# Patient Record
Sex: MALE | Race: WHITE | NOT HISPANIC OR LATINO | URBAN - METROPOLITAN AREA
[De-identification: names, ages, dates, MRNs, and addresses within clinical notes are randomized per-mention and may not be internally consistent; named-entity substitution may affect disease eponyms.]

---

## 2017-12-19 ENCOUNTER — HISTORICAL (OUTPATIENT)
Dept: RADIOLOGY | Facility: HOSPITAL | Age: 57
End: 2017-12-19

## 2018-09-21 ENCOUNTER — HISTORICAL (OUTPATIENT)
Dept: ADMINISTRATIVE | Facility: HOSPITAL | Age: 58
End: 2018-09-21

## 2019-09-25 ENCOUNTER — HISTORICAL (OUTPATIENT)
Dept: ADMINISTRATIVE | Facility: HOSPITAL | Age: 59
End: 2019-09-25

## 2019-09-25 LAB
ALBUMIN SERPL-MCNC: 4.6 G/DL (ref 3.6–4.8)
ALBUMIN/GLOB SERPL: 2 {RATIO} (ref 1.2–2.2)
ALP SERPL-CCNC: 88 IU/L (ref 39–117)
ALT SERPL-CCNC: 22 IU/L (ref 0–44)
AST SERPL-CCNC: 17 IU/L (ref 0–40)
BASOPHILS # BLD AUTO: 0.1 X10E3/UL (ref 0–0.2)
BASOPHILS NFR BLD AUTO: 1 %
BILIRUB SERPL-MCNC: 0.5 MG/DL (ref 0–1.2)
BUN SERPL-MCNC: 28 MG/DL (ref 8–27)
CALCIUM SERPL-MCNC: 9.3 MG/DL (ref 8.6–10.2)
CHLORIDE SERPL-SCNC: 104 MMOL/L (ref 96–106)
CHOLEST SERPL-MCNC: 222 MG/DL (ref 100–199)
CHOLEST/HDLC SERPL: 7.2 RATIO (ref 0–5)
CO2 SERPL-SCNC: 24 MMOL/L (ref 20–29)
CREAT SERPL-MCNC: 1.51 MG/DL (ref 0.76–1.27)
CREAT/UREA NIT SERPL: 19 (ref 10–24)
DEPRECATED CALCIDIOL+CALCIFEROL SERPL-MC: 33.2 NG/ML (ref 30–100)
EOSINOPHIL # BLD AUTO: 0.2 X10E3/UL (ref 0–0.4)
EOSINOPHIL NFR BLD AUTO: 3 %
ERYTHROCYTE [DISTWIDTH] IN BLOOD BY AUTOMATED COUNT: 13.3 % (ref 12.3–15.4)
GLOBULIN SER-MCNC: 2.3 G/DL (ref 1.5–4.5)
GLUCOSE SERPL-MCNC: 177 MG/DL (ref 65–99)
HCT VFR BLD AUTO: 40.8 % (ref 37.5–51)
HDLC SERPL-MCNC: 31 MG/DL
HGB BLD-MCNC: 12.9 G/DL (ref 13–17.7)
LDLC SERPL CALC-MCNC: 166 MG/DL (ref 0–99)
LYMPHOCYTES # BLD AUTO: 1.3 X10E3/UL (ref 0.7–3.1)
LYMPHOCYTES NFR BLD AUTO: 18 %
MCH RBC QN AUTO: 31.1 PG (ref 26.6–33)
MCHC RBC AUTO-ENTMCNC: 31.6 G/DL (ref 31.5–35.7)
MCV RBC AUTO: 98 FL (ref 79–97)
MICROALBUMIN/CREAT RATIO PNL UR: 33.8 MG/G CREAT (ref 0–30)
MONOCYTES # BLD AUTO: 0.7 X10E3/UL (ref 0.1–0.9)
MONOCYTES NFR BLD AUTO: 10 %
NEUTROPHILS # BLD AUTO: 4.9 X10E3/UL (ref 1.4–7)
NEUTROPHILS NFR BLD AUTO: 68 %
PLATELET # BLD AUTO: 174 X10E3/UL (ref 150–450)
POTASSIUM SERPL-SCNC: 4.6 MMOL/L (ref 3.5–5.2)
PROT SERPL-MCNC: 6.9 G/DL (ref 6–8.5)
PSA SERPL-MCNC: 1.2 NG/ML (ref 0–4)
RBC # BLD AUTO: 4.15 X10(6)/MCL (ref 4.14–5.8)
SODIUM SERPL-SCNC: 142 MMOL/L (ref 134–144)
TRIGL SERPL-MCNC: 124 MG/DL (ref 0–149)
TSH SERPL-ACNC: 2.38 MIU/ML (ref 0.45–4.5)
VLDLC SERPL CALC-MCNC: 25 MG/DL (ref 5–40)
WBC # SPEC AUTO: 7.2 X10E3/UL (ref 3.4–10.8)

## 2020-01-06 ENCOUNTER — HISTORICAL (OUTPATIENT)
Dept: ADMINISTRATIVE | Facility: HOSPITAL | Age: 60
End: 2020-01-06

## 2020-01-06 LAB
BASOPHILS # BLD AUTO: 0.1 X10E3/UL (ref 0–0.2)
BASOPHILS NFR BLD AUTO: 1 %
CHOLEST SERPL-MCNC: 168 MG/DL (ref 100–199)
CHOLEST/HDLC SERPL: 4.8 RATIO (ref 0–5)
EOSINOPHIL # BLD AUTO: 0.3 X10E3/UL (ref 0–0.4)
EOSINOPHIL NFR BLD AUTO: 4 %
ERYTHROCYTE [DISTWIDTH] IN BLOOD BY AUTOMATED COUNT: 13.4 % (ref 11.6–15.4)
HCT VFR BLD AUTO: 39.9 % (ref 37.5–51)
HDLC SERPL-MCNC: 35 MG/DL
HGB BLD-MCNC: 12.1 G/DL (ref 13–17.7)
LDLC SERPL CALC-MCNC: 124 MG/DL (ref 0–99)
LYMPHOCYTES # BLD AUTO: 1.6 X10E3/UL (ref 0.7–3.1)
LYMPHOCYTES NFR BLD AUTO: 19 %
MCH RBC QN AUTO: 30.6 PG (ref 26.6–33)
MCHC RBC AUTO-ENTMCNC: 30.3 G/DL (ref 31.5–35.7)
MCV RBC AUTO: 101 FL (ref 79–97)
MONOCYTES # BLD AUTO: 0.9 X10E3/UL (ref 0.1–0.9)
MONOCYTES NFR BLD AUTO: 11 %
NEUTROPHILS # BLD AUTO: 5.4 X10E3/UL (ref 1.4–7)
NEUTROPHILS NFR BLD AUTO: 65 %
PLATELET # BLD AUTO: 169 X10E3/UL (ref 150–450)
RBC # BLD AUTO: 3.95 X10(6)/MCL (ref 4.14–5.8)
TRIGL SERPL-MCNC: 47 MG/DL (ref 0–149)
VLDLC SERPL CALC-MCNC: 9 MG/DL (ref 5–40)
WBC # SPEC AUTO: 8.2 X10E3/UL (ref 3.4–10.8)

## 2020-04-07 ENCOUNTER — HISTORICAL (OUTPATIENT)
Dept: ADMINISTRATIVE | Facility: HOSPITAL | Age: 60
End: 2020-04-07

## 2020-04-07 LAB
ALBUMIN SERPL-MCNC: 4.7 G/DL (ref 3.8–4.8)
ALBUMIN/GLOB SERPL: 1.7 {RATIO} (ref 1.2–2.2)
ALP SERPL-CCNC: 74 IU/L (ref 39–117)
ALT SERPL-CCNC: 66 IU/L (ref 0–44)
AST SERPL-CCNC: 29 IU/L (ref 0–40)
BILIRUB SERPL-MCNC: 0.4 MG/DL (ref 0–1.2)
BUN SERPL-MCNC: 33 MG/DL (ref 8–27)
CALCIUM SERPL-MCNC: 9.7 MG/DL (ref 8.6–10.2)
CHLORIDE SERPL-SCNC: 104 MMOL/L (ref 96–106)
CHOLEST SERPL-MCNC: 187 MG/DL (ref 100–199)
CHOLEST/HDLC SERPL: 5.3 RATIO (ref 0–5)
CO2 SERPL-SCNC: 24 MMOL/L (ref 20–29)
CREAT SERPL-MCNC: 1.46 MG/DL (ref 0.76–1.27)
CREAT/UREA NIT SERPL: 23 (ref 10–24)
GLOBULIN SER-MCNC: 2.7 G/DL (ref 1.5–4.5)
GLUCOSE SERPL-MCNC: 145 MG/DL (ref 65–99)
HDLC SERPL-MCNC: 35 MG/DL
LDLC SERPL CALC-MCNC: 129 MG/DL (ref 0–99)
POTASSIUM SERPL-SCNC: 4.7 MMOL/L (ref 3.5–5.2)
PROT SERPL-MCNC: 7.4 G/DL (ref 6–8.5)
SODIUM SERPL-SCNC: 144 MMOL/L (ref 134–144)
TRIGL SERPL-MCNC: 114 MG/DL (ref 0–149)
VLDLC SERPL CALC-MCNC: 23 MG/DL (ref 5–40)

## 2020-07-14 ENCOUNTER — HISTORICAL (OUTPATIENT)
Dept: ADMINISTRATIVE | Facility: HOSPITAL | Age: 60
End: 2020-07-14

## 2020-07-14 LAB
CHOLEST SERPL-MCNC: 180 MG/DL (ref 100–199)
CHOLEST/HDLC SERPL: 5.3 RATIO (ref 0–5)
HDLC SERPL-MCNC: 34 MG/DL
LDLC SERPL CALC-MCNC: 130 MG/DL (ref 0–99)
TRIGL SERPL-MCNC: 79 MG/DL (ref 0–149)
VLDLC SERPL CALC-MCNC: 16 MG/DL (ref 5–40)

## 2021-01-19 ENCOUNTER — HISTORICAL (OUTPATIENT)
Dept: ADMINISTRATIVE | Facility: HOSPITAL | Age: 61
End: 2021-01-19

## 2021-01-19 LAB
ALBUMIN SERPL-MCNC: 4.8 G/DL (ref 3.8–4.8)
ALBUMIN/GLOB SERPL: 2.2 {RATIO} (ref 1.2–2.2)
ALP SERPL-CCNC: 64 IU/L (ref 39–117)
ALT SERPL-CCNC: 36 IU/L (ref 0–44)
AST SERPL-CCNC: 21 IU/L (ref 0–40)
BASOPHILS # BLD AUTO: 0.1 X10E3/UL (ref 0–0.2)
BASOPHILS NFR BLD AUTO: 1 %
BILIRUB SERPL-MCNC: 0.3 MG/DL (ref 0–1.2)
BUN SERPL-MCNC: 31 MG/DL (ref 8–27)
CALCIUM SERPL-MCNC: 9.9 MG/DL (ref 8.6–10.2)
CHLORIDE SERPL-SCNC: 107 MMOL/L (ref 96–106)
CHOLEST SERPL-MCNC: 193 MG/DL (ref 100–199)
CHOLEST/HDLC SERPL: 4.7 RATIO (ref 0–5)
CO2 SERPL-SCNC: 24 MMOL/L (ref 20–29)
CREAT SERPL-MCNC: 1.6 MG/DL (ref 0.76–1.27)
CREAT/UREA NIT SERPL: 19 (ref 10–24)
DEPRECATED CALCIDIOL+CALCIFEROL SERPL-MC: 36.5 NG/ML (ref 30–100)
EOSINOPHIL # BLD AUTO: 0.3 X10E3/UL (ref 0–0.4)
EOSINOPHIL NFR BLD AUTO: 4 %
ERYTHROCYTE [DISTWIDTH] IN BLOOD BY AUTOMATED COUNT: 13.3 % (ref 11.6–15.4)
GLOBULIN SER-MCNC: 2.2 G/DL (ref 1.5–4.5)
GLUCOSE SERPL-MCNC: 128 MG/DL (ref 65–99)
HCT VFR BLD AUTO: 41.7 % (ref 37.5–51)
HDLC SERPL-MCNC: 41 MG/DL
HGB BLD-MCNC: 12.6 G/DL (ref 13–17.7)
LDLC SERPL CALC-MCNC: 139 MG/DL (ref 0–99)
LYMPHOCYTES # BLD AUTO: 1.5 X10E3/UL (ref 0.7–3.1)
LYMPHOCYTES NFR BLD AUTO: 22 %
MCH RBC QN AUTO: 31.1 PG (ref 26.6–33)
MCHC RBC AUTO-ENTMCNC: 30.2 G/DL (ref 31.5–35.7)
MCV RBC AUTO: 103 FL (ref 79–97)
MICROALBUMIN/CREAT RATIO PNL UR: 54 MG/G CREAT (ref 0–29)
MONOCYTES # BLD AUTO: 0.7 X10E3/UL (ref 0.1–0.9)
MONOCYTES NFR BLD AUTO: 11 %
NEUTROPHILS # BLD AUTO: 4.3 X10E3/UL (ref 1.4–7)
NEUTROPHILS NFR BLD AUTO: 62 %
PLATELET # BLD AUTO: 165 X10E3/UL (ref 150–450)
POTASSIUM SERPL-SCNC: 4.6 MMOL/L (ref 3.5–5.2)
PROT SERPL-MCNC: 7 G/DL (ref 6–8.5)
PSA SERPL-MCNC: 1.2 NG/ML (ref 0–4)
RBC # BLD AUTO: 4.05 X10(6)/MCL (ref 4.14–5.8)
SODIUM SERPL-SCNC: 146 MMOL/L (ref 134–144)
TRIGL SERPL-MCNC: 72 MG/DL (ref 0–149)
TSH SERPL-ACNC: 2.52 MIU/ML (ref 0.45–4.5)
VLDLC SERPL CALC-MCNC: 13 MG/DL (ref 5–40)
WBC # SPEC AUTO: 6.9 X10E3/UL (ref 3.4–10.8)

## 2021-04-20 LAB
HEMOCCULT SP1 STL QL: NEGATIVE
HEMOCCULT SP2 STL QL: NEGATIVE
HEMOCCULT SP3 STL QL: NEGATIVE

## 2021-04-29 ENCOUNTER — HISTORICAL (OUTPATIENT)
Dept: ADMINISTRATIVE | Facility: HOSPITAL | Age: 61
End: 2021-04-29

## 2021-04-29 LAB
ALBUMIN SERPL-MCNC: 4.6 G/DL (ref 3.8–4.8)
ALBUMIN/GLOB SERPL: 1.9 {RATIO} (ref 1.2–2.2)
ALP SERPL-CCNC: 68 IU/L (ref 39–117)
ALT SERPL-CCNC: 34 IU/L (ref 0–44)
AST SERPL-CCNC: 19 IU/L (ref 0–40)
BASOPHILS # BLD AUTO: 0.1 X10E3/UL (ref 0–0.2)
BASOPHILS NFR BLD AUTO: 1 %
BILIRUB SERPL-MCNC: 0.3 MG/DL (ref 0–1.2)
BUN SERPL-MCNC: 27 MG/DL (ref 8–27)
CALCIUM SERPL-MCNC: 9.3 MG/DL (ref 8.6–10.2)
CHLORIDE SERPL-SCNC: 104 MMOL/L (ref 96–106)
CO2 SERPL-SCNC: 23 MMOL/L (ref 20–29)
CREAT SERPL-MCNC: 1.54 MG/DL (ref 0.76–1.27)
CREAT/UREA NIT SERPL: 18 (ref 10–24)
EOSINOPHIL # BLD AUTO: 0.3 X10E3/UL (ref 0–0.4)
EOSINOPHIL NFR BLD AUTO: 4 %
ERYTHROCYTE [DISTWIDTH] IN BLOOD BY AUTOMATED COUNT: 13.3 % (ref 11.6–15.4)
GLOBULIN SER-MCNC: 2.4 G/DL (ref 1.5–4.5)
GLUCOSE SERPL-MCNC: 118 MG/DL (ref 65–99)
HCT VFR BLD AUTO: 40.9 % (ref 37.5–51)
HGB BLD-MCNC: 13 G/DL (ref 13–17.7)
LYMPHOCYTES # BLD AUTO: 1.3 X10E3/UL (ref 0.7–3.1)
LYMPHOCYTES NFR BLD AUTO: 19 %
MCH RBC QN AUTO: 31.6 PG (ref 26.6–33)
MCHC RBC AUTO-ENTMCNC: 31.8 G/DL (ref 31.5–35.7)
MCV RBC AUTO: 100 FL (ref 79–97)
MONOCYTES # BLD AUTO: 0.7 X10E3/UL (ref 0.1–0.9)
MONOCYTES NFR BLD AUTO: 10 %
NEUTROPHILS # BLD AUTO: 4.7 X10E3/UL (ref 1.4–7)
NEUTROPHILS NFR BLD AUTO: 66 %
PLATELET # BLD AUTO: 156 X10E3/UL (ref 150–450)
POTASSIUM SERPL-SCNC: 4.8 MMOL/L (ref 3.5–5.2)
PROT SERPL-MCNC: 7 G/DL (ref 6–8.5)
RBC # BLD AUTO: 4.11 X10(6)/MCL (ref 4.14–5.8)
SODIUM SERPL-SCNC: 143 MMOL/L (ref 134–144)
WBC # SPEC AUTO: 7 X10E3/UL (ref 3.4–10.8)

## 2021-06-01 LAB
LEFT EYE DM RETINOPATHY: POSITIVE
RIGHT EYE DM RETINOPATHY: POSITIVE

## 2021-06-21 ENCOUNTER — HISTORICAL (OUTPATIENT)
Dept: ADMINISTRATIVE | Facility: HOSPITAL | Age: 61
End: 2021-06-21

## 2021-06-21 LAB
ALBUMIN SERPL-MCNC: 4.4 G/DL (ref 3.8–4.8)
ALBUMIN/GLOB SERPL: 1.6 {RATIO} (ref 1.2–2.2)
ALP SERPL-CCNC: 94 IU/L (ref 48–121)
ALT SERPL-CCNC: 45 IU/L (ref 0–44)
AST SERPL-CCNC: 41 IU/L (ref 0–40)
BASOPHILS # BLD AUTO: 0.1 X10E3/UL (ref 0–0.2)
BASOPHILS NFR BLD AUTO: 1 %
BILIRUB SERPL-MCNC: 0.2 MG/DL (ref 0–1.2)
BUN SERPL-MCNC: 40 MG/DL (ref 8–27)
CALCIUM SERPL-MCNC: 9.5 MG/DL (ref 8.6–10.2)
CHLORIDE SERPL-SCNC: 100 MMOL/L (ref 96–106)
CO2 SERPL-SCNC: 19 MMOL/L (ref 20–29)
CREAT SERPL-MCNC: 1.77 MG/DL (ref 0.76–1.27)
CREAT/UREA NIT SERPL: 23 (ref 10–24)
EOSINOPHIL # BLD AUTO: 0.4 X10E3/UL (ref 0–0.4)
EOSINOPHIL NFR BLD AUTO: 3 %
ERYTHROCYTE [DISTWIDTH] IN BLOOD BY AUTOMATED COUNT: 13.6 % (ref 11.6–15.4)
GLOBULIN SER-MCNC: 2.8 G/DL (ref 1.5–4.5)
GLUCOSE SERPL-MCNC: 199 MG/DL (ref 65–99)
HCT VFR BLD AUTO: 34.3 % (ref 37.5–51)
HGB BLD-MCNC: 10.9 G/DL (ref 13–17.7)
LYMPHOCYTES # BLD AUTO: 1.6 X10E3/UL (ref 0.7–3.1)
LYMPHOCYTES NFR BLD AUTO: 15 %
MCH RBC QN AUTO: 31.1 PG (ref 26.6–33)
MCHC RBC AUTO-ENTMCNC: 31.8 G/DL (ref 31.5–35.7)
MCV RBC AUTO: 98 FL (ref 79–97)
MONOCYTES # BLD AUTO: 0.8 X10E3/UL (ref 0.1–0.9)
MONOCYTES NFR BLD AUTO: 8 %
NEUTROPHILS # BLD AUTO: 7.5 X10E3/UL (ref 1.4–7)
NEUTROPHILS NFR BLD AUTO: 73 %
PLATELET # BLD AUTO: 322 X10E3/UL (ref 150–450)
POTASSIUM SERPL-SCNC: 4.7 MMOL/L (ref 3.5–5.2)
PROT SERPL-MCNC: 7.2 G/DL (ref 6–8.5)
PSA SERPL-MCNC: 10.3 NG/ML (ref 0–4)
RBC # BLD AUTO: 3.5 X10(6)/MCL (ref 4.14–5.8)
SODIUM SERPL-SCNC: 139 MMOL/L (ref 134–144)
TSH SERPL-ACNC: 1.71 MIU/ML (ref 0.45–4.5)
WBC # SPEC AUTO: 10.4 X10E3/UL (ref 3.4–10.8)

## 2021-06-28 ENCOUNTER — HISTORICAL (OUTPATIENT)
Dept: ADMINISTRATIVE | Facility: HOSPITAL | Age: 61
End: 2021-06-28

## 2021-06-28 LAB
ALBUMIN SERPL-MCNC: 4.3 G/DL (ref 3.8–4.8)
ALBUMIN/GLOB SERPL: 1.8 {RATIO} (ref 1.2–2.2)
ALP SERPL-CCNC: 74 IU/L (ref 48–121)
ALT SERPL-CCNC: 25 IU/L (ref 0–44)
AST SERPL-CCNC: 18 IU/L (ref 0–40)
BILIRUB SERPL-MCNC: 0.2 MG/DL (ref 0–1.2)
BUN SERPL-MCNC: 37 MG/DL (ref 8–27)
CALCIUM SERPL-MCNC: 9.2 MG/DL (ref 8.6–10.2)
CHLORIDE SERPL-SCNC: 108 MMOL/L (ref 96–106)
CO2 SERPL-SCNC: 20 MMOL/L (ref 20–29)
CREAT SERPL-MCNC: 1.81 MG/DL (ref 0.76–1.27)
CREAT/UREA NIT SERPL: 20 (ref 10–24)
GLOBULIN SER-MCNC: 2.4 G/DL (ref 1.5–4.5)
GLUCOSE SERPL-MCNC: 160 MG/DL (ref 65–99)
POTASSIUM SERPL-SCNC: 4.5 MMOL/L (ref 3.5–5.2)
PROT SERPL-MCNC: 6.7 G/DL (ref 6–8.5)
SODIUM SERPL-SCNC: 146 MMOL/L (ref 134–144)

## 2021-09-27 ENCOUNTER — HISTORICAL (OUTPATIENT)
Dept: ADMINISTRATIVE | Facility: HOSPITAL | Age: 61
End: 2021-09-27

## 2021-09-27 LAB
ALBUMIN SERPL-MCNC: 4.6 G/DL (ref 3.8–4.8)
ALBUMIN/GLOB SERPL: 2.1 {RATIO} (ref 1.2–2.2)
ALP SERPL-CCNC: 72 IU/L (ref 44–121)
ALT SERPL-CCNC: 42 IU/L (ref 0–44)
AST SERPL-CCNC: 32 IU/L (ref 0–40)
BASOPHILS # BLD AUTO: 0 X10E3/UL (ref 0–0.2)
BASOPHILS NFR BLD AUTO: 1 %
BILIRUB SERPL-MCNC: 0.2 MG/DL (ref 0–1.2)
BUN SERPL-MCNC: 32 MG/DL (ref 8–27)
CALCIUM SERPL-MCNC: 9.1 MG/DL (ref 8.6–10.2)
CHLORIDE SERPL-SCNC: 105 MMOL/L (ref 96–106)
CHOLEST SERPL-MCNC: 139 MG/DL (ref 100–199)
CHOLEST/HDLC SERPL: 4 RATIO (ref 0–5)
CO2 SERPL-SCNC: 23 MMOL/L (ref 20–29)
CREAT SERPL-MCNC: 1.72 MG/DL (ref 0.76–1.27)
CREAT/UREA NIT SERPL: 19 (ref 10–24)
DEPRECATED CALCIDIOL+CALCIFEROL SERPL-MC: 34.3 NG/ML (ref 30–100)
EOSINOPHIL # BLD AUTO: 0.2 X10E3/UL (ref 0–0.4)
EOSINOPHIL NFR BLD AUTO: 3 %
ERYTHROCYTE [DISTWIDTH] IN BLOOD BY AUTOMATED COUNT: 14.5 % (ref 11.6–15.4)
GLOBULIN SER-MCNC: 2.2 G/DL (ref 1.5–4.5)
GLUCOSE SERPL-MCNC: 150 MG/DL (ref 65–99)
HCT VFR BLD AUTO: 37.8 % (ref 37.5–51)
HDLC SERPL-MCNC: 35 MG/DL
HGB BLD-MCNC: 11.7 G/DL (ref 13–17.7)
LDLC SERPL CALC-MCNC: 90 MG/DL (ref 0–99)
LYMPHOCYTES # BLD AUTO: 1 X10E3/UL (ref 0.7–3.1)
LYMPHOCYTES NFR BLD AUTO: 17 %
MCH RBC QN AUTO: 30.2 PG (ref 26.6–33)
MCHC RBC AUTO-ENTMCNC: 31 G/DL (ref 31.5–35.7)
MCV RBC AUTO: 98 FL (ref 79–97)
MONOCYTES # BLD AUTO: 0.6 X10E3/UL (ref 0.1–0.9)
MONOCYTES NFR BLD AUTO: 10 %
NEUTROPHILS # BLD AUTO: 4.3 X10E3/UL (ref 1.4–7)
NEUTROPHILS NFR BLD AUTO: 69 %
PLATELET # BLD AUTO: 165 X10E3/UL (ref 150–450)
POTASSIUM SERPL-SCNC: 4.7 MMOL/L (ref 3.5–5.2)
PROT SERPL-MCNC: 6.8 G/DL (ref 6–8.5)
PSA SERPL-MCNC: 5.2 NG/ML (ref 0–4)
RBC # BLD AUTO: 3.87 X10(6)/MCL (ref 4.14–5.8)
SODIUM SERPL-SCNC: 142 MMOL/L (ref 134–144)
TRIGL SERPL-MCNC: 71 MG/DL (ref 0–149)
VLDLC SERPL CALC-MCNC: 14 MG/DL (ref 5–40)
WBC # SPEC AUTO: 6.2 X10E3/UL (ref 3.4–10.8)

## 2022-01-11 ENCOUNTER — HISTORICAL (OUTPATIENT)
Dept: ADMINISTRATIVE | Facility: HOSPITAL | Age: 62
End: 2022-01-11

## 2022-01-11 LAB
ALBUMIN SERPL-MCNC: 4.7 G/DL (ref 3.8–4.8)
ALBUMIN/GLOB SERPL: 2 {RATIO} (ref 1.2–2.2)
ALP SERPL-CCNC: 61 IU/L (ref 44–121)
ALT SERPL-CCNC: 29 IU/L (ref 0–44)
AST SERPL-CCNC: 25 IU/L (ref 0–40)
BASOPHILS # BLD AUTO: 0 X10E3/UL (ref 0–0.2)
BASOPHILS NFR BLD AUTO: 1 %
BILIRUB SERPL-MCNC: 0.3 MG/DL (ref 0–1.2)
BUN SERPL-MCNC: 32 MG/DL (ref 8–27)
CALCIUM SERPL-MCNC: 9.3 MG/DL (ref 8.6–10.2)
CHLORIDE SERPL-SCNC: 105 MMOL/L (ref 96–106)
CHOLEST SERPL-MCNC: 150 MG/DL (ref 100–199)
CHOLEST/HDLC SERPL: 4.1 RATIO (ref 0–5)
CO2 SERPL-SCNC: 23 MMOL/L (ref 20–29)
CREAT SERPL-MCNC: 1.55 MG/DL (ref 0.76–1.27)
CREAT/UREA NIT SERPL: 21 (ref 10–24)
EOSINOPHIL # BLD AUTO: 0.2 X10E3/UL (ref 0–0.4)
EOSINOPHIL NFR BLD AUTO: 3 %
ERYTHROCYTE [DISTWIDTH] IN BLOOD BY AUTOMATED COUNT: 13.8 % (ref 11.6–15.4)
GLOBULIN SER-MCNC: 2.4 G/DL (ref 1.5–4.5)
GLUCOSE SERPL-MCNC: 98 MG/DL (ref 65–99)
HBA1C MFR BLD: 6.6 % (ref 4.8–5.6)
HCT VFR BLD AUTO: 38 % (ref 37.5–51)
HDLC SERPL-MCNC: 37 MG/DL
HGB BLD-MCNC: 11.9 G/DL (ref 13–17.7)
LDLC SERPL CALC-MCNC: 100 MG/DL (ref 0–99)
LYMPHOCYTES # BLD AUTO: 1 X10E3/UL (ref 0.7–3.1)
LYMPHOCYTES NFR BLD AUTO: 15 %
MCH RBC QN AUTO: 31.6 PG (ref 26.6–33)
MCHC RBC AUTO-ENTMCNC: 31.3 G/DL (ref 31.5–35.7)
MCV RBC AUTO: 101 FL (ref 79–97)
MONOCYTES # BLD AUTO: 0.7 X10E3/UL (ref 0.1–0.9)
MONOCYTES NFR BLD AUTO: 9 %
NEUTROPHILS # BLD AUTO: 5 X10E3/UL (ref 1.4–7)
NEUTROPHILS NFR BLD AUTO: 72 %
PLATELET # BLD AUTO: 171 X10E3/UL (ref 150–450)
POTASSIUM SERPL-SCNC: 5 MMOL/L (ref 3.5–5.2)
PROT SERPL-MCNC: 7.1 G/DL (ref 6–8.5)
PSA SERPL-MCNC: 4.1 NG/ML (ref 0–4)
RBC # BLD AUTO: 3.77 X10(6)/MCL (ref 4.14–5.8)
SODIUM SERPL-SCNC: 142 MMOL/L (ref 134–144)
TRIGL SERPL-MCNC: 65 MG/DL (ref 0–149)
VLDLC SERPL CALC-MCNC: 13 MG/DL (ref 5–40)
WBC # SPEC AUTO: 7 X10E3/UL (ref 3.4–10.8)

## 2022-04-11 ENCOUNTER — HISTORICAL (OUTPATIENT)
Dept: ADMINISTRATIVE | Facility: HOSPITAL | Age: 62
End: 2022-04-11
Payer: COMMERCIAL

## 2022-04-29 VITALS
BODY MASS INDEX: 21.19 KG/M2 | OXYGEN SATURATION: 98 % | DIASTOLIC BLOOD PRESSURE: 65 MMHG | WEIGHT: 124.13 LBS | SYSTOLIC BLOOD PRESSURE: 103 MMHG | HEIGHT: 64 IN

## 2022-07-06 PROBLEM — I25.10 ARTERIOSCLEROSIS OF CORONARY ARTERY: Chronic | Status: ACTIVE | Noted: 2022-07-06

## 2022-07-06 PROBLEM — E78.2 MIXED HYPERLIPIDEMIA: Status: ACTIVE | Noted: 2022-07-06

## 2022-07-06 PROBLEM — I25.10 ARTERIOSCLEROSIS OF CORONARY ARTERY: Status: ACTIVE | Noted: 2022-07-06

## 2022-07-06 PROBLEM — G82.20 PARAPARESIS: Chronic | Status: ACTIVE | Noted: 2022-07-06

## 2022-07-06 PROBLEM — I10 HYPERTENSION: Chronic | Status: ACTIVE | Noted: 2022-07-06

## 2022-07-06 PROBLEM — R79.89 LOW VITAMIN D LEVEL: Status: ACTIVE | Noted: 2022-07-06

## 2022-07-06 PROBLEM — D63.8 ANEMIA OF CHRONIC DISEASE: Status: ACTIVE | Noted: 2022-07-06

## 2022-07-06 PROBLEM — I10 HYPERTENSION: Status: ACTIVE | Noted: 2022-07-06

## 2022-07-06 PROBLEM — N18.31 STAGE 3A CHRONIC KIDNEY DISEASE: Status: ACTIVE | Noted: 2022-07-06

## 2022-07-06 PROBLEM — D63.8 ANEMIA OF CHRONIC DISEASE: Chronic | Status: ACTIVE | Noted: 2022-07-06

## 2022-07-06 PROBLEM — R79.89 LOW VITAMIN D LEVEL: Chronic | Status: ACTIVE | Noted: 2022-07-06

## 2022-07-06 PROBLEM — K21.9 GASTROESOPHAGEAL REFLUX DISEASE: Chronic | Status: ACTIVE | Noted: 2022-07-06

## 2022-07-06 PROBLEM — N18.31 STAGE 3A CHRONIC KIDNEY DISEASE: Chronic | Status: ACTIVE | Noted: 2022-07-06

## 2022-07-06 PROBLEM — E53.8 COBALAMIN DEFICIENCY: Status: ACTIVE | Noted: 2022-07-06

## 2022-07-06 PROBLEM — E11.9 TYPE 2 DIABETES MELLITUS: Chronic | Status: ACTIVE | Noted: 2022-07-06

## 2022-07-06 PROBLEM — E11.9 TYPE 2 DIABETES MELLITUS: Status: ACTIVE | Noted: 2022-07-06

## 2022-07-06 PROBLEM — E78.2 MIXED HYPERLIPIDEMIA: Chronic | Status: ACTIVE | Noted: 2022-07-06

## 2022-07-06 PROBLEM — G82.20 PARAPARESIS: Status: ACTIVE | Noted: 2022-07-06

## 2022-07-06 PROBLEM — G95.9 CERVICAL MYELOPATHY: Status: ACTIVE | Noted: 2022-07-06

## 2022-07-06 PROBLEM — I42.8 OTHER CARDIOMYOPATHY: Status: ACTIVE | Noted: 2022-07-06

## 2022-07-06 PROBLEM — E53.8 COBALAMIN DEFICIENCY: Chronic | Status: ACTIVE | Noted: 2022-07-06

## 2022-07-06 PROBLEM — G95.9 CERVICAL MYELOPATHY: Chronic | Status: ACTIVE | Noted: 2022-07-06

## 2022-07-06 PROBLEM — K21.9 GASTROESOPHAGEAL REFLUX DISEASE: Status: ACTIVE | Noted: 2022-07-06

## 2022-07-18 PROBLEM — I42.8 OTHER CARDIOMYOPATHY: Chronic | Status: ACTIVE | Noted: 2022-07-06

## 2022-07-25 ENCOUNTER — OFFICE VISIT (OUTPATIENT)
Dept: NEUROLOGY | Facility: CLINIC | Age: 62
End: 2022-07-25
Payer: COMMERCIAL

## 2022-07-25 VITALS
BODY MASS INDEX: 20.32 KG/M2 | SYSTOLIC BLOOD PRESSURE: 102 MMHG | WEIGHT: 119 LBS | HEIGHT: 64 IN | DIASTOLIC BLOOD PRESSURE: 60 MMHG

## 2022-07-25 DIAGNOSIS — Z86.73 H/O: STROKE: ICD-10-CM

## 2022-07-25 DIAGNOSIS — I63.89 OTHER CEREBRAL INFARCTION: ICD-10-CM

## 2022-07-25 DIAGNOSIS — G82.20 PARAPARESIS: Primary | Chronic | ICD-10-CM

## 2022-07-25 DIAGNOSIS — M54.16 LUMBAR RADICULAR PAIN: ICD-10-CM

## 2022-07-25 DIAGNOSIS — R33.9 URINARY RETENTION: ICD-10-CM

## 2022-07-25 DIAGNOSIS — R25.8 CLONUS: ICD-10-CM

## 2022-07-25 DIAGNOSIS — G95.9 CERVICAL MYELOPATHY: Chronic | ICD-10-CM

## 2022-07-25 PROCEDURE — 1159F PR MEDICATION LIST DOCUMENTED IN MEDICAL RECORD: ICD-10-PCS | Mod: CPTII,S$GLB,, | Performed by: NURSE PRACTITIONER

## 2022-07-25 PROCEDURE — 3074F SYST BP LT 130 MM HG: CPT | Mod: CPTII,S$GLB,, | Performed by: NURSE PRACTITIONER

## 2022-07-25 PROCEDURE — 3078F DIAST BP <80 MM HG: CPT | Mod: CPTII,S$GLB,, | Performed by: NURSE PRACTITIONER

## 2022-07-25 PROCEDURE — 99999 PR PBB SHADOW E&M-EST. PATIENT-LVL IV: ICD-10-PCS | Mod: PBBFAC,,, | Performed by: NURSE PRACTITIONER

## 2022-07-25 PROCEDURE — 1160F PR REVIEW ALL MEDS BY PRESCRIBER/CLIN PHARMACIST DOCUMENTED: ICD-10-PCS | Mod: CPTII,S$GLB,, | Performed by: NURSE PRACTITIONER

## 2022-07-25 PROCEDURE — 3051F PR MOST RECENT HEMOGLOBIN A1C LEVEL 7.0 - < 8.0%: ICD-10-PCS | Mod: CPTII,S$GLB,, | Performed by: NURSE PRACTITIONER

## 2022-07-25 PROCEDURE — 99214 PR OFFICE/OUTPT VISIT, EST, LEVL IV, 30-39 MIN: ICD-10-PCS | Mod: S$GLB,,, | Performed by: NURSE PRACTITIONER

## 2022-07-25 PROCEDURE — 1159F MED LIST DOCD IN RCRD: CPT | Mod: CPTII,S$GLB,, | Performed by: NURSE PRACTITIONER

## 2022-07-25 PROCEDURE — 99214 OFFICE O/P EST MOD 30 MIN: CPT | Mod: S$GLB,,, | Performed by: NURSE PRACTITIONER

## 2022-07-25 PROCEDURE — 99999 PR PBB SHADOW E&M-EST. PATIENT-LVL IV: CPT | Mod: PBBFAC,,, | Performed by: NURSE PRACTITIONER

## 2022-07-25 PROCEDURE — 3051F HG A1C>EQUAL 7.0%<8.0%: CPT | Mod: CPTII,S$GLB,, | Performed by: NURSE PRACTITIONER

## 2022-07-25 PROCEDURE — 3008F BODY MASS INDEX DOCD: CPT | Mod: CPTII,S$GLB,, | Performed by: NURSE PRACTITIONER

## 2022-07-25 PROCEDURE — 3008F PR BODY MASS INDEX (BMI) DOCUMENTED: ICD-10-PCS | Mod: CPTII,S$GLB,, | Performed by: NURSE PRACTITIONER

## 2022-07-25 PROCEDURE — 3074F PR MOST RECENT SYSTOLIC BLOOD PRESSURE < 130 MM HG: ICD-10-PCS | Mod: CPTII,S$GLB,, | Performed by: NURSE PRACTITIONER

## 2022-07-25 PROCEDURE — 1160F RVW MEDS BY RX/DR IN RCRD: CPT | Mod: CPTII,S$GLB,, | Performed by: NURSE PRACTITIONER

## 2022-07-25 PROCEDURE — 3078F PR MOST RECENT DIASTOLIC BLOOD PRESSURE < 80 MM HG: ICD-10-PCS | Mod: CPTII,S$GLB,, | Performed by: NURSE PRACTITIONER

## 2022-07-25 RX ORDER — DULOXETIN HYDROCHLORIDE 30 MG/1
30 CAPSULE, DELAYED RELEASE ORAL DAILY
Qty: 7 CAPSULE | Refills: 0 | Status: SHIPPED | OUTPATIENT
Start: 2022-07-25 | End: 2023-07-24

## 2022-07-25 NOTE — PROGRESS NOTES
Subjective:          Patient ID: Froy Barragan is a 62 y.o. male.    Chief Complaint: paraparesis     HPI:            Cont to have lower back pain, weakness to legs; states limited walking distance: now utilizes w/c in home; walker for short distances    States cymbalta not helpful; he had stopped gabapentin but inc pain and resumed; wants to stop the Cymbalta.    Has urinary urgency; self caths; management per Dr. Dilan Berry    Has frequent falls    Denies stroke symptoms    Continues with Paraparesis and increased weakness and deconditioning.    Sleeps well most nights      ROS: as per HPI, otherwise pertinent systems review is negative          Past Medical History:   Diagnosis Date    Anemia of chronic disease 7/6/2022    Arteriosclerosis of coronary artery 7/6/2022    Cervical myelopathy 7/6/2022    Cobalamin deficiency 7/6/2022    Gastroesophageal reflux disease 7/6/2022    Hypertension 7/6/2022    Low vitamin D level 7/6/2022    Mixed hyperlipidemia 7/6/2022    Paraparesis 7/6/2022    Stage 3a chronic kidney disease 7/6/2022    Type 2 diabetes mellitus 7/6/2022       Past Surgical History:   Procedure Laterality Date    SPINE SURGERY         History reviewed. No pertinent family history.    Social History     Socioeconomic History    Marital status: Single   Tobacco Use    Smoking status: Never Smoker    Smokeless tobacco: Never Used   Substance and Sexual Activity    Alcohol use: Never    Drug use: Never       Review of patient's allergies indicates:  No Known Allergies      Current Outpatient Medications:     alfuzosin (UROXATRAL) 10 mg Tb24, Take 10 mg by mouth once daily., Disp: , Rfl:     amLODIPine (NORVASC) 2.5 MG tablet, Take 2.5 mg by mouth., Disp: , Rfl:     aspirin (ECOTRIN) 81 MG EC tablet, Take 81 mg by mouth once daily., Disp: , Rfl:     carvediloL (COREG) 25 MG tablet, Take 25 mg by mouth 2 (two) times daily., Disp: , Rfl:     DULoxetine (CYMBALTA) 30 MG  "capsule, Take 60 mg by mouth once daily., Disp: , Rfl:     ezetimibe (ZETIA) 10 mg tablet, Take 10 mg by mouth once daily., Disp: , Rfl:     gabapentin (NEURONTIN) 300 MG capsule, Take 300 mg by mouth 3 (three) times daily., Disp: , Rfl:     glimepiride (AMARYL) 4 MG tablet, Take 1 tablet (4 mg total) by mouth daily with breakfast., Disp: 90 tablet, Rfl: 3    pantoprazole (PROTONIX) 40 MG tablet, Take 1 tablet (40 mg total) by mouth once daily., Disp: 30 tablet, Rfl: 11    rosuvastatin (CRESTOR) 40 MG Tab, Take 40 mg by mouth once daily., Disp: , Rfl:     vitamin D (VITAMIN D3) 1000 units Tab, Take 1,000 Units by mouth once daily., Disp: , Rfl:          Objective:        Exam:   /60   Ht 5' 4" (1.626 m)   Wt 54 kg (119 lb)   BMI 20.43 kg/m²     Physical Exam  Vitals reviewed.   Constitutional:       Appearance: no distress  HENT:      Ears:      Comments: normal   Eyes:      Extraocular Movements: Extraocular movements intact.      VFs grossly intact  Cardiovascular:      Rate and Rhythm: Normal rate and regular rhythm.   Pulmonary:      Effort: Pulmonary effort is normal.      Breath sounds: Normal breath sounds.   Musculoskeletal:         General: Normal range of motion; generalized weakness     B FDI and gastroc wasting  Skin:     General: Skin is warm and dry.      Healing abrasion lateral aspect of R elbow     Healing abrasions B knees  Neurological:      Mental Status: He is alert and oriented to person, place, and time.     Speech clear and fluent; no dysarthria     Face symmetric     Motor - gen weakness; B bicep/tricep/pincers/figner spread/wrist ext      5/5; B hip, PF, DF all 3-4/5; B knee ext 5/5     Reduced vib at the ankles and great toes     B bicep/tri/brachrad/KJs/AJs 2+     Nonsustained R ankle clonus and increased L ankle tone     No cogwheel rigidity     strongly positive bilateral Babinski's      Gait: WC; able to stand with assistance and was anteriorflexed;    unable to take a " step unassisted  Psychiatric:         Mood and Affect: Mood normal.         Behavior: Behavior normal.     Upper extremities move well but has wasting in the hand muscles.  Lower extremities were stiff           Assessment/Plan:     Problem List Items Addressed This Visit        Neuro    Cervical myelopathy (Chronic)    Current Assessment & Plan     MRI C spine w w/o contrast    Fall risk discussed; asked that he use WC or walker at all times    He refused PT    FU in 2 weeks w/Dr. Smyth for imaging review           Paraparesis - Primary (Chronic)    Current Assessment & Plan     Repeat MRI brain and C spine w w/o contrast  Advise he be careful not to fall; use assistive devices at all times    Driving discussed    Patient refused PT           H/O: stroke    Lumbar radicular pain    Current Assessment & Plan     Wean the Duloxetine:  For one week take 30 mg daily then stop taking    Ok to increase the Gabapentin to 2 caps per dose three times per day by adding one cap per week to one dose starting with bed time dose. He will ok this with PCP Dr. Bran who prescribes this for him.     Medication administration personally reviewed with patient by MD/provider. Potential or actual medication changes discussed. Common and potentially serious side effects of medications or medication changes discussed. Discussed increase risk of falls while taking Gabapentin; he ia aware and mentioned that most of the time he is in the WC.             Clonus    Current Assessment & Plan     Nonsustained R ankle clonus    MRI C spine w w/o contrast              Renal/    Urinary retention    Current Assessment & Plan     Patient continues to self catheterize - management per Dr. Dilan Berry    Repeat MRI  C spine w w/o contrast              Other Visit Diagnoses     Other cerebral infarction                   Continue 2' stroke prevention measures         Abhinav Alvarez, MSN, APRN, AGACNP-BC

## 2022-07-25 NOTE — ASSESSMENT & PLAN NOTE
Wean the Duloxetine:  For one week take 30 mg daily then stop taking    Ok to increase the Gabapentin to 2 caps per dose three times per day by adding one cap per week to one dose starting with bed time dose. He will ok this with PCP Dr. Bran who prescribes this for him.     Medication administration personally reviewed with patient by MD/provider. Potential or actual medication changes discussed. Common and potentially serious side effects of medications or medication changes discussed. Discussed increase risk of falls while taking Gabapentin; he ia aware and mentioned that most of the time he is in the .

## 2022-07-25 NOTE — ASSESSMENT & PLAN NOTE
MRI C spine w w/o contrast    Fall risk discussed; asked that he use WC or walker at all times    He refused PT    FU in 2 weeks w/Dr. Smyth for imaging review

## 2022-07-25 NOTE — ASSESSMENT & PLAN NOTE
Repeat MRI brain and C spine w w/o contrast  Advise he be careful not to fall; use assistive devices at all times    Driving discussed    Patient refused PT

## 2022-07-25 NOTE — ASSESSMENT & PLAN NOTE
Patient continues to self catheterize - management per Dr. Dilan Berry    Repeat MRI  C spine w w/o contrast

## 2022-08-08 ENCOUNTER — TELEPHONE (OUTPATIENT)
Dept: NEUROLOGY | Facility: CLINIC | Age: 62
End: 2022-08-08
Payer: COMMERCIAL

## 2022-08-08 NOTE — TELEPHONE ENCOUNTER
D/w pt: auth completed for [facility]; pt sched'd for 8/9/22 @345 (arrival time); pt notified; contact info for imaging location

## 2022-08-08 NOTE — TELEPHONE ENCOUNTER
CT scans an option but not likely to show me what I need to see (cord or demyelination) and if he has not met deductible this may be cost prohibitive as well. I could possibly show me stenosis and possibe stroke changes although this is less likely.     He was averse to PT that I suggested as well.    I did rec changes to his med regimen so we can keep his upcoming appt for now so we can discuss.    Is he averse to CT scans?

## 2022-08-08 NOTE — TELEPHONE ENCOUNTER
----- Message from Jojo Dean sent at 2022  8:50 AM CDT -----  Regarding: MRI not done yet  CallType: Patient Call  To: Ofc When in   From: Froy Barragan   Phone: [phone]   Patient name: Same   : 1960   Reg Dr: Dr Dennis Smyth   Ref: Did not do his MRI, will this  affect his appointment? Please call    [facility] ID: [id number]    --------------------------------------  Message History  Account: [id number]  Taken:  Fri 05-Aug-2022  1:17p PEYTON  Serial#: [phone]

## 2022-08-08 NOTE — TELEPHONE ENCOUNTER
rec'd call from [facility] regarding imaging being cancelled by patient  ..  Cost issue (he has not met his deductible); he has a f/u appt sched'd for this Thursday: how do y'all want to proceed

## 2022-08-10 ENCOUNTER — DOCUMENTATION ONLY (OUTPATIENT)
Dept: ADMINISTRATIVE | Facility: HOSPITAL | Age: 62
End: 2022-08-10
Payer: COMMERCIAL

## 2022-10-06 PROBLEM — R25.8 CLONUS: Chronic | Status: ACTIVE | Noted: 2022-07-25

## 2022-10-06 PROBLEM — M54.16 LUMBAR RADICULAR PAIN: Chronic | Status: ACTIVE | Noted: 2022-07-25

## 2022-10-06 PROBLEM — R33.9 URINARY RETENTION: Chronic | Status: ACTIVE | Noted: 2022-07-25

## 2022-10-06 PROBLEM — Z86.73 H/O: STROKE: Chronic | Status: ACTIVE | Noted: 2022-07-25

## 2022-10-20 PROBLEM — Z00.00 WELLNESS EXAMINATION: Status: ACTIVE | Noted: 2022-10-20

## 2023-01-10 PROBLEM — Z00.00 WELLNESS EXAMINATION: Status: RESOLVED | Noted: 2022-10-20 | Resolved: 2023-01-10

## 2024-05-03 ENCOUNTER — HOSPITAL ENCOUNTER (OUTPATIENT)
Dept: RADIOLOGY | Facility: HOSPITAL | Age: 64
Discharge: HOME | End: 2024-05-03
Attending: INTERNAL MEDICINE
Payer: MEDICARE

## 2024-05-03 DIAGNOSIS — N18.31 STAGE 3A CHRONIC KIDNEY DISEASE: Chronic | ICD-10-CM

## 2024-05-03 PROCEDURE — 82570 ASSAY OF URINE CREATININE: CPT

## 2024-05-03 PROCEDURE — 84156 ASSAY OF PROTEIN URINE: CPT

## 2024-05-03 PROCEDURE — 76775 US EXAM ABDO BACK WALL LIM: CPT | Mod: TC

## 2024-05-03 PROCEDURE — 0077U IG PARAPROTEIN QUAL BLD/UR: CPT

## 2024-05-10 PROCEDURE — 84156 ASSAY OF PROTEIN URINE: CPT | Performed by: INTERNAL MEDICINE

## 2024-05-10 PROCEDURE — 82570 ASSAY OF URINE CREATININE: CPT | Performed by: INTERNAL MEDICINE

## 2024-06-10 ENCOUNTER — LAB VISIT (OUTPATIENT)
Dept: LAB | Facility: HOSPITAL | Age: 64
End: 2024-06-10
Attending: INTERNAL MEDICINE
Payer: MEDICARE

## 2024-06-10 DIAGNOSIS — B70.0 MEGALOBLASTIC ANEMIA DUE TO FISH TAPEWORM: ICD-10-CM

## 2024-06-10 DIAGNOSIS — N18.31 CHRONIC KIDNEY DISEASE (CKD) STAGE G3A/A1, MODERATELY DECREASED GLOMERULAR FILTRATION RATE (GFR) BETWEEN 45-59 ML/MIN/1.73 SQUARE METER AND ALBUMINURIA CREATININE RATIO LESS THAN 30 MG/G: Primary | ICD-10-CM

## 2024-06-10 DIAGNOSIS — D63.8 MEGALOBLASTIC ANEMIA DUE TO FISH TAPEWORM: ICD-10-CM

## 2024-06-10 LAB
ERYTHROCYTE [DISTWIDTH] IN BLOOD BY AUTOMATED COUNT: 13.7 % (ref 11.5–17)
HCT VFR BLD AUTO: 37.2 % (ref 42–52)
HGB BLD-MCNC: 12.1 G/DL (ref 14–18)
MCH RBC QN AUTO: 31 PG (ref 27–31)
MCHC RBC AUTO-ENTMCNC: 32.5 G/DL (ref 33–36)
MCV RBC AUTO: 95.4 FL (ref 80–94)
NRBC BLD AUTO-RTO: 0 %
PLATELET # BLD AUTO: 234 X10(3)/MCL (ref 130–400)
PMV BLD AUTO: 11.1 FL (ref 7.4–10.4)
RBC # BLD AUTO: 3.9 X10(6)/MCL (ref 4.7–6.1)
WBC # SPEC AUTO: 7.49 X10(3)/MCL (ref 4.5–11.5)

## 2024-06-10 PROCEDURE — 36415 COLL VENOUS BLD VENIPUNCTURE: CPT

## 2024-06-10 PROCEDURE — 85027 COMPLETE CBC AUTOMATED: CPT

## 2024-06-12 LAB — VIEW PATHOLOGY REPORT (RELIAPATH): NORMAL

## 2024-09-08 ENCOUNTER — HOSPITAL ENCOUNTER (INPATIENT)
Facility: HOSPITAL | Age: 64
LOS: 23 days | Discharge: NURSING, REHAB OR LONG-TERM CARE | DRG: 871 | End: 2024-10-01
Attending: EMERGENCY MEDICINE | Admitting: INTERNAL MEDICINE
Payer: MEDICARE

## 2024-09-08 DIAGNOSIS — E86.0 ACUTE DEHYDRATION: ICD-10-CM

## 2024-09-08 DIAGNOSIS — I63.9 STROKE (CEREBRUM): ICD-10-CM

## 2024-09-08 DIAGNOSIS — M00.00 STAPHYLOCOCCAL ARTHRITIS, SEPTIC ARTHRITIS OF UNSPECIFIED LOCATION: ICD-10-CM

## 2024-09-08 DIAGNOSIS — R00.0 TACHYCARDIA: ICD-10-CM

## 2024-09-08 DIAGNOSIS — R73.9 HYPERGLYCEMIA: ICD-10-CM

## 2024-09-08 DIAGNOSIS — E11.10 DIABETIC KETOACIDOSIS WITHOUT COMA ASSOCIATED WITH TYPE 2 DIABETES MELLITUS: Primary | ICD-10-CM

## 2024-09-08 DIAGNOSIS — I50.31 ACUTE HEART FAILURE WITH PRESERVED EJECTION FRACTION: ICD-10-CM

## 2024-09-08 DIAGNOSIS — R94.31 QT PROLONGATION: ICD-10-CM

## 2024-09-08 DIAGNOSIS — G95.9 CERVICAL MYELOPATHY: ICD-10-CM

## 2024-09-08 DIAGNOSIS — N39.9 URINARY TRACT DISORDER: ICD-10-CM

## 2024-09-08 DIAGNOSIS — N17.9 ACUTE KIDNEY INJURY: ICD-10-CM

## 2024-09-08 DIAGNOSIS — I95.9 HYPOTENSION, UNSPECIFIED HYPOTENSION TYPE: ICD-10-CM

## 2024-09-08 DIAGNOSIS — R60.9 SWELLING: ICD-10-CM

## 2024-09-08 LAB
ALBUMIN SERPL-MCNC: 2.8 G/DL (ref 3.4–4.8)
ALBUMIN/GLOB SERPL: 0.9 RATIO (ref 1.1–2)
ALP SERPL-CCNC: 56 UNIT/L (ref 40–150)
ALT SERPL-CCNC: 13 UNIT/L (ref 0–55)
ANION GAP SERPL CALC-SCNC: 14 MEQ/L
ANION GAP SERPL CALC-SCNC: 9 MEQ/L
AST SERPL-CCNC: 22 UNIT/L (ref 5–34)
B-OH-BUTYR SERPL-MCNC: 1.4 MMOL/L
BACTERIA #/AREA URNS AUTO: ABNORMAL /HPF
BASE EXCESS BLD CALC-SCNC: -11.4 MMOL/L
BASOPHILS # BLD AUTO: 0.04 X10(3)/MCL
BASOPHILS NFR BLD AUTO: 0.4 %
BILIRUB SERPL-MCNC: 0.3 MG/DL
BILIRUB UR QL STRIP.AUTO: NEGATIVE
BLOOD GAS SAMPLE TYPE (OHS): ABNORMAL
BUN SERPL-MCNC: 76.8 MG/DL (ref 8.4–25.7)
BUN SERPL-MCNC: 88.6 MG/DL (ref 8.4–25.7)
CA-I BLD-SCNC: 1.04 MMOL/L (ref 1.12–1.23)
CALCIUM SERPL-MCNC: 7.1 MG/DL (ref 8.8–10)
CALCIUM SERPL-MCNC: 8.6 MG/DL (ref 8.8–10)
CHLORIDE SERPL-SCNC: 104 MMOL/L (ref 98–107)
CHLORIDE SERPL-SCNC: 116 MMOL/L (ref 98–107)
CK SERPL-CCNC: 712 U/L (ref 30–200)
CLARITY UR: ABNORMAL
CO2 BLDA-SCNC: 14.8 MMOL/L
CO2 SERPL-SCNC: 15 MMOL/L (ref 23–31)
CO2 SERPL-SCNC: 16 MMOL/L (ref 23–31)
COHGB MFR BLDA: 1.8 %
COLOR UR AUTO: ABNORMAL
CREAT SERPL-MCNC: 2.66 MG/DL (ref 0.73–1.18)
CREAT SERPL-MCNC: 3.4 MG/DL (ref 0.73–1.18)
CREAT/UREA NIT SERPL: 26
CREAT/UREA NIT SERPL: 29
DRAWN BY BLOOD GAS (OHS): ABNORMAL
EOSINOPHIL # BLD AUTO: 0 X10(3)/MCL (ref 0–0.9)
EOSINOPHIL NFR BLD AUTO: 0 %
ERYTHROCYTE [DISTWIDTH] IN BLOOD BY AUTOMATED COUNT: 13.2 % (ref 11.5–17)
FLUAV AG UPPER RESP QL IA.RAPID: NOT DETECTED
FLUBV AG UPPER RESP QL IA.RAPID: NOT DETECTED
GFR SERPLBLD CREATININE-BSD FMLA CKD-EPI: 19 ML/MIN/1.73/M2
GFR SERPLBLD CREATININE-BSD FMLA CKD-EPI: 26 ML/MIN/1.73/M2
GLOBULIN SER-MCNC: 3.1 GM/DL (ref 2.4–3.5)
GLUCOSE SERPL-MCNC: 283 MG/DL (ref 82–115)
GLUCOSE SERPL-MCNC: 607 MG/DL (ref 82–115)
GLUCOSE UR QL STRIP: ABNORMAL
HCO3 BLDA-SCNC: 13.9 MMOL/L
HCT VFR BLD AUTO: 34.5 % (ref 42–52)
HGB BLD-MCNC: 11.8 G/DL (ref 14–18)
HGB UR QL STRIP: ABNORMAL
IMM GRANULOCYTES # BLD AUTO: 0.02 X10(3)/MCL (ref 0–0.04)
IMM GRANULOCYTES NFR BLD AUTO: 0.2 %
KETONES UR QL STRIP: NEGATIVE
LACTATE SERPL-SCNC: 5.1 MMOL/L (ref 0.5–2.2)
LACTATE SERPL-SCNC: 5.4 MMOL/L (ref 0.5–2.2)
LEUKOCYTE ESTERASE UR QL STRIP: 250
LYMPHOCYTES # BLD AUTO: 0.19 X10(3)/MCL (ref 0.6–4.6)
LYMPHOCYTES NFR BLD AUTO: 1.8 %
MAGNESIUM SERPL-MCNC: 1.4 MG/DL (ref 1.6–2.6)
MAGNESIUM SERPL-MCNC: 1.6 MG/DL (ref 1.6–2.6)
MCH RBC QN AUTO: 31.8 PG (ref 27–31)
MCHC RBC AUTO-ENTMCNC: 34.2 G/DL (ref 33–36)
MCV RBC AUTO: 93 FL (ref 80–94)
METHGB MFR BLDA: 1.4 %
MONOCYTES # BLD AUTO: 0.99 X10(3)/MCL (ref 0.1–1.3)
MONOCYTES NFR BLD AUTO: 9.5 %
MRSA PCR SCRN (OHS): NOT DETECTED
MUCOUS THREADS URNS QL MICRO: ABNORMAL /LPF
NEUTROPHILS # BLD AUTO: 9.13 X10(3)/MCL (ref 2.1–9.2)
NEUTROPHILS NFR BLD AUTO: 88.1 %
NITRITE UR QL STRIP: ABNORMAL
NRBC BLD AUTO-RTO: 0 %
O2 HB BLOOD GAS (OHS): 87 %
OXYHGB MFR BLDA: 11.1 G/DL
PCO2 BLDA: 29 MMHG (ref 20–50)
PH BLDA: 7.29 [PH] (ref 7.3–7.6)
PH UR STRIP: 5.5 [PH]
PHOSPHATE SERPL-MCNC: 2 MG/DL (ref 2.3–4.7)
PLATELET # BLD AUTO: 174 X10(3)/MCL (ref 130–400)
PMV BLD AUTO: 12 FL (ref 7.4–10.4)
PO2 BLDA: 54 MMHG
POCT GLUCOSE: 274 MG/DL (ref 70–110)
POCT GLUCOSE: 283 MG/DL (ref 70–110)
POCT GLUCOSE: 445 MG/DL (ref 70–110)
POCT GLUCOSE: >500 MG/DL (ref 70–110)
POTASSIUM BLOOD GAS (OHS): 4.2 MMOL/L (ref 3.5–5)
POTASSIUM SERPL-SCNC: 3.9 MMOL/L (ref 3.5–5.1)
POTASSIUM SERPL-SCNC: 4.7 MMOL/L (ref 3.5–5.1)
PROT SERPL-MCNC: 5.9 GM/DL (ref 5.8–7.6)
PROT UR QL STRIP: ABNORMAL
RBC # BLD AUTO: 3.71 X10(6)/MCL (ref 4.7–6.1)
RBC #/AREA URNS AUTO: ABNORMAL /HPF
RSV A 5' UTR RNA NPH QL NAA+PROBE: NOT DETECTED
SAO2 % BLDA: 83.3 %
SARS-COV-2 RNA RESP QL NAA+PROBE: NOT DETECTED
SODIUM BLOOD GAS (OHS): 130 MMOL/L (ref 137–145)
SODIUM SERPL-SCNC: 134 MMOL/L (ref 136–145)
SODIUM SERPL-SCNC: 140 MMOL/L (ref 136–145)
SP GR UR STRIP.AUTO: 1.01 (ref 1–1.03)
SQUAMOUS #/AREA URNS LPF: ABNORMAL /HPF
TROPONIN I SERPL-MCNC: 0.02 NG/ML (ref 0–0.04)
UROBILINOGEN UR STRIP-ACNC: NORMAL
WBC # BLD AUTO: 10.37 X10(3)/MCL (ref 4.5–11.5)
WBC #/AREA URNS AUTO: >100 /HPF

## 2024-09-08 PROCEDURE — 0BH17EZ INSERTION OF ENDOTRACHEAL AIRWAY INTO TRACHEA, VIA NATURAL OR ARTIFICIAL OPENING: ICD-10-PCS | Performed by: NURSE ANESTHETIST, CERTIFIED REGISTERED

## 2024-09-08 PROCEDURE — 84484 ASSAY OF TROPONIN QUANT: CPT

## 2024-09-08 PROCEDURE — 63600175 PHARM REV CODE 636 W HCPCS

## 2024-09-08 PROCEDURE — 20000000 HC ICU ROOM

## 2024-09-08 PROCEDURE — 87070 CULTURE OTHR SPECIMN AEROBIC: CPT

## 2024-09-08 PROCEDURE — 84100 ASSAY OF PHOSPHORUS: CPT

## 2024-09-08 PROCEDURE — 94640 AIRWAY INHALATION TREATMENT: CPT

## 2024-09-08 PROCEDURE — 83735 ASSAY OF MAGNESIUM: CPT | Performed by: EMERGENCY MEDICINE

## 2024-09-08 PROCEDURE — 51702 INSERT TEMP BLADDER CATH: CPT

## 2024-09-08 PROCEDURE — 85025 COMPLETE CBC W/AUTO DIFF WBC: CPT | Performed by: EMERGENCY MEDICINE

## 2024-09-08 PROCEDURE — 5A1945Z RESPIRATORY VENTILATION, 24-96 CONSECUTIVE HOURS: ICD-10-PCS | Performed by: NURSE ANESTHETIST, CERTIFIED REGISTERED

## 2024-09-08 PROCEDURE — 25000003 PHARM REV CODE 250

## 2024-09-08 PROCEDURE — 83605 ASSAY OF LACTIC ACID: CPT

## 2024-09-08 PROCEDURE — 27100171 HC OXYGEN HIGH FLOW UP TO 24 HOURS

## 2024-09-08 PROCEDURE — 87040 BLOOD CULTURE FOR BACTERIA: CPT

## 2024-09-08 PROCEDURE — 99900035 HC TECH TIME PER 15 MIN (STAT)

## 2024-09-08 PROCEDURE — 25000003 PHARM REV CODE 250: Performed by: EMERGENCY MEDICINE

## 2024-09-08 PROCEDURE — 94799 UNLISTED PULMONARY SVC/PX: CPT

## 2024-09-08 PROCEDURE — 87641 MR-STAPH DNA AMP PROBE: CPT

## 2024-09-08 PROCEDURE — 82803 BLOOD GASES ANY COMBINATION: CPT

## 2024-09-08 PROCEDURE — 0241U COVID/RSV/FLU A&B PCR: CPT

## 2024-09-08 PROCEDURE — 99285 EMERGENCY DEPT VISIT HI MDM: CPT | Mod: 25

## 2024-09-08 PROCEDURE — 96374 THER/PROPH/DIAG INJ IV PUSH: CPT

## 2024-09-08 PROCEDURE — 99900031 HC PATIENT EDUCATION (STAT)

## 2024-09-08 PROCEDURE — 81001 URINALYSIS AUTO W/SCOPE: CPT | Performed by: EMERGENCY MEDICINE

## 2024-09-08 PROCEDURE — 27000207 HC ISOLATION

## 2024-09-08 PROCEDURE — 87086 URINE CULTURE/COLONY COUNT: CPT | Performed by: EMERGENCY MEDICINE

## 2024-09-08 PROCEDURE — 87077 CULTURE AEROBIC IDENTIFY: CPT | Performed by: EMERGENCY MEDICINE

## 2024-09-08 PROCEDURE — 27100092 HC HIGH FLOW DELIVERY CANNULA

## 2024-09-08 PROCEDURE — 27000249 HC VAPOTHERM CIRCUIT

## 2024-09-08 PROCEDURE — 25000242 PHARM REV CODE 250 ALT 637 W/ HCPCS

## 2024-09-08 PROCEDURE — 83735 ASSAY OF MAGNESIUM: CPT

## 2024-09-08 PROCEDURE — 80053 COMPREHEN METABOLIC PANEL: CPT | Performed by: EMERGENCY MEDICINE

## 2024-09-08 PROCEDURE — 96361 HYDRATE IV INFUSION ADD-ON: CPT

## 2024-09-08 PROCEDURE — 82962 GLUCOSE BLOOD TEST: CPT

## 2024-09-08 PROCEDURE — 63600175 PHARM REV CODE 636 W HCPCS: Performed by: EMERGENCY MEDICINE

## 2024-09-08 PROCEDURE — 87798 DETECT AGENT NOS DNA AMP: CPT

## 2024-09-08 PROCEDURE — 94760 N-INVAS EAR/PLS OXIMETRY 1: CPT | Mod: XB

## 2024-09-08 PROCEDURE — 82550 ASSAY OF CK (CPK): CPT

## 2024-09-08 PROCEDURE — 82010 KETONE BODYS QUAN: CPT | Performed by: EMERGENCY MEDICINE

## 2024-09-08 RX ORDER — IPRATROPIUM BROMIDE AND ALBUTEROL SULFATE 2.5; .5 MG/3ML; MG/3ML
3 SOLUTION RESPIRATORY (INHALATION)
Status: DISCONTINUED | OUTPATIENT
Start: 2024-09-08 | End: 2024-09-12

## 2024-09-08 RX ORDER — NOREPINEPHRINE BITARTRATE/D5W 8 MG/250ML
PLASTIC BAG, INJECTION (ML) INTRAVENOUS
Status: COMPLETED
Start: 2024-09-08 | End: 2024-09-08

## 2024-09-08 RX ORDER — VANCOMYCIN HCL IN 5 % DEXTROSE 1G/250ML
20 PLASTIC BAG, INJECTION (ML) INTRAVENOUS ONCE
Status: COMPLETED | OUTPATIENT
Start: 2024-09-08 | End: 2024-09-09

## 2024-09-08 RX ORDER — IBUPROFEN 200 MG
24 TABLET ORAL
Status: DISCONTINUED | OUTPATIENT
Start: 2024-09-08 | End: 2024-09-10

## 2024-09-08 RX ORDER — GLUCAGON 1 MG
1 KIT INJECTION
Status: DISCONTINUED | OUTPATIENT
Start: 2024-09-08 | End: 2024-09-10

## 2024-09-08 RX ORDER — VANCOMYCIN HCL IN 5 % DEXTROSE 1G/250ML
20 PLASTIC BAG, INJECTION (ML) INTRAVENOUS
Status: DISCONTINUED | OUTPATIENT
Start: 2024-09-08 | End: 2024-09-08

## 2024-09-08 RX ORDER — IBUPROFEN 200 MG
16 TABLET ORAL
Status: DISCONTINUED | OUTPATIENT
Start: 2024-09-08 | End: 2024-09-10

## 2024-09-08 RX ORDER — INSULIN ASPART 100 [IU]/ML
0-5 INJECTION, SOLUTION INTRAVENOUS; SUBCUTANEOUS
Status: DISCONTINUED | OUTPATIENT
Start: 2024-09-08 | End: 2024-09-10

## 2024-09-08 RX ORDER — MAGNESIUM SULFATE HEPTAHYDRATE 40 MG/ML
2 INJECTION, SOLUTION INTRAVENOUS ONCE
Status: COMPLETED | OUTPATIENT
Start: 2024-09-08 | End: 2024-09-08

## 2024-09-08 RX ORDER — MUPIROCIN 20 MG/G
OINTMENT TOPICAL 2 TIMES DAILY
Status: DISPENSED | OUTPATIENT
Start: 2024-09-10 | End: 2024-09-15

## 2024-09-08 RX ORDER — NOREPINEPHRINE BITARTRATE/D5W 8 MG/250ML
0-3 PLASTIC BAG, INJECTION (ML) INTRAVENOUS CONTINUOUS
Status: DISCONTINUED | OUTPATIENT
Start: 2024-09-08 | End: 2024-09-09

## 2024-09-08 RX ORDER — SODIUM CHLORIDE 9 MG/ML
1000 INJECTION, SOLUTION INTRAVENOUS
Status: COMPLETED | OUTPATIENT
Start: 2024-09-08 | End: 2024-09-08

## 2024-09-08 RX ORDER — IPRATROPIUM BROMIDE AND ALBUTEROL SULFATE 2.5; .5 MG/3ML; MG/3ML
3 SOLUTION RESPIRATORY (INHALATION)
Status: DISCONTINUED | OUTPATIENT
Start: 2024-09-09 | End: 2024-09-08

## 2024-09-08 RX ORDER — NAPROXEN SODIUM 220 MG/1
81 TABLET, FILM COATED ORAL DAILY
Status: DISCONTINUED | OUTPATIENT
Start: 2024-09-09 | End: 2024-10-01 | Stop reason: HOSPADM

## 2024-09-08 RX ORDER — SODIUM CHLORIDE, SODIUM LACTATE, POTASSIUM CHLORIDE, CALCIUM CHLORIDE 600; 310; 30; 20 MG/100ML; MG/100ML; MG/100ML; MG/100ML
INJECTION, SOLUTION INTRAVENOUS CONTINUOUS
Status: DISCONTINUED | OUTPATIENT
Start: 2024-09-08 | End: 2024-09-09

## 2024-09-08 RX ORDER — INSULIN ASPART 100 [IU]/ML
5 INJECTION, SOLUTION INTRAVENOUS; SUBCUTANEOUS ONCE
Status: DISCONTINUED | OUTPATIENT
Start: 2024-09-08 | End: 2024-09-08

## 2024-09-08 RX ADMIN — SODIUM CHLORIDE 1000 ML: 9 INJECTION, SOLUTION INTRAVENOUS at 06:09

## 2024-09-08 RX ADMIN — SODIUM CHLORIDE, POTASSIUM CHLORIDE, SODIUM LACTATE AND CALCIUM CHLORIDE: 600; 310; 30; 20 INJECTION, SOLUTION INTRAVENOUS at 11:09

## 2024-09-08 RX ADMIN — NOREPINEPHRINE BITARTRATE 0.02 MG: 8 INJECTION, SOLUTION INTRAVENOUS at 07:09

## 2024-09-08 RX ADMIN — MAGNESIUM SULFATE HEPTAHYDRATE 2 G: 40 INJECTION, SOLUTION INTRAVENOUS at 09:09

## 2024-09-08 RX ADMIN — VANCOMYCIN HYDROCHLORIDE 1000 MG: 1 INJECTION, POWDER, LYOPHILIZED, FOR SOLUTION INTRAVENOUS at 11:09

## 2024-09-08 RX ADMIN — IPRATROPIUM BROMIDE AND ALBUTEROL SULFATE 3 ML: 2.5; .5 SOLUTION RESPIRATORY (INHALATION) at 10:09

## 2024-09-08 RX ADMIN — INSULIN ASPART 3 UNITS: 100 INJECTION, SOLUTION INTRAVENOUS; SUBCUTANEOUS at 09:09

## 2024-09-08 RX ADMIN — SODIUM CHLORIDE 1000 ML: 9 INJECTION, SOLUTION INTRAVENOUS at 05:09

## 2024-09-08 RX ADMIN — SODIUM PHOSPHATE, MONOBASIC, MONOHYDRATE AND SODIUM PHOSPHATE, DIBASIC, ANHYDROUS 15 MMOL: 142; 276 INJECTION, SOLUTION INTRAVENOUS at 09:09

## 2024-09-08 RX ADMIN — HUMAN INSULIN 10 UNITS: 100 INJECTION, SOLUTION SUBCUTANEOUS at 05:09

## 2024-09-08 RX ADMIN — PIPERACILLIN AND TAZOBACTAM 4.5 G: 4; .5 INJECTION, POWDER, LYOPHILIZED, FOR SOLUTION INTRAVENOUS; PARENTERAL at 07:09

## 2024-09-08 RX ADMIN — SODIUM CHLORIDE 1000 ML: 9 INJECTION, SOLUTION INTRAVENOUS at 04:09

## 2024-09-08 RX ADMIN — SODIUM CHLORIDE, POTASSIUM CHLORIDE, SODIUM LACTATE AND CALCIUM CHLORIDE 1000 ML: 600; 310; 30; 20 INJECTION, SOLUTION INTRAVENOUS at 07:09

## 2024-09-08 NOTE — ED PROVIDER NOTES
Encounter Date: 9/8/2024    SCRIBE #1 NOTE: I, Mio Squires, am scribing for, and in the presence of,  Clyde Peña MD. I have scribed the following portions of the note - Other sections scribed: HPI,ROS,PE.       History     Chief Complaint   Patient presents with    Hyperglycemia     Pt. Arrives via EMS C/o hyperglycemia, vomiting started today and diarrhea started x1 week ago.. hx of dm med list includes metformin and glimepiride rep rots sister gives him medications. Aaox4.. no noted labored breathing at this time. Dx with UTI with an otc test x2 days ago. However was not able to fill script because it is the weekened     63 y/o male with PMHx of anemia, GERD, HTN, DM on oral medication, HLD, and CKD presents to ED c/o nausea/vomiting onset ~1x week. Pt reports associated symptoms of cough and SOB. He denies any abdominal pain or chest pain. Pt states he is unable to hold anything down. He reports he is wheelchair bound due to chronic weakness to his bilateral legs for several years.  Patient apparently self caths at home.  He was diagnosed with a UTI recently.    On arrival to ED pt's CBG was >500.     PCP:  George Bran MD        The history is provided by the patient. No  was used.     Review of patient's allergies indicates:  No Known Allergies  Past Medical History:   Diagnosis Date    Anemia of chronic disease 7/6/2022    Arteriosclerosis of coronary artery 7/6/2022    Cervical myelopathy 7/6/2022    Cobalamin deficiency 7/6/2022    Gastroesophageal reflux disease 7/6/2022    Hypertension 7/6/2022    Low vitamin D level 7/6/2022    Mixed hyperlipidemia 7/6/2022    Paraparesis 7/6/2022    Stage 3a chronic kidney disease 7/6/2022    Type 2 diabetes mellitus 7/6/2022     Past Surgical History:   Procedure Laterality Date    SPINE SURGERY       No family history on file.  Social History     Tobacco Use    Smoking status: Never    Smokeless tobacco: Never   Substance Use Topics     Alcohol use: Never    Drug use: Never     Review of Systems   Constitutional:  Negative for activity change, chills, diaphoresis, fatigue and fever.   HENT:  Negative for congestion, ear pain, sinus pain and sore throat.    Eyes:  Negative for visual disturbance.   Respiratory:  Positive for cough and shortness of breath. Negative for wheezing and stridor.    Cardiovascular:  Negative for chest pain, palpitations and leg swelling.   Gastrointestinal:  Positive for nausea and vomiting. Negative for abdominal pain, constipation, diarrhea and rectal pain.   Genitourinary:  Negative for dysuria and hematuria.   Musculoskeletal:  Negative for arthralgias, back pain and myalgias.   Skin:  Negative for rash.   Neurological:  Positive for weakness (chronic weakness to BLE, pt wheelchair bound). Negative for dizziness, syncope, numbness and headaches.   All other systems reviewed and are negative.      Physical Exam     Initial Vitals [09/08/24 1617]   BP Pulse Resp Temp SpO2   (!) 97/57 87 (!) 22 97.3 °F (36.3 °C) 97 %      MAP       --         Physical Exam    Nursing note and vitals reviewed.  Constitutional: No distress.   HENT:   Head: Normocephalic and atraumatic.   Mouth/Throat: Mucous membranes are dry.   Upper airway congestion.    Eyes: EOM are normal.   Neck: Trachea normal. Neck supple.   Normal range of motion.  Cardiovascular:  Normal rate, regular rhythm and normal heart sounds.           No murmur heard.  Pulmonary/Chest: Breath sounds normal. No respiratory distress.   Abdominal: Abdomen is soft. Bowel sounds are normal. He exhibits no distension. There is no abdominal tenderness. There is no rebound and no guarding.   Musculoskeletal:         General: Normal range of motion.      Cervical back: Normal range of motion and neck supple.      Lumbar back: Normal.     Neurological: He is alert and oriented to person, place, and time. GCS score is 15. GCS eye subscore is 4. GCS verbal subscore is 5. GCS motor  subscore is 6.   Chronic weakness to BLE, unable to walk, pt is wheelchair bound.   Skin: Skin is warm and dry. No rash noted.   Psychiatric: He has a normal mood and affect.         ED Course   Critical Care    Date/Time: 9/8/2024 5:54 PM    Performed by: Clyde Peña MD  Authorized by: Clyde Peña MD  Direct patient critical care time: 25 minutes  Ordering / reviewing critical care time: 10 minutes  Documentation critical care time: 10 minutes  Consulting other physicians critical care time: 5 minutes  Total critical care time (exclusive of procedural time) : 50 minutes  Critical care time was exclusive of separately billable procedures and treating other patients and teaching time.  Critical care was necessary to treat or prevent imminent or life-threatening deterioration of the following conditions: circulatory failure, endocrine crisis, renal failure, shock, metabolic crisis and dehydration.  Critical care was time spent personally by me on the following activities: development of treatment plan with patient or surrogate, discussions with consultants, interpretation of cardiac output measurements, evaluation of patient's response to treatment, examination of patient, obtaining history from patient or surrogate, ordering and performing treatments and interventions, ordering and review of laboratory studies, ordering and review of radiographic studies, pulse oximetry and re-evaluation of patient's condition.        Labs Reviewed   COMPREHENSIVE METABOLIC PANEL - Abnormal       Result Value    Sodium 134 (*)     Potassium 4.7      Chloride 104      CO2 16 (*)     Glucose 607 (*)     Blood Urea Nitrogen 88.6 (*)     Creatinine 3.40 (*)     Calcium 8.6 (*)     Protein Total 5.9      Albumin 2.8 (*)     Globulin 3.1      Albumin/Globulin Ratio 0.9 (*)     Bilirubin Total 0.3      ALP 56      ALT 13      AST 22      eGFR 19      Anion Gap 14.0      BUN/Creatinine Ratio 26     URINALYSIS, REFLEX TO URINE  CULTURE - Abnormal    Color, UA Dark-Yellow      Appearance, UA Turbid (*)     Specific Gravity, UA 1.011      pH, UA 5.5      Protein, UA 1+ (*)     Glucose, UA 4+ (*)     Ketones, UA Negative      Blood, UA 3+ (*)     Bilirubin, UA Negative      Urobilinogen, UA Normal      Nitrites, UA 1+ (*)     Leukocyte Esterase,  (*)     RBC, UA 0-5      WBC, UA >100 (*)     Bacteria, UA Few (*)     Squamous Epithelial Cells, UA Trace      Mucous, UA Trace (*)    BETA - HYDROXYBUTYRATE, SERUM - Abnormal    Beta Hydroxybutyrate 1.40 (*)    CBC WITH DIFFERENTIAL - Abnormal    WBC 10.37      RBC 3.71 (*)     Hgb 11.8 (*)     Hct 34.5 (*)     MCV 93.0      MCH 31.8 (*)     MCHC 34.2      RDW 13.2      Platelet 174      MPV 12.0 (*)     Neut % 88.1      Lymph % 1.8      Mono % 9.5      Eos % 0.0      Basophil % 0.4      Lymph # 0.19 (*)     Neut # 9.13      Mono # 0.99      Eos # 0.00      Baso # 0.04      IG# 0.02      IG% 0.2      NRBC% 0.0     BLOOD GAS - Abnormal    Sample Type Venous Blood      Drawn by RN      pH, Blood gas 7.290 (*)     pCO2, Blood gas 29.0      pO2, Blood gas 54.0      Sodium, Blood Gas 130 (*)     Potassium, Blood Gas 4.2      Calcium Level Ionized 1.04 (*)     TOC2, Blood gas 14.8      Base Excess, Blood gas -11.40      sO2, Blood gas 83.3      HCO3, Blood gas 13.9      THb, Blood gas 11.1      O2 Hb, Blood Gas 87.0      CO Hgb 1.8      Met Hgb 1.4     POCT GLUCOSE - Abnormal    POCT Glucose >500 (*)    POCT GLUCOSE - Abnormal    POCT Glucose 445 (*)    MAGNESIUM - Normal    Magnesium Level 1.60     CULTURE, URINE   BLOOD CULTURE OLG   BLOOD CULTURE OLG   CBC W/ AUTO DIFFERENTIAL    Narrative:     The following orders were created for panel order CBC auto differential.  Procedure                               Abnormality         Status                     ---------                               -----------         ------                     CBC with Differential[[id number]]       Abnormal             Final result                 Please view results for these tests on the individual orders.   TROPONIN I   LACTIC ACID, PLASMA   BASIC METABOLIC PANEL   CK          Imaging Results              X-Ray Chest AP Portable (In process)                      Medications   vancomycin 1.5 g in dextrose 5 % 250 mL IVPB (ready to mix) (has no administration in time range)   piperacillin-tazobactam (ZOSYN) 4.5 g in D5W 100 mL IVPB (MB+) (has no administration in time range)   sodium chloride 0.9% bolus 1,000 mL 1,000 mL (0 mLs Intravenous Stopped 9/8/24 1740)   0.9%  NaCl infusion (0 mLs Intravenous Stopped 9/8/24 1811)   insulin regular injection 10 Units 0.1 mL (10 Units Intravenous Given 9/8/24 1718)   0.9%  NaCl infusion (1,000 mLs Intravenous New Bag 9/8/24 1814)     Medical Decision Making  The differential diagnosis includes, but is not limited to, hyperglycemia, dehydration, DKA, nausea, vomiting, electrolyte abnormality.     Amount and/or Complexity of Data Reviewed  Labs: ordered. Decision-making details documented in ED Course.     Details: Abnormal labs include H&H of 11.8 and 34.5, glucose of 607, bicarb of 16, BUN of 86, creatinine of 3.4 with a baseline creatinine of 1.2, serum acetone 1.4.  Urine is positive for UTI  Radiology: ordered.  Discussion of management or test interpretation with external provider(s): Patient continues to be hypotensive after initial 1 L bolus of fluids.  2 L was initiated.  Patient is mildly acidotic on VBG.  His bicarb is 16 and he has acute kidney injury.  Insulin 10 units has been given.  IC was consulted and they will evaluate patient.  Patient remains hypotensive will initiate 3rd L of fluids.  Patient remains alert and oriented.  Anticipate admission to ICU.  Will start Zosyn and vancomycin secondary to hypotension.    Risk  OTC drugs.  Prescription drug management.  Decision regarding hospitalization.            Scribe Attestation:   Scribe #1: I performed the above scribed  service and the documentation accurately describes the services I performed. I attest to the accuracy of the note.    Attending Attestation:           Physician Attestation for Scribe:  Physician Attestation Statement for Scribe #1: I, Clyde Peña MD, reviewed documentation, as scribed by Mio Squires in my presence, and it is both accurate and complete.             ED Course as of 09/08/24 1859   Sun Sep 08, 2024   1749 Intensive care was consulted.  Will come to evaluate [KG]   1749 Paged ICU [LYNETTE]   1751 Patient is hypotensive after 1 L, 2 L infusing. [KG]   1855 As 3 L is infusing, patient's blood pressure remains in the 80s.  Will start Zosyn and vancomycin.  Patient has a UTI [KG]      ED Course User Index  [LYNETTE] Mio Squires  [KG] Clyde Peña MD                           Clinical Impression:  Final diagnoses:  [E11.10] Diabetic ketoacidosis without coma associated with type 2 diabetes mellitus (Primary)  [I95.9] Hypotension, unspecified hypotension type  [N17.9] Acute kidney injury  [E86.0] Acute dehydration  [R73.9] Hyperglycemia          ED Disposition Condition    Admit Stable                Clyde Peña MD  09/08/24 1859

## 2024-09-09 ENCOUNTER — ANESTHESIA (OUTPATIENT)
Dept: INTENSIVE CARE | Facility: HOSPITAL | Age: 64
End: 2024-09-09
Payer: MEDICARE

## 2024-09-09 ENCOUNTER — ANESTHESIA EVENT (OUTPATIENT)
Dept: INTENSIVE CARE | Facility: HOSPITAL | Age: 64
End: 2024-09-09
Payer: MEDICARE

## 2024-09-09 PROBLEM — E43 SEVERE MALNUTRITION: Status: ACTIVE | Noted: 2024-09-09

## 2024-09-09 LAB
ABS NEUT (OLG): 5.56 X10(3)/MCL (ref 2.1–9.2)
ALBUMIN SERPL-MCNC: 2.2 G/DL (ref 3.4–4.8)
ALBUMIN/GLOB SERPL: 0.9 RATIO (ref 1.1–2)
ALLENS TEST BLOOD GAS (OHS): YES
ALLENS TEST BLOOD GAS (OHS): YES
ALP SERPL-CCNC: 42 UNIT/L (ref 40–150)
ALT SERPL-CCNC: 12 UNIT/L (ref 0–55)
ANION GAP SERPL CALC-SCNC: 10 MEQ/L
ANION GAP SERPL CALC-SCNC: 11 MEQ/L
ANION GAP SERPL CALC-SCNC: 8 MEQ/L
ANION GAP SERPL CALC-SCNC: 8 MEQ/L
AST SERPL-CCNC: 22 UNIT/L (ref 5–34)
B PERT.PT PRMT NPH QL NAA+NON-PROBE: NOT DETECTED
BASE EXCESS BLD CALC-SCNC: -8.1 MMOL/L (ref -2–2)
BASE EXCESS BLD CALC-SCNC: -8.8 MMOL/L (ref -2–2)
BILIRUB SERPL-MCNC: 0.4 MG/DL
BLOOD GAS SAMPLE TYPE (OHS): ABNORMAL
BLOOD GAS SAMPLE TYPE (OHS): ABNORMAL
BUN SERPL-MCNC: 45 MG/DL (ref 8.4–25.7)
BUN SERPL-MCNC: 50.3 MG/DL (ref 8.4–25.7)
BUN SERPL-MCNC: 56.3 MG/DL (ref 8.4–25.7)
BUN SERPL-MCNC: 69.3 MG/DL (ref 8.4–25.7)
BURR CELLS (OLG): ABNORMAL
C PNEUM DNA NPH QL NAA+NON-PROBE: NOT DETECTED
CA-I BLD-SCNC: 1.12 MMOL/L (ref 1.12–1.23)
CA-I BLD-SCNC: 1.16 MMOL/L (ref 1.12–1.23)
CALCIUM SERPL-MCNC: 7.3 MG/DL (ref 8.8–10)
CALCIUM SERPL-MCNC: 7.5 MG/DL (ref 8.8–10)
CALCIUM SERPL-MCNC: 7.6 MG/DL (ref 8.8–10)
CALCIUM SERPL-MCNC: 7.7 MG/DL (ref 8.8–10)
CHLORIDE SERPL-SCNC: 100 MMOL/L (ref 98–107)
CHLORIDE SERPL-SCNC: 100 MMOL/L (ref 98–107)
CHLORIDE SERPL-SCNC: 110 MMOL/L (ref 98–107)
CHLORIDE SERPL-SCNC: 99 MMOL/L (ref 98–107)
CO2 BLDA-SCNC: 17.7 MMOL/L
CO2 BLDA-SCNC: 18.6 MMOL/L
CO2 SERPL-SCNC: 17 MMOL/L (ref 23–31)
CO2 SERPL-SCNC: 24 MMOL/L (ref 23–31)
CO2 SERPL-SCNC: 26 MMOL/L (ref 23–31)
CO2 SERPL-SCNC: 27 MMOL/L (ref 23–31)
COHGB MFR BLDA: 1.1 % (ref 0.5–1.5)
COHGB MFR BLDA: 1.1 % (ref 0.5–1.5)
CREAT SERPL-MCNC: 2.16 MG/DL (ref 0.73–1.18)
CREAT SERPL-MCNC: 2.27 MG/DL (ref 0.73–1.18)
CREAT SERPL-MCNC: 2.29 MG/DL (ref 0.73–1.18)
CREAT SERPL-MCNC: 2.58 MG/DL (ref 0.73–1.18)
CREAT/UREA NIT SERPL: 21
CREAT/UREA NIT SERPL: 22
CREAT/UREA NIT SERPL: 25
CREAT/UREA NIT SERPL: 27
DRAWN BY BLOOD GAS (OHS): ABNORMAL
DRAWN BY BLOOD GAS (OHS): ABNORMAL
ERYTHROCYTE [DISTWIDTH] IN BLOOD BY AUTOMATED COUNT: 13.2 % (ref 11.5–17)
GFR SERPLBLD CREATININE-BSD FMLA CKD-EPI: 27 ML/MIN/1.73/M2
GFR SERPLBLD CREATININE-BSD FMLA CKD-EPI: 31 ML/MIN/1.73/M2
GFR SERPLBLD CREATININE-BSD FMLA CKD-EPI: 31 ML/MIN/1.73/M2
GFR SERPLBLD CREATININE-BSD FMLA CKD-EPI: 33 ML/MIN/1.73/M2
GLOBULIN SER-MCNC: 2.4 GM/DL (ref 2.4–3.5)
GLUCOSE SERPL-MCNC: 319 MG/DL (ref 82–115)
GLUCOSE SERPL-MCNC: 332 MG/DL (ref 82–115)
GLUCOSE SERPL-MCNC: 341 MG/DL (ref 82–115)
GLUCOSE SERPL-MCNC: 396 MG/DL (ref 82–115)
HADV DNA NPH QL NAA+NON-PROBE: NOT DETECTED
HCO3 BLDA-SCNC: 16.7 MMOL/L (ref 22–26)
HCO3 BLDA-SCNC: 17.4 MMOL/L (ref 22–26)
HCOV 229E RNA NPH QL NAA+NON-PROBE: NOT DETECTED
HCOV HKU1 RNA NPH QL NAA+NON-PROBE: NOT DETECTED
HCOV NL63 RNA NPH QL NAA+NON-PROBE: NOT DETECTED
HCOV OC43 RNA NPH QL NAA+NON-PROBE: NOT DETECTED
HCT VFR BLD AUTO: 29.6 % (ref 42–52)
HGB BLD-MCNC: 10.2 G/DL (ref 14–18)
HMPV RNA NPH QL NAA+NON-PROBE: NOT DETECTED
HPIV1 RNA NPH QL NAA+NON-PROBE: NOT DETECTED
HPIV2 RNA NPH QL NAA+NON-PROBE: NOT DETECTED
HPIV3 RNA NPH QL NAA+NON-PROBE: NOT DETECTED
HPIV4 RNA NPH QL NAA+NON-PROBE: NOT DETECTED
INHALED O2 CONCENTRATION: 100 %
INHALED O2 CONCENTRATION: 100 %
INSTRUMENT WBC (OLG): 6.86 X10(3)/MCL
LACTATE SERPL-SCNC: 1.5 MMOL/L (ref 0.5–2.2)
LACTATE SERPL-SCNC: 2.5 MMOL/L (ref 0.5–2.2)
LACTATE SERPL-SCNC: 2.7 MMOL/L (ref 0.5–2.2)
LACTATE SERPL-SCNC: 2.7 MMOL/L (ref 0.5–2.2)
LPM (OHS): 40
LYMPHOCYTES NFR BLD MANUAL: 0.27 X10(3)/MCL
LYMPHOCYTES NFR BLD MANUAL: 4 %
M PNEUMO DNA NPH QL NAA+NON-PROBE: NOT DETECTED
MCH RBC QN AUTO: 31.8 PG (ref 27–31)
MCHC RBC AUTO-ENTMCNC: 34.5 G/DL (ref 33–36)
MCV RBC AUTO: 92.2 FL (ref 80–94)
MECH RR (OHS): 20 B/MIN
METAMYELOCYTES NFR BLD MANUAL: 1 %
METHGB MFR BLDA: 0.9 % (ref 0.4–1.5)
METHGB MFR BLDA: 1.2 % (ref 0.4–1.5)
MODE (OHS): AC
MONOCYTES NFR BLD MANUAL: 1.03 X10(3)/MCL (ref 0.1–1.3)
MONOCYTES NFR BLD MANUAL: 15 %
NEUTROPHILS NFR BLD MANUAL: 81 %
NRBC BLD AUTO-RTO: 0 %
O2 HB BLOOD GAS (OHS): 89.1 % (ref 94–97)
O2 HB BLOOD GAS (OHS): 92.3 % (ref 94–97)
OXYGEN DEVICE BLOOD GAS (OHS): ABNORMAL
OXYGEN DEVICE BLOOD GAS (OHS): ABNORMAL
OXYHGB MFR BLDA: 10.6 G/DL (ref 12–16)
OXYHGB MFR BLDA: 10.8 G/DL (ref 12–16)
PCO2 BLDA: 31 MMHG (ref 35–45)
PCO2 BLDA: 38 MMHG (ref 35–45)
PEEP RESPIRATORY: 15 CMH2O
PH BLDA: 7.27 [PH] (ref 7.35–7.45)
PH BLDA: 7.34 [PH] (ref 7.35–7.45)
PLATELET # BLD AUTO: 167 X10(3)/MCL (ref 130–400)
PLATELET # BLD EST: NORMAL 10*3/UL
PMV BLD AUTO: 12.5 FL (ref 7.4–10.4)
PO2 BLDA: 52 MMHG (ref 80–100)
PO2 BLDA: 64 MMHG (ref 80–100)
POCT GLUCOSE: 185 MG/DL (ref 70–110)
POCT GLUCOSE: 344 MG/DL (ref 70–110)
POCT GLUCOSE: 364 MG/DL (ref 70–110)
POCT GLUCOSE: 368 MG/DL (ref 70–110)
POCT GLUCOSE: 406 MG/DL (ref 70–110)
POCT GLUCOSE: 421 MG/DL (ref 70–110)
POIKILOCYTOSIS BLD QL SMEAR: ABNORMAL
POTASSIUM BLOOD GAS (OHS): 3.3 MMOL/L (ref 3.5–5)
POTASSIUM BLOOD GAS (OHS): 3.3 MMOL/L (ref 3.5–5)
POTASSIUM SERPL-SCNC: 2.6 MMOL/L (ref 3.5–5.1)
POTASSIUM SERPL-SCNC: 3.1 MMOL/L (ref 3.5–5.1)
POTASSIUM SERPL-SCNC: 3.7 MMOL/L (ref 3.5–5.1)
POTASSIUM SERPL-SCNC: 5.1 MMOL/L (ref 3.5–5.1)
PROT SERPL-MCNC: 4.6 GM/DL (ref 5.8–7.6)
RBC # BLD AUTO: 3.21 X10(6)/MCL (ref 4.7–6.1)
RBC MORPH BLD: ABNORMAL
RSV RNA NPH QL NAA+NON-PROBE: NOT DETECTED
RV+EV RNA NPH QL NAA+NON-PROBE: NOT DETECTED
SAMPLE SITE BLOOD GAS (OHS): ABNORMAL
SAMPLE SITE BLOOD GAS (OHS): ABNORMAL
SAO2 % BLDA: 84 %
SAO2 % BLDA: 88.6 %
SODIUM BLOOD GAS (OHS): 131 MMOL/L (ref 137–145)
SODIUM BLOOD GAS (OHS): 131 MMOL/L (ref 137–145)
SODIUM SERPL-SCNC: 135 MMOL/L (ref 136–145)
SPONT+MECH VT ON VENT: 450 ML
WBC # BLD AUTO: 6.86 X10(3)/MCL (ref 4.5–11.5)

## 2024-09-09 PROCEDURE — 27000513 HC SENSOR FLOTRAC

## 2024-09-09 PROCEDURE — 85025 COMPLETE CBC W/AUTO DIFF WBC: CPT

## 2024-09-09 PROCEDURE — 25000003 PHARM REV CODE 250

## 2024-09-09 PROCEDURE — 25000003 PHARM REV CODE 250: Performed by: INTERNAL MEDICINE

## 2024-09-09 PROCEDURE — 94760 N-INVAS EAR/PLS OXIMETRY 1: CPT | Mod: XB

## 2024-09-09 PROCEDURE — 27200966 HC CLOSED SUCTION SYSTEM

## 2024-09-09 PROCEDURE — 85027 COMPLETE CBC AUTOMATED: CPT

## 2024-09-09 PROCEDURE — 63600175 PHARM REV CODE 636 W HCPCS

## 2024-09-09 PROCEDURE — 02HV33Z INSERTION OF INFUSION DEVICE INTO SUPERIOR VENA CAVA, PERCUTANEOUS APPROACH: ICD-10-PCS | Performed by: INTERNAL MEDICINE

## 2024-09-09 PROCEDURE — 36415 COLL VENOUS BLD VENIPUNCTURE: CPT

## 2024-09-09 PROCEDURE — 80053 COMPREHEN METABOLIC PANEL: CPT

## 2024-09-09 PROCEDURE — 94761 N-INVAS EAR/PLS OXIMETRY MLT: CPT | Mod: XB

## 2024-09-09 PROCEDURE — 94640 AIRWAY INHALATION TREATMENT: CPT

## 2024-09-09 PROCEDURE — 83605 ASSAY OF LACTIC ACID: CPT

## 2024-09-09 PROCEDURE — 27100171 HC OXYGEN HIGH FLOW UP TO 24 HOURS

## 2024-09-09 PROCEDURE — 27000207 HC ISOLATION

## 2024-09-09 PROCEDURE — 36620 INSERTION CATHETER ARTERY: CPT

## 2024-09-09 PROCEDURE — 31500 INSERT EMERGENCY AIRWAY: CPT

## 2024-09-09 PROCEDURE — 94003 VENT MGMT INPAT SUBQ DAY: CPT

## 2024-09-09 PROCEDURE — C1751 CATH, INF, PER/CENT/MIDLINE: HCPCS

## 2024-09-09 PROCEDURE — 36569 INSJ PICC 5 YR+ W/O IMAGING: CPT

## 2024-09-09 PROCEDURE — 99900035 HC TECH TIME PER 15 MIN (STAT)

## 2024-09-09 PROCEDURE — 25000242 PHARM REV CODE 250 ALT 637 W/ HCPCS

## 2024-09-09 PROCEDURE — 31500 INSERT EMERGENCY AIRWAY: CPT | Mod: ,,, | Performed by: NURSE ANESTHETIST, CERTIFIED REGISTERED

## 2024-09-09 PROCEDURE — 20000000 HC ICU ROOM

## 2024-09-09 PROCEDURE — 99900031 HC PATIENT EDUCATION (STAT)

## 2024-09-09 PROCEDURE — 82803 BLOOD GASES ANY COMBINATION: CPT

## 2024-09-09 PROCEDURE — 36600 WITHDRAWAL OF ARTERIAL BLOOD: CPT

## 2024-09-09 PROCEDURE — 80048 BASIC METABOLIC PNL TOTAL CA: CPT

## 2024-09-09 RX ORDER — SODIUM CHLORIDE 0.9 % (FLUSH) 0.9 %
10 SYRINGE (ML) INJECTION
Status: DISCONTINUED | OUTPATIENT
Start: 2024-09-09 | End: 2024-10-01 | Stop reason: HOSPADM

## 2024-09-09 RX ORDER — INSULIN GLARGINE 100 [IU]/ML
10 INJECTION, SOLUTION SUBCUTANEOUS NIGHTLY
Status: DISCONTINUED | OUTPATIENT
Start: 2024-09-09 | End: 2024-09-10

## 2024-09-09 RX ORDER — SODIUM CHLORIDE 9 MG/ML
1000 INJECTION, SOLUTION INTRAVENOUS CONTINUOUS
Status: DISCONTINUED | OUTPATIENT
Start: 2024-09-09 | End: 2024-09-09

## 2024-09-09 RX ORDER — DEXTROSE MONOHYDRATE, SODIUM CHLORIDE, AND POTASSIUM CHLORIDE 50; 1.49; 4.5 G/1000ML; G/1000ML; G/1000ML
INJECTION, SOLUTION INTRAVENOUS CONTINUOUS
Status: DISCONTINUED | OUTPATIENT
Start: 2024-09-09 | End: 2024-09-09

## 2024-09-09 RX ORDER — PROPOFOL 10 MG/ML
0-50 INJECTION, EMULSION INTRAVENOUS CONTINUOUS
Status: DISCONTINUED | OUTPATIENT
Start: 2024-09-09 | End: 2024-09-12

## 2024-09-09 RX ORDER — DEXTROSE MONOHYDRATE AND SODIUM CHLORIDE 5; .45 G/100ML; G/100ML
INJECTION, SOLUTION INTRAVENOUS CONTINUOUS PRN
Status: DISCONTINUED | OUTPATIENT
Start: 2024-09-09 | End: 2024-09-09

## 2024-09-09 RX ORDER — NOREPINEPHRINE BITARTRATE/D5W 8 MG/250ML
0-3 PLASTIC BAG, INJECTION (ML) INTRAVENOUS CONTINUOUS
Status: DISCONTINUED | OUTPATIENT
Start: 2024-09-09 | End: 2024-09-12

## 2024-09-09 RX ORDER — FENTANYL CITRATE 50 UG/ML
50 INJECTION, SOLUTION INTRAMUSCULAR; INTRAVENOUS
Status: DISCONTINUED | OUTPATIENT
Start: 2024-09-09 | End: 2024-09-12

## 2024-09-09 RX ORDER — SODIUM CHLORIDE 9 MG/ML
INJECTION, SOLUTION INTRAVENOUS CONTINUOUS
Status: DISCONTINUED | OUTPATIENT
Start: 2024-09-09 | End: 2024-09-09

## 2024-09-09 RX ORDER — SODIUM CHLORIDE 9 MG/ML
1000 INJECTION, SOLUTION INTRAVENOUS CONTINUOUS
Status: ACTIVE | OUTPATIENT
Start: 2024-09-09 | End: 2024-09-09

## 2024-09-09 RX ORDER — SODIUM CHLORIDE 9 MG/ML
INJECTION, SOLUTION INTRAVENOUS CONTINUOUS
Status: DISCONTINUED | OUTPATIENT
Start: 2024-09-09 | End: 2024-09-12

## 2024-09-09 RX ORDER — SODIUM CHLORIDE 9 MG/ML
INJECTION, SOLUTION INTRAVENOUS CONTINUOUS
Status: DISCONTINUED | OUTPATIENT
Start: 2024-09-10 | End: 2024-09-09

## 2024-09-09 RX ORDER — POTASSIUM CHLORIDE 14.9 MG/ML
20 INJECTION INTRAVENOUS
Status: DISCONTINUED | OUTPATIENT
Start: 2024-09-09 | End: 2024-09-09

## 2024-09-09 RX ORDER — PROPOFOL 10 MG/ML
INJECTION, EMULSION INTRAVENOUS
Status: COMPLETED
Start: 2024-09-09 | End: 2024-09-09

## 2024-09-09 RX ORDER — DEXAMETHASONE SODIUM PHOSPHATE 4 MG/ML
4 INJECTION, SOLUTION INTRA-ARTICULAR; INTRALESIONAL; INTRAMUSCULAR; INTRAVENOUS; SOFT TISSUE ONCE
Status: COMPLETED | OUTPATIENT
Start: 2024-09-09 | End: 2024-09-09

## 2024-09-09 RX ORDER — SODIUM CHLORIDE 450 MG/100ML
INJECTION, SOLUTION INTRAVENOUS CONTINUOUS
Status: DISCONTINUED | OUTPATIENT
Start: 2024-09-09 | End: 2024-09-09

## 2024-09-09 RX ORDER — DEXMEDETOMIDINE HYDROCHLORIDE 4 UG/ML
INJECTION, SOLUTION INTRAVENOUS
Status: COMPLETED
Start: 2024-09-09 | End: 2024-09-09

## 2024-09-09 RX ORDER — DEXMEDETOMIDINE HYDROCHLORIDE 4 UG/ML
0-1.4 INJECTION, SOLUTION INTRAVENOUS CONTINUOUS
Status: DISCONTINUED | OUTPATIENT
Start: 2024-09-09 | End: 2024-09-12

## 2024-09-09 RX ORDER — SODIUM CHLORIDE AND POTASSIUM CHLORIDE 150; 900 MG/100ML; MG/100ML
INJECTION, SOLUTION INTRAVENOUS CONTINUOUS
Status: DISCONTINUED | OUTPATIENT
Start: 2024-09-09 | End: 2024-09-09

## 2024-09-09 RX ORDER — DEXTROSE MONOHYDRATE AND SODIUM CHLORIDE 5; .45 G/100ML; G/100ML
INJECTION, SOLUTION INTRAVENOUS CONTINUOUS
Status: DISCONTINUED | OUTPATIENT
Start: 2024-09-09 | End: 2024-09-09

## 2024-09-09 RX ORDER — POTASSIUM CHLORIDE 7.45 MG/ML
10 INJECTION INTRAVENOUS
Status: DISCONTINUED | OUTPATIENT
Start: 2024-09-09 | End: 2024-09-09

## 2024-09-09 RX ORDER — HEPARIN SODIUM 5000 [USP'U]/ML
5000 INJECTION, SOLUTION INTRAVENOUS; SUBCUTANEOUS EVERY 12 HOURS
Status: DISCONTINUED | OUTPATIENT
Start: 2024-09-09 | End: 2024-09-14

## 2024-09-09 RX ORDER — INDOMETHACIN 25 MG/1
100 CAPSULE ORAL ONCE
Status: COMPLETED | OUTPATIENT
Start: 2024-09-09 | End: 2024-09-09

## 2024-09-09 RX ORDER — DEXTROSE MONOHYDRATE AND SODIUM CHLORIDE 5; .45 G/100ML; G/100ML
INJECTION, SOLUTION INTRAVENOUS CONTINUOUS PRN
Status: DISCONTINUED | OUTPATIENT
Start: 2024-09-09 | End: 2024-10-01 | Stop reason: HOSPADM

## 2024-09-09 RX ORDER — MAGNESIUM SULFATE HEPTAHYDRATE 40 MG/ML
2 INJECTION, SOLUTION INTRAVENOUS
Status: DISCONTINUED | OUTPATIENT
Start: 2024-09-09 | End: 2024-10-01 | Stop reason: HOSPADM

## 2024-09-09 RX ORDER — SODIUM CHLORIDE 0.9 % (FLUSH) 0.9 %
10 SYRINGE (ML) INJECTION
Status: DISCONTINUED | OUTPATIENT
Start: 2024-09-09 | End: 2024-09-15

## 2024-09-09 RX ORDER — SODIUM CHLORIDE 0.9 % (FLUSH) 0.9 %
10 SYRINGE (ML) INJECTION
Status: DISCONTINUED | OUTPATIENT
Start: 2024-09-09 | End: 2024-09-12

## 2024-09-09 RX ORDER — SODIUM CHLORIDE 0.9 % (FLUSH) 0.9 %
10 SYRINGE (ML) INJECTION EVERY 6 HOURS
Status: DISCONTINUED | OUTPATIENT
Start: 2024-09-09 | End: 2024-09-12

## 2024-09-09 RX ORDER — FENTANYL CITRATE 50 UG/ML
50 INJECTION, SOLUTION INTRAMUSCULAR; INTRAVENOUS
Status: ACTIVE | OUTPATIENT
Start: 2024-09-09 | End: 2024-09-09

## 2024-09-09 RX ADMIN — HEPARIN SODIUM 5000 UNITS: 5000 INJECTION, SOLUTION INTRAVENOUS; SUBCUTANEOUS at 09:09

## 2024-09-09 RX ADMIN — INSULIN GLARGINE 10 UNITS: 100 INJECTION, SOLUTION SUBCUTANEOUS at 09:09

## 2024-09-09 RX ADMIN — VASOPRESSIN 0.04 UNITS/MIN: 20 INJECTION INTRAVENOUS at 11:09

## 2024-09-09 RX ADMIN — PIPERACILLIN SODIUM AND TAZOBACTAM SODIUM 4.5 G: 4; .5 INJECTION, POWDER, LYOPHILIZED, FOR SOLUTION INTRAVENOUS at 05:09

## 2024-09-09 RX ADMIN — NOREPINEPHRINE BITARTRATE 0.36 MCG/KG/MIN: 8 INJECTION, SOLUTION INTRAVENOUS at 03:09

## 2024-09-09 RX ADMIN — IPRATROPIUM BROMIDE AND ALBUTEROL SULFATE 3 ML: 2.5; .5 SOLUTION RESPIRATORY (INHALATION) at 12:09

## 2024-09-09 RX ADMIN — SODIUM CHLORIDE, POTASSIUM CHLORIDE, SODIUM LACTATE AND CALCIUM CHLORIDE 1000 ML: 600; 310; 30; 20 INJECTION, SOLUTION INTRAVENOUS at 09:09

## 2024-09-09 RX ADMIN — INSULIN GLARGINE 10 UNITS: 100 INJECTION, SOLUTION SUBCUTANEOUS at 04:09

## 2024-09-09 RX ADMIN — IPRATROPIUM BROMIDE AND ALBUTEROL SULFATE 3 ML: 2.5; .5 SOLUTION RESPIRATORY (INHALATION) at 08:09

## 2024-09-09 RX ADMIN — HEPARIN SODIUM 5000 UNITS: 5000 INJECTION, SOLUTION INTRAVENOUS; SUBCUTANEOUS at 04:09

## 2024-09-09 RX ADMIN — SODIUM BICARBONATE: 84 INJECTION, SOLUTION INTRAVENOUS at 05:09

## 2024-09-09 RX ADMIN — DEXAMETHASONE SODIUM PHOSPHATE 4 MG: 4 INJECTION, SOLUTION INTRA-ARTICULAR; INTRALESIONAL; INTRAMUSCULAR; INTRAVENOUS; SOFT TISSUE at 05:09

## 2024-09-09 RX ADMIN — NOREPINEPHRINE BITARTRATE 0.46 MCG/KG/MIN: 8 INJECTION, SOLUTION INTRAVENOUS at 07:09

## 2024-09-09 RX ADMIN — PROPOFOL 10 MCG/KG/MIN: 10 INJECTION, EMULSION INTRAVENOUS at 04:09

## 2024-09-09 RX ADMIN — INSULIN ASPART 5 UNITS: 100 INJECTION, SOLUTION INTRAVENOUS; SUBCUTANEOUS at 09:09

## 2024-09-09 RX ADMIN — DEXMEDETOMIDINE HYDROCHLORIDE 1.4 MCG/KG/HR: 400 INJECTION INTRAVENOUS at 05:09

## 2024-09-09 RX ADMIN — POTASSIUM CHLORIDE 20 MEQ: 14.9 INJECTION, SOLUTION INTRAVENOUS at 05:09

## 2024-09-09 RX ADMIN — PROPOFOL 30 MCG/KG/MIN: 10 INJECTION, EMULSION INTRAVENOUS at 09:09

## 2024-09-09 RX ADMIN — DEXMEDETOMIDINE HYDROCHLORIDE 0.5 MCG/KG/HR: 400 INJECTION INTRAVENOUS at 07:09

## 2024-09-09 RX ADMIN — VASOPRESSIN 0.04 UNITS/MIN: 20 INJECTION INTRAVENOUS at 07:09

## 2024-09-09 RX ADMIN — POTASSIUM BICARBONATE 40 MEQ: 391 TABLET, EFFERVESCENT ORAL at 04:09

## 2024-09-09 RX ADMIN — DEXMEDETOMIDINE HYDROCHLORIDE 1.4 MCG/KG/HR: 400 INJECTION INTRAVENOUS at 09:09

## 2024-09-09 RX ADMIN — POTASSIUM BICARBONATE 50 MEQ: 978 TABLET, EFFERVESCENT ORAL at 02:09

## 2024-09-09 RX ADMIN — PIPERACILLIN SODIUM AND TAZOBACTAM SODIUM 4.5 G: 4; .5 INJECTION, POWDER, LYOPHILIZED, FOR SOLUTION INTRAVENOUS at 06:09

## 2024-09-09 RX ADMIN — IPRATROPIUM BROMIDE AND ALBUTEROL SULFATE 3 ML: 2.5; .5 SOLUTION RESPIRATORY (INHALATION) at 04:09

## 2024-09-09 RX ADMIN — ASPIRIN 81 MG CHEWABLE TABLET 81 MG: 81 TABLET CHEWABLE at 09:09

## 2024-09-09 RX ADMIN — INSULIN ASPART 5 UNITS: 100 INJECTION, SOLUTION INTRAVENOUS; SUBCUTANEOUS at 02:09

## 2024-09-09 RX ADMIN — INSULIN ASPART 4 UNITS: 100 INJECTION, SOLUTION INTRAVENOUS; SUBCUTANEOUS at 09:09

## 2024-09-09 RX ADMIN — INSULIN ASPART 4 UNITS: 100 INJECTION, SOLUTION INTRAVENOUS; SUBCUTANEOUS at 12:09

## 2024-09-09 RX ADMIN — SODIUM CHLORIDE 1000 ML: 9 INJECTION, SOLUTION INTRAVENOUS at 02:09

## 2024-09-09 RX ADMIN — SODIUM CHLORIDE: 9 INJECTION, SOLUTION INTRAVENOUS at 11:09

## 2024-09-09 RX ADMIN — POTASSIUM CHLORIDE 20 MEQ: 14.9 INJECTION, SOLUTION INTRAVENOUS at 09:09

## 2024-09-09 RX ADMIN — PROPOFOL 1000 MG: 10 INJECTION, EMULSION INTRAVENOUS at 04:09

## 2024-09-09 RX ADMIN — SODIUM BICARBONATE 100 MEQ: 84 INJECTION, SOLUTION INTRAVENOUS at 05:09

## 2024-09-09 RX ADMIN — VASOPRESSIN 0.04 UNITS/MIN: 20 INJECTION INTRAVENOUS at 05:09

## 2024-09-09 RX ADMIN — NOREPINEPHRINE BITARTRATE 0.01 MCG/KG/MIN: 8 INJECTION, SOLUTION INTRAVENOUS at 11:09

## 2024-09-09 NOTE — NURSING
Nurses Note -- 4 Eyes      9/8/2024   10:37 PM      Skin assessed during: Admit      [] No Altered Skin Integrity Present    []Prevention Measures Documented      [x] Yes- Altered Skin Integrity Present or Discovered   [x] LDA Added if Not in Epic (Describe Wound)   [x] New Altered Skin Integrity was Present on Admit and Documented in LDA   [x] Wound Image Taken    Wound Care Consulted? Yes    Attending Nurse:  PJ Mccormick    Second RN/Staff Member:   PJ Hines

## 2024-09-09 NOTE — PLAN OF CARE
09/09/24 1456   Discharge Assessment   Assessment Type Discharge Planning Assessment   Confirmed/corrected address, phone number and insurance Yes   Confirmed Demographics Correct on Facesheet   Source of Information family   When was your last doctors appointment? 07/15/24   Communicated RISSA with patient/caregiver Date not available/Unable to determine   Reason For Admission severe malnutrition   People in Home alone   Facility Arrived From: home   Do you expect to return to your current living situation? Other (see comments)  (family unsure, pending progress)   Do you have help at home or someone to help you manage your care at home? Yes   Who are your caregiver(s) and their phone number(s)? limited help (4-6 hours a day paid help) sisters, Clary Hardy   Prior to hospitilization cognitive status: Alert/Oriented   Current cognitive status: Coma/Sedated/Intubated   Walking or Climbing Stairs Difficulty yes   Walking or Climbing Stairs transferring difficulty, assistance 1 person   Mobility Management wheelchair   Dressing/Bathing Difficulty yes   Dressing/Bathing bathing difficulty, requires equipment;bathing difficulty, assistance 1 person   Dressing/Bathing Management bath bench and assist from one person   Home Accessibility wheelchair accessible   Equipment Currently Used at Home wheelchair;bath bench;other (see comments)  (has a walker)   Patient currently being followed by outpatient case management? No   Do you currently have service(s) that help you manage your care at home? No   Do you have prescription coverage? Yes   Coverage [employer] managed medicare   Do you have any problems affording any of your prescribed medications? No   Who is going to help you get home at discharge? family   How do you get to doctors appointments? family or friend will provide   Are you on dialysis? No   Do you take coumadin? No   Discharge Plan A Other  (pending progress/ post acute therapy facility)   Discharge Plan B  Skilled Nursing Facility   DME Needed Upon Discharge  none   Discharge Plan discussed with: Sibling   Name(s) and Number(s) spoke to Clary at bedside and Chantal on phone   Transition of Care Barriers None     Patient lives alone. He was deconditioned and was using wheelchair and had private pay help 4-6 hours a day for ADL assist. Sisters live locally. They have HCPOA and POA. Brother lives in [place]. Sister at bedside. Called chantal, other sister and they are open to NH or rehab placement for therapy and long term pending progress.

## 2024-09-09 NOTE — H&P
[facility] - Emergency Dept  Pulmonary Critical Care Note    Patient Name: Froy Barragan  MRN: [id number]  Admission Date: 9/8/2024  Hospital Length of Stay: 0 days  Code Status: No Order  Attending Provider: Froy Dougherty MD  Primary Care Provider: George Bran MD     Subjective:     HPI:   Patient is a 64-year-old male with a past medical history of hypertension, hyperlipidemia, CAD S/P PCI, BPH with current self catheterization, and HFpEF who initially presented to the ED with a complaint of nausea/vomiting for the last 1 week.  He is accompanied by his sister, patient's legal guardian, who assisted with history.  They state that patient has been experiencing nonbloody vomiting over the last 1 week.  She also reports about a 2 day history of urinary incontinence, worse than baseline, and uncontrollable nonbloody diarrhea.  She reports that patient was wheelchair-bound due to chronic weakness in his bilateral legs for several years.  Patient currently perform self catheterization at home but his sister states that it has been 1-2 days since he self catheterized himself.  She reports patient had multiple UTIs in the past.  She also reports that patient has had difficulty monitoring his blood glucose levels at home but he reports full compliance with all prescribed medications.  Patient denies any fever, chills, hematemesis, abdominal pain, suprapubic tenderness, melena.     In the ED, initial POCT glucose elevated greater than 500.  CMP with bicarb of 16, serum creatinine elevated at 3.4 with previous baseline at a proximally 1.5.  CBG elevated at 607.  BOHB 1.4.  UA displayed 4+ glycosuria, 3+ hematuria, but no ketonuria.  Indicative of UTI as well.  ABG with slight acidosis with a pH of  7.290.  ICU was consulted for admission due to concerns for DKA.    Hospital Course/Significant events:  9/8/2024; admitted to ICU    24 Hour Interval History:  N/A    Past Medical History:    Diagnosis Date    Anemia of chronic disease 7/6/2022    Arteriosclerosis of coronary artery 7/6/2022    Cervical myelopathy 7/6/2022    Cobalamin deficiency 7/6/2022    Gastroesophageal reflux disease 7/6/2022    Hypertension 7/6/2022    Low vitamin D level 7/6/2022    Mixed hyperlipidemia 7/6/2022    Paraparesis 7/6/2022    Stage 3a chronic kidney disease 7/6/2022    Type 2 diabetes mellitus 7/6/2022       Past Surgical History:   Procedure Laterality Date    SPINE SURGERY         Social History     Socioeconomic History    Marital status: Single   Tobacco Use    Smoking status: Never    Smokeless tobacco: Never   Substance and Sexual Activity    Alcohol use: Never    Drug use: Never     Social Determinants of Health     Financial Resource Strain: Low Risk  (4/25/2024)    Overall Financial Resource Strain (CARDIA)     Difficulty of Paying Living Expenses: Not hard at all   Food Insecurity: No Food Insecurity (4/25/2024)    Hunger Vital Sign     Worried About Running Out of Food in the Last Year: Never true     Ran Out of Food in the Last Year: Never true   Transportation Needs: No Transportation Needs (4/25/2024)    PRAPARE - Transportation     Lack of Transportation (Medical): No     Lack of Transportation (Non-Medical): No   Physical Activity: Inactive (4/25/2024)    Exercise Vital Sign     Days of Exercise per Week: 0 days     Minutes of Exercise per Session: 10 min   Stress: No Stress Concern Present (4/25/2024)    [employer] - Occupational Stress Questionnaire     Feeling of Stress : Only a little   Housing Stability: Unknown (4/25/2024)    Housing Stability Vital Sign     Unable to Pay for Housing in the Last Year: No           Current Outpatient Medications   Medication Instructions    alcohol swabs (BD ALCOHOL SWABS) PadM 1 each, Topical (Top), Daily    alfuzosin (UROXATRAL) 10 mg Tb24 TAKE 1 TABLET EVERY DAY WITH BREAKFAST    amLODIPine (NORVASC) 2.5 mg, Oral    aspirin  (ECOTRIN) 81 mg, Oral, Daily    b complex vitamins capsule 1 capsule, Oral, Daily    blood glucose control, low (TRUE METRIX LEVEL 1) Soln 1 Bottle, Misc.(Non-Drug; Combo Route), Daily    blood-glucose meter (TRUE METRIX AIR GLUCOSE METER) Misc 1 each, Misc.(Non-Drug; Combo Route), Daily    blood-glucose sensor (DEXCOM G7 SENSOR) Carmen 1 each, Misc.(Non-Drug; Combo Route), Daily    carvediloL (COREG) 25 mg, Oral, 2 times daily    ezetimibe (ZETIA) 10 mg, Oral, Daily    finasteride (PROSCAR) 5 mg, Oral, Daily    gabapentin (NEURONTIN) 300 mg, Oral, 3 times daily    glimepiride (AMARYL) 2 mg, Oral, Before breakfast    lancets (TRUEPLUS LANCETS) 33 gauge Misc 1 lancet , Misc.(Non-Drug; Combo Route), Daily    metFORMIN (GLUCOPHAGE) 1,000 mg, Oral, 2 times daily    pantoprazole (PROTONIX) 40 mg, Oral    rosuvastatin (CRESTOR) 40 mg, Oral, Daily    TRUE METRIX GLUCOSE TEST STRIP Strp TEST BLOOD SUGAR EVERY DAY    vitamin D (VITAMIN D3) 1,000 Units, Oral, Daily       Current Inpatient Medications   lactated ringers  1,000 mL Intravenous ED 1 Time    [START ON 9/10/2024] mupirocin   Nasal BID    piperacillin-tazobactam (Zosyn) IV (PEDS and ADULTS) (extended infusion is not appropriate)  4.5 g Intravenous ED 1 Time    piperacillin-tazobactam (Zosyn) IV (PEDS and ADULTS) (extended infusion is not appropriate)  4.5 g Intravenous Q8H       Current Intravenous Infusions        Review of Systems   Constitutional:  Negative for chills, diaphoresis and fever.   Respiratory:  Negative for cough, sputum production and shortness of breath.    Cardiovascular:  Negative for chest pain, palpitations and leg swelling.   Gastrointestinal:  Positive for diarrhea, nausea and vomiting. Negative for blood in stool.   Genitourinary:  Positive for dysuria and frequency.   Neurological:  Positive for weakness. Negative for tingling, sensory change and headaches.        Objective:       Intake/Output Summary (Last 24 hours) at 9/8/2024 1921  Last  data filed at 9/8/2024 1811  Gross per 24 hour   Intake 1999 ml   Output --   Net 1999 ml         Vital Signs (Most Recent):  Temp: 97.3 °F (36.3 °C) (09/08/24 1617)  Pulse: 99 (09/08/24 1855)  Resp: (!) 34 (09/08/24 1855)  BP: (!) 81/56 (09/08/24 1851)  SpO2: 95 % (09/08/24 1855)  Body mass index is 20.59 kg/m².  Weight: 54.4 kg (120 lb) Vital Signs (24h Range):  Temp:  [97.3 °F (36.3 °C)] 97.3 °F (36.3 °C)  Pulse:  [] 99  Resp:  [22-34] 34  SpO2:  [91 %-97 %] 95 %  BP: (78-97)/(53-67) 81/56     Physical Exam  Vitals reviewed.   Constitutional:       Comments: Patient frail-appearing  On 3 L OxyMask at time of examination   Cardiovascular:      Rate and Rhythm: Regular rhythm. Tachycardia present.      Pulses: Normal pulses.      Heart sounds: Murmur heard.      No friction rub. No gallop.   Pulmonary:      Breath sounds: No wheezing, rhonchi or rales.   Abdominal:      General: Bowel sounds are normal. There is no distension.      Palpations: Abdomen is soft.      Tenderness: There is no abdominal tenderness.   Musculoskeletal:      Right lower leg: No edema.      Left lower leg: No edema.   Skin:     Capillary Refill: Capillary refill takes more than 3 seconds.   Neurological:      General: No focal deficit present.      Mental Status: He is alert and oriented to person, place, and time.       Lines/Drains/Airways       Drain  Duration                  Urethral Catheter 09/08/24 1810 Straight-tip 16 Fr. <1 day              Peripheral Intravenous Line  Duration                  Peripheral IV - Single Lumen 09/08/24 1646 20 G Anterior;Right Forearm <1 day         Peripheral IV - Single Lumen 09/08/24 1700 18 G Left Antecubital <1 day         Peripheral IV - Single Lumen 09/08/24 1915 20 G Anterior;Left Forearm <1 day                    Significant Labs:    Lab Results   Component Value Date    WBC 10.37 09/08/2024    HGB 11.8 (L) 09/08/2024    HCT 34.5 (L) 09/08/2024    MCV 93.0 09/08/2024      09/08/2024           BMP  Lab Results   Component Value Date     (L) 09/08/2024    K 4.7 09/08/2024    CO2 16 (L) 09/08/2024    BUN 88.6 (H) 09/08/2024    CREATININE 3.40 (H) 09/08/2024    CALCIUM 8.6 (L) 09/08/2024    AGAP 14.0 09/08/2024    EGFRNONAA 47 (L) 01/11/2022         ABG  Recent Labs   Lab 09/08/24  1738   PH 7.290*   PO2 54.0   PCO2 29.0   HCO3 13.9   POCBASEDEF -11.40       Mechanical Ventilation Support:     Significant Imaging:  I have reviewed the pertinent imaging within the past 24 hours.    Assessment/Plan:     Assessment  Septic shock secondary to UTI  Uncontrolled T2DM with hyperglycemia  DWAINE on CKD secondary to volume depletion  Metabolic acidosis; likely secondary to lactic acidosis vs diarrhea  Hypomagnesemia  Hypophosphatemia  Diarrhea  Normocytic anemia  Malnutrition  Elevated PSA with urinary retention; history of self catheterization  History of CAD S/p PCI  History of HFpEF (55%)  History of hypertension  History of hyperlipidemia    Plan  Admit to ICU  Patient received total of 3L IVF in ED with MAP remaining below 65.  UA indicative of UTI, patient high-risk secondary to history of self catheterization.  X-ray displaying possible consolidation of right middle lobe.  Urine, blood, and respiratory cultures pending.  Patient currently on Vanc and Zosyn (D1).  Most recent urine culture on 04/16 displaying pansensitive Klebsiella.  Patient currently on Levophed; wean to maintain MAP > 65  Initial concern for DKA in the ED. Initial chemistry displayed AG of 14. Serum bicarb 16. BOHB mildly elevated at 1.40. pH on ABG 7.290. No ketonuria. Patient administered IVF and 10 units  regular insulin in ED. Repeat BMP displayed AG of 9.  Acidosis secondary to lactic acidosis versus bicarbonate loss with diarrhea.  Repeat BMP at midnight.   DWAINE already improving with IVF  Trend lactic acid  Continue to follow and replete electrolytes as needed  C diff testing pending    DVT Prophylaxis:  SCD    32 minutes of critical care was time spent personally by me on the following activities: development of treatment plan with patient or surrogate and bedside caregivers, discussions with consultants, evaluation of patient's response to treatment, examination of patient, ordering and performing treatments and interventions, ordering and review of laboratory studies, ordering and review of radiographic studies, pulse oximetry, re-evaluation of patient's condition.  This critical care time did not overlap with that of any other provider or involve time for any procedures.     Jose E Tristan DO  Pulmonary Critical Care Medicine  [facility] - Emergency Dept  DOS: 09/08/2024

## 2024-09-09 NOTE — PROGRESS NOTES
Pharmacokinetic Initial Assessment: IV Vancomycin    Assessment/Plan:    Initiate intravenous vancomycin with loading dose of 1000 mg once with subsequent doses when random concentrations are less than 20 mcg/mL  Desired empiric serum trough concentration is 15 to 20 mcg/mL  Draw vancomycin random level on 09/09/24 at 2100.  Pharmacy will continue to follow and monitor vancomycin.      Please contact pharmacy at extension [phone] with any questions regarding this assessment.     Thank you for the consult,   Vicki Cidaux       Patient brief summary:  Froy Barragan is a 64 y.o. male initiated on antimicrobial therapy with IV Vancomycin for treatment of suspected sepsis    Drug Allergies:   Review of patient's allergies indicates:  No Known Allergies    Actual Body Weight:   54.4 kg    Renal Function:   Estimated Creatinine Clearance: 21 mL/min (A) (based on SCr of 2.66 mg/dL (H)).,     Dialysis Method (if applicable):  N/A    CBC (last 72 hours):  Recent Labs   Lab Result Units 09/08/24  1631   WBC x10(3)/mcL 10.37   Hgb g/dL 11.8*   Hct % 34.5*   Platelet x10(3)/mcL 174   Mono % % 9.5   Eos % % 0.0   Basophil % % 0.4       Metabolic Panel (last 72 hours):  Recent Labs   Lab Result Units 09/08/24  1631 09/08/24  1738 09/08/24  1809 09/08/24  1851   Sodium mmol/L 134*  --   --  140   Sodium, Blood Gas mmol/L  --  130*  --   --    Potassium mmol/L 4.7  --   --  3.9   Potassium, Blood Gas mmol/L  --  4.2  --   --    Chloride mmol/L 104  --   --  116*   CO2 mmol/L 16*  --   --  15*   Glucose mg/dL 607*  --   --  283*   Glucose, UA   --   --  4+*  --    Blood Urea Nitrogen mg/dL 88.6*  --   --  76.8*   Creatinine mg/dL 3.40*  --   --  2.66*   Albumin g/dL 2.8*  --   --   --    Bilirubin Total mg/dL 0.3  --   --   --    ALP unit/L 56  --   --   --    AST unit/L 22  --   --   --    ALT unit/L 13  --   --   --    Magnesium Level mg/dL 1.60  --   --  1.40*   Phosphorus Level mg/dL  --   --   --  2.0*       Drug levels  "(last 3 results):  No results for input(s): "VANCOMYCINRA", "VANCORANDOM", "VANCOMYCINPE", "VANCOPEAK", "VANCOMYCINTR", "VANCOTROUGH" in the last 72 hours.    Microbiologic Results:  Microbiology Results (last 7 days)       Procedure Component Value Units Date/Time    Clostridium Diff Toxin, A & B, EIA [[id number]]     Order Status: Sent Specimen: Stool     Blood Culture [[id number]] Collected: 09/08/24 1851    Order Status: Resulted Specimen: Blood Updated: 09/08/24 1907    Blood Culture [[id number]] Collected: 09/08/24 1851    Order Status: Resulted Specimen: Blood Updated: 09/08/24 1905    Urine culture [[id number]] Collected: 09/08/24 1809    Order Status: Sent Specimen: Urine Updated: 09/08/24 1827            "

## 2024-09-09 NOTE — PLAN OF CARE
Problem: Infection  Goal: Absence of Infection Signs and Symptoms  Outcome: Progressing     Problem: Adult Inpatient Plan of Care  Goal: Plan of Care Review  Outcome: Progressing  Goal: Patient-Specific Goal (Individualized)  Outcome: Progressing  Goal: Absence of Hospital-Acquired Illness or Injury  Outcome: Progressing  Goal: Optimal Comfort and Wellbeing  Outcome: Progressing  Goal: Readiness for Transition of Care  Outcome: Progressing     Problem: Diabetes Comorbidity  Goal: Blood Glucose Level Within Targeted Range  Outcome: Progressing      I assume primary medical responsibility for this patient, I have reviewed the case history, findings, diagnosis and treatment plan with the resident and agree that the care is reasonable and necessary. This service has been performed by a resident with the presence of a teaching physician under the primary care exception. See below addendum for my evaluation and additional findings.

## 2024-09-09 NOTE — ANESTHESIA PROCEDURE NOTES
Ad Hoc Intubation    Date/Time: 9/9/2024 3:50 AM    Performed by: Can Guaman CRNA  Authorized by: Can Guaman CRNA    Indications:  Anesthesia  Diagnosis:  Respiratory failure  Patient Location:  ICU  Timeout:  9/9/2024 3:48 AM  Procedure Start Time:  9/9/2024 3:49 AM  Procedure End Time:  9/9/2024 3:50 AM  Staff:     Anesthesiologist Present: Yes    Intubation:     Induction:  Intravenous    Intubated:  Postinduction    Mask Ventilation:  Easy mask    Attempts:  1    Attempted By:  CRNA    Method of Intubation:  Direct    Blade:  Bach 2    Laryngeal View Grade: Grade I - full view of chords      Difficult Airway Encountered?: No      Complications:  None    Airway Device Size:  7.5    Style/Cuff Inflation:  Cuffed    Inflation Amount (mL):  7    Tube secured:  24    Secured at:  The lips    Placement Verified By:  Capnometry and Colorimetric ETCO2 device    Complicating Factors:  None    Findings Post-Intubation:  Atraumatic/condition of teeth unchanged and BS equal bilateral

## 2024-09-09 NOTE — PROCEDURES
"Froy Barragan is a 64 y.o. male patient.    Temp: 98.9 °F (37.2 °C) (09/09/24 1200)  Pulse: 68 (09/09/24 1217)  Resp: 20 (09/09/24 1217)  BP: 113/87 (09/09/24 1217)  SpO2: 100 % (09/09/24 1217)  Weight: 50 kg (110 lb 3.7 oz) (09/09/24 1044)  Height: 5' 4" (162.6 cm) (09/08/24 2045)    PICC  Time out: Immediately prior to procedure a time out was called to verify the correct patient, procedure, equipment, support staff and site/side marked as required  Indications: med administration  Preparation: skin prepped with ChloraPrep  Skin prep agent dried: skin prep agent completely dried prior to procedure  Sterile barriers: all five maximum sterile barriers used - cap, mask, sterile gown, sterile gloves, and large sterile sheet  Hand hygiene: hand hygiene performed prior to central venous catheter insertion  Location details: left brachial  Catheter type: triple lumen  Catheter size: 5 Fr  Catheter Length: 41cm    Ultrasound guidance: yes  Needle advanced into vessel with real time Ultrasound guidance.  Guidewire confirmed in vessel.  Sterile sheath used.            Name radha  9/9/2024    "

## 2024-09-09 NOTE — CONSULTS
[facility] - 7th Floor ICU  Wound Care    Patient Name:  Froy Barragan   MRN:  [id number]  Date: 9/9/2024  Diagnosis: <principal problem not specified>    History:     Past Medical History:   Diagnosis Date    Anemia of chronic disease 7/6/2022    Arteriosclerosis of coronary artery 7/6/2022    Cervical myelopathy 7/6/2022    Cobalamin deficiency 7/6/2022    Gastroesophageal reflux disease 7/6/2022    Hypertension 7/6/2022    Low vitamin D level 7/6/2022    Mixed hyperlipidemia 7/6/2022    Paraparesis 7/6/2022    Stage 3a chronic kidney disease 7/6/2022    Type 2 diabetes mellitus 7/6/2022       Social History     Socioeconomic History    Marital status: Single   Tobacco Use    Smoking status: Never    Smokeless tobacco: Never   Substance and Sexual Activity    Alcohol use: Never    Drug use: Never     Social Determinants of Health     Financial Resource Strain: Low Risk  (4/25/2024)    Overall Financial Resource Strain (CARDIA)     Difficulty of Paying Living Expenses: Not hard at all   Food Insecurity: No Food Insecurity (4/25/2024)    Hunger Vital Sign     Worried About Running Out of Food in the Last Year: Never true     Ran Out of Food in the Last Year: Never true   Transportation Needs: No Transportation Needs (4/25/2024)    PRAPARE - Transportation     Lack of Transportation (Medical): No     Lack of Transportation (Non-Medical): No   Physical Activity: Inactive (4/25/2024)    Exercise Vital Sign     Days of Exercise per Week: 0 days     Minutes of Exercise per Session: 10 min   Stress: No Stress Concern Present (4/25/2024)    [employer] - Occupational Stress Questionnaire     Feeling of Stress : Only a little   Housing Stability: Unknown (4/25/2024)    Housing Stability Vital Sign     Unable to Pay for Housing in the Last Year: No       Precautions:     Allergies as of 09/08/2024    (No Known Allergies)       WO Assessment Details/Treatment      09/09/24  1258   WOCN Assessment   Visit Date 09/09/24   Visit Time 1258   Consult Type New   [facility] Speciality Wound   Intervention assessed;chart review;orders   Teaching on-going   Skin Interventions   Device Skin Pressure Protection absorbent pad utilized/changed        Wound 09/09/24 Moisture associated dermatitis Buttocks   Date First Assessed: 09/09/24   Present on Original Admission: Yes  Primary Wound Type: Moisture associated dermatitis  Location: Buttocks   Wound Image     Dressing Appearance Intact;Dry;Clean   Drainage Amount None   Drainage Characteristics/Odor No odor   Appearance Intact;Pink   Tissue loss description Not applicable   Periwound Area Moist   Wound Edges   (closed)   Care Cleansed with:;Sterile normal saline   Dressing Applied;Foam     WOCN follow up for sacrum. Family at bedside. Treatment recommendations put into place: Sacrum: Cleanse with NS. Apply sacral foam dressing. Change daily and monitor. Keep areas clean and dry, no adult briefs while in bed. Nursing to continue with turning every two hours, wedge, and floating heels.  On ICU ANGELIQUE mattress. Will follow up.    09/09/2024

## 2024-09-09 NOTE — ASSESSMENT & PLAN NOTE
Patient meets ASPEN criteria for severe malnutrition of chronic illness per RD assessment as evidenced by:  Energy Intake (Malnutrition): less than 75% for greater than or equal to 1 month  Weight Loss (Malnutrition):  (does not meet criteria)  Subcutaneous Fat (Malnutrition): severe depletion  Muscle Mass (Malnutrition): severe depletion           A minimum of two characteristics is recommended for diagnosis of either severe or non-severe malnutrition.

## 2024-09-09 NOTE — PROCEDURES
Arterial Catheter Insertion Procedure Note     Procedure: Insertion of Arterial Catheter     Indications: Hemodynamic Monitoring     Procedure Details   Informed consent was obtained for the procedure: Patient is mechanically ventilated & sedated    The skin above the left radial artery was prepped with Chloroprep. Ultrasound guidance was used to identify the artery. A  20-gauge catheter over a needle was then inserted into the artery. A guide wire was then passed easily through the needle. A arterial catheter was then inserted into the vessel over the guide wire. The needle was then withdrawn and catheter was connected to the monitor to ensure a proper waveform.  A sterile dressing was then applied.     Ultrasound/Sonosite was used during the procedure.      Estimated blood loss: 5 cc    Patient tolerated procedure well.    Jose E Tristan DO  9/9/2024

## 2024-09-09 NOTE — PROGRESS NOTES
Pharmacist Renal Dose Adjustment Note    Froy Barragan is a 64 y.o. male being treated with the medication Zosyn    Patient Data:    Vital Signs (Most Recent):  Temp: 97.3 °F (36.3 °C) (09/08/24 1617)  Pulse: 98 (09/08/24 1922)  Resp: (!) 29 (09/08/24 1922)  BP: 94/60 (09/08/24 1922)  SpO2: (!) 93 % (09/08/24 1922) Vital Signs (72h Range):  Temp:  [97.3 °F (36.3 °C)]   Pulse:  []   Resp:  [22-34]   BP: (78-97)/(53-68)   SpO2:  [91 %-97 %]      Recent Labs   Lab 09/08/24  1631   CREATININE 3.40*     Serum creatinine: 3.4 mg/dL (H) 09/08/24 1631  Estimated creatinine clearance: 16.4 mL/min (A)    Medication: Zosyn dose: 4.5 g frequency q8h will be changed to medication: Zosyn dose: 4.5 g frequency: q12h    Pharmacist's Name: Vicki Gomez  Pharmacist's Extension: [phone]

## 2024-09-09 NOTE — PROGRESS NOTES
Inpatient Nutrition Assessment    Admit Date: 9/8/2024   Total duration of encounter: 1 day   Patient Age: 64 y.o.    Nutrition Recommendation/Prescription     Tube feeding:  Impact Peptide 1.5, start at 15 ml/hr, advance by 10 ml/hr every 12 hours as tolerated until goal rate 45 ml/hr  provides (calculations based on estimated 20 hr/d run time)   1350 kcal, 97% needs  84 g protein, 100% needs  125 g carbohydrate, 89% needs  693 ml free water, 46% needs    Communication of Recommendations: reviewed with nurse and reviewed with family    Nutrition Assessment     Malnutrition Assessment/Nutrition-Focused Physical Exam    Malnutrition Context: chronic illness (09/09/24 1049)  Malnutrition Level: severe (09/09/24 1049)  Energy Intake (Malnutrition): less than 75% for greater than or equal to 1 month (09/09/24 1049)  Weight Loss (Malnutrition):  (does not meet criteria) (09/09/24 1049)  Subcutaneous Fat (Malnutrition): severe depletion (09/09/24 1049)  Orbital Region (Subcutaneous Fat Loss): severe depletion  Upper Arm Region (Subcutaneous Fat Loss): severe depletion     Muscle Mass (Malnutrition): severe depletion (09/09/24 1049)  [place] Region (Muscle Loss): severe depletion     Clavicle and Acromion Bone Region (Muscle Loss): severe depletion           Anterior Thigh Region (Muscle Loss): severe depletion  Posterior Calf Region (Muscle Loss): severe depletion           A minimum of two characteristics is recommended for diagnosis of either severe or non-severe malnutrition.    Chart Review    Reason Seen: continuous nutrition monitoring and malnutrition screening tool (MST)    Malnutrition Screening Tool Results   Have you recently lost weight without trying?: Unsure  Have you been eating poorly because of a decreased appetite?: Yes   MST Score: 3   Diagnosis:  Septic shock secondary to UTI  Uncontrolled T2DM with hyperglycemia  DWAINE on CKD secondary to volume depletion  Metabolic acidosis; likely secondary to lactic  acidosis vs diarrhea  Hypomagnesemia  Hypophosphatemia  Diarrhea  Normocytic anemia  Malnutrition  Elevated PSA with urinary retention; history of self catheterization    Relevant Medical History: hypertension, hyperlipidemia, CAD S/P PCI, BPH with current self catheterization, HF     Scheduled Medications:  albuterol-ipratropium, 3 mL, Q4H WAKE  aspirin, 81 mg, Daily  heparin (porcine), 5,000 Units, Q12H  insulin glargine U-100, 10 Units, QHS  [START ON 9/10/2024] mupirocin, , BID  piperacillin-tazobactam (Zosyn) IV (PEDS and ADULTS) (extended infusion is not appropriate), 4.5 g, Q12H    Continuous Infusions:  dexmedeTOMIDine (Precedex) infusion (titrating), Last Rate: 1.4 mcg/kg/hr (09/09/24 0901)  lactated ringers, Last Rate: Stopped (09/09/24 0555)  NORepinephrine bitartrate-D5W, Last Rate: 0.46 mcg/kg/min (09/09/24 0728)  propofoL, Last Rate: 30 mcg/kg/min (09/09/24 0902)  sodium bicarbonate 150 mEq in D5W 1,000 mL infusion, Last Rate: 150 mL/hr at 09/09/24 0620  vasopressin, Last Rate: 0.04 Units/min (09/09/24 0620)    PRN Medications:   dextrose 10%, 12.5 g, PRN  dextrose 10%, 25 g, PRN  fentaNYL, 50 mcg, Q1H PRN  glucagon (human recombinant), 1 mg, PRN  glucose, 16 g, PRN  glucose, 24 g, PRN  insulin aspart U-100, 0-5 Units, QID (AC + HS) PRN  vancomycin - pharmacy to dose, , pharmacy to manage frequency    Calorie Containing IV Medications: no significant kcals from medications at this time    Recent Labs   Lab 09/08/24  1631 09/08/24  1851 09/09/24  0031 09/09/24  0120   * 140 135*  --    K 4.7 3.9 3.7  --    CALCIUM 8.6* 7.1* 7.5*  --    PHOS  --  2.0*  --   --    MG 1.60 1.40*  --   --     116* 110*  --    CO2 16* 15* 17*  --    BUN 88.6* 76.8* 69.3*  --    CREATININE 3.40* 2.66* 2.58*  --    EGFRNORACEVR 19 26 27  --    GLUCOSE 607* 283* 319*  --    BILITOT 0.3  --   --   --    ALKPHOS 56  --   --   --    ALT 13  --   --   --    AST 22  --   --   --    ALBUMIN 2.8*  --   --   --    WBC 10.37  " --   --  6.86  6.86   HGB 11.8*  --   --  10.2*   HCT 34.5*  --   --  29.6*     Nutrition Orders:  Diet diabetic 2000 Calorie      Appetite/Oral Intake: NPO/not applicable  Factors Affecting Nutritional Intake: decreased appetite, NPO, and on mechanical ventilation  Social Needs Impacting Access to Food: none identified  Food/Adventism/Cultural Preferences: none reported  Food Allergies: no known food allergies  Last Bowel Movement: 24  Wound(s): no pressure injuries documented at this time     Comments    24 Patient on ventilator, propofol off during rounds, plans to start tube feeding. Spoke with patient's sister at bedside. She reports very poor intake for the past month and weight loss, weight history in chart reveals no significant weight changes over the past year. Patient is malnourished and at risk for refeeding syndrome; recommend slow initiation/advancement of tube feeding.    Anthropometrics    Height: 5' 4" (162.6 cm), Height Method: Stated  Last Weight: 50 kg (110 lb 3.7 oz) (24 1044), Weight Method: Bed Scale  BMI (Calculated): 18.9  BMI Classification: underweight (BMI less than 22 if >65 years of age)        Ideal Body Weight (IBW), Male: 130 lb     % Ideal Body Weight, Male (lb): 79.31 %                 Usual Body Weight (UBW), k.4 kg (10/24/23)  % Usual Body Weight: 92.1  % Weight Change From Usual Weight: -8.09 %  Usual Weight Provided By: family/caregiver and EMR weight history    Wt Readings from Last 8 Encounters:   24 50 kg (110 lb 3.7 oz)   24 49.4 kg (108 lb 14.5 oz)   24 49.4 kg (109 lb)   24 49.9 kg (110 lb)   24 50 kg (110 lb 3.2 oz)   24 46.3 kg (102 lb 1.6 oz)   10/24/23 54.4 kg (120 lb)   10/19/23 54.4 kg (120 lb)     Weight Change(s) Since Admission:   () fluctuations over past 24 hours, question accuracy, took bed weight of 50 kg during rounds today  Wt Readings from Last 1 Encounters:   24 1044 50 kg (110 lb 3.7 oz) "   24 46.8 kg (103 lb 1.6 oz)   24 54.4 kg (120 lb)   Admit Weight: 54.4 kg (120 lb) (24 161), Weight Method: Bed Scale    Estimated Needs    Weight Used For Calorie Calculations: 50 kg (110 lb 3.7 oz)  Energy Calorie Requirements (kcal): 1,395.52  Energy Need Method: [place]  Total Ve: 8.6 L/m, Temp (24hrs), Av.1 °F (36.7 °C), Min:97.3 °F (36.3 °C), Max:98.8 °F (37.1 °C)  Vtot (L/Min) for [place] Equation Calculation: 8.6; Max Temp for [place] Equation Calculation: 98.8 °F  Weight Used For Protein Calculations: 50 kg (110 lb 3.7 oz)  Protein Requirements:  g, 1.5-2 g/kg  Fluid Requirements (mL): 1500  CHO Requirement: 140-174 g, 40-50% of kcal  Last Updated:      Enteral Nutrition Patient not receiving enteral nutrition at this time.    Parenteral Nutrition Patient not receiving parenteral nutrition support at this time.    Evaluation of Received Nutrient Intake    Calories: not meeting estimated needs  Protein: not meeting estimated needs    Patient Education Not applicable.    Nutrition Diagnosis     PES: Inadequate energy intake related to inability to consume sufficient nutrients as evidenced by less than 80% needs met. (new)  PES: Severe chronic disease or condition related malnutrition related to suboptimal protein/energy intake as evidenced by less than 75% needs met for greater than or equal to 1 month, severe fat depletion, and severe muscle depletion. (new)    Nutrition Interventions     Intervention(s): modified composition of enteral nutrition and modified rate of enteral nutrition  Goal: Meet greater than 80% of nutritional needs by follow-up. (new)  Goal: Tolerate enteral feeding at goal rate by follow-up. (new)    Nutrition Goals & Monitoring     Dietitian will monitor: food and beverage intake, energy intake, enteral nutrition intake, parenteral nutrition intake, weight, weight change, electrolyte/renal panel, beliefs/attitudes, glucose/endocrine  profile, and gastrointestinal profile  Discharge planning: too early to determine; pending clinical course  Nutrition Risk/Follow-Up: high (follow-up in 1-4 days)   Please consult if re-assessment needed sooner.

## 2024-09-09 NOTE — NURSING
Nurses Note -- 4 Eyes      9/9/2024   6:11 PM      Skin assessed during: Daily Assessment      [] No Altered Skin Integrity Present    []Prevention Measures Documented      [x] Yes- Altered Skin Integrity Present or Discovered   [x] LDA Added if Not in Epic (Describe Wound)   [x] New Altered Skin Integrity was Present on Admit and Documented in LDA   [] Wound Image Taken    Wound Care Consulted? Yes    Attending Nurse:  PJ Sandoval    Second RN/Staff Member: PJ Chavez.        \

## 2024-09-10 LAB
ALBUMIN SERPL-MCNC: 2.2 G/DL (ref 3.4–4.8)
ALBUMIN/GLOB SERPL: 0.9 RATIO (ref 1.1–2)
ALLENS TEST BLOOD GAS (OHS): ABNORMAL
ALLENS TEST BLOOD GAS (OHS): ABNORMAL
ALP SERPL-CCNC: 45 UNIT/L (ref 40–150)
ALT SERPL-CCNC: 15 UNIT/L (ref 0–55)
ANION GAP SERPL CALC-SCNC: 9 MEQ/L
AST SERPL-CCNC: 14 UNIT/L (ref 5–34)
BACTERIA SPEC CULT: NORMAL
BASE EXCESS BLD CALC-SCNC: 3.1 MMOL/L (ref -2–2)
BASE EXCESS BLD CALC-SCNC: 4.9 MMOL/L (ref -2–2)
BASOPHILS # BLD AUTO: 0.03 X10(3)/MCL
BASOPHILS NFR BLD AUTO: 0.3 %
BILIRUB SERPL-MCNC: 0.3 MG/DL
BLOOD GAS SAMPLE TYPE (OHS): ABNORMAL
BLOOD GAS SAMPLE TYPE (OHS): ABNORMAL
BUN SERPL-MCNC: 41.4 MG/DL (ref 8.4–25.7)
CA-I BLD-SCNC: 1 MMOL/L (ref 1.12–1.23)
CA-I BLD-SCNC: 1.01 MMOL/L (ref 1.12–1.23)
CALCIUM SERPL-MCNC: 7.5 MG/DL (ref 8.8–10)
CHLORIDE SERPL-SCNC: 103 MMOL/L (ref 98–107)
CO2 BLDA-SCNC: 27.8 MMOL/L
CO2 BLDA-SCNC: 28 MMOL/L
CO2 SERPL-SCNC: 25 MMOL/L (ref 23–31)
COHGB MFR BLDA: 1.1 % (ref 0.5–1.5)
COHGB MFR BLDA: 2.3 % (ref 0.5–1.5)
CREAT SERPL-MCNC: 2.11 MG/DL (ref 0.73–1.18)
CREAT/UREA NIT SERPL: 20
DRAWN BY BLOOD GAS (OHS): ABNORMAL
DRAWN BY BLOOD GAS (OHS): ABNORMAL
EOSINOPHIL # BLD AUTO: 0 X10(3)/MCL (ref 0–0.9)
EOSINOPHIL NFR BLD AUTO: 0 %
ERYTHROCYTE [DISTWIDTH] IN BLOOD BY AUTOMATED COUNT: 13.2 % (ref 11.5–17)
GFR SERPLBLD CREATININE-BSD FMLA CKD-EPI: 34 ML/MIN/1.73/M2
GLOBULIN SER-MCNC: 2.5 GM/DL (ref 2.4–3.5)
GLUCOSE SERPL-MCNC: 271 MG/DL (ref 82–115)
GRAM STN SPEC: NORMAL
HCO3 BLDA-SCNC: 26.9 MMOL/L (ref 22–26)
HCO3 BLDA-SCNC: 26.9 MMOL/L (ref 22–26)
HCT VFR BLD AUTO: 29.8 % (ref 42–52)
HGB BLD-MCNC: 10.7 G/DL (ref 14–18)
IMM GRANULOCYTES # BLD AUTO: 0.05 X10(3)/MCL (ref 0–0.04)
IMM GRANULOCYTES NFR BLD AUTO: 0.5 %
INHALED O2 CONCENTRATION: 40 %
INHALED O2 CONCENTRATION: 40 %
LYMPHOCYTES # BLD AUTO: 0.26 X10(3)/MCL (ref 0.6–4.6)
LYMPHOCYTES NFR BLD AUTO: 2.6 %
MCH RBC QN AUTO: 31.2 PG (ref 27–31)
MCHC RBC AUTO-ENTMCNC: 35.9 G/DL (ref 33–36)
MCV RBC AUTO: 86.9 FL (ref 80–94)
MECH RR (OHS): 18 B/MIN
MECH RR (OHS): 20 B/MIN
METHGB MFR BLDA: 0.2 % (ref 0.4–1.5)
METHGB MFR BLDA: 0.9 % (ref 0.4–1.5)
MODE (OHS): AC
MODE (OHS): AC
MONOCYTES # BLD AUTO: 0.65 X10(3)/MCL (ref 0.1–1.3)
MONOCYTES NFR BLD AUTO: 6.5 %
NEUTROPHILS # BLD AUTO: 8.99 X10(3)/MCL (ref 2.1–9.2)
NEUTROPHILS NFR BLD AUTO: 90.1 %
NRBC BLD AUTO-RTO: 0 %
O2 HB BLOOD GAS (OHS): 97.5 % (ref 94–97)
O2 HB BLOOD GAS (OHS): 98.4 % (ref 94–97)
OXYGEN DEVICE BLOOD GAS (OHS): ABNORMAL
OXYGEN DEVICE BLOOD GAS (OHS): ABNORMAL
OXYHGB MFR BLDA: 10.2 G/DL (ref 12–16)
OXYHGB MFR BLDA: 10.3 G/DL (ref 12–16)
PCO2 BLDA: 30 MMHG (ref 35–45)
PCO2 BLDA: 37 MMHG (ref 35–45)
PEEP RESPIRATORY: 10 CMH2O
PEEP RESPIRATORY: 5 CMH2O
PH BLDA: 7.47 [PH] (ref 7.35–7.45)
PH BLDA: 7.56 [PH] (ref 7.35–7.45)
PLATELET # BLD AUTO: 207 X10(3)/MCL (ref 130–400)
PMV BLD AUTO: 12.1 FL (ref 7.4–10.4)
PO2 BLDA: 105 MMHG (ref 80–100)
PO2 BLDA: 116 MMHG (ref 80–100)
POCT GLUCOSE: 208 MG/DL (ref 70–110)
POCT GLUCOSE: 211 MG/DL (ref 70–110)
POCT GLUCOSE: 212 MG/DL (ref 70–110)
POCT GLUCOSE: 238 MG/DL (ref 70–110)
POCT GLUCOSE: 260 MG/DL (ref 70–110)
POCT GLUCOSE: 268 MG/DL (ref 70–110)
POCT GLUCOSE: 294 MG/DL (ref 70–110)
POCT GLUCOSE: 304 MG/DL (ref 70–110)
POCT GLUCOSE: 325 MG/DL (ref 70–110)
POCT GLUCOSE: 328 MG/DL (ref 70–110)
POCT GLUCOSE: 373 MG/DL (ref 70–110)
POTASSIUM BLOOD GAS (OHS): 3.7 MMOL/L (ref 3.5–5)
POTASSIUM BLOOD GAS (OHS): 3.9 MMOL/L (ref 3.5–5)
POTASSIUM SERPL-SCNC: 4.3 MMOL/L (ref 3.5–5.1)
PROT SERPL-MCNC: 4.7 GM/DL (ref 5.8–7.6)
RBC # BLD AUTO: 3.43 X10(6)/MCL (ref 4.7–6.1)
SAMPLE SITE BLOOD GAS (OHS): ABNORMAL
SAMPLE SITE BLOOD GAS (OHS): ABNORMAL
SAO2 % BLDA: 98.7 %
SAO2 % BLDA: 98.8 %
SODIUM BLOOD GAS (OHS): 133 MMOL/L (ref 137–145)
SODIUM BLOOD GAS (OHS): 137 MMOL/L (ref 137–145)
SODIUM SERPL-SCNC: 137 MMOL/L (ref 136–145)
SPONT+MECH VT ON VENT: 400 ML
SPONT+MECH VT ON VENT: 450 ML
WBC # BLD AUTO: 9.98 X10(3)/MCL (ref 4.5–11.5)

## 2024-09-10 PROCEDURE — 25000003 PHARM REV CODE 250: Performed by: INTERNAL MEDICINE

## 2024-09-10 PROCEDURE — 36415 COLL VENOUS BLD VENIPUNCTURE: CPT

## 2024-09-10 PROCEDURE — 94003 VENT MGMT INPAT SUBQ DAY: CPT

## 2024-09-10 PROCEDURE — A4216 STERILE WATER/SALINE, 10 ML: HCPCS | Performed by: INTERNAL MEDICINE

## 2024-09-10 PROCEDURE — 82803 BLOOD GASES ANY COMBINATION: CPT

## 2024-09-10 PROCEDURE — 63600175 PHARM REV CODE 636 W HCPCS

## 2024-09-10 PROCEDURE — 20000000 HC ICU ROOM

## 2024-09-10 PROCEDURE — 99900031 HC PATIENT EDUCATION (STAT)

## 2024-09-10 PROCEDURE — 80053 COMPREHEN METABOLIC PANEL: CPT

## 2024-09-10 PROCEDURE — 63600175 PHARM REV CODE 636 W HCPCS: Performed by: NURSE PRACTITIONER

## 2024-09-10 PROCEDURE — 25000242 PHARM REV CODE 250 ALT 637 W/ HCPCS

## 2024-09-10 PROCEDURE — 99900035 HC TECH TIME PER 15 MIN (STAT)

## 2024-09-10 PROCEDURE — 25000003 PHARM REV CODE 250

## 2024-09-10 PROCEDURE — 27200966 HC CLOSED SUCTION SYSTEM

## 2024-09-10 PROCEDURE — 37799 UNLISTED PX VASCULAR SURGERY: CPT

## 2024-09-10 PROCEDURE — 94760 N-INVAS EAR/PLS OXIMETRY 1: CPT | Mod: XB

## 2024-09-10 PROCEDURE — 85025 COMPLETE CBC W/AUTO DIFF WBC: CPT

## 2024-09-10 PROCEDURE — 27000207 HC ISOLATION

## 2024-09-10 PROCEDURE — 94640 AIRWAY INHALATION TREATMENT: CPT

## 2024-09-10 PROCEDURE — 94761 N-INVAS EAR/PLS OXIMETRY MLT: CPT

## 2024-09-10 PROCEDURE — 27100171 HC OXYGEN HIGH FLOW UP TO 24 HOURS

## 2024-09-10 PROCEDURE — 99900026 HC AIRWAY MAINTENANCE (STAT)

## 2024-09-10 RX ORDER — MICONAZOLE NITRATE 2 G/100G
POWDER TOPICAL 2 TIMES DAILY
Status: DISCONTINUED | OUTPATIENT
Start: 2024-09-10 | End: 2024-10-01 | Stop reason: HOSPADM

## 2024-09-10 RX ORDER — INSULIN GLARGINE 100 [IU]/ML
10 INJECTION, SOLUTION SUBCUTANEOUS 2 TIMES DAILY
Status: DISCONTINUED | OUTPATIENT
Start: 2024-09-10 | End: 2024-09-11

## 2024-09-10 RX ORDER — INSULIN ASPART 100 [IU]/ML
0-10 INJECTION, SOLUTION INTRAVENOUS; SUBCUTANEOUS
Status: DISCONTINUED | OUTPATIENT
Start: 2024-09-10 | End: 2024-10-01 | Stop reason: HOSPADM

## 2024-09-10 RX ADMIN — PROPOFOL 20 MCG/KG/MIN: 10 INJECTION, EMULSION INTRAVENOUS at 04:09

## 2024-09-10 RX ADMIN — SODIUM CHLORIDE, PRESERVATIVE FREE 10 ML: 5 INJECTION INTRAVENOUS at 05:09

## 2024-09-10 RX ADMIN — DEXMEDETOMIDINE HYDROCHLORIDE 1.4 MCG/KG/HR: 400 INJECTION INTRAVENOUS at 02:09

## 2024-09-10 RX ADMIN — MUPIROCIN: 20 OINTMENT TOPICAL at 09:09

## 2024-09-10 RX ADMIN — PROPOFOL 20 MCG/KG/MIN: 10 INJECTION, EMULSION INTRAVENOUS at 09:09

## 2024-09-10 RX ADMIN — SODIUM CHLORIDE: 9 INJECTION, SOLUTION INTRAVENOUS at 01:09

## 2024-09-10 RX ADMIN — PIPERACILLIN SODIUM AND TAZOBACTAM SODIUM 4.5 G: 4; .5 INJECTION, POWDER, LYOPHILIZED, FOR SOLUTION INTRAVENOUS at 06:09

## 2024-09-10 RX ADMIN — IPRATROPIUM BROMIDE AND ALBUTEROL SULFATE 3 ML: 2.5; .5 SOLUTION RESPIRATORY (INHALATION) at 11:09

## 2024-09-10 RX ADMIN — SODIUM CHLORIDE, POTASSIUM CHLORIDE, SODIUM LACTATE AND CALCIUM CHLORIDE 500 ML: 600; 310; 30; 20 INJECTION, SOLUTION INTRAVENOUS at 11:09

## 2024-09-10 RX ADMIN — MICONAZOLE NITRATE 2 % TOPICAL POWDER: at 09:09

## 2024-09-10 RX ADMIN — PROPOFOL 40 MCG/KG/MIN: 10 INJECTION, EMULSION INTRAVENOUS at 03:09

## 2024-09-10 RX ADMIN — DEXMEDETOMIDINE HYDROCHLORIDE 1.4 MCG/KG/HR: 400 INJECTION INTRAVENOUS at 09:09

## 2024-09-10 RX ADMIN — INSULIN ASPART 4 UNITS: 100 INJECTION, SOLUTION INTRAVENOUS; SUBCUTANEOUS at 04:09

## 2024-09-10 RX ADMIN — INSULIN ASPART 8 UNITS: 100 INJECTION, SOLUTION INTRAVENOUS; SUBCUTANEOUS at 08:09

## 2024-09-10 RX ADMIN — INSULIN ASPART 8 UNITS: 100 INJECTION, SOLUTION INTRAVENOUS; SUBCUTANEOUS at 01:09

## 2024-09-10 RX ADMIN — DEXMEDETOMIDINE HYDROCHLORIDE 1.4 MCG/KG/HR: 400 INJECTION INTRAVENOUS at 03:09

## 2024-09-10 RX ADMIN — SODIUM CHLORIDE, PRESERVATIVE FREE 10 ML: 5 INJECTION INTRAVENOUS at 12:09

## 2024-09-10 RX ADMIN — NOREPINEPHRINE BITARTRATE 0.1 MCG/KG/MIN: 8 INJECTION, SOLUTION INTRAVENOUS at 04:09

## 2024-09-10 RX ADMIN — MICONAZOLE NITRATE 2 % TOPICAL POWDER: at 12:09

## 2024-09-10 RX ADMIN — DEXMEDETOMIDINE HYDROCHLORIDE 0.8 MCG/KG/HR: 400 INJECTION INTRAVENOUS at 11:09

## 2024-09-10 RX ADMIN — PIPERACILLIN SODIUM AND TAZOBACTAM SODIUM 4.5 G: 4; .5 INJECTION, POWDER, LYOPHILIZED, FOR SOLUTION INTRAVENOUS at 05:09

## 2024-09-10 RX ADMIN — MUPIROCIN: 20 OINTMENT TOPICAL at 08:09

## 2024-09-10 RX ADMIN — IPRATROPIUM BROMIDE AND ALBUTEROL SULFATE 3 ML: 2.5; .5 SOLUTION RESPIRATORY (INHALATION) at 08:09

## 2024-09-10 RX ADMIN — HEPARIN SODIUM 5000 UNITS: 5000 INJECTION, SOLUTION INTRAVENOUS; SUBCUTANEOUS at 09:09

## 2024-09-10 RX ADMIN — INSULIN ASPART 4 UNITS: 100 INJECTION, SOLUTION INTRAVENOUS; SUBCUTANEOUS at 09:09

## 2024-09-10 RX ADMIN — SODIUM PHOSPHATE, MONOBASIC, MONOHYDRATE AND SODIUM PHOSPHATE, DIBASIC, ANHYDROUS 30 MMOL: 142; 276 INJECTION, SOLUTION INTRAVENOUS at 11:09

## 2024-09-10 RX ADMIN — HEPARIN SODIUM 5000 UNITS: 5000 INJECTION, SOLUTION INTRAVENOUS; SUBCUTANEOUS at 08:09

## 2024-09-10 RX ADMIN — ASPIRIN 81 MG CHEWABLE TABLET 81 MG: 81 TABLET CHEWABLE at 08:09

## 2024-09-10 RX ADMIN — INSULIN GLARGINE 10 UNITS: 100 INJECTION, SOLUTION SUBCUTANEOUS at 09:09

## 2024-09-10 RX ADMIN — INSULIN GLARGINE 10 UNITS: 100 INJECTION, SOLUTION SUBCUTANEOUS at 11:09

## 2024-09-10 RX ADMIN — IPRATROPIUM BROMIDE AND ALBUTEROL SULFATE 3 ML: 2.5; .5 SOLUTION RESPIRATORY (INHALATION) at 07:09

## 2024-09-10 NOTE — PLAN OF CARE
Problem: Infection  Goal: Absence of Infection Signs and Symptoms  Outcome: Progressing     Problem: Adult Inpatient Plan of Care  Goal: Plan of Care Review  Outcome: Progressing  Goal: Patient-Specific Goal (Individualized)  Outcome: Progressing  Goal: Absence of Hospital-Acquired Illness or Injury  Outcome: Progressing  Goal: Optimal Comfort and Wellbeing  Outcome: Progressing  Goal: Readiness for Transition of Care  Outcome: Progressing     Problem: Diabetes Comorbidity  Goal: Blood Glucose Level Within Targeted Range  Outcome: Progressing     Problem: Skin Injury Risk Increased  Goal: Skin Health and Integrity  Outcome: Progressing     Problem: Fall Injury Risk  Goal: Absence of Fall and Fall-Related Injury  Outcome: Progressing     Problem: Wound  Goal: Optimal Coping  Outcome: Progressing  Goal: Optimal Functional Ability  Outcome: Progressing  Goal: Absence of Infection Signs and Symptoms  Outcome: Progressing  Goal: Improved Oral Intake  Outcome: Progressing  Goal: Optimal Pain Control and Function  Outcome: Progressing  Goal: Skin Health and Integrity  Outcome: Progressing  Goal: Optimal Wound Healing  Outcome: Progressing

## 2024-09-10 NOTE — PROGRESS NOTES
[facility]  Pulmonary Critical Care Note    Patient Name: Froy Barragan  MRN: [id number]  Admission Date: 9/8/2024  Hospital Length of Stay: 2 days  Code Status: No Order  Attending Provider: Froy Dougherty MD  Primary Care Provider: George Bran MD     Subjective:     HPI:   Patient is a 64-year-old male with a past medical history of hypertension, hyperlipidemia, CAD S/P PCI, BPH with current self catheterization, and HFpEF who initially presented to the ED with a complaint of nausea/vomiting for the last 1 week.  He is accompanied by his sister, patient's legal guardian, who assisted with history.  They state that patient has been experiencing nonbloody vomiting over the last 1 week.  She also reports about a 2 day history of urinary incontinence, worse than baseline, and uncontrollable nonbloody diarrhea.  She reports that patient was wheelchair-bound due to chronic weakness in his bilateral legs for several years.  Patient currently perform self catheterization at home but his sister states that it has been 1-2 days since he self catheterized himself.  She reports patient had multiple UTIs in the past.  She also reports that patient has had difficulty monitoring his blood glucose levels at home but he reports full compliance with all prescribed medications.  Patient denies any fever, chills, hematemesis, abdominal pain, suprapubic tenderness, melena.     In the ED, initial POCT glucose elevated greater than 500.  CMP with bicarb of 16, serum creatinine elevated at 3.4 with previous baseline at a proximally 1.5.  CBG elevated at 607.  BOHB 1.4.  UA displayed 4+ glycosuria, 3+ hematuria, but no ketonuria.  Indicative of UTI as well.  ABG with slight acidosis with a pH of  7.290.  ICU was consulted for admission due to concerns for DKA.    Hospital Course/Significant events:  9/8/2024; admitted to ICU and developed worsening hypotension and respiratory failure  requiring intubation that night    24 Hour Interval History:  Urine culture growing >100,000 colonies GNRs. Blood cultures remain negative. He is now only on levophed, 0.02mcg/kg/min. Vasopressin stopped overnight.     Past Medical History:   Diagnosis Date    Anemia of chronic disease 7/6/2022    Arteriosclerosis of coronary artery 7/6/2022    Cervical myelopathy 7/6/2022    Cobalamin deficiency 7/6/2022    Gastroesophageal reflux disease 7/6/2022    Hypertension 7/6/2022    Low vitamin D level 7/6/2022    Mixed hyperlipidemia 7/6/2022    Paraparesis 7/6/2022    Stage 3a chronic kidney disease 7/6/2022    Type 2 diabetes mellitus 7/6/2022       Past Surgical History:   Procedure Laterality Date    SPINE SURGERY         Social History     Socioeconomic History    Marital status: Single   Tobacco Use    Smoking status: Never    Smokeless tobacco: Never   Substance and Sexual Activity    Alcohol use: Never    Drug use: Never     Social Determinants of Health     Financial Resource Strain: Low Risk  (4/25/2024)    Overall Financial Resource Strain (CARDIA)     Difficulty of Paying Living Expenses: Not hard at all   Food Insecurity: No Food Insecurity (4/25/2024)    Hunger Vital Sign     Worried About Running Out of Food in the Last Year: Never true     Ran Out of Food in the Last Year: Never true   Transportation Needs: No Transportation Needs (4/25/2024)    PRAPARE - Transportation     Lack of Transportation (Medical): No     Lack of Transportation (Non-Medical): No   Physical Activity: Inactive (4/25/2024)    Exercise Vital Sign     Days of Exercise per Week: 0 days     Minutes of Exercise per Session: 10 min   Stress: No Stress Concern Present (4/25/2024)    [employer] - Occupational Stress Questionnaire     Feeling of Stress : Only a little   Housing Stability: Unknown (4/25/2024)    Housing Stability Vital Sign     Unable to Pay for Housing in the Last Year: No           Current  Outpatient Medications   Medication Instructions    alcohol swabs (BD ALCOHOL SWABS) PadM 1 each, Topical (Top), Daily    alfuzosin (UROXATRAL) 10 mg Tb24 TAKE 1 TABLET EVERY DAY WITH BREAKFAST    amLODIPine (NORVASC) 2.5 mg, Oral    aspirin (ECOTRIN) 81 mg, Oral, Daily    b complex vitamins capsule 1 capsule, Oral, Daily    blood glucose control, low (TRUE METRIX LEVEL 1) Soln 1 Bottle, Misc.(Non-Drug; Combo Route), Daily    blood-glucose meter (TRUE METRIX AIR GLUCOSE METER) Misc 1 each, Misc.(Non-Drug; Combo Route), Daily    blood-glucose sensor (DEXCOM G7 SENSOR) Carmen 1 each, Misc.(Non-Drug; Combo Route), Daily    carvediloL (COREG) 25 mg, Oral, 2 times daily    ezetimibe (ZETIA) 10 mg, Oral, Daily    finasteride (PROSCAR) 5 mg, Oral, Daily    gabapentin (NEURONTIN) 300 mg, Oral, 3 times daily    glimepiride (AMARYL) 2 mg, Oral, Before breakfast    lancets (TRUEPLUS LANCETS) 33 gauge Misc 1 lancet , Misc.(Non-Drug; Combo Route), Daily    metFORMIN (GLUCOPHAGE) 1,000 mg, Oral, 2 times daily    pantoprazole (PROTONIX) 40 mg, Oral    rosuvastatin (CRESTOR) 40 mg, Oral, Daily    TRUE METRIX GLUCOSE TEST STRIP Strp TEST BLOOD SUGAR EVERY DAY    vitamin D (VITAMIN D3) 1,000 Units, Oral, Daily       Current Inpatient Medications   albuterol-ipratropium  3 mL Nebulization Q4H WAKE    aspirin  81 mg Oral Daily    heparin (porcine)  5,000 Units Subcutaneous Q12H    insulin glargine U-100  10 Units Subcutaneous QHS    mupirocin   Nasal BID    piperacillin-tazobactam (Zosyn) IV (PEDS and ADULTS) (extended infusion is not appropriate)  4.5 g Intravenous Q12H    sodium chloride 0.9%  10 mL Intravenous Q6H       Current Intravenous Infusions   0.9% NaCl   Intravenous Continuous 125 mL/hr at 09/10/24 0614 Rate Verify at 09/10/24 0614    dexmedeTOMIDine (Precedex) infusion (titrating)  0-1.4 mcg/kg/hr Intravenous Continuous 16.38 mL/hr at 09/10/24 0941 1.4 mcg/kg/hr at 09/10/24 0941    D5 and 0.45% NaCl   Intravenous Continuous  PRN        NORepinephrine bitartrate-D5W  0-3 mcg/kg/min (Order-Specific) Intravenous Continuous 8.8 mL/hr at 09/10/24 0406 0.1 mcg/kg/min at 09/10/24 0406    propofoL  0-50 mcg/kg/min (Dosing Weight) Intravenous Continuous 6.5 mL/hr at 09/10/24 0942 20 mcg/kg/min at 09/10/24 0942    vasopressin  0.04 Units/min Intravenous Continuous   Stopped at 09/10/24 0239       ROS: unable to perform       Objective:       Intake/Output Summary (Last 24 hours) at 9/10/2024 1022  Last data filed at 9/10/2024 1019  Gross per 24 hour   Intake 5294.55 ml   Output 3375 ml   Net 1919.55 ml         Vital Signs (Most Recent):  Temp: 98.7 °F (37.1 °C) (09/10/24 0800)  Pulse: 88 (09/10/24 1000)  Resp: 20 (09/10/24 1000)  BP: (!) 88/70 (09/10/24 1000)  SpO2: 97 % (09/10/24 1000)  Body mass index is 18.92 kg/m².  Weight: 50 kg (110 lb 3.7 oz) Vital Signs (24h Range):  Temp:  [93.6 °F (34.2 °C)-98.9 °F (37.2 °C)] 98.7 °F (37.1 °C)  Pulse:  [58-91] 88  Resp:  [14-25] 20  SpO2:  [96 %-100 %] 97 %  BP: ()/(61-94) 88/70  Arterial Line BP: ()/(45-73) 99/59     Physical Exam  Vitals reviewed.   Constitutional:       Comments:  male lying in bed on mechanical ventilation   Cardiovascular:      Rate and Rhythm: Regular rhythm. Tachycardia present.      Pulses: Normal pulses.      Heart sounds: Murmur heard.      No friction rub. No gallop.   Pulmonary:      Breath sounds: No wheezing, rhonchi or rales.   Abdominal:      General: Bowel sounds are normal. There is no distension.      Palpations: Abdomen is soft.      Tenderness: There is no abdominal tenderness.   Musculoskeletal:      Right lower leg: No edema.      Left lower leg: No edema.   Skin:     Capillary Refill: Capillary refill takes more than 3 seconds.   Neurological:      General: No focal deficit present.      Mental Status: He is oriented to person, place, and time.       Lines/Drains/Airways       Peripherally Inserted Central Catheter Line  Duration              PICC Triple Lumen 09/09/24 1309 left brachial <1 day              Drain  Duration                  NG/OG Tube 09/09/24 0700 Center mouth 1 day         Urethral Catheter 09/08/24 1810 Straight-tip 16 Fr. 1 day              Airway  Duration                  Airway - Non-Surgical 09/09/24 0349 1 day              Arterial Line  Duration             Arterial Line 09/09/24 1345 Left Radial <1 day              Peripheral Intravenous Line  Duration                  Peripheral IV - Single Lumen 09/08/24 1646 20 G Anterior;Right Forearm 1 day         Peripheral IV - Single Lumen 09/08/24 1700 18 G Left Antecubital 1 day         Peripheral IV - Single Lumen 09/08/24 1915 20 G Anterior;Left Forearm 1 day                    Significant Labs:    Lab Results   Component Value Date    WBC 9.98 09/10/2024    HGB 10.7 (L) 09/10/2024    HCT 29.8 (L) 09/10/2024    MCV 86.9 09/10/2024     09/10/2024           BMP  Lab Results   Component Value Date     09/10/2024    K 4.3 09/10/2024    CO2 25 09/10/2024    BUN 41.4 (H) 09/10/2024    CREATININE 2.11 (H) 09/10/2024    CALCIUM 7.5 (L) 09/10/2024    AGAP 9.0 09/10/2024    EGFRNONAA 47 (L) 01/11/2022         ABG  Recent Labs   Lab 09/10/24  0712   PH 7.560*   PO2 105.0*   PCO2 30.0*   HCO3 26.9*   POCBASEDEF 4.90*       Mechanical Ventilation Support:  Vent Mode: A/C (09/10/24 0836)  Set Rate: 20 BPM (09/10/24 0836)  Vt Set: 450 mL (09/10/24 0836)  PEEP/CPAP: 10 cmH20 (09/10/24 0836)  Oxygen Concentration (%): 40 (09/10/24 0836)  Peak Airway Pressure: 22 cmH20 (09/10/24 0836)  Total Ve: 8.9 L/m (09/10/24 0836)  F/VT Ratio<105 (RSBI): (!) 48.66 (09/10/24 0625)    Significant Imaging:  I have reviewed the pertinent imaging within the past 24 hours.    Assessment/Plan:     Assessment  Septic shock secondary to UTI  Uncontrolled T2DM with hyperglycemia  DWAINE on CKD secondary to volume depletion  Metabolic acidosis; likely secondary to lactic acidosis vs  diarrhea  Hypomagnesemia  Hypophosphatemia  Diarrhea  Normocytic anemia  Malnutrition  Elevated PSA with urinary retention; history of self catheterization  History of CAD S/p PCI  History of HFpEF (55%)  History of hypertension  History of hyperlipidemia    Plan  Continue IVFs and weaning pressors  Vent settings decreased s/t alkalosis  Continue zosyn and follow final urine culture  Wean sedation as tolerated  Continue sliding scale and long acting insulin - dose increased today   Cdiff testing pending  Continue ICU care    DVT Prophylaxis: heparin    32 minutes of critical care was time spent personally by me on the following activities: development of treatment plan with patient or surrogate and bedside caregivers, discussions with consultants, evaluation of patient's response to treatment, examination of patient, ordering and performing treatments and interventions, ordering and review of laboratory studies, ordering and review of radiographic studies, pulse oximetry, re-evaluation of patient's condition.  This critical care time did not overlap with that of any other provider or involve time for any procedures.     IRLANDA Loyola  Pulmonary Critical Care Medicine  [facility] - Emergency Dept  DOS: 09/10/2024

## 2024-09-10 NOTE — CONSULTS
[facility] - 7th Floor ICU  Wound Care    Patient Name:  Froy Barragan   MRN:  [id number]  Date: 9/10/2024  Diagnosis: <principal problem not specified>    History:     Past Medical History:   Diagnosis Date    Anemia of chronic disease 7/6/2022    Arteriosclerosis of coronary artery 7/6/2022    Cervical myelopathy 7/6/2022    Cobalamin deficiency 7/6/2022    Gastroesophageal reflux disease 7/6/2022    Hypertension 7/6/2022    Low vitamin D level 7/6/2022    Mixed hyperlipidemia 7/6/2022    Paraparesis 7/6/2022    Stage 3a chronic kidney disease 7/6/2022    Type 2 diabetes mellitus 7/6/2022       Social History     Socioeconomic History    Marital status: Single   Tobacco Use    Smoking status: Never    Smokeless tobacco: Never   Substance and Sexual Activity    Alcohol use: Never    Drug use: Never     Social Determinants of Health     Financial Resource Strain: Low Risk  (4/25/2024)    Overall Financial Resource Strain (CARDIA)     Difficulty of Paying Living Expenses: Not hard at all   Food Insecurity: No Food Insecurity (4/25/2024)    Hunger Vital Sign     Worried About Running Out of Food in the Last Year: Never true     Ran Out of Food in the Last Year: Never true   Transportation Needs: No Transportation Needs (4/25/2024)    PRAPARE - Transportation     Lack of Transportation (Medical): No     Lack of Transportation (Non-Medical): No   Physical Activity: Inactive (4/25/2024)    Exercise Vital Sign     Days of Exercise per Week: 0 days     Minutes of Exercise per Session: 10 min   Stress: No Stress Concern Present (4/25/2024)    [employer] - Occupational Stress Questionnaire     Feeling of Stress : Only a little   Housing Stability: Unknown (4/25/2024)    Housing Stability Vital Sign     Unable to Pay for Housing in the Last Year: No       Precautions:     Allergies as of 09/08/2024    (No Known Allergies)       WOC Assessment Details/Treatment         09/10/24 1051   WOCN Assessment   Visit Date 09/10/24   Visit Time 1051   Consult Type New   WOCN Speciality Wound   Intervention assessed;chart review;orders   Teaching on-going   Skin Interventions   Device Skin Pressure Protection absorbent pad utilized/changed   Pressure Reduction Devices specialty bed utilized        Wound 09/10/24 0200 Rash medial Groin   Date First Assessed/Time First Assessed: 09/10/24 0200   Present on Original Admission: No  Primary Wound Type: Rash  Orientation: medial  Location: Groin   Wound Image    Distribution localized   Characteristics redness/erythema   Rash Care cleansed with;sterile normal saline;antifungal applied     WOCN consulted for redness in groin. Initial evaluation, family at bedside. Recommendations to Cleanse with NS. Apply miconazole powder to groin, and perineum BID and PRN. Keep areas clean and dry, no adult briefs while in bed. Nursing to continue with turning every two hours, wedge, and floating heels. On ANGELIQUE mattress. Will follow up.    09/10/2024

## 2024-09-10 NOTE — NURSING
Nurses Note -- 4 Eyes      9/10/2024   12:15 PM      Skin assessed during: Daily Assessment      [] No Altered Skin Integrity Present    [x]Prevention Measures Documented      [x] Yes- Altered Skin Integrity Present or Discovered   [] LDA Added if Not in Epic (Describe Wound)   [] New Altered Skin Integrity was Present on Admit and Documented in LDA   [] Wound Image Taken    Wound Care Consulted? Yes    Attending Nurse:  Mata OLIVA    Second RN/Staff Member:   Kathy OLIVA

## 2024-09-10 NOTE — NURSING
Nurses Note -- 4 Eyes      9/10/2024   3:37 AM      Skin assessed during: Daily Assessment      [] No Altered Skin Integrity Present    []Prevention Measures Documented      [x] Yes- Altered Skin Integrity Present or Discovered   [x] LDA Added if Not in Epic (Describe Wound)   [x] New Altered Skin Integrity was Present on Admit and Documented in LDA   [x] Wound Image Taken    Wound Care Consulted? Yes    Attending Nurse:  PJ Mccormick    Second RN/Staff Member:   PJ Hines

## 2024-09-11 LAB
ALBUMIN SERPL-MCNC: 2 G/DL (ref 3.4–4.8)
ALBUMIN/GLOB SERPL: 0.7 RATIO (ref 1.1–2)
ALLENS TEST BLOOD GAS (OHS): ABNORMAL
ALP SERPL-CCNC: 54 UNIT/L (ref 40–150)
ALT SERPL-CCNC: 17 UNIT/L (ref 0–55)
ANION GAP SERPL CALC-SCNC: 8 MEQ/L
ANION GAP SERPL CALC-SCNC: 9 MEQ/L
AST SERPL-CCNC: 18 UNIT/L (ref 5–34)
BACTERIA UR CULT: ABNORMAL
BACTERIA UR CULT: ABNORMAL
BASE EXCESS BLD CALC-SCNC: 0.6 MMOL/L (ref -2–2)
BASOPHILS # BLD AUTO: 0.02 X10(3)/MCL
BASOPHILS NFR BLD AUTO: 0.2 %
BILIRUB SERPL-MCNC: 0.2 MG/DL
BLOOD GAS SAMPLE TYPE (OHS): ABNORMAL
BUN SERPL-MCNC: 37.2 MG/DL (ref 8.4–25.7)
BUN SERPL-MCNC: 40.2 MG/DL (ref 8.4–25.7)
CA-I BLD-SCNC: 1.03 MMOL/L (ref 1.12–1.23)
CALCIUM SERPL-MCNC: 7.1 MG/DL (ref 8.8–10)
CALCIUM SERPL-MCNC: 7.2 MG/DL (ref 8.8–10)
CHLORIDE SERPL-SCNC: 112 MMOL/L (ref 98–107)
CHLORIDE SERPL-SCNC: 114 MMOL/L (ref 98–107)
CO2 BLDA-SCNC: 25.2 MMOL/L
CO2 SERPL-SCNC: 23 MMOL/L (ref 23–31)
CO2 SERPL-SCNC: 23 MMOL/L (ref 23–31)
COHGB MFR BLDA: 0.9 % (ref 0.5–1.5)
CPAP BLOOD GAS (OHS): 5 CM H2O
CREAT SERPL-MCNC: 1.67 MG/DL (ref 0.73–1.18)
CREAT SERPL-MCNC: 2.03 MG/DL (ref 0.73–1.18)
CREAT/UREA NIT SERPL: 20
CREAT/UREA NIT SERPL: 22
DRAWN BY BLOOD GAS (OHS): ABNORMAL
EOSINOPHIL # BLD AUTO: 0.03 X10(3)/MCL (ref 0–0.9)
EOSINOPHIL NFR BLD AUTO: 0.2 %
ERYTHROCYTE [DISTWIDTH] IN BLOOD BY AUTOMATED COUNT: 13.5 % (ref 11.5–17)
GFR SERPLBLD CREATININE-BSD FMLA CKD-EPI: 35 ML/MIN/1.73/M2
GFR SERPLBLD CREATININE-BSD FMLA CKD-EPI: 45 ML/MIN/1.73/M2
GLOBULIN SER-MCNC: 2.9 GM/DL (ref 2.4–3.5)
GLUCOSE SERPL-MCNC: 303 MG/DL (ref 82–115)
GLUCOSE SERPL-MCNC: 32 MG/DL (ref 82–115)
HCO3 BLDA-SCNC: 24.2 MMOL/L (ref 22–26)
HCT VFR BLD AUTO: 29.3 % (ref 42–52)
HGB BLD-MCNC: 10.1 G/DL (ref 14–18)
IMM GRANULOCYTES # BLD AUTO: 0.14 X10(3)/MCL (ref 0–0.04)
IMM GRANULOCYTES NFR BLD AUTO: 1.1 %
INHALED O2 CONCENTRATION: 30 %
LYMPHOCYTES # BLD AUTO: 0.45 X10(3)/MCL (ref 0.6–4.6)
LYMPHOCYTES NFR BLD AUTO: 3.6 %
MAGNESIUM SERPL-MCNC: 1.6 MG/DL (ref 1.6–2.6)
MAGNESIUM SERPL-MCNC: 3.2 MG/DL (ref 1.6–2.6)
MCH RBC QN AUTO: 31.4 PG (ref 27–31)
MCHC RBC AUTO-ENTMCNC: 34.5 G/DL (ref 33–36)
MCV RBC AUTO: 91 FL (ref 80–94)
METHGB MFR BLDA: 1 % (ref 0.4–1.5)
MODE (OHS): ABNORMAL
MONOCYTES # BLD AUTO: 1.08 X10(3)/MCL (ref 0.1–1.3)
MONOCYTES NFR BLD AUTO: 8.7 %
NEUTROPHILS # BLD AUTO: 10.7 X10(3)/MCL (ref 2.1–9.2)
NEUTROPHILS NFR BLD AUTO: 86.2 %
NRBC BLD AUTO-RTO: 0 %
O2 HB BLOOD GAS (OHS): 96.4 % (ref 94–97)
OHS QRS DURATION: 76 MS
OHS QTC CALCULATION: 441 MS
OXYGEN DEVICE BLOOD GAS (OHS): ABNORMAL
OXYHGB MFR BLDA: 10 G/DL (ref 12–16)
PCO2 BLDA: 34 MMHG (ref 35–45)
PH BLDA: 7.46 [PH] (ref 7.35–7.45)
PHOSPHATE SERPL-MCNC: 2.3 MG/DL (ref 2.3–4.7)
PHOSPHATE SERPL-MCNC: 3.2 MG/DL (ref 2.3–4.7)
PLATELET # BLD AUTO: 165 X10(3)/MCL (ref 130–400)
PMV BLD AUTO: 12.2 FL (ref 7.4–10.4)
PO2 BLDA: 88 MMHG (ref 80–100)
POCT GLUCOSE: 152 MG/DL (ref 70–110)
POCT GLUCOSE: 210 MG/DL (ref 70–110)
POCT GLUCOSE: 233 MG/DL (ref 70–110)
POCT GLUCOSE: 25 MG/DL (ref 70–110)
POCT GLUCOSE: 262 MG/DL (ref 70–110)
POCT GLUCOSE: 37 MG/DL (ref 70–110)
POCT GLUCOSE: 52 MG/DL (ref 70–110)
POCT GLUCOSE: 86 MG/DL (ref 70–110)
POTASSIUM BLOOD GAS (OHS): 4 MMOL/L (ref 3.5–5)
POTASSIUM SERPL-SCNC: 3.6 MMOL/L (ref 3.5–5.1)
POTASSIUM SERPL-SCNC: 4 MMOL/L (ref 3.5–5.1)
PROT SERPL-MCNC: 4.9 GM/DL (ref 5.8–7.6)
PS (OHS): 8 CMH2O
RBC # BLD AUTO: 3.22 X10(6)/MCL (ref 4.7–6.1)
SAMPLE SITE BLOOD GAS (OHS): ABNORMAL
SAO2 % BLDA: 97.2 %
SODIUM BLOOD GAS (OHS): 139 MMOL/L (ref 137–145)
SODIUM SERPL-SCNC: 144 MMOL/L (ref 136–145)
SODIUM SERPL-SCNC: 145 MMOL/L (ref 136–145)
WBC # BLD AUTO: 12.42 X10(3)/MCL (ref 4.5–11.5)

## 2024-09-11 PROCEDURE — 63600175 PHARM REV CODE 636 W HCPCS: Performed by: INTERNAL MEDICINE

## 2024-09-11 PROCEDURE — 63600175 PHARM REV CODE 636 W HCPCS

## 2024-09-11 PROCEDURE — 83735 ASSAY OF MAGNESIUM: CPT

## 2024-09-11 PROCEDURE — 25000003 PHARM REV CODE 250: Performed by: INTERNAL MEDICINE

## 2024-09-11 PROCEDURE — 85025 COMPLETE CBC W/AUTO DIFF WBC: CPT

## 2024-09-11 PROCEDURE — 25000242 PHARM REV CODE 250 ALT 637 W/ HCPCS

## 2024-09-11 PROCEDURE — 94003 VENT MGMT INPAT SUBQ DAY: CPT

## 2024-09-11 PROCEDURE — 63700000 PHARM REV CODE 250 ALT 637 W/O HCPCS: Performed by: INTERNAL MEDICINE

## 2024-09-11 PROCEDURE — 80053 COMPREHEN METABOLIC PANEL: CPT

## 2024-09-11 PROCEDURE — 99900026 HC AIRWAY MAINTENANCE (STAT)

## 2024-09-11 PROCEDURE — 63600175 PHARM REV CODE 636 W HCPCS: Performed by: NURSE PRACTITIONER

## 2024-09-11 PROCEDURE — 94761 N-INVAS EAR/PLS OXIMETRY MLT: CPT | Mod: XB

## 2024-09-11 PROCEDURE — 94760 N-INVAS EAR/PLS OXIMETRY 1: CPT

## 2024-09-11 PROCEDURE — 93010 ELECTROCARDIOGRAM REPORT: CPT | Mod: ,,, | Performed by: INTERNAL MEDICINE

## 2024-09-11 PROCEDURE — 36415 COLL VENOUS BLD VENIPUNCTURE: CPT

## 2024-09-11 PROCEDURE — 94799 UNLISTED PULMONARY SVC/PX: CPT

## 2024-09-11 PROCEDURE — 84100 ASSAY OF PHOSPHORUS: CPT

## 2024-09-11 PROCEDURE — 27000207 HC ISOLATION

## 2024-09-11 PROCEDURE — 94640 AIRWAY INHALATION TREATMENT: CPT

## 2024-09-11 PROCEDURE — 93005 ELECTROCARDIOGRAM TRACING: CPT

## 2024-09-11 PROCEDURE — 99900035 HC TECH TIME PER 15 MIN (STAT)

## 2024-09-11 PROCEDURE — 27100171 HC OXYGEN HIGH FLOW UP TO 24 HOURS

## 2024-09-11 PROCEDURE — 37799 UNLISTED PX VASCULAR SURGERY: CPT

## 2024-09-11 PROCEDURE — 20000000 HC ICU ROOM

## 2024-09-11 PROCEDURE — 82803 BLOOD GASES ANY COMBINATION: CPT

## 2024-09-11 PROCEDURE — 25000003 PHARM REV CODE 250

## 2024-09-11 PROCEDURE — A4216 STERILE WATER/SALINE, 10 ML: HCPCS | Performed by: INTERNAL MEDICINE

## 2024-09-11 PROCEDURE — 99900031 HC PATIENT EDUCATION (STAT)

## 2024-09-11 RX ORDER — POTASSIUM CHLORIDE 14.9 MG/ML
40 INJECTION INTRAVENOUS ONCE
Status: DISCONTINUED | OUTPATIENT
Start: 2024-09-11 | End: 2024-09-11

## 2024-09-11 RX ORDER — POTASSIUM CHLORIDE 14.9 MG/ML
20 INJECTION INTRAVENOUS
Status: COMPLETED | OUTPATIENT
Start: 2024-09-11 | End: 2024-09-11

## 2024-09-11 RX ORDER — AZITHROMYCIN 250 MG/1
500 TABLET, FILM COATED ORAL DAILY
Status: DISCONTINUED | OUTPATIENT
Start: 2024-09-11 | End: 2024-09-11

## 2024-09-11 RX ORDER — AZITHROMYCIN 250 MG/1
500 TABLET, FILM COATED ORAL DAILY
Status: COMPLETED | OUTPATIENT
Start: 2024-09-11 | End: 2024-09-13

## 2024-09-11 RX ORDER — MAGNESIUM SULFATE HEPTAHYDRATE 40 MG/ML
4 INJECTION, SOLUTION INTRAVENOUS ONCE
Status: COMPLETED | OUTPATIENT
Start: 2024-09-11 | End: 2024-09-11

## 2024-09-11 RX ORDER — HALOPERIDOL 5 MG/ML
1 INJECTION INTRAMUSCULAR EVERY 6 HOURS PRN
Status: DISCONTINUED | OUTPATIENT
Start: 2024-09-11 | End: 2024-10-01 | Stop reason: HOSPADM

## 2024-09-11 RX ORDER — INSULIN GLARGINE 100 [IU]/ML
6 INJECTION, SOLUTION SUBCUTANEOUS 2 TIMES DAILY
Status: DISCONTINUED | OUTPATIENT
Start: 2024-09-11 | End: 2024-09-12

## 2024-09-11 RX ORDER — DEXTROSE MONOHYDRATE, SODIUM CHLORIDE, AND POTASSIUM CHLORIDE 50; 1.49; 4.5 G/1000ML; G/1000ML; G/1000ML
INJECTION, SOLUTION INTRAVENOUS CONTINUOUS
Status: DISCONTINUED | OUTPATIENT
Start: 2024-09-11 | End: 2024-09-11

## 2024-09-11 RX ADMIN — ASPIRIN 81 MG CHEWABLE TABLET 81 MG: 81 TABLET CHEWABLE at 12:09

## 2024-09-11 RX ADMIN — POTASSIUM PHOSPHATE, MONOBASIC AND POTASSIUM PHOSPHATE, DIBASIC 15 MMOL: 224; 236 INJECTION, SOLUTION, CONCENTRATE INTRAVENOUS at 06:09

## 2024-09-11 RX ADMIN — IPRATROPIUM BROMIDE AND ALBUTEROL SULFATE 3 ML: 2.5; .5 SOLUTION RESPIRATORY (INHALATION) at 12:09

## 2024-09-11 RX ADMIN — DEXMEDETOMIDINE HYDROCHLORIDE 0.8 MCG/KG/HR: 400 INJECTION INTRAVENOUS at 06:09

## 2024-09-11 RX ADMIN — IPRATROPIUM BROMIDE AND ALBUTEROL SULFATE 3 ML: 2.5; .5 SOLUTION RESPIRATORY (INHALATION) at 07:09

## 2024-09-11 RX ADMIN — INSULIN GLARGINE 10 UNITS: 100 INJECTION, SOLUTION SUBCUTANEOUS at 08:09

## 2024-09-11 RX ADMIN — POTASSIUM CHLORIDE 20 MEQ: 14.9 INJECTION, SOLUTION INTRAVENOUS at 09:09

## 2024-09-11 RX ADMIN — PIPERACILLIN SODIUM AND TAZOBACTAM SODIUM 4.5 G: 4; .5 INJECTION, POWDER, LYOPHILIZED, FOR SOLUTION INTRAVENOUS at 05:09

## 2024-09-11 RX ADMIN — DEXTROSE MONOHYDRATE 250 ML: 100 INJECTION, SOLUTION INTRAVENOUS at 04:09

## 2024-09-11 RX ADMIN — CEFTRIAXONE SODIUM 1 G: 1 INJECTION, POWDER, FOR SOLUTION INTRAMUSCULAR; INTRAVENOUS at 09:09

## 2024-09-11 RX ADMIN — MICONAZOLE NITRATE 2 % TOPICAL POWDER: at 09:09

## 2024-09-11 RX ADMIN — IPRATROPIUM BROMIDE AND ALBUTEROL SULFATE 3 ML: 2.5; .5 SOLUTION RESPIRATORY (INHALATION) at 04:09

## 2024-09-11 RX ADMIN — PROPOFOL 25 MCG/KG/MIN: 10 INJECTION, EMULSION INTRAVENOUS at 05:09

## 2024-09-11 RX ADMIN — MAGNESIUM SULFATE HEPTAHYDRATE 4 G: 40 INJECTION, SOLUTION INTRAVENOUS at 10:09

## 2024-09-11 RX ADMIN — HEPARIN SODIUM 5000 UNITS: 5000 INJECTION, SOLUTION INTRAVENOUS; SUBCUTANEOUS at 08:09

## 2024-09-11 RX ADMIN — MUPIROCIN: 20 OINTMENT TOPICAL at 08:09

## 2024-09-11 RX ADMIN — AZITHROMYCIN DIHYDRATE 500 MG: 250 TABLET ORAL at 12:09

## 2024-09-11 RX ADMIN — HALOPERIDOL LACTATE 1 MG: 5 INJECTION, SOLUTION INTRAMUSCULAR at 08:09

## 2024-09-11 RX ADMIN — MICONAZOLE NITRATE 2 % TOPICAL POWDER: at 08:09

## 2024-09-11 RX ADMIN — IPRATROPIUM BROMIDE AND ALBUTEROL SULFATE 3 ML: 2.5; .5 SOLUTION RESPIRATORY (INHALATION) at 08:09

## 2024-09-11 RX ADMIN — POTASSIUM CHLORIDE 20 MEQ: 14.9 INJECTION, SOLUTION INTRAVENOUS at 05:09

## 2024-09-11 RX ADMIN — HALOPERIDOL LACTATE 1 MG: 5 INJECTION, SOLUTION INTRAMUSCULAR at 02:09

## 2024-09-11 RX ADMIN — DEXTROSE MONOHYDRATE 125 ML: 100 INJECTION, SOLUTION INTRAVENOUS at 05:09

## 2024-09-11 RX ADMIN — INSULIN ASPART 4 UNITS: 100 INJECTION, SOLUTION INTRAVENOUS; SUBCUTANEOUS at 05:09

## 2024-09-11 RX ADMIN — INSULIN ASPART 4 UNITS: 100 INJECTION, SOLUTION INTRAVENOUS; SUBCUTANEOUS at 11:09

## 2024-09-11 RX ADMIN — SODIUM CHLORIDE, PRESERVATIVE FREE 10 ML: 5 INJECTION INTRAVENOUS at 12:09

## 2024-09-11 NOTE — NURSING
Nurses Note -- 4 Eyes      9/11/2024   2:14 PM      Skin assessed during: Daily Assessment      [] No Altered Skin Integrity Present    [x]Prevention Measures Documented      [x] Yes- Altered Skin Integrity Present or Discovered   [] LDA Added if Not in Epic (Describe Wound)   [] New Altered Skin Integrity was Present on Admit and Documented in LDA   [] Wound Image Taken    Wound Care Consulted? Yes    Attending Nurse:  Mata OLIVA    Second RN/Staff Member:   Chaz OLIVA

## 2024-09-11 NOTE — PROGRESS NOTES
[facility]  Pulmonary Critical Care Note    Patient Name: Froy Barragan  MRN: [id number]  Admission Date: 9/8/2024  Hospital Length of Stay: 3 days  Code Status: No Order  Attending Provider: Froy Dougherty MD  Primary Care Provider: George Bran MD     Subjective:     HPI:   Patient is a 64-year-old male with a past medical history of hypertension, hyperlipidemia, CAD S/P PCI, BPH with current self catheterization, and HFpEF who initially presented to the ED with a complaint of nausea/vomiting for the last 1 week.  He is accompanied by his sister, patient's legal guardian, who assisted with history.  They state that patient has been experiencing nonbloody vomiting over the last 1 week.  She also reports about a 2 day history of urinary incontinence, worse than baseline, and uncontrollable nonbloody diarrhea.  She reports that patient was wheelchair-bound due to chronic weakness in his bilateral legs for several years.  Patient currently perform self catheterization at home but his sister states that it has been 1-2 days since he self catheterized himself.  She reports patient had multiple UTIs in the past.  She also reports that patient has had difficulty monitoring his blood glucose levels at home but he reports full compliance with all prescribed medications.  Patient denies any fever, chills, hematemesis, abdominal pain, suprapubic tenderness, melena.     In the ED, initial POCT glucose elevated greater than 500.  CMP with bicarb of 16, serum creatinine elevated at 3.4 with previous baseline at a proximally 1.5.  CBG elevated at 607.  BOHB 1.4.  UA displayed 4+ glycosuria, 3+ hematuria, but no ketonuria.  Indicative of UTI as well.  ABG with slight acidosis with a pH of  7.290.  ICU was consulted for admission due to concerns for DKA.      Hospital Course/Significant events:  9/8/2024; admitted to ICU and developed worsening hypotension and respiratory failure  requiring intubation that night      24 Hour Interval History:  Ongoing improvements in hemodynamics overnight, now off vasoactive medications.  Urine cultures with Klebsiella and Providencia growth, modify antibiotics appropriately today.  Mental status improved with sedation wean yesterday.  Respiratory alkalosis significantly improved on follow-up gases yesterday.  Respiratory cultures remain negative.        Review of systems unobtainable due to intubation, mechanical ventilation.        Past Medical History:   Diagnosis Date    Anemia of chronic disease 7/6/2022    Arteriosclerosis of coronary artery 7/6/2022    Cervical myelopathy 7/6/2022    Cobalamin deficiency 7/6/2022    Gastroesophageal reflux disease 7/6/2022    Hypertension 7/6/2022    Low vitamin D level 7/6/2022    Mixed hyperlipidemia 7/6/2022    Paraparesis 7/6/2022    Stage 3a chronic kidney disease 7/6/2022    Type 2 diabetes mellitus 7/6/2022       Past Surgical History:   Procedure Laterality Date    SPINE SURGERY         Social History     Socioeconomic History    Marital status: Single   Tobacco Use    Smoking status: Never    Smokeless tobacco: Never   Substance and Sexual Activity    Alcohol use: Never    Drug use: Never     Social Determinants of Health     Financial Resource Strain: Low Risk  (4/25/2024)    Overall Financial Resource Strain (CARDIA)     Difficulty of Paying Living Expenses: Not hard at all   Food Insecurity: No Food Insecurity (4/25/2024)    Hunger Vital Sign     Worried About Running Out of Food in the Last Year: Never true     Ran Out of Food in the Last Year: Never true   Transportation Needs: No Transportation Needs (4/25/2024)    PRAPARE - Transportation     Lack of Transportation (Medical): No     Lack of Transportation (Non-Medical): No   Physical Activity: Inactive (4/25/2024)    Exercise Vital Sign     Days of Exercise per Week: 0 days     Minutes of Exercise per Session: 10 min   Stress: No Stress Concern Present  (4/25/2024)    [employer] - Occupational Stress Questionnaire     Feeling of Stress : Only a little   Housing Stability: Unknown (4/25/2024)    Housing Stability Vital Sign     Unable to Pay for Housing in the Last Year: No       Current Outpatient Medications   Medication Instructions    alcohol swabs (BD ALCOHOL SWABS) PadM 1 each, Topical (Top), Daily    alfuzosin (UROXATRAL) 10 mg Tb24 TAKE 1 TABLET EVERY DAY WITH BREAKFAST    amLODIPine (NORVASC) 2.5 mg, Oral    aspirin (ECOTRIN) 81 mg, Oral, Daily    b complex vitamins capsule 1 capsule, Oral, Daily    blood glucose control, low (TRUE METRIX LEVEL 1) Soln 1 Bottle, Misc.(Non-Drug; Combo Route), Daily    blood-glucose meter (TRUE METRIX AIR GLUCOSE METER) Misc 1 each, Misc.(Non-Drug; Combo Route), Daily    blood-glucose sensor (DEXCOM G7 SENSOR) Carmen 1 each, Misc.(Non-Drug; Combo Route), Daily    carvediloL (COREG) 25 mg, Oral, 2 times daily    ezetimibe (ZETIA) 10 mg, Oral, Daily    finasteride (PROSCAR) 5 mg, Oral, Daily    gabapentin (NEURONTIN) 300 mg, Oral, 3 times daily    glimepiride (AMARYL) 2 mg, Oral, Before breakfast    lancets (TRUEPLUS LANCETS) 33 gauge Misc 1 lancet , Misc.(Non-Drug; Combo Route), Daily    metFORMIN (GLUCOPHAGE) 1,000 mg, Oral, 2 times daily    pantoprazole (PROTONIX) 40 mg, Oral    rosuvastatin (CRESTOR) 40 mg, Oral, Daily    TRUE METRIX GLUCOSE TEST STRIP Strp TEST BLOOD SUGAR EVERY DAY    vitamin D (VITAMIN D3) 1,000 Units, Oral, Daily       Current Inpatient Medications   albuterol-ipratropium  3 mL Nebulization Q4H WAKE    aspirin  81 mg Oral Daily    heparin (porcine)  5,000 Units Subcutaneous Q12H    insulin glargine U-100  10 Units Subcutaneous BID    magnesium sulfate IVPB  4 g Intravenous Once    miconazole NITRATE 2 %   Topical (Top) BID    mupirocin   Nasal BID    piperacillin-tazobactam (Zosyn) IV (PEDS and ADULTS) (extended infusion is not appropriate)  4.5 g Intravenous Q12H     potassium chloride in water  20 mEq Intravenous Q2H    sodium chloride 0.9%  10 mL Intravenous Q6H       Current Intravenous Infusions   0.9% NaCl   Intravenous Continuous 125 mL/hr at 09/11/24 0616 Rate Verify at 09/11/24 0616    dexmedeTOMIDine (Precedex) infusion (titrating)  0-1.4 mcg/kg/hr Intravenous Continuous 9.36 mL/hr at 09/11/24 0646 0.8 mcg/kg/hr at 09/11/24 0646    D5 and 0.45% NaCl   Intravenous Continuous PRN        NORepinephrine bitartrate-D5W  0-3 mcg/kg/min (Order-Specific) Intravenous Continuous   Stopped at 09/11/24 0401    propofoL  0-50 mcg/kg/min (Dosing Weight) Intravenous Continuous 8.2 mL/hr at 09/11/24 0616 25 mcg/kg/min at 09/11/24 0616    vasopressin  0.04 Units/min Intravenous Continuous   Stopped at 09/10/24 0239           Objective:       Intake/Output Summary (Last 24 hours) at 9/11/2024 0722  Last data filed at 9/11/2024 0616  Gross per 24 hour   Intake 5472.35 ml   Output 4725 ml   Net 747.35 ml         Vital Signs (Most Recent):  Temp: 98.7 °F (37.1 °C) (09/11/24 0400)  Pulse: 66 (09/11/24 0615)  Resp: 18 (09/11/24 0615)  BP: 120/80 (09/11/24 0600)  SpO2: 98 % (09/11/24 0615)  Body mass index is 18.92 kg/m².  Weight: 50 kg (110 lb 3.7 oz) Vital Signs (24h Range):  Temp:  [97.4 °F (36.3 °C)-99 °F (37.2 °C)] 98.7 °F (37.1 °C)  Pulse:  [59-96] 66  Resp:  [0-33] 18  SpO2:  [95 %-100 %] 98 %  BP: ()/(55-87) 120/80  Arterial Line BP: ()/(42-74) 117/58         Physical exam:   Gen- intubated, no distress  HENT- ATNC, MMM, ETT in place  CV- RRR  Resp- CTAB, tolerating PSV trial well with slow deep breaths   MSK- WWP, thin extremities  Neuro- awake and alert, briskly follows all commands  Psych- unable to assess 2/2 intubation, mechanical ventilation         Lines/Drains/Airways       Peripherally Inserted Central Catheter Line  Duration             PICC Triple Lumen 09/09/24 1309 left brachial 1 day              Drain  Duration                  NG/OG Tube 09/09/24 0700 Center  mouth 2 days         Urethral Catheter 09/08/24 1810 Straight-tip 16 Fr. 2 days              Airway  Duration                  Airway - Non-Surgical 09/09/24 0349 2 days              Arterial Line  Duration             Arterial Line 09/09/24 1345 Left Radial 1 day              Peripheral Intravenous Line  Duration                  Peripheral IV - Single Lumen 09/08/24 1646 20 G Anterior;Right Forearm 2 days         Peripheral IV - Single Lumen 09/08/24 1700 18 G Left Antecubital 2 days         Peripheral IV - Single Lumen 09/08/24 1915 20 G Anterior;Left Forearm 2 days                    Significant Labs:    Lab Results   Component Value Date    WBC 12.42 (H) 09/11/2024    HGB 10.1 (L) 09/11/2024    HCT 29.3 (L) 09/11/2024    MCV 91.0 09/11/2024     09/11/2024     BMP  Lab Results   Component Value Date     09/11/2024    K 3.6 09/11/2024    CO2 23 09/11/2024    BUN 40.2 (H) 09/11/2024    CREATININE 2.03 (H) 09/11/2024    CALCIUM 7.2 (L) 09/11/2024    AGAP 9.0 09/11/2024    EGFRNONAA 47 (L) 01/11/2022     ABG  Recent Labs   Lab 09/10/24  1429   PH 7.470*   PO2 116.0*   PCO2 37.0   HCO3 26.9*   POCBASEDEF 3.10*       Mechanical Ventilation Support:  Vent Mode: A/C (09/11/24 0456)  Set Rate: 18 BPM (09/11/24 0456)  Vt Set: 400 mL (09/11/24 0456)  PEEP/CPAP: 5 cmH20 (09/11/24 0456)  Oxygen Concentration (%): 30 (09/11/24 0456)  Peak Airway Pressure: 15 cmH20 (09/11/24 0456)  Total Ve: 6.4 L/m (09/11/24 0456)  F/VT Ratio<105 (RSBI): (!) 45 (09/10/24 2330)    Significant Imaging:  I have reviewed the pertinent imaging within the past 24 hours.    Assessment/Plan:     Assessment  Septic shock secondary to UTI  Uncontrolled T2DM with hyperglycemia  DWAINE on CKD secondary to volume depletion  Metabolic acidosis; likely secondary to lactic acidosis vs diarrhea  Hypomagnesemia  Hypophosphatemia  Diarrhea  Normocytic anemia  Malnutrition  Elevated PSA with urinary retention; history of self catheterization  History  of CAD S/p PCI  History of HFpEF (55%)  History of hypertension  History of hyperlipidemia      Plan  -initial presentation of septic shock in setting of urinary tract infection, with urine cultures growing Klebsiella and Providencia, right lobar pneumonia with respiratory cultures negative  -hemodynamics markedly improved, now stable off vasoactive medications  -modify antimicrobials to ceftriaxone alone per urine culture sensitivities, will add 3 day course azithromycin 500 mg daily for additional atypical respiratory coverage for CAP  -renal function slowly improving, no critical electrolyte derangements and robust urine output noted  -briskly following commands today and tolerating pressure support ventilation well, likely attempt extubation this morning        DVT Prophylaxis: heparin  GI ppx: none         I spent 32 minutes providing critical care services to this patient.  This does not include time spent for separately procedures.         Anthony Cochran MD  Pulmonary Critical Care Medicine  [facility] - Emergency Dept  DOS: 09/11/2024

## 2024-09-11 NOTE — NURSING
Nurses Note -- 4 Eyes      9/11/2024   5:42 AM      Skin assessed during: Daily Assessment      [] No Altered Skin Integrity Present    [x]Prevention Measures Documented      [x] Yes- Altered Skin Integrity Present or Discovered   [] LDA Added if Not in Epic (Describe Wound)   [] New Altered Skin Integrity was Present on Admit and Documented in LDA   [] Wound Image Taken    Wound Care Consulted? Yes    Attending Nurse:  PJ Hines    Second RN/Staff Member:   PJ Mccormick

## 2024-09-12 LAB
ALBUMIN SERPL-MCNC: 2.3 G/DL (ref 3.4–4.8)
ALBUMIN/GLOB SERPL: 0.7 RATIO (ref 1.1–2)
ALP SERPL-CCNC: 54 UNIT/L (ref 40–150)
ALT SERPL-CCNC: 32 UNIT/L (ref 0–55)
ANION GAP SERPL CALC-SCNC: 8 MEQ/L
AST SERPL-CCNC: 29 UNIT/L (ref 5–34)
BASOPHILS # BLD AUTO: 0.05 X10(3)/MCL
BASOPHILS NFR BLD AUTO: 0.3 %
BILIRUB SERPL-MCNC: 0.3 MG/DL
BUN SERPL-MCNC: 33 MG/DL (ref 8.4–25.7)
CALCIUM SERPL-MCNC: 8.1 MG/DL (ref 8.8–10)
CHLORIDE SERPL-SCNC: 108 MMOL/L (ref 98–107)
CO2 SERPL-SCNC: 26 MMOL/L (ref 23–31)
CREAT SERPL-MCNC: 1.75 MG/DL (ref 0.73–1.18)
CREAT/UREA NIT SERPL: 19
EOSINOPHIL # BLD AUTO: 0.17 X10(3)/MCL (ref 0–0.9)
EOSINOPHIL NFR BLD AUTO: 1.1 %
ERYTHROCYTE [DISTWIDTH] IN BLOOD BY AUTOMATED COUNT: 13.6 % (ref 11.5–17)
GFR SERPLBLD CREATININE-BSD FMLA CKD-EPI: 42 ML/MIN/1.73/M2
GLOBULIN SER-MCNC: 3.3 GM/DL (ref 2.4–3.5)
GLUCOSE SERPL-MCNC: 92 MG/DL (ref 82–115)
HCT VFR BLD AUTO: 34.9 % (ref 42–52)
HGB BLD-MCNC: 11.6 G/DL (ref 14–18)
IMM GRANULOCYTES # BLD AUTO: 0.11 X10(3)/MCL (ref 0–0.04)
IMM GRANULOCYTES NFR BLD AUTO: 0.7 %
LYMPHOCYTES # BLD AUTO: 0.6 X10(3)/MCL (ref 0.6–4.6)
LYMPHOCYTES NFR BLD AUTO: 3.7 %
MAGNESIUM SERPL-MCNC: 2.6 MG/DL (ref 1.6–2.6)
MCH RBC QN AUTO: 30.9 PG (ref 27–31)
MCHC RBC AUTO-ENTMCNC: 33.2 G/DL (ref 33–36)
MCV RBC AUTO: 93.1 FL (ref 80–94)
MONOCYTES # BLD AUTO: 1.23 X10(3)/MCL (ref 0.1–1.3)
MONOCYTES NFR BLD AUTO: 7.6 %
NEUTROPHILS # BLD AUTO: 13.95 X10(3)/MCL (ref 2.1–9.2)
NEUTROPHILS NFR BLD AUTO: 86.6 %
NRBC BLD AUTO-RTO: 0 %
PHOSPHATE SERPL-MCNC: 3.8 MG/DL (ref 2.3–4.7)
PLATELET # BLD AUTO: 163 X10(3)/MCL (ref 130–400)
PMV BLD AUTO: 11.8 FL (ref 7.4–10.4)
POCT GLUCOSE: 105 MG/DL (ref 70–110)
POCT GLUCOSE: 108 MG/DL (ref 70–110)
POCT GLUCOSE: 111 MG/DL (ref 70–110)
POCT GLUCOSE: 160 MG/DL (ref 70–110)
POCT GLUCOSE: 215 MG/DL (ref 70–110)
POCT GLUCOSE: 221 MG/DL (ref 70–110)
POCT GLUCOSE: 36 MG/DL (ref 70–110)
POCT GLUCOSE: 67 MG/DL (ref 70–110)
POTASSIUM SERPL-SCNC: 4.8 MMOL/L (ref 3.5–5.1)
PROT SERPL-MCNC: 5.6 GM/DL (ref 5.8–7.6)
RBC # BLD AUTO: 3.75 X10(6)/MCL (ref 4.7–6.1)
SODIUM SERPL-SCNC: 142 MMOL/L (ref 136–145)
WBC # BLD AUTO: 16.11 X10(3)/MCL (ref 4.5–11.5)

## 2024-09-12 PROCEDURE — 97535 SELF CARE MNGMENT TRAINING: CPT

## 2024-09-12 PROCEDURE — 36415 COLL VENOUS BLD VENIPUNCTURE: CPT

## 2024-09-12 PROCEDURE — 99900031 HC PATIENT EDUCATION (STAT)

## 2024-09-12 PROCEDURE — 94640 AIRWAY INHALATION TREATMENT: CPT

## 2024-09-12 PROCEDURE — 63700000 PHARM REV CODE 250 ALT 637 W/O HCPCS: Performed by: INTERNAL MEDICINE

## 2024-09-12 PROCEDURE — 25500020 PHARM REV CODE 255: Performed by: STUDENT IN AN ORGANIZED HEALTH CARE EDUCATION/TRAINING PROGRAM

## 2024-09-12 PROCEDURE — 97530 THERAPEUTIC ACTIVITIES: CPT

## 2024-09-12 PROCEDURE — 11000001 HC ACUTE MED/SURG PRIVATE ROOM

## 2024-09-12 PROCEDURE — 92611 MOTION FLUOROSCOPY/SWALLOW: CPT

## 2024-09-12 PROCEDURE — A4216 STERILE WATER/SALINE, 10 ML: HCPCS | Performed by: INTERNAL MEDICINE

## 2024-09-12 PROCEDURE — 97163 PT EVAL HIGH COMPLEX 45 MIN: CPT

## 2024-09-12 PROCEDURE — 94760 N-INVAS EAR/PLS OXIMETRY 1: CPT

## 2024-09-12 PROCEDURE — A9698 NON-RAD CONTRAST MATERIALNOC: HCPCS | Performed by: STUDENT IN AN ORGANIZED HEALTH CARE EDUCATION/TRAINING PROGRAM

## 2024-09-12 PROCEDURE — 63600175 PHARM REV CODE 636 W HCPCS

## 2024-09-12 PROCEDURE — 25000003 PHARM REV CODE 250: Performed by: INTERNAL MEDICINE

## 2024-09-12 PROCEDURE — 84100 ASSAY OF PHOSPHORUS: CPT

## 2024-09-12 PROCEDURE — 25000003 PHARM REV CODE 250

## 2024-09-12 PROCEDURE — 25000003 PHARM REV CODE 250: Performed by: STUDENT IN AN ORGANIZED HEALTH CARE EDUCATION/TRAINING PROGRAM

## 2024-09-12 PROCEDURE — 63600175 PHARM REV CODE 636 W HCPCS: Performed by: INTERNAL MEDICINE

## 2024-09-12 PROCEDURE — 97166 OT EVAL MOD COMPLEX 45 MIN: CPT

## 2024-09-12 PROCEDURE — 85025 COMPLETE CBC W/AUTO DIFF WBC: CPT

## 2024-09-12 PROCEDURE — 25000242 PHARM REV CODE 250 ALT 637 W/ HCPCS

## 2024-09-12 PROCEDURE — 83735 ASSAY OF MAGNESIUM: CPT

## 2024-09-12 PROCEDURE — 80053 COMPREHEN METABOLIC PANEL: CPT

## 2024-09-12 PROCEDURE — 92610 EVALUATE SWALLOWING FUNCTION: CPT

## 2024-09-12 PROCEDURE — 94761 N-INVAS EAR/PLS OXIMETRY MLT: CPT

## 2024-09-12 RX ORDER — SODIUM CHLORIDE, SODIUM LACTATE, POTASSIUM CHLORIDE, CALCIUM CHLORIDE 600; 310; 30; 20 MG/100ML; MG/100ML; MG/100ML; MG/100ML
INJECTION, SOLUTION INTRAVENOUS CONTINUOUS
Status: DISCONTINUED | OUTPATIENT
Start: 2024-09-12 | End: 2024-09-14

## 2024-09-12 RX ORDER — IPRATROPIUM BROMIDE AND ALBUTEROL SULFATE 2.5; .5 MG/3ML; MG/3ML
3 SOLUTION RESPIRATORY (INHALATION) EVERY 4 HOURS PRN
Status: DISCONTINUED | OUTPATIENT
Start: 2024-09-12 | End: 2024-09-17

## 2024-09-12 RX ORDER — LOPERAMIDE HYDROCHLORIDE 2 MG/1
2 CAPSULE ORAL 4 TIMES DAILY PRN
Status: DISCONTINUED | OUTPATIENT
Start: 2024-09-12 | End: 2024-10-01 | Stop reason: HOSPADM

## 2024-09-12 RX ADMIN — MICONAZOLE NITRATE 2 % TOPICAL POWDER: at 09:09

## 2024-09-12 RX ADMIN — MUPIROCIN: 20 OINTMENT TOPICAL at 09:09

## 2024-09-12 RX ADMIN — HEPARIN SODIUM 5000 UNITS: 5000 INJECTION, SOLUTION INTRAVENOUS; SUBCUTANEOUS at 09:09

## 2024-09-12 RX ADMIN — SODIUM CHLORIDE, PRESERVATIVE FREE 10 ML: 5 INJECTION INTRAVENOUS at 06:09

## 2024-09-12 RX ADMIN — BARIUM SULFATE 10 ML: 0.81 POWDER, FOR SUSPENSION ORAL at 11:09

## 2024-09-12 RX ADMIN — AZITHROMYCIN DIHYDRATE 500 MG: 250 TABLET ORAL at 08:09

## 2024-09-12 RX ADMIN — IPRATROPIUM BROMIDE AND ALBUTEROL SULFATE 3 ML: 2.5; .5 SOLUTION RESPIRATORY (INHALATION) at 12:09

## 2024-09-12 RX ADMIN — MICONAZOLE NITRATE 2 % TOPICAL POWDER: at 08:09

## 2024-09-12 RX ADMIN — DEXTROSE MONOHYDRATE 250 ML: 100 INJECTION, SOLUTION INTRAVENOUS at 12:09

## 2024-09-12 RX ADMIN — IPRATROPIUM BROMIDE AND ALBUTEROL SULFATE 3 ML: 2.5; .5 SOLUTION RESPIRATORY (INHALATION) at 08:09

## 2024-09-12 RX ADMIN — ASPIRIN 81 MG CHEWABLE TABLET 81 MG: 81 TABLET CHEWABLE at 08:09

## 2024-09-12 RX ADMIN — HEPARIN SODIUM 5000 UNITS: 5000 INJECTION, SOLUTION INTRAVENOUS; SUBCUTANEOUS at 08:09

## 2024-09-12 RX ADMIN — LOPERAMIDE HYDROCHLORIDE 2 MG: 2 CAPSULE ORAL at 04:09

## 2024-09-12 RX ADMIN — CEFTRIAXONE SODIUM 1 G: 1 INJECTION, POWDER, FOR SOLUTION INTRAMUSCULAR; INTRAVENOUS at 08:09

## 2024-09-12 RX ADMIN — SODIUM CHLORIDE, PRESERVATIVE FREE 10 ML: 5 INJECTION INTRAVENOUS at 12:09

## 2024-09-12 RX ADMIN — INSULIN ASPART 4 UNITS: 100 INJECTION, SOLUTION INTRAVENOUS; SUBCUTANEOUS at 04:09

## 2024-09-12 NOTE — PROCEDURES
[facility]  Speech Language Pathology Department  Modified Barium Swallow (MBS) Study    Patient Name:  Froy Barragan   MRN:  [id number]    Recommendations     General recommendations:  dysphagia therapy  Repeat MBS study: as clinically indicated  Solid texture recommendation:  Minced & Moist Diet - IDDSI Level 5  Liquid consistency recommendation: Thin liquids - IDDSI Level 0   Medications: crushed in puree  Swallow strategies/precautions: small bites/sips, slow rate, and alternate solids/liquids  General precautions: aspiration    History     Froy Barragan is a/n 64 y.o. male admitted to [facility] with septic shock, DWAINE and metabolic acidosis.  Pt developed acute hypoxic respiratory failure requiring intubation for mechanical ventilation     Past Medical History:   Diagnosis Date    Anemia of chronic disease 7/6/2022    Arteriosclerosis of coronary artery 7/6/2022    Cervical myelopathy 7/6/2022    Cobalamin deficiency 7/6/2022    Gastroesophageal reflux disease 7/6/2022    Hypertension 7/6/2022    Low vitamin D level 7/6/2022    Mixed hyperlipidemia 7/6/2022    Paraparesis 7/6/2022    Stage 3a chronic kidney disease 7/6/2022    Type 2 diabetes mellitus 7/6/2022     Past Surgical History:   Procedure Laterality Date    SPINE SURGERY       A MBS Study was completed to assess the efficiency of his swallow function, rule out aspiration and make recommendations regarding safe dietary consistencies, effective compensatory strategies, and safe eating environment.     Intubation hx: 9/9-9/11    Home diet texture/consistency: unknown  Current Method of Nutrition: NPO    Imaging   Results for orders placed during the hospital encounter of 09/08/24    X-Ray Chest 1 View    Narrative  EXAMINATION:  XR CHEST 1 VIEW    CPT 78833    CLINICAL HISTORY:  Intubated;    COMPARISON:  September 9, 2024    FINDINGS:  Examination reveals cardiomediastinal silhouette and pleuroparenchymal changes  to be essentially unchanged as compared with the previous exam.    There are persistent consolidative changes at the right base with minimal improvement as compared with the last exam.    Support catheters remain in place    Impression  Minimal improvement in consolidative changes at the right lower lobe.    No other significant change      Electronically signed by: Sha Horton  Date:    09/10/2024  Time:    05:43    Subjective     Patient awake, alert, and flat.    Spiritual/Cultural/Latter day Beliefs/Practices that affect care: no  Pain/Comfort: Pain Rating 1: 0/10    Respiratory Status:  3L via nasal cannula    Restraints/positioning devices: none    Fluoroscopic Findings     Oral Musculature  Dentition: own teeth  Secretion Management: adequate  Mucosal Quality: good  Facial Movement: WFL  Buccal Strength & Mobility: WFL  Mandibular Strength & Mobility: WFL  Oral Labial Strength & Mobility: impaired pursing and impaired seal  Lingual Strength & Mobility: impaired strength, impaired left lateral movement, and impaired right lateral movement  Velar Elevation: WFL  Vocal Quality: adequate    Setup  Upright on stretcher  Able to self feed  Adequate head control    Visualization  Lateral view    Oral Phase:   Adequate lip closure  Prolonged mastication  Prolonged/disorganized bolus formation  Decreased rotary chew  Tongue pumping  Piecemeal deglutition  Reduced bolus cohesion  Cues required to initiate anterior-posterior transport    Pharyngeal Phase:   Timely swallow reflex  Adequate base of tongue retraction  Adequate epiglottic deflection  Reduced hyolaryngeal excursion  Adequate airway protection  Consistency Fed by Laryngeal Penetration Aspiration Residue   Mildly thick liquid by cup Self None None None   Thin liquid by cup Self None None None   Puree SLP None None Mild  Pyriform sinus  Cleared with additional swallow   Chewable solid SLP None None Trace   Thin liquid by straw SLP None None Trace      Cervical Esophageal Phase:   UES appeared to accommodate all bolus types without stasis or retrograde movement visualized    Assessment     Pt exhibited moderate oral and mild pharyngeal dysphagia characterized by the findings noted above.  No laryngeal penetration/aspiration was visualized during this study.  Swallow safety is preserved; however, swallow efficiency is impaired.    Patient appears to be at low risk for aspiration related pneumonia.  Prognosis for behavioral swallow rehabilitation is fair.    Outcome Measures     Functional Oral Intake Scale: 5 - Total oral diet with multiple consistencies, by requiring special preparation or compensations    Goals     Multidisciplinary Problems       SLP Goals          Problem: SLP    Goal Priority Disciplines Outcome   SLP Goal     SLP Progressing   Description:   LTG: Tolerate least restrictive PO diet with no clinical signs/sx aspiration  STG: Tolerate half meal of Soft & Bite-sized solids with adequate bolus formation and no clinical signs/sx aspiration                       Education     Patient provided with verbal education regarding results/recommendations.  Understanding was verbalized, however additional teaching warranted.    Plan     SLP Follow-Up:  Yes    Patient to be seen:  3 x/week   Plan of Care expires:  09/26/24  Plan of Care reviewed with:  patient     Time Tracking     SLP Treatment Date:   09/12/24  Speech Start Time:  1015  Speech Stop Time:  1035     Speech Total Time (min):  20 min    Billable minutes:   Motion Fluoroscopic Evaluation, Video Recording, 20 minutes     09/12/2024

## 2024-09-12 NOTE — PLAN OF CARE
Problem: Occupational Therapy  Goal: Occupational Therapy Goal  Description: Goals to be met by: 10/12/24     Patient will increase functional independence with ADLs by performing:    Grooming while seated with Supervision.  Toileting from bedside commode with Moderate Assistance for hygiene and clothing management.   Sitting at edge of bed x10 minutes with Supervision.  Supine to sit with Supervision.  Squat pivot transfers with Minimal Assistance.  Toilet transfer to bedside commode with Minimal Assistance.  Increased functional strength to 4/5 for self care skills and functional mobility.  Upper extremity exercise program x10 reps per handout, with independence.    Outcome: Progressing     Pt would benefit from cont OT services in order to maximize functional independence. Recommending moderate intensity therapy at d/c. Pt presenting with confusion, deconditioning, and impaired independence with ADLs and mobility. Currently requires max-total A for all axs. Will progress pt as able.

## 2024-09-12 NOTE — PROGRESS NOTES
[facility] - Emergency Dept  Pulmonary Critical Care Note    Patient Name: Froy Barragan  MRN: [id number]  Admission Date: 9/8/2024  Hospital Length of Stay: 4 days  Code Status: No Order  Attending Provider: Froy Dougherty MD  Primary Care Provider: George Bran MD     Subjective:     HPI:   Patient is a 64-year-old male with a past medical history of hypertension, hyperlipidemia, CAD S/P PCI, BPH with current self catheterization, and HFpEF who initially presented to the ED with a complaint of nausea/vomiting for the last 1 week.  He is accompanied by his sister, patient's legal guardian, who assisted with history.  They state that patient has been experiencing nonbloody vomiting over the last 1 week.  She also reports about a 2 day history of urinary incontinence, worse than baseline, and uncontrollable nonbloody diarrhea.  She reports that patient was wheelchair-bound due to chronic weakness in his bilateral legs for several years.  Patient currently perform self catheterization at home but his sister states that it has been 1-2 days since he self catheterized himself.  She reports patient had multiple UTIs in the past.  She also reports that patient has had difficulty monitoring his blood glucose levels at home but he reports full compliance with all prescribed medications.  Patient denies any fever, chills, hematemesis, abdominal pain, suprapubic tenderness, melena.     In the ED, initial POCT glucose elevated greater than 500.  CMP with bicarb of 16, serum creatinine elevated at 3.4 with previous baseline at a proximally 1.5.  CBG elevated at 607.  BOHB 1.4.  UA displayed 4+ glycosuria, 3+ hematuria, but no ketonuria.  Indicative of UTI as well.  ABG with slight acidosis with a pH of  7.290.  ICU was consulted for admission due to concerns for DKA.      Hospital Course/Significant events:  9/8/2024; admitted to ICU and developed worsening hypotension and respiratory failure  requiring intubation that night      24 Hour Interval History:  Successfully extubated yesterday, now weaned to RA without respiratory distress. Afebrile overnight, leukocytosis slightly worsened from yesterday. No new culture data available.         Review of systems negative unless documented in the history of present illness.         Past Medical History:   Diagnosis Date    Anemia of chronic disease 7/6/2022    Arteriosclerosis of coronary artery 7/6/2022    Cervical myelopathy 7/6/2022    Cobalamin deficiency 7/6/2022    Gastroesophageal reflux disease 7/6/2022    Hypertension 7/6/2022    Low vitamin D level 7/6/2022    Mixed hyperlipidemia 7/6/2022    Paraparesis 7/6/2022    Stage 3a chronic kidney disease 7/6/2022    Type 2 diabetes mellitus 7/6/2022       Past Surgical History:   Procedure Laterality Date    SPINE SURGERY         Social History     Socioeconomic History    Marital status: Single   Tobacco Use    Smoking status: Never    Smokeless tobacco: Never   Substance and Sexual Activity    Alcohol use: Never    Drug use: Never     Social Determinants of Health     Financial Resource Strain: Low Risk  (4/25/2024)    Overall Financial Resource Strain (CARDIA)     Difficulty of Paying Living Expenses: Not hard at all   Food Insecurity: No Food Insecurity (4/25/2024)    Hunger Vital Sign     Worried About Running Out of Food in the Last Year: Never true     Ran Out of Food in the Last Year: Never true   Transportation Needs: No Transportation Needs (4/25/2024)    PRAPARE - Transportation     Lack of Transportation (Medical): No     Lack of Transportation (Non-Medical): No   Physical Activity: Inactive (4/25/2024)    Exercise Vital Sign     Days of Exercise per Week: 0 days     Minutes of Exercise per Session: 10 min   Stress: No Stress Concern Present (4/25/2024)    [employer] [place] [employer] - Occupational Stress Questionnaire     Feeling of Stress : Only a little   Housing Stability:  Unknown (4/25/2024)    Housing Stability Vital Sign     Unable to Pay for Housing in the Last Year: No       Current Outpatient Medications   Medication Instructions    alcohol swabs (BD ALCOHOL SWABS) PadM 1 each, Topical (Top), Daily    alfuzosin (UROXATRAL) 10 mg Tb24 TAKE 1 TABLET EVERY DAY WITH BREAKFAST    amLODIPine (NORVASC) 2.5 mg, Oral    aspirin (ECOTRIN) 81 mg, Oral, Daily    b complex vitamins capsule 1 capsule, Oral, Daily    blood glucose control, low (TRUE METRIX LEVEL 1) Soln 1 Bottle, Misc.(Non-Drug; Combo Route), Daily    blood-glucose meter (TRUE METRIX AIR GLUCOSE METER) Misc 1 each, Misc.(Non-Drug; Combo Route), Daily    blood-glucose sensor (DEXCOM G7 SENSOR) Carmen 1 each, Misc.(Non-Drug; Combo Route), Daily    carvediloL (COREG) 25 mg, Oral, 2 times daily    ezetimibe (ZETIA) 10 mg, Oral, Daily    finasteride (PROSCAR) 5 mg, Oral, Daily    gabapentin (NEURONTIN) 300 mg, Oral, 3 times daily    glimepiride (AMARYL) 2 mg, Oral, Before breakfast    lancets (TRUEPLUS LANCETS) 33 gauge Misc 1 lancet , Misc.(Non-Drug; Combo Route), Daily    metFORMIN (GLUCOPHAGE) 1,000 mg, Oral, 2 times daily    pantoprazole (PROTONIX) 40 mg, Oral    rosuvastatin (CRESTOR) 40 mg, Oral, Daily    TRUE METRIX GLUCOSE TEST STRIP Strp TEST BLOOD SUGAR EVERY DAY    vitamin D (VITAMIN D3) 1,000 Units, Oral, Daily       Current Inpatient Medications   albuterol-ipratropium  3 mL Nebulization Q4H WAKE    aspirin  81 mg Oral Daily    azithromycin  500 mg Oral Daily    cefTRIAXone (Rocephin) IV (PEDS and ADULTS)  1 g Intravenous Q24H    heparin (porcine)  5,000 Units Subcutaneous Q12H    miconazole NITRATE 2 %   Topical (Top) BID    mupirocin   Nasal BID    sodium chloride 0.9%  10 mL Intravenous Q6H       Current Intravenous Infusions   0.9% NaCl   Intravenous Continuous   Stopped at 09/11/24 0728    dexmedeTOMIDine (Precedex) infusion (titrating)  0-1.4 mcg/kg/hr Intravenous Continuous   Stopped at 09/11/24 0805    D5 and  0.45% NaCl   Intravenous Continuous PRN        NORepinephrine bitartrate-D5W  0-3 mcg/kg/min (Order-Specific) Intravenous Continuous   Stopped at 09/11/24 0401    propofoL  0-50 mcg/kg/min (Dosing Weight) Intravenous Continuous   Stopped at 09/11/24 0640    vasopressin  0.04 Units/min Intravenous Continuous   Stopped at 09/10/24 0239           Objective:       Intake/Output Summary (Last 24 hours) at 9/12/2024 0746  Last data filed at 9/12/2024 0600  Gross per 24 hour   Intake 725.72 ml   Output 4400 ml   Net -3674.28 ml         Vital Signs (Most Recent):  Temp: 98.1 °F (36.7 °C) (09/12/24 0400)  Pulse: 104 (09/12/24 0630)  Resp: (!) 21 (09/12/24 0630)  BP: (!) 140/97 (09/12/24 0630)  SpO2: 96 % (09/12/24 0600)  Body mass index is 18.92 kg/m².  Weight: 50 kg (110 lb 3.7 oz) Vital Signs (24h Range):  Temp:  [96.4 °F (35.8 °C)-98.1 °F (36.7 °C)] 98.1 °F (36.7 °C)  Pulse:  [] 104  Resp:  [4-30] 21  SpO2:  [80 %-99 %] 96 %  BP: ()/() 140/97         Physical exam:   Gen- awake and alert, no distress  HENT- ATNC, MMM  CV- RRR  Resp- faint right lower lung field inspiratory crackles, normal work of breathing   MSK- WWP, thin extremities  Neuro- awake and alert, AXEL, no gross deficits  Psych- flat affect, conversant and cooperative with exam         Lines/Drains/Airways       Peripherally Inserted Central Catheter Line  Duration             PICC Triple Lumen 09/09/24 1309 left brachial 2 days              Drain  Duration                  Urethral Catheter 09/08/24 1810 Straight-tip 16 Fr. 3 days              Peripheral Intravenous Line  Duration                  Peripheral IV - Single Lumen 09/08/24 1700 18 G Left Antecubital 3 days         Peripheral IV - Single Lumen 09/08/24 1915 20 G Anterior;Left Forearm 3 days                    Significant Labs:    Lab Results   Component Value Date    WBC 16.11 (H) 09/12/2024    HGB 11.6 (L) 09/12/2024    HCT 34.9 (L) 09/12/2024    MCV 93.1 09/12/2024      09/12/2024     BMP  Lab Results   Component Value Date     09/12/2024    K 4.8 09/12/2024    CO2 26 09/12/2024    BUN 33.0 (H) 09/12/2024    CREATININE 1.75 (H) 09/12/2024    CALCIUM 8.1 (L) 09/12/2024    AGAP 8.0 09/12/2024    EGFRNONAA 47 (L) 01/11/2022         Assessment/Plan:     Assessment  Septic shock secondary to UTI  Uncontrolled T2DM with hyperglycemia  DWAINE on CKD secondary to volume depletion  Metabolic acidosis; likely secondary to lactic acidosis vs diarrhea  Hypomagnesemia  Hypophosphatemia  Diarrhea  Normocytic anemia  Malnutrition  Elevated PSA with urinary retention; history of self catheterization  History of CAD S/p PCI  History of HFpEF (55%)  History of hypertension  History of hyperlipidemia      Plan  -initial presentation of septic shock in setting of urinary tract infection, with urine cultures growing Klebsiella and Providencia, right lobar pneumonia with respiratory cultures negative  -hemodynamics markedly improved, now stable off vasoactive medications  -modify antimicrobials to ceftriaxone alone per urine culture sensitivities for total 7d course, day 2/3 azithromycin 500 mg daily for additional atypical respiratory coverage for CAP  -renal function largely stable from yesterday, suspect some degree of baseline underlying CKD    -stable for downgrade to floor today        DVT Prophylaxis: SQH  GI ppx: none          Anthony Cochran MD  Pulmonary Critical Care Medicine  [facility] - Emergency Dept  DOS: 09/12/2024

## 2024-09-12 NOTE — PLAN OF CARE
Problem: Infection  Goal: Absence of Infection Signs and Symptoms  Outcome: Progressing     Problem: Adult Inpatient Plan of Care  Goal: Plan of Care Review  Outcome: Progressing  Goal: Patient-Specific Goal (Individualized)  Outcome: Progressing  Goal: Absence of Hospital-Acquired Illness or Injury  Outcome: Progressing  Goal: Optimal Comfort and Wellbeing  Outcome: Progressing  Goal: Readiness for Transition of Care  Outcome: Progressing     Problem: Diabetes Comorbidity  Goal: Blood Glucose Level Within Targeted Range  Outcome: Progressing     Problem: Fall Injury Risk  Goal: Absence of Fall and Fall-Related Injury  Outcome: Progressing     Problem: Skin Injury Risk Increased  Goal: Skin Health and Integrity  Outcome: Progressing     Problem: Wound  Goal: Optimal Coping  Outcome: Progressing  Goal: Optimal Functional Ability  Outcome: Progressing  Goal: Absence of Infection Signs and Symptoms  Outcome: Progressing  Goal: Improved Oral Intake  Outcome: Progressing  Goal: Optimal Pain Control and Function  Outcome: Progressing  Goal: Skin Health and Integrity  Outcome: Progressing  Goal: Optimal Wound Healing  Outcome: Progressing     Problem: Restraint, Nonviolent  Goal: Absence of Harm or Injury  Outcome: Progressing

## 2024-09-12 NOTE — PLAN OF CARE
Problem: SLP  Goal: SLP Goal  Description:   LTG: Tolerate least restrictive PO diet with no clinical signs/sx aspiration  STG: Tolerate half meal of Soft & Bite-sized solids with adequate bolus formation and no clinical signs/sx aspiration    Outcome: Progressing

## 2024-09-12 NOTE — NURSING
Nurses Note -- 4 Eyes      9/12/2024   5:28 AM      Skin assessed during: Daily Assessment      [] No Altered Skin Integrity Present    [x]Prevention Measures Documented      [x] Yes- Altered Skin Integrity Present or Discovered   [] LDA Added if Not in Epic (Describe Wound)   [] New Altered Skin Integrity was Present on Admit and Documented in LDA   [] Wound Image Taken    Wound Care Consulted? Yes    Attending Nurse:  Edna Campo RN/Staff Member:  PJ Marlow

## 2024-09-12 NOTE — PT/OT/SLP EVAL
[facility]  Speech Language Pathology Department  Clinical Swallow Evaluation    Patient Name:  Froy Barragan   MRN:  [id number]    Recommendations     General recommendations:  Modified Barium Swallow Study  Solid texture recommendation:  NPO  Liquid consistency recommendation: NPO   Medications: crushed in puree  Precautions: aspiration    History     Froy Barragan is a/n 64 y.o. male admitted to [facility] with septic shock, DWAINE and metabolic acidosis.  Pt developed acute hypoxic respiratory failure requiring intubation for mechanical ventilation.    Past Medical History:   Diagnosis Date    Anemia of chronic disease 7/6/2022    Arteriosclerosis of coronary artery 7/6/2022    Cervical myelopathy 7/6/2022    Cobalamin deficiency 7/6/2022    Gastroesophageal reflux disease 7/6/2022    Hypertension 7/6/2022    Low vitamin D level 7/6/2022    Mixed hyperlipidemia 7/6/2022    Paraparesis 7/6/2022    Stage 3a chronic kidney disease 7/6/2022    Type 2 diabetes mellitus 7/6/2022     Past Surgical History:   Procedure Laterality Date    SPINE SURGERY       Prior Intubation HX:  9/9-9/11    Home diet texture/consistency: unknown  Current method of nutrition: NPO    Imaging   Results for orders placed during the hospital encounter of 09/08/24    X-Ray Chest 1 View    Narrative  EXAMINATION:  XR CHEST 1 VIEW    CPT 32746    CLINICAL HISTORY:  Intubated;    COMPARISON:  September 9, 2024    FINDINGS:  Examination reveals cardiomediastinal silhouette and pleuroparenchymal changes to be essentially unchanged as compared with the previous exam.    There are persistent consolidative changes at the right base with minimal improvement as compared with the last exam.    Support catheters remain in place    Impression  Minimal improvement in consolidative changes at the right lower lobe.    No other significant change      Electronically signed by: Sha  Sol  Date:    09/10/2024  Time:    05:43    Subjective     Patient awake, alert, and flat.    Patient goals: to eat/drink and go home   Spiritual/Cultural/[employer]aritan Beliefs/Practices that affect care: no    Pain/Comfort: Pain Rating 1: 0/10    Respiratory Status:  3L via nasal cannula    Restraints/positioning devices: none    Objective     ORAL MUSCULATURE  Dentition: own teeth  Secretion Management: adequate  Mucosal Quality: good  Facial Movement: WFL  Buccal Strength & Mobility: WFL  Mandibular Strength & Mobility: WFL  Oral Labial Strength & Mobility: impaired pursing and impaired seal  Lingual Strength & Mobility: impaired strength, impaired left lateral movement, and impaired right lateral movement  Vocal Quality: adequate    PO TRIALS  Consistency Fed By Oral Symptoms Pharyngeal Symptoms   Thin liquid by cup Self None Multiple swallows  Throat clear after swallow   Puree SLP None Multiple swallows   Thin liquid by straw SLP None Throat clear after swallow     Assessment     Pt presents with signs/sx oropharyngeal dysphagia warranting an instrumental assessment of swallow function to determine safety of PO intake and develop appropriate treatment plan.    Outcome Measures     Functional Oral Intake Scale: 1 - Nothing by mouth    Education     Patient provided with verbal education regarding SLP POC.  Understanding was verbalized, however additional teaching warranted.    Plan     Plan of Care reviewed with:  patient     Time Tracking     SLP Treatment Date:   09/12/24  Speech Start Time:  0845  Speech Stop Time:  0855     Speech Total Time (min):  10 min    Billable minutes:  Swallow and Oral Function Evaluation, 10 minutes     09/12/2024

## 2024-09-12 NOTE — PT/OT/SLP EVAL
Occupational Therapy  Evaluation    Name: Froy Barragan  MRN: [id number]  Admitting Diagnosis: hyperglycemia; UTI   Recent Surgery: * No surgery found *      Recommendations:     Discharge therapy intensity: Moderate Intensity Therapy   Discharge Equipment Recommendations:  to be determined by next level of care  Barriers to discharge:  Decreased caregiver support    Assessment:     Froy Barragan is a 64 y.o. male with a medical diagnosis of The primary encounter diagnosis was Diabetic ketoacidosis without coma associated with type 2 diabetes mellitus. Diagnoses of Hypotension, unspecified hypotension type, Acute kidney injury, Acute dehydration, Hyperglycemia, and QT prolongation were also pertinent to this visit.  .  He presents with the following performance deficits affecting function: weakness, impaired joint extensibility, impaired endurance, impaired cognition, decreased ROM, impaired self care skills, impaired coordination, decreased lower extremity function, impaired functional mobility, decreased upper extremity function, impaired balance, decreased safety awareness.     Pt would benefit from cont OT services in order to maximize functional independence. Recommending moderate intensity therapy at d/c. Pt presenting with confusion, deconditioning, and impaired independence with ADLs and mobility. Currently requires max-total A for all axs. Will progress pt as able.     Rehab Prognosis: Good; patient would benefit from acute skilled OT services to address these deficits and reach maximum level of function.       Plan:     Patient to be seen 4 x/week to address the above listed problems via self-care/home management, therapeutic activities, therapeutic exercises  Plan of Care Expires: 10/12/24  Plan of Care Reviewed with: patient, family    Subjective     Chief Complaint: pt with confusion during session; sister/caregiver reports concerns over pt's decline in mentation and physical  state  Patient/Family Comments/goals: get rehab and placement for pt     Occupational Profile:  Living Environment: alone, SSH, thres to enter, t/s combo  Previous level of function: pt with confusion during session and relaying inaccurate info; sister present at end of session providing PLOF; reports pt has not ambulated in ~ 1 year and has been w/c bound; recently significantly declined in mobility and movement requiring assist for all axs; caregiver 7days/wk for ~ 4 hrs per day; pt has required increased assist for bed mobility, stand pivot t/fs to w/c, toileting, dsg, bathing. Sister states pt has had issues with recall and memory; in bed for extended hours when no one is present at home   Equipment Used at Home: walker, rolling, wheelchair, shower chair, bath bench, power chair  Assistance upon Discharge: caregiver; family assists when able and has been providing additional care    Pain/Comfort:  Pain Rating 1: 0/10    Patients cultural, spiritual, Tenriism conflicts given the current situation:      Objective:     OT communicated with nsg prior to session.      Patient was found right sidelying with  (ICU monitoring; colbert) upon OT entry to room.    General Precautions: Standard, aspiration, fall  Orthopedic Precautions: N/A  Braces: N/A    Vital Signs: Blood Pressure: WNL    Bed Mobility:    Patient completed Rolling/Turning to Left with  moderate assistance and maximal assistance  Patient completed Rolling/Turning to Right with moderate assistance and maximal assistance  Patient completed Scooting/Bridging with maximal assistance  Patient completed Supine to Sit with maximal assistance  Patient completed Sit to Supine with maximal assistance    Functional Mobility/Transfers:  Patient completed Sit <> Stand Transfer with maximal assistance  with  no assistive device; 2 attempts to stand with limited buttocks clearance from EOB; pt with limited effort for ax    Functional Mobility: unable to perform      Activities of Daily Living:  Upper Body Dressing: maximal assistance doff/vijaya soiled gown   Lower Body Dressing: dependence doff/vijaya socks   Toileting: dependence colbert cath in place; pt incontinent of bowels; BM during session; pt unaware he was having BM     AMPAC 6 Click ADL:  [facility] Total Score: 12    Functional Cognition:  Orientation: oriented to Person, Place; not currently oriented to time- states he is 22 years old; believes he was in an accident and that is why he is in the hospital   Problem Solving: Impaired.   Memory: Impaired STM, Impaired LTM, and Poor immediate recall  Safety Awareness: Impaired.   Insight into Deficits: Impaired.   Flat affect     Visual Perceptual Skills:  Intact    Upper Extremity Function:  Right Upper Extremity:   WFL ROM for pt's needs   Strength grossly 3+ /5     Left Upper Extremity:  Limited shoulder flexion and external rotation, WFL distally   Grossly 3+/5    Balance:   Impaired. Poor sitting balance EOB; posterior and L lateral LOB while seated; requires BUE assist and support on bed rails     Therapeutic Positioning  Risk for acquired pressure injuries is increased due to relative decrease in mobility d/t hospitalization , impaired mobility, incontinence, and altered skin already present.    OT interventions performed during the course of today's session:   Education was provided on benefits of and recommendations for therapeutic positioning    Skin assessment: all bony prominences were assessed    Findings: known area of altered skin integrity at sacrum- redness; scrotal redness; L shoulder with bruise     OT recommendations for therapeutic positioning throughout hospitalization:   Follow [facility] Pressure Injury Prevention Protocol    Additional Treatment:  Pt sat EOB this date; static balance control performed with and without UE support   Attempted 2 stands from EOB; maximal assist required with limited pt effort  Seated scooting to [facility]   Pt performed multiple  trials of rolling in order to perform hygiene post BM   Time spent answering pt's family's questions to best of knowledge     Patient Education:  Patient and family were provided with verbal education education regarding OT role/goals/POC, fall prevention, safety awareness, Discharge/DME recommendations, and pressure ulcer prevention.  Understanding was verbalized, however additional teaching warranted.     Patient left supine with all lines intact, call button in reach, and nsg present. To complete hygiene and yvette care    GOALS:   Multidisciplinary Problems       Occupational Therapy Goals          Problem: Occupational Therapy    Goal Priority Disciplines Outcome Interventions   Occupational Therapy Goal     OT, PT/OT Progressing    Description: Goals to be met by: 10/12/24     Patient will increase functional independence with ADLs by performing:    Grooming while seated with Supervision.  Toileting from bedside commode with Moderate Assistance for hygiene and clothing management.   Sitting at edge of bed x10 minutes with Supervision.  Supine to sit with Supervision.  Squat pivot transfers with Minimal Assistance.  Toilet transfer to bedside commode with Minimal Assistance.  Increased functional strength to 4/5 for self care skills and functional mobility.  Upper extremity exercise program x10 reps per handout, with independence.                         History:     Past Medical History:   Diagnosis Date    Anemia of chronic disease 7/6/2022    Arteriosclerosis of coronary artery 7/6/2022    Cervical myelopathy 7/6/2022    Cobalamin deficiency 7/6/2022    Gastroesophageal reflux disease 7/6/2022    Hypertension 7/6/2022    Low vitamin D level 7/6/2022    Mixed hyperlipidemia 7/6/2022    Paraparesis 7/6/2022    Stage 3a chronic kidney disease 7/6/2022    Type 2 diabetes mellitus 7/6/2022         Past Surgical History:   Procedure Laterality Date    SPINE SURGERY         Time Tracking:     OT Date of Treatment:  09/12/24  OT Start Time: 1358  OT Stop Time: 1439  OT Total Time (min): 41 min    Billable Minutes:Evaluation 10  Self Care/Home Management 31    9/12/2024

## 2024-09-12 NOTE — H&P
[facility] Hospital Medicine History & Physical Examination       Patient Name: Froy Barragan  MRN: [id number]  Patient Class: IP- Inpatient   Admission Date: 9/8/2024   Admitting Physician: [facility] Service   Length of Stay: 4  Attending Physician: Dr. Frank Awad   Primary Care Provider: George Bran MD  Face-to-Face encounter date: 09/12/2024  Code Status: full code  Chief Complaint: Hyperglycemia (Pt. Arrives via EMS C/o hyperglycemia, vomiting started today and diarrhea started x1 week ago.. hx of dm med list includes metformin and glimepiride rep rots sister gives him medications. Aaox4.. no noted labored breathing at this time. Dx with UTI with an otc test x2 days ago. However was not able to fill script because it is the weekened)        Screening for Social Drivers for health:  Patient screened for food insecurity, housing instability, transportation needs, utility difficulties, and interpersonal safety (select all that apply as identified as concern)  []Housing or Food  []Transportation Needs  []Utility Difficulties  []Interpersonal safety  [x]None    Patient information was obtained from patient, patient's family, past medical records and/or ER records.     HISTORY OF PRESENT ILLNESS:   Froy Barragan is a 64 y.o. male who PMH includes HTN, HLD, CAD status post PCI, BPH with self catheterization, CHF, GERD, CKD stage 3, DM type 2, wheelchair-bound; presents to the ED at [facility] on 9/8/2024  with reported of weakness with a primary complaint of elevated blood glucose with associated N/V/D and UTI symptom. PT self cath at home.  It was reported the patient has had the symptoms of decreased p.o. intake with associated nausea and vomiting over the past week.  It was reported his sister helps him manage his medications.  He has been continuing to take his diabetes medication despite his nausea and vomiting and decreased p.o. intake.  Patient does have issues with  frequent urinary tract infection and it was reported he was nose with 1 prior to arrival the ED and was treated by his PCP.  It was reported he has had urinary incontinence 2 days prior to arrival in the ED with uncontrolled diarrhea.  Patient denies any fever, chills, hematemesis, abdominal pain, suprapubic tenderness, melena. In the ED, initial POCT glucose elevated greater than 500.  CMP with bicarb of 16, serum creatinine elevated at 3.4 with previous baseline at a proximally 1.5.  CBG elevated at 607.  BOHB 1.4.  UA displayed 4+ glycosuria, 3+ hematuria, but no ketonuria, suggestive of UTI.  as well.  ABG with slight acidosis with a pH of  7.290, concern for DKA. Pt was admitted to ICU for DKA.  Patient had worsening respiratory failure with associated hypotension.  Patient required intubation and mechanical ventilation along with vasopressor therapy.  Patient was started on IV Zosyn and vancomycin therapy with suspicion of sepsis.  Since then patient's gap has closed, he has been extubated and vaso vasopressor therapy DC., insulin infusion has been DC.  Patient remains on IV antibiotic therapy.  Respiratory culture unremarkable, blood cultures unremarkable.  Urine culture and sensitivity identified Klebsiella oxytoca and Providencia rettgeri; IV antibiotic therapy has been deescalated to just Zosyn therapy.  Patient has been cleared for transfer out of ICU to the regular floor.  Hospital medicine services have been consulted for assumption of care.    PAST MEDICAL HISTORY:     Past Medical History:   Diagnosis Date    Anemia of chronic disease 7/6/2022    Arteriosclerosis of coronary artery 7/6/2022    Cervical myelopathy 7/6/2022    Cobalamin deficiency 7/6/2022    Gastroesophageal reflux disease 7/6/2022    Hypertension 7/6/2022    Low vitamin D level 7/6/2022    Mixed hyperlipidemia 7/6/2022    Paraparesis 7/6/2022    Stage 3a chronic kidney disease 7/6/2022    Type 2 diabetes mellitus 7/6/2022       PAST  SURGICAL HISTORY:     Past Surgical History:   Procedure Laterality Date    SPINE SURGERY         ALLERGIES:   Patient has no known allergies.    FAMILY HISTORY:   Reviewed and negative    SOCIAL HISTORY:   No reports of alcohol, tobacco, or illicit drug use   Lives alone-has family    HOME MEDICATIONS:   As documented  Prior to Admission medications    Medication Sig Start Date End Date Taking? Authorizing Provider   alcohol swabs (BD ALCOHOL SWABS) PadM Apply 1 each topically once daily. 6/7/23   George Bran MD   alfuzosin (UROXATRAL) 10 mg Tb24 TAKE 1 TABLET EVERY DAY WITH BREAKFAST 7/3/24   George Bran MD   amLODIPine (NORVASC) 2.5 MG tablet TAKE 1 TABLET ONE TIME DAILY 7/3/24   George Bran MD   aspirin (ECOTRIN) 81 MG EC tablet Take 81 mg by mouth once daily.    Provider, Historical   b complex vitamins capsule Take 1 capsule by mouth once daily.    Provider, Historical   blood glucose control, low (TRUE METRIX LEVEL 1) Soln 1 Bottle by Misc.(Non-Drug; Combo Route) route once daily. 6/7/23   George Bran MD   blood-glucose meter (TRUE METRIX AIR GLUCOSE METER) Misc 1 each by Misc.(Non-Drug; Combo Route) route once daily. 6/7/23   George Bran MD   blood-glucose sensor (DEXCOM G7 SENSOR) Carmen 1 each by Misc.(Non-Drug; Combo Route) route once daily. 9/5/24 9/5/25  George Bran MD   carvediloL (COREG) 25 MG tablet TAKE 1 TABLET TWICE DAILY 7/3/24   George Bran MD   ezetimibe (ZETIA) 10 mg tablet Take 1 tablet (10 mg total) by mouth once daily. 7/24/23   Torrie Orozco NP   finasteride (PROSCAR) 5 mg tablet Take 1 tablet (5 mg total) by mouth once daily. 4/24/24 4/24/25  Torrie Orozco NP   gabapentin (NEURONTIN) 300 MG capsule TAKE 1 CAPSULE THREE TIMES DAILY 7/3/24   George Bran MD   glimepiride (AMARYL) 2 MG tablet Take 1 tablet (2 mg total) by mouth before breakfast. 10/24/23 10/23/24  Delahoussaye, Torrie D, NP   lancets (TRUEPLUS LANCETS) 33 gauge Misc 1 lancet by  Misc.(Non-Drug; Combo Route) route once daily. 6/7/23   George Bran MD   metFORMIN (GLUCOPHAGE) 1000 MG tablet Take 1 tablet (1,000 mg total) by mouth 2 (two) times a day. 6/5/23   George Bran MD   pantoprazole (PROTONIX) 40 MG tablet TAKE 1 TABLET ONE TIME DAILY 7/3/24   George Bran MD   rosuvastatin (CRESTOR) 40 MG Tab Take 1 tablet (40 mg total) by mouth once daily. 6/5/23   George Bran MD   TRUE METRIX GLUCOSE TEST STRIP Strp TEST BLOOD SUGAR EVERY DAY 9/4/24   George Bran MD   vitamin D (VITAMIN D3) 1000 units Tab Take 1,000 Units by mouth once daily.    Provider, Historical       REVIEW OF SYSTEMS:   Except as documented, all other systems reviewed and negative     PHYSICAL EXAM:     VITAL SIGNS: 24 HRS MIN & MAX LAST   Temp  Min: 96.4 °F (35.8 °C)  Max: 98.1 °F (36.7 °C) 98.1 °F (36.7 °C)   BP  Min: 92/67  Max: 164/108 (!) 132/95   Pulse  Min: 60  Max: 119  110   Resp  Min: 4  Max: 28 (!) 22   SpO2  Min: 80 %  Max: 99 % 96 %       General appearance: Well-developed, well-nourished male, chronically ill appearing, looks older than stated age, non-toxic, fatigued, NAD  HENT: Atraumatic head. Moist mucous membranes of oral cavity.  Eyes: PERRL  Lungs: diminished bilaterally. No wheezing present.   Heart: Regular rate and rhythm. S1 and S2 present , cap refill brisk  Abdomen: Soft, non-distended, non-tender. No rebound tenderness/guarding. Bowel sounds are normal.   Extremities: No cyanosis, clubbing, chronic BLE weakness  Skin: warm and dry, scrotal erythema with excoriation,  bilat heels redness; see media                  Neuro: fatigued, no acute focal deficits  Psych/mental status: flat affect, cooperative     LABS AND IMAGING:     Recent Labs   Lab 09/10/24  0250 09/11/24  0235 09/12/24  0252   WBC 9.98 12.42* 16.11*   RBC 3.43* 3.22* 3.75*   HGB 10.7* 10.1* 11.6*   HCT 29.8* 29.3* 34.9*   MCV 86.9 91.0 93.1   MCH 31.2* 31.4* 30.9   MCHC 35.9 34.5 33.2   RDW 13.2 13.5 13.6    165 163    MPV 12.1* 12.2* 11.8*       Recent Labs   Lab 09/10/24  0250 09/10/24  0712 09/10/24  1429 09/11/24  0235 09/11/24  0735 09/11/24  1645 09/12/24  0252     --   --  144  --  145 142   K 4.3  --   --  3.6  --  4.0 4.8     --   --  112*  --  114* 108*   CO2 25  --   --  23  --  23 26   BUN 41.4*  --   --  40.2*  --  37.2* 33.0*   CREATININE 2.11*  --   --  2.03*  --  1.67* 1.75*   CALCIUM 7.5*  --   --  7.2*  --  7.1* 8.1*   PH  --  7.560* 7.470*  --  7.460*  --   --    MG  --   --   --  1.60  --  3.20* 2.60   ALBUMIN 2.2*  --   --  2.0*  --   --  2.3*   ALKPHOS 45  --   --  54  --   --  54   ALT 15  --   --  17  --   --  32   AST 14  --   --  18  --   --  29   BILITOT 0.3  --   --  0.2  --   --  0.3       Microbiology Results (last 7 days)       Procedure Component Value Units Date/Time    Blood Culture [[id number]]  (Normal) Collected: 09/08/24 1851    Order Status: Completed Specimen: Blood Updated: 09/11/24 2001     Blood Culture No Growth At 72 Hours    Blood Culture [[id number]]  (Normal) Collected: 09/08/24 1851    Order Status: Completed Specimen: Blood Updated: 09/11/24 2001     Blood Culture No Growth At 72 Hours    Urine culture [[id number]]  (Abnormal)  (Susceptibility) Collected: 09/08/24 1809    Order Status: Completed Specimen: Urine Updated: 09/11/24 0642     Urine Culture >/= 100,000 colonies/ml Klebsiella oxytoca      >/= 100,000 colonies/ml Providencia rettgeri    Respiratory Culture [[id number]] Collected: 09/08/24 2217    Order Status: Completed Specimen: Sputum from Mouth Updated: 09/10/24 1037     Respiratory Culture Normal respiratory clari     GRAM STAIN Quality 1+      Moderate Gram positive cocci      Moderate Gram Negative Rods      Moderate Gram Positive Rods    Clostridium Diff Toxin, A & B, EIA [[id number]]     Order Status: Canceled Specimen: Stool              X-Ray Chest 1 View  Narrative: EXAMINATION:  XR CHEST 1 VIEW    CPT 52460    CLINICAL  HISTORY:  Intubated;    COMPARISON:  September 9, 2024    FINDINGS:  Examination reveals cardiomediastinal silhouette and pleuroparenchymal changes to be essentially unchanged as compared with the previous exam.    There are persistent consolidative changes at the right base with minimal improvement as compared with the last exam.    Support catheters remain in place  Impression: Minimal improvement in consolidative changes at the right lower lobe.    No other significant change    Electronically signed by: Sha Horton  Date:    09/10/2024  Time:    05:43        ASSESSMENT & PLAN:   ASSESSMENT:  Acute respiratory failure requiring intubation and mechanical ventilation-09/08/2024   Septic shock-requiring vasopressor therapy-09/08/2024   Diabetic ketoacidosis-with metabolic acidosis- POA  DM type 2 uncontrolled with hyperglycemia-POA  Lactic acidosis-suspected secondary to sepsis-POA  Acute on chronic CKD stage 3-POA  Nausea with vomiting-non intractable-POA  Diarrhea-noninfected-POA   Weakness- POA    Hx of CAD status post PCI, CHF, HLD, chronic anemia, BPH, wheelchair-bound, self catheterization    PLAN:  Continue with IV hydration   Continue with IV Zosyn therapy -urine cultures grew out Klebsiella and Providencia  PT OT eval and treat  Accurate I&O   Daily weights   Resume home medication as deemed necessary   Accu-Cheks with sliding scale coverage   Consult dietitian for nutritional recommendations/support   Repeat lab work in a.m.      VTE Prophylaxis: Heparin for DVT prophylaxis and will be advised to be as mobile as possible and sit in a chair as tolerated    Patient condition:  Stable    __________________________________________________________________________  INPATIENT LIST OF MEDICATIONS     Scheduled Meds:   albuterol-ipratropium  3 mL Nebulization Q4H WAKE    aspirin  81 mg Oral Daily    azithromycin  500 mg Oral Daily    cefTRIAXone (Rocephin) IV (PEDS and ADULTS)  1 g Intravenous Q24H    heparin  (porcine)  5,000 Units Subcutaneous Q12H    miconazole NITRATE 2 %   Topical (Top) BID    mupirocin   Nasal BID    sodium chloride 0.9%  10 mL Intravenous Q6H     Continuous Infusions:   0.9% NaCl   Intravenous Continuous   Stopped at 24 0728    dexmedeTOMIDine (Precedex) infusion (titrating)  0-1.4 mcg/kg/hr Intravenous Continuous   Stopped at 24 0805    D5 and 0.45% NaCl   Intravenous Continuous PRN        NORepinephrine bitartrate-D5W  0-3 mcg/kg/min (Order-Specific) Intravenous Continuous   Stopped at 24 0401    propofoL  0-50 mcg/kg/min (Dosing Weight) Intravenous Continuous   Stopped at 24 0640    vasopressin  0.04 Units/min Intravenous Continuous   Stopped at 09/10/24 0239     PRN Meds:.  Current Facility-Administered Medications:     dextrose 10%, 12.5 g, Intravenous, PRN    dextrose 10%, 25 g, Intravenous, PRN    D5 and 0.45% NaCl, , Intravenous, Continuous PRN    [] fentaNYL, 50 mcg, Intravenous, Q15 Min PRN **FOLLOWED BY** fentaNYL, 50 mcg, Intravenous, Q1H PRN    haloperidol lactate, 1 mg, Intravenous, Q6H PRN    insulin aspart U-100, 0-10 Units, Subcutaneous, QID (AC + HS) PRN    magnesium sulfate IVPB, 2 g, Intravenous, PRN    sodium chloride 0.9%, 10 mL, Intravenous, PRN    sodium chloride 0.9%, 10 mL, Intravenous, PRN    Flushing PICC/Midline Protocol, , , Until Discontinued **AND** sodium chloride 0.9%, 10 mL, Intravenous, Q6H **AND** sodium chloride 0.9%, 10 mL, Intravenous, PRN    sodium phosphate 20.01 mmol in D5W 250 mL IVPB, 20.01 mmol, Intravenous, PRN      IJesica FNP have reviewed and discussed the case with   Dr. Frank Awad. Please see the following addendum for further assessment and plan from their attending MD.  IRLANDA Pickering   2024    ________________________________________________________________________________    MD Addendum:  Dr. AUDI ---assumed care of this patient today at ---am/pm  For the patient encounter, I  performed the substantive portion of the visit, I reviewed the NP/PA documentation, treatment plan, and medical decision making.  I had face to face time with this patient     A. History:    B. Physical exam:    C. Medical decision making:    Discharge Planning and Disposition: No mobility needs. Ambulating well. Good social support system.   Anticipated discharge    All diagnosis and differential diagnosis have been reviewed; assessment and plan has been documented; I have personally reviewed the labs and test results that are presently available; I have reviewed the patients medication list; I have reviewed the consulting providers response and recommendations. I have reviewed or attempted to review medical records based upon their availability.    All of the patient and family questions have been addressed and answered. Patient's is agreeable to the above stated plan. I will continue to monitor closely and make adjustments to medical management as needed.

## 2024-09-12 NOTE — PT/OT/SLP EVAL
Physical Therapy Evaluation    Patient Name:  Froy Barragan   MRN:  [id number]    Recommendations:     Discharge therapy intensity: Moderate Intensity Therapy   Discharge Equipment Recommendations: none   Barriers to discharge: Decreased caregiver support, Impaired mobility, and Ongoing medical needs    Assessment:     Froy Barragan is a 64 y.o. male admitted with a medical diagnosis of septic shock 2/2 UTI, DWAINE, acute respiratory failure requiring intubation.  He presents with the following impairments/functional limitations: weakness, impaired functional mobility, impaired endurance, impaired balance, decreased lower extremity function. Pt tolerated session fair, limited 2/2 weakness. Significant BLE weakness noted required MaxA for all mobility. Pt w/c bound at baseline; however, lives alone and is able to t/f and care for self at w/c level Toby. Recommending moderate intensity therapy at d/c.     Rehab Prognosis: Good; patient would benefit from acute skilled PT services to address these deficits and reach maximum level of function.    Recent Surgery: * No surgery found *      Plan:     During this hospitalization, patient would benefit from acute PT services 5 x/week to address the identified rehab impairments via therapeutic exercises, therapeutic activities, wheelchair management/training and progress toward the following goals:    Plan of Care Expires:  10/19/24    Subjective     Chief Complaint: to return to PLOF  Patient/Family Comments/goals: to go home  Pain/Comfort:  Pain Rating 1: 0/10    Patients cultural, spiritual, Jain conflicts given the current situation:      Living Environment:  Pt lives alone in a H with flat entrance.   Prior to admission, patients level of function was Toby for pivot t/f to manual w/c.  Equipment used at home: wheelchair, shower chair, bath bench.  DME owned (not currently used): rolling walker.  Upon discharge, patient will have assistance from  [facility].    Objective:     Communicated with RN prior to session.  Patient found HOB elevated with peripheral IV, blood pressure cuff, telemetry, pulse ox (continuous), SCD, colbert catheter  upon PT entry to room.    General Precautions: Standard, aspiration, fall  Orthopedic Precautions:N/A   Braces: N/A  Respiratory Status: Room air  Blood Pressure: 132/95      Exams:  Sensation:    -       Intact  RLE Strength: Deficits: 2 hip flexion, 3- knee extension, 3- DF  LLE Strength: Deficits: 2- hip flexion, 2+ knee extension, 2- DF  Skin integrity: redness to sacrum    Functional Mobility:  Bed Mobility:     Rolling Left:  maximal assistance  Rolling Right: maximal assistance  Scooting: moderate assistance  Supine to Sit: maximal assistance and of 2 persons  Sit to Supine: maximal assistance and of 2 persons  Transfers:     Sit to Stand:  moderate assistance with no AD, partial glut clearance to scoot towards HOB  Balance: poor static sitting with posterior lean       AM-PAC 6 CLICK MOBILITY  Total Score:10       Treatment & Education:    Patient provided with verbal education education regarding PT role/goals/POC, fall prevention, and safety awareness.  Understanding was verbalized, however additional teaching warranted.     Patient left HOB elevated with all lines intact, call button in reach, and RN present.    GOALS:   Multidisciplinary Problems       Physical Therapy Goals          Problem: Physical Therapy    Goal Priority Disciplines Outcome Goal Variances Interventions   Physical Therapy Goal     PT, PT/OT Progressing     Description: Goals to be met by: d/c     Patient will increase functional independence with mobility by performin. Supine to sit with Stand-by Assistance  2. Sit to supine with Stand-by Assistance  3. Sit to stand transfer with Stand-by Assistance  4. Bed to chair transfer with Stand-by Assistance using No Assistive Device scoot pivot vs SB  5. Wheelchair propulsion x100 feet with Supervision  using bilateral uppper extremities                         History:     Past Medical History:   Diagnosis Date    Anemia of chronic disease 7/6/2022    Arteriosclerosis of coronary artery 7/6/2022    Cervical myelopathy 7/6/2022    Cobalamin deficiency 7/6/2022    Gastroesophageal reflux disease 7/6/2022    Hypertension 7/6/2022    Low vitamin D level 7/6/2022    Mixed hyperlipidemia 7/6/2022    Paraparesis 7/6/2022    Stage 3a chronic kidney disease 7/6/2022    Type 2 diabetes mellitus 7/6/2022       Past Surgical History:   Procedure Laterality Date    SPINE SURGERY         Time Tracking:     PT Received On: 09/12/24  PT Start Time: 1015     PT Stop Time: 1032  PT Total Time (min): 17 min     Billable Minutes: Evaluation 17      09/12/2024

## 2024-09-12 NOTE — PLAN OF CARE
Problem: Physical Therapy  Goal: Physical Therapy Goal  Description: Goals to be met by: d/c     Patient will increase functional independence with mobility by performin. Supine to sit with Stand-by Assistance  2. Sit to supine with Stand-by Assistance  3. Sit to stand transfer with Stand-by Assistance  4. Bed to chair transfer with Stand-by Assistance using No Assistive Device scoot pivot vs SB  5. Wheelchair propulsion x100 feet with Supervision using bilateral uppper extremities    Outcome: Progressing

## 2024-09-13 PROBLEM — R29.898 WEAKNESS OF BOTH LOWER EXTREMITIES: Status: ACTIVE | Noted: 2024-09-13

## 2024-09-13 LAB
ALBUMIN SERPL-MCNC: 2.4 G/DL (ref 3.4–4.8)
ALBUMIN/GLOB SERPL: 0.8 RATIO (ref 1.1–2)
ALP SERPL-CCNC: 55 UNIT/L (ref 40–150)
ALT SERPL-CCNC: 29 UNIT/L (ref 0–55)
ANION GAP SERPL CALC-SCNC: 11 MEQ/L
AST SERPL-CCNC: 18 UNIT/L (ref 5–34)
BACTERIA BLD CULT: NORMAL
BACTERIA BLD CULT: NORMAL
BASOPHILS # BLD AUTO: 0.06 X10(3)/MCL
BASOPHILS NFR BLD AUTO: 0.4 %
BILIRUB SERPL-MCNC: 0.3 MG/DL
BUN SERPL-MCNC: 32.3 MG/DL (ref 8.4–25.7)
CALCIUM SERPL-MCNC: 8.1 MG/DL (ref 8.8–10)
CHLORIDE SERPL-SCNC: 106 MMOL/L (ref 98–107)
CO2 SERPL-SCNC: 23 MMOL/L (ref 23–31)
CREAT SERPL-MCNC: 1.83 MG/DL (ref 0.73–1.18)
CREAT/UREA NIT SERPL: 18
EOSINOPHIL # BLD AUTO: 0.19 X10(3)/MCL (ref 0–0.9)
EOSINOPHIL NFR BLD AUTO: 1.2 %
ERYTHROCYTE [DISTWIDTH] IN BLOOD BY AUTOMATED COUNT: 13.3 % (ref 11.5–17)
GFR SERPLBLD CREATININE-BSD FMLA CKD-EPI: 40 ML/MIN/1.73/M2
GLOBULIN SER-MCNC: 3 GM/DL (ref 2.4–3.5)
GLUCOSE SERPL-MCNC: 153 MG/DL (ref 82–115)
HCT VFR BLD AUTO: 36.5 % (ref 42–52)
HGB BLD-MCNC: 11.9 G/DL (ref 14–18)
IMM GRANULOCYTES # BLD AUTO: 0.12 X10(3)/MCL (ref 0–0.04)
IMM GRANULOCYTES NFR BLD AUTO: 0.7 %
LYMPHOCYTES # BLD AUTO: 0.87 X10(3)/MCL (ref 0.6–4.6)
LYMPHOCYTES NFR BLD AUTO: 5.4 %
MAGNESIUM SERPL-MCNC: 1.9 MG/DL (ref 1.6–2.6)
MCH RBC QN AUTO: 30.7 PG (ref 27–31)
MCHC RBC AUTO-ENTMCNC: 32.6 G/DL (ref 33–36)
MCV RBC AUTO: 94.1 FL (ref 80–94)
MONOCYTES # BLD AUTO: 1.07 X10(3)/MCL (ref 0.1–1.3)
MONOCYTES NFR BLD AUTO: 6.7 %
NEUTROPHILS # BLD AUTO: 13.74 X10(3)/MCL (ref 2.1–9.2)
NEUTROPHILS NFR BLD AUTO: 85.6 %
NRBC BLD AUTO-RTO: 0 %
PHOSPHATE SERPL-MCNC: 3 MG/DL (ref 2.3–4.7)
PLATELET # BLD AUTO: 191 X10(3)/MCL (ref 130–400)
PMV BLD AUTO: 11.7 FL (ref 7.4–10.4)
POCT GLUCOSE: 121 MG/DL (ref 70–110)
POCT GLUCOSE: 144 MG/DL (ref 70–110)
POCT GLUCOSE: 152 MG/DL (ref 70–110)
POCT GLUCOSE: 227 MG/DL (ref 70–110)
POCT GLUCOSE: 243 MG/DL (ref 70–110)
POTASSIUM SERPL-SCNC: 4.5 MMOL/L (ref 3.5–5.1)
PROT SERPL-MCNC: 5.4 GM/DL (ref 5.8–7.6)
RBC # BLD AUTO: 3.88 X10(6)/MCL (ref 4.7–6.1)
SODIUM SERPL-SCNC: 140 MMOL/L (ref 136–145)
WBC # BLD AUTO: 16.05 X10(3)/MCL (ref 4.5–11.5)

## 2024-09-13 PROCEDURE — 99223 1ST HOSP IP/OBS HIGH 75: CPT | Mod: ,,, | Performed by: PSYCHIATRY & NEUROLOGY

## 2024-09-13 PROCEDURE — 84100 ASSAY OF PHOSPHORUS: CPT

## 2024-09-13 PROCEDURE — 86053 AQAPRN-4 ANTB FLO CYTMTRY EA: CPT

## 2024-09-13 PROCEDURE — 25000003 PHARM REV CODE 250: Performed by: INTERNAL MEDICINE

## 2024-09-13 PROCEDURE — 99900031 HC PATIENT EDUCATION (STAT)

## 2024-09-13 PROCEDURE — 94760 N-INVAS EAR/PLS OXIMETRY 1: CPT

## 2024-09-13 PROCEDURE — 36415 COLL VENOUS BLD VENIPUNCTURE: CPT

## 2024-09-13 PROCEDURE — 63600175 PHARM REV CODE 636 W HCPCS: Performed by: INTERNAL MEDICINE

## 2024-09-13 PROCEDURE — 63600175 PHARM REV CODE 636 W HCPCS

## 2024-09-13 PROCEDURE — 11000001 HC ACUTE MED/SURG PRIVATE ROOM

## 2024-09-13 PROCEDURE — 25000003 PHARM REV CODE 250

## 2024-09-13 PROCEDURE — 63600175 PHARM REV CODE 636 W HCPCS: Performed by: STUDENT IN AN ORGANIZED HEALTH CARE EDUCATION/TRAINING PROGRAM

## 2024-09-13 PROCEDURE — 85025 COMPLETE CBC W/AUTO DIFF WBC: CPT

## 2024-09-13 PROCEDURE — 97530 THERAPEUTIC ACTIVITIES: CPT | Mod: CQ

## 2024-09-13 PROCEDURE — 36415 COLL VENOUS BLD VENIPUNCTURE: CPT | Performed by: INTERNAL MEDICINE

## 2024-09-13 PROCEDURE — 80053 COMPREHEN METABOLIC PANEL: CPT

## 2024-09-13 PROCEDURE — 83735 ASSAY OF MAGNESIUM: CPT

## 2024-09-13 PROCEDURE — 86363 MOG-IGG1 ANTB FLO CYTMTRY EA: CPT

## 2024-09-13 PROCEDURE — 63700000 PHARM REV CODE 250 ALT 637 W/O HCPCS: Performed by: INTERNAL MEDICINE

## 2024-09-13 PROCEDURE — 87040 BLOOD CULTURE FOR BACTERIA: CPT | Performed by: INTERNAL MEDICINE

## 2024-09-13 PROCEDURE — 99900035 HC TECH TIME PER 15 MIN (STAT)

## 2024-09-13 PROCEDURE — 25000003 PHARM REV CODE 250: Performed by: STUDENT IN AN ORGANIZED HEALTH CARE EDUCATION/TRAINING PROGRAM

## 2024-09-13 RX ORDER — ATORVASTATIN CALCIUM 10 MG/1
20 TABLET, FILM COATED ORAL DAILY
Status: DISCONTINUED | OUTPATIENT
Start: 2024-09-14 | End: 2024-10-01 | Stop reason: HOSPADM

## 2024-09-13 RX ORDER — FINASTERIDE 5 MG/1
5 TABLET, FILM COATED ORAL DAILY
Status: DISCONTINUED | OUTPATIENT
Start: 2024-09-14 | End: 2024-10-01 | Stop reason: HOSPADM

## 2024-09-13 RX ORDER — ASPIRIN 81 MG/1
81 TABLET ORAL DAILY
Status: DISCONTINUED | OUTPATIENT
Start: 2024-09-14 | End: 2024-09-13

## 2024-09-13 RX ORDER — TAMSULOSIN HYDROCHLORIDE 0.4 MG/1
0.4 CAPSULE ORAL DAILY
Status: DISCONTINUED | OUTPATIENT
Start: 2024-09-14 | End: 2024-10-01 | Stop reason: HOSPADM

## 2024-09-13 RX ADMIN — CEFTRIAXONE SODIUM 1 G: 1 INJECTION, POWDER, FOR SOLUTION INTRAMUSCULAR; INTRAVENOUS at 08:09

## 2024-09-13 RX ADMIN — HEPARIN SODIUM 5000 UNITS: 5000 INJECTION, SOLUTION INTRAVENOUS; SUBCUTANEOUS at 09:09

## 2024-09-13 RX ADMIN — ASPIRIN 81 MG CHEWABLE TABLET 81 MG: 81 TABLET CHEWABLE at 08:09

## 2024-09-13 RX ADMIN — AZITHROMYCIN DIHYDRATE 500 MG: 250 TABLET ORAL at 08:09

## 2024-09-13 RX ADMIN — LOPERAMIDE HYDROCHLORIDE 2 MG: 2 CAPSULE ORAL at 09:09

## 2024-09-13 RX ADMIN — INSULIN ASPART 4 UNITS: 100 INJECTION, SOLUTION INTRAVENOUS; SUBCUTANEOUS at 11:09

## 2024-09-13 RX ADMIN — MUPIROCIN: 20 OINTMENT TOPICAL at 09:09

## 2024-09-13 RX ADMIN — SODIUM CHLORIDE, POTASSIUM CHLORIDE, SODIUM LACTATE AND CALCIUM CHLORIDE: 600; 310; 30; 20 INJECTION, SOLUTION INTRAVENOUS at 01:09

## 2024-09-13 RX ADMIN — MICONAZOLE NITRATE 2 % TOPICAL POWDER: at 09:09

## 2024-09-13 RX ADMIN — HEPARIN SODIUM 5000 UNITS: 5000 INJECTION, SOLUTION INTRAVENOUS; SUBCUTANEOUS at 08:09

## 2024-09-13 RX ADMIN — PIPERACILLIN SODIUM AND TAZOBACTAM SODIUM 4.5 G: 4; .5 INJECTION, POWDER, LYOPHILIZED, FOR SOLUTION INTRAVENOUS at 04:09

## 2024-09-13 RX ADMIN — SODIUM CHLORIDE, POTASSIUM CHLORIDE, SODIUM LACTATE AND CALCIUM CHLORIDE: 600; 310; 30; 20 INJECTION, SOLUTION INTRAVENOUS at 11:09

## 2024-09-13 RX ADMIN — INSULIN ASPART 4 UNITS: 100 INJECTION, SOLUTION INTRAVENOUS; SUBCUTANEOUS at 04:09

## 2024-09-13 NOTE — SUBJECTIVE & OBJECTIVE
Past Medical History:   Diagnosis Date    Anemia of chronic disease 7/6/2022    Arteriosclerosis of coronary artery 7/6/2022    Cervical myelopathy 7/6/2022    Cobalamin deficiency 7/6/2022    Gastroesophageal reflux disease 7/6/2022    Hypertension 7/6/2022    Low vitamin D level 7/6/2022    Mixed hyperlipidemia 7/6/2022    Paraparesis 7/6/2022    Stage 3a chronic kidney disease 7/6/2022    Type 2 diabetes mellitus 7/6/2022       Past Surgical History:   Procedure Laterality Date    SPINE SURGERY         Review of patient's allergies indicates:  No Known Allergies    Current Neurological Medications:     No current facility-administered medications on file prior to encounter.     Current Outpatient Medications on File Prior to Encounter   Medication Sig    alcohol swabs (BD ALCOHOL SWABS) PadM Apply 1 each topically once daily.    alfuzosin (UROXATRAL) 10 mg Tb24 TAKE 1 TABLET EVERY DAY WITH BREAKFAST    amLODIPine (NORVASC) 2.5 MG tablet TAKE 1 TABLET ONE TIME DAILY    aspirin (ECOTRIN) 81 MG EC tablet Take 81 mg by mouth once daily.    b complex vitamins capsule Take 1 capsule by mouth once daily.    blood glucose control, low (TRUE METRIX LEVEL 1) Soln 1 Bottle by Misc.(Non-Drug; Combo Route) route once daily.    blood-glucose meter (TRUE METRIX AIR GLUCOSE METER) Misc 1 each by Misc.(Non-Drug; Combo Route) route once daily.    blood-glucose sensor (DEXCOM G7 SENSOR) Carmen 1 each by Misc.(Non-Drug; Combo Route) route once daily.    carvediloL (COREG) 25 MG tablet TAKE 1 TABLET TWICE DAILY    ezetimibe (ZETIA) 10 mg tablet Take 1 tablet (10 mg total) by mouth once daily.    finasteride (PROSCAR) 5 mg tablet Take 1 tablet (5 mg total) by mouth once daily.    gabapentin (NEURONTIN) 300 MG capsule TAKE 1 CAPSULE THREE TIMES DAILY    glimepiride (AMARYL) 2 MG tablet Take 1 tablet (2 mg total) by mouth before breakfast.    lancets (TRUEPLUS LANCETS) 33 gauge Misc 1 lancet by Misc.(Non-Drug; Combo Route) route once  daily.    metFORMIN (GLUCOPHAGE) 1000 MG tablet Take 1 tablet (1,000 mg total) by mouth 2 (two) times a day.    pantoprazole (PROTONIX) 40 MG tablet TAKE 1 TABLET ONE TIME DAILY    rosuvastatin (CRESTOR) 40 MG Tab Take 1 tablet (40 mg total) by mouth once daily.    TRUE METRIX GLUCOSE TEST STRIP Strp TEST BLOOD SUGAR EVERY DAY    vitamin D (VITAMIN D3) 1000 units Tab Take 1,000 Units by mouth once daily.     Family History    None       Tobacco Use    Smoking status: Never    Smokeless tobacco: Never   Substance and Sexual Activity    Alcohol use: Never    Drug use: Never    Sexual activity: Not on file     Review of Systems   Unable to perform ROS: Other     Objective:     Vital Signs (Most Recent):  Temp: 97.7 °F (36.5 °C) (09/13/24 0400)  Pulse: 107 (09/13/24 0600)  Resp: 20 (09/13/24 0600)  BP: 123/86 (09/13/24 0600)  SpO2: (!) 94 % (09/13/24 0924) Vital Signs (24h Range):  Temp:  [97.7 °F (36.5 °C)-98.8 °F (37.1 °C)] 97.7 °F (36.5 °C)  Pulse:  [107-118] 107  Resp:  [17-28] 20  SpO2:  [93 %-94 %] 94 %  BP: (122-157)/() 123/86     Weight: 50 kg (110 lb 3.7 oz)  Body mass index is 18.92 kg/m².     Physical Exam  Vitals reviewed.   Constitutional:       General: He is awake.      Appearance: He is underweight.   HENT:      Head: Normocephalic and atraumatic.      Nose: Nose normal.      Mouth/Throat:      Mouth: Mucous membranes are dry.      Pharynx: Oropharynx is clear.   Eyes:      Pupils: Pupils are equal, round, and reactive to light.   Pulmonary:      Effort: Pulmonary effort is normal.   Neurological:      Mental Status: He is alert.      Deep Tendon Reflexes:      Reflex Scores:       Bicep reflexes are 1+ on the right side and 1+ on the left side.       Brachioradialis reflexes are 1+ on the right side and 1+ on the left side.       Patellar reflexes are 0 on the right side and 1+ on the left side.       Achilles reflexes are 0 on the right side and 0 on the left side.  Psychiatric:         Behavior:  Behavior is cooperative.          NEUROLOGICAL EXAMINATION:     MENTAL STATUS   Oriented to person.   Follows 1 step commands.   Attention: decreased. Concentration: decreased.   Level of consciousness: alert  Knowledge: poor.        Overall confused     CRANIAL NERVES     CN II   Visual fields full to confrontation.     CN III, IV, VI   Pupils are equal, round, and reactive to light.  Nystagmus: none   Ophthalmoparesis: left abduction  Conjugate gaze: present    CN XII   Fasciculations: absent    MOTOR EXAM   Muscle bulk: decreased  Overall muscle tone: increased       BUE: 3/5  BLE: 0/5  B/l dorsal flexion/extension: 0/5     REFLEXES     Reflexes   Right brachioradialis: 1+  Left brachioradialis: 1+  Right biceps: 1+  Left biceps: 1+  Right patellar: 0  Left patellar: 1+  Right achilles: 0  Left achilles: 0  Right plantar: upgoing  Left plantar: upgoing  Right ankle clonus: absent  Left ankle clonus: absent      Significant Labs:   Recent Lab Results  (Last 5 results in the past 24 hours)        09/13/24  0637   09/13/24  0346   09/13/24  0012   09/12/24  2156   09/12/24  1624        Albumin/Globulin Ratio   0.8             Albumin   2.4             ALP   55             ALT   29             Anion Gap   11.0             AST   18             Baso #   0.06             Basophil %   0.4             BILIRUBIN TOTAL   0.3             BUN   32.3             BUN/CREAT RATIO   18             Calcium   8.1             Chloride   106             CO2   23             Creatinine   1.83             eGFR   40             Eos #   0.19             Eos %   1.2             Globulin, Total   3.0             Glucose   153             Hematocrit   36.5             Hemoglobin   11.9             Immature Grans (Abs)   0.12             Immature Granulocytes   0.7             Lymph #   0.87             LYMPH %   5.4             Magnesium    1.90             MCH   30.7             MCHC   32.6             MCV   94.1             Mono #    1.07             Mono %   6.7             MPV   11.7             Neut #   13.74             Neut %   85.6             nRBC   0.0             Phosphorus Level   3.0             Platelet Count   191             POCT Glucose 152     121   111   215       Potassium   4.5             PROTEIN TOTAL   5.4             RBC   3.88             RDW   13.3             Sodium   140             WBC   16.05                                    Significant Imaging:     I have reviewed all pertinent imaging results/findings within the past 24 hours.

## 2024-09-13 NOTE — NURSING
Nurses Note -- 4 Eyes      9/13/2024   1:01 AM      Skin assessed during: Daily Assessment      [] No Altered Skin Integrity Present    [x]Prevention Measures Documented      [x] Yes- Altered Skin Integrity Present or Discovered   [] LDA Added if Not in Epic (Describe Wound)   [] New Altered Skin Integrity was Present on Admit and Documented in LDA   [] Wound Image Taken    Wound Care Consulted? Yes    Attending Nurse:  Edna Campo RN/Staff Member:  Brionna PCT

## 2024-09-13 NOTE — ASSESSMENT & PLAN NOTE
Pt's sister reports previous diagnosis of muscular dystrophy, can not recall which type, however, unable to find documentation of this per EMR  MRI brain w/o in 2021: L middle cerebellar peduncle restricting lesion concerning for acute infarct versus demyelinating plaque    -MRI brain, c and t spine w w/o ordered  -repeat CPK ordered  -continue therapy efforts  -will discuss further w/u with neurologist

## 2024-09-13 NOTE — HPI
64-year-old male with HTN, HLD, CAD s/p PCI, BPH with self catheterization, GERD, CKD 3, DM2, paraparesis and wheelchair-bound who presented to ED on 09/08 with complaints of weakness, elevated blood glucose, N/V/D, and UTI.  On admission, pt found to be in acute hypoxic respiratory failure requiring mechanical ventilation, AGMA with ketoacidosis requiring IV insulin for DKA, and urosepsis requiring vasopressor support and broad-spectrum antimicrobials.  Patient eventually hemodynamically stable and was extubated on 09/11.  Once patient stable patient's family at bedside endorsing concern for functional and cognitive decline over the last few weeks.  Strep bedside reports patient lives alone, is able to get around his home with his wheelchair and is able to transfer himself with BUE from wheelchair at baseline.  Reports his siblings visits him daily to help with medication management and meal preparation.    MRI brain w w/o on 9/19 significant for R basal ganglia subacute infarct and encephalomalacia to R occipital lobe.  MRI C-spine w w/o revealed chronic cord signal changes at C4-C6 (also seen on MRI C-spine in 2021), unrevealing for abnormal enhancement or other abnormalities.    MRI T-spine w w/o unrevealing for abnormal enhancement or any other significant findings.    Patient has suffered with paraparesis over the last few years and has been wheelchair-bound, for which she underwent neurologic evaluation for in 2021, including MRI brain which revealed L middle cerebellar peduncle restricting lesion reflecting acute lacunar infarct versus active demyelinating plaque.

## 2024-09-13 NOTE — PROGRESS NOTES
Inpatient Nutrition Assessment    Admit Date: 9/8/2024   Total duration of encounter: 5 days   Patient Age: 64 y.o.    Nutrition Recommendation/Prescription     Diabetic diet, consistency per SLP.  Boost Glucose Control (provides 190 kcal, 16 g protein per serving) BID.  Encouraged intake.  Consider appetite stimulant medication if feasible.    Communication of Recommendations: reviewed with nurse, reviewed with patient, and reviewed with family    Nutrition Assessment     Malnutrition Assessment/Nutrition-Focused Physical Exam    Malnutrition Context: chronic illness (09/09/24 1049)  Malnutrition Level: severe (09/09/24 1049)  Energy Intake (Malnutrition): less than 75% for greater than or equal to 1 month (09/09/24 1049)  Weight Loss (Malnutrition):  (does not meet criteria) (09/09/24 1049)  Subcutaneous Fat (Malnutrition): severe depletion (09/09/24 1049)  Orbital Region (Subcutaneous Fat Loss): severe depletion  Upper Arm Region (Subcutaneous Fat Loss): severe depletion     Muscle Mass (Malnutrition): severe depletion (09/09/24 1049)  Nondenominational Region (Muscle Loss): severe depletion     Clavicle and Acromion Bone Region (Muscle Loss): severe depletion           Anterior Thigh Region (Muscle Loss): severe depletion  Posterior Calf Region (Muscle Loss): severe depletion           A minimum of two characteristics is recommended for diagnosis of either severe or non-severe malnutrition.    Chart Review    Reason Seen: follow-up    Malnutrition Screening Tool Results   Have you recently lost weight without trying?: Unsure  Have you been eating poorly because of a decreased appetite?: Yes   MST Score: 3   Diagnosis:  Septic shock secondary to UTI  Uncontrolled T2DM with hyperglycemia  DWAINE on CKD secondary to volume depletion  Metabolic acidosis; likely secondary to lactic acidosis vs diarrhea  Hypomagnesemia  Hypophosphatemia  Diarrhea  Normocytic anemia  Malnutrition  Elevated PSA with urinary retention; history of self  catheterization    Relevant Medical History: hypertension, hyperlipidemia, CAD S/P PCI, BPH with current self catheterization, HF     Scheduled Medications:  aspirin, 81 mg, Daily  cefTRIAXone (Rocephin) IV (PEDS and ADULTS), 1 g, Q24H  heparin (porcine), 5,000 Units, Q12H  miconazole NITRATE 2 %, , BID  mupirocin, , BID    Continuous Infusions:  D5 and 0.45% NaCl  lactated ringers, Last Rate: 100 mL/hr at 09/13/24 1119    PRN Medications:   albuterol-ipratropium, 3 mL, Q4H PRN  dextrose 10%, 12.5 g, PRN  dextrose 10%, 25 g, PRN  D5 and 0.45% NaCl, , Continuous PRN  haloperidol lactate, 1 mg, Q6H PRN  insulin aspart U-100, 0-10 Units, QID (AC + HS) PRN  loperamide, 2 mg, QID PRN  magnesium sulfate IVPB, 2 g, PRN  sodium chloride 0.9%, 10 mL, PRN  sodium chloride 0.9%, 10 mL, PRN  sodium phosphate 20.01 mmol in D5W 250 mL IVPB, 20.01 mmol, PRN    Calorie Containing IV Medications: no significant kcals from medications at this time    Recent Labs   Lab 09/08/24  1631 09/08/24  1851 09/09/24  0031 09/09/24  0120 09/09/24  1044 09/09/24  1553 09/09/24  2100 09/10/24  0250 09/11/24  0235 09/11/24  1645 09/12/24  0252 09/13/24  0346   * 140   < >  --  135* 135* 135* 137 144 145 142 140   K 4.7 3.9   < >  --  3.1* 2.6* 5.1 4.3 3.6 4.0 4.8 4.5   CALCIUM 8.6* 7.1*   < >  --  7.6* 7.7* 7.3* 7.5* 7.2* 7.1* 8.1* 8.1*   PHOS  --  2.0*  --   --   --   --   --   --  3.2 2.3 3.8 3.0   MG 1.60 1.40*  --   --   --   --   --   --  1.60 3.20* 2.60 1.90    116*   < >  --  100 99 100 103 112* 114* 108* 106   CO2 16* 15*   < >  --  24 26 27 25 23 23 26 23   BUN 88.6* 76.8*   < >  --  56.3* 50.3* 45.0* 41.4* 40.2* 37.2* 33.0* 32.3*   CREATININE 3.40* 2.66*   < >  --  2.29* 2.27* 2.16* 2.11* 2.03* 1.67* 1.75* 1.83*   EGFRNORACEVR 19 26   < >  --  31 31 33 34 35 45 42 40   GLUCOSE 607* 283*   < >  --  396* 341* 332* 271* 303* 32* 92 153*   BILITOT 0.3  --   --   --  0.4  --   --  0.3 0.2  --  0.3 0.3   ALKPHOS 56  --   --   --   "42  --   --  45 54  --  54 55   ALT 13  --   --   --  12  --   --  15 17  --  32 29   AST 22  --   --   --  22  --   --  14 18  --  29 18   ALBUMIN 2.8*  --   --   --  2.2*  --   --  2.2* 2.0*  --  2.3* 2.4*   WBC 10.37  --   --  6.86  6.86  --   --   --  9.98 12.42*  --  16.11* 16.05*   HGB 11.8*  --   --  10.2*  --   --   --  10.7* 10.1*  --  11.6* 11.9*   HCT 34.5*  --   --  29.6*  --   --   --  29.8* 29.3*  --  34.9* 36.5*    < > = values in this interval not displayed.     Nutrition Orders:  Diet Minced & Moist (IDDSI Level 5) Diabetic; 1800 Calorie  Tube Feedings/Formulas 45; 900; Impact Peptide 1.5; OG (start at 15 ml/hr, advance by 10 ml/hr every 12 hours as tolerated); 30; Every 4 hours    Appetite/Oral Intake: fair/25-50% of meals  Factors Affecting Nutritional Intake: decreased appetite  Social Needs Impacting Access to Food: none identified  Food/Worship/Cultural Preferences: none reported  Food Allergies: no known food allergies  Last Bowel Movement: 09/12/24  Wound(s):     Wound 09/09/24 Moisture associated dermatitis Buttocks-Tissue loss description: Partial thickness    Comments    9/9/24 Patient on ventilator, propofol off during rounds, plans to start tube feeding. Spoke with patient's sister at bedside. She reports very poor intake for the past month and weight loss, weight history in chart reveals no significant weight changes over the past year. Patient is malnourished and at risk for refeeding syndrome; recommend slow initiation/advancement of tube feeding.    9/13/24 Patient extubated, tube feeding discontinued, started on dysphagia diet. Patient reports a fair appetite, eating about 25-50% of meals, agreeable to vanilla Boost BID.    Anthropometrics    Height: 5' 4" (162.6 cm), Height Method: Stated  Last Weight: 50 kg (110 lb 3.7 oz) (09/09/24 1044), Weight Method: Bed Scale  BMI (Calculated): 18.9  BMI Classification: underweight (BMI less than 22 if >65 years of age)        Ideal Body " Weight (IBW), Male: 130 lb     % Ideal Body Weight, Male (lb): 79.31 %                 Usual Body Weight (UBW), k.4 kg (10/24/23)  % Usual Body Weight: 92.1  % Weight Change From Usual Weight: -8.09 %  Usual Weight Provided By: family/caregiver and EMR weight history    Wt Readings from Last 8 Encounters:   24 50 kg (110 lb 3.7 oz)   24 49.4 kg (108 lb 14.5 oz)   24 49.4 kg (109 lb)   24 49.9 kg (110 lb)   24 50 kg (110 lb 3.2 oz)   24 46.3 kg (102 lb 1.6 oz)   10/24/23 54.4 kg (120 lb)   10/19/23 54.4 kg (120 lb)     Weight Change(s) Since Admission:   () fluctuations over past 24 hours, question accuracy, took bed weight of 50 kg during rounds today  Wt Readings from Last 1 Encounters:   24 1044 50 kg (110 lb 3.7 oz)   24 204 46.8 kg (103 lb 1.6 oz)   24 161 54.4 kg (120 lb)   Admit Weight: 54.4 kg (120 lb) (24 1617), Weight Method: Bed Scale    Estimated Needs    Weight Used For Calorie Calculations: 50 kg (110 lb 3.7 oz)  Energy Calorie Requirements (kcal): 6325-1669, 35-40 kcal/kg  Weight Used For Protein Calculations: 50 kg (110 lb 3.7 oz)  Protein Requirements:  g, 1.5-2 g/kg  Fluid Requirements (mL): 1500  CHO Requirement: 175-250 g, 40-50% of kcal    Enteral Nutrition Patient not receiving enteral nutrition at this time.    Parenteral Nutrition Patient not receiving parenteral nutrition support at this time.    Evaluation of Received Nutrient Intake    Calories: not meeting estimated needs  Protein: not meeting estimated needs    Patient Education Not applicable.    Nutrition Diagnosis     PES: Inadequate energy intake related to inability to consume sufficient nutrients as evidenced by less than 80% needs met. (active)  PES: Severe chronic disease or condition related malnutrition related to suboptimal protein/energy intake as evidenced by less than 75% needs met for greater than or equal to 1 month, severe fat depletion, and  severe muscle depletion. (active)    Nutrition Interventions     Intervention(s): modified composition of meals/snacks, commercial beverage, and collaboration with other providers  Goal: Meet greater than 80% of nutritional needs by follow-up. (goal not met)  Goal: Tolerate enteral feeding at goal rate by follow-up. (goal discontinued)    Nutrition Goals & Monitoring     Dietitian will monitor: food and beverage intake, energy intake, enteral nutrition intake, parenteral nutrition intake, weight, weight change, electrolyte/renal panel, beliefs/attitudes, glucose/endocrine profile, and gastrointestinal profile  Discharge planning: continue diabetic diet with Boost Glucose Control oral supplements - consistency per SLP  Nutrition Risk/Follow-Up: high (follow-up in 1-4 days)   Please consult if re-assessment needed sooner.

## 2024-09-13 NOTE — PROGRESS NOTES
[facility]  Hospital Medicine Progress Note        Chief Complaint: Inpatient Follow-up    HPI:   64-year-old male with significant history of HTN, HLD, CAD status post PCI, BPH on intermittent self catheterization, chronic diastolic heart failure, progressive physical decline with global weakness more in lower extremities, concern for neuromuscular disorder (no official diagnosis).  Has history of familial paraparesis.  Patient was brought to the hospital with complaints of nausea/vomiting/urinary incontinence, uncontrollable diarrhea for the past 1 week.  He is wheelchair/bed-bound secondary to progressive weakness in his bilateral lower extremities.  CBG more than 500 in the ED. workup consistent with possible DKA in the ED, also concern for UTI.  Patient also had significant electrolyte derangement and acute kidney injury.  Patient was hypotensive, did not respond to vigorous IV fluid resuscitation.  Chest x-ray concerning for right-sided pneumonia, possible aspiration.  Patient admitted to ICU for septic shock secondary to UTI/possible aspiration pneumonia.  Metabolic acidosis likely secondary to diarrhea, low suspicion for diabetic ketoacidosis.  Initiated vasopressors.  Stool studies ordered.  Patient developed progressive respiratory failure in ICU requiring intubation, mechanical ventilatory support.  Broad-spectrum antimicrobials continued pending cultures.  Fortunately shock rapidly improved after steroid administration.  Vasopressors weaned and patient successfully extubated.  MRSA PCR negative, vancomycin DC and Zosyn continued.  Patient on NG tube feedings.  Urine cultures finalized Klebsiella, Providencia.  Antibiotics de-escalated to ceftriaxone, Zithromax to cover polymicrobial UTI and pneumonia.  Evaluated by ST, cleared for oral intake-modified diet.  Patient downgraded to hospital medicine services on 9/12.  MRI brain ordered to further evaluate his bilateral lower  extremity weakness which is progressively worsening, neurology services consulted.  Remains on IV fluids for acute kidney injury    Interval Hx:   Patient seen at bedside, comfortably laying in bed, tachycardic, normotensive, saturation stable in mid 90s, patient afebrile, no acute events overnight, tolerating modified diet    Objective/physical exam:  General: In no acute distress, afebrile  Chest: Clear to auscultation bilaterally  Heart: S1, S2, no appreciable murmur  Abdomen: Soft, nontender, BS +  MSK: Warm, no lower extremity edema, no clubbing or cyanosis  Neurologic:  Awake, alert, oriented x2, global weakness, more weakness in lower extremities done upper extremities  VITAL SIGNS: 24 HRS MIN & MAX LAST   Temp  Min: 97.5 °F (36.4 °C)  Max: 98.8 °F (37.1 °C) 97.7 °F (36.5 °C)   BP  Min: 122/89  Max: 164/108 123/86   Pulse  Min: 105  Max: 119  107   Resp  Min: 16  Max: 28 20   SpO2  Min: 93 %  Max: 98 % (!) 93 %       Recent Labs   Lab 09/13/24  0346   WBC 16.05*   RBC 3.88*   HGB 11.9*   HCT 36.5*   MCV 94.1*   MCH 30.7   MCHC 32.6*   RDW 13.3      MPV 11.7*         Recent Labs   Lab 09/11/24  0735 09/11/24  1645 09/13/24  0346   NA  --    < > 140   K  --    < > 4.5   CL  --    < > 106   CO2  --    < > 23   BUN  --    < > 32.3*   CREATININE  --    < > 1.83*   CALCIUM  --    < > 8.1*   PH 7.460*  --   --    MG  --    < > 1.90   ALBUMIN  --    < > 2.4*   ALKPHOS  --    < > 55   ALT  --    < > 29   AST  --    < > 18   BILITOT  --    < > 0.3    < > = values in this interval not displayed.          Microbiology Results (last 7 days)       Procedure Component Value Units Date/Time    Blood Culture [[id number]]     Order Status: Sent Specimen: Blood     Blood Culture [[id number]]     Order Status: Sent Specimen: Blood     Blood Culture [[id number]]  (Normal) Collected: 09/08/24 1851    Order Status: Completed Specimen: Blood Updated: 09/12/24 2001     Blood Culture No Growth At 96 Hours    Blood Culture  [[id number]]  (Normal) Collected: 09/08/24 1851    Order Status: Completed Specimen: Blood Updated: 09/12/24 2001     Blood Culture No Growth At 96 Hours    Urine culture [[id number]]  (Abnormal)  (Susceptibility) Collected: 09/08/24 1809    Order Status: Completed Specimen: Urine Updated: 09/11/24 0642     Urine Culture >/= 100,000 colonies/ml Klebsiella oxytoca      >/= 100,000 colonies/ml Providencia rettgeri    Respiratory Culture [[id number]] Collected: 09/08/24 2217    Order Status: Completed Specimen: Sputum from Mouth Updated: 09/10/24 1037     Respiratory Culture Normal respiratory clari     GRAM STAIN Quality 1+      Moderate Gram positive cocci      Moderate Gram Negative Rods      Moderate Gram Positive Rods    Clostridium Diff Toxin, A & B, EIA [[id number]]     Order Status: Canceled Specimen: Stool              Scheduled Med:   aspirin  81 mg Oral Daily    azithromycin  500 mg Oral Daily    cefTRIAXone (Rocephin) IV (PEDS and ADULTS)  1 g Intravenous Q24H    heparin (porcine)  5,000 Units Subcutaneous Q12H    miconazole NITRATE 2 %   Topical (Top) BID    mupirocin   Nasal BID          Assessment/Plan:    Septic shock-multifactorial, secondary to below  Persistent leukocytosis  Acute bacterial UTI secondary to Klebsiella/Providencia  Bilateral pneumonia pneumonia with parapneumonic effusion-likely aspiration/cap  Acute hypoxemic respiratory failure secondary to pneumonia requiring intubation, mechanical ventilatory support, extubated,   Intractable nausea/vomiting/diarrhea on admit-improved   Metabolic acidosis on admit-improved   History of type 2 diabetes mellitus with labile CBG  Acute on chronic kidney disease-stage III  Progressive global weakness bilateral lower extremities more weaker than upper extremities-suspect undiagnosed neuromuscular disorder   History of familial paraparesis  History of essential HTN   HLD   History of CAD status post PCI   History of BPH  Chronic diastolic heart  failure-appears compensated   Prophylaxis    Hemodynamics stabilized except for tachycardia  Leukocytosis persist   Blood cultures negative   Patient now on ceftriaxone, Zithromax to cover polymicrobial UTI/cap   I will switch ceftriaxone to Zosyn in case pneumonia is aspiration, this is expected to cover polymicrobial UTI   Given persistent leukocytosis I will obtain CT chest, abdomen, pelvis to rule out other source of infection  Continue Ringer lactate infusion at 100 cc/hour for acute kidney injury  Evaluated by ST and he is cleared for modified diet  Respiratory status now stable, saturation in mid 90s  No more acidosis   No obstructive uropathy per ultrasound retroperitoneum   Avoiding nephrotoxins  Resumed home meds-aspirin, statin, finasteride, tamsulosin,  Neurology consulted for progressive global weakness, more weakness in lower extremities   Has history of familial paraparesis   MRI brain and MRI pan spine ordered   Follow up results and follow up Neurology recommendations   CBG is labile with on and off hypo and hyperglycemia   Keep sliding scale for now   Holding metformin given renal insufficiency   Holding sulfonylurea given increased risk of hypoglycemia in the setting of renal insufficiency  A1c with a.m. labs  DVT prophylaxis-subQ heparin      Debbie Trotter MD   09/13/2024

## 2024-09-13 NOTE — PLAN OF CARE
Problem: Infection  Goal: Absence of Infection Signs and Symptoms  Outcome: Progressing     Problem: Adult Inpatient Plan of Care  Goal: Plan of Care Review  Outcome: Progressing  Goal: Patient-Specific Goal (Individualized)  Outcome: Progressing  Goal: Absence of Hospital-Acquired Illness or Injury  Outcome: Progressing  Goal: Optimal Comfort and Wellbeing  Outcome: Progressing  Goal: Readiness for Transition of Care  Outcome: Progressing     Problem: Diabetes Comorbidity  Goal: Blood Glucose Level Within Targeted Range  Outcome: Progressing     Problem: Skin Injury Risk Increased  Goal: Skin Health and Integrity  Outcome: Progressing     Problem: Fall Injury Risk  Goal: Absence of Fall and Fall-Related Injury  Outcome: Progressing     Problem: Wound  Goal: Optimal Coping  Outcome: Progressing  Goal: Optimal Functional Ability  Outcome: Progressing  Goal: Absence of Infection Signs and Symptoms  Outcome: Progressing  Goal: Improved Oral Intake  Outcome: Progressing  Goal: Optimal Pain Control and Function  Outcome: Progressing  Goal: Skin Health and Integrity  Outcome: Progressing  Goal: Optimal Wound Healing  Outcome: Progressing     Problem: Restraint, Nonviolent  Goal: Absence of Harm or Injury  Outcome: Progressing

## 2024-09-13 NOTE — CONSULTS
[facility] - 7th Floor ICU  Neurology  Consult Note    Patient Name: Froy Barragan  MRN: [id number]  Admission Date: 9/8/2024  Hospital Length of Stay: 5 days  Code Status: No Order   Attending Provider: Debbie Trotter MD   Consulting Provider: Dinorah Trujillo [facility]  Primary Care Physician: George Bran MD  Principal Problem:<principal problem not specified>    Inpatient consult to Neurology  Consult performed by: Dinorah Trujillo FNP  Consult ordered by: Frank Awad MD         Subjective:     Chief Complaint:       HPI:   64-year-old male with HTN, HLD, CAD s/p PCI, BPH with self catheterization, GERD, CKD 3, DM2, paraparesis and wheelchair-bound who presented to ED on 09/08 with complaints of weakness, elevated blood glucose, N/V/D, and UTI.  On admission, pt found to be in acute hypoxic respiratory failure requiring mechanical ventilation, AGMA with ketoacidosis requiring IV insulin for DKA, and urosepsis requiring vasopressor support and broad-spectrum antimicrobials.  Patient eventually hemodynamically stable and was extubated on 09/11.  Once patient stable patient's family at bedside endorsing concern for functional and cognitive decline over the last few weeks.  Strep bedside reports patient lives alone, is able to get around his home with his wheelchair and is able to transfer himself with BUE from wheelchair at baseline.  Reports his siblings visits him daily to help with medication management and meal preparation.    Patient has suffered with paraparesis over the last few years and has been wheelchair-bound, for which she underwent neurologic evaluation for in 2021, including MRI brain which revealed L middle cerebellar peduncle restricting lesion reflecting acute lacunar infarct versus active demyelinating plaque.  Of note, h/o familial paraparesis.    Labs significant for leukocytosis, microcytic anemia, BUN/creatinine 32.3/1.83,  (on 09/08), and bacteriuria     Past  Medical History:   Diagnosis Date    Anemia of chronic disease 7/6/2022    Arteriosclerosis of coronary artery 7/6/2022    Cervical myelopathy 7/6/2022    Cobalamin deficiency 7/6/2022    Gastroesophageal reflux disease 7/6/2022    Hypertension 7/6/2022    Low vitamin D level 7/6/2022    Mixed hyperlipidemia 7/6/2022    Paraparesis 7/6/2022    Stage 3a chronic kidney disease 7/6/2022    Type 2 diabetes mellitus 7/6/2022       Past Surgical History:   Procedure Laterality Date    SPINE SURGERY         Review of patient's allergies indicates:  No Known Allergies    Current Neurological Medications:     No current facility-administered medications on file prior to encounter.     Current Outpatient Medications on File Prior to Encounter   Medication Sig    alcohol swabs (BD ALCOHOL SWABS) PadM Apply 1 each topically once daily.    alfuzosin (UROXATRAL) 10 mg Tb24 TAKE 1 TABLET EVERY DAY WITH BREAKFAST    amLODIPine (NORVASC) 2.5 MG tablet TAKE 1 TABLET ONE TIME DAILY    aspirin (ECOTRIN) 81 MG EC tablet Take 81 mg by mouth once daily.    b complex vitamins capsule Take 1 capsule by mouth once daily.    blood glucose control, low (TRUE METRIX LEVEL 1) Soln 1 Bottle by Misc.(Non-Drug; Combo Route) route once daily.    blood-glucose meter (TRUE METRIX AIR GLUCOSE METER) Misc 1 each by Misc.(Non-Drug; Combo Route) route once daily.    blood-glucose sensor (DEXCOM G7 SENSOR) Carmen 1 each by Misc.(Non-Drug; Combo Route) route once daily.    carvediloL (COREG) 25 MG tablet TAKE 1 TABLET TWICE DAILY    ezetimibe (ZETIA) 10 mg tablet Take 1 tablet (10 mg total) by mouth once daily.    finasteride (PROSCAR) 5 mg tablet Take 1 tablet (5 mg total) by mouth once daily.    gabapentin (NEURONTIN) 300 MG capsule TAKE 1 CAPSULE THREE TIMES DAILY    glimepiride (AMARYL) 2 MG tablet Take 1 tablet (2 mg total) by mouth before breakfast.    lancets (TRUEPLUS LANCETS) 33 gauge Misc 1 lancet by Misc.(Non-Drug; Combo Route) route once daily.     metFORMIN (GLUCOPHAGE) 1000 MG tablet Take 1 tablet (1,000 mg total) by mouth 2 (two) times a day.    pantoprazole (PROTONIX) 40 MG tablet TAKE 1 TABLET ONE TIME DAILY    rosuvastatin (CRESTOR) 40 MG Tab Take 1 tablet (40 mg total) by mouth once daily.    TRUE METRIX GLUCOSE TEST STRIP Strp TEST BLOOD SUGAR EVERY DAY    vitamin D (VITAMIN D3) 1000 units Tab Take 1,000 Units by mouth once daily.     Family History    None       Tobacco Use    Smoking status: Never    Smokeless tobacco: Never   Substance and Sexual Activity    Alcohol use: Never    Drug use: Never    Sexual activity: Not on file     Review of Systems   Unable to perform ROS: Other     Objective:     Vital Signs (Most Recent):  Temp: 97.7 °F (36.5 °C) (09/13/24 0400)  Pulse: 107 (09/13/24 0600)  Resp: 20 (09/13/24 0600)  BP: 123/86 (09/13/24 0600)  SpO2: (!) 94 % (09/13/24 0924) Vital Signs (24h Range):  Temp:  [97.7 °F (36.5 °C)-98.8 °F (37.1 °C)] 97.7 °F (36.5 °C)  Pulse:  [107-118] 107  Resp:  [17-28] 20  SpO2:  [93 %-94 %] 94 %  BP: (122-157)/() 123/86     Weight: 50 kg (110 lb 3.7 oz)  Body mass index is 18.92 kg/m².     Physical Exam  Vitals reviewed.   Constitutional:       General: He is awake.      Appearance: He is underweight.   HENT:      Head: Normocephalic and atraumatic.      Nose: Nose normal.      Mouth/Throat:      Mouth: Mucous membranes are dry.      Pharynx: Oropharynx is clear.   Eyes:      Pupils: Pupils are equal, round, and reactive to light.   Pulmonary:      Effort: Pulmonary effort is normal.   Neurological:      Mental Status: He is alert.      Deep Tendon Reflexes:      Reflex Scores:       Bicep reflexes are 1+ on the right side and 1+ on the left side.       Brachioradialis reflexes are 1+ on the right side and 1+ on the left side.       Patellar reflexes are 0 on the right side and 1+ on the left side.       Achilles reflexes are 0 on the right side and 0 on the left side.  Psychiatric:         Behavior: Behavior  is cooperative.          NEUROLOGICAL EXAMINATION:     MENTAL STATUS   Oriented to person.   Follows 1 step commands.   Attention: decreased. Concentration: decreased.   Level of consciousness: alert  Knowledge: poor.        Overall confused     CRANIAL NERVES     CN II   Visual fields full to confrontation.     CN III, IV, VI   Pupils are equal, round, and reactive to light.  Nystagmus: none   Ophthalmoparesis: left abduction  Conjugate gaze: present    CN XII   Fasciculations: absent    MOTOR EXAM   Muscle bulk: decreased  Overall muscle tone: increased       BUE: 3/5  BLE: 0/5  B/l dorsal flexion/extension: 0/5     REFLEXES     Reflexes   Right brachioradialis: 1+  Left brachioradialis: 1+  Right biceps: 1+  Left biceps: 1+  Right patellar: 0  Left patellar: 1+  Right achilles: 0  Left achilles: 0  Right plantar: upgoing  Left plantar: upgoing  Right ankle clonus: absent  Left ankle clonus: absent      Significant Labs:   Recent Lab Results  (Last 5 results in the past 24 hours)        09/13/24  0637   09/13/24  0346   09/13/24  0012   09/12/24  2156   09/12/24  1624        Albumin/Globulin Ratio   0.8             Albumin   2.4             ALP   55             ALT   29             Anion Gap   11.0             AST   18             Baso #   0.06             Basophil %   0.4             BILIRUBIN TOTAL   0.3             BUN   32.3             BUN/CREAT RATIO   18             Calcium   8.1             Chloride   106             CO2   23             Creatinine   1.83             eGFR   40             Eos #   0.19             Eos %   1.2             Globulin, Total   3.0             Glucose   153             Hematocrit   36.5             Hemoglobin   11.9             Immature Grans (Abs)   0.12             Immature Granulocytes   0.7             Lymph #   0.87             LYMPH %   5.4             Magnesium    1.90             MCH   30.7             MCHC   32.6             MCV   94.1             Mono #   1.07              Mono %   6.7             MPV   11.7             Neut #   13.74             Neut %   85.6             nRBC   0.0             Phosphorus Level   3.0             Platelet Count   191             POCT Glucose 152     121   111   215       Potassium   4.5             PROTEIN TOTAL   5.4             RBC   3.88             RDW   13.3             Sodium   140             WBC   16.05                                    Significant Imaging:     I have reviewed all pertinent imaging results/findings within the past 24 hours.  Assessment and Plan:     Weakness of both lower extremities  Pt's sister reports previous diagnosis of muscular dystrophy, can not recall which type, however, unable to find documentation of this per EMR  MRI brain w/o in 2021: L middle cerebellar peduncle restricting lesion concerning for acute infarct versus demyelinating plaque    -MRI brain, c and t spine w w/o ordered  -repeat CPK ordered  -continue therapy efforts  -will discuss further w/u with neurologist              VTE Risk Mitigation (From admission, onward)           Ordered     heparin (porcine) injection 5,000 Units  Every 12 hours         09/09/24 0224     IP VTE LOW RISK PATIENT  Once         09/09/24 0224     Place sequential compression device  Until discontinued         09/08/24 2032                    Thank you for your consult.  Further recommendations to follow per MD Dinorah Trujillo, [facility]  Inpatient Neurology  [facility] - 7th Floor ICU

## 2024-09-13 NOTE — PT/OT/SLP PROGRESS
"Physical Therapy Treatment    Patient Name:  Froy Barragan   MRN:  [id number]    Recommendations:     Discharge therapy intensity: Moderate Intensity Therapy   Discharge Equipment Recommendations: none  Barriers to discharge: Impaired mobility, Ongoing medical needs, and placement.    Assessment:     Froy Barragan is a 64 y.o. male admitted with a medical diagnosis of septic shock 2/2 UTI, DWAINE, acute respiratory failure requiring intubation.  He presents with the following impairments/functional limitations: weakness, impaired functional mobility, impaired endurance, impaired balance, decreased lower extremity function.    Pt presented w/flat affect and some inattention to the L. Awake and oriented to self and family members in his room, but when asked where he was he replied "[facility]." Max-totA for all functional mobility.    Rehab Prognosis: Fair; patient would benefit from acute skilled PT services to address these deficits and reach maximum level of function.    Recent Surgery: * No surgery found *      Plan:     During this hospitalization, patient would benefit from acute PT services 5 x/week to address the identified rehab impairments via therapeutic exercises, therapeutic activities, wheelchair management/training and progress toward the following goals:    Plan of Care Expires:  10/19/24    Subjective     Chief Complaint: n/a  Patient/Family Comments/goals:   Pain/Comfort:         Objective:     Communicated with RN prior to session.  Patient found HOB elevated with peripheral IV, blood pressure cuff, telemetry, pulse ox (continuous), SCD, colbert catheter upon PT entry to room.     General Precautions: Standard, aspiration, fall  Orthopedic Precautions: N/A  Braces: N/A  Respiratory Status: Room air  HR: 131 seated EOB  Skin Integrity: Visible skin intact      Functional Mobility:  Bed Mobility:     Scooting: total assistance  Supine to Sit: maximal assistance and of 2 " persons  Sit to Supine: maximal assistance and of 2 persons  Balance: MaxA-CGA    Therapeutic Activities/Exercises:  Pt seated EOB ~10min MaxA for posterior lean; intermittently CGA w/BUE use to hold self up; L inattention noted w/mod vc to participate.  R/L reaching x 3 each high, middle, and low  R/L reaching across midline x 3 each  Scooting x 2 towards HOB MaxA w/vc for hand placement.     Education:  Patient provided with verbal education education regarding PT role/goals/POC, fall prevention, and safety awareness.  Understanding was verbalized.     Patient left HOB elevated with all lines intact, call button in reach, pressure relief boots, and family present    GOALS:   Multidisciplinary Problems       Physical Therapy Goals          Problem: Physical Therapy    Goal Priority Disciplines Outcome Goal Variances Interventions   Physical Therapy Goal     PT, PT/OT Progressing     Description: Goals to be met by: d/c     Patient will increase functional independence with mobility by performin. Supine to sit with Stand-by Assistance  2. Sit to supine with Stand-by Assistance  3. Sit to stand transfer with Stand-by Assistance  4. Bed to chair transfer with Stand-by Assistance using No Assistive Device scoot pivot vs SB  5. Wheelchair propulsion x100 feet with Supervision using bilateral uppper extremities                         Time Tracking:     PT Received On: 24  PT Start Time: 1034     PT Stop Time: 1101  PT Total Time (min): 27 min     Billable Minutes: Therapeutic Activity 27    Treatment Type: Treatment  PT/PTA: PTA     Number of PTA visits since last PT visit: 2024

## 2024-09-13 NOTE — PT/OT/SLP PROGRESS
[facility]  Spe[facility] Pathology Department  Diet Tolerance Follow-up    Patient Name:  Froy Barragan   MRN:  [id number]  Admitting Diagnosis: respiratory failure    Recommendations     General recommendations:  dysphagia therapy  Solid texture recommendation:  Minced & Moist Diet - IDDSI Level 5  Liquid consistency recommendation: Thin liquids - IDDSI Level 0   Medications: crushed in puree  Swallow strategies/precautions: small bites/sips, slow rate, and alternate solids/liquids  Precautions: aspiration    Diet Tolerance     Nursing reports no difficulty regarding diet tolerance.    Plan     SLP Follow-Up:  Yes    Patient to be seen:  3 x/week   Plan of Care expires:  09/26/24 09/13/2024

## 2024-09-14 LAB
ALBUMIN SERPL-MCNC: 2.2 G/DL (ref 3.4–4.8)
ALBUMIN/GLOB SERPL: 0.8 RATIO (ref 1.1–2)
ALLENS TEST BLOOD GAS (OHS): YES
ALP SERPL-CCNC: 50 UNIT/L (ref 40–150)
ALT SERPL-CCNC: 19 UNIT/L (ref 0–55)
ANION GAP SERPL CALC-SCNC: 9 MEQ/L
AST SERPL-CCNC: 13 UNIT/L (ref 5–34)
BASE EXCESS BLD CALC-SCNC: 1.5 MMOL/L (ref -2–2)
BASOPHILS # BLD AUTO: 0.07 X10(3)/MCL
BASOPHILS NFR BLD AUTO: 0.4 %
BILIRUB SERPL-MCNC: 0.3 MG/DL
BLOOD GAS SAMPLE TYPE (OHS): ABNORMAL
BUN SERPL-MCNC: 39.7 MG/DL (ref 8.4–25.7)
CA-I BLD-SCNC: 1.09 MMOL/L (ref 1.12–1.23)
CALCIUM SERPL-MCNC: 8 MG/DL (ref 8.8–10)
CHLORIDE SERPL-SCNC: 105 MMOL/L (ref 98–107)
CO2 BLDA-SCNC: 25.6 MMOL/L
CO2 SERPL-SCNC: 22 MMOL/L (ref 23–31)
COHGB MFR BLDA: 1.1 % (ref 0.5–1.5)
CREAT SERPL-MCNC: 1.45 MG/DL (ref 0.73–1.18)
CREAT/UREA NIT SERPL: 27
D DIMER PPP IA.FEU-MCNC: 2.44 UG/ML FEU (ref 0–0.5)
DRAWN BY BLOOD GAS (OHS): ABNORMAL
EOSINOPHIL # BLD AUTO: 0.18 X10(3)/MCL (ref 0–0.9)
EOSINOPHIL NFR BLD AUTO: 1 %
ERYTHROCYTE [DISTWIDTH] IN BLOOD BY AUTOMATED COUNT: 13.2 % (ref 11.5–17)
EST. AVERAGE GLUCOSE BLD GHB EST-MCNC: 131.2 MG/DL
GFR SERPLBLD CREATININE-BSD FMLA CKD-EPI: 53 ML/MIN/1.73/M2
GLOBULIN SER-MCNC: 2.6 GM/DL (ref 2.4–3.5)
GLUCOSE SERPL-MCNC: 175 MG/DL (ref 82–115)
HBA1C MFR BLD: 6.2 %
HCO3 BLDA-SCNC: 24.6 MMOL/L (ref 22–26)
HCT VFR BLD AUTO: 31.1 % (ref 42–52)
HGB BLD-MCNC: 10.5 G/DL (ref 14–18)
IMM GRANULOCYTES # BLD AUTO: 0.11 X10(3)/MCL (ref 0–0.04)
IMM GRANULOCYTES NFR BLD AUTO: 0.6 %
LPM (OHS): 15
LYMPHOCYTES # BLD AUTO: 0.82 X10(3)/MCL (ref 0.6–4.6)
LYMPHOCYTES NFR BLD AUTO: 4.5 %
MAGNESIUM SERPL-MCNC: 1.7 MG/DL (ref 1.6–2.6)
MCH RBC QN AUTO: 30.8 PG (ref 27–31)
MCHC RBC AUTO-ENTMCNC: 33.8 G/DL (ref 33–36)
MCV RBC AUTO: 91.2 FL (ref 80–94)
METHGB MFR BLDA: 1.2 % (ref 0.4–1.5)
MONOCYTES # BLD AUTO: 1.3 X10(3)/MCL (ref 0.1–1.3)
MONOCYTES NFR BLD AUTO: 7.1 %
NEUTROPHILS # BLD AUTO: 15.78 X10(3)/MCL (ref 2.1–9.2)
NEUTROPHILS NFR BLD AUTO: 86.4 %
NRBC BLD AUTO-RTO: 0 %
O2 HB BLOOD GAS (OHS): 95.4 % (ref 94–97)
OXYGEN DEVICE BLOOD GAS (OHS): ABNORMAL
OXYHGB MFR BLDA: 11.7 G/DL (ref 12–16)
PCO2 BLDA: 33 MMHG (ref 35–45)
PH BLDA: 7.48 [PH] (ref 7.35–7.45)
PHOSPHATE SERPL-MCNC: 2.7 MG/DL (ref 2.3–4.7)
PLATELET # BLD AUTO: 174 X10(3)/MCL (ref 130–400)
PMV BLD AUTO: 11.5 FL (ref 7.4–10.4)
PO2 BLDA: 82 MMHG (ref 80–100)
POCT GLUCOSE: 172 MG/DL (ref 70–110)
POCT GLUCOSE: 188 MG/DL (ref 70–110)
POCT GLUCOSE: 189 MG/DL (ref 70–110)
POCT GLUCOSE: 290 MG/DL (ref 70–110)
POTASSIUM BLOOD GAS (OHS): 4 MMOL/L (ref 3.5–5)
POTASSIUM SERPL-SCNC: 4.1 MMOL/L (ref 3.5–5.1)
PROT SERPL-MCNC: 4.8 GM/DL (ref 5.8–7.6)
RBC # BLD AUTO: 3.41 X10(6)/MCL (ref 4.7–6.1)
SAMPLE SITE BLOOD GAS (OHS): ABNORMAL
SAO2 % BLDA: 96.8 %
SODIUM BLOOD GAS (OHS): 131 MMOL/L (ref 137–145)
SODIUM SERPL-SCNC: 136 MMOL/L (ref 136–145)
WBC # BLD AUTO: 18.26 X10(3)/MCL (ref 4.5–11.5)

## 2024-09-14 PROCEDURE — 36415 COLL VENOUS BLD VENIPUNCTURE: CPT | Performed by: INTERNAL MEDICINE

## 2024-09-14 PROCEDURE — 83036 HEMOGLOBIN GLYCOSYLATED A1C: CPT | Performed by: INTERNAL MEDICINE

## 2024-09-14 PROCEDURE — 85379 FIBRIN DEGRADATION QUANT: CPT | Performed by: INTERNAL MEDICINE

## 2024-09-14 PROCEDURE — 85025 COMPLETE CBC W/AUTO DIFF WBC: CPT

## 2024-09-14 PROCEDURE — 82803 BLOOD GASES ANY COMBINATION: CPT

## 2024-09-14 PROCEDURE — 25000003 PHARM REV CODE 250: Performed by: INTERNAL MEDICINE

## 2024-09-14 PROCEDURE — 94640 AIRWAY INHALATION TREATMENT: CPT

## 2024-09-14 PROCEDURE — 27000221 HC OXYGEN, UP TO 24 HOURS

## 2024-09-14 PROCEDURE — 63600175 PHARM REV CODE 636 W HCPCS

## 2024-09-14 PROCEDURE — 11000001 HC ACUTE MED/SURG PRIVATE ROOM

## 2024-09-14 PROCEDURE — 84100 ASSAY OF PHOSPHORUS: CPT

## 2024-09-14 PROCEDURE — 25000242 PHARM REV CODE 250 ALT 637 W/ HCPCS: Performed by: INTERNAL MEDICINE

## 2024-09-14 PROCEDURE — 80053 COMPREHEN METABOLIC PANEL: CPT

## 2024-09-14 PROCEDURE — 94760 N-INVAS EAR/PLS OXIMETRY 1: CPT | Mod: XB

## 2024-09-14 PROCEDURE — 99900031 HC PATIENT EDUCATION (STAT)

## 2024-09-14 PROCEDURE — 63600175 PHARM REV CODE 636 W HCPCS: Performed by: INTERNAL MEDICINE

## 2024-09-14 PROCEDURE — 36600 WITHDRAWAL OF ARTERIAL BLOOD: CPT

## 2024-09-14 PROCEDURE — 99900035 HC TECH TIME PER 15 MIN (STAT)

## 2024-09-14 PROCEDURE — 25000003 PHARM REV CODE 250

## 2024-09-14 PROCEDURE — 83735 ASSAY OF MAGNESIUM: CPT

## 2024-09-14 RX ORDER — PANTOPRAZOLE SODIUM 40 MG/10ML
40 INJECTION, POWDER, LYOPHILIZED, FOR SOLUTION INTRAVENOUS 2 TIMES DAILY
Status: DISCONTINUED | OUTPATIENT
Start: 2024-09-14 | End: 2024-09-24

## 2024-09-14 RX ORDER — MAGNESIUM SULFATE 1 G/100ML
1 INJECTION INTRAVENOUS ONCE
Status: COMPLETED | OUTPATIENT
Start: 2024-09-14 | End: 2024-09-14

## 2024-09-14 RX ORDER — ENOXAPARIN SODIUM 100 MG/ML
1 INJECTION SUBCUTANEOUS ONCE
Status: COMPLETED | OUTPATIENT
Start: 2024-09-14 | End: 2024-09-14

## 2024-09-14 RX ORDER — ENOXAPARIN SODIUM 100 MG/ML
30 INJECTION SUBCUTANEOUS EVERY 24 HOURS
Status: DISCONTINUED | OUTPATIENT
Start: 2024-09-15 | End: 2024-09-15

## 2024-09-14 RX ORDER — IPRATROPIUM BROMIDE AND ALBUTEROL SULFATE 2.5; .5 MG/3ML; MG/3ML
3 SOLUTION RESPIRATORY (INHALATION) EVERY 4 HOURS
Status: DISCONTINUED | OUTPATIENT
Start: 2024-09-14 | End: 2024-09-17

## 2024-09-14 RX ORDER — ACETYLCYSTEINE 200 MG/ML
4 SOLUTION ORAL; RESPIRATORY (INHALATION) 4 TIMES DAILY
Status: DISCONTINUED | OUTPATIENT
Start: 2024-09-14 | End: 2024-09-16

## 2024-09-14 RX ORDER — SODIUM CHLORIDE, SODIUM LACTATE, POTASSIUM CHLORIDE, CALCIUM CHLORIDE 600; 310; 30; 20 MG/100ML; MG/100ML; MG/100ML; MG/100ML
INJECTION, SOLUTION INTRAVENOUS CONTINUOUS
Status: ACTIVE | OUTPATIENT
Start: 2024-09-14 | End: 2024-09-15

## 2024-09-14 RX ADMIN — PANTOPRAZOLE SODIUM 40 MG: 40 INJECTION, POWDER, FOR SOLUTION INTRAVENOUS at 08:09

## 2024-09-14 RX ADMIN — ATORVASTATIN CALCIUM 20 MG: 10 TABLET, FILM COATED ORAL at 08:09

## 2024-09-14 RX ADMIN — HEPARIN SODIUM 5000 UNITS: 5000 INJECTION, SOLUTION INTRAVENOUS; SUBCUTANEOUS at 08:09

## 2024-09-14 RX ADMIN — MUPIROCIN: 20 OINTMENT TOPICAL at 08:09

## 2024-09-14 RX ADMIN — FINASTERIDE 5 MG: 5 TABLET, FILM COATED ORAL at 08:09

## 2024-09-14 RX ADMIN — INSULIN ASPART 2 UNITS: 100 INJECTION, SOLUTION INTRAVENOUS; SUBCUTANEOUS at 08:09

## 2024-09-14 RX ADMIN — ASPIRIN 81 MG CHEWABLE TABLET 81 MG: 81 TABLET CHEWABLE at 08:09

## 2024-09-14 RX ADMIN — MICONAZOLE NITRATE 2 % TOPICAL POWDER: at 08:09

## 2024-09-14 RX ADMIN — ACETYLCYSTEINE 4 ML: 200 INHALANT RESPIRATORY (INHALATION) at 08:09

## 2024-09-14 RX ADMIN — MICONAZOLE NITRATE 2 % TOPICAL POWDER: at 09:09

## 2024-09-14 RX ADMIN — INSULIN ASPART 2 UNITS: 100 INJECTION, SOLUTION INTRAVENOUS; SUBCUTANEOUS at 06:09

## 2024-09-14 RX ADMIN — ENOXAPARIN SODIUM 50 MG: 100 INJECTION SUBCUTANEOUS at 08:09

## 2024-09-14 RX ADMIN — PIPERACILLIN SODIUM AND TAZOBACTAM SODIUM 4.5 G: 4; .5 INJECTION, POWDER, LYOPHILIZED, FOR SOLUTION INTRAVENOUS at 09:09

## 2024-09-14 RX ADMIN — PIPERACILLIN SODIUM AND TAZOBACTAM SODIUM 4.5 G: 4; .5 INJECTION, POWDER, LYOPHILIZED, FOR SOLUTION INTRAVENOUS at 01:09

## 2024-09-14 RX ADMIN — MAGNESIUM SULFATE IN DEXTROSE 1 G: 10 INJECTION, SOLUTION INTRAVENOUS at 08:09

## 2024-09-14 RX ADMIN — PIPERACILLIN SODIUM AND TAZOBACTAM SODIUM 4.5 G: 4; .5 INJECTION, POWDER, LYOPHILIZED, FOR SOLUTION INTRAVENOUS at 05:09

## 2024-09-14 RX ADMIN — IPRATROPIUM BROMIDE AND ALBUTEROL SULFATE 3 ML: 2.5; .5 SOLUTION RESPIRATORY (INHALATION) at 08:09

## 2024-09-14 RX ADMIN — TAMSULOSIN HYDROCHLORIDE 0.4 MG: 0.4 CAPSULE ORAL at 08:09

## 2024-09-14 RX ADMIN — INSULIN ASPART 6 UNITS: 100 INJECTION, SOLUTION INTRAVENOUS; SUBCUTANEOUS at 04:09

## 2024-09-14 RX ADMIN — SODIUM CHLORIDE, POTASSIUM CHLORIDE, SODIUM LACTATE AND CALCIUM CHLORIDE: 600; 310; 30; 20 INJECTION, SOLUTION INTRAVENOUS at 08:09

## 2024-09-14 NOTE — CONSULTS
[facility] - Emergency Dept  /Pulmonary Critical Care Note/    Patient Name: Froy Barragan  MRN: [id number]  Admission Date: 9/8/2024  Hospital Length of Stay: 6 days  Code Status: No Order  Attending Provider: Debbie Trotter MD  Primary Care Provider: George Bran MD     Subjective:     HPI:   Patient is a 64-year-old male with a past medical history of hypertension, hyperlipidemia, CAD S/P PCI, BPH with current self catheterization, and HFpEF who initially presented to the ED with a complaint of nausea/vomiting for the last 1 week.  He is accompanied by his sister, patient's legal guardian, who assisted with history.  They state that patient has been experiencing nonbloody vomiting over the last 1 week.  She also reports about a 2 day history of urinary incontinence, worse than baseline, and uncontrollable nonbloody diarrhea.  She reports that patient was wheelchair-bound due to chronic weakness in his bilateral legs for several years.  Patient currently perform self catheterization at home but his sister states that it has been 1-2 days since he self catheterized himself.  She reports patient had multiple UTIs in the past.  She also reports that patient has had difficulty monitoring his blood glucose levels at home but he reports full compliance with all prescribed medications.  Patient denies any fever, chills, hematemesis, abdominal pain, suprapubic tenderness, melena.     In the ED, initial POCT glucose elevated greater than 500.  CMP with bicarb of 16, serum creatinine elevated at 3.4 with previous baseline at a proximally 1.5.  CBG elevated at 607.  BOHB 1.4.  UA displayed 4+ glycosuria, 3+ hematuria, but no ketonuria.  Indicative of UTI as well.  ABG with slight acidosis with a pH of  7.290.  ICU was consulted for admission due to concerns for DKA.      Hospital Course/Significant events:  9/8/2024; admitted to ICU and developed worsening hypotension and respiratory failure  requiring intubation that night  9/10/2024- extubated        24 Hour Interval History:  Patient was downgraded from the ICU on 9/11/2024.  CT of chest on 09/13/2024 revealed a right lower lobe consolidation along with right pleural effusion suspicious for empyema. US demonstrated small possible loculated effusion. WBC 18. Afebrile. Currently on Zosyn.        Review of systems negative unless documented in the history of present illness.         Past Medical History:   Diagnosis Date    Anemia of chronic disease 7/6/2022    Arteriosclerosis of coronary artery 7/6/2022    Cervical myelopathy 7/6/2022    Cobalamin deficiency 7/6/2022    Gastroesophageal reflux disease 7/6/2022    Hypertension 7/6/2022    Low vitamin D level 7/6/2022    Mixed hyperlipidemia 7/6/2022    Paraparesis 7/6/2022    Stage 3a chronic kidney disease 7/6/2022    Type 2 diabetes mellitus 7/6/2022       Past Surgical History:   Procedure Laterality Date    SPINE SURGERY         Social History     Socioeconomic History    Marital status: Single   Tobacco Use    Smoking status: Never    Smokeless tobacco: Never   Substance and Sexual Activity    Alcohol use: Never    Drug use: Never     Social Determinants of Health     Financial Resource Strain: Low Risk  (4/25/2024)    Overall Financial Resource Strain (CARDIA)     Difficulty of Paying Living Expenses: Not hard at all   Food Insecurity: No Food Insecurity (4/25/2024)    Hunger Vital Sign     Worried About Running Out of Food in the Last Year: Never true     Ran Out of Food in the Last Year: Never true   Transportation Needs: No Transportation Needs (4/25/2024)    PRAPARE - Transportation     Lack of Transportation (Medical): No     Lack of Transportation (Non-Medical): No   Physical Activity: Inactive (4/25/2024)    Exercise Vital Sign     Days of Exercise per Week: 0 days     Minutes of Exercise per Session: 10 min   Stress: No Stress Concern Present (4/25/2024)    [employer] - Occupational Stress Questionnaire     Feeling of Stress : Only a little   Housing Stability: Unknown (4/25/2024)    Housing Stability Vital Sign     Unable to Pay for Housing in the Last Year: No       Current Outpatient Medications   Medication Instructions    alcohol swabs (BD ALCOHOL SWABS) PadM 1 each, Topical (Top), Daily    alfuzosin (UROXATRAL) 10 mg Tb24 TAKE 1 TABLET EVERY DAY WITH BREAKFAST    amLODIPine (NORVASC) 2.5 mg, Oral    aspirin (ECOTRIN) 81 mg, Oral, Daily    b complex vitamins capsule 1 capsule, Oral, Daily    blood glucose control, low (TRUE METRIX LEVEL 1) Soln 1 Bottle, Misc.(Non-Drug; Combo Route), Daily    blood-glucose meter (TRUE METRIX AIR GLUCOSE METER) Misc 1 each, Misc.(Non-Drug; Combo Route), Daily    blood-glucose sensor (DEXCOM G7 SENSOR) Carmen 1 each, Misc.(Non-Drug; Combo Route), Daily    carvediloL (COREG) 25 mg, Oral, 2 times daily    ezetimibe (ZETIA) 10 mg, Oral, Daily    finasteride (PROSCAR) 5 mg, Oral, Daily    glimepiride (AMARYL) 2 mg, Oral, Before breakfast    lancets (TRUEPLUS LANCETS) 33 gauge Misc 1 lancet , Misc.(Non-Drug; Combo Route), Daily    metFORMIN (GLUCOPHAGE) 1,000 mg, Oral, 2 times daily    pantoprazole (PROTONIX) 40 mg, Oral    rosuvastatin (CRESTOR) 40 mg, Oral, Daily    TRUE METRIX GLUCOSE TEST STRIP Strp TEST BLOOD SUGAR EVERY DAY    vitamin D (VITAMIN D3) 1,000 Units, Oral, Daily       Current Inpatient Medications   aspirin  81 mg Oral Daily    atorvastatin  20 mg Oral Daily    finasteride  5 mg Oral Daily    heparin (porcine)  5,000 Units Subcutaneous Q12H    magnesium sulfate IVPB  1 g Intravenous Once    miconazole NITRATE 2 %   Topical (Top) BID    mupirocin   Nasal BID    pantoprazole  40 mg Intravenous BID    piperacillin-tazobactam (Zosyn) IV (PEDS and ADULTS) (extended infusion is not appropriate)  4.5 g Intravenous Q8H    tamsulosin  0.4 mg Oral Daily       Current Intravenous Infusions   D5 and 0.45% NaCl   Intravenous  Continuous PRN        lactated ringers   Intravenous Continuous 75 mL/hr at 09/14/24 0815 New Bag at 09/14/24 0815           Objective:       Intake/Output Summary (Last 24 hours) at 9/14/2024 0928  Last data filed at 9/14/2024 0600  Gross per 24 hour   Intake 2460.27 ml   Output 1565 ml   Net 895.27 ml         Vital Signs (Most Recent):  Temp: 97.7 °F (36.5 °C) (09/14/24 0400)  Pulse: (!) 113 (09/14/24 0600)  Resp: 20 (09/14/24 0600)  BP: 100/73 (09/14/24 0600)  SpO2: (!) 92 % (09/14/24 0600)  Body mass index is 18.92 kg/m².  Weight: 50 kg (110 lb 3.7 oz) Vital Signs (24h Range):  Temp:  [97.4 °F (36.3 °C)-98.6 °F (37 °C)] 97.7 °F (36.5 °C)  Pulse:  [109-121] 113  Resp:  [5-29] 20  SpO2:  [82 %-95 %] 92 %  BP: (100-136)/() 100/73         Physical exam:   Gen- awake and alert, no distress  HENT- ATNC, MMM  CV- RRR  Resp- faint right lower lung field inspiratory crackles, normal work of breathing   MSK- WWP, thin extremities  Neuro- awake and alert, AXEL, no gross deficits  Psych- flat affect, conversant and cooperative with exam         Lines/Drains/Airways       Drain  Duration                  Urethral Catheter 09/08/24 1810 Straight-tip 16 Fr. 5 days              Peripheral Intravenous Line  Duration                  Peripheral IV - Single Lumen 09/08/24 1700 18 G Left Antecubital 5 days         Peripheral IV - Single Lumen 09/08/24 1915 20 G Anterior;Left Forearm 5 days                    Significant Labs:    Lab Results   Component Value Date    WBC 18.26 (H) 09/14/2024    HGB 10.5 (L) 09/14/2024    HCT 31.1 (L) 09/14/2024    MCV 91.2 09/14/2024     09/14/2024     BMP  Lab Results   Component Value Date     09/14/2024    K 4.1 09/14/2024    CO2 22 (L) 09/14/2024    BUN 39.7 (H) 09/14/2024    CREATININE 1.45 (H) 09/14/2024    CALCIUM 8.0 (L) 09/14/2024    AGAP 9.0 09/14/2024    EGFRNONAA 47 (L) 01/11/2022         Assessment/Plan:     Assessment  Right lower lobe consolidation with right  pleural effusion  Septic shock secondary to UTI  Uncontrolled T2DM with hyperglycemia  DWAINE on CKD secondary to volume depletion        Plan  Bedside US was performed by Dr. Quintanilla, revealing small possibly loculated pleural effusion, not amendable for thoracentesis. Continue antibiotics. Speech therapy is already following due dysphagia. Pulmonary to re-evaluate on Monday.              IRLANDA Felix  Pulmonary Critical Care Medicine  [facility] - Emergency Dept  DOS: 09/14/2024

## 2024-09-14 NOTE — NURSING
Nurses Note -- 4 Eyes      9/14/2024   6:13 AM      Skin assessed during: Daily Assessment      [] No Altered Skin Integrity Present    [x]Prevention Measures Documented      [x] Yes- Altered Skin Integrity Present or Discovered   [] LDA Added if Not in Epic (Describe Wound)   [] New Altered Skin Integrity was Present on Admit and Documented in LDA   [] Wound Image Taken    Wound Care Consulted? Yes    Attending Nurse:  PJ Bishop      Second RN/Staff Member:  DARYL Simon

## 2024-09-14 NOTE — PROGRESS NOTES
[facility]  Hospital Medicine Progress Note        Chief Complaint: Inpatient Follow-up    HPI:   64-year-old male with significant history of HTN, HLD, CAD status post PCI, BPH on intermittent self catheterization, chronic diastolic heart failure, progressive physical decline with global weakness more in lower extremities, concern for neuromuscular disorder (no official diagnosis).  Has history of familial paraparesis.  Patient was brought to the hospital with complaints of nausea/vomiting/urinary incontinence, uncontrollable diarrhea for the past 1 week.  He is wheelchair/bed-bound secondary to progressive weakness in his bilateral lower extremities.  CBG more than 500 in the ED. workup consistent with possible DKA in the ED, also concern for UTI.  Patient also had significant electrolyte derangement and acute kidney injury.  Patient was hypotensive, did not respond to vigorous IV fluid resuscitation.  Chest x-ray concerning for right-sided pneumonia, possible aspiration.  Patient admitted to ICU for septic shock secondary to UTI/possible aspiration pneumonia.  Metabolic acidosis likely secondary to diarrhea, low suspicion for diabetic ketoacidosis.  Initiated vasopressors.  Stool studies ordered.  Patient developed progressive respiratory failure in ICU requiring intubation, mechanical ventilatory support.  Broad-spectrum antimicrobials continued pending cultures.  Fortunately shock rapidly improved after steroid administration.  Vasopressors weaned and patient successfully extubated.  MRSA PCR negative, vancomycin DC and Zosyn continued.  Patient on NG tube feedings.  Urine cultures finalized Klebsiella, Providencia.  Antibiotics de-escalated to ceftriaxone, Zithromax to cover polymicrobial UTI and pneumonia.  Evaluated by ST, cleared for oral intake-modified diet.  Patient downgraded to hospital medicine services on 9/12.  MRI brain ordered to further evaluate his bilateral lower  extremity weakness which is progressively worsening, neurology services consulted.  Remains on IV fluids for acute kidney injury.  Worsening leukocytosis, antibiotics escalated to Zosyn from ceftriaxone, Zithromax continued, CT chest, abdomen, pelvis ordered, IV fluids continued for acute kidney injury, neurology evaluated and ordered MRI pan spine in addition to MRI brain    Interval Hx:   Patient seen at bedside, comfortably laying in bed, tachycardic, saturations low 90s on 5 L, no acute events overnight, patient is awake and alert, does not communicate much  Objective/physical exam:  General: In no acute distress, afebrile  Chest: Clear to auscultation bilaterally  Heart: S1, S2, no appreciable murmur  Abdomen: Soft, nontender, BS +  MSK: Warm, no lower extremity edema, no clubbing or cyanosis  Neurologic:  Awake, alert, oriented x1, does not communicate much, global weakness, more weakness in lower extremities done upper extremities  VITAL SIGNS: 24 HRS MIN & MAX LAST   Temp  Min: 97.4 °F (36.3 °C)  Max: 98.6 °F (37 °C) 97.7 °F (36.5 °C)   BP  Min: 100/73  Max: 136/100 100/73   Pulse  Min: 109  Max: 121  (!) 113   Resp  Min: 5  Max: 29 20   SpO2  Min: 82 %  Max: 95 % (!) 92 %       Recent Labs   Lab 09/14/24  0336   WBC 18.26*   RBC 3.41*   HGB 10.5*   HCT 31.1*   MCV 91.2   MCH 30.8   MCHC 33.8   RDW 13.2      MPV 11.5*         Recent Labs   Lab 09/11/24  0735 09/11/24  1645 09/14/24  0336   NA  --    < > 136   K  --    < > 4.1   CL  --    < > 105   CO2  --    < > 22*   BUN  --    < > 39.7*   CREATININE  --    < > 1.45*   CALCIUM  --    < > 8.0*   PH 7.460*  --   --    MG  --    < > 1.70   ALBUMIN  --    < > 2.2*   ALKPHOS  --    < > 50   ALT  --    < > 19   AST  --    < > 13   BILITOT  --    < > 0.3    < > = values in this interval not displayed.          Microbiology Results (last 7 days)       Procedure Component Value Units Date/Time    Blood Culture [[id number]]  (Normal) Collected: 09/08/24 1858     Order Status: Completed Specimen: Blood Updated: 09/13/24 2002     Blood Culture No Growth at 5 days    Blood Culture [[id number]]  (Normal) Collected: 09/08/24 1851    Order Status: Completed Specimen: Blood Updated: 09/13/24 2002     Blood Culture No Growth at 5 days    Blood Culture [[id number]] Collected: 09/13/24 0806    Order Status: Resulted Specimen: Blood Updated: 09/13/24 0811    Blood Culture [[id number]] Collected: 09/13/24 0806    Order Status: Resulted Specimen: Blood Updated: 09/13/24 0810    Urine culture [[id number]]  (Abnormal)  (Susceptibility) Collected: 09/08/24 1809    Order Status: Completed Specimen: Urine Updated: 09/11/24 0642     Urine Culture >/= 100,000 colonies/ml Klebsiella oxytoca      >/= 100,000 colonies/ml Providencia rettgeri    Respiratory Culture [[id number]] Collected: 09/08/24 2217    Order Status: Completed Specimen: Sputum from Mouth Updated: 09/10/24 1037     Respiratory Culture Normal respiratory clari     GRAM STAIN Quality 1+      Moderate Gram positive cocci      Moderate Gram Negative Rods      Moderate Gram Positive Rods    Clostridium Diff Toxin, A & B, EIA [[id number]]     Order Status: Canceled Specimen: Stool              Scheduled Med:   aspirin  81 mg Oral Daily    atorvastatin  20 mg Oral Daily    finasteride  5 mg Oral Daily    heparin (porcine)  5,000 Units Subcutaneous Q12H    magnesium sulfate IVPB  1 g Intravenous Once    miconazole NITRATE 2 %   Topical (Top) BID    mupirocin   Nasal BID    pantoprazole  40 mg Intravenous BID    piperacillin-tazobactam (Zosyn) IV (PEDS and ADULTS) (extended infusion is not appropriate)  4.5 g Intravenous Q8H    tamsulosin  0.4 mg Oral Daily          Assessment/Plan:    Septic shock-multifactorial, secondary to below  Persistent worsening leukocytosis  Acute bacterial UTI secondary to Klebsiella/Providencia  Right lower lobe large pneumonia-likely aspiration  Suspected right lung empyema  Acute hypoxemic respiratory  failure secondary to pneumonia requiring intubation, mechanical ventilatory support, extubated, on nasal cannula 5 L  Intractable nausea/vomiting/diarrhea on admit-improved   Duodenitis  Metabolic acidosis on admit-improved   History of type 2 diabetes mellitus with labile CBG, A1c-6.2  Acute on chronic kidney disease-stage III  Progressive global weakness bilateral lower extremities more weaker than upper extremities-suspect undiagnosed neuromuscular disorder   History of familial paraparesis  History of essential HTN   HLD   History of CAD status post PCI   History of BPH  Chronic diastolic heart failure-appears compensated   Prophylaxis    Hemodynamics stabilized except for tachycardia  Leukocytosis worsening   CT chest, abdomen, pelvis with right-sided large pneumonia and suspicion for possible empyema   I will continue Zosyn  Completed Zithromax course  MRSA PCR was negative   Blood cultures negative   Pulmonology consulted to further evaluate possible empyema, fluid collection is small loculated, plan to re-evaluate Monday  Zosyn expected to cover polymicrobial UTI in addition to aspiration pneumonia with possible empyema  On 5 L with saturation in 90s  Continue Ringer lactate infusion at 75 cc/hour for acute kidney injury, will have to be cautious given intermittent mild hyperglycemia  Evaluated by ST and he is cleared for modified diet  No more acidosis   No obstructive uropathy per ultrasound retroperitoneum   Avoiding nephrotoxins  Concern for duodenitis per CT, added IV Protonix, no overt bleeding  Continue home meds-aspirin, statin, finasteride, tamsulosin,  Neurology consulted for progressive global weakness, more weakness in lower extremities   Has history of familial paraparesis   MRI brain and MRI pan spine ordered 9/13  Will have to be done under anesthesia, consult anesthesia team on Monday  Follow Neurology recommendations  CBG is labile with on and off hypo and hyperglycemia   Keep sliding scale  for now   A1c is only 6.2  Holding metformin given renal insufficiency   Holding sulfonylurea given increased risk of hypoglycemia in the setting of renal insufficiency  Mag slightly low, replaced with 1 g IV magnesium sulfate  DVT prophylaxis-subQ heparin    Debbie Trotter MD   09/14/2024     UPDATE  I was informed by the nursing staff that the patient's oxygen requirement went up to 15 L on oxygen mask  Stat chest x-ray, ABG ordered   PO2 low 80s on 15 L will switch to high-flow-Vapotherm  Chest x-ray non impressive   Check D-dimer   If D-dimer positive will rule out PE with CT angiogram, creatinine is 1.45    UPDATE  Patient fortunately weaned down to 2 L nasal cannula  Having wet cough  I have added neb treatments/Mucomyst  Since his oxygen status improved if D-dimer comes back positive I will order V/Q scan instead of CT angiogram in the setting of renal insufficiency    UPDATE  D dimer >2. VQ /Venous us BLE ordered.  One time full dose lovenox ordered pending above test results

## 2024-09-15 LAB
ALBUMIN SERPL-MCNC: 1.9 G/DL (ref 3.4–4.8)
ALBUMIN SERPL-MCNC: 2.1 G/DL (ref 3.4–4.8)
ALBUMIN/GLOB SERPL: 0.6 RATIO (ref 1.1–2)
ALBUMIN/GLOB SERPL: 0.8 RATIO (ref 1.1–2)
ALLENS TEST BLOOD GAS (OHS): ABNORMAL
ALLENS TEST BLOOD GAS (OHS): YES
ALLENS TEST BLOOD GAS (OHS): YES
ALP SERPL-CCNC: 41 UNIT/L (ref 40–150)
ALP SERPL-CCNC: 46 UNIT/L (ref 40–150)
ALT SERPL-CCNC: 16 UNIT/L (ref 0–55)
ALT SERPL-CCNC: 17 UNIT/L (ref 0–55)
ANION GAP SERPL CALC-SCNC: 11 MEQ/L
ANION GAP SERPL CALC-SCNC: 9 MEQ/L
AST SERPL-CCNC: 14 UNIT/L (ref 5–34)
AST SERPL-CCNC: 14 UNIT/L (ref 5–34)
BASE EXCESS BLD CALC-SCNC: -0.5 MMOL/L (ref -2–2)
BASE EXCESS BLD CALC-SCNC: 0.1 MMOL/L (ref -2–2)
BASE EXCESS BLD CALC-SCNC: 2.4 MMOL/L (ref -2–2)
BASOPHILS # BLD AUTO: 0.03 X10(3)/MCL
BASOPHILS # BLD AUTO: 0.05 X10(3)/MCL
BASOPHILS # BLD AUTO: 0.08 X10(3)/MCL
BASOPHILS NFR BLD AUTO: 0.2 %
BASOPHILS NFR BLD AUTO: 0.3 %
BASOPHILS NFR BLD AUTO: 0.5 %
BILIRUB SERPL-MCNC: 0.3 MG/DL
BILIRUB SERPL-MCNC: 0.4 MG/DL
BIPAP(E) BLOOD GAS (OHS): 8 CM H2O
BIPAP(I) BLOOD GAS (OHS): 14 CM H2O
BLOOD GAS SAMPLE TYPE (OHS): ABNORMAL
BUN SERPL-MCNC: 32.9 MG/DL (ref 8.4–25.7)
BUN SERPL-MCNC: 36.5 MG/DL (ref 8.4–25.7)
CA-I BLD-SCNC: 1.06 MMOL/L (ref 1.12–1.23)
CA-I BLD-SCNC: 1.06 MMOL/L (ref 1.12–1.23)
CA-I BLD-SCNC: 1.07 MMOL/L (ref 1.12–1.23)
CALCIUM SERPL-MCNC: 8.3 MG/DL (ref 8.8–10)
CALCIUM SERPL-MCNC: 8.3 MG/DL (ref 8.8–10)
CHLORIDE SERPL-SCNC: 103 MMOL/L (ref 98–107)
CHLORIDE SERPL-SCNC: 106 MMOL/L (ref 98–107)
CO2 BLDA-SCNC: 22.8 MMOL/L
CO2 BLDA-SCNC: 24.6 MMOL/L
CO2 BLDA-SCNC: 26 MMOL/L
CO2 SERPL-SCNC: 21 MMOL/L (ref 23–31)
CO2 SERPL-SCNC: 23 MMOL/L (ref 23–31)
COHGB MFR BLDA: 0.7 % (ref 0.5–1.5)
COHGB MFR BLDA: 1.1 % (ref 0.5–1.5)
COHGB MFR BLDA: 1.9 % (ref 0.5–1.5)
CREAT SERPL-MCNC: 1.71 MG/DL (ref 0.73–1.18)
CREAT SERPL-MCNC: 1.71 MG/DL (ref 0.73–1.18)
CREAT/UREA NIT SERPL: 19
CREAT/UREA NIT SERPL: 21
DRAWN BY BLOOD GAS (OHS): ABNORMAL
EOSINOPHIL # BLD AUTO: 0.12 X10(3)/MCL (ref 0–0.9)
EOSINOPHIL # BLD AUTO: 0.13 X10(3)/MCL (ref 0–0.9)
EOSINOPHIL # BLD AUTO: 0.29 X10(3)/MCL (ref 0–0.9)
EOSINOPHIL NFR BLD AUTO: 0.9 %
EOSINOPHIL NFR BLD AUTO: 0.9 %
EOSINOPHIL NFR BLD AUTO: 1.9 %
ERYTHROCYTE [DISTWIDTH] IN BLOOD BY AUTOMATED COUNT: 13 % (ref 11.5–17)
ERYTHROCYTE [DISTWIDTH] IN BLOOD BY AUTOMATED COUNT: 13.1 % (ref 11.5–17)
ERYTHROCYTE [DISTWIDTH] IN BLOOD BY AUTOMATED COUNT: 13.2 % (ref 11.5–17)
GFR SERPLBLD CREATININE-BSD FMLA CKD-EPI: 44 ML/MIN/1.73/M2
GFR SERPLBLD CREATININE-BSD FMLA CKD-EPI: 44 ML/MIN/1.73/M2
GLOBULIN SER-MCNC: 2.5 GM/DL (ref 2.4–3.5)
GLOBULIN SER-MCNC: 3.3 GM/DL (ref 2.4–3.5)
GLUCOSE SERPL-MCNC: 200 MG/DL (ref 82–115)
GLUCOSE SERPL-MCNC: 210 MG/DL (ref 82–115)
HCO3 BLDA-SCNC: 22 MMOL/L (ref 22–26)
HCO3 BLDA-SCNC: 23.6 MMOL/L (ref 22–26)
HCO3 BLDA-SCNC: 25.1 MMOL/L (ref 22–26)
HCT VFR BLD AUTO: 18.5 % (ref 42–52)
HCT VFR BLD AUTO: 20 % (ref 42–52)
HCT VFR BLD AUTO: 27.3 % (ref 42–52)
HGB BLD-MCNC: 6.4 G/DL (ref 14–18)
HGB BLD-MCNC: 6.8 G/DL (ref 14–18)
HGB BLD-MCNC: 9 G/DL (ref 14–18)
IMM GRANULOCYTES # BLD AUTO: 0.09 X10(3)/MCL (ref 0–0.04)
IMM GRANULOCYTES # BLD AUTO: 0.11 X10(3)/MCL (ref 0–0.04)
IMM GRANULOCYTES # BLD AUTO: 0.16 X10(3)/MCL (ref 0–0.04)
IMM GRANULOCYTES NFR BLD AUTO: 0.7 %
IMM GRANULOCYTES NFR BLD AUTO: 0.8 %
IMM GRANULOCYTES NFR BLD AUTO: 1.1 %
INHALED O2 CONCENTRATION: 100 %
LACTATE SERPL-SCNC: 2.4 MMOL/L (ref 0.5–2.2)
LACTATE SERPL-SCNC: 2.8 MMOL/L (ref 0.5–2.2)
LPM (OHS): 12
LPM (OHS): 15
LYMPHOCYTES # BLD AUTO: 0.58 X10(3)/MCL (ref 0.6–4.6)
LYMPHOCYTES # BLD AUTO: 0.63 X10(3)/MCL (ref 0.6–4.6)
LYMPHOCYTES # BLD AUTO: 0.75 X10(3)/MCL (ref 0.6–4.6)
LYMPHOCYTES NFR BLD AUTO: 4 %
LYMPHOCYTES NFR BLD AUTO: 4.7 %
LYMPHOCYTES NFR BLD AUTO: 5 %
MAGNESIUM SERPL-MCNC: 1.5 MG/DL (ref 1.6–2.6)
MAGNESIUM SERPL-MCNC: 1.7 MG/DL (ref 1.6–2.6)
MCH RBC QN AUTO: 30.9 PG (ref 27–31)
MCH RBC QN AUTO: 30.9 PG (ref 27–31)
MCH RBC QN AUTO: 31.4 PG (ref 27–31)
MCHC RBC AUTO-ENTMCNC: 33 G/DL (ref 33–36)
MCHC RBC AUTO-ENTMCNC: 34 G/DL (ref 33–36)
MCHC RBC AUTO-ENTMCNC: 34.6 G/DL (ref 33–36)
MCV RBC AUTO: 90.7 FL (ref 80–94)
MCV RBC AUTO: 90.9 FL (ref 80–94)
MCV RBC AUTO: 93.8 FL (ref 80–94)
METHGB MFR BLDA: 0.2 % (ref 0.4–1.5)
METHGB MFR BLDA: 0.9 % (ref 0.4–1.5)
METHGB MFR BLDA: 1.4 % (ref 0.4–1.5)
MODE (OHS): ABNORMAL
MONOCYTES # BLD AUTO: 1.21 X10(3)/MCL (ref 0.1–1.3)
MONOCYTES # BLD AUTO: 1.38 X10(3)/MCL (ref 0.1–1.3)
MONOCYTES # BLD AUTO: 1.54 X10(3)/MCL (ref 0.1–1.3)
MONOCYTES NFR BLD AUTO: 10.5 %
MONOCYTES NFR BLD AUTO: 8.9 %
MONOCYTES NFR BLD AUTO: 9.2 %
NEUTROPHILS # BLD AUTO: 11.45 X10(3)/MCL (ref 2.1–9.2)
NEUTROPHILS # BLD AUTO: 12.19 X10(3)/MCL (ref 2.1–9.2)
NEUTROPHILS # BLD AUTO: 12.27 X10(3)/MCL (ref 2.1–9.2)
NEUTROPHILS NFR BLD AUTO: 82.3 %
NEUTROPHILS NFR BLD AUTO: 83.5 %
NEUTROPHILS NFR BLD AUTO: 84.6 %
NRBC BLD AUTO-RTO: 0 %
NRBC BLD AUTO-RTO: 0 %
NRBC BLD AUTO-RTO: 0.1 %
O2 HB BLOOD GAS (OHS): 92.2 % (ref 94–97)
O2 HB BLOOD GAS (OHS): 95.3 % (ref 94–97)
O2 HB BLOOD GAS (OHS): 97.3 % (ref 94–97)
OXYGEN DEVICE BLOOD GAS (OHS): ABNORMAL
OXYGEN DEVICE BLOOD GAS (OHS): ABNORMAL
OXYHGB MFR BLDA: 12.4 G/DL (ref 12–16)
OXYHGB MFR BLDA: 7.4 G/DL (ref 12–16)
OXYHGB MFR BLDA: 7.5 G/DL (ref 12–16)
PCO2 BLDA: 27 MMHG (ref 35–45)
PCO2 BLDA: 30 MMHG (ref 35–45)
PCO2 BLDA: 34 MMHG (ref 35–45)
PH BLDA: 7.45 [PH] (ref 7.35–7.45)
PH BLDA: 7.52 [PH] (ref 7.35–7.45)
PH BLDA: 7.53 [PH] (ref 7.35–7.45)
PHOSPHATE SERPL-MCNC: 2.5 MG/DL (ref 2.3–4.7)
PHOSPHATE SERPL-MCNC: 2.6 MG/DL (ref 2.3–4.7)
PLATELET # BLD AUTO: 152 X10(3)/MCL (ref 130–400)
PLATELET # BLD AUTO: 164 X10(3)/MCL (ref 130–400)
PLATELET # BLD AUTO: 173 X10(3)/MCL (ref 130–400)
PLATELET # BLD EST: NORMAL 10*3/UL
PMV BLD AUTO: 11.1 FL (ref 7.4–10.4)
PMV BLD AUTO: 11.3 FL (ref 7.4–10.4)
PMV BLD AUTO: 11.4 FL (ref 7.4–10.4)
PO2 BLDA: 109 MMHG (ref 80–100)
PO2 BLDA: 52 MMHG (ref 80–100)
PO2 BLDA: 84 MMHG (ref 80–100)
POCT GLUCOSE: 175 MG/DL (ref 70–110)
POCT GLUCOSE: 221 MG/DL (ref 70–110)
POCT GLUCOSE: 251 MG/DL (ref 70–110)
POCT GLUCOSE: 302 MG/DL (ref 70–110)
POTASSIUM BLOOD GAS (OHS): 4 MMOL/L (ref 3.5–5)
POTASSIUM BLOOD GAS (OHS): 4.1 MMOL/L (ref 3.5–5)
POTASSIUM BLOOD GAS (OHS): 4.1 MMOL/L (ref 3.5–5)
POTASSIUM SERPL-SCNC: 4 MMOL/L (ref 3.5–5.1)
POTASSIUM SERPL-SCNC: 4 MMOL/L (ref 3.5–5.1)
PROT SERPL-MCNC: 4.4 GM/DL (ref 5.8–7.6)
PROT SERPL-MCNC: 5.4 GM/DL (ref 5.8–7.6)
RBC # BLD AUTO: 2.04 X10(6)/MCL (ref 4.7–6.1)
RBC # BLD AUTO: 2.2 X10(6)/MCL (ref 4.7–6.1)
RBC # BLD AUTO: 2.91 X10(6)/MCL (ref 4.7–6.1)
RBC MORPH BLD: NORMAL
SAMPLE SITE BLOOD GAS (OHS): ABNORMAL
SAO2 % BLDA: 90.2 %
SAO2 % BLDA: 96.8 %
SAO2 % BLDA: 98.8 %
SODIUM BLOOD GAS (OHS): 129 MMOL/L (ref 137–145)
SODIUM BLOOD GAS (OHS): 130 MMOL/L (ref 137–145)
SODIUM BLOOD GAS (OHS): 131 MMOL/L (ref 137–145)
SODIUM SERPL-SCNC: 136 MMOL/L (ref 136–145)
SODIUM SERPL-SCNC: 137 MMOL/L (ref 136–145)
WBC # BLD AUTO: 13.53 X10(3)/MCL (ref 4.5–11.5)
WBC # BLD AUTO: 14.6 X10(3)/MCL (ref 4.5–11.5)
WBC # BLD AUTO: 14.93 X10(3)/MCL (ref 4.5–11.5)

## 2024-09-15 PROCEDURE — 99900035 HC TECH TIME PER 15 MIN (STAT)

## 2024-09-15 PROCEDURE — 63600175 PHARM REV CODE 636 W HCPCS: Performed by: INTERNAL MEDICINE

## 2024-09-15 PROCEDURE — 27000190 HC CPAP FULL FACE MASK W/VALVE

## 2024-09-15 PROCEDURE — 82803 BLOOD GASES ANY COMBINATION: CPT

## 2024-09-15 PROCEDURE — 27000221 HC OXYGEN, UP TO 24 HOURS

## 2024-09-15 PROCEDURE — 99900031 HC PATIENT EDUCATION (STAT)

## 2024-09-15 PROCEDURE — 94761 N-INVAS EAR/PLS OXIMETRY MLT: CPT | Mod: XB

## 2024-09-15 PROCEDURE — 84100 ASSAY OF PHOSPHORUS: CPT

## 2024-09-15 PROCEDURE — 25000242 PHARM REV CODE 250 ALT 637 W/ HCPCS: Performed by: INTERNAL MEDICINE

## 2024-09-15 PROCEDURE — 84100 ASSAY OF PHOSPHORUS: CPT | Performed by: HOSPITALIST

## 2024-09-15 PROCEDURE — 85025 COMPLETE CBC W/AUTO DIFF WBC: CPT | Performed by: HOSPITALIST

## 2024-09-15 PROCEDURE — 36415 COLL VENOUS BLD VENIPUNCTURE: CPT

## 2024-09-15 PROCEDURE — 83605 ASSAY OF LACTIC ACID: CPT

## 2024-09-15 PROCEDURE — 83735 ASSAY OF MAGNESIUM: CPT | Performed by: HOSPITALIST

## 2024-09-15 PROCEDURE — 27100171 HC OXYGEN HIGH FLOW UP TO 24 HOURS

## 2024-09-15 PROCEDURE — 94640 AIRWAY INHALATION TREATMENT: CPT

## 2024-09-15 PROCEDURE — 93010 ELECTROCARDIOGRAM REPORT: CPT | Mod: ,,, | Performed by: INTERNAL MEDICINE

## 2024-09-15 PROCEDURE — 94660 CPAP INITIATION&MGMT: CPT

## 2024-09-15 PROCEDURE — 63600175 PHARM REV CODE 636 W HCPCS

## 2024-09-15 PROCEDURE — 25000003 PHARM REV CODE 250

## 2024-09-15 PROCEDURE — 27100092 HC HIGH FLOW DELIVERY CANNULA

## 2024-09-15 PROCEDURE — 93005 ELECTROCARDIOGRAM TRACING: CPT

## 2024-09-15 PROCEDURE — 86900 BLOOD TYPING SEROLOGIC ABO: CPT | Performed by: HOSPITALIST

## 2024-09-15 PROCEDURE — 27000249 HC VAPOTHERM CIRCUIT

## 2024-09-15 PROCEDURE — 25000003 PHARM REV CODE 250: Mod: JZ,JG | Performed by: INTERNAL MEDICINE

## 2024-09-15 PROCEDURE — 97530 THERAPEUTIC ACTIVITIES: CPT | Mod: CQ

## 2024-09-15 PROCEDURE — 63600175 PHARM REV CODE 636 W HCPCS: Performed by: HOSPITALIST

## 2024-09-15 PROCEDURE — 36620 INSERTION CATHETER ARTERY: CPT

## 2024-09-15 PROCEDURE — 85025 COMPLETE CBC W/AUTO DIFF WBC: CPT | Performed by: STUDENT IN AN ORGANIZED HEALTH CARE EDUCATION/TRAINING PROGRAM

## 2024-09-15 PROCEDURE — 37799 UNLISTED PX VASCULAR SURGERY: CPT

## 2024-09-15 PROCEDURE — 83735 ASSAY OF MAGNESIUM: CPT

## 2024-09-15 PROCEDURE — 83605 ASSAY OF LACTIC ACID: CPT | Performed by: HOSPITALIST

## 2024-09-15 PROCEDURE — 94799 UNLISTED PULMONARY SVC/PX: CPT

## 2024-09-15 PROCEDURE — 94760 N-INVAS EAR/PLS OXIMETRY 1: CPT

## 2024-09-15 PROCEDURE — 86901 BLOOD TYPING SEROLOGIC RH(D): CPT | Performed by: HOSPITALIST

## 2024-09-15 PROCEDURE — 25000003 PHARM REV CODE 250: Performed by: INTERNAL MEDICINE

## 2024-09-15 PROCEDURE — 5A09457 ASSISTANCE WITH RESPIRATORY VENTILATION, 24-96 CONSECUTIVE HOURS, CONTINUOUS POSITIVE AIRWAY PRESSURE: ICD-10-PCS | Performed by: HOSPITALIST

## 2024-09-15 PROCEDURE — 80053 COMPREHEN METABOLIC PANEL: CPT

## 2024-09-15 PROCEDURE — 20000000 HC ICU ROOM

## 2024-09-15 PROCEDURE — 36600 WITHDRAWAL OF ARTERIAL BLOOD: CPT

## 2024-09-15 PROCEDURE — 80053 COMPREHEN METABOLIC PANEL: CPT | Performed by: HOSPITALIST

## 2024-09-15 PROCEDURE — 87040 BLOOD CULTURE FOR BACTERIA: CPT

## 2024-09-15 PROCEDURE — 85025 COMPLETE CBC W/AUTO DIFF WBC: CPT

## 2024-09-15 RX ORDER — SODIUM CHLORIDE, SODIUM LACTATE, POTASSIUM CHLORIDE, CALCIUM CHLORIDE 600; 310; 30; 20 MG/100ML; MG/100ML; MG/100ML; MG/100ML
INJECTION, SOLUTION INTRAVENOUS CONTINUOUS
Status: DISCONTINUED | OUTPATIENT
Start: 2024-09-15 | End: 2024-09-23

## 2024-09-15 RX ORDER — NOREPINEPHRINE BITARTRATE/D5W 8 MG/250ML
0-3 PLASTIC BAG, INJECTION (ML) INTRAVENOUS CONTINUOUS
Status: DISCONTINUED | OUTPATIENT
Start: 2024-09-15 | End: 2024-09-19

## 2024-09-15 RX ORDER — CALCIUM GLUCONATE 20 MG/ML
1 INJECTION, SOLUTION INTRAVENOUS
Status: COMPLETED | OUTPATIENT
Start: 2024-09-15 | End: 2024-09-15

## 2024-09-15 RX ORDER — NOREPINEPHRINE BITARTRATE/D5W 8 MG/250ML
PLASTIC BAG, INJECTION (ML) INTRAVENOUS
Status: DISPENSED
Start: 2024-09-15 | End: 2024-09-16

## 2024-09-15 RX ORDER — DEXMEDETOMIDINE HYDROCHLORIDE 4 UG/ML
0-1.4 INJECTION, SOLUTION INTRAVENOUS CONTINUOUS
Status: DISCONTINUED | OUTPATIENT
Start: 2024-09-15 | End: 2024-09-19

## 2024-09-15 RX ORDER — ENOXAPARIN SODIUM 100 MG/ML
1 INJECTION SUBCUTANEOUS EVERY 24 HOURS
Status: DISCONTINUED | OUTPATIENT
Start: 2024-09-15 | End: 2024-09-16

## 2024-09-15 RX ORDER — MAGNESIUM SULFATE HEPTAHYDRATE 40 MG/ML
2 INJECTION, SOLUTION INTRAVENOUS ONCE
Status: COMPLETED | OUTPATIENT
Start: 2024-09-15 | End: 2024-09-16

## 2024-09-15 RX ORDER — HYDROCODONE BITARTRATE AND ACETAMINOPHEN 500; 5 MG/1; MG/1
TABLET ORAL
Status: DISCONTINUED | OUTPATIENT
Start: 2024-09-15 | End: 2024-10-01 | Stop reason: HOSPADM

## 2024-09-15 RX ADMIN — ACETYLCYSTEINE 4 ML: 200 INHALANT RESPIRATORY (INHALATION) at 09:09

## 2024-09-15 RX ADMIN — PIPERACILLIN SODIUM AND TAZOBACTAM SODIUM 4.5 G: 4; .5 INJECTION, POWDER, LYOPHILIZED, FOR SOLUTION INTRAVENOUS at 06:09

## 2024-09-15 RX ADMIN — INSULIN ASPART 2 UNITS: 100 INJECTION, SOLUTION INTRAVENOUS; SUBCUTANEOUS at 12:09

## 2024-09-15 RX ADMIN — MICONAZOLE NITRATE 2 % TOPICAL POWDER: at 09:09

## 2024-09-15 RX ADMIN — FINASTERIDE 5 MG: 5 TABLET, FILM COATED ORAL at 09:09

## 2024-09-15 RX ADMIN — ACETYLCYSTEINE 4 ML: 200 INHALANT RESPIRATORY (INHALATION) at 08:09

## 2024-09-15 RX ADMIN — SODIUM CHLORIDE, POTASSIUM CHLORIDE, SODIUM LACTATE AND CALCIUM CHLORIDE: 600; 310; 30; 20 INJECTION, SOLUTION INTRAVENOUS at 12:09

## 2024-09-15 RX ADMIN — INSULIN ASPART 4 UNITS: 100 INJECTION, SOLUTION INTRAVENOUS; SUBCUTANEOUS at 05:09

## 2024-09-15 RX ADMIN — CALCIUM GLUCONATE 1 G: 20 INJECTION, SOLUTION INTRAVENOUS at 04:09

## 2024-09-15 RX ADMIN — IPRATROPIUM BROMIDE AND ALBUTEROL SULFATE 3 ML: 2.5; .5 SOLUTION RESPIRATORY (INHALATION) at 12:09

## 2024-09-15 RX ADMIN — IPRATROPIUM BROMIDE AND ALBUTEROL SULFATE 3 ML: 2.5; .5 SOLUTION RESPIRATORY (INHALATION) at 04:09

## 2024-09-15 RX ADMIN — ATORVASTATIN CALCIUM 20 MG: 10 TABLET, FILM COATED ORAL at 09:09

## 2024-09-15 RX ADMIN — DEXMEDETOMIDINE HYDROCHLORIDE 0.2 MCG/KG/HR: 400 INJECTION INTRAVENOUS at 04:09

## 2024-09-15 RX ADMIN — IPRATROPIUM BROMIDE AND ALBUTEROL SULFATE 3 ML: 2.5; .5 SOLUTION RESPIRATORY (INHALATION) at 08:09

## 2024-09-15 RX ADMIN — ENOXAPARIN SODIUM 50 MG: 60 INJECTION SUBCUTANEOUS at 01:09

## 2024-09-15 RX ADMIN — PANTOPRAZOLE SODIUM 40 MG: 40 INJECTION, POWDER, FOR SOLUTION INTRAVENOUS at 09:09

## 2024-09-15 RX ADMIN — TAMSULOSIN HYDROCHLORIDE 0.4 MG: 0.4 CAPSULE ORAL at 09:09

## 2024-09-15 RX ADMIN — MAGNESIUM SULFATE HEPTAHYDRATE 2 G: 40 INJECTION, SOLUTION INTRAVENOUS at 10:09

## 2024-09-15 RX ADMIN — PIPERACILLIN SODIUM AND TAZOBACTAM SODIUM 4.5 G: 4; .5 INJECTION, POWDER, LYOPHILIZED, FOR SOLUTION INTRAVENOUS at 09:09

## 2024-09-15 RX ADMIN — ASPIRIN 81 MG CHEWABLE TABLET 81 MG: 81 TABLET CHEWABLE at 09:09

## 2024-09-15 RX ADMIN — PIPERACILLIN SODIUM AND TAZOBACTAM SODIUM 4.5 G: 4; .5 INJECTION, POWDER, LYOPHILIZED, FOR SOLUTION INTRAVENOUS at 01:09

## 2024-09-15 RX ADMIN — IPRATROPIUM BROMIDE AND ALBUTEROL SULFATE 3 ML: 2.5; .5 SOLUTION RESPIRATORY (INHALATION) at 09:09

## 2024-09-15 RX ADMIN — INSULIN ASPART 8 UNITS: 100 INJECTION, SOLUTION INTRAVENOUS; SUBCUTANEOUS at 06:09

## 2024-09-15 RX ADMIN — CALCIUM GLUCONATE 1 G: 20 INJECTION, SOLUTION INTRAVENOUS at 06:09

## 2024-09-15 RX ADMIN — SODIUM CHLORIDE, POTASSIUM CHLORIDE, SODIUM LACTATE AND CALCIUM CHLORIDE 1000 ML: 600; 310; 30; 20 INJECTION, SOLUTION INTRAVENOUS at 01:09

## 2024-09-15 NOTE — PLAN OF CARE
Problem: Infection  Goal: Absence of Infection Signs and Symptoms  Outcome: Not Progressing     Problem: Adult Inpatient Plan of Care  Goal: Plan of Care Review  Outcome: Not Progressing  Goal: Patient-Specific Goal (Individualized)  Outcome: Not Progressing  Goal: Absence of Hospital-Acquired Illness or Injury  Outcome: Not Progressing  Goal: Optimal Comfort and Wellbeing  Outcome: Not Progressing  Goal: Readiness for Transition of Care  Outcome: Not Progressing     Problem: Diabetes Comorbidity  Goal: Blood Glucose Level Within Targeted Range  Outcome: Not Progressing     Problem: Skin Injury Risk Increased  Goal: Skin Health and Integrity  Outcome: Not Progressing     Problem: Fall Injury Risk  Goal: Absence of Fall and Fall-Related Injury  Outcome: Not Progressing     Problem: Wound  Goal: Optimal Coping  Outcome: Not Progressing  Goal: Optimal Functional Ability  Outcome: Not Progressing  Goal: Absence of Infection Signs and Symptoms  Outcome: Not Progressing  Goal: Improved Oral Intake  Outcome: Not Progressing  Goal: Optimal Pain Control and Function  Outcome: Not Progressing

## 2024-09-15 NOTE — PT/OT/SLP PROGRESS
Physical Therapy Treatment    Patient Name:  Froy Barragan   MRN:  [id number]    Recommendations:     Discharge therapy intensity: Moderate Intensity Therapy   Discharge Equipment Recommendations: none  Barriers to discharge: Ongoing medical needs    Assessment:     Froy Barragan is a 64 y.o. male.  He presents with the following impairments/functional limitations: weakness, impaired functional mobility, impaired endurance, impaired balance, decreased lower extremity function.  Communicated with NSG prior to session.  Patient found right sidelying with wedge under left side upon PT entry to room.     Rehab Prognosis: Fair; patient would benefit from acute skilled PT services to address these deficits and reach maximum level of function.    Recent Surgery: * No surgery found *    General Precautions: Standard, aspiration, fall  Orthopedic Precautions: N/A  Braces: N/A  Respiratory Status: High flow, flow 35 L/min, concentration 100%  Blood Pressure:   Skin Integrity: Visible skin intact    Plan:     During this hospitalization, patient would benefit from acute PT services 5 x/week to address the identified rehab impairments via therapeutic exercises, therapeutic activities, wheelchair management/training and progress toward the following goals:    Plan of Care Expires:  10/19/24    Subjective     Chief Complaint: none      Objective:     Functional Mobility:  Bed Mobility: Max assist x 2 supine > EOB. EOB > Supine Max A x 2.   EOB Balance: Max A to maintain balance while EOB due to posterior, right lateral leaning.   Upright tolerance EOB x 15 mins.        Education:  Role and goals of PT, transfer training, bed mobility, gait training, balance training, safety awareness, assistive device, strengthening exercises, and importance of participating in PT to return to PLOF     Patient left right sidelying with all lines intact, call button in reach, and wedge under L side    GOALS:   Multidisciplinary  Problems       Physical Therapy Goals          Problem: Physical Therapy    Goal Priority Disciplines Outcome Goal Variances Interventions   Physical Therapy Goal     PT, PT/OT Progressing     Description: Goals to be met by: d/c     Patient will increase functional independence with mobility by performin. Supine to sit with Stand-by Assistance  2. Sit to supine with Stand-by Assistance  3. Sit to stand transfer with Stand-by Assistance  4. Bed to chair transfer with Stand-by Assistance using No Assistive Device scoot pivot vs SB  5. Wheelchair propulsion x100 feet with Supervision using bilateral uppper extremities                         Time Tracking:     Billable Minutes: Therapeutic Activity 23    Treatment Type: Treatment  PT/PTA: PTA     Number of PTA visits since last PT visit: 2     09/15/2024

## 2024-09-15 NOTE — NURSING
Nurses Note -- 4 Eyes      9/15/2024   4:00 PM      Skin assessed during: Daily Assessment      [] No Altered Skin Integrity Present    [x]Prevention Measures Documented      [x] Yes- Altered Skin Integrity Present or Discovered   [] LDA Added if Not in Epic (Describe Wound)   [] New Altered Skin Integrity was Present on Admit and Documented in LDA   [] Wound Image Taken    Wound Care Consulted? Yes    Attending Nurse:  PJ Osman     Second RN/Staff Member:  PJ Meehan

## 2024-09-15 NOTE — PROGRESS NOTES
[facility] - 7th Floor ICU  Pulmonary/Critical Care  Progress Note  9/15/2024    Patient Name: Froy Barragan  MRN: [id number]  Admission Date: 9/8/2024  Code Status: No Order      Subjective:     HPI:  Patient is a 64-year-old male with a past medical history of hypertension, hyperlipidemia, CAD S/P PCI, BPH with current self catheterization, and HFpEF who initially presented to the ED with a complaint of nausea/vomiting for the last 1 week.  He is accompanied by his sister, patient's legal guardian, who assisted with history.  They state that patient has been experiencing nonbloody vomiting over the last 1 week.  She also reports about a 2 day history of urinary incontinence, worse than baseline, and uncontrollable nonbloody diarrhea.  She reports that patient was wheelchair-bound due to chronic weakness in his bilateral legs for several years.  Patient currently perform self catheterization at home but his sister states that it has been 1-2 days since he self catheterized himself.  She reports patient had multiple UTIs in the past.  She also reports that patient has had difficulty monitoring his blood glucose levels at home but he reports full compliance with all prescribed medications.  Patient denies any fever, chills, hematemesis, abdominal pain, suprapubic tenderness, melena.      In the ED, initial POCT glucose elevated greater than 500.  CMP with bicarb of 16, serum creatinine elevated at 3.4 with previous baseline at a proximally 1.5.  CBG elevated at 607.  BOHB 1.4.  UA displayed 4+ glycosuria, 3+ hematuria, but no ketonuria.  Indicative of UTI as well.  ABG with slight acidosis with a pH of  7.290.  ICU was consulted for admission due to concerns for DKA.  He was transferred to a floor hospital bed 09/14/2024.       24hr Interval History:  We recalled secondary to hypotension with systolic blood pressures in the 70s and desaturation into the 80s with tachypnea.  He is afebrile.   Blood cultures 09/15/2024 no growth. He is receiving IV Zosyn.  He was received IV fluid bolus without significant improvement, began on Levophed infusion.  He was also placed on BiPAP, O2 saturations currently 100%.  He denies chest or back pain, states that his breathing is comfortable.      Scheduled Medications:   acetylcysteine 200 mg/ml (20%)  4 mL Nebulization QID    albuterol-ipratropium  3 mL Nebulization Q4H    aspirin  81 mg Oral Daily    atorvastatin  20 mg Oral Daily    calcium gluconate IVPB  1 g Intravenous Q15 Min    enoxparin  1 mg/kg (Dosing Weight) Subcutaneous Q24H (treatment, non-standard time)    finasteride  5 mg Oral Daily    miconazole NITRATE 2 %   Topical (Top) BID    pantoprazole  40 mg Intravenous BID    piperacillin-tazobactam (Zosyn) IV (PEDS and ADULTS) (extended infusion is not appropriate)  4.5 g Intravenous Q8H    tamsulosin  0.4 mg Oral Daily     PRN Medications:    Current Facility-Administered Medications:     albuterol-ipratropium, 3 mL, Nebulization, Q4H PRN    dextrose 10%, 12.5 g, Intravenous, PRN    dextrose 10%, 25 g, Intravenous, PRN    D5 and 0.45% NaCl, , Intravenous, Continuous PRN    haloperidol lactate, 1 mg, Intravenous, Q6H PRN    insulin aspart U-100, 0-10 Units, Subcutaneous, QID (AC + HS) PRN    loperamide, 2 mg, Oral, QID PRN    magnesium sulfate IVPB, 2 g, Intravenous, PRN    sodium chloride 0.9%, 10 mL, Intravenous, PRN    sodium phosphate 20.01 mmol in D5W 250 mL IVPB, 20.01 mmol, Intravenous, PRN  Continuous Infusions:   dexmedeTOMIDine (Precedex) infusion (titrating)  0-1.4 mcg/kg/hr Intravenous Continuous        D5 and 0.45% NaCl   Intravenous Continuous PRN        lactated ringers   Intravenous Continuous 100 mL/hr at 09/15/24 1204 New Bag at 09/15/24 1204    NORepinephrine bitartrate-D5W  0-3 mcg/kg/min (Dosing Weight) Intravenous Continuous           Past Medical History:   Diagnosis Date    Anemia of chronic disease 7/6/2022    Arteriosclerosis of  coronary artery 7/6/2022    Cervical myelopathy 7/6/2022    Cobalamin deficiency 7/6/2022    Gastroesophageal reflux disease 7/6/2022    Hypertension 7/6/2022    Low vitamin D level 7/6/2022    Mixed hyperlipidemia 7/6/2022    Paraparesis 7/6/2022    Stage 3a chronic kidney disease 7/6/2022    Type 2 diabetes mellitus 7/6/2022       Past Surgical History:   Procedure Laterality Date    SPINE SURGERY         Objective:     Input/output:    Intake/Output Summary (Last 24 hours) at 9/15/2024 1633  Last data filed at 9/15/2024 1330  Gross per 24 hour   Intake 4152.73 ml   Output 1200 ml   Net 2952.73 ml       Vital Signs (Most Recent):  Temp: 97.5 °F (36.4 °C) (09/15/24 1200)  Pulse: (!) 134 (09/15/24 1600)  Resp: (!) 39 (09/15/24 1600)  BP: (!) 98/54 (09/15/24 1300)  SpO2: (!) 94 % (09/15/24 1600)  Body mass index is 18.92 kg/m².  Weight: 50 kg (110 lb 3.7 oz) Vital Signs (24h Range):  Temp:  [97.5 °F (36.4 °C)-97.8 °F (36.6 °C)] 97.5 °F (36.4 °C)  Pulse:  [] 134  Resp:  [10-39] 39  SpO2:  [67 %-100 %] 94 %  BP: ()/(54-95) 98/54     Physical Exam  Constitutional:       Comments: Currently on BiPAP, appears comfortable respiratory status.   Cardiovascular:      Rate and Rhythm: Regular rhythm. Tachycardia present.   Pulmonary:      Breath sounds: Rhonchi (Bilateral few coarse) present.   Abdominal:      General: There is no distension.      Palpations: Abdomen is soft.   Neurological:      Comments: Awake and alert, disoriented         Lines/Drains/Airways       Drain  Duration                  Urethral Catheter 09/08/24 1810 Straight-tip 16 Fr. 6 days              Peripheral Intravenous Line  Duration                  Peripheral IV - Single Lumen 09/08/24 1700 18 G Left Antecubital 6 days         Peripheral IV - Single Lumen 09/08/24 1915 20 G Anterior;Left Forearm 6 days                    Vent:  Vent Mode: CPAP / PSV (09/11/24 0755)  Set Rate: 18 BPM (09/11/24 0456)  Vt Set: 400 mL (09/11/24  0456)  Pressure Support: 8 cmH20 (09/11/24 0755)  PEEP/CPAP: 5 cmH20 (09/11/24 0755)  Oxygen Concentration (%): 100 (09/15/24 1600)  Peak Airway Pressure: 14 cmH20 (09/11/24 0755)  Total Ve: 5.1 L/m (09/11/24 0755)  F/VT Ratio<105 (RSBI): (!) 23.44 (09/11/24 0755)    ABGs:  Lab Results   Component Value Date    PH 7.530 (H) 09/15/2024    PO2 109.0 (H) 09/15/2024    PCO2 30.0 (L) 09/15/2024         Significant Labs:    Lab Results   Component Value Date    WBC 14.93 (H) 09/15/2024    HGB 9.0 (L) 09/15/2024    HCT 27.3 (L) 09/15/2024    MCV 93.8 09/15/2024     09/15/2024         Recent Labs   Lab 09/15/24  0351      K 4.0      CO2 23   BUN 36.5*   CREATININE 1.71*   CALCIUM 8.3*   MG 1.70   PHOS 2.6   AST 14   ALT 17   ALKPHOS 46   ALBUMIN 2.1*     Imaging:   Chest x-ray (09/15/2024):  Elevation of right hemidiaphragm with slight blunting of right costophrenic angle.  Left lung field clear.    Assessment:     Right lower lobe atelectasis/consolidation with small right pleural effusion  Acute hypoxic respiratory failure, markedly improved with the addition of BiPAP  Urinary tract infection with sepsis/septic shock and hypotension requiring vasopressor  Type 2 diabetes mellitus with hyperglycemia  Acute kidney injury, likely primarily secondary to intravascular volume depletion and possible ATN, improving  Wide anion gap metabolic acidosis of lactic acid etiology, improved    Plan:     Continue careful hydration with isotonic crystalloid  Continue current IV antibiotic regimen  Continue BiPAP, weaning FiO2 as tolerated  Levophed to maintain MAP greater than 65 mm Hg.       35 minutes of critical care was time spent personally by me on the following activities: development of treatment plan with patient or surrogate and bedside caregivers, discussions with consultants, evaluation of patient's response to treatment, examination of patient, ordering and performing treatments and interventions, ordering  and review of laboratory studies, ordering and review of radiographic studies, pulse oximetry, re-evaluation of patient's condition.  This patient demonstrates a high probability for further clinical decompensation due to ongoing critical illness.  Critical care time did not overlap with that of any other provider or involve time for any procedures.       Loren Hayes MD, [facility]  Pulmonary/Critical Care

## 2024-09-15 NOTE — PROGRESS NOTES
[facility] Hospital Medicine Progress Note        Chief complaint: Follow-up for septic shock     Hospital course 64-year-old male with HTN, HLD, CAD status post PCI, BPH on intermittent self catheterization, chronic diastolic heart failure, progressive physical decline with global weakness, initially presented with hyperglycemia, UTI, DWAINE, chest x-ray concerning for right-sided pneumonia with possible aspiration.  Admitted with septic shock that required vasopressors.  Because of respiratory failure he was intubated and mechanically ventilated.  Eventually, vasopressors were discontinued, the patient was extubated.  Urine cultures demonstrated Klebsiella, Providencia.  The patient was started on NG tube feeds.  He was cleared for oral intake by speech.  The antibiotics were initially deescalated and secondary to worsening leukocytosis were again escalated to include Zosyn, azithromycin.  Imaging demonstrated right-sided large pneumonia, possible empyema.  Pulmonary were consulted.  Because of progressive global weakness, Neurology were consulted who recommended an MRI of the brain and MRI of the entire spine.  Because a need for anesthesia, this has been deferred until 09/16/2024.  The patient did require a brief increase of his supplemental oxygenation given hypoxia in the 80s.  Chest x-ray was unremarkable.  Because of worsening D-dimer, a V/Q scan was ordered     Today: Pulmonary has recommended repeat imaging on 09/16/2024.  The patient was relatively stable on 09/14/2024 and was even weaned down to room air.  However, at some point during the morning of 915th 2024, he was again placed on a non-rebreather, was oxygenating in the low 90s, placed on high-flow nasal cannula.  Was oxygenating 87%.  Labs demonstrate improving leukocytosis from 74580-96.9.  Labs demonstrate worsening acute kidney injury from yesterday, however, still improved from the day prior.  ABG on 09/15/2024  demonstrated pH of 7.45, pCO2 of 34, PO2 of 84.  Stat chest x-ray this morning demonstrated further improvement of right lower lung lobe zone opacities.  Medication list reviewed and demonstrates continuation of Zosyn, bronchodilators, prophylactic dose of Lovenox. Upon evaluation of the patient, his blood pressure was in the 80s.  His heart rate was in the 130s.  Physical examination demonstrates no hypervolemia.  A bolus of lactated Ringer was ordered.  His heart rate is starting to trend down in the 120s.  Blood pressure was at 89.    Case was discussed with patient's nurse and  on the floor.    Objective/physical exam:  General: In no acute distress, afebrile  Chest: Clear to auscultation bilaterally  Heart: RRR, +S1, S2, no appreciable murmur  Abdomen: Soft, nontender, BS +  MSK: Warm, no lower extremity edema, no clubbing or cyanosis  Neurologic: Alert and oriented x 3, watching TV, Strength 5/5 in all 4 extremities    VITAL SIGNS: 24 HRS MIN & MAX LAST   Temp  Min: 97.4 °F (36.3 °C)  Max: 97.8 °F (36.6 °C) 97.5 °F (36.4 °C)   BP  Min: 94/73  Max: 138/95 94/73   Pulse  Min: 92  Max: 123  (!) 123   Resp  Min: 10  Max: 25 17   SpO2  Min: 83 %  Max: 100 % (!) 87 %     I have reviewed the following labs:  Recent Labs   Lab 09/13/24  0346 09/14/24  0336 09/15/24  0351   WBC 16.05* 18.26* 14.93*   RBC 3.88* 3.41* 2.91*   HGB 11.9* 10.5* 9.0*   HCT 36.5* 31.1* 27.3*   MCV 94.1* 91.2 93.8   MCH 30.7 30.8 30.9   MCHC 32.6* 33.8 33.0   RDW 13.3 13.2 13.2    174 152   MPV 11.7* 11.5* 11.1*     Recent Labs   Lab 09/11/24  0735 09/11/24  1645 09/13/24  0346 09/14/24  0336 09/14/24  1825 09/15/24  0302 09/15/24  0351   NA  --    < > 140 136  --   --  137   K  --    < > 4.5 4.1  --   --  4.0   CL  --    < > 106 105  --   --  103   CO2  --    < > 23 22*  --   --  23   BUN  --    < > 32.3* 39.7*  --   --  36.5*   CREATININE  --    < > 1.83* 1.45*  --   --  1.71*   CALCIUM  --    < > 8.1* 8.0*  --   --  8.3*    PH 7.460*  --   --   --  7.480* 7.450  --    MG  --    < > 1.90 1.70  --   --  1.70   ALBUMIN  --    < > 2.4* 2.2*  --   --  2.1*   ALKPHOS  --    < > 55 50  --   --  46   ALT  --    < > 29 19  --   --  17   AST  --    < > 18 13  --   --  14   BILITOT  --    < > 0.3 0.3  --   --  0.4    < > = values in this interval not displayed.     Microbiology Results (last 7 days)       Procedure Component Value Units Date/Time    Blood Culture [[id number]]  (Normal) Collected: 09/13/24 0806    Order Status: Completed Specimen: Blood Updated: 09/15/24 1000     Blood Culture No Growth At 48 Hours    Blood Culture [[id number]]  (Normal) Collected: 09/13/24 0806    Order Status: Completed Specimen: Blood Updated: 09/15/24 0901     Blood Culture No Growth At 48 Hours    Blood Culture [[id number]]  (Normal) Collected: 09/08/24 1851    Order Status: Completed Specimen: Blood Updated: 09/13/24 2002     Blood Culture No Growth at 5 days    Blood Culture [[id number]]  (Normal) Collected: 09/08/24 1851    Order Status: Completed Specimen: Blood Updated: 09/13/24 2002     Blood Culture No Growth at 5 days    Urine culture [[id number]]  (Abnormal)  (Susceptibility) Collected: 09/08/24 1809    Order Status: Completed Specimen: Urine Updated: 09/11/24 0642     Urine Culture >/= 100,000 colonies/ml Klebsiella oxytoca      >/= 100,000 colonies/ml Providencia rettgeri    Respiratory Culture [[id number]] Collected: 09/08/24 2217    Order Status: Completed Specimen: Sputum from Mouth Updated: 09/10/24 1037     Respiratory Culture Normal respiratory clari     GRAM STAIN Quality 1+      Moderate Gram positive cocci      Moderate Gram Negative Rods      Moderate Gram Positive Rods    Clostridium Diff Toxin, A & B, EIA [[id number]]     Order Status: Canceled Specimen: Stool              See below for Radiology    Scheduled Med:   acetylcysteine 200 mg/ml (20%)  4 mL Nebulization QID    albuterol-ipratropium  3 mL Nebulization Q4H    aspirin  81 mg  Oral Daily    atorvastatin  20 mg Oral Daily    enoxparin  30 mg Subcutaneous Q24H (prophylaxis, 1700)    finasteride  5 mg Oral Daily    miconazole NITRATE 2 %   Topical (Top) BID    pantoprazole  40 mg Intravenous BID    piperacillin-tazobactam (Zosyn) IV (PEDS and ADULTS) (extended infusion is not appropriate)  4.5 g Intravenous Q8H    tamsulosin  0.4 mg Oral Daily      Continuous Infusions:   D5 and 0.45% NaCl   Intravenous Continuous PRN          PRN Meds:    Current Facility-Administered Medications:     albuterol-ipratropium, 3 mL, Nebulization, Q4H PRN    dextrose 10%, 12.5 g, Intravenous, PRN    dextrose 10%, 25 g, Intravenous, PRN    D5 and 0.45% NaCl, , Intravenous, Continuous PRN    haloperidol lactate, 1 mg, Intravenous, Q6H PRN    insulin aspart U-100, 0-10 Units, Subcutaneous, QID (AC + HS) PRN    loperamide, 2 mg, Oral, QID PRN    magnesium sulfate IVPB, 2 g, Intravenous, PRN    sodium chloride 0.9%, 10 mL, Intravenous, PRN    sodium phosphate 20.01 mmol in D5W 250 mL IVPB, 20.01 mmol, Intravenous, PRN     Assessment/Plan:    Acute hypoxic respiratory failure, now with worsening hypoxia has been placed on high-flow nasal cannula   hypotension  Worsening tachycardia  Right lower lobe pneumonia, likely aspiration   Questionable right lung empyema   Duodenitis   Type 2 diabetes mellitus, non insulin   Acute kidney injury superimposed on chronic kidney disease stage 3  Progressive global weakness for which further imaging will be implemented on 09/16/2024   Essential hypertension   Mixed hyperlipidemia   Previous history of CAD status post PCI   Chronic heart failure with a preserved ejection fraction, euvolemic    The case was discussed with Pulmonary.  As of now, no preclusion to a full-dose anticoagulation.  There is a suspicion for a PE,, albeit low.  We will transition to full-dose Lovenox.  Will continue with a bolus of lactated Ringer's, will do maintenance therapy at 100.  Would like to obtain a  repeat echo given a previous diagnosis of heart failure preserved ejection fraction, also would like to know if there is a potential for right heart strain if this is a consideration for a PE.  The patient does not have any fever, improved leukocytosis.  Low suspicion for new infection and as such will not augment the current anti-infective therapy.  Otherwise, continue to wean off supplemental oxygenation as needed.  Consideration for repeat ABG if worsening respiratory status.  No food/medication intolerance.  Diet has been augmented.  In terms of his neuromuscular condition not otherwise specified, the patient will require imaging at a later time frame upon a stabilization of his respiratory state.    Critical care time spent on the evaluation and treatment of severe organ dysfunction, review of pertinent labs and imaging studies, discussions with consulting providers and discussions with patient/family: 32 minutes.     Update on 9/15/2024. Worsening hemodynamic instability inspite of 2 L of LR, repeat ABG results noted, with worsening hypoxia, cxr repeat is pending. Still consideration for PE, has been started on full dose anticoagulation. Communicated with ICU and will upgrade to ICU. Greatly appreciate their assistance and management of this pulmonary decompensation.    PPI ppx: ppi    GI Stress ulcer prophylaxis    1. PPI recommended    a. coagulopathy (platelets < 50k, INR > 1.5, or PTT > 2x control value)  b. Mechanical ventilation > 48 hours  c. hx of GI ulcer with or without bleeding  d. TBI, burn  e. Current dual anti-platelet therapy  f. Anti-platelet and anti-coagulation therapy concurrently   g. Anti-platelet therapy and hx of GI ulcer  H. anti-coagulation/anti-platelet and corticosteroid use    2. PPI suggested (continuation of outpatient treatment for)  a. Erosive esophagitis  b. Symptomatic GERD  c. H. pylori infection  d. NSAID induced GI ulcer  e. Zollinger Elison and GI hypersecretory  syndrome      VTE prophylaxis: full dose lovenox, renally dosed.    Patient condition:  guarded    All diagnosis and differential diagnosis have been reviewed; assessment and plan has been documented; I have personally reviewed the labs and test results that are presently available; I have reviewed the patients medication list; I have reviewed the consulting providers response and recommendations. I have reviewed or attempted to review medical records based upon their availability    All of the patient's questions have been  addressed and answered. Patient's is agreeable to the above stated plan. I will continue to monitor closely and make adjustments to medical management as needed.  _____________________________________________________________________    Nutrition Status:    Radiology:  I have personally reviewed the following imaging and agree with the radiologist.     CV Ultrasound doppler venous legs bilat  The right lower extremity venous system is patent with no evidence of   superficial or deep vein thrombosis.  The left lower extremity venous system is patent with no evidence of   superficial or deep vein thrombosis.  X-Ray Chest 1 View  Narrative: EXAMINATION:  XR CHEST 1 VIEW    CLINICAL HISTORY:  Desat;    TECHNIQUE:  One view    COMPARISON:  September 14, 2024..    FINDINGS:  Cardiopericardial silhouette is within normal limits.  Right lung chronically is of lesser volume with elevation of the diaphragm.  Further improvement right lower lung zone atelectasis and/or infiltrates.  Left lung is clear.  No pleural effusion or pneumothorax.  Impression: Further improvement right lower lung zone opacities.    Electronically signed by: Jose J Do  Date:    09/15/2024  Time:    07:41      Radha Johnson MD   09/15/2024

## 2024-09-16 LAB
ABO + RH BLD: NORMAL
ALBUMIN SERPL-MCNC: 1.8 G/DL (ref 3.4–4.8)
ALBUMIN/GLOB SERPL: 0.6 RATIO (ref 1.1–2)
ALLENS TEST BLOOD GAS (OHS): YES
ALP SERPL-CCNC: 39 UNIT/L (ref 40–150)
ALT SERPL-CCNC: 17 UNIT/L (ref 0–55)
ANION GAP SERPL CALC-SCNC: 9 MEQ/L
AST SERPL-CCNC: 16 UNIT/L (ref 5–34)
BASE EXCESS BLD CALC-SCNC: -2.1 MMOL/L (ref -2–2)
BASOPHILS # BLD AUTO: 0.03 X10(3)/MCL
BASOPHILS NFR BLD AUTO: 0.2 %
BILIRUB SERPL-MCNC: 0.5 MG/DL
BIPAP(E) BLOOD GAS (OHS): 8 CM H2O
BIPAP(I) BLOOD GAS (OHS): 14 CM H2O
BLD PROD TYP BPU: NORMAL
BLOOD GAS SAMPLE TYPE (OHS): ABNORMAL
BLOOD UNIT EXPIRATION DATE: NORMAL
BLOOD UNIT TYPE CODE: 5100
BSA FOR ECHO PROCEDURE: 1.45 M2
BUN SERPL-MCNC: 30.3 MG/DL (ref 8.4–25.7)
CA-I BLD-SCNC: 1.16 MMOL/L (ref 1.12–1.23)
CALCIUM SERPL-MCNC: 8.2 MG/DL (ref 8.8–10)
CHLORIDE SERPL-SCNC: 104 MMOL/L (ref 98–107)
CO2 BLDA-SCNC: 22.9 MMOL/L
CO2 SERPL-SCNC: 21 MMOL/L (ref 23–31)
COHGB MFR BLDA: 1.3 % (ref 0.5–1.5)
CREAT SERPL-MCNC: 1.57 MG/DL (ref 0.73–1.18)
CREAT/UREA NIT SERPL: 19
CROSSMATCH INTERPRETATION: NORMAL
DISPENSE STATUS: NORMAL
DRAWN BY BLOOD GAS (OHS): ABNORMAL
EOSINOPHIL # BLD AUTO: 0.09 X10(3)/MCL (ref 0–0.9)
EOSINOPHIL NFR BLD AUTO: 0.7 %
ERYTHROCYTE [DISTWIDTH] IN BLOOD BY AUTOMATED COUNT: 13 % (ref 11.5–17)
GFR SERPLBLD CREATININE-BSD FMLA CKD-EPI: 48 ML/MIN/1.73/M2
GLOBULIN SER-MCNC: 2.8 GM/DL (ref 2.4–3.5)
GLUCOSE SERPL-MCNC: 213 MG/DL (ref 82–115)
GROUP & RH: NORMAL
HCO3 BLDA-SCNC: 21.9 MMOL/L (ref 22–26)
HCT VFR BLD AUTO: 22.5 % (ref 42–52)
HGB BLD-MCNC: 7.7 G/DL (ref 14–18)
IMM GRANULOCYTES # BLD AUTO: 0.04 X10(3)/MCL (ref 0–0.04)
IMM GRANULOCYTES NFR BLD AUTO: 0.3 %
INDIRECT COOMBS: NORMAL
INHALED O2 CONCENTRATION: 50 %
LYMPHOCYTES # BLD AUTO: 0.51 X10(3)/MCL (ref 0.6–4.6)
LYMPHOCYTES NFR BLD AUTO: 3.9 %
MAGNESIUM SERPL-MCNC: 2.8 MG/DL (ref 1.6–2.6)
MCH RBC QN AUTO: 32 PG (ref 27–31)
MCHC RBC AUTO-ENTMCNC: 34.2 G/DL (ref 33–36)
MCV RBC AUTO: 93.4 FL (ref 80–94)
METHGB MFR BLDA: 0.8 % (ref 0.4–1.5)
MODE (OHS): ABNORMAL
MONOCYTES # BLD AUTO: 1.08 X10(3)/MCL (ref 0.1–1.3)
MONOCYTES NFR BLD AUTO: 8.3 %
NEUTROPHILS # BLD AUTO: 11.32 X10(3)/MCL (ref 2.1–9.2)
NEUTROPHILS NFR BLD AUTO: 86.6 %
NRBC BLD AUTO-RTO: 0 %
O2 HB BLOOD GAS (OHS): 96.3 % (ref 94–97)
OHS QRS DURATION: 66 MS
OHS QTC CALCULATION: 428 MS
OXYHGB MFR BLDA: 8 G/DL (ref 12–16)
PCO2 BLDA: 33 MMHG (ref 35–45)
PH BLDA: 7.43 [PH] (ref 7.35–7.45)
PHOSPHATE SERPL-MCNC: 3 MG/DL (ref 2.3–4.7)
PLATELET # BLD AUTO: 135 X10(3)/MCL (ref 130–400)
PMV BLD AUTO: 11.2 FL (ref 7.4–10.4)
PO2 BLDA: 79 MMHG (ref 80–100)
POCT GLUCOSE: 146 MG/DL (ref 70–110)
POCT GLUCOSE: 186 MG/DL (ref 70–110)
POCT GLUCOSE: 188 MG/DL (ref 70–110)
POCT GLUCOSE: 237 MG/DL (ref 70–110)
POTASSIUM BLOOD GAS (OHS): 3.5 MMOL/L (ref 3.5–5)
POTASSIUM SERPL-SCNC: 3.9 MMOL/L (ref 3.5–5.1)
PROT SERPL-MCNC: 4.6 GM/DL (ref 5.8–7.6)
RBC # BLD AUTO: 2.41 X10(6)/MCL (ref 4.7–6.1)
SAMPLE SITE BLOOD GAS (OHS): ABNORMAL
SAO2 % BLDA: 95.9 %
SODIUM BLOOD GAS (OHS): 131 MMOL/L (ref 137–145)
SODIUM SERPL-SCNC: 134 MMOL/L (ref 136–145)
SPECIMEN OUTDATE: NORMAL
SPONT RR (OHS): 25 B/MIN
UNIT NUMBER: NORMAL
WBC # BLD AUTO: 13.07 X10(3)/MCL (ref 4.5–11.5)

## 2024-09-16 PROCEDURE — 85025 COMPLETE CBC W/AUTO DIFF WBC: CPT

## 2024-09-16 PROCEDURE — 94660 CPAP INITIATION&MGMT: CPT

## 2024-09-16 PROCEDURE — 36600 WITHDRAWAL OF ARTERIAL BLOOD: CPT

## 2024-09-16 PROCEDURE — 25000003 PHARM REV CODE 250

## 2024-09-16 PROCEDURE — 36415 COLL VENOUS BLD VENIPUNCTURE: CPT

## 2024-09-16 PROCEDURE — 82803 BLOOD GASES ANY COMBINATION: CPT

## 2024-09-16 PROCEDURE — 63600175 PHARM REV CODE 636 W HCPCS: Performed by: INTERNAL MEDICINE

## 2024-09-16 PROCEDURE — 20000000 HC ICU ROOM

## 2024-09-16 PROCEDURE — 94760 N-INVAS EAR/PLS OXIMETRY 1: CPT | Mod: XB

## 2024-09-16 PROCEDURE — 63600175 PHARM REV CODE 636 W HCPCS

## 2024-09-16 PROCEDURE — 25000003 PHARM REV CODE 250: Performed by: HOSPITALIST

## 2024-09-16 PROCEDURE — 27100171 HC OXYGEN HIGH FLOW UP TO 24 HOURS

## 2024-09-16 PROCEDURE — P9016 RBC LEUKOCYTES REDUCED: HCPCS | Performed by: STUDENT IN AN ORGANIZED HEALTH CARE EDUCATION/TRAINING PROGRAM

## 2024-09-16 PROCEDURE — 99900035 HC TECH TIME PER 15 MIN (STAT)

## 2024-09-16 PROCEDURE — 84100 ASSAY OF PHOSPHORUS: CPT

## 2024-09-16 PROCEDURE — 97110 THERAPEUTIC EXERCISES: CPT

## 2024-09-16 PROCEDURE — 80053 COMPREHEN METABOLIC PANEL: CPT

## 2024-09-16 PROCEDURE — 99900031 HC PATIENT EDUCATION (STAT)

## 2024-09-16 PROCEDURE — 63600175 PHARM REV CODE 636 W HCPCS: Performed by: STUDENT IN AN ORGANIZED HEALTH CARE EDUCATION/TRAINING PROGRAM

## 2024-09-16 PROCEDURE — 83735 ASSAY OF MAGNESIUM: CPT

## 2024-09-16 PROCEDURE — A9540 TC99M MAA: HCPCS | Performed by: HOSPITALIST

## 2024-09-16 PROCEDURE — 25000242 PHARM REV CODE 250 ALT 637 W/ HCPCS: Performed by: INTERNAL MEDICINE

## 2024-09-16 PROCEDURE — 97530 THERAPEUTIC ACTIVITIES: CPT | Mod: CQ

## 2024-09-16 PROCEDURE — 25000003 PHARM REV CODE 250: Performed by: INTERNAL MEDICINE

## 2024-09-16 PROCEDURE — 94640 AIRWAY INHALATION TREATMENT: CPT

## 2024-09-16 PROCEDURE — A9567 TECHNETIUM TC-99M AEROSOL: HCPCS | Performed by: HOSPITALIST

## 2024-09-16 PROCEDURE — 86923 COMPATIBILITY TEST ELECTRIC: CPT | Performed by: STUDENT IN AN ORGANIZED HEALTH CARE EDUCATION/TRAINING PROGRAM

## 2024-09-16 RX ORDER — SODIUM CHLORIDE 9 MG/ML
INJECTION, SOLUTION INTRAVENOUS
Status: DISCONTINUED | OUTPATIENT
Start: 2024-09-16 | End: 2024-10-01 | Stop reason: HOSPADM

## 2024-09-16 RX ADMIN — PIPERACILLIN SODIUM AND TAZOBACTAM SODIUM 4.5 G: 4; .5 INJECTION, POWDER, LYOPHILIZED, FOR SOLUTION INTRAVENOUS at 09:09

## 2024-09-16 RX ADMIN — KIT FOR THE PREPARATION OF TECHNETIUM TC 99M ALBUMIN AGGREGATED 5.3 MILLICURIE: 2 INJECTION, POWDER, LYOPHILIZED, FOR SUSPENSION INTRAPERITONEAL; INTRAVENOUS at 12:09

## 2024-09-16 RX ADMIN — INSULIN ASPART 2 UNITS: 100 INJECTION, SOLUTION INTRAVENOUS; SUBCUTANEOUS at 05:09

## 2024-09-16 RX ADMIN — SODIUM CHLORIDE, POTASSIUM CHLORIDE, SODIUM LACTATE AND CALCIUM CHLORIDE: 600; 310; 30; 20 INJECTION, SOLUTION INTRAVENOUS at 03:09

## 2024-09-16 RX ADMIN — SODIUM CHLORIDE: 9 INJECTION, SOLUTION INTRAVENOUS at 08:09

## 2024-09-16 RX ADMIN — IPRATROPIUM BROMIDE AND ALBUTEROL SULFATE 3 ML: 2.5; .5 SOLUTION RESPIRATORY (INHALATION) at 12:09

## 2024-09-16 RX ADMIN — PANTOPRAZOLE SODIUM 40 MG: 40 INJECTION, POWDER, FOR SOLUTION INTRAVENOUS at 08:09

## 2024-09-16 RX ADMIN — SODIUM CHLORIDE, POTASSIUM CHLORIDE, SODIUM LACTATE AND CALCIUM CHLORIDE: 600; 310; 30; 20 INJECTION, SOLUTION INTRAVENOUS at 08:09

## 2024-09-16 RX ADMIN — MICONAZOLE NITRATE 2 % TOPICAL POWDER: at 08:09

## 2024-09-16 RX ADMIN — PIPERACILLIN SODIUM AND TAZOBACTAM SODIUM 4.5 G: 4; .5 INJECTION, POWDER, LYOPHILIZED, FOR SOLUTION INTRAVENOUS at 05:09

## 2024-09-16 RX ADMIN — IPRATROPIUM BROMIDE AND ALBUTEROL SULFATE 3 ML: 2.5; .5 SOLUTION RESPIRATORY (INHALATION) at 07:09

## 2024-09-16 RX ADMIN — DEXMEDETOMIDINE HYDROCHLORIDE 0.2 MCG/KG/HR: 400 INJECTION INTRAVENOUS at 07:09

## 2024-09-16 RX ADMIN — SODIUM CHLORIDE, POTASSIUM CHLORIDE, SODIUM LACTATE AND CALCIUM CHLORIDE: 600; 310; 30; 20 INJECTION, SOLUTION INTRAVENOUS at 07:09

## 2024-09-16 RX ADMIN — IPRATROPIUM BROMIDE AND ALBUTEROL SULFATE 3 ML: 2.5; .5 SOLUTION RESPIRATORY (INHALATION) at 04:09

## 2024-09-16 RX ADMIN — KIT FOR THE PREPARATION OF TECHNETIUM TC 99M PENTETATE 37.8 MILLICURIE: 20 INJECTION, POWDER, LYOPHILIZED, FOR SOLUTION INTRAVENOUS; RESPIRATORY (INHALATION) at 12:09

## 2024-09-16 RX ADMIN — ASPIRIN 81 MG CHEWABLE TABLET 81 MG: 81 TABLET CHEWABLE at 08:09

## 2024-09-16 RX ADMIN — IPRATROPIUM BROMIDE AND ALBUTEROL SULFATE 3 ML: 2.5; .5 SOLUTION RESPIRATORY (INHALATION) at 11:09

## 2024-09-16 RX ADMIN — DEXMEDETOMIDINE HYDROCHLORIDE 0.5 MCG/KG/HR: 400 INJECTION INTRAVENOUS at 03:09

## 2024-09-16 RX ADMIN — ATORVASTATIN CALCIUM 20 MG: 10 TABLET, FILM COATED ORAL at 08:09

## 2024-09-16 RX ADMIN — MAGNESIUM SULFATE HEPTAHYDRATE 2 G: 40 INJECTION, SOLUTION INTRAVENOUS at 01:09

## 2024-09-16 RX ADMIN — ACETYLCYSTEINE 4 ML: 200 INHALANT RESPIRATORY (INHALATION) at 07:09

## 2024-09-16 RX ADMIN — INSULIN ASPART 4 UNITS: 100 INJECTION, SOLUTION INTRAVENOUS; SUBCUTANEOUS at 05:09

## 2024-09-16 RX ADMIN — FINASTERIDE 5 MG: 5 TABLET, FILM COATED ORAL at 08:09

## 2024-09-16 RX ADMIN — PIPERACILLIN SODIUM AND TAZOBACTAM SODIUM 4.5 G: 4; .5 INJECTION, POWDER, LYOPHILIZED, FOR SOLUTION INTRAVENOUS at 02:09

## 2024-09-16 RX ADMIN — IPRATROPIUM BROMIDE AND ALBUTEROL SULFATE 3 ML: 2.5; .5 SOLUTION RESPIRATORY (INHALATION) at 08:09

## 2024-09-16 NOTE — PLAN OF CARE
Problem: Infection  Goal: Absence of Infection Signs and Symptoms  Outcome: Not Progressing     Problem: Adult Inpatient Plan of Care  Goal: Plan of Care Review  Outcome: Not Progressing  Goal: Patient-Specific Goal (Individualized)  Outcome: Not Progressing  Goal: Absence of Hospital-Acquired Illness or Injury  Outcome: Not Progressing  Goal: Optimal Comfort and Wellbeing  Outcome: Not Progressing  Goal: Readiness for Transition of Care  Outcome: Not Progressing     Problem: Diabetes Comorbidity  Goal: Blood Glucose Level Within Targeted Range  Outcome: Not Progressing     Problem: Skin Injury Risk Increased  Goal: Skin Health and Integrity  Outcome: Not Progressing     Problem: Fall Injury Risk  Goal: Absence of Fall and Fall-Related Injury  Outcome: Not Progressing     Problem: Wound  Goal: Optimal Coping  Outcome: Not Progressing  Goal: Optimal Functional Ability  Outcome: Not Progressing  Goal: Absence of Infection Signs and Symptoms  Outcome: Not Progressing  Goal: Improved Oral Intake  Outcome: Not Progressing  Goal: Optimal Pain Control and Function  Outcome: Not Progressing  Goal: Skin Health and Integrity  Outcome: Not Progressing  Goal: Optimal Wound Healing  Outcome: Not Progressing

## 2024-09-16 NOTE — PT/OT/SLP PROGRESS
Occupational Therapy   Treatment    Name: Froy Barragan  MRN: [id number]    Recommendations:     Recommended therapy intensity at discharge: Moderate Intensity Therapy   Discharge Equipment Recommendations:  to be determined by next level of care  Barriers to discharge:  Decreased caregiver support, Other (Comment) (ongoing medical needs)    Assessment:     Froy Barragan is a 64 y.o. male with a medical diagnosis of  septic shock 2/2 UTI, DWAINE, acute respiratory failure requiring intubation. Performance deficits affecting function are weakness, impaired endurance, impaired self care skills, impaired functional mobility, gait instability, impaired balance, decreased safety awareness, decreased lower extremity function, decreased upper extremity function, impaired cardiopulmonary response to activity, decreased coordination. He continues to demonstrate L sided inattention and required max verbal/tacile cues to position head in midline. He tolerated sitting EOB for ~10 minutes with max-CGA. Pt completed x5 reps of anterior weight shifting using BUE on bed rails; pt fatigued quickly with noted RR of 35-40. Pt able to recover to 25-30 with rest. Continue to recommend moderate intensity therapy upon d/c.     Rehab Prognosis:  Good; patient would benefit from acute skilled OT services to address these deficits and reach maximum level of function.       Plan:     Patient to be seen 4 x/week to address the above listed problems via self-care/home management, therapeutic activities, therapeutic exercises  Plan of Care Expires: 10/12/24  Plan of Care Reviewed with: patient, friend    Subjective     Pain/Comfort:  Pain Rating 1: 0/10    Objective:     Communicated with: RN prior to session.  Patient found HOB elevated with peripheral IV, blood pressure cuff, telemetry, pulse ox (continuous), SCD, colbert catheter, BIPAP, arterial line, pressure relief boots upon OT entry to room.    General Precautions: Standard,  aspiration, fall    Orthopedic Precautions:N/A  Braces: N/A  Respiratory Status:  Bipap  Vital Signs: Blood Pressure: 102/72   HR: 72  Sp02: 100%-96%  RR:40-35 with activity, 30-25 at rest     Occupational Performance:     Bed Mobility:    Patient completed Rolling/Turning to Right with total assistance  Patient completed Scooting/Bridging with total assistance  Patient completed Supine to Sit with total assistance and x2 persons  Patient completed Sit to Supine with total assistance and x2 persons     Therapeutic Positioning    OT interventions performed during the course of today's session in an effort to prevent and/or reduce acquired pressure injuries:   Therapeutic positioning was provided at the conclusion of session to offload all bony prominences for the prevention and/or reduction of pressure injuries    Skin assessment: all bony prominences were assessed    Findings:  Visible skin intact.     [facility] 6 Click ADL: 11    Patient Education:  Patient and friend   provided with verbal education education regarding OT role/goals/POC, fall prevention, safety awareness, Discharge/DME recommendations, and pressure ulcer prevention.  Understanding was verbalized, however additional teaching warranted.      Patient left HOB elevated with B mittens,  all lines intact, call button in reach, wedge under L side, pressure relief boots, RN notified, and friend present.    GOALS:   Multidisciplinary Problems       Occupational Therapy Goals          Problem: Occupational Therapy    Goal Priority Disciplines Outcome Interventions   Occupational Therapy Goal     OT, PT/OT Progressing    Description: Goals to be met by: 10/12/24     Patient will increase functional independence with ADLs by performing:    Grooming while seated with Supervision.  Toileting from bedside commode with Moderate Assistance for hygiene and clothing management.   Sitting at edge of bed x10 minutes with Supervision.  Supine to sit with  Supervision.  Squat pivot transfers with Minimal Assistance.  Toilet transfer to bedside commode with Minimal Assistance.  Increased functional strength to 4/5 for self care skills and functional mobility.  Upper extremity exercise program x10 reps per handout, with independence.                         Time Tracking:     OT Date of Treatment: 09/16/24  OT Start Time: 1019  OT Stop Time: 1046  OT Total Time (min): 27 min    Billable Minutes:Therapeutic Exercise 27 minutes.     OT/JOLENE: OT     Number of JOLENE visits since last OT visit: 1 9/16/2024

## 2024-09-16 NOTE — PT/OT/SLP PROGRESS
Physical Therapy Treatment    Patient Name:  Froy Barragan   MRN:  [id number]    Recommendations:     Discharge therapy intensity: Moderate Intensity Therapy   Discharge Equipment Recommendations: none  Barriers to discharge: Impaired mobility, Ongoing medical needs, and placement.    Assessment:     Froy Barragan is a 64 y.o. male admitted with a medical diagnosis of septic shock 2/2 UTI, DWAINE, acute respiratory failure requiring intubation.  He presents with the following impairments/functional limitations: weakness, impaired functional mobility, impaired endurance, impaired balance, decreased lower extremity function.    Pt demo'd decreased tolerance in sitting EOB; continued to have inattention to L side w/Max vc to hold head in midline. He did perform some anterior weight shifting for a few reps, but cut short 2/2 fatigue. Increased respiratory rate to 40 noted during coughing episode, but recovered quickly to 30. Pt functional mobility tot-maxA.    Rehab Prognosis: Fair; patient would benefit from acute skilled PT services to address these deficits and reach maximum level of function.    Recent Surgery: * No surgery found *      Plan:     During this hospitalization, patient would benefit from acute PT services 5 x/week to address the identified rehab impairments via therapeutic exercises, therapeutic activities, wheelchair management/training and progress toward the following goals:    Plan of Care Expires:  10/19/24    Subjective     Chief Complaint: Fatigue  Patient/Family Comments/goals:   Pain/Comfort:         Objective:     Communicated with RN prior to session.  Patient found HOB elevated with peripheral IV, blood pressure cuff, telemetry, pulse ox (continuous), SCD, colbert catheter upon PT entry to room.     General Precautions: Standard, aspiration, fall  Orthopedic Precautions: N/A  Braces: N/A  Respiratory Status:  BiPAP  Blood Pressure: 102/72  Skin Integrity: Visible skin  intact      Functional Mobility:  Bed Mobility:     Rolling Right: total assistance  Scooting: total assistance  Supine to Sit: total assistance and of 2 persons  Sit to Supine: total assistance and of 2 persons  Balance: MaxA-CGA    Therapeutic Activities/Exercises:  Pt seated EOB ~10 min MaxA for posterior L lean; brief periods of CGA when pt usees BUE on bed rails to hold self up; max vc to hold head in midline; L sided inattention noted.  Anterior WS x 5 CGA w/BUE use on bed rails; limited 2/2 fatigue  Reaching W/RUE attempted x 2; pt unable to perform 2/2 weakness.    Education:  Patient provided with verbal education education regarding PT role/goals/POC, fall prevention, and safety awareness.  Additional teaching is warranted.     Patient left right sidelying with all lines intact, call button in reach, wedge under L side, pressure relief boots, and friend present    GOALS:   Multidisciplinary Problems       Physical Therapy Goals          Problem: Physical Therapy    Goal Priority Disciplines Outcome Goal Variances Interventions   Physical Therapy Goal     PT, PT/OT Progressing     Description: Goals to be met by: d/c     Patient will increase functional independence with mobility by performin. Supine to sit with Stand-by Assistance  2. Sit to supine with Stand-by Assistance  3. Sit to stand transfer with Stand-by Assistance  4. Bed to chair transfer with Stand-by Assistance using No Assistive Device scoot pivot vs SB  5. Wheelchair propulsion x100 feet with Supervision using bilateral uppper extremities                         Time Tracking:     PT Received On: 24  PT Start Time: 1017     PT Stop Time: 1046  PT Total Time (min): 29 min     Billable Minutes: Therapeutic Activity 29    Treatment Type: Treatment  PT/PTA: PTA     Number of PTA visits since last PT visit: 3     2024

## 2024-09-16 NOTE — NURSING
Nurses Note -- 4 Eyes      9/16/2024   4:00 PM      Skin assessed during: Daily Assessment      [] No Altered Skin Integrity Present    [x]Prevention Measures Documented      [x] Yes- Altered Skin Integrity Present or Discovered   [] LDA Added if Not in Epic (Describe Wound)   [] New Altered Skin Integrity was Present on Admit and Documented in LDA   [] Wound Image Taken    Wound Care Consulted? Yes    Attending Nurse:  PJ Osman     Second RN/Staff Member:  PJ Woods

## 2024-09-16 NOTE — PROGRESS NOTES
[facility] - Emergency Dept  /Pulmonary Critical Care Note/    Patient Name: Froy Barragan  MRN: [id number]  Admission Date: 9/8/2024  Hospital Length of Stay: 8 days  Code Status: No Order  Attending Provider: Hayden Quintanilla MD  Primary Care Provider: George Bran MD     Subjective:     HPI:   Patient is a 64-year-old male with a past medical history of hypertension, hyperlipidemia, CAD S/P PCI, BPH with current self catheterization, and HFpEF who initially presented to the ED with a complaint of nausea/vomiting for the last 1 week.  He is accompanied by his sister, patient's legal guardian, who assisted with history.  They state that patient has been experiencing nonbloody vomiting over the last 1 week.  She also reports about a 2 day history of urinary incontinence, worse than baseline, and uncontrollable nonbloody diarrhea.  She reports that patient was wheelchair-bound due to chronic weakness in his bilateral legs for several years.  Patient currently perform self catheterization at home but his sister states that it has been 1-2 days since he self catheterized himself.  She reports patient had multiple UTIs in the past.  She also reports that patient has had difficulty monitoring his blood glucose levels at home but he reports full compliance with all prescribed medications.  Patient denies any fever, chills, hematemesis, abdominal pain, suprapubic tenderness, melena.     In the ED, initial POCT glucose elevated greater than 500.  CMP with bicarb of 16, serum creatinine elevated at 3.4 with previous baseline at a proximally 1.5.  CBG elevated at 607.  BOHB 1.4.  UA displayed 4+ glycosuria, 3+ hematuria, but no ketonuria.  Indicative of UTI as well.  ABG with slight acidosis with a pH of  7.290.  ICU was consulted for admission due to concerns for DKA.      Hospital Course/Significant events:  9/8/2024; admitted to ICU and developed worsening hypotension and respiratory failure  requiring intubation that night  9/10/2024- extubated        24 Hour Interval History:  Patient developed hypotension and was upgraded to ICU services.  Currently on Precedex along with Levophed at 0.04.  He is also on BiPAP at 50%.  He is awake and alert.  Urine output has decreased somewhat.  Hemoglobin found to be decreased and has been transfused 1 unit.      Review of systems negative unless documented in the history of present illness.         Past Medical History:   Diagnosis Date    Anemia of chronic disease 7/6/2022    Arteriosclerosis of coronary artery 7/6/2022    Cervical myelopathy 7/6/2022    Cobalamin deficiency 7/6/2022    Gastroesophageal reflux disease 7/6/2022    Hypertension 7/6/2022    Low vitamin D level 7/6/2022    Mixed hyperlipidemia 7/6/2022    Paraparesis 7/6/2022    Stage 3a chronic kidney disease 7/6/2022    Type 2 diabetes mellitus 7/6/2022       Past Surgical History:   Procedure Laterality Date    SPINE SURGERY         Social History     Socioeconomic History    Marital status: Single   Tobacco Use    Smoking status: Never    Smokeless tobacco: Never   Substance and Sexual Activity    Alcohol use: Never    Drug use: Never     Social Determinants of Health     Financial Resource Strain: Low Risk  (4/25/2024)    Overall Financial Resource Strain (CARDIA)     Difficulty of Paying Living Expenses: Not hard at all   Food Insecurity: No Food Insecurity (4/25/2024)    Hunger Vital Sign     Worried About Running Out of Food in the Last Year: Never true     Ran Out of Food in the Last Year: Never true   Transportation Needs: No Transportation Needs (4/25/2024)    PRAPARE - Transportation     Lack of Transportation (Medical): No     Lack of Transportation (Non-Medical): No   Physical Activity: Inactive (4/25/2024)    Exercise Vital Sign     Days of Exercise per Week: 0 days     Minutes of Exercise per Session: 10 min   Stress: No Stress Concern Present (4/25/2024)    [facility]  Occupational [facility] - Occupational Stress Questionnaire     Feeling of Stress : Only a little   Housing Stability: Unknown (4/25/2024)    Housing Stability Vital Sign     Unable to Pay for Housing in the Last Year: No       Current Outpatient Medications   Medication Instructions    alcohol swabs (BD ALCOHOL SWABS) PadM 1 each, Topical (Top), Daily    alfuzosin (UROXATRAL) 10 mg Tb24 TAKE 1 TABLET EVERY DAY WITH BREAKFAST    amLODIPine (NORVASC) 2.5 mg, Oral    aspirin (ECOTRIN) 81 mg, Oral, Daily    b complex vitamins capsule 1 capsule, Oral, Daily    blood glucose control, low (TRUE METRIX LEVEL 1) Soln 1 Bottle, Misc.(Non-Drug; Combo Route), Daily    blood-glucose meter (TRUE METRIX AIR GLUCOSE METER) Misc 1 each, Misc.(Non-Drug; Combo Route), Daily    blood-glucose sensor (DEXCOM G7 SENSOR) Carmen 1 each, Misc.(Non-Drug; Combo Route), Daily    carvediloL (COREG) 25 mg, Oral, 2 times daily    ezetimibe (ZETIA) 10 mg, Oral, Daily    finasteride (PROSCAR) 5 mg, Oral, Daily    glimepiride (AMARYL) 2 mg, Oral, Before breakfast    lancets (TRUEPLUS LANCETS) 33 gauge Misc 1 lancet , Misc.(Non-Drug; Combo Route), Daily    metFORMIN (GLUCOPHAGE) 1,000 mg, Oral, 2 times daily    pantoprazole (PROTONIX) 40 mg, Oral    rosuvastatin (CRESTOR) 40 mg, Oral, Daily    TRUE METRIX GLUCOSE TEST STRIP Strp TEST BLOOD SUGAR EVERY DAY    vitamin D (VITAMIN D3) 1,000 Units, Oral, Daily       Current Inpatient Medications   acetylcysteine 200 mg/ml (20%)  4 mL Nebulization QID    albuterol-ipratropium  3 mL Nebulization Q4H    aspirin  81 mg Oral Daily    atorvastatin  20 mg Oral Daily    enoxparin  1 mg/kg (Dosing Weight) Subcutaneous Q24H (treatment, non-standard time)    finasteride  5 mg Oral Daily    miconazole NITRATE 2 %   Topical (Top) BID    pantoprazole  40 mg Intravenous BID    piperacillin-tazobactam (Zosyn) IV (PEDS and ADULTS) (extended infusion is not appropriate)  4.5 g Intravenous Q8H    tamsulosin  0.4 mg Oral Daily        Current Intravenous Infusions   dexmedeTOMIDine (Precedex) infusion (titrating)  0-1.4 mcg/kg/hr Intravenous Continuous 6.25 mL/hr at 09/16/24 0610 0.5 mcg/kg/hr at 09/16/24 0610    D5 and 0.45% NaCl   Intravenous Continuous PRN        lactated ringers   Intravenous Continuous 100 mL/hr at 09/16/24 0838 New Bag at 09/16/24 0838    NORepinephrine bitartrate-D5W  0-3 mcg/kg/min (Dosing Weight) Intravenous Continuous               Objective:       Intake/Output Summary (Last 24 hours) at 9/16/2024 0920  Last data filed at 9/16/2024 0610  Gross per 24 hour   Intake 3320.45 ml   Output 1100 ml   Net 2220.45 ml         Vital Signs (Most Recent):  Temp: 98.2 °F (36.8 °C) (09/16/24 0400)  Pulse: 74 (09/16/24 0755)  Resp: 18 (09/16/24 0755)  BP: 98/66 (09/16/24 0600)  SpO2: 96 % (09/16/24 0755)  Body mass index is 18.92 kg/m².  Weight: 50 kg (110 lb 3.7 oz) Vital Signs (24h Range):  Temp:  [97.4 °F (36.3 °C)-98.4 °F (36.9 °C)] 98.2 °F (36.8 °C)  Pulse:  [] 74  Resp:  [17-39] 18  SpO2:  [67 %-100 %] 96 %  BP: ()/(54-88) 98/66  Arterial Line BP: ()/(48-71) 109/57         Physical exam:   Gen- awake and alert, no distress on BiPAP  HENT- ATNC, MMM  CV- RRR  Resp- faint right lower lung field inspiratory crackles, normal work of breathing   MSK- WWP, thin extremities  Neuro- awake and alert, AXEL, no gross deficits  Psych- flat affect, conversant and cooperative with exam         Lines/Drains/Airways       Drain  Duration                  Urethral Catheter 09/08/24 1810 Straight-tip 16 Fr. 7 days              Arterial Line  Duration             Arterial Line 09/15/24 1515 Right Radial <1 day              Peripheral Intravenous Line  Duration                  Peripheral IV - Single Lumen 20 G Anterior;Proximal;Right Forearm -- days         Peripheral IV - Single Lumen 09/08/24 1700 18 G Left Antecubital 7 days         Peripheral IV - Single Lumen 09/08/24 1915 20 G Anterior;Left Forearm 7 days                     Significant Labs:    Lab Results   Component Value Date    WBC 13.07 (H) 09/16/2024    HGB 7.7 (L) 09/16/2024    HCT 22.5 (L) 09/16/2024    MCV 93.4 09/16/2024     09/16/2024     BMP  Lab Results   Component Value Date     (L) 09/16/2024    K 3.9 09/16/2024    CO2 21 (L) 09/16/2024    BUN 30.3 (H) 09/16/2024    CREATININE 1.57 (H) 09/16/2024    CALCIUM 8.2 (L) 09/16/2024    AGAP 9.0 09/16/2024    EGFRNONAA 47 (L) 01/11/2022         Assessment/Plan:     Assessment  Right lower lobe consolidation with right pleural effusion concerning for developing empyema  Septic shock secondary to UTI of Klebsiella and Providencia treated appropriately with Zosyn  Uncontrolled T2DM with hyperglycemia  DWAINE on CKD secondary to volume depletion  Chronic paraplegia and wheelchair-bound over the past few years.  Being evaluated by Neurology  Abrupt drop in hemoglobin without evidence of active bleeding    Plan  Continue evaluation per Neurology  Continue Zosyn antibiotic therapy.  Monitoring hemoglobin and transfuse as needed.  We will check CT of the chest to evaluate empyema and possible need for surgical intervention  Hold Lovenox until status more clear.  Continue subcu insulin.  Reintroduce Lantus as necessary        DVT prophylaxis:  SCDs.  Chemical on hold with drop in hemoglobin  GI prophylaxis:  IV Protonix    33 minutes of critical care time was spent reviewing medical records, direct patient contact, coordinating treatment with nursing and respiratory therapy and/or discussing the case with family members     Hayden Quintanilla MD  Pulmonary Critical Care Medicine  [facility]   DOS: 09/16/2024

## 2024-09-17 LAB
ABO + RH BLD: NORMAL
ABO + RH BLD: NORMAL
ALBUMIN SERPL-MCNC: 1.8 G/DL (ref 3.4–4.8)
ALBUMIN/GLOB SERPL: 0.8 RATIO (ref 1.1–2)
ALP SERPL-CCNC: 43 UNIT/L (ref 40–150)
ALT SERPL-CCNC: 18 UNIT/L (ref 0–55)
ANION GAP SERPL CALC-SCNC: 12 MEQ/L
AST SERPL-CCNC: 24 UNIT/L (ref 5–34)
BASOPHILS # BLD AUTO: 0.03 X10(3)/MCL
BASOPHILS NFR BLD AUTO: 0.2 %
BILIRUB SERPL-MCNC: 0.5 MG/DL
BLD PROD TYP BPU: NORMAL
BLD PROD TYP BPU: NORMAL
BLOOD UNIT EXPIRATION DATE: NORMAL
BLOOD UNIT EXPIRATION DATE: NORMAL
BLOOD UNIT TYPE CODE: 5100
BLOOD UNIT TYPE CODE: 5100
BUN SERPL-MCNC: 21.6 MG/DL (ref 8.4–25.7)
CALCIUM SERPL-MCNC: 7.7 MG/DL (ref 8.8–10)
CHLORIDE SERPL-SCNC: 103 MMOL/L (ref 98–107)
CO2 SERPL-SCNC: 20 MMOL/L (ref 23–31)
CREAT SERPL-MCNC: 1.61 MG/DL (ref 0.73–1.18)
CREAT/UREA NIT SERPL: 13
CROSSMATCH INTERPRETATION: NORMAL
CROSSMATCH INTERPRETATION: NORMAL
DISPENSE STATUS: NORMAL
DISPENSE STATUS: NORMAL
EOSINOPHIL # BLD AUTO: 0.13 X10(3)/MCL (ref 0–0.9)
EOSINOPHIL NFR BLD AUTO: 1 %
ERYTHROCYTE [DISTWIDTH] IN BLOOD BY AUTOMATED COUNT: 12.9 % (ref 11.5–17)
GFR SERPLBLD CREATININE-BSD FMLA CKD-EPI: 47 ML/MIN/1.73/M2
GLOBULIN SER-MCNC: 2.3 GM/DL (ref 2.4–3.5)
GLUCOSE SERPL-MCNC: 151 MG/DL (ref 82–115)
HCT VFR BLD AUTO: 18.5 % (ref 42–52)
HCT VFR BLD AUTO: 26.7 % (ref 42–52)
HGB BLD-MCNC: 6.5 G/DL (ref 14–18)
HGB BLD-MCNC: 9.4 G/DL (ref 14–18)
IMM GRANULOCYTES # BLD AUTO: 0.08 X10(3)/MCL (ref 0–0.04)
IMM GRANULOCYTES NFR BLD AUTO: 0.6 %
LYMPHOCYTES # BLD AUTO: 0.55 X10(3)/MCL (ref 0.6–4.6)
LYMPHOCYTES NFR BLD AUTO: 4.3 %
MCH RBC QN AUTO: 32.2 PG (ref 27–31)
MCHC RBC AUTO-ENTMCNC: 35.1 G/DL (ref 33–36)
MCV RBC AUTO: 91.6 FL (ref 80–94)
MONOCYTES # BLD AUTO: 1 X10(3)/MCL (ref 0.1–1.3)
MONOCYTES NFR BLD AUTO: 7.8 %
NEUTROPHILS # BLD AUTO: 10.96 X10(3)/MCL (ref 2.1–9.2)
NEUTROPHILS NFR BLD AUTO: 86.1 %
NRBC BLD AUTO-RTO: 0 %
PLATELET # BLD AUTO: 147 X10(3)/MCL (ref 130–400)
PMV BLD AUTO: 10.9 FL (ref 7.4–10.4)
POCT GLUCOSE: 169 MG/DL (ref 70–110)
POCT GLUCOSE: 176 MG/DL (ref 70–110)
POCT GLUCOSE: 191 MG/DL (ref 70–110)
POCT GLUCOSE: 191 MG/DL (ref 70–110)
POTASSIUM SERPL-SCNC: 3.6 MMOL/L (ref 3.5–5.1)
PROT SERPL-MCNC: 4.1 GM/DL (ref 5.8–7.6)
RBC # BLD AUTO: 2.02 X10(6)/MCL (ref 4.7–6.1)
SODIUM SERPL-SCNC: 135 MMOL/L (ref 136–145)
UNIT NUMBER: NORMAL
UNIT NUMBER: NORMAL
WBC # BLD AUTO: 12.75 X10(3)/MCL (ref 4.5–11.5)

## 2024-09-17 PROCEDURE — 63600175 PHARM REV CODE 636 W HCPCS

## 2024-09-17 PROCEDURE — 20000000 HC ICU ROOM

## 2024-09-17 PROCEDURE — 99900035 HC TECH TIME PER 15 MIN (STAT)

## 2024-09-17 PROCEDURE — 85018 HEMOGLOBIN: CPT

## 2024-09-17 PROCEDURE — 63600175 PHARM REV CODE 636 W HCPCS: Performed by: INTERNAL MEDICINE

## 2024-09-17 PROCEDURE — 36430 TRANSFUSION BLD/BLD COMPNT: CPT

## 2024-09-17 PROCEDURE — 25000003 PHARM REV CODE 250: Performed by: INTERNAL MEDICINE

## 2024-09-17 PROCEDURE — 94760 N-INVAS EAR/PLS OXIMETRY 1: CPT

## 2024-09-17 PROCEDURE — 27000221 HC OXYGEN, UP TO 24 HOURS

## 2024-09-17 PROCEDURE — 94640 AIRWAY INHALATION TREATMENT: CPT

## 2024-09-17 PROCEDURE — 25000242 PHARM REV CODE 250 ALT 637 W/ HCPCS: Performed by: INTERNAL MEDICINE

## 2024-09-17 PROCEDURE — 36415 COLL VENOUS BLD VENIPUNCTURE: CPT

## 2024-09-17 PROCEDURE — 85025 COMPLETE CBC W/AUTO DIFF WBC: CPT

## 2024-09-17 PROCEDURE — 25000003 PHARM REV CODE 250

## 2024-09-17 PROCEDURE — 30233N1 TRANSFUSION OF NONAUTOLOGOUS RED BLOOD CELLS INTO PERIPHERAL VEIN, PERCUTANEOUS APPROACH: ICD-10-PCS | Performed by: HOSPITALIST

## 2024-09-17 PROCEDURE — 86923 COMPATIBILITY TEST ELECTRIC: CPT | Mod: 91

## 2024-09-17 PROCEDURE — 80053 COMPREHEN METABOLIC PANEL: CPT

## 2024-09-17 PROCEDURE — 99900031 HC PATIENT EDUCATION (STAT)

## 2024-09-17 PROCEDURE — P9016 RBC LEUKOCYTES REDUCED: HCPCS

## 2024-09-17 PROCEDURE — 25000003 PHARM REV CODE 250: Performed by: HOSPITALIST

## 2024-09-17 RX ORDER — HYDROCODONE BITARTRATE AND ACETAMINOPHEN 500; 5 MG/1; MG/1
TABLET ORAL
Status: DISCONTINUED | OUTPATIENT
Start: 2024-09-17 | End: 2024-10-01 | Stop reason: HOSPADM

## 2024-09-17 RX ORDER — ZINC OXIDE 20 G/100G
OINTMENT TOPICAL 2 TIMES DAILY
Status: DISCONTINUED | OUTPATIENT
Start: 2024-09-17 | End: 2024-10-01 | Stop reason: HOSPADM

## 2024-09-17 RX ORDER — IPRATROPIUM BROMIDE AND ALBUTEROL SULFATE 2.5; .5 MG/3ML; MG/3ML
3 SOLUTION RESPIRATORY (INHALATION) EVERY 6 HOURS PRN
Status: DISCONTINUED | OUTPATIENT
Start: 2024-09-17 | End: 2024-10-01 | Stop reason: HOSPADM

## 2024-09-17 RX ADMIN — ATORVASTATIN CALCIUM 20 MG: 10 TABLET, FILM COATED ORAL at 08:09

## 2024-09-17 RX ADMIN — PANTOPRAZOLE SODIUM 40 MG: 40 INJECTION, POWDER, FOR SOLUTION INTRAVENOUS at 08:09

## 2024-09-17 RX ADMIN — DEXMEDETOMIDINE HYDROCHLORIDE 0.4 MCG/KG/HR: 400 INJECTION INTRAVENOUS at 08:09

## 2024-09-17 RX ADMIN — MICONAZOLE NITRATE 2 % TOPICAL POWDER: at 08:09

## 2024-09-17 RX ADMIN — RUGBY ZINC OXIDE 20%: 20 OINTMENT TOPICAL at 08:09

## 2024-09-17 RX ADMIN — FINASTERIDE 5 MG: 5 TABLET, FILM COATED ORAL at 08:09

## 2024-09-17 RX ADMIN — PIPERACILLIN SODIUM AND TAZOBACTAM SODIUM 4.5 G: 4; .5 INJECTION, POWDER, LYOPHILIZED, FOR SOLUTION INTRAVENOUS at 05:09

## 2024-09-17 RX ADMIN — TAMSULOSIN HYDROCHLORIDE 0.4 MG: 0.4 CAPSULE ORAL at 08:09

## 2024-09-17 RX ADMIN — INSULIN ASPART 2 UNITS: 100 INJECTION, SOLUTION INTRAVENOUS; SUBCUTANEOUS at 06:09

## 2024-09-17 RX ADMIN — ASPIRIN 81 MG CHEWABLE TABLET 81 MG: 81 TABLET CHEWABLE at 08:09

## 2024-09-17 RX ADMIN — SODIUM CHLORIDE, POTASSIUM CHLORIDE, SODIUM LACTATE AND CALCIUM CHLORIDE: 600; 310; 30; 20 INJECTION, SOLUTION INTRAVENOUS at 04:09

## 2024-09-17 RX ADMIN — PIPERACILLIN SODIUM AND TAZOBACTAM SODIUM 4.5 G: 4; .5 INJECTION, POWDER, LYOPHILIZED, FOR SOLUTION INTRAVENOUS at 01:09

## 2024-09-17 RX ADMIN — IPRATROPIUM BROMIDE AND ALBUTEROL SULFATE 3 ML: 2.5; .5 SOLUTION RESPIRATORY (INHALATION) at 05:09

## 2024-09-17 RX ADMIN — INSULIN ASPART 1 UNITS: 100 INJECTION, SOLUTION INTRAVENOUS; SUBCUTANEOUS at 08:09

## 2024-09-17 RX ADMIN — PIPERACILLIN SODIUM AND TAZOBACTAM SODIUM 4.5 G: 4; .5 INJECTION, POWDER, LYOPHILIZED, FOR SOLUTION INTRAVENOUS at 10:09

## 2024-09-17 RX ADMIN — IPRATROPIUM BROMIDE AND ALBUTEROL SULFATE 3 ML: 2.5; .5 SOLUTION RESPIRATORY (INHALATION) at 12:09

## 2024-09-17 RX ADMIN — SODIUM CHLORIDE, POTASSIUM CHLORIDE, SODIUM LACTATE AND CALCIUM CHLORIDE: 600; 310; 30; 20 INJECTION, SOLUTION INTRAVENOUS at 05:09

## 2024-09-17 NOTE — NURSING
Nurses Note -- 4 Eyes      9/17/2024   12:26 AM      Skin assessed during: Q Shift Change      [] No Altered Skin Integrity Present    [x]Prevention Measures Documented      [x] Yes- Altered Skin Integrity Present or Discovered   [] LDA Added if Not in Epic (Describe Wound)   [] New Altered Skin Integrity was Present on Admit and Documented in LDA   [x] Wound Image Taken    Wound Care Consulted? No    Attending Nurse:  Tanner Campo RN/Staff Member:  Monica

## 2024-09-17 NOTE — PT/OT/SLP PROGRESS
Physical Therapy      Patient Name:  Froy Barragan   MRN:  [id number]    Patient not seen today secondary to refusing despite max encouragement from PT and pt sister. Will follow-up tomorrow.

## 2024-09-17 NOTE — PROGRESS NOTES
[facility] - Emergency Dept  /Pulmonary Critical Care Note/    Patient Name: Froy Barragan  MRN: [id number]  Admission Date: 9/8/2024  Hospital Length of Stay: 9 days  Code Status: No Order  Attending Provider: Hayden Quintanilla MD  Primary Care Provider: George Bran MD     Subjective:     HPI:   Patient is a 64-year-old male with a past medical history of hypertension, hyperlipidemia, CAD S/P PCI, BPH with current self catheterization, and HFpEF who initially presented to the ED with a complaint of nausea/vomiting for the last 1 week.  He is accompanied by his sister, patient's legal guardian, who assisted with history.  They state that patient has been experiencing nonbloody vomiting over the last 1 week.  She also reports about a 2 day history of urinary incontinence, worse than baseline, and uncontrollable nonbloody diarrhea.  She reports that patient was wheelchair-bound due to chronic weakness in his bilateral legs for several years.  Patient currently perform self catheterization at home but his sister states that it has been 1-2 days since he self catheterized himself.  She reports patient had multiple UTIs in the past.  She also reports that patient has had difficulty monitoring his blood glucose levels at home but he reports full compliance with all prescribed medications.  Patient denies any fever, chills, hematemesis, abdominal pain, suprapubic tenderness, melena.     In the ED, initial POCT glucose elevated greater than 500.  CMP with bicarb of 16, serum creatinine elevated at 3.4 with previous baseline at a proximally 1.5.  CBG elevated at 607.  BOHB 1.4.  UA displayed 4+ glycosuria, 3+ hematuria, but no ketonuria.  Indicative of UTI as well.  ABG with slight acidosis with a pH of  7.290.  ICU was consulted for admission due to concerns for DKA.      Hospital Course/Significant events:  9/8/2024; admitted to ICU and developed worsening hypotension and respiratory failure  requiring intubation that night  9/10/2024- extubated  9/15/2024-upgraded to ICU due to hypotension        24 Hour Interval History:  Off BiPAP. Now on 8L oxymizer. H&H this AM dropped to 6.5 and 18.5, received two units of PRBCs. CT of Chest/abdomen with suspected ill-defined areas of bilateral intramuscular hemorrhage int he psoas muscles. Anticoagulation remains on hold. Slated for MRI of spine with anesthesia today.      Review of systems negative unless documented in the history of present illness.         Past Medical History:   Diagnosis Date    Anemia of chronic disease 7/6/2022    Arteriosclerosis of coronary artery 7/6/2022    Cervical myelopathy 7/6/2022    Cobalamin deficiency 7/6/2022    Gastroesophageal reflux disease 7/6/2022    Hypertension 7/6/2022    Low vitamin D level 7/6/2022    Mixed hyperlipidemia 7/6/2022    Paraparesis 7/6/2022    Stage 3a chronic kidney disease 7/6/2022    Type 2 diabetes mellitus 7/6/2022       Past Surgical History:   Procedure Laterality Date    SPINE SURGERY         Social History     Socioeconomic History    Marital status: Single   Tobacco Use    Smoking status: Never    Smokeless tobacco: Never   Substance and Sexual Activity    Alcohol use: Never    Drug use: Never     Social Determinants of Health     Financial Resource Strain: Patient Unable To Answer (9/17/2024)    Overall Financial Resource Strain (CARDIA)     Difficulty of Paying Living Expenses: Patient unable to answer   Food Insecurity: Patient Unable To Answer (9/17/2024)    Hunger Vital Sign     Worried About Running Out of Food in the Last Year: Patient unable to answer     Ran Out of Food in the Last Year: Patient unable to answer   Transportation Needs: Patient Unable To Answer (9/17/2024)    TRANSPORTATION NEEDS     Transportation : Patient unable to answer   Physical Activity: Inactive (4/25/2024)    Exercise Vital Sign     Days of Exercise per Week: 0 days     Minutes of Exercise per Session: 10 min    Stress: Patient Unable To Answer (9/17/2024)    [employer] - Occupational Stress Questionnaire     Feeling of Stress : Patient unable to answer   Housing Stability: Patient Unable To Answer (9/17/2024)    Housing Stability Vital Sign     Unable to Pay for Housing in the Last Year: Patient unable to answer     Homeless in the Last Year: Patient unable to answer       Current Outpatient Medications   Medication Instructions    alcohol swabs (BD ALCOHOL SWABS) PadM 1 each, Topical (Top), Daily    alfuzosin (UROXATRAL) 10 mg Tb24 TAKE 1 TABLET EVERY DAY WITH BREAKFAST    amLODIPine (NORVASC) 2.5 mg, Oral    aspirin (ECOTRIN) 81 mg, Oral, Daily    b complex vitamins capsule 1 capsule, Oral, Daily    blood glucose control, low (TRUE METRIX LEVEL 1) Soln 1 Bottle, Misc.(Non-Drug; Combo Route), Daily    blood-glucose meter (TRUE METRIX AIR GLUCOSE METER) Misc 1 each, Misc.(Non-Drug; Combo Route), Daily    blood-glucose sensor (DEXCOM G7 SENSOR) Carmen 1 each, Misc.(Non-Drug; Combo Route), Daily    carvediloL (COREG) 25 mg, Oral, 2 times daily    ezetimibe (ZETIA) 10 mg, Oral, Daily    finasteride (PROSCAR) 5 mg, Oral, Daily    glimepiride (AMARYL) 2 mg, Oral, Before breakfast    lancets (TRUEPLUS LANCETS) 33 gauge Misc 1 lancet , Misc.(Non-Drug; Combo Route), Daily    metFORMIN (GLUCOPHAGE) 1,000 mg, Oral, 2 times daily    pantoprazole (PROTONIX) 40 mg, Oral    rosuvastatin (CRESTOR) 40 mg, Oral, Daily    TRUE METRIX GLUCOSE TEST STRIP Strp TEST BLOOD SUGAR EVERY DAY    vitamin D (VITAMIN D3) 1,000 Units, Oral, Daily       Current Inpatient Medications   aspirin  81 mg Oral Daily    atorvastatin  20 mg Oral Daily    finasteride  5 mg Oral Daily    miconazole NITRATE 2 %   Topical (Top) BID    pantoprazole  40 mg Intravenous BID    piperacillin-tazobactam (Zosyn) IV (PEDS and ADULTS) (extended infusion is not appropriate)  4.5 g Intravenous Q8H    tamsulosin  0.4 mg Oral Daily       Current  Intravenous Infusions   dexmedeTOMIDine (Precedex) infusion (titrating)  0-1.4 mcg/kg/hr Intravenous Continuous 5 mL/hr at 09/17/24 0609 0.4 mcg/kg/hr at 09/17/24 0609    D5 and 0.45% NaCl   Intravenous Continuous PRN        lactated ringers   Intravenous Continuous 100 mL/hr at 09/17/24 0609 Rate Verify at 09/17/24 0609    NORepinephrine bitartrate-D5W  0-3 mcg/kg/min (Dosing Weight) Intravenous Continuous               Objective:       Intake/Output Summary (Last 24 hours) at 9/17/2024 0922  Last data filed at 9/17/2024 0614  Gross per 24 hour   Intake 2926.66 ml   Output 700 ml   Net 2226.66 ml         Vital Signs (Most Recent):  Temp: 97.6 °F (36.4 °C) (09/17/24 0629)  Pulse: 84 (09/17/24 0629)  Resp: 14 (09/17/24 0629)  BP: 113/79 (09/17/24 0600)  SpO2: (!) 87 % (09/17/24 0911)  Body mass index is 18.92 kg/m².  Weight: 50 kg (110 lb 3.7 oz) Vital Signs (24h Range):  Temp:  [97.6 °F (36.4 °C)-98.5 °F (36.9 °C)] 97.6 °F (36.4 °C)  Pulse:  [] 84  Resp:  [13-29] 14  SpO2:  [78 %-100 %] 87 %  BP: ()/(61-82) 113/79  Arterial Line BP: ()/(22-60) 136/59         Physical exam:   Gen- awake and alert, no distress on oxymizer  HENT- ATNC, MMM  CV- RRR  Resp- faint right lower lung field inspiratory crackles, normal work of breathing   MSK- WWP, thin extremities  Neuro- awake and alert, AXEL, no gross deficits  Psych- flat affect, conversant and cooperative with exam         Lines/Drains/Airways       Drain  Duration                  Urethral Catheter 09/08/24 1810 Straight-tip 16 Fr. 8 days              Arterial Line  Duration             Arterial Line 09/15/24 1515 Right Radial 1 day              Peripheral Intravenous Line  Duration                  Peripheral IV - Single Lumen 20 G Anterior;Proximal;Right Forearm -- days         Peripheral IV - Single Lumen 09/08/24 1700 18 G Left Antecubital 8 days         Peripheral IV - Single Lumen 09/08/24 1915 20 G Anterior;Left Forearm 8 days                     Significant Labs:    Lab Results   Component Value Date    WBC 12.75 (H) 09/17/2024    HGB 6.5 (L) 09/17/2024    HCT 18.5 (LL) 09/17/2024    MCV 91.6 09/17/2024     09/17/2024     BMP  Lab Results   Component Value Date     (L) 09/17/2024    K 3.6 09/17/2024    CO2 20 (L) 09/17/2024    BUN 21.6 09/17/2024    CREATININE 1.61 (H) 09/17/2024    CALCIUM 7.7 (L) 09/17/2024    AGAP 12.0 09/17/2024    EGFRNONAA 47 (L) 01/11/2022         Assessment/Plan:     Assessment  Right lower lobe consolidation with right pleural effusion concerning for developing empyema  Septic shock secondary to UTI of Klebsiella and Providencia treated appropriately with Zosyn  Uncontrolled T2DM with hyperglycemia  DWAINE on CKD secondary to volume depletion  Chronic paraplegia and wheelchair-bound over the past few years.  Being evaluated by Neurology  Abrupt drop in hemoglobin without evidence of active bleeding    Plan  Continue evaluation per Neurology- plan for MRI of brain and spinal cord today with anesthesia.  Continue Zosyn antibiotic therapy.  Will recheck H&H in 1 hour.  Hold Lovenox until status more clear.  Continue supplemental oxygen, wean as tolerated; Likely benefit from BiPAP at night        DVT prophylaxis:  SCDs.  Chemical on hold with drop in hemoglobin  GI prophylaxis:  IV Protonix    33 minutes of critical care time was spent reviewing medical records, direct patient contact, coordinating treatment with nursing and respiratory therapy and/or discussing the case with family members     IRLANDA Felix  Pulmonary Critical Care Medicine  [facility]   DOS: 09/17/2024

## 2024-09-17 NOTE — PT/OT/SLP PROGRESS
[facility] Speech Language Pathology Department    Patient Name:  Froy Barragan   MRN:  [id number]    POC discussed with nursing and MD.  Pt re-upgraded to ICU due to hypotension and nursing reports poor tolerance of PO intake.  MRI with anesthesia planned for today.  SLP to follow up regarding repeat clinical swallow evaluation in AM.

## 2024-09-17 NOTE — PLAN OF CARE
Spoke to sister, Kelin. Gave her choices of SNF facilities. Explained that he is not ready yet, but she and her sisters can start looking at choices. She took the list and voiced understanding.

## 2024-09-17 NOTE — PROGRESS NOTES
Inpatient Nutrition Assessment    Admit Date: 9/8/2024   Total duration of encounter: 9 days   Patient Age: 64 y.o.    Nutrition Recommendation/Prescription     Consider NG placement. If placed, consider:    Peptamen AF goal rate 70 ml/hr to provide  1680 kcal/d (96% est needs)  105 g protein/d (105% est needs)  1134 ml free water/d (76% est needs)  (calculations based on estimated 20 hr/d run time)     If no IV fluids running, can give 50ml q 2hr water flushes. Total water provided: 1634ml (109% est needs.)     Communication of Recommendations: reviewed with nurse    Nutrition Assessment     Malnutrition Assessment/Nutrition-Focused Physical Exam    Malnutrition Context: chronic illness (09/09/24 1049)  Malnutrition Level: severe (09/09/24 1049)  Energy Intake (Malnutrition): less than 75% for greater than or equal to 1 month (09/09/24 1049)  Weight Loss (Malnutrition):  (does not meet criteria) (09/09/24 1049)  Subcutaneous Fat (Malnutrition): severe depletion (09/09/24 1049)  Orbital Region (Subcutaneous Fat Loss): severe depletion  Upper Arm Region (Subcutaneous Fat Loss): severe depletion     Muscle Mass (Malnutrition): severe depletion (09/09/24 1049)  Restorationist Region (Muscle Loss): severe depletion     Clavicle and Acromion Bone Region (Muscle Loss): severe depletion           Anterior Thigh Region (Muscle Loss): severe depletion  Posterior Calf Region (Muscle Loss): severe depletion           A minimum of two characteristics is recommended for diagnosis of either severe or non-severe malnutrition.    Chart Review    Reason Seen: follow-up    Malnutrition Screening Tool Results   Have you recently lost weight without trying?: Unsure  Have you been eating poorly because of a decreased appetite?: Yes   MST Score: 3   Diagnosis:  Septic shock secondary to UTI  Uncontrolled T2DM with hyperglycemia  DWAINE on CKD secondary to volume depletion  Metabolic acidosis; likely secondary to lactic acidosis vs  diarrhea  Hypomagnesemia  Hypophosphatemia  Diarrhea  Normocytic anemia  Malnutrition    Relevant Medical History: HTN, HLD, CAD S/P PCI, BPH, HF     Scheduled Medications:  aspirin, 81 mg, Daily  atorvastatin, 20 mg, Daily  finasteride, 5 mg, Daily  miconazole NITRATE 2 %, , BID  pantoprazole, 40 mg, BID  piperacillin-tazobactam (Zosyn) IV (PEDS and ADULTS) (extended infusion is not appropriate), 4.5 g, Q8H  tamsulosin, 0.4 mg, Daily    Continuous Infusions:  dexmedeTOMIDine (Precedex) infusion (titrating), Last Rate: 0.4 mcg/kg/hr (09/17/24 0609)  D5 and 0.45% NaCl  lactated ringers, Last Rate: 100 mL/hr at 09/17/24 0609  NORepinephrine bitartrate-D5W    PRN Medications:   0.9%  NaCl infusion (for blood administration), , Q24H PRN  0.9%  NaCl infusion (for blood administration), , Q24H PRN  0.9% NaCl, , PRN  albuterol-ipratropium, 3 mL, Q6H PRN  dextrose 10%, 12.5 g, PRN  dextrose 10%, 25 g, PRN  D5 and 0.45% NaCl, , Continuous PRN  haloperidol lactate, 1 mg, Q6H PRN  insulin aspart U-100, 0-10 Units, QID (AC + HS) PRN  loperamide, 2 mg, QID PRN  magnesium sulfate IVPB, 2 g, PRN  sodium chloride 0.9%, 10 mL, PRN  sodium phosphate 20.01 mmol in D5W 250 mL IVPB, 20.01 mmol, PRN    Calorie Containing IV Medications: no significant kcals from medications at this time    Recent Labs   Lab 09/11/24  1645 09/12/24  0252 09/13/24  0346 09/14/24  0336 09/15/24  0351 09/15/24  2026 09/15/24  2225 09/16/24  0320 09/17/24  0226 09/17/24  1043    142 140 136 137 136  --  134* 135*  --    K 4.0 4.8 4.5 4.1 4.0 4.0  --  3.9 3.6  --    CALCIUM 7.1* 8.1* 8.1* 8.0* 8.3* 8.3*  --  8.2* 7.7*  --    PHOS 2.3 3.8 3.0 2.7 2.6 2.5  --  3.0  --   --    MG 3.20* 2.60 1.90 1.70 1.70 1.50*  --  2.80*  --   --    * 108* 106 105 103 106  --  104 103  --    CO2 23 26 23 22* 23 21*  --  21* 20*  --    BUN 37.2* 33.0* 32.3* 39.7* 36.5* 32.9*  --  30.3* 21.6  --    CREATININE 1.67* 1.75* 1.83* 1.45* 1.71* 1.71*  --  1.57* 1.61*  --   "  EGFRNORACEVR 45 42 40 53 44 44  --  48 47  --    GLUCOSE 32* 92 153* 175* 210* 200*  --  213* 151*  --    BILITOT  --  0.3 0.3 0.3 0.4 0.3  --  0.5 0.5  --    ALKPHOS  --  54 55 50 46 41  --  39* 43  --    ALT  --  32 29 19 17 16  --  17 18  --    AST  --  29 18 13 14 14  --  16 24  --    ALBUMIN  --  2.3* 2.4* 2.2* 2.1* 1.9*  --  1.8* 1.8*  --    HGBA1C  --   --   --  6.2  --   --   --   --   --   --    WBC  --  16.11* 16.05* 18.26* 14.93* 13.53* 14.60* 13.07* 12.75*  --    HGB  --  11.6* 11.9* 10.5* 9.0* 6.8* 6.4* 7.7* 6.5* 9.4*   HCT  --  34.9* 36.5* 31.1* 27.3* 20.0* 18.5* 22.5* 18.5* 26.7*     Nutrition Orders:  Diet NPO      Appetite/Oral Intake: NPO/NPO  Factors Affecting Nutritional Intake: impaired cognitive status/motor control and NPO  Social Needs Impacting Access to Food: none identified  Food/Islam/Cultural Preferences: none reported  Food Allergies: no known food allergies  Last Bowel Movement: 09/16/24  Wound(s):     Wound 09/09/24 Moisture associated dermatitis Buttocks-Tissue loss description: Partial thickness    Comments    9/9/24 Patient on ventilator, propofol off during rounds, plans to start tube feeding. Spoke with patient's sister at bedside. She reports very poor intake for the past month and weight loss, weight history in chart reveals no significant weight changes over the past year. Patient is malnourished and at risk for refeeding syndrome; recommend slow initiation/advancement of tube feeding.    9/13/24 Patient extubated, tube feeding discontinued, started on dysphagia diet. Patient reports a fair appetite, eating about 25-50% of meals, agreeable to vanilla Boost BID.    9/17/24: Discussed with RN, possible plans for NG placement since pt unable to consume adequate nutrition. Will provide TF recommendations in case needed. Pt mental status prohibiting obtaining subjective info.     Anthropometrics    Height: 5' 4" (162.6 cm), Height Method: Stated  Last Weight: 50 kg (110 lb " 3.7 oz) (24 1044), Weight Method: Bed Scale  BMI (Calculated): 18.9  BMI Classification: underweight (BMI less than 22 if >65 years of age)        Ideal Body Weight (IBW), Male: 130 lb     % Ideal Body Weight, Male (lb): 79.31 %                 Usual Body Weight (UBW), k.4 kg (10/24/23)  % Usual Body Weight: 92.1  % Weight Change From Usual Weight: -8.09 %  Usual Weight Provided By: family/caregiver and EMR weight history    Wt Readings from Last 8 Encounters:   24 50 kg (110 lb 3.7 oz)   24 49.4 kg (108 lb 14.5 oz)   24 49.4 kg (109 lb)   24 49.9 kg (110 lb)   24 50 kg (110 lb 3.2 oz)   24 46.3 kg (102 lb 1.6 oz)   10/24/23 54.4 kg (120 lb)   10/19/23 54.4 kg (120 lb)     Weight Change(s) Since Admission:   () fluctuations over past 24 hours, question accuracy, took bed weight of 50 kg during rounds today  Wt Readings from Last 1 Encounters:   24 1044 50 kg (110 lb 3.7 oz)   24 46.8 kg (103 lb 1.6 oz)   24 161 54.4 kg (120 lb)   Admit Weight: 54.4 kg (120 lb) (24 1617), Weight Method: Bed Scale    Estimated Needs    Weight Used For Calorie Calculations: 50 kg (110 lb 3.7 oz)  Energy Calorie Requirements (kcal): 0466-3807, 35-40 kcal/kg  Weight Used For Protein Calculations: 50 kg (110 lb 3.7 oz)  Protein Requirements:  g, 1.5-2 g/kg  Fluid Requirements (mL): 1500  CHO Requirement: 175-250 g, 40-50% of kcal    Enteral Nutrition Patient not receiving enteral nutrition at this time.    Parenteral Nutrition Patient not receiving parenteral nutrition support at this time.    Evaluation of Received Nutrient Intake    Calories: not meeting estimated needs  Protein: not meeting estimated needs    Patient Education Not applicable.    Nutrition Diagnosis     PES: Inadequate energy intake related to inability to consume sufficient nutrients as evidenced by less than 80% needs met. (active)  PES: Severe chronic disease or condition related  malnutrition related to suboptimal protein/energy intake as evidenced by less than 75% needs met for greater than or equal to 1 month, severe fat depletion, and severe muscle depletion. (active)    Nutrition Interventions     Intervention(s): modified composition of enteral nutrition, modified rate of enteral nutrition, and collaboration with other providers  Goal: Meet greater than 80% of nutritional needs by follow-up. (goal progressing)  Goal: Tolerate enteral feeding at goal rate by follow-up. (goal discontinued)    Nutrition Goals & Monitoring     Dietitian will monitor: energy intake, weight, weight change, electrolyte/renal panel, glucose/endocrine profile, and gastrointestinal profile  Discharge planning: too early to determine; pending clinical course - consistency per SLP  Nutrition Risk/Follow-Up: high (follow-up in 1-4 days)   Please consult if re-assessment needed sooner.

## 2024-09-17 NOTE — PLAN OF CARE
Problem: Diabetes Comorbidity  Goal: Blood Glucose Level Within Targeted Range  Outcome: Progressing     Problem: Skin Injury Risk Increased  Goal: Skin Health and Integrity  Outcome: Progressing     Problem: Fall Injury Risk  Goal: Absence of Fall and Fall-Related Injury  Outcome: Progressing     Problem: Infection  Goal: Absence of Infection Signs and Symptoms  Outcome: Not Progressing     Problem: Adult Inpatient Plan of Care  Goal: Patient-Specific Goal (Individualized)  Outcome: Not Progressing  Flowsheets (Taken 9/16/2024 1945)  Patient/Family-Specific Goals (Include Timeframe): none stated     Problem: Wound  Goal: Optimal Coping  Outcome: Not Progressing

## 2024-09-17 NOTE — PROGRESS NOTES
[facility] - 7th Floor ICU  Wound Care    Patient Name:  Froy Barragan   MRN:  [id number]  Date: 9/17/2024  Diagnosis: <principal problem not specified>    History:     Past Medical History:   Diagnosis Date    Anemia of chronic disease 7/6/2022    Arteriosclerosis of coronary artery 7/6/2022    Cervical myelopathy 7/6/2022    Cobalamin deficiency 7/6/2022    Gastroesophageal reflux disease 7/6/2022    Hypertension 7/6/2022    Low vitamin D level 7/6/2022    Mixed hyperlipidemia 7/6/2022    Paraparesis 7/6/2022    Stage 3a chronic kidney disease 7/6/2022    Type 2 diabetes mellitus 7/6/2022       Social History     Socioeconomic History    Marital status: Single   Tobacco Use    Smoking status: Never    Smokeless tobacco: Never   Substance and Sexual Activity    Alcohol use: Never    Drug use: Never     Social Determinants of Health     Financial Resource Strain: Patient Unable To Answer (9/17/2024)    Overall Financial Resource Strain (CARDIA)     Difficulty of Paying Living Expenses: Patient unable to answer   Food Insecurity: Patient Unable To Answer (9/17/2024)    Hunger Vital Sign     Worried About Running Out of Food in the Last Year: Patient unable to answer     Ran Out of Food in the Last Year: Patient unable to answer   Transportation Needs: Patient Unable To Answer (9/17/2024)    TRANSPORTATION NEEDS     Transportation : Patient unable to answer   Physical Activity: Inactive (4/25/2024)    Exercise Vital Sign     Days of Exercise per Week: 0 days     Minutes of Exercise per Session: 10 min   Stress: Patient Unable To Answer (9/17/2024)    [employer] - Occupational Stress Questionnaire     Feeling of Stress : Patient unable to answer   Housing Stability: Patient Unable To Answer (9/17/2024)    Housing Stability Vital Sign     Unable to Pay for Housing in the Last Year: Patient unable to answer     Homeless in the Last Year: Patient unable to answer        Precautions:     Allergies as of 09/08/2024    (No Known Allergies)       [facility] Assessment Details/Treatment        09/17/24 1302   WO Assessment   Visit Date 09/17/24   Visit Time 1302   Consult Type New   [facility] Speciality Wound   Intervention assessed;chart review;orders   Teaching on-going   Skin Interventions   Device Skin Pressure Protection absorbent pad utilized/changed   Pressure Reduction Devices specialty bed utilized        Wound 09/09/24 Moisture associated dermatitis Buttocks   Date First Assessed: 09/09/24   Present on Original Admission: Yes  Primary Wound Type: (c) Moisture associated dermatitis  Location: Buttocks   Wound Image    Dressing Appearance Dry;Intact;Clean   Drainage Amount None   Drainage Characteristics/Odor No odor   Appearance Red;Purple   Periwound Area Moist;Redness   Wound Edges Irregular   Wound Length (cm) 1 cm   Wound Width (cm) 0.5 cm   Wound Surface Area (cm^2) 0.5 cm^2   Care Cleansed with:;Sterile normal saline   Dressing Applied     WOCN consulted for sacrum, Family at bedside. Now has purple area noted on sacrum and buttocks moisture versus deep tissue injury. Sacrum:  Cleanse with NS. Apply Desitin, cover with ABD pad, secure with medipore tape. BID and PRN. Keep areas clean and dry, no adult briefs while in bed. Nursing to continue with turning every two hours, wedge, and floating heels.  Head of bed elevated, bed in lowest position. On ANGELIQUE mattress. Will continue to monitor over next week, if the wound doesn't progress as we would expect, will plan to consult Wound Care Physician.    09/17/2024

## 2024-09-18 ENCOUNTER — ANESTHESIA EVENT (OUTPATIENT)
Dept: SURGERY | Facility: HOSPITAL | Age: 64
End: 2024-09-18
Payer: MEDICARE

## 2024-09-18 PROBLEM — R29.898 WEAKNESS OF BOTH LOWER EXTREMITIES: Status: RESOLVED | Noted: 2024-09-13 | Resolved: 2024-09-18

## 2024-09-18 LAB
ALBUMIN SERPL-MCNC: 1.7 G/DL (ref 3.4–4.8)
ALBUMIN/GLOB SERPL: 0.7 RATIO (ref 1.1–2)
ALP SERPL-CCNC: 42 UNIT/L (ref 40–150)
ALT SERPL-CCNC: 19 UNIT/L (ref 0–55)
ANION GAP SERPL CALC-SCNC: 14 MEQ/L
AST SERPL-CCNC: 21 UNIT/L (ref 5–34)
BACTERIA BLD CULT: NORMAL
BACTERIA BLD CULT: NORMAL
BASOPHILS # BLD AUTO: 0.05 X10(3)/MCL
BASOPHILS NFR BLD AUTO: 0.4 %
BILIRUB SERPL-MCNC: 0.7 MG/DL
BUN SERPL-MCNC: 16.6 MG/DL (ref 8.4–25.7)
CALCIUM SERPL-MCNC: 7.7 MG/DL (ref 8.8–10)
CHLORIDE SERPL-SCNC: 103 MMOL/L (ref 98–107)
CO2 SERPL-SCNC: 20 MMOL/L (ref 23–31)
CREAT SERPL-MCNC: 1.5 MG/DL (ref 0.73–1.18)
CREAT/UREA NIT SERPL: 11
EOSINOPHIL # BLD AUTO: 0.15 X10(3)/MCL (ref 0–0.9)
EOSINOPHIL NFR BLD AUTO: 1.3 %
ERYTHROCYTE [DISTWIDTH] IN BLOOD BY AUTOMATED COUNT: 14.8 % (ref 11.5–17)
GFR SERPLBLD CREATININE-BSD FMLA CKD-EPI: 51 ML/MIN/1.73/M2
GLOBULIN SER-MCNC: 2.5 GM/DL (ref 2.4–3.5)
GLUCOSE SERPL-MCNC: 134 MG/DL (ref 82–115)
HCT VFR BLD AUTO: 26.1 % (ref 42–52)
HGB BLD-MCNC: 9 G/DL (ref 14–18)
IMM GRANULOCYTES # BLD AUTO: 0.09 X10(3)/MCL (ref 0–0.04)
IMM GRANULOCYTES NFR BLD AUTO: 0.8 %
LYMPHOCYTES # BLD AUTO: 0.52 X10(3)/MCL (ref 0.6–4.6)
LYMPHOCYTES NFR BLD AUTO: 4.5 %
MCH RBC QN AUTO: 31.1 PG (ref 27–31)
MCHC RBC AUTO-ENTMCNC: 34.5 G/DL (ref 33–36)
MCV RBC AUTO: 90.3 FL (ref 80–94)
MONOCYTES # BLD AUTO: 0.73 X10(3)/MCL (ref 0.1–1.3)
MONOCYTES NFR BLD AUTO: 6.4 %
NEUTROPHILS # BLD AUTO: 9.94 X10(3)/MCL (ref 2.1–9.2)
NEUTROPHILS NFR BLD AUTO: 86.6 %
NRBC BLD AUTO-RTO: 0 %
PLATELET # BLD AUTO: 155 X10(3)/MCL (ref 130–400)
PMV BLD AUTO: 10.6 FL (ref 7.4–10.4)
POCT GLUCOSE: 149 MG/DL (ref 70–110)
POCT GLUCOSE: 252 MG/DL (ref 70–110)
POTASSIUM SERPL-SCNC: 3.3 MMOL/L (ref 3.5–5.1)
PROT SERPL-MCNC: 4.2 GM/DL (ref 5.8–7.6)
RBC # BLD AUTO: 2.89 X10(6)/MCL (ref 4.7–6.1)
SODIUM SERPL-SCNC: 137 MMOL/L (ref 136–145)
WBC # BLD AUTO: 11.48 X10(3)/MCL (ref 4.5–11.5)

## 2024-09-18 PROCEDURE — 63600175 PHARM REV CODE 636 W HCPCS

## 2024-09-18 PROCEDURE — 97530 THERAPEUTIC ACTIVITIES: CPT | Mod: CQ

## 2024-09-18 PROCEDURE — 63600175 PHARM REV CODE 636 W HCPCS: Performed by: INTERNAL MEDICINE

## 2024-09-18 PROCEDURE — 25000003 PHARM REV CODE 250: Performed by: INTERNAL MEDICINE

## 2024-09-18 PROCEDURE — 97535 SELF CARE MNGMENT TRAINING: CPT | Mod: CO

## 2024-09-18 PROCEDURE — 20000000 HC ICU ROOM

## 2024-09-18 PROCEDURE — 36415 COLL VENOUS BLD VENIPUNCTURE: CPT

## 2024-09-18 PROCEDURE — 92610 EVALUATE SWALLOWING FUNCTION: CPT

## 2024-09-18 PROCEDURE — 27000221 HC OXYGEN, UP TO 24 HOURS

## 2024-09-18 PROCEDURE — 63600175 PHARM REV CODE 636 W HCPCS: Performed by: HOSPITALIST

## 2024-09-18 PROCEDURE — 85025 COMPLETE CBC W/AUTO DIFF WBC: CPT

## 2024-09-18 PROCEDURE — 80053 COMPREHEN METABOLIC PANEL: CPT

## 2024-09-18 PROCEDURE — 25000003 PHARM REV CODE 250

## 2024-09-18 RX ORDER — POTASSIUM CHLORIDE 7.45 MG/ML
30 INJECTION INTRAVENOUS
Status: DISCONTINUED | OUTPATIENT
Start: 2024-09-18 | End: 2024-09-18

## 2024-09-18 RX ORDER — POTASSIUM CHLORIDE 7.45 MG/ML
10 INJECTION INTRAVENOUS
Status: COMPLETED | OUTPATIENT
Start: 2024-09-18 | End: 2024-09-18

## 2024-09-18 RX ADMIN — POTASSIUM CHLORIDE 10 MEQ: 7.46 INJECTION, SOLUTION INTRAVENOUS at 06:09

## 2024-09-18 RX ADMIN — ATORVASTATIN CALCIUM 20 MG: 10 TABLET, FILM COATED ORAL at 09:09

## 2024-09-18 RX ADMIN — ASPIRIN 81 MG CHEWABLE TABLET 81 MG: 81 TABLET CHEWABLE at 09:09

## 2024-09-18 RX ADMIN — PANTOPRAZOLE SODIUM 40 MG: 40 INJECTION, POWDER, FOR SOLUTION INTRAVENOUS at 08:09

## 2024-09-18 RX ADMIN — PIPERACILLIN SODIUM AND TAZOBACTAM SODIUM 4.5 G: 4; .5 INJECTION, POWDER, LYOPHILIZED, FOR SOLUTION INTRAVENOUS at 05:09

## 2024-09-18 RX ADMIN — MICONAZOLE NITRATE 2 % TOPICAL POWDER: at 08:09

## 2024-09-18 RX ADMIN — PIPERACILLIN SODIUM AND TAZOBACTAM SODIUM 4.5 G: 4; .5 INJECTION, POWDER, LYOPHILIZED, FOR SOLUTION INTRAVENOUS at 01:09

## 2024-09-18 RX ADMIN — PIPERACILLIN SODIUM AND TAZOBACTAM SODIUM 4.5 G: 4; .5 INJECTION, POWDER, LYOPHILIZED, FOR SOLUTION INTRAVENOUS at 09:09

## 2024-09-18 RX ADMIN — FINASTERIDE 5 MG: 5 TABLET, FILM COATED ORAL at 09:09

## 2024-09-18 RX ADMIN — POTASSIUM CHLORIDE 10 MEQ: 7.46 INJECTION, SOLUTION INTRAVENOUS at 07:09

## 2024-09-18 RX ADMIN — INSULIN ASPART 3 UNITS: 100 INJECTION, SOLUTION INTRAVENOUS; SUBCUTANEOUS at 08:09

## 2024-09-18 RX ADMIN — TAMSULOSIN HYDROCHLORIDE 0.4 MG: 0.4 CAPSULE ORAL at 09:09

## 2024-09-18 RX ADMIN — RUGBY ZINC OXIDE 20%: 20 OINTMENT TOPICAL at 08:09

## 2024-09-18 RX ADMIN — SODIUM CHLORIDE, POTASSIUM CHLORIDE, SODIUM LACTATE AND CALCIUM CHLORIDE: 600; 310; 30; 20 INJECTION, SOLUTION INTRAVENOUS at 01:09

## 2024-09-18 RX ADMIN — POTASSIUM CHLORIDE 10 MEQ: 7.46 INJECTION, SOLUTION INTRAVENOUS at 05:09

## 2024-09-18 NOTE — PT/OT/SLP EVAL
[facility]  Speech Language Pathology Department  Clinical Swallow Evaluation    Patient Name:  Froy Barragan   MRN:  [id number]    Recommendations     General recommendations:  dysphagia therapy  Solid texture recommendation:  Puree  Liquid consistency recommendation: Thin   Medications: crushed in puree  Swallow strategies/precautions: small bites/sips, slow rate, and alternate solids/liquids  Precautions: aspiration    History     Froy Barragan is a/n 64 y.o. male admitted to Icu with septic shock, DWAINE and metabolic acidosis. Pt developed acute hypoxic respiratory failure requiring intubation for mechanical ventilation.     A Modified Barium Swallow study was completed on 9/12/24 which revealed signs/sx of moderate oral and mild pharyngeal dysphagia.    Pt re-upgraded to ICU due to hypotension and nursing reports poor tolerance of PO intake.     Past Medical History:   Diagnosis Date    Anemia of chronic disease 7/6/2022    Arteriosclerosis of coronary artery 7/6/2022    Cervical myelopathy 7/6/2022    Cobalamin deficiency 7/6/2022    Gastroesophageal reflux disease 7/6/2022    Hypertension 7/6/2022    Low vitamin D level 7/6/2022    Mixed hyperlipidemia 7/6/2022    Paraparesis 7/6/2022    Stage 3a chronic kidney disease 7/6/2022    Type 2 diabetes mellitus 7/6/2022     Past Surgical History:   Procedure Laterality Date    SPINE SURGERY       Prior Intubation HX:  9/9-9/11    Home diet texture/consistency: unknown  Current method of nutrition: NPO        Imaging   Results for orders placed during the hospital encounter of 09/08/24    X-Ray Chest 1 View    Narrative  EXAMINATION:  XR CHEST 1 VIEW    CPT 90668    CLINICAL HISTORY:  Hypoxia, increasing oxygen requirement;    COMPARISON:  September 16, 2024    FINDINGS:  Examination reveals cardiomediastinal silhouette to be unchanged as compared with the previous exam.    There is significant blunting of the right  costophrenic angle with significantly more opacification of the right hemithorax indicating the presence of a large right-sided pleural effusion.    Compressive atelectatic changes are identified, however, infiltrate/atelectasis cannot be completely excluded.    There is increased left retrocardiac density and silhouetting of the left hemidiaphragm although it might be related to pleural fluid it may also represent an infiltrate/atelectasis    Impression  Changes suggestive of bilateral pleural effusions with increased left retrocardiac density and some silhouetting of the left hemidiaphragm which might be related to pleural fluid and or represent an infiltrate/atelectasis.    Worsening changes on the right with a larger right-sided pleural effusion haziness and more opacities throughout the right lung and compressive atelectatic changes      Electronically signed by: Sha Horton  Date:    09/18/2024  Time:    05:58    Subjective     Patient awake, alert, and flat.    Patient goals: To eat and drink.    Spiritual/Cultural/Nondenominational Beliefs/Practices that affect care: no    Pain/Comfort: Pain Rating 1: 0/10    Respiratory Status:  8L via oximyzer    Restraints/positioning devices: soft mittens    Objective     ORAL MUSCULATURE  Dentition: own teeth  Secretion Management: adequate  Mucosal Quality: good  Facial Movement: WFL  Buccal Strength & Mobility: WFL  Mandibular Strength & Mobility: WFL  Oral Labial Strength & Mobility: impaired pursing and impaired seal  Lingual Strength & Mobility: impaired strength, impaired left lateral movement, and impaired right lateral movement  Vocal Quality: adequate    PO TRIALS  Consistency Fed By Oral Symptoms Pharyngeal Symptoms   Thin liquid by cup Self None None   Puree Self None None     Assessment     Pt presents with oral dysphagia requiring diet modification to improve swallowing safety and efficiency. No overt/clinical signs/sx of aspiration noted.     Outcome  Measures     Functional Oral Intake Scale: 5 - Total oral diet with multiple consistencies, by requiring special preparation or compensations    Goals     Multidisciplinary Problems       SLP Goals          Problem: SLP    Goal Priority Disciplines Outcome   SLP Goal     SLP Progressing   Description:   LTG: Tolerate least restrictive PO diet with no clinical signs/sx aspiration- progressing   STG: Tolerate half meal of Minced & Moist solids with adequate bolus formation and no clinical signs/sx aspiration- revised                       Education     Patient provided with verbal education regarding SLP POC.  Understanding was verbalized.    Plan     SLP Follow-Up:  Yes   Patient to be seen:  5 x/week   Plan of Care expires:  10/02/24  Plan of Care reviewed with:  patient     Time Tracking     SLP Treatment Date:   09/18/24  Speech Start Time:  1000  Speech Stop Time:  1015     Speech Total Time (min):  15 min    Billable minutes:  Swallow and Oral Function Evaluation, 15 minutes     09/18/2024

## 2024-09-18 NOTE — ASSESSMENT & PLAN NOTE
MRI brain w/o in 2021: L middle cerebellar peduncle restricting lesion concerning for acute infarct versus demyelinating plaque    -pt refused MRI brain, c and t spine w w/o  -continue therapy efforts  -consideration for o/p follow-up with neurology  -no further recommendations from Neurology standpoint   -call with any questions or concerns

## 2024-09-18 NOTE — SUBJECTIVE & OBJECTIVE
Subjective:     Interval History:   Patient refused MRIs brain and Spine yesterday with anesthesia.  Plan of care going forward, from neurology standpoint, discussed with patient and patient's family at bedside.    Current Neurological Medications:     Current Facility-Administered Medications   Medication Dose Route Frequency Provider Last Rate Last Admin    0.9%  NaCl infusion (for blood administration)   Intravenous Q24H PRN Marianela Gaona MD        0.9%  NaCl infusion (for blood administration)   Intravenous Q24H PRN Sanjuanita Conrad MD        0.9%  NaCl infusion   Intravenous PRN Hayden Quintanilla MD 5 mL/hr at 09/18/24 0604 Rate Verify at 09/18/24 0604    albuterol-ipratropium 2.5 mg-0.5 mg/3 mL nebulizer solution 3 mL  3 mL Nebulization Q6H PRN Hayden Quintanilla MD        aspirin chewable tablet 81 mg  81 mg Oral Daily Jose E Tristan DO   81 mg at 09/18/24 0908    atorvastatin tablet 20 mg  20 mg Oral Daily Debbie Trotter MD   20 mg at 09/18/24 0907    dexmedetomidine (PRECEDEX) 400mcg/100mL 0.9% NaCL infusion  0-1.4 mcg/kg/hr Intravenous Continuous Kyaw Naylor MD 7.5 mL/hr at 09/18/24 0604 0.6 mcg/kg/hr at 09/18/24 0604    dextrose 10% bolus 125 mL 125 mL  12.5 g Intravenous PRN Anthony Cochran MD   Stopped at 09/11/24 1724    dextrose 10% bolus 250 mL 250 mL  25 g Intravenous PRN Anthony Cochran  mL/hr at 09/17/24 1800 Rate Verify at 09/17/24 1800    dextrose 5 % and 0.45 % NaCl infusion   Intravenous Continuous PRN Anthony Cochran MD        finasteride tablet 5 mg  5 mg Oral Daily Debbie Trotter MD   5 mg at 09/18/24 0908    haloperidol lactate injection 1 mg  1 mg Intravenous Q6H PRN Jose E Tristan DO   1 mg at 09/11/24 2056    insulin aspart U-100 injection 0-10 Units  0-10 Units Subcutaneous QID (AC + HS) PRN Jake Lugo MD   1 Units at 09/17/24 2019    lactated ringers infusion   Intravenous Continuous Kyaw Naylor  mL/hr at 09/18/24 0604 Rate Verify at 09/18/24  0604    loperamide capsule 2 mg  2 mg Oral QID PRN Frank Awad MD   2 mg at 09/13/24 2158    magnesium sulfate 2g in water 50mL IVPB (premix)  2 g Intravenous PRN Jose E Tristan DO   Stopped at 09/16/24 0041    miconazole NITRATE 2 % top powder   Topical (Top) BID Froy Dougherty MD   Given at 09/18/24 0835    NORepinephrine 8 mg in dextrose 5% 250 mL infusion  0-3 mcg/kg/min (Dosing Weight) Intravenous Continuous Kyaw Naylor MD        pantoprazole injection 40 mg  40 mg Intravenous BID Debbie Trotter MD   40 mg at 09/18/24 0834    piperacillin-tazobactam (ZOSYN) 4.5 g in D5W 100 mL IVPB (MB+)  4.5 g Intravenous Q8H Debbie Trotter MD 25 mL/hr at 09/18/24 0957 4.5 g at 09/18/24 0957    sodium chloride 0.9% flush 10 mL  10 mL Intravenous PRN Anthony Cochran MD        sodium phosphate 20.01 mmol in D5W 250 mL IVPB  20.01 mmol Intravenous PRN Jose E Tristan DO        tamsulosin 24 hr capsule 0.4 mg  0.4 mg Oral Daily Debbie Trotter MD   0.4 mg at 09/18/24 0908    zinc oxide 20 % ointment   Topical (Top) BID Hayden Quintanilla MD   Given at 09/18/24 0835       Review of Systems  A 14pt ros was reviewed & is negative unless o/w documented in the hpi    Objective:     Vital Signs (Most Recent):  Temp: 98.1 °F (36.7 °C) (09/18/24 0345)  Pulse: 79 (09/18/24 0601)  Resp: 16 (09/18/24 0601)  BP: 122/84 (09/18/24 0601)  SpO2: (!) 94 % (09/18/24 0601) Vital Signs (24h Range):  Temp:  [98.1 °F (36.7 °C)-98.6 °F (37 °C)] 98.1 °F (36.7 °C)  Pulse:  [] 79  Resp:  [14-25] 16  SpO2:  [77 %-100 %] 94 %  BP: (122-167)/() 122/84  Arterial Line BP: (116-300)/() 131/56     Weight: 50 kg (110 lb 3.7 oz)  Body mass index is 18.92 kg/m².     Physical Exam   GENERAL: NAD, calm, cooperative, flat affect, awake/alert, sitting upright in recliner, on high flow nasal cannula  MENTAL STATUS: Oriented, follows commands reliably  SPEECH/LANGUAGE: Clear, coherent  CN:  EOMI gaze conjugate, visual fields  intact  PERRLA  Motor: BUE 3/5, BLE 0/5   DTRs: BUE +1, BLE 0  Gait: not observed         Significant Labs:   Recent Lab Results  (Last 5 results in the past 24 hours)        09/18/24  0510   09/18/24  0338   09/17/24 2013 09/17/24  1756   09/17/24  1441        Albumin/Globulin Ratio   0.7             Albumin   1.7             ALP   42             ALT   19             Anion Gap   14.0             AST   21             Baso #   0.05             Basophil %   0.4             BILIRUBIN TOTAL   0.7             BUN   16.6             BUN/CREAT RATIO   11             Calcium   7.7             Chloride   103             CO2   20             Creatinine   1.50             eGFR   51             Eos #   0.15             Eos %   1.3             Globulin, Total   2.5             Glucose   134             Hematocrit   26.1             Hemoglobin   9.0             Immature Grans (Abs)   0.09             Immature Granulocytes   0.8             Lymph #   0.52             LYMPH %   4.5             MCH   31.1             MCHC   34.5             MCV   90.3             Mono #   0.73             Mono %   6.4             MPV   10.6             Neut #   9.94             Neut %   86.6             nRBC   0.0             Platelet Count   155             POCT Glucose 149     169   176   191       Potassium   3.3             PROTEIN TOTAL   4.2             RBC   2.89             RDW   14.8             Sodium   137             WBC   11.48                                    Significant Imaging: I have reviewed all pertinent imaging results/findings within the past 24 hours.

## 2024-09-18 NOTE — PLAN OF CARE
Problem: Diabetes Comorbidity  Goal: Blood Glucose Level Within Targeted Range  Outcome: Progressing     Problem: Fall Injury Risk  Goal: Absence of Fall and Fall-Related Injury  Outcome: Progressing     Problem: Infection  Goal: Absence of Infection Signs and Symptoms  Outcome: Not Progressing     Problem: Adult Inpatient Plan of Care  Goal: Patient-Specific Goal (Individualized)  Outcome: Not Progressing     Problem: Skin Injury Risk Increased  Goal: Skin Health and Integrity  Outcome: Not Progressing     Problem: Wound  Goal: Optimal Coping  Outcome: Not Progressing

## 2024-09-18 NOTE — PROGRESS NOTES
[facility] - 7th Floor ICU  Neurology  Progress Note    Patient Name: Froy Barragan  MRN: [id number]  Admission Date: 9/8/2024  Hospital Length of Stay: 10 days  Code Status: No Order   Attending Provider: Hayden Quintanilla MD  Primary Care Physician: George Bran MD   Principal Problem:<principal problem not specified>    HPI:   64-year-old male with HTN, HLD, CAD s/p PCI, BPH with self catheterization, GERD, CKD 3, DM2, paraparesis and wheelchair-bound who presented to ED on 09/08 with complaints of weakness, elevated blood glucose, N/V/D, and UTI.  On admission, pt found to be in acute hypoxic respiratory failure requiring mechanical ventilation, AGMA with ketoacidosis requiring IV insulin for DKA, and urosepsis requiring vasopressor support and broad-spectrum antimicrobials.  Patient eventually hemodynamically stable and was extubated on 09/11.  Once patient stable patient's family at bedside endorsing concern for functional and cognitive decline over the last few weeks.  Strep bedside reports patient lives alone, is able to get around his home with his wheelchair and is able to transfer himself with BUE from wheelchair at baseline.  Reports his siblings visits him daily to help with medication management and meal preparation.    Patient has suffered with paraparesis over the last few years and has been wheelchair-bound, for which she underwent neurologic evaluation for in 2021, including MRI brain which revealed L middle cerebellar peduncle restricting lesion reflecting acute lacunar infarct versus active demyelinating plaque.  Of note, h/o familial paraparesis.    Labs significant for leukocytosis, microcytic anemia, BUN/creatinine 32.3/1.83,  (on 09/08), and bacteriuria    Overview/Hospital Course:  No notes on file        Subjective:     Interval History:   Patient refused MRIs brain and Spine yesterday with anesthesia.  Plan of care going forward, from neurology standpoint,  discussed with patient and patient's family at bedside.    Current Neurological Medications:     Current Facility-Administered Medications   Medication Dose Route Frequency Provider Last Rate Last Admin    0.9%  NaCl infusion (for blood administration)   Intravenous Q24H PRN Marianela Gaona MD        0.9%  NaCl infusion (for blood administration)   Intravenous Q24H PRN Sanjuanita Conrad MD        0.9%  NaCl infusion   Intravenous PRN Hayden Quintanilla MD 5 mL/hr at 09/18/24 0604 Rate Verify at 09/18/24 0604    albuterol-ipratropium 2.5 mg-0.5 mg/3 mL nebulizer solution 3 mL  3 mL Nebulization Q6H PRN Hayden Quintanilla MD        aspirin chewable tablet 81 mg  81 mg Oral Daily Jose E Tristan DO   81 mg at 09/18/24 0908    atorvastatin tablet 20 mg  20 mg Oral Daily Debbie Trotter MD   20 mg at 09/18/24 0907    dexmedetomidine (PRECEDEX) 400mcg/100mL 0.9% NaCL infusion  0-1.4 mcg/kg/hr Intravenous Continuous Kyaw Naylor MD 7.5 mL/hr at 09/18/24 0604 0.6 mcg/kg/hr at 09/18/24 0604    dextrose 10% bolus 125 mL 125 mL  12.5 g Intravenous PRN Anthony Cochran MD   Stopped at 09/11/24 1724    dextrose 10% bolus 250 mL 250 mL  25 g Intravenous PRN Anthony Cochran  mL/hr at 09/17/24 1800 Rate Verify at 09/17/24 1800    dextrose 5 % and 0.45 % NaCl infusion   Intravenous Continuous PRN Anthony Cochran MD        finasteride tablet 5 mg  5 mg Oral Daily Debbie Trotter MD   5 mg at 09/18/24 0908    haloperidol lactate injection 1 mg  1 mg Intravenous Q6H PRN Jose E Tristan DO   1 mg at 09/11/24 2056    insulin aspart U-100 injection 0-10 Units  0-10 Units Subcutaneous QID (AC + HS) PRN Jake Lugo MD   1 Units at 09/17/24 2019    lactated ringers infusion   Intravenous Continuous Kyaw Naylor  mL/hr at 09/18/24 0604 Rate Verify at 09/18/24 0604    loperamide capsule 2 mg  2 mg Oral QID PRN Frank Awad MD   2 mg at 09/13/24 4162    magnesium sulfate 2g in water 50mL IVPB (premix)  2 g  Intravenous PRN Jose E Tristan DO   Stopped at 09/16/24 0041    miconazole NITRATE 2 % top powder   Topical (Top) BID Froy Dougherty MD   Given at 09/18/24 0835    NORepinephrine 8 mg in dextrose 5% 250 mL infusion  0-3 mcg/kg/min (Dosing Weight) Intravenous Continuous Kyaw Naylor MD        pantoprazole injection 40 mg  40 mg Intravenous BID Debbie Trotter MD   40 mg at 09/18/24 0834    piperacillin-tazobactam (ZOSYN) 4.5 g in D5W 100 mL IVPB (MB+)  4.5 g Intravenous Q8H Debbie Trotter MD 25 mL/hr at 09/18/24 0957 4.5 g at 09/18/24 0957    sodium chloride 0.9% flush 10 mL  10 mL Intravenous PRN Anthony Cochran MD        sodium phosphate 20.01 mmol in D5W 250 mL IVPB  20.01 mmol Intravenous PRN Jose E Tristan DO        tamsulosin 24 hr capsule 0.4 mg  0.4 mg Oral Daily Debbie Trotter MD   0.4 mg at 09/18/24 0908    zinc oxide 20 % ointment   Topical (Top) BID Hayden Quintanilla MD   Given at 09/18/24 0835       Review of Systems  A 14pt ros was reviewed & is negative unless o/w documented in the hpi    Objective:     Vital Signs (Most Recent):  Temp: 98.1 °F (36.7 °C) (09/18/24 0345)  Pulse: 79 (09/18/24 0601)  Resp: 16 (09/18/24 0601)  BP: 122/84 (09/18/24 0601)  SpO2: (!) 94 % (09/18/24 0601) Vital Signs (24h Range):  Temp:  [98.1 °F (36.7 °C)-98.6 °F (37 °C)] 98.1 °F (36.7 °C)  Pulse:  [] 79  Resp:  [14-25] 16  SpO2:  [77 %-100 %] 94 %  BP: (122-167)/() 122/84  Arterial Line BP: (116-300)/() 131/56     Weight: 50 kg (110 lb 3.7 oz)  Body mass index is 18.92 kg/m².     Physical Exam   GENERAL: NAD, calm, cooperative, flat affect, awake/alert, sitting upright in recliner, on high flow nasal cannula  MENTAL STATUS: Oriented, follows commands reliably  SPEECH/LANGUAGE: Clear, coherent  CN:  EOMI gaze conjugate, visual fields intact  PERRLA  Motor: BUE 3/5, BLE 0/5   DTRs: BUE +1, BLE 0  Gait: not observed         Significant Labs:   Recent Lab Results  (Last 5 results in the past  24 hours)        09/18/24  0510   09/18/24  0338   09/17/24 2013 09/17/24  1756   09/17/24  1441        Albumin/Globulin Ratio   0.7             Albumin   1.7             ALP   42             ALT   19             Anion Gap   14.0             AST   21             Baso #   0.05             Basophil %   0.4             BILIRUBIN TOTAL   0.7             BUN   16.6             BUN/CREAT RATIO   11             Calcium   7.7             Chloride   103             CO2   20             Creatinine   1.50             eGFR   51             Eos #   0.15             Eos %   1.3             Globulin, Total   2.5             Glucose   134             Hematocrit   26.1             Hemoglobin   9.0             Immature Grans (Abs)   0.09             Immature Granulocytes   0.8             Lymph #   0.52             LYMPH %   4.5             MCH   31.1             MCHC   34.5             MCV   90.3             Mono #   0.73             Mono %   6.4             MPV   10.6             Neut #   9.94             Neut %   86.6             nRBC   0.0             Platelet Count   155             POCT Glucose 149     169   176   191       Potassium   3.3             PROTEIN TOTAL   4.2             RBC   2.89             RDW   14.8             Sodium   137             WBC   11.48                                    Significant Imaging: I have reviewed all pertinent imaging results/findings within the past 24 hours.  Assessment and Plan:     Paraparesis  MRI brain w/o in 2021: L middle cerebellar peduncle restricting lesion concerning for acute infarct versus demyelinating plaque    -pt refused MRI brain, c and t spine w w/o  -continue therapy efforts  -consideration for o/p follow-up with neurology for further evaluation and EMG  -no further recommendations from Neurology standpoint   -call with any questions or concerns        VTE Risk Mitigation (From admission, onward)           Ordered     IP VTE LOW RISK PATIENT  Once         09/09/24  0224     Place sequential compression device  Until discontinued         09/08/24 2032                    MIGUE Powell-BC  Inpatient Neurology  [facility] - 7th Floor ICU

## 2024-09-18 NOTE — PLAN OF CARE
Problem: SLP  Goal: SLP Goal  Description:   LTG: Tolerate least restrictive PO diet with no clinical signs/sx aspiration- progressing   STG: Tolerate half meal of Minced & Moist solids with adequate bolus formation and no clinical signs/sx aspiration- revised    Outcome: Progressing

## 2024-09-18 NOTE — NURSING
Nurses Note -- 4 Eyes      9/17/2024   10:22 PM      Skin assessed during: Q Shift Change      [] No Altered Skin Integrity Present    [x]Prevention Measures Documented      [x] Yes- Altered Skin Integrity Present or Discovered   [] LDA Added if Not in Epic (Describe Wound)   [] New Altered Skin Integrity was Present on Admit and Documented in LDA   [] Wound Image Taken    Wound Care Consulted? Yes    Attending Nurse:  Tanner Campo RN/Staff Member:  Monica

## 2024-09-18 NOTE — PLAN OF CARE
Sister, Kelin, in room. She gave top 3 choices for SNF. [facility], [facility], [facility]. Will send referral to [facility]. Filled out Passr and notified, СЕРГЕЙ Barone.

## 2024-09-18 NOTE — PT/OT/SLP PROGRESS
Physical Therapy Treatment    Patient Name:  Froy Barragan   MRN:  [id number]    Recommendations:     Discharge therapy intensity: Moderate Intensity Therapy   Discharge Equipment Recommendations: none  Barriers to discharge: Impaired mobility, Ongoing medical needs, and placement.    Assessment:     Froy Barragan is a 64 y.o. male admitted with a medical diagnosis of septic shock 2/2 UTI, DWAINE, acute respiratory failure requiring intubation.  He presents with the following impairments/functional limitations: weakness, impaired functional mobility, impaired endurance, impaired balance, decreased lower extremity function.      Rehab Prognosis: Fair; patient would benefit from acute skilled PT services to address these deficits and reach maximum level of function.    Recent Surgery: Procedure(s) (LRB):  MRI (Magnetic Resonance Imagine) (N/A) 1 Day Post-Op    Plan:     During this hospitalization, patient would benefit from acute PT services 5 x/week to address the identified rehab impairments via therapeutic exercises, therapeutic activities, wheelchair management/training and progress toward the following goals:    Plan of Care Expires:  10/19/24    Subjective     Chief Complaint: none stated  Patient/Family Comments/goals:   Pain/Comfort:         Objective:     Communicated with RN prior to session.  Patient found right sidelying with peripheral IV, blood pressure cuff, telemetry, pulse ox (continuous), SCD, colbert catheter upon PT entry to room.     General Precautions: Standard, aspiration, fall  Orthopedic Precautions: N/A  Braces: N/A  Respiratory Status: High flow Nasal cannula, flow 8 L/min  Skin Integrity: Visible skin intact      Functional Mobility:  Bed Mobility:     Supine to Sit: total assistance and of 2 persons  Transfers:     Sit to Stand:  total assistance and of 2 persons with no AD  Bed to Chair: total assistance and of 2 persons with  no AD  using  Squat Pivot  Balance: MaxA-CGA;  MaxA at first, then CGA when pt was able to use BUE on bed rails    Therapeutic Activities/Exercises:  Pt seated EOB ~8min MaxA-CGA; O2 desat to 87% w/quick recovery to 93%; vc for pursed lip breathing.  BLE LAQ x 10  Anterior WS w/BUE use x 10  See ROBLERO note for ADL  STS x 2 totAx2; able to clear hips briefly    Education:  Patient provided with verbal education education regarding PT role/goals/POC, fall prevention, and safety awareness.  Understanding was verbalized.     Patient left up in chair with all lines intact, call button in reach, geomat cushion, and RN notified    GOALS:   Multidisciplinary Problems       Physical Therapy Goals          Problem: Physical Therapy    Goal Priority Disciplines Outcome Goal Variances Interventions   Physical Therapy Goal     PT, PT/OT Progressing     Description: Goals to be met by: d/c     Patient will increase functional independence with mobility by performin. Supine to sit with Stand-by Assistance  2. Sit to supine with Stand-by Assistance  3. Sit to stand transfer with Stand-by Assistance  4. Bed to chair transfer with Stand-by Assistance using No Assistive Device scoot pivot vs SB  5. Wheelchair propulsion x100 feet with Supervision using bilateral uppper extremities                         Time Tracking:     PT Received On: 24  PT Start Time: 829     PT Stop Time: 900  PT Total Time (min): 31 min     Billable Minutes: Therapeutic Activity 31    Treatment Type: Treatment  PT/PTA: PTA     Number of PTA visits since last PT visit: 4     2024

## 2024-09-18 NOTE — PROGRESS NOTES
[facility] - Emergency Dept  /Pulmonary Critical Care Note/    Patient Name: Froy Barragan  MRN: [id number]  Admission Date: 9/8/2024  Hospital Length of Stay: 10 days  Code Status: No Order  Attending Provider: Hayden Quintanilla MD  Primary Care Provider: George Bran MD     Subjective:     HPI:   Patient is a 64-year-old male with a past medical history of hypertension, hyperlipidemia, CAD S/P PCI, BPH with current self catheterization, and HFpEF who initially presented to the ED with a complaint of nausea/vomiting for the last 1 week.  He is accompanied by his sister, patient's legal guardian, who assisted with history.  They state that patient has been experiencing nonbloody vomiting over the last 1 week.  She also reports about a 2 day history of urinary incontinence, worse than baseline, and uncontrollable nonbloody diarrhea.  She reports that patient was wheelchair-bound due to chronic weakness in his bilateral legs for several years.  Patient currently perform self catheterization at home but his sister states that it has been 1-2 days since he self catheterized himself.  She reports patient had multiple UTIs in the past.  She also reports that patient has had difficulty monitoring his blood glucose levels at home but he reports full compliance with all prescribed medications.  Patient denies any fever, chills, hematemesis, abdominal pain, suprapubic tenderness, melena.     In the ED, initial POCT glucose elevated greater than 500.  CMP with bicarb of 16, serum creatinine elevated at 3.4 with previous baseline at a proximally 1.5.  CBG elevated at 607.  BOHB 1.4.  UA displayed 4+ glycosuria, 3+ hematuria, but no ketonuria.  Indicative of UTI as well.  ABG with slight acidosis with a pH of  7.290.  ICU was consulted for admission due to concerns for DKA.      Hospital Course/Significant events:  9/8/2024; admitted to ICU and developed worsening hypotension and respiratory failure  requiring intubation that night  9/10/2024- extubated  9/15/2024-upgraded to ICU due to hypotension        24 Hour Interval History:  Yesterday patient refused MRI.  This morning he is on low-dose Precedex and nasal cannula oxygen.  He is on no vasopressor support.  Apparently family is coming in to talk with the patient about their and his desires.    Review of systems negative unless documented in the history of present illness.         Past Medical History:   Diagnosis Date    Anemia of chronic disease 7/6/2022    Arteriosclerosis of coronary artery 7/6/2022    Cervical myelopathy 7/6/2022    Cobalamin deficiency 7/6/2022    Gastroesophageal reflux disease 7/6/2022    Hypertension 7/6/2022    Low vitamin D level 7/6/2022    Mixed hyperlipidemia 7/6/2022    Paraparesis 7/6/2022    Stage 3a chronic kidney disease 7/6/2022    Type 2 diabetes mellitus 7/6/2022       Past Surgical History:   Procedure Laterality Date    SPINE SURGERY         Social History     Socioeconomic History    Marital status: Single   Tobacco Use    Smoking status: Never    Smokeless tobacco: Never   Substance and Sexual Activity    Alcohol use: Never    Drug use: Never     Social Determinants of Health     Financial Resource Strain: Patient Unable To Answer (9/17/2024)    Overall Financial Resource Strain (CARDIA)     Difficulty of Paying Living Expenses: Patient unable to answer   Food Insecurity: Patient Unable To Answer (9/17/2024)    Hunger Vital Sign     Worried About Running Out of Food in the Last Year: Patient unable to answer     Ran Out of Food in the Last Year: Patient unable to answer   Transportation Needs: Patient Unable To Answer (9/17/2024)    TRANSPORTATION NEEDS     Transportation : Patient unable to answer   Physical Activity: Inactive (4/25/2024)    Exercise Vital Sign     Days of Exercise per Week: 0 days     Minutes of Exercise per Session: 10 min   Stress: Patient Unable To Answer (9/17/2024)    [facility]  Occupational Health - Occupational Stress Questionnaire     Feeling of Stress : Patient unable to answer   Housing Stability: Patient Unable To Answer (9/17/2024)    Housing Stability Vital Sign     Unable to Pay for Housing in the Last Year: Patient unable to answer     Homeless in the Last Year: Patient unable to answer       Current Outpatient Medications   Medication Instructions    alcohol swabs (BD ALCOHOL SWABS) PadM 1 each, Topical (Top), Daily    alfuzosin (UROXATRAL) 10 mg Tb24 TAKE 1 TABLET EVERY DAY WITH BREAKFAST    amLODIPine (NORVASC) 2.5 mg, Oral    aspirin (ECOTRIN) 81 mg, Oral, Daily    b complex vitamins capsule 1 capsule, Oral, Daily    blood glucose control, low (TRUE METRIX LEVEL 1) Soln 1 Bottle, Misc.(Non-Drug; Combo Route), Daily    blood-glucose meter (TRUE METRIX AIR GLUCOSE METER) Misc 1 each, Misc.(Non-Drug; Combo Route), Daily    blood-glucose sensor (DEXCOM G7 SENSOR) Carmen 1 each, Misc.(Non-Drug; Combo Route), Daily    carvediloL (COREG) 25 mg, Oral, 2 times daily    ezetimibe (ZETIA) 10 mg, Oral, Daily    finasteride (PROSCAR) 5 mg, Oral, Daily    glimepiride (AMARYL) 2 mg, Oral, Before breakfast    lancets (TRUEPLUS LANCETS) 33 gauge Misc 1 lancet , Misc.(Non-Drug; Combo Route), Daily    metFORMIN (GLUCOPHAGE) 1,000 mg, Oral, 2 times daily    pantoprazole (PROTONIX) 40 mg, Oral    rosuvastatin (CRESTOR) 40 mg, Oral, Daily    TRUE METRIX GLUCOSE TEST STRIP Strp TEST BLOOD SUGAR EVERY DAY    vitamin D (VITAMIN D3) 1,000 Units, Oral, Daily       Current Inpatient Medications   aspirin  81 mg Oral Daily    atorvastatin  20 mg Oral Daily    finasteride  5 mg Oral Daily    miconazole NITRATE 2 %   Topical (Top) BID    pantoprazole  40 mg Intravenous BID    piperacillin-tazobactam (Zosyn) IV (PEDS and ADULTS) (extended infusion is not appropriate)  4.5 g Intravenous Q8H    tamsulosin  0.4 mg Oral Daily    zinc oxide   Topical (Top) BID       Current Intravenous Infusions   dexmedeTOMIDine  (Precedex) infusion (titrating)  0-1.4 mcg/kg/hr Intravenous Continuous 7.5 mL/hr at 09/18/24 0604 0.6 mcg/kg/hr at 09/18/24 0604    D5 and 0.45% NaCl   Intravenous Continuous PRN        lactated ringers   Intravenous Continuous 100 mL/hr at 09/18/24 0604 Rate Verify at 09/18/24 0604    NORepinephrine bitartrate-D5W  0-3 mcg/kg/min (Dosing Weight) Intravenous Continuous               Objective:       Intake/Output Summary (Last 24 hours) at 9/18/2024 0943  Last data filed at 9/18/2024 0604  Gross per 24 hour   Intake 2723.78 ml   Output 3000 ml   Net -276.22 ml         Vital Signs (Most Recent):  Temp: 98.1 °F (36.7 °C) (09/18/24 0345)  Pulse: 79 (09/18/24 0601)  Resp: 16 (09/18/24 0601)  BP: 122/84 (09/18/24 0601)  SpO2: (!) 94 % (09/18/24 0601)  Body mass index is 18.92 kg/m².  Weight: 50 kg (110 lb 3.7 oz) Vital Signs (24h Range):  Temp:  [98.1 °F (36.7 °C)-98.6 °F (37 °C)] 98.1 °F (36.7 °C)  Pulse:  [] 79  Resp:  [14-25] 16  SpO2:  [77 %-100 %] 94 %  BP: (122-167)/() 122/84  Arterial Line BP: (116-300)/() 131/56         Physical exam:   Gen- awake and alert, no distress on nasal cannula oxygen  HENT- ATNC, MMM  CV- RRR  Resp- faint right lower lung field inspiratory crackles, normal work of breathing   MSK- WWP, thin extremities  Neuro- awake and alert, AXEL, no gross deficits  Psych- flat affect, conversant and cooperative with exam         Lines/Drains/Airways       Drain  Duration                  Urethral Catheter 09/08/24 1810 Straight-tip 16 Fr. 9 days              Arterial Line  Duration             Arterial Line 09/15/24 1515 Right Radial 2 days              Peripheral Intravenous Line  Duration                  Peripheral IV - Single Lumen 20 G Anterior;Proximal;Right Forearm -- days         Peripheral IV - Single Lumen 09/08/24 1700 18 G Left Antecubital 9 days         Peripheral IV - Single Lumen 09/08/24 1915 20 G Anterior;Left Forearm 9 days                    Significant  Labs:    Lab Results   Component Value Date    WBC 11.48 09/18/2024    HGB 9.0 (L) 09/18/2024    HCT 26.1 (L) 09/18/2024    MCV 90.3 09/18/2024     09/18/2024     BMP  Lab Results   Component Value Date     09/18/2024    K 3.3 (L) 09/18/2024    CO2 20 (L) 09/18/2024    BUN 16.6 09/18/2024    CREATININE 1.50 (H) 09/18/2024    CALCIUM 7.7 (L) 09/18/2024    AGAP 14.0 09/18/2024    EGFRNONAA 47 (L) 01/11/2022         Assessment/Plan:     Assessment  Right lower lobe consolidation with small right pleural effusion  Septic shock secondary to UTI of Klebsiella and Providencia treated appropriately with Zosyn  Diabetes mellitus well controlled with sliding scale insulin  DWAINE on CKD secondary to volume depletion.  Stabilized and improving  Chronic paraplegia and wheelchair-bound over the past few years.  Being evaluated by Neurology  Abrupt drop in hemoglobin without evidence of active bleeding.  CT showed possible iliopsoas hematoma.  Hemoglobin now stabilized post transfusion total of 3 units packed red blood cells.  The patient apparently declining further therapies and long-term plans including code status need to be addressed    Plan  Continue Zosyn antibiotic therapy.  Discussion apparently is to occur later today with the patient and his sister.  This will determine long-term plans including code status and whether to continue invasive evaluation versus conservative care  Hold Lovenox until status more clear.  Continue supplemental oxygen, wean as tolerated; Likely benefit from BiPAP at night        DVT prophylaxis:  SCDs.  Chemical on hold with drop in hemoglobin  GI prophylaxis:  IV Protonix         Hayden Quintanilla MD  Pulmonary Critical Care Medicine  [facility]   DOS: 09/18/2024

## 2024-09-18 NOTE — PLAN OF CARE
Problem: Infection  Goal: Absence of Infection Signs and Symptoms  Outcome: Progressing     Problem: Adult Inpatient Plan of Care  Goal: Plan of Care Review  Outcome: Progressing  Goal: Patient-Specific Goal (Individualized)  Outcome: Progressing  Goal: Absence of Hospital-Acquired Illness or Injury  Outcome: Progressing  Goal: Optimal Comfort and Wellbeing  Outcome: Progressing  Goal: Readiness for Transition of Care  Outcome: Progressing     Problem: Diabetes Comorbidity  Goal: Blood Glucose Level Within Targeted Range  Outcome: Progressing     Problem: Skin Injury Risk Increased  Goal: Skin Health and Integrity  Outcome: Progressing     Problem: Fall Injury Risk  Goal: Absence of Fall and Fall-Related Injury  Outcome: Progressing     Problem: Restraint, Nonviolent  Goal: Absence of Harm or Injury  Outcome: Progressing

## 2024-09-19 ENCOUNTER — ANESTHESIA (OUTPATIENT)
Dept: SURGERY | Facility: HOSPITAL | Age: 64
End: 2024-09-19
Payer: MEDICARE

## 2024-09-19 LAB
ALBUMIN SERPL-MCNC: 1.9 G/DL (ref 3.4–4.8)
ALBUMIN/GLOB SERPL: 0.5 RATIO (ref 1.1–2)
ALP SERPL-CCNC: 48 UNIT/L (ref 40–150)
ALT SERPL-CCNC: 20 UNIT/L (ref 0–55)
ANION GAP SERPL CALC-SCNC: 13 MEQ/L
AQP4 H2O CHANNEL IGG SERPL QL: NEGATIVE
AST SERPL-CCNC: 17 UNIT/L (ref 5–34)
BASOPHILS # BLD AUTO: 0.07 X10(3)/MCL
BASOPHILS NFR BLD AUTO: 0.5 %
BILIRUB SERPL-MCNC: 0.7 MG/DL
BUN SERPL-MCNC: 17.6 MG/DL (ref 8.4–25.7)
CALCIUM SERPL-MCNC: 8.4 MG/DL (ref 8.8–10)
CHLORIDE SERPL-SCNC: 103 MMOL/L (ref 98–107)
CO2 SERPL-SCNC: 23 MMOL/L (ref 23–31)
CREAT SERPL-MCNC: 1.7 MG/DL (ref 0.73–1.18)
CREAT/UREA NIT SERPL: 10
EOSINOPHIL # BLD AUTO: 0.14 X10(3)/MCL (ref 0–0.9)
EOSINOPHIL NFR BLD AUTO: 1 %
ERYTHROCYTE [DISTWIDTH] IN BLOOD BY AUTOMATED COUNT: 15 % (ref 11.5–17)
GFR SERPLBLD CREATININE-BSD FMLA CKD-EPI: 44 ML/MIN/1.73/M2
GLOBULIN SER-MCNC: 3.6 GM/DL (ref 2.4–3.5)
GLUCOSE SERPL-MCNC: 170 MG/DL (ref 82–115)
HCT VFR BLD AUTO: 31.9 % (ref 42–52)
HGB BLD-MCNC: 11 G/DL (ref 14–18)
IMM GRANULOCYTES # BLD AUTO: 0.12 X10(3)/MCL (ref 0–0.04)
IMM GRANULOCYTES NFR BLD AUTO: 0.8 %
IMMUNOLOGIST REVIEW: NORMAL
LYMPHOCYTES # BLD AUTO: 0.72 X10(3)/MCL (ref 0.6–4.6)
LYMPHOCYTES NFR BLD AUTO: 5.1 %
MCH RBC QN AUTO: 31.3 PG (ref 27–31)
MCHC RBC AUTO-ENTMCNC: 34.5 G/DL (ref 33–36)
MCV RBC AUTO: 90.9 FL (ref 80–94)
MOG IGG1 SERPL QL FC: NEGATIVE
MONOCYTES # BLD AUTO: 0.86 X10(3)/MCL (ref 0.1–1.3)
MONOCYTES NFR BLD AUTO: 6 %
NEUTROPHILS # BLD AUTO: 12.33 X10(3)/MCL (ref 2.1–9.2)
NEUTROPHILS NFR BLD AUTO: 86.6 %
NRBC BLD AUTO-RTO: 0 %
PLATELET # BLD AUTO: 209 X10(3)/MCL (ref 130–400)
PMV BLD AUTO: 10.5 FL (ref 7.4–10.4)
POCT GLUCOSE: 154 MG/DL (ref 70–110)
POCT GLUCOSE: 173 MG/DL (ref 70–110)
POCT GLUCOSE: 448 MG/DL (ref 70–110)
POTASSIUM SERPL-SCNC: 3.5 MMOL/L (ref 3.5–5.1)
PROT SERPL-MCNC: 5.5 GM/DL (ref 5.8–7.6)
RBC # BLD AUTO: 3.51 X10(6)/MCL (ref 4.7–6.1)
SODIUM SERPL-SCNC: 139 MMOL/L (ref 136–145)
WBC # BLD AUTO: 14.24 X10(3)/MCL (ref 4.5–11.5)

## 2024-09-19 PROCEDURE — A9577 INJ MULTIHANCE: HCPCS | Performed by: INTERNAL MEDICINE

## 2024-09-19 PROCEDURE — 27000221 HC OXYGEN, UP TO 24 HOURS

## 2024-09-19 PROCEDURE — 63600175 PHARM REV CODE 636 W HCPCS: Performed by: INTERNAL MEDICINE

## 2024-09-19 PROCEDURE — 37000008 HC ANESTHESIA 1ST 15 MINUTES: Performed by: ANESTHESIOLOGY

## 2024-09-19 PROCEDURE — 11000001 HC ACUTE MED/SURG PRIVATE ROOM

## 2024-09-19 PROCEDURE — 97530 THERAPEUTIC ACTIVITIES: CPT | Mod: CQ

## 2024-09-19 PROCEDURE — 80053 COMPREHEN METABOLIC PANEL: CPT

## 2024-09-19 PROCEDURE — 21400001 HC TELEMETRY ROOM

## 2024-09-19 PROCEDURE — 25000003 PHARM REV CODE 250: Performed by: INTERNAL MEDICINE

## 2024-09-19 PROCEDURE — 99900035 HC TECH TIME PER 15 MIN (STAT)

## 2024-09-19 PROCEDURE — 36415 COLL VENOUS BLD VENIPUNCTURE: CPT

## 2024-09-19 PROCEDURE — 63600175 PHARM REV CODE 636 W HCPCS: Performed by: NURSE ANESTHETIST, CERTIFIED REGISTERED

## 2024-09-19 PROCEDURE — 63600175 PHARM REV CODE 636 W HCPCS

## 2024-09-19 PROCEDURE — 25000003 PHARM REV CODE 250: Performed by: NURSE ANESTHETIST, CERTIFIED REGISTERED

## 2024-09-19 PROCEDURE — 94760 N-INVAS EAR/PLS OXIMETRY 1: CPT

## 2024-09-19 PROCEDURE — 97535 SELF CARE MNGMENT TRAINING: CPT | Mod: CO

## 2024-09-19 PROCEDURE — 94799 UNLISTED PULMONARY SVC/PX: CPT

## 2024-09-19 PROCEDURE — 99900031 HC PATIENT EDUCATION (STAT)

## 2024-09-19 PROCEDURE — 85025 COMPLETE CBC W/AUTO DIFF WBC: CPT

## 2024-09-19 PROCEDURE — 25500020 PHARM REV CODE 255: Performed by: INTERNAL MEDICINE

## 2024-09-19 PROCEDURE — 37000009 HC ANESTHESIA EA ADD 15 MINS: Performed by: ANESTHESIOLOGY

## 2024-09-19 RX ORDER — ONDANSETRON HYDROCHLORIDE 2 MG/ML
INJECTION, SOLUTION INTRAVENOUS
Status: DISCONTINUED | OUTPATIENT
Start: 2024-09-19 | End: 2024-09-19

## 2024-09-19 RX ORDER — LIDOCAINE HYDROCHLORIDE 20 MG/ML
INJECTION, SOLUTION EPIDURAL; INFILTRATION; INTRACAUDAL; PERINEURAL
Status: DISCONTINUED | OUTPATIENT
Start: 2024-09-19 | End: 2024-09-19

## 2024-09-19 RX ORDER — DEXAMETHASONE SODIUM PHOSPHATE 4 MG/ML
INJECTION, SOLUTION INTRA-ARTICULAR; INTRALESIONAL; INTRAMUSCULAR; INTRAVENOUS; SOFT TISSUE
Status: DISCONTINUED | OUTPATIENT
Start: 2024-09-19 | End: 2024-09-19

## 2024-09-19 RX ORDER — GLUCAGON 1 MG
1 KIT INJECTION
Status: CANCELLED | OUTPATIENT
Start: 2024-09-19

## 2024-09-19 RX ORDER — ONDANSETRON HYDROCHLORIDE 2 MG/ML
4 INJECTION, SOLUTION INTRAVENOUS DAILY PRN
Status: CANCELLED | OUTPATIENT
Start: 2024-09-19

## 2024-09-19 RX ORDER — PHENYLEPHRINE HCL IN 0.9% NACL 1 MG/10 ML
SYRINGE (ML) INTRAVENOUS
Status: DISCONTINUED | OUTPATIENT
Start: 2024-09-19 | End: 2024-09-19

## 2024-09-19 RX ORDER — DIPHENHYDRAMINE HYDROCHLORIDE 50 MG/ML
25 INJECTION INTRAMUSCULAR; INTRAVENOUS EVERY 6 HOURS PRN
Status: CANCELLED | OUTPATIENT
Start: 2024-09-19

## 2024-09-19 RX ORDER — OXYCODONE HYDROCHLORIDE 5 MG/1
5 TABLET ORAL
Status: CANCELLED | OUTPATIENT
Start: 2024-09-19

## 2024-09-19 RX ORDER — HYDROMORPHONE HYDROCHLORIDE 2 MG/ML
0.2 INJECTION, SOLUTION INTRAMUSCULAR; INTRAVENOUS; SUBCUTANEOUS EVERY 5 MIN PRN
Status: CANCELLED | OUTPATIENT
Start: 2024-09-19

## 2024-09-19 RX ORDER — PROPOFOL 10 MG/ML
VIAL (ML) INTRAVENOUS
Status: DISCONTINUED | OUTPATIENT
Start: 2024-09-19 | End: 2024-09-19

## 2024-09-19 RX ADMIN — LIDOCAINE HYDROCHLORIDE 80 MG: 20 INJECTION, SOLUTION INTRAVENOUS at 01:09

## 2024-09-19 RX ADMIN — Medication 100 MCG: at 01:09

## 2024-09-19 RX ADMIN — RUGBY ZINC OXIDE 20%: 20 OINTMENT TOPICAL at 09:09

## 2024-09-19 RX ADMIN — PIPERACILLIN SODIUM AND TAZOBACTAM SODIUM 4.5 G: 4; .5 INJECTION, POWDER, LYOPHILIZED, FOR SOLUTION INTRAVENOUS at 05:09

## 2024-09-19 RX ADMIN — GADOBENATE DIMEGLUMINE 10 ML: 529 INJECTION, SOLUTION INTRAVENOUS at 04:09

## 2024-09-19 RX ADMIN — PHENYLEPHRINE HYDROCHLORIDE 40 MCG/MIN: 10 INJECTION INTRAVENOUS at 01:09

## 2024-09-19 RX ADMIN — PROPOFOL 100 MG: 10 INJECTION, EMULSION INTRAVENOUS at 01:09

## 2024-09-19 RX ADMIN — Medication 150 MCG: at 03:09

## 2024-09-19 RX ADMIN — SODIUM CHLORIDE, POTASSIUM CHLORIDE, SODIUM LACTATE AND CALCIUM CHLORIDE: 600; 310; 30; 20 INJECTION, SOLUTION INTRAVENOUS at 12:09

## 2024-09-19 RX ADMIN — INSULIN ASPART 2 UNITS: 100 INJECTION, SOLUTION INTRAVENOUS; SUBCUTANEOUS at 05:09

## 2024-09-19 RX ADMIN — SODIUM CHLORIDE: 9 INJECTION, SOLUTION INTRAVENOUS at 01:09

## 2024-09-19 RX ADMIN — ONDANSETRON 4 MG: 2 INJECTION INTRAMUSCULAR; INTRAVENOUS at 03:09

## 2024-09-19 RX ADMIN — PIPERACILLIN SODIUM AND TAZOBACTAM SODIUM 4.5 G: 4; .5 INJECTION, POWDER, LYOPHILIZED, FOR SOLUTION INTRAVENOUS at 01:09

## 2024-09-19 RX ADMIN — MICONAZOLE NITRATE 2 % TOPICAL POWDER: at 09:09

## 2024-09-19 RX ADMIN — PANTOPRAZOLE SODIUM 40 MG: 40 INJECTION, POWDER, FOR SOLUTION INTRAVENOUS at 09:09

## 2024-09-19 RX ADMIN — Medication 150 MCG: at 01:09

## 2024-09-19 RX ADMIN — INSULIN ASPART 5 UNITS: 100 INJECTION, SOLUTION INTRAVENOUS; SUBCUTANEOUS at 09:09

## 2024-09-19 RX ADMIN — DEXAMETHASONE SODIUM PHOSPHATE 4 MG: 4 INJECTION, SOLUTION INTRA-ARTICULAR; INTRALESIONAL; INTRAMUSCULAR; INTRAVENOUS; SOFT TISSUE at 01:09

## 2024-09-19 RX ADMIN — SODIUM CHLORIDE, POTASSIUM CHLORIDE, SODIUM LACTATE AND CALCIUM CHLORIDE: 600; 310; 30; 20 INJECTION, SOLUTION INTRAVENOUS at 09:09

## 2024-09-19 NOTE — PT/OT/SLP PROGRESS
Physical Therapy Treatment    Patient Name:  Froy Barragan   MRN:  [id number]    Recommendations:     Discharge therapy intensity: Moderate Intensity Therapy   Discharge Equipment Recommendations: none  Barriers to discharge: Impaired mobility, Ongoing medical needs, and placement.    Assessment:     Froy Barragan is a 64 y.o. male admitted with a medical diagnosis of septic shock 2/2 UTI, DWAINE, acute respiratory failure requiring intubation.  He presents with the following impairments/functional limitations: weakness, impaired functional mobility, impaired endurance, impaired balance, decreased lower extremity function.    Rehab Prognosis: Fair; patient would benefit from acute skilled PT services to address these deficits and reach maximum level of function.    Recent Surgery: Procedure(s) (LRB):  MRI (Magnetic Resonance Imagine) (N/A) 2 Days Post-Op    Plan:     During this hospitalization, patient would benefit from acute PT services 5 x/week to address the identified rehab impairments via therapeutic exercises, therapeutic activities, wheelchair management/training and progress toward the following goals:    Plan of Care Expires:  10/19/24    Subjective     Chief Complaint: none  Patient/Family Comments/goals:   Pain/Comfort:         Objective:     Communicated with RN prior to session.  Patient found HOB elevated with peripheral IV, blood pressure cuff, telemetry, pulse ox (continuous), SCD, colbert catheter upon PT entry to room.     General Precautions: Standard, aspiration, fall  Orthopedic Precautions: N/A  Braces: N/A  Respiratory Status:  Oxymizer 4L  Skin Integrity:Visible skin intact       Functional Mobility:  Bed Mobility:     Supine to Sit: maximal assistance and of 2 persons  Transfers:     Sit to Stand:  total assistance and of 2 persons with rolling walker  Bed to Chair: total assistance with  no AD  using  Squat Pivot  Balance: SBA-CGA static seated balance    Therapeutic  Activities/Exercises:  Seated EOB ~8min SBA-CGA  See ROBLERO note for ADL's  STS x 2 totAx2 w/RW; vc for push through BUE to improve standing balance, but no initiation.    Education:  Patient provided with verbal education education regarding PT role/goals/POC, fall prevention, and safety awareness.  Understanding was verbalized.     Patient left up in chair with all lines intact, call button in reach, and RN notified    GOALS:   Multidisciplinary Problems       Physical Therapy Goals          Problem: Physical Therapy    Goal Priority Disciplines Outcome Goal Variances Interventions   Physical Therapy Goal     PT, PT/OT Progressing     Description: Goals to be met by: d/c     Patient will increase functional independence with mobility by performin. Supine to sit with Stand-by Assistance  2. Sit to supine with Stand-by Assistance  3. Sit to stand transfer with Stand-by Assistance  4. Bed to chair transfer with Stand-by Assistance using No Assistive Device scoot pivot vs SB  5. Wheelchair propulsion x100 feet with Supervision using bilateral uppper extremities                         Time Tracking:     PT Received On: 24  PT Start Time: 846     PT Stop Time: 910  PT Total Time (min): 24 min     Billable Minutes: Therapeutic Activity 24    Treatment Type: Treatment  PT/PTA: PTA     Number of PTA visits since last PT visit: 5     2024

## 2024-09-19 NOTE — NURSING
Patient received on bed accompanied by nurse x2. Pt alert with delayed responses. Lung sounds clear. Sister at bedside. Catheter noted with clear urine. Bed in lowest position. Side rails up x2.

## 2024-09-19 NOTE — PT/OT/SLP PROGRESS
[facility]  Speech Language Pathology Department  Diet Tolerance Follow-up    Patient Name:  Froy Barragan   MRN:  [id number]  Admitting Diagnosis: septic shock    Recommendations     General recommendations:  dysphagia therapy  Solid texture recommendation:  Puree  Liquid consistency recommendation: Thin   Medications: crushed in puree  Swallow strategies/precautions: small bites/sips, slow rate, and alternate solids/liquids  Precautions: aspiration    Diet Tolerance     Nursing reports no difficulty regarding diet tolerance.    09/19/2024

## 2024-09-19 NOTE — PT/OT/SLP PROGRESS
Occupational Therapy   Treatment    Name: Froy Barragan  MRN: [id number]  Admitting Diagnosis:  UTI  Day of Surgery    Recommendations:     Recommended therapy intensity at discharge: Moderate Intensity Therapy   Discharge Equipment Recommendations:  to be determined by next level of care  Barriers to discharge:       Assessment:     Froy Barragan is a 64 y.o. male with a medical diagnosis of UTI.  He presents with flat affect, appears depressed, requiring much motivation throughout session for participation. Pt. Requiring Max A during t/f to BS chair, pt. Is a fall risk recommending Mod intensity therapy pending progress. Performance deficits affecting function are weakness, impaired endurance, impaired self care skills, impaired functional mobility, impaired balance.     Rehab Prognosis:  Fair; patient would benefit from acute skilled OT services to address these deficits and reach maximum level of function.       Plan:     Patient to be seen 4 x/week to address the above listed problems via therapeutic activities, therapeutic exercises, self-care/home management  Plan of Care Expires: 10/12/24  Plan of Care Reviewed with: patient    Subjective     Pain/Comfort:       Objective:     Communicated with: RN prior to session.  Patient found HOB elevated with   upon OT entry to room.    General Precautions: Standard, aspiration    Orthopedic Precautions:N/A  Braces: N/A  Respiratory Status:  Oxymizer 4L   Vital Signs: Blood Pressure: 169/75  HR: 99  Sp02: 99     Occupational Performance:   (Bed Mobility- Max A)  (Static sitting balance- CGA with L UE support with bed rail)  Pt. Performing grooming task (brushing teeth/hair) with appropriate preparation and setup noted of grooming items.  (Sit to stand- Max A) From EOB using RW for UE support with balance.   Pt. Requiring Max A during stand pivot t/f from EOB to BS chair.  Pt. Left UIC with all needs within reach.  Therapeutic Positioning    OT  interventions performed during the course of today's session in an effort to prevent and/or reduce acquired pressure injuries:   Therapeutic positioning was provided at the conclusion of session to offload all bony prominences for the prevention and/or reduction of pressure injuries        [facility] 6 Click ADL:      Patient Education:  Patient provided with verbal education education regarding fall prevention, safety awareness, and pressure ulcer prevention.  Additional teaching is warranted.      Patient left up in chair with all lines intact and call button in reach.    GOALS:   Multidisciplinary Problems       Occupational Therapy Goals          Problem: Occupational Therapy    Goal Priority Disciplines Outcome Interventions   Occupational Therapy Goal     OT, PT/OT Progressing    Description: Goals to be met by: 10/12/24     Patient will increase functional independence with ADLs by performing:    Grooming while seated with Supervision.  Toileting from bedside commode with Moderate Assistance for hygiene and clothing management.   Sitting at edge of bed x10 minutes with Supervision.  Supine to sit with Supervision.  Squat pivot transfers with Minimal Assistance.  Toilet transfer to bedside commode with Minimal Assistance.  Increased functional strength to 4/5 for self care skills and functional mobility.  Upper extremity exercise program x10 reps per handout, with independence.                         Time Tracking:     OT Date of Treatment: 09/19/24  OT Start Time: 0845  OT Stop Time: 0912  OT Total Time (min): 27 min    Billable Minutes:Self Care/Home Management 2    OT/JOLENE: JLOENE     Number of JOLENE visits since last OT visit: 3    9/19/2024

## 2024-09-19 NOTE — PLAN OF CARE
Spoke with Angela with [facility], she informed that the pt's insurance is out of network with their facility.

## 2024-09-19 NOTE — PLAN OF CARE
Problem: Adult Inpatient Plan of Care  Goal: Patient-Specific Goal (Individualized)  Outcome: Progressing     Problem: Fall Injury Risk  Goal: Absence of Fall and Fall-Related Injury  Outcome: Progressing     Problem: Wound  Goal: Optimal Coping  Outcome: Progressing     Problem: Diabetes Comorbidity  Goal: Blood Glucose Level Within Targeted Range  Outcome: Not Progressing     Problem: Skin Injury Risk Increased  Goal: Skin Health and Integrity  Outcome: Not Progressing

## 2024-09-19 NOTE — PROGRESS NOTES
[facility] - Emergency Dept  /Pulmonary Critical Care Note/    Patient Name: Froy Barragan  MRN: [id number]  Admission Date: 9/8/2024  Hospital Length of Stay: 11 days  Code Status: No Order  Attending Provider: Hayden Quintanilla MD  Primary Care Provider: George Bran MD     Subjective:     HPI:   Patient is a 64-year-old male with a past medical history of hypertension, hyperlipidemia, CAD S/P PCI, BPH with current self catheterization, and HFpEF who initially presented to the ED with a complaint of nausea/vomiting for the last 1 week.  He is accompanied by his sister, patient's legal guardian, who assisted with history.  They state that patient has been experiencing nonbloody vomiting over the last 1 week.  She also reports about a 2 day history of urinary incontinence, worse than baseline, and uncontrollable nonbloody diarrhea.  She reports that patient was wheelchair-bound due to chronic weakness in his bilateral legs for several years.  Patient currently perform self catheterization at home but his sister states that it has been 1-2 days since he self catheterized himself.  She reports patient had multiple UTIs in the past.  She also reports that patient has had difficulty monitoring his blood glucose levels at home but he reports full compliance with all prescribed medications.  Patient denies any fever, chills, hematemesis, abdominal pain, suprapubic tenderness, melena.     In the ED, initial POCT glucose elevated greater than 500.  CMP with bicarb of 16, serum creatinine elevated at 3.4 with previous baseline at a proximally 1.5.  CBG elevated at 607.  BOHB 1.4.  UA displayed 4+ glycosuria, 3+ hematuria, but no ketonuria.  Indicative of UTI as well.  ABG with slight acidosis with a pH of  7.290.  ICU was consulted for admission due to concerns for DKA.      Hospital Course/Significant events:  9/8/2024; admitted to ICU and developed worsening hypotension and respiratory failure  requiring intubation that night  9/10/2024- extubated  9/15/2024-upgraded to ICU due to hypotension        24 Hour Interval History:  Yesterday patient agreed to have an MRI done today.  Is hemodynamically stable.  On no vasopressor support and on nasal cannula oxygen.  He is awake and alert denies any discomfort.    Review of systems negative unless documented in the history of present illness.         Past Medical History:   Diagnosis Date    Anemia of chronic disease 7/6/2022    Arteriosclerosis of coronary artery 7/6/2022    Cervical myelopathy 7/6/2022    Cobalamin deficiency 7/6/2022    Gastroesophageal reflux disease 7/6/2022    Hypertension 7/6/2022    Low vitamin D level 7/6/2022    Mixed hyperlipidemia 7/6/2022    Paraparesis 7/6/2022    Stage 3a chronic kidney disease 7/6/2022    Type 2 diabetes mellitus 7/6/2022       Past Surgical History:   Procedure Laterality Date    SPINE SURGERY         Social History     Socioeconomic History    Marital status: Single   Tobacco Use    Smoking status: Never    Smokeless tobacco: Never   Substance and Sexual Activity    Alcohol use: Never    Drug use: Never     Social Determinants of Health     Financial Resource Strain: Patient Unable To Answer (9/17/2024)    Overall Financial Resource Strain (CARDIA)     Difficulty of Paying Living Expenses: Patient unable to answer   Food Insecurity: Patient Unable To Answer (9/17/2024)    Hunger Vital Sign     Worried About Running Out of Food in the Last Year: Patient unable to answer     Ran Out of Food in the Last Year: Patient unable to answer   Transportation Needs: Patient Unable To Answer (9/17/2024)    TRANSPORTATION NEEDS     Transportation : Patient unable to answer   Physical Activity: Inactive (4/25/2024)    Exercise Vital Sign     Days of Exercise per Week: 0 days     Minutes of Exercise per Session: 10 min   Stress: Patient Unable To Answer (9/17/2024)    [employer] - Occupational  Stress Questionnaire     Feeling of Stress : Patient unable to answer   Housing Stability: Patient Unable To Answer (9/17/2024)    Housing Stability Vital Sign     Unable to Pay for Housing in the Last Year: Patient unable to answer     Homeless in the Last Year: Patient unable to answer       Current Outpatient Medications   Medication Instructions    alcohol swabs (BD ALCOHOL SWABS) PadM 1 each, Topical (Top), Daily    alfuzosin (UROXATRAL) 10 mg Tb24 TAKE 1 TABLET EVERY DAY WITH BREAKFAST    amLODIPine (NORVASC) 2.5 mg, Oral    aspirin (ECOTRIN) 81 mg, Oral, Daily    b complex vitamins capsule 1 capsule, Oral, Daily    blood glucose control, low (TRUE METRIX LEVEL 1) Soln 1 Bottle, Misc.(Non-Drug; Combo Route), Daily    blood-glucose meter (TRUE METRIX AIR GLUCOSE METER) Misc 1 each, Misc.(Non-Drug; Combo Route), Daily    blood-glucose sensor (DEXCOM G7 SENSOR) Carmen 1 each, Misc.(Non-Drug; Combo Route), Daily    carvediloL (COREG) 25 mg, Oral, 2 times daily    ezetimibe (ZETIA) 10 mg, Oral, Daily    finasteride (PROSCAR) 5 mg, Oral, Daily    glimepiride (AMARYL) 2 mg, Oral, Before breakfast    lancets (TRUEPLUS LANCETS) 33 gauge Misc 1 lancet , Misc.(Non-Drug; Combo Route), Daily    metFORMIN (GLUCOPHAGE) 1,000 mg, Oral, 2 times daily    pantoprazole (PROTONIX) 40 mg, Oral    rosuvastatin (CRESTOR) 40 mg, Oral, Daily    TRUE METRIX GLUCOSE TEST STRIP Strp TEST BLOOD SUGAR EVERY DAY    vitamin D (VITAMIN D3) 1,000 Units, Oral, Daily       Current Inpatient Medications   aspirin  81 mg Oral Daily    atorvastatin  20 mg Oral Daily    finasteride  5 mg Oral Daily    miconazole NITRATE 2 %   Topical (Top) BID    pantoprazole  40 mg Intravenous BID    piperacillin-tazobactam (Zosyn) IV (PEDS and ADULTS) (extended infusion is not appropriate)  4.5 g Intravenous Q8H    tamsulosin  0.4 mg Oral Daily    zinc oxide   Topical (Top) BID       Current Intravenous Infusions   dexmedeTOMIDine (Precedex) infusion (titrating)   0-1.4 mcg/kg/hr Intravenous Continuous   Stopped at 09/18/24 1015    D5 and 0.45% NaCl   Intravenous Continuous PRN        lactated ringers   Intravenous Continuous 100 mL/hr at 09/19/24 0913 New Bag at 09/19/24 0913    NORepinephrine bitartrate-D5W  0-3 mcg/kg/min (Dosing Weight) Intravenous Continuous               Objective:       Intake/Output Summary (Last 24 hours) at 9/19/2024 0922  Last data filed at 9/19/2024 0548  Gross per 24 hour   Intake 2818.92 ml   Output 4125 ml   Net -1306.08 ml         Vital Signs (Most Recent):  Temp: 98.8 °F (37.1 °C) (09/19/24 0400)  Pulse: 100 (09/19/24 0730)  Resp: (!) 21 (09/19/24 0730)  BP: 123/81 (09/19/24 0700)  SpO2: (!) 92 % (09/19/24 0730)  Body mass index is 18.92 kg/m².  Weight: 50 kg (110 lb 3.7 oz) Vital Signs (24h Range):  Temp:  [98.4 °F (36.9 °C)-99.4 °F (37.4 °C)] 98.8 °F (37.1 °C)  Pulse:  [] 100  Resp:  [8-28] 21  SpO2:  [79 %-100 %] 92 %  BP: (109-162)/() 123/81  Arterial Line BP: (107-146)/(57-93) 146/57         Physical exam:   Gen- awake and alert, no distress on nasal cannula oxygen  HENT- ATNC, MMM  CV- RRR  Resp- faint right lower lung field inspiratory crackles, normal work of breathing   MSK- WWP, thin extremities  Neuro- awake and alert, AXEL, no gross deficits  Psych- flat affect, conversant and cooperative with exam         Lines/Drains/Airways       Drain  Duration                  Urethral Catheter 09/08/24 1810 Straight-tip 16 Fr. 10 days              Peripheral Intravenous Line  Duration                  Peripheral IV - Single Lumen 20 G Anterior;Proximal;Right Forearm -- days         Peripheral IV - Single Lumen 09/08/24 1700 18 G Left Antecubital 10 days         Peripheral IV - Single Lumen 09/08/24 1915 20 G Anterior;Left Forearm 10 days                    Significant Labs:    Lab Results   Component Value Date    WBC 14.24 (H) 09/19/2024    HGB 11.0 (L) 09/19/2024    HCT 31.9 (L) 09/19/2024    MCV 90.9 09/19/2024      09/19/2024     BMP  Lab Results   Component Value Date     09/19/2024    K 3.5 09/19/2024    CO2 23 09/19/2024    BUN 17.6 09/19/2024    CREATININE 1.70 (H) 09/19/2024    CALCIUM 8.4 (L) 09/19/2024    AGAP 13.0 09/19/2024    EGFRNONAA 47 (L) 01/11/2022         Assessment/Plan:     Assessment  Right lower lobe consolidation with small right pleural effusion  Septic shock secondary to UTI of Klebsiella and Providencia treated appropriately with Zosyn  Diabetes mellitus well controlled with sliding scale insulin  DWAINE on CKD secondary to volume depletion.  Stabilized and improving  Chronic paraplegia and wheelchair-bound over the past few years.  Being evaluated by Neurology  Abrupt drop in hemoglobin without evidence of active bleeding.  CT showed possible iliopsoas hematoma.  Hemoglobin now stabilized post transfusion total of 3 units packed red blood cells.  The patient now agreeing to MRI.    Plan  Continue Zosyn antibiotic therapy.  Patient can now downgrade out of ICU.    Hold Lovenox until status more clear.  Continue supplemental oxygen, wean as tolerated; Likely benefit from BiPAP at night  I did discuss code status with the patient today.  He is unclear of his desires.        DVT prophylaxis:  SCDs.  Chemical on hold with drop in hemoglobin  GI prophylaxis:  IV Protonix         Hayden Quintanilla MD  Pulmonary Critical Care Medicine  [facility]   DOS: 09/19/2024

## 2024-09-19 NOTE — TRANSFER OF CARE
"Anesthesia Transfer of Care Note    Patient: Froy Barragan    Procedure(s) Performed: Procedure(s) (LRB):  MRI (Magnetic Resonance Imagine) (N/A)    Patient location: PACU    Anesthesia Type: general    Transport from OR: Transported from OR on 2-3 L/min O2 by NC with adequate spontaneous ventilation    Post pain: adequate analgesia    Post assessment: no apparent anesthetic complications    Post vital signs: stable    Level of consciousness: sedated    Nausea/Vomiting: no nausea/vomiting    Complications: none    Transfer of care protocol was followed      Last vitals: Visit Vitals  /78   Pulse 79   Temp 37 °C (98.6 °F)   Resp 18   Ht 5' 4" (1.626 m)   Wt 50 kg (110 lb 3.7 oz)   SpO2 96%   BMI 18.92 kg/m²     "

## 2024-09-19 NOTE — PLAN OF CARE
Referral sent to [facility], [facility], and [facility] via [facility]; awaiting response

## 2024-09-19 NOTE — ANESTHESIA PREPROCEDURE EVALUATION
09/19/2024  Froy Barragan is a 64 y.o., male.    64-year-old male with HTN, HLD, CAD s/p PCI, BPH with self catheterization, GERD, CKD 3, DM2, paraparesis and wheelchair-bound who presented to ED on 09/08 with complaints of weakness, elevated blood glucose, N/V/D, and UTI.  On admission, pt found to be in acute hypoxic respiratory failure requiring mechanical ventilation, AGMA with ketoacidosis requiring IV insulin for DKA, and urosepsis requiring vasopressor support and broad-spectrum antimicrobials.  Patient eventually hemodynamically stable and was extubated on 09/11.  Once patient stable patient's family at bedside endorsing concern for functional and cognitive decline over the last few weeks.  Strep bedside reports patient lives alone, is able to get around his home with his wheelchair and is able to transfer himself with BUE from wheelchair at baseline.  Reports his siblings visits him daily to help with medication management and meal preparation.     Patient has suffered with paraparesis over the last few years and has been wheelchair-bound, for which she underwent neurologic evaluation for in 2021, including MRI brain which revealed L middle cerebellar peduncle restricting lesion reflecting acute lacunar infarct versus active demyelinating plaque.  Of note, h/o familial paraparesis.     Labs significant for leukocytosis, microcytic anemia, BUN/creatinine 32.3/1.83,  (on 09/08), and bacteriuria    Past Medical History:   Diagnosis Date    Anemia of chronic disease 7/6/2022    Arteriosclerosis of coronary artery 7/6/2022    Cervical myelopathy 7/6/2022    Cobalamin deficiency 7/6/2022    Gastroesophageal reflux disease 7/6/2022    Hypertension 7/6/2022    Low vitamin D level 7/6/2022    Mixed hyperlipidemia 7/6/2022    Paraparesis 7/6/2022    Stage 3a chronic kidney disease 7/6/2022     Type 2 diabetes mellitus 7/6/2022     Past Surgical History:   Procedure Laterality Date    SPINE SURGERY       No current facility-administered medications on file prior to encounter.     Current Outpatient Medications on File Prior to Encounter   Medication Sig Dispense Refill    alcohol swabs (BD ALCOHOL SWABS) PadM Apply 1 each topically once daily. 200 each 12    alfuzosin (UROXATRAL) 10 mg Tb24 TAKE 1 TABLET EVERY DAY WITH BREAKFAST 90 tablet 3    amLODIPine (NORVASC) 2.5 MG tablet TAKE 1 TABLET ONE TIME DAILY 90 tablet 3    aspirin (ECOTRIN) 81 MG EC tablet Take 81 mg by mouth once daily.      b complex vitamins capsule Take 1 capsule by mouth once daily.      blood glucose control, low (TRUE METRIX LEVEL 1) Soln 1 Bottle by Misc.(Non-Drug; Combo Route) route once daily. 1 each 12    blood-glucose meter (TRUE METRIX AIR GLUCOSE METER) Misc 1 each by Misc.(Non-Drug; Combo Route) route once daily. 1 each 0    blood-glucose sensor (DEXCOM G7 SENSOR) Carmen 1 each by Misc.(Non-Drug; Combo Route) route once daily. 3 each 12    carvediloL (COREG) 25 MG tablet TAKE 1 TABLET TWICE DAILY 180 tablet 3    ezetimibe (ZETIA) 10 mg tablet Take 1 tablet (10 mg total) by mouth once daily. 90 tablet 3    finasteride (PROSCAR) 5 mg tablet Take 1 tablet (5 mg total) by mouth once daily. 30 tablet 11    glimepiride (AMARYL) 2 MG tablet Take 1 tablet (2 mg total) by mouth before breakfast. 90 tablet 3    lancets (TRUEPLUS LANCETS) 33 gauge Misc 1 lancet by Misc.(Non-Drug; Combo Route) route once daily. 200 each 12    metFORMIN (GLUCOPHAGE) 1000 MG tablet Take 1 tablet (1,000 mg total) by mouth 2 (two) times a day. 180 tablet 3    pantoprazole (PROTONIX) 40 MG tablet TAKE 1 TABLET ONE TIME DAILY 90 tablet 3    rosuvastatin (CRESTOR) 40 MG Tab Take 1 tablet (40 mg total) by mouth once daily. 90 tablet 3    TRUE METRIX GLUCOSE TEST STRIP Strp TEST BLOOD SUGAR EVERY  strip 3    vitamin D (VITAMIN D3) 1000 units Tab Take 1,000  Units by mouth once daily.       Normal LV Ftn on limited TTE    Hgb 11.0    GFR 44        Pre-op Assessment    I have reviewed the Patient Summary Reports.     I have reviewed the Nursing Notes. I have reviewed the NPO Status.   I have reviewed the Medications.     Review of Systems  Anesthesia Hx:  No problems with previous Anesthesia                Hematology/Oncology:       -- Anemia:                                  Cardiovascular:  Exercise tolerance: poor   Hypertension   CAD           hyperlipidemia   ECG has been reviewed.                          Renal/:  Chronic Renal Disease, CKD                Hepatic/GI:     GERD             Musculoskeletal:         Spine Disorders:             Neurological:    Neuromuscular Disease,                                   Endocrine:  Diabetes               Physical Exam  General: Cooperative, Alert and Oriented    Airway:  Mallampati: II   Mouth Opening: Small, but > 3cm  TM Distance: Normal  Tongue: Normal  Neck ROM: Extension Decreased    Dental:  Intact        Anesthesia Plan  Type of Anesthesia, risks & benefits discussed:    Anesthesia Type: Gen Supraglottic Airway  Intra-op Monitoring Plan: Standard ASA Monitors  Post Op Pain Control Plan: multimodal analgesia  Informed Consent: Informed consent signed with the Patient and all parties understand the risks and agree with anesthesia plan.  All questions answered. Patient consented to blood products? Yes  ASA Score: 3  Day of Surgery Review of History & Physical: H&P Update referred to the surgeon/provider.I have interviewed and examined the patient. I have reviewed the patient's H&P dated: There are no significant changes.     Ready For Surgery From Anesthesia Perspective.     .

## 2024-09-19 NOTE — NURSING
Nurses Note -- 4 Eyes      9/19/2024   1100      Skin assessed during: Admit      [] No Altered Skin Integrity Present    []Prevention Measures Documented      [x] Yes- Altered Skin Integrity Present or Discovered   [] LDA Added if Not in Epic (Describe Wound)   [] New Altered Skin Integrity was Present on Admit and Documented in LDA   [x] Wound Image Taken    Wound Care Consulted? Yes    Attending Nurse:  Aliza Campo RN/Staff Member:  Braulio

## 2024-09-19 NOTE — NURSING
Nurses Note -- 4 Eyes      9/18/2024   9:02 PM      Skin assessed during: Q Shift Change      [] No Altered Skin Integrity Present    [x]Prevention Measures Documented      [x] Yes- Altered Skin Integrity Present or Discovered   [] LDA Added if Not in Epic (Describe Wound)   [] New Altered Skin Integrity was Present on Admit and Documented in LDA   [] Wound Image Taken    Wound Care Consulted? Yes    Attending Nurse:  Tanner Campo RN/Staff Member:  Lyubov

## 2024-09-19 NOTE — PLAN OF CARE
[facility] is [facility], discussed with POA at bedside. Chantal (POA) would like referral sent to [facility], [facility], and [facility]. FOC placed in chart. SSC notified.

## 2024-09-19 NOTE — ANESTHESIA POSTPROCEDURE EVALUATION
Anesthesia Post Evaluation    Patient: Froy Barragan    Procedure(s) Performed: Procedure(s) (LRB):  MRI (Magnetic Resonance Imagine) (N/A)    Final Anesthesia Type: general      Patient location during evaluation: PACU  Patient participation: Yes- Able to Participate  Level of consciousness: awake  Post-procedure vital signs: reviewed and stable  Pain management: adequate  Airway patency: patent    PONV status at discharge: No PONV  Anesthetic complications: no      Cardiovascular status: blood pressure returned to baseline  Respiratory status: unassisted and spontaneous ventilation  Hydration status: euvolemic  Follow-up needed               Vitals Value Taken Time   /76 09/19/24 1655   Temp 36.4 °C (97.5 °F) 09/19/24 1656   Pulse 96 09/19/24 1656   Resp 17 09/19/24 1639   SpO2 94 % 09/19/24 1656   Vitals shown include unfiled device data.      Event Time   Out of Recovery 09/19/2024 16:32:00         Pain/Eliel Score: Eliel Score: 8 (9/19/2024  4:30 PM)

## 2024-09-20 LAB
ALBUMIN SERPL-MCNC: 1.8 G/DL (ref 3.4–4.8)
ALBUMIN/GLOB SERPL: 0.7 RATIO (ref 1.1–2)
ALP SERPL-CCNC: 45 UNIT/L (ref 40–150)
ALT SERPL-CCNC: 16 UNIT/L (ref 0–55)
ANION GAP SERPL CALC-SCNC: 12 MEQ/L
AST SERPL-CCNC: 12 UNIT/L (ref 5–34)
BACTERIA BLD CULT: NORMAL
BACTERIA BLD CULT: NORMAL
BASOPHILS # BLD AUTO: 0.04 X10(3)/MCL
BASOPHILS NFR BLD AUTO: 0.4 %
BILIRUB SERPL-MCNC: 0.5 MG/DL
BUN SERPL-MCNC: 20 MG/DL (ref 8.4–25.7)
CALCIUM SERPL-MCNC: 7.7 MG/DL (ref 8.8–10)
CHLORIDE SERPL-SCNC: 103 MMOL/L (ref 98–107)
CO2 SERPL-SCNC: 23 MMOL/L (ref 23–31)
CREAT SERPL-MCNC: 1.51 MG/DL (ref 0.73–1.18)
CREAT/UREA NIT SERPL: 13
EOSINOPHIL # BLD AUTO: 0.01 X10(3)/MCL (ref 0–0.9)
EOSINOPHIL NFR BLD AUTO: 0.1 %
ERYTHROCYTE [DISTWIDTH] IN BLOOD BY AUTOMATED COUNT: 15.2 % (ref 11.5–17)
GFR SERPLBLD CREATININE-BSD FMLA CKD-EPI: 51 ML/MIN/1.73/M2
GLOBULIN SER-MCNC: 2.7 GM/DL (ref 2.4–3.5)
GLUCOSE SERPL-MCNC: 236 MG/DL (ref 82–115)
HCT VFR BLD AUTO: 28.9 % (ref 42–52)
HGB BLD-MCNC: 9.7 G/DL (ref 14–18)
IMM GRANULOCYTES # BLD AUTO: 0.06 X10(3)/MCL (ref 0–0.04)
IMM GRANULOCYTES NFR BLD AUTO: 0.6 %
LYMPHOCYTES # BLD AUTO: 0.56 X10(3)/MCL (ref 0.6–4.6)
LYMPHOCYTES NFR BLD AUTO: 5.2 %
MCH RBC QN AUTO: 30.7 PG (ref 27–31)
MCHC RBC AUTO-ENTMCNC: 33.6 G/DL (ref 33–36)
MCV RBC AUTO: 91.5 FL (ref 80–94)
MONOCYTES # BLD AUTO: 0.7 X10(3)/MCL (ref 0.1–1.3)
MONOCYTES NFR BLD AUTO: 6.5 %
NEUTROPHILS # BLD AUTO: 9.36 X10(3)/MCL (ref 2.1–9.2)
NEUTROPHILS NFR BLD AUTO: 87.2 %
NRBC BLD AUTO-RTO: 0 %
PLATELET # BLD AUTO: 223 X10(3)/MCL (ref 130–400)
PMV BLD AUTO: 10.2 FL (ref 7.4–10.4)
POCT GLUCOSE: 252 MG/DL (ref 70–110)
POCT GLUCOSE: 253 MG/DL (ref 70–110)
POCT GLUCOSE: 302 MG/DL (ref 70–110)
POCT GLUCOSE: 358 MG/DL (ref 70–110)
POTASSIUM SERPL-SCNC: 3.7 MMOL/L (ref 3.5–5.1)
PROT SERPL-MCNC: 4.5 GM/DL (ref 5.8–7.6)
RBC # BLD AUTO: 3.16 X10(6)/MCL (ref 4.7–6.1)
SODIUM SERPL-SCNC: 138 MMOL/L (ref 136–145)
WBC # BLD AUTO: 10.73 X10(3)/MCL (ref 4.5–11.5)

## 2024-09-20 PROCEDURE — 63600175 PHARM REV CODE 636 W HCPCS

## 2024-09-20 PROCEDURE — 80053 COMPREHEN METABOLIC PANEL: CPT

## 2024-09-20 PROCEDURE — 97164 PT RE-EVAL EST PLAN CARE: CPT

## 2024-09-20 PROCEDURE — 94760 N-INVAS EAR/PLS OXIMETRY 1: CPT

## 2024-09-20 PROCEDURE — 97530 THERAPEUTIC ACTIVITIES: CPT

## 2024-09-20 PROCEDURE — 63600175 PHARM REV CODE 636 W HCPCS: Performed by: INTERNAL MEDICINE

## 2024-09-20 PROCEDURE — 36415 COLL VENOUS BLD VENIPUNCTURE: CPT

## 2024-09-20 PROCEDURE — 94799 UNLISTED PULMONARY SVC/PX: CPT | Mod: XB

## 2024-09-20 PROCEDURE — 25000003 PHARM REV CODE 250: Performed by: INTERNAL MEDICINE

## 2024-09-20 PROCEDURE — 25000003 PHARM REV CODE 250

## 2024-09-20 PROCEDURE — 21400001 HC TELEMETRY ROOM

## 2024-09-20 PROCEDURE — 85025 COMPLETE CBC W/AUTO DIFF WBC: CPT

## 2024-09-20 RX ORDER — INSULIN GLARGINE 100 [IU]/ML
5 INJECTION, SOLUTION SUBCUTANEOUS NIGHTLY
Status: DISCONTINUED | OUTPATIENT
Start: 2024-09-20 | End: 2024-09-21

## 2024-09-20 RX ADMIN — INSULIN ASPART 5 UNITS: 100 INJECTION, SOLUTION INTRAVENOUS; SUBCUTANEOUS at 12:09

## 2024-09-20 RX ADMIN — INSULIN ASPART 6 UNITS: 100 INJECTION, SOLUTION INTRAVENOUS; SUBCUTANEOUS at 11:09

## 2024-09-20 RX ADMIN — PANTOPRAZOLE SODIUM 40 MG: 40 INJECTION, POWDER, FOR SOLUTION INTRAVENOUS at 09:09

## 2024-09-20 RX ADMIN — SODIUM CHLORIDE, POTASSIUM CHLORIDE, SODIUM LACTATE AND CALCIUM CHLORIDE: 600; 310; 30; 20 INJECTION, SOLUTION INTRAVENOUS at 12:09

## 2024-09-20 RX ADMIN — PIPERACILLIN SODIUM AND TAZOBACTAM SODIUM 4.5 G: 4; .5 INJECTION, POWDER, LYOPHILIZED, FOR SOLUTION INTRAVENOUS at 09:09

## 2024-09-20 RX ADMIN — ASPIRIN 81 MG CHEWABLE TABLET 81 MG: 81 TABLET CHEWABLE at 09:09

## 2024-09-20 RX ADMIN — INSULIN ASPART 8 UNITS: 100 INJECTION, SOLUTION INTRAVENOUS; SUBCUTANEOUS at 04:09

## 2024-09-20 RX ADMIN — INSULIN ASPART 6 UNITS: 100 INJECTION, SOLUTION INTRAVENOUS; SUBCUTANEOUS at 05:09

## 2024-09-20 RX ADMIN — INSULIN GLARGINE 5 UNITS: 100 INJECTION, SOLUTION SUBCUTANEOUS at 10:09

## 2024-09-20 RX ADMIN — INSULIN ASPART 2 UNITS: 100 INJECTION, SOLUTION INTRAVENOUS; SUBCUTANEOUS at 09:09

## 2024-09-20 RX ADMIN — ATORVASTATIN CALCIUM 20 MG: 10 TABLET, FILM COATED ORAL at 09:09

## 2024-09-20 RX ADMIN — MICONAZOLE NITRATE 2 % TOPICAL POWDER: at 09:09

## 2024-09-20 RX ADMIN — TAMSULOSIN HYDROCHLORIDE 0.4 MG: 0.4 CAPSULE ORAL at 09:09

## 2024-09-20 RX ADMIN — PIPERACILLIN SODIUM AND TAZOBACTAM SODIUM 4.5 G: 4; .5 INJECTION, POWDER, LYOPHILIZED, FOR SOLUTION INTRAVENOUS at 04:09

## 2024-09-20 RX ADMIN — RUGBY ZINC OXIDE 20%: 20 OINTMENT TOPICAL at 09:09

## 2024-09-20 RX ADMIN — FINASTERIDE 5 MG: 5 TABLET, FILM COATED ORAL at 09:09

## 2024-09-20 RX ADMIN — PIPERACILLIN SODIUM AND TAZOBACTAM SODIUM 4.5 G: 4; .5 INJECTION, POWDER, LYOPHILIZED, FOR SOLUTION INTRAVENOUS at 01:09

## 2024-09-20 NOTE — NURSING
Nurses Note -- 4 Eyes      9/20/2024   0645AM      Skin assessed during: Q Shift Change      [x] No Altered Skin Integrity Present    []Prevention Measures Documented      [] Yes- Altered Skin Integrity Present or Discovered   [] LDA Added if Not in Epic (Describe Wound)   [] New Altered Skin Integrity was Present on Admit and Documented in LDA   [] Wound Image Taken    Wound Care Consulted? No    Attending Nurse:  Rachel Campo RN/Staff Member:  Jannet WALSH orders in for sacral Stage 2 to buttock

## 2024-09-20 NOTE — PLAN OF CARE
Andra with [facility] informed that they are out of network with pt's insurance, however - that if the patient has 3 denials then they may consider placement. I did inform her that so far we have two denials ([facility] and [facility]).     Andra did let me know that she has not looked at the patient's referral, but will do first thing on Monday morning. I let her know that I'll call her again on Monday morning.

## 2024-09-20 NOTE — NURSING
Nurses Note -- 4 Eyes      9/19/2024   7:30 PM      Skin assessed during: Q Shift Change      [] No Altered Skin Integrity Present    []Prevention Measures Documented      [x] Yes- Altered Skin Integrity Present or Discovered   [] LDA Added if Not in Epic (Describe Wound)   [] New Altered Skin Integrity was Present on Admit and Documented in LDA   [] Wound Image Taken    Wound Care Consulted? Yes    Attending Nurse:  Jannet Cmapo RN/Staff Member:  Aliza ROMERO

## 2024-09-20 NOTE — PROGRESS NOTES
Inpatient Nutrition Assessment    Admit Date: 9/8/2024   Total duration of encounter: 12 days   Patient Age: 64 y.o.    Nutrition Recommendation/Prescription     -Continue oral diet per SLP recs. Assist with meals.   -Monitor wt, labs, and intake.     Communication of Recommendations: reviewed with patient    Nutrition Assessment     Malnutrition Assessment/Nutrition-Focused Physical Exam    Malnutrition Context: chronic illness (09/09/24 1049)  Malnutrition Level: severe (09/09/24 1049)  Energy Intake (Malnutrition): less than 75% for greater than or equal to 1 month (09/09/24 1049)  Weight Loss (Malnutrition):  (does not meet criteria) (09/09/24 1049)  Subcutaneous Fat (Malnutrition): severe depletion (09/09/24 1049)  Orbital Region (Subcutaneous Fat Loss): severe depletion  Upper Arm Region (Subcutaneous Fat Loss): severe depletion     Muscle Mass (Malnutrition): severe depletion (09/09/24 1049)  [place] Region (Muscle Loss): severe depletion     Clavicle and Acromion Bone Region (Muscle Loss): severe depletion           Anterior Thigh Region (Muscle Loss): severe depletion  Posterior Calf Region (Muscle Loss): severe depletion           A minimum of two characteristics is recommended for diagnosis of either severe or non-severe malnutrition.    Chart Review    Reason Seen: follow-up    Malnutrition Screening Tool Results   Have you recently lost weight without trying?: Unsure  Have you been eating poorly because of a decreased appetite?: Yes   MST Score: 3   Diagnosis:  Septic shock secondary to UTI  Uncontrolled T2DM with hyperglycemia  DWAINE on CKD secondary to volume depletion  Metabolic acidosis; likely secondary to lactic acidosis vs diarrhea  Hypomagnesemia  Hypophosphatemia  Diarrhea  Normocytic anemia  Malnutrition    Relevant Medical History: HTN, HLD, CAD S/P PCI, BPH, HF     Scheduled Medications:  aspirin, 81 mg, Daily  atorvastatin, 20 mg, Daily  finasteride, 5 mg, Daily  miconazole NITRATE 2 %, ,  BID  pantoprazole, 40 mg, BID  piperacillin-tazobactam (Zosyn) IV (PEDS and ADULTS) (extended infusion is not appropriate), 4.5 g, Q8H  tamsulosin, 0.4 mg, Daily  zinc oxide, , BID    Continuous Infusions:  D5 and 0.45% NaCl  lactated ringers, Last Rate: 100 mL/hr at 09/20/24 0027    PRN Medications:   0.9%  NaCl infusion (for blood administration), , Q24H PRN  0.9%  NaCl infusion (for blood administration), , Q24H PRN  0.9% NaCl, , PRN  albuterol-ipratropium, 3 mL, Q6H PRN  dextrose 10%, 12.5 g, PRN  dextrose 10%, 25 g, PRN  D5 and 0.45% NaCl, , Continuous PRN  haloperidol lactate, 1 mg, Q6H PRN  insulin aspart U-100, 0-10 Units, QID (AC + HS) PRN  loperamide, 2 mg, QID PRN  magnesium sulfate IVPB, 2 g, PRN  sodium chloride 0.9%, 10 mL, PRN  sodium phosphate 20.01 mmol in D5W 250 mL IVPB, 20.01 mmol, PRN    Calorie Containing IV Medications: no significant kcals from medications at this time    Recent Labs   Lab 09/14/24  0336 09/15/24  0351 09/15/24  2026 09/15/24  2225 09/16/24  0320 09/17/24  0226 09/17/24  1043 09/18/24  0338 09/19/24  0450 09/20/24  0423    137 136  --  134* 135*  --  137 139 138   K 4.1 4.0 4.0  --  3.9 3.6  --  3.3* 3.5 3.7   CALCIUM 8.0* 8.3* 8.3*  --  8.2* 7.7*  --  7.7* 8.4* 7.7*   PHOS 2.7 2.6 2.5  --  3.0  --   --   --   --   --    MG 1.70 1.70 1.50*  --  2.80*  --   --   --   --   --     103 106  --  104 103  --  103 103 103   CO2 22* 23 21*  --  21* 20*  --  20* 23 23   BUN 39.7* 36.5* 32.9*  --  30.3* 21.6  --  16.6 17.6 20.0   CREATININE 1.45* 1.71* 1.71*  --  1.57* 1.61*  --  1.50* 1.70* 1.51*   EGFRNORACEVR 53 44 44  --  48 47  --  51 44 51   GLUCOSE 175* 210* 200*  --  213* 151*  --  134* 170* 236*   BILITOT 0.3 0.4 0.3  --  0.5 0.5  --  0.7 0.7 0.5   ALKPHOS 50 46 41  --  39* 43  --  42 48 45   ALT 19 17 16  --  17 18  --  19 20 16   AST 13 14 14  --  16 24  --  21 17 12   ALBUMIN 2.2* 2.1* 1.9*  --  1.8* 1.8*  --  1.7* 1.9* 1.8*   HGBA1C 6.2  --   --   --   --   --   " --   --   --   --    WBC 18.26* 14.93* 13.53* 14.60* 13.07* 12.75*  --  11.48 14.24* 10.73   HGB 10.5* 9.0* 6.8* 6.4* 7.7* 6.5* 9.4* 9.0* 11.0* 9.7*   HCT 31.1* 27.3* 20.0* 18.5* 22.5* 18.5* 26.7* 26.1* 31.9* 28.9*     Nutrition Orders:  Diet Pureed (IDDSI Level 4) Supervision with Meals      Appetite/Oral Intake: good/% of meals  Factors Affecting Nutritional Intake: impaired cognitive status/motor control and difficulty/impaired swallowing  Social Needs Impacting Access to Food: none identified  Food/Yarsani/Cultural Preferences: none reported  Food Allergies: no known food allergies  Last Bowel Movement: 09/18/24  Wound(s):     Wound 09/09/24 Moisture associated dermatitis Buttocks-Tissue loss description: Partial thickness    Comments    9/9/24 Patient on ventilator, propofol off during rounds, plans to start tube feeding. Spoke with patient's sister at bedside. She reports very poor intake for the past month and weight loss, weight history in chart reveals no significant weight changes over the past year. Patient is malnourished and at risk for refeeding syndrome; recommend slow initiation/advancement of tube feeding.    9/13/24 Patient extubated, tube feeding discontinued, started on dysphagia diet. Patient reports a fair appetite, eating about 25-50% of meals, agreeable to vanilla Boost BID.    9/17/24: Discussed with RN, possible plans for NG placement since pt unable to consume adequate nutrition. Will provide TF recommendations in case needed. Pt mental status prohibiting obtaining subjective info.     9/20/24: Pt reports good appetite/intake; denies n/v and chewing/swallowing difficulty. Pt is on oral diet per SLP recs.     Anthropometrics    Height: 5' 4" (162.6 cm), Height Method: Stated  Last Weight: 50 kg (110 lb 3.7 oz) (09/09/24 1044), Weight Method: Bed Scale  BMI (Calculated): 18.9  BMI Classification: underweight (BMI less than 22 if >65 years of age)        Ideal Body Weight (IBW), " Male: 130 lb     % Ideal Body Weight, Male (lb): 79.31 %                 Usual Body Weight (UBW), k.4 kg (10/24/23)  % Usual Body Weight: 92.1  % Weight Change From Usual Weight: -8.09 %  Usual Weight Provided By: family/caregiver and EMR weight history    Wt Readings from Last 8 Encounters:   24 50 kg (110 lb 3.7 oz)   24 49.4 kg (108 lb 14.5 oz)   24 49.4 kg (109 lb)   24 49.9 kg (110 lb)   24 50 kg (110 lb 3.2 oz)   24 46.3 kg (102 lb 1.6 oz)   10/24/23 54.4 kg (120 lb)   10/19/23 54.4 kg (120 lb)     Weight Change(s) Since Admission:   () fluctuations over past 24 hours, question accuracy, took bed weight of 50 kg during rounds today  Wt Readings from Last 1 Encounters:   24 1044 50 kg (110 lb 3.7 oz)   24 204 46.8 kg (103 lb 1.6 oz)   24 161 54.4 kg (120 lb)   Admit Weight: 54.4 kg (120 lb) (24 1617), Weight Method: Bed Scale    Estimated Needs    Weight Used For Calorie Calculations: 50 kg (110 lb 3.7 oz)  Energy Calorie Requirements (kcal): 6511-1704, 35-40 kcal/kg  Weight Used For Protein Calculations: 50 kg (110 lb 3.7 oz)  Protein Requirements:  g, 1.5-2 g/kg  Fluid Requirements (mL): 1500  CHO Requirement: 175-250 g, 40-50% of kcal    Enteral Nutrition Patient not receiving enteral nutrition at this time.    Parenteral Nutrition Patient not receiving parenteral nutrition support at this time.    Evaluation of Received Nutrient Intake    Calories: meeting estimated needs  Protein: meeting estimated needs    Patient Education Not applicable.    Nutrition Diagnosis     PES: Inadequate energy intake related to inability to consume sufficient nutrients as evidenced by less than 80% needs met. (resolved)  PES: Severe chronic disease or condition related malnutrition related to suboptimal protein/energy intake as evidenced by less than 75% needs met for greater than or equal to 1 month, severe fat depletion, and severe muscle depletion.  (active)    Nutrition Interventions     Intervention(s): modified composition of enteral nutrition, modified rate of enteral nutrition, and collaboration with other providers  Goal: Meet greater than 80% of nutritional needs by follow-up. (goal progressing)  Goal: Tolerate enteral feeding at goal rate by follow-up. (goal discontinued)    Nutrition Goals & Monitoring     Dietitian will monitor: energy intake, weight, weight change, electrolyte/renal panel, glucose/endocrine profile, and gastrointestinal profile  Discharge planning: continue pureed diet   Nutrition Risk/Follow-Up: high (follow-up in 1-4 days)   Please consult if re-assessment needed sooner.

## 2024-09-20 NOTE — PT/OT/SLP EVAL
Physical Therapy Re-Evaluation    Patient Name:  Froy Barragan   MRN:  [id number]    Recommendations:     Discharge therapy intensity: Moderate Intensity Therapy   Discharge Equipment Recommendations: none   Barriers to discharge: Impaired mobility    Assessment:     Froy Barragan is a 64 y.o. male admitted with a medical diagnosis of medical diagnosis of septic shock 2/2 UTI, DWAINE, acute respiratory failure requiring intubation.  He presents with the following impairments/functional limitations: weakness, gait instability, impaired endurance, impaired balance, decreased safety awareness, impaired functional mobility. The pt tolerated session fair. Pt continues to demo significant weakness, continue with current POC.    Rehab Prognosis: Good; patient would benefit from acute skilled PT services to address these deficits and reach maximum level of function.    Recent Surgery: Procedure(s) (LRB):  MRI (Magnetic Resonance Imagine) (N/A) 1 Day Post-Op    Plan:     During this hospitalization, patient would benefit from acute PT services 5 x/week to address the identified rehab impairments via gait training, therapeutic activities, therapeutic exercises, wheelchair management/training and progress toward the following goals:    Plan of Care Expires:  10/18/24    PT/PTA conference to discuss PT POC and patient's progression towards goals held with  Alexis Alberts .     Subjective     Chief Complaint: none  Patient/Family Comments/goals: return to PLOF  Pain/Comfort:       Patients cultural, spiritual, Anabaptist conflicts given the current situation: no    Objective:     Communicated with NSG prior to session.  Patient found supine with peripheral IV, blood pressure cuff, pulse ox (continuous), SCD, colbert catheter, BIPAP, arterial line, pressure relief boots  upon PT entry to room.    General Precautions: Standard, aspiration  Orthopedic Precautions:N/A   Braces: N/A  Respiratory Status: Room air  Blood  Pressure: NA      Exams:  RLE ROM: WFL  RLE Strength: WFL  LLE ROM: WFL  LLE Strength: WFL  Skin integrity: Visible skin intact      Functional Mobility:  Bed Mobility:     Supine to Sit: maximal assistance  Sit to Supine: maximal assistance  Balance pt is able to sit EOB with SBA with BUE support x 5 mins  PT assisted pt to chair via leif lift tf. NSG staff notified to tf pt back to bed in 1 hr.    Patient provided with verbal education education regarding PT role/goals/POC, safety awareness, and discharge/DME recommendations.  Understanding was verbalized.     Patient left supine with all lines intact, call button in reach, and NSG notified.    GOALS:   Multidisciplinary Problems       Physical Therapy Goals          Problem: Physical Therapy    Goal Priority Disciplines Outcome Goal Variances Interventions   Physical Therapy Goal     PT, PT/OT Progressing     Description: Goals to be met by: d/c     Patient will increase functional independence with mobility by performin. Supine to sit with Stand-by Assistance  2. Sit to supine with Stand-by Assistance  3. Sit to stand transfer with Stand-by Assistance  4. Bed to chair transfer with Stand-by Assistance using No Assistive Device scoot pivot vs SB  5. Wheelchair propulsion x100 feet with Supervision using bilateral uppper extremities                         History:     Past Medical History:   Diagnosis Date    Anemia of chronic disease 2022    Arteriosclerosis of coronary artery 2022    Cervical myelopathy 2022    Cobalamin deficiency 2022    Gastroesophageal reflux disease 2022    Hypertension 2022    Low vitamin D level 2022    Mixed hyperlipidemia 2022    Paraparesis 2022    Stage 3a chronic kidney disease 2022    Type 2 diabetes mellitus 2022       Past Surgical History:   Procedure Laterality Date    MAGNETIC RESONANCE IMAGING N/A 2024    Procedure: MRI (Magnetic Resonance Imagine);  Surgeon: Brennan  DO David;  Location: [facility];  Service: Anesthesiology;  Laterality: N/A;    SPINE SURGERY         Time Tracking:     PT Received On: 09/20/24  PT Start Time: 1338     PT Stop Time: 1403  PT Total Time (min): 25 min     Billable Minutes: Re-eval 15 and Therapeutic Activity 10      09/20/2024

## 2024-09-20 NOTE — PLAN OF CARE
Problem: Diabetes Comorbidity  Goal: Blood Glucose Level Within Targeted Range  Outcome: Progressing     Problem: Fall Injury Risk  Goal: Absence of Fall and Fall-Related Injury  Outcome: Progressing     Problem: Wound  Goal: Absence of Infection Signs and Symptoms  Outcome: Progressing

## 2024-09-20 NOTE — PLAN OF CARE
Talked to Chari with [facility], she stated referral was received and she'll visit patient first thing Monday Morning.

## 2024-09-20 NOTE — PLAN OF CARE
Called [facility]ive[facility] for a status update; Message taken by Shawna who informed that she'll relay the msg to Andra (admissions counselor) who will return my call.

## 2024-09-21 LAB
ALBUMIN SERPL-MCNC: 2 G/DL (ref 3.4–4.8)
ALBUMIN/GLOB SERPL: 0.6 RATIO (ref 1.1–2)
ALP SERPL-CCNC: 47 UNIT/L (ref 40–150)
ALT SERPL-CCNC: 16 UNIT/L (ref 0–55)
ANION GAP SERPL CALC-SCNC: 10 MEQ/L
AST SERPL-CCNC: 14 UNIT/L (ref 5–34)
BASOPHILS # BLD AUTO: 0.13 X10(3)/MCL
BASOPHILS NFR BLD AUTO: 1.2 %
BILIRUB SERPL-MCNC: 0.7 MG/DL
BUN SERPL-MCNC: 23.2 MG/DL (ref 8.4–25.7)
CALCIUM SERPL-MCNC: 7.9 MG/DL (ref 8.8–10)
CHLORIDE SERPL-SCNC: 104 MMOL/L (ref 98–107)
CO2 SERPL-SCNC: 25 MMOL/L (ref 23–31)
CREAT SERPL-MCNC: 1.5 MG/DL (ref 0.73–1.18)
CREAT/UREA NIT SERPL: 15
EOSINOPHIL # BLD AUTO: 0.19 X10(3)/MCL (ref 0–0.9)
EOSINOPHIL NFR BLD AUTO: 1.7 %
ERYTHROCYTE [DISTWIDTH] IN BLOOD BY AUTOMATED COUNT: 15.4 % (ref 11.5–17)
GFR SERPLBLD CREATININE-BSD FMLA CKD-EPI: 51 ML/MIN/1.73/M2
GLOBULIN SER-MCNC: 3.1 GM/DL (ref 2.4–3.5)
GLUCOSE SERPL-MCNC: 127 MG/DL (ref 82–115)
HCT VFR BLD AUTO: 30.9 % (ref 42–52)
HGB BLD-MCNC: 10.2 G/DL (ref 14–18)
IMM GRANULOCYTES # BLD AUTO: 0.06 X10(3)/MCL (ref 0–0.04)
IMM GRANULOCYTES NFR BLD AUTO: 0.5 %
LYMPHOCYTES # BLD AUTO: 0.77 X10(3)/MCL (ref 0.6–4.6)
LYMPHOCYTES NFR BLD AUTO: 6.9 %
MAGNESIUM SERPL-MCNC: 1.3 MG/DL (ref 1.6–2.6)
MCH RBC QN AUTO: 30.5 PG (ref 27–31)
MCHC RBC AUTO-ENTMCNC: 33 G/DL (ref 33–36)
MCV RBC AUTO: 92.5 FL (ref 80–94)
MONOCYTES # BLD AUTO: 0.98 X10(3)/MCL (ref 0.1–1.3)
MONOCYTES NFR BLD AUTO: 8.8 %
NEUTROPHILS # BLD AUTO: 8.98 X10(3)/MCL (ref 2.1–9.2)
NEUTROPHILS NFR BLD AUTO: 80.9 %
NRBC BLD AUTO-RTO: 0 %
PLATELET # BLD AUTO: 251 X10(3)/MCL (ref 130–400)
PMV BLD AUTO: 10.2 FL (ref 7.4–10.4)
POCT GLUCOSE: 227 MG/DL (ref 70–110)
POCT GLUCOSE: 239 MG/DL (ref 70–110)
POTASSIUM SERPL-SCNC: 3.7 MMOL/L (ref 3.5–5.1)
PROT SERPL-MCNC: 5.1 GM/DL (ref 5.8–7.6)
RBC # BLD AUTO: 3.34 X10(6)/MCL (ref 4.7–6.1)
SODIUM SERPL-SCNC: 139 MMOL/L (ref 136–145)
WBC # BLD AUTO: 11.11 X10(3)/MCL (ref 4.5–11.5)

## 2024-09-21 PROCEDURE — 63600175 PHARM REV CODE 636 W HCPCS

## 2024-09-21 PROCEDURE — 80053 COMPREHEN METABOLIC PANEL: CPT

## 2024-09-21 PROCEDURE — 25000003 PHARM REV CODE 250: Performed by: INTERNAL MEDICINE

## 2024-09-21 PROCEDURE — 94799 UNLISTED PULMONARY SVC/PX: CPT

## 2024-09-21 PROCEDURE — 83735 ASSAY OF MAGNESIUM: CPT | Performed by: INTERNAL MEDICINE

## 2024-09-21 PROCEDURE — 36415 COLL VENOUS BLD VENIPUNCTURE: CPT

## 2024-09-21 PROCEDURE — 63600175 PHARM REV CODE 636 W HCPCS: Performed by: INTERNAL MEDICINE

## 2024-09-21 PROCEDURE — 25000003 PHARM REV CODE 250

## 2024-09-21 PROCEDURE — 21400001 HC TELEMETRY ROOM

## 2024-09-21 PROCEDURE — 63600175 PHARM REV CODE 636 W HCPCS: Performed by: NURSE PRACTITIONER

## 2024-09-21 PROCEDURE — 85025 COMPLETE CBC W/AUTO DIFF WBC: CPT

## 2024-09-21 RX ORDER — MAGNESIUM SULFATE HEPTAHYDRATE 40 MG/ML
2 INJECTION, SOLUTION INTRAVENOUS ONCE
Status: COMPLETED | OUTPATIENT
Start: 2024-09-21 | End: 2024-09-21

## 2024-09-21 RX ORDER — INSULIN GLARGINE 100 [IU]/ML
5 INJECTION, SOLUTION SUBCUTANEOUS 2 TIMES DAILY
Status: DISCONTINUED | OUTPATIENT
Start: 2024-09-21 | End: 2024-09-22

## 2024-09-21 RX ORDER — HYDRALAZINE HYDROCHLORIDE 20 MG/ML
10 INJECTION INTRAMUSCULAR; INTRAVENOUS EVERY 4 HOURS PRN
Status: DISCONTINUED | OUTPATIENT
Start: 2024-09-21 | End: 2024-10-01 | Stop reason: HOSPADM

## 2024-09-21 RX ADMIN — INSULIN ASPART 4 UNITS: 100 INJECTION, SOLUTION INTRAVENOUS; SUBCUTANEOUS at 12:09

## 2024-09-21 RX ADMIN — PANTOPRAZOLE SODIUM 40 MG: 40 INJECTION, POWDER, FOR SOLUTION INTRAVENOUS at 09:09

## 2024-09-21 RX ADMIN — INSULIN GLARGINE 5 UNITS: 100 INJECTION, SOLUTION SUBCUTANEOUS at 09:09

## 2024-09-21 RX ADMIN — FINASTERIDE 5 MG: 5 TABLET, FILM COATED ORAL at 09:09

## 2024-09-21 RX ADMIN — PIPERACILLIN SODIUM AND TAZOBACTAM SODIUM 4.5 G: 4; .5 INJECTION, POWDER, LYOPHILIZED, FOR SOLUTION INTRAVENOUS at 09:09

## 2024-09-21 RX ADMIN — TAMSULOSIN HYDROCHLORIDE 0.4 MG: 0.4 CAPSULE ORAL at 09:09

## 2024-09-21 RX ADMIN — MAGNESIUM SULFATE HEPTAHYDRATE 2 G: 40 INJECTION, SOLUTION INTRAVENOUS at 09:09

## 2024-09-21 RX ADMIN — SODIUM CHLORIDE, POTASSIUM CHLORIDE, SODIUM LACTATE AND CALCIUM CHLORIDE: 600; 310; 30; 20 INJECTION, SOLUTION INTRAVENOUS at 04:09

## 2024-09-21 RX ADMIN — MICONAZOLE NITRATE 2 % TOPICAL POWDER: at 09:09

## 2024-09-21 RX ADMIN — RUGBY ZINC OXIDE 20%: 20 OINTMENT TOPICAL at 09:09

## 2024-09-21 RX ADMIN — ATORVASTATIN CALCIUM 20 MG: 10 TABLET, FILM COATED ORAL at 09:09

## 2024-09-21 RX ADMIN — ASPIRIN 81 MG CHEWABLE TABLET 81 MG: 81 TABLET CHEWABLE at 09:09

## 2024-09-21 RX ADMIN — PIPERACILLIN SODIUM AND TAZOBACTAM SODIUM 4.5 G: 4; .5 INJECTION, POWDER, LYOPHILIZED, FOR SOLUTION INTRAVENOUS at 01:09

## 2024-09-21 RX ADMIN — HYDRALAZINE HYDROCHLORIDE 10 MG: 20 INJECTION INTRAMUSCULAR; INTRAVENOUS at 05:09

## 2024-09-21 NOTE — PROGRESS NOTES
[facility] Hospital Medicine Progress Note        Chief complaint: Follow-up for septic shock     Hospital course 64-year-old male with HTN, HLD, CAD status post PCI, BPH on intermittent self catheterization, chronic diastolic heart failure, progressive physical decline with global weakness, initially presented with hyperglycemia, UTI, DWAINE, chest x-ray concerning for right-sided pneumonia with possible aspiration.  Admitted with septic shock that required vasopressors.  Because of respiratory failure he was intubated and mechanically ventilated.  Eventually, vasopressors were discontinued, the patient was extubated.  Urine cultures demonstrated Klebsiella, Providencia.  The patient was started on NG tube feeds.  He was cleared for oral intake by speech.  The antibiotics were initially deescalated and secondary to worsening leukocytosis were again escalated to include Zosyn, azithromycin.  Imaging demonstrated right-sided large pneumonia, possible empyema.  Pulmonary were consulted.  Because of progressive global weakness, Neurology were consulted who recommended an MRI of the brain and MRI of the entire spine.  Because a need for anesthesia, this has been deferred until 09/16/2024.  The patient did require a brief increase of his supplemental oxygenation given hypoxia in the 80s.  Chest x-ray was unremarkable.  Because of worsening D-dimer, a V/Q scan was ordered     9/15/24-Pulmonary has recommended repeat imaging on 09/16/2024.  The patient was relatively stable on 09/14/2024 and was even weaned down to room air.  However, at some point during the morning of 915th 2024, he was again placed on a non-rebreather, was oxygenating in the low 90s, placed on high-flow nasal cannula.  Was oxygenating 87%.  Labs demonstrate improving leukocytosis from 20689-76.9.  Labs demonstrate worsening acute kidney injury from yesterday, however, still improved from the day prior.  ABG on 09/15/2024  demonstrated pH of 7.45, pCO2 of 34, PO2 of 84.  Stat chest x-ray this morning demonstrated further improvement of right lower lung lobe zone opacities.  Medication list reviewed and demonstrates continuation of Zosyn, bronchodilators, prophylactic dose of Lovenox. Upon evaluation of the patient, his blood pressure was in the 80s.  His heart rate was in the 130s.  Physical examination demonstrates no hypervolemia.  A bolus of lactated Ringer was ordered.  His heart rate is starting to trend down in the 120s.  Blood pressure was at 89.      09/20/2024 Dr. Orozco-patient completely decompensated on 09/15/2024.  Patient remained hypotensive, hypoxic with a high suspicions for pulmonary emboli on full-dose anticoagulation.  Patient had to be transferred to ICU for respiratory decompensation, hypotension not responding to IV boluses requiring vasopressors.  In ICU he was placed on BiPAP/Levophed/isotonic crystalloid hydration and patient was kept on current IV antibiotic regimen.  Chest x-rays done in ICU with a right lower lobe consolidation/effusion concerning for empyema on Zosyn.  Decision was done to do a CTA of the chest.  CT chest abdomen with suspected ill-defined areas of bilateral intramuscular hemorrhage in the psoas muscles.  At that time anticoagulation was placed on hold.  Patient required 2 units PRBC and was able to wean down BiPAP to 8 L Oxymizer.  Neurology wanted MRI brain and whole spine secondary to patient's history of chronic paraplegia/wheelchair-bound over the past few years but patient refused MRIs.  Patient was kept on supplemental oxygen during the day and BiPAP at night.  Eventually patient agreed to have some MRIs done under anesthesia.  At this moment patient has been downgraded again to hospitalist services.      MRI brain with and without 09/19/2024-subacute ischemic changes in the right basal ganglia    MRI cervical spine with and without contrast 09/19/2024 with no evidence of active  demyelination.  Mild degenerative narrowing spinal canal at C3-C5.  Multilevel neural foraminal stenosis.  Chronic cord signal changes at C4-C6    MRI thoracic spine with and without contrast on 09/19/2024 with no definite cord signal abnormality.  Mild degenerative narrowing of the spinal canal at T5-T6 and T7-T9      The patient remains on Zosyn as well as aspirin 81 mg/atorvastatin 20/finasteride 5/tamsulosin 24 hour capsule 0.4 mg and Ringer's lactate at 100 cc/hour.      White blood cell count is trending down to normal, 10.7 at the present moment.  Serum creatinine also improving today at 1 point 5 that appears to be at his baseline.  Patient remains hyperglycemic.    Micro PCR negative on 09/08/2024 blood cultures x6 collected since 09/08/2024 also negative at 5 days    The present moment patient appears to be tired of nursing staff coming in and out of the room not letting him rest.  At the same time I am unable to get a straight answer from him.            Case was discussed with patient's nurse and  on the floor.    Objective/physical exam:  General: In no acute distress, afebrile  Chest: Clear to auscultation bilaterally  Heart: RRR, +S1, S2, no appreciable murmur  Abdomen: Soft, nontender, BS +  MSK: Warm, no lower extremity edema, no clubbing or cyanosis  Neurologic: Alert and oriented x 3, watching TV,    VITAL SIGNS: 24 HRS MIN & MAX LAST   Temp  Min: 97.5 °F (36.4 °C)  Max: 98.3 °F (36.8 °C) 98.3 °F (36.8 °C)   BP  Min: 114/75  Max: 131/79 129/73   Pulse  Min: 90  Max: 113  109   Resp  Min: 18  Max: 20 19   SpO2  Min: 92 %  Max: 96 % (!) 94 %     I have reviewed the following labs:  Recent Labs   Lab 09/18/24  0338 09/19/24  0450 09/20/24  0423   WBC 11.48 14.24* 10.73   RBC 2.89* 3.51* 3.16*   HGB 9.0* 11.0* 9.7*   HCT 26.1* 31.9* 28.9*   MCV 90.3 90.9 91.5   MCH 31.1* 31.3* 30.7   MCHC 34.5 34.5 33.6   RDW 14.8 15.0 15.2    209 223   MPV 10.6* 10.5* 10.2     Recent Labs   Lab  09/15/24  0351 09/15/24  1340 09/15/24  1623 09/15/24  2026 09/16/24  0320 09/16/24  0610 09/17/24  0226 09/18/24  0338 09/19/24  0450 09/20/24  0423     --   --  136 134*  --    < > 137 139 138   K 4.0  --   --  4.0 3.9  --    < > 3.3* 3.5 3.7     --   --  106 104  --    < > 103 103 103   CO2 23  --   --  21* 21*  --    < > 20* 23 23   BUN 36.5*  --   --  32.9* 30.3*  --    < > 16.6 17.6 20.0   CREATININE 1.71*  --   --  1.71* 1.57*  --    < > 1.50* 1.70* 1.51*   CALCIUM 8.3*  --   --  8.3* 8.2*  --    < > 7.7* 8.4* 7.7*   PH  --  7.520* 7.530*  --   --  7.430  --   --   --   --    MG 1.70  --   --  1.50* 2.80*  --   --   --   --   --    ALBUMIN 2.1*  --   --  1.9* 1.8*  --    < > 1.7* 1.9* 1.8*   ALKPHOS 46  --   --  41 39*  --    < > 42 48 45   ALT 17  --   --  16 17  --    < > 19 20 16   AST 14  --   --  14 16  --    < > 21 17 12   BILITOT 0.4  --   --  0.3 0.5  --    < > 0.7 0.7 0.5    < > = values in this interval not displayed.     Microbiology Results (last 7 days)       Procedure Component Value Units Date/Time    Blood Culture [[id number]]  (Normal) Collected: 09/15/24 1527    Order Status: Completed Specimen: Blood from Arm, Right Updated: 09/20/24 1602     Blood Culture No Growth at 5 days    Blood Culture [[id number]]  (Normal) Collected: 09/15/24 1527    Order Status: Completed Specimen: Blood from Arm, Left Updated: 09/20/24 1602     Blood Culture No Growth at 5 days    Blood Culture [[id number]]  (Normal) Collected: 09/13/24 0806    Order Status: Completed Specimen: Blood Updated: 09/18/24 1002     Blood Culture No Growth at 5 days    Blood Culture [[id number]]  (Normal) Collected: 09/13/24 0806    Order Status: Completed Specimen: Blood Updated: 09/18/24 0902     Blood Culture No Growth at 5 days             See below for Radiology    Scheduled Med:   aspirin  81 mg Oral Daily    atorvastatin  20 mg Oral Daily    finasteride  5 mg Oral Daily    miconazole NITRATE 2 %   Topical (Top) BID     pantoprazole  40 mg Intravenous BID    piperacillin-tazobactam (Zosyn) IV (PEDS and ADULTS) (extended infusion is not appropriate)  4.5 g Intravenous Q8H    tamsulosin  0.4 mg Oral Daily    zinc oxide   Topical (Top) BID      Continuous Infusions:   D5 and 0.45% NaCl   Intravenous Continuous PRN        lactated ringers   Intravenous Continuous 100 mL/hr at 09/20/24 1713 Rate Verify at 09/20/24 1713      PRN Meds:    Current Facility-Administered Medications:     0.9%  NaCl infusion (for blood administration), , Intravenous, Q24H PRN    0.9%  NaCl infusion (for blood administration), , Intravenous, Q24H PRN    0.9% NaCl, , Intravenous, PRN    albuterol-ipratropium, 3 mL, Nebulization, Q6H PRN    dextrose 10%, 12.5 g, Intravenous, PRN    dextrose 10%, 25 g, Intravenous, PRN    D5 and 0.45% NaCl, , Intravenous, Continuous PRN    haloperidol lactate, 1 mg, Intravenous, Q6H PRN    insulin aspart U-100, 0-10 Units, Subcutaneous, QID (AC + HS) PRN    loperamide, 2 mg, Oral, QID PRN    magnesium sulfate IVPB, 2 g, Intravenous, PRN    sodium chloride 0.9%, 10 mL, Intravenous, PRN    sodium phosphate 20.01 mmol in D5W 250 mL IVPB, 20.01 mmol, Intravenous, PRN     Assessment/Plan:    Acute hypoxic respiratory failure requiring noninvasive positive pressure ventilation    Septic shock requiring vasopressors as well as transfusion PRBC    Right lower lobe pneumonia, likely aspiration   Questionable right lung empyema   Duodenitis   Type 2 diabetes mellitus, non insulin with hyperglycemia  Acute kidney injury superimposed on chronic kidney disease stage 3  Progressive global weakness   Essential hypertension   Mixed hyperlipidemia   Previous history of CAD status post PCI   Chronic heart failure with a preserved ejection fraction, euvolemic  Subacute right basal ganglia infarct on aspirin 81 mg  Symptomatic anemia requiring transfusion PRBC  Septic shock secondary to UTI with Klebsiella and Providencia treated appropriately  with Zosyn    The patient remains alert, active and oriented.  Able to participate in HPI.  We will continue to hold Lovenox.  Today Zosyn day number 8.  Blood cultures negative as well as micro PCR.  White blood cell count is trending down to normal and kidney function at baseline.  We will start patient on Lantus 5 units at bedtime and of course add diabetic restrictions to his modified diet          VTE prophylaxis: scd    Patient condition:  guarded    All diagnosis and differential diagnosis have been reviewed; assessment and plan has been documented; I have personally reviewed the labs and test results that are presently available; I have reviewed the patients medication list; I have reviewed the consulting providers response and recommendations. I have reviewed or attempted to review medical records based upon their availability    All of the patient's questions have been  addressed and answered. Patient's is agreeable to the above stated plan. I will continue to monitor closely and make adjustments to medical management as needed.        Alexis Orozco MD   09/20/2024

## 2024-09-21 NOTE — NURSING
Nurses Note -- 4 Eyes      9/21/2024   0645 AM      Skin assessed during: Q Shift Change      [] No Altered Skin Integrity Present    []Prevention Measures Documented      [x] Yes- Altered Skin Integrity Present or Discovered   [] LDA Added if Not in Epic (Describe Wound)   [] New Altered Skin Integrity was Present on Admit and Documented in LDA   [] Wound Image Taken    Wound Care Consulted? No    Attending Nurse:  Rachel Campo RN/Staff Member:  Alex  Bruises to (B ) arms and (L) torso, stage 2 ulcer to buttocks with WC orders and dsg in place

## 2024-09-22 LAB
ALBUMIN SERPL-MCNC: 2 G/DL (ref 3.4–4.8)
ALBUMIN/GLOB SERPL: 0.6 RATIO (ref 1.1–2)
ALP SERPL-CCNC: 50 UNIT/L (ref 40–150)
ALT SERPL-CCNC: 15 UNIT/L (ref 0–55)
ANION GAP SERPL CALC-SCNC: 9 MEQ/L
AST SERPL-CCNC: 12 UNIT/L (ref 5–34)
BASOPHILS # BLD AUTO: 0.13 X10(3)/MCL
BASOPHILS NFR BLD AUTO: 1.3 %
BILIRUB SERPL-MCNC: 0.6 MG/DL
BUN SERPL-MCNC: 25.4 MG/DL (ref 8.4–25.7)
CALCIUM SERPL-MCNC: 8 MG/DL (ref 8.8–10)
CHLORIDE SERPL-SCNC: 104 MMOL/L (ref 98–107)
CO2 SERPL-SCNC: 25 MMOL/L (ref 23–31)
CREAT SERPL-MCNC: 1.57 MG/DL (ref 0.73–1.18)
CREAT/UREA NIT SERPL: 16
EOSINOPHIL # BLD AUTO: 0.15 X10(3)/MCL (ref 0–0.9)
EOSINOPHIL NFR BLD AUTO: 1.5 %
ERYTHROCYTE [DISTWIDTH] IN BLOOD BY AUTOMATED COUNT: 15.9 % (ref 11.5–17)
GFR SERPLBLD CREATININE-BSD FMLA CKD-EPI: 48 ML/MIN/1.73/M2
GLOBULIN SER-MCNC: 3.1 GM/DL (ref 2.4–3.5)
GLUCOSE SERPL-MCNC: 181 MG/DL (ref 82–115)
HCT VFR BLD AUTO: 31.3 % (ref 42–52)
HGB BLD-MCNC: 10.3 G/DL (ref 14–18)
IMM GRANULOCYTES # BLD AUTO: 0.07 X10(3)/MCL (ref 0–0.04)
IMM GRANULOCYTES NFR BLD AUTO: 0.7 %
LYMPHOCYTES # BLD AUTO: 0.63 X10(3)/MCL (ref 0.6–4.6)
LYMPHOCYTES NFR BLD AUTO: 6.3 %
MAGNESIUM SERPL-MCNC: 1.9 MG/DL (ref 1.6–2.6)
MCH RBC QN AUTO: 31.2 PG (ref 27–31)
MCHC RBC AUTO-ENTMCNC: 32.9 G/DL (ref 33–36)
MCV RBC AUTO: 94.8 FL (ref 80–94)
MONOCYTES # BLD AUTO: 1.02 X10(3)/MCL (ref 0.1–1.3)
MONOCYTES NFR BLD AUTO: 10.1 %
NEUTROPHILS # BLD AUTO: 8.06 X10(3)/MCL (ref 2.1–9.2)
NEUTROPHILS NFR BLD AUTO: 80.1 %
NRBC BLD AUTO-RTO: 0 %
PLATELET # BLD AUTO: 255 X10(3)/MCL (ref 130–400)
PMV BLD AUTO: 10.2 FL (ref 7.4–10.4)
POCT GLUCOSE: 145 MG/DL (ref 70–110)
POCT GLUCOSE: 156 MG/DL (ref 70–110)
POCT GLUCOSE: 293 MG/DL (ref 70–110)
POTASSIUM SERPL-SCNC: 4 MMOL/L (ref 3.5–5.1)
PROT SERPL-MCNC: 5.1 GM/DL (ref 5.8–7.6)
RBC # BLD AUTO: 3.3 X10(6)/MCL (ref 4.7–6.1)
SODIUM SERPL-SCNC: 138 MMOL/L (ref 136–145)
WBC # BLD AUTO: 10.06 X10(3)/MCL (ref 4.5–11.5)

## 2024-09-22 PROCEDURE — 21400001 HC TELEMETRY ROOM

## 2024-09-22 PROCEDURE — 97535 SELF CARE MNGMENT TRAINING: CPT | Mod: CO

## 2024-09-22 PROCEDURE — 97530 THERAPEUTIC ACTIVITIES: CPT | Mod: CQ

## 2024-09-22 PROCEDURE — 63600175 PHARM REV CODE 636 W HCPCS: Performed by: INTERNAL MEDICINE

## 2024-09-22 PROCEDURE — 85025 COMPLETE CBC W/AUTO DIFF WBC: CPT

## 2024-09-22 PROCEDURE — 80053 COMPREHEN METABOLIC PANEL: CPT

## 2024-09-22 PROCEDURE — 94799 UNLISTED PULMONARY SVC/PX: CPT

## 2024-09-22 PROCEDURE — 25000003 PHARM REV CODE 250: Performed by: INTERNAL MEDICINE

## 2024-09-22 PROCEDURE — 99900035 HC TECH TIME PER 15 MIN (STAT)

## 2024-09-22 PROCEDURE — 99900031 HC PATIENT EDUCATION (STAT)

## 2024-09-22 PROCEDURE — 83735 ASSAY OF MAGNESIUM: CPT | Performed by: INTERNAL MEDICINE

## 2024-09-22 PROCEDURE — 63600175 PHARM REV CODE 636 W HCPCS

## 2024-09-22 PROCEDURE — 36415 COLL VENOUS BLD VENIPUNCTURE: CPT

## 2024-09-22 PROCEDURE — 25000003 PHARM REV CODE 250

## 2024-09-22 RX ORDER — INSULIN GLARGINE 100 [IU]/ML
7 INJECTION, SOLUTION SUBCUTANEOUS 2 TIMES DAILY
Status: DISCONTINUED | OUTPATIENT
Start: 2024-09-23 | End: 2024-09-26

## 2024-09-22 RX ADMIN — MICONAZOLE NITRATE 2 % TOPICAL POWDER: at 09:09

## 2024-09-22 RX ADMIN — FINASTERIDE 5 MG: 5 TABLET, FILM COATED ORAL at 11:09

## 2024-09-22 RX ADMIN — PANTOPRAZOLE SODIUM 40 MG: 40 INJECTION, POWDER, FOR SOLUTION INTRAVENOUS at 09:09

## 2024-09-22 RX ADMIN — TAMSULOSIN HYDROCHLORIDE 0.4 MG: 0.4 CAPSULE ORAL at 08:09

## 2024-09-22 RX ADMIN — MICONAZOLE NITRATE 2 % TOPICAL POWDER: at 08:09

## 2024-09-22 RX ADMIN — RUGBY ZINC OXIDE 20%: 20 OINTMENT TOPICAL at 09:09

## 2024-09-22 RX ADMIN — ASPIRIN 81 MG CHEWABLE TABLET 81 MG: 81 TABLET CHEWABLE at 08:09

## 2024-09-22 RX ADMIN — INSULIN GLARGINE 5 UNITS: 100 INJECTION, SOLUTION SUBCUTANEOUS at 09:09

## 2024-09-22 RX ADMIN — PIPERACILLIN SODIUM AND TAZOBACTAM SODIUM 4.5 G: 4; .5 INJECTION, POWDER, LYOPHILIZED, FOR SOLUTION INTRAVENOUS at 01:09

## 2024-09-22 RX ADMIN — ATORVASTATIN CALCIUM 20 MG: 10 TABLET, FILM COATED ORAL at 08:09

## 2024-09-22 RX ADMIN — SODIUM CHLORIDE, POTASSIUM CHLORIDE, SODIUM LACTATE AND CALCIUM CHLORIDE: 600; 310; 30; 20 INJECTION, SOLUTION INTRAVENOUS at 11:09

## 2024-09-22 RX ADMIN — INSULIN ASPART 6 UNITS: 100 INJECTION, SOLUTION INTRAVENOUS; SUBCUTANEOUS at 12:09

## 2024-09-22 RX ADMIN — PIPERACILLIN SODIUM AND TAZOBACTAM SODIUM 4.5 G: 4; .5 INJECTION, POWDER, LYOPHILIZED, FOR SOLUTION INTRAVENOUS at 06:09

## 2024-09-22 RX ADMIN — PANTOPRAZOLE SODIUM 40 MG: 40 INJECTION, POWDER, FOR SOLUTION INTRAVENOUS at 08:09

## 2024-09-22 RX ADMIN — INSULIN GLARGINE 5 UNITS: 100 INJECTION, SOLUTION SUBCUTANEOUS at 08:09

## 2024-09-22 RX ADMIN — RUGBY ZINC OXIDE 20%: 20 OINTMENT TOPICAL at 08:09

## 2024-09-22 RX ADMIN — PIPERACILLIN SODIUM AND TAZOBACTAM SODIUM 4.5 G: 4; .5 INJECTION, POWDER, LYOPHILIZED, FOR SOLUTION INTRAVENOUS at 09:09

## 2024-09-22 NOTE — NURSING
Nurses Note -- 4 Eyes      9/20/24   1900      Skin assessed during: Q Shift Change      [] No Altered Skin Integrity Present    []Prevention Measures Documented      [x] Yes- Altered Skin Integrity Present or Discovered   [] LDA Added if Not in Epic (Describe Wound)   [] New Altered Skin Integrity was Present on Admit and Documented in LDA   [] Wound Image Taken    Wound Care Consulted? No    Attending Nurse:  Alex Campo RN/Staff Member:  Rachel

## 2024-09-22 NOTE — NURSING
Nurses Note -- 4 Eyes      9/22/2024   0645 AM      Skin assessed during: Q Shift Change      [] No Altered Skin Integrity Present    []Prevention Measures Documented      [x] Yes- Altered Skin Integrity Present or Discovered   [x] LDA Added if Not in Epic (Describe Wound)   [] New Altered Skin Integrity was Present on Admit and Documented in LDA   [] Wound Image Taken    Wound Care Consulted? No    Attending Nurse:  Rachel Campo RN/Staff Member:  Alex  WC orders per WC in place to stage 2 on buttocks, bruises to (B) UE

## 2024-09-22 NOTE — NURSING
Nurses Note -- 4 Eyes      9/21/2024   1900      Skin assessed during: Q Shift Change      [] No Altered Skin Integrity Present    []Prevention Measures Documented      [x] Yes- Altered Skin Integrity Present or Discovered   [] LDA Added if Not in Epic (Describe Wound)   [] New Altered Skin Integrity was Present on Admit and Documented in LDA   [] Wound Image Taken    Wound Care Consulted? No    Attending Nurse:  Alex Campo RN/Staff Member:  Rachel

## 2024-09-22 NOTE — PT/OT/SLP PROGRESS
Occupational Therapy   Treatment    Name: Froy Barragan  MRN: [id number]  Admitting Diagnosis:  UTI/DWAINE  3 Days Post-Op    Recommendations:     Recommended therapy intensity at discharge: Moderate Intensity Therapy   Discharge Equipment Recommendations:  to be determined by next level of care  Barriers to discharge:       Assessment:     Froy Barragan is a 64 y.o. male with a medical diagnosis of UTI/DWAINE.  He presents with poor safety awareness, increased B LE weakness, poor activity tolerance, recommending Mod intensity therapy. Performance deficits affecting function are weakness, impaired endurance, impaired self care skills, impaired functional mobility, impaired balance.     Rehab Prognosis:  Fair; patient would benefit from acute skilled OT services to address these deficits and reach maximum level of function.       Plan:     Patient to be seen 4 x/week to address the above listed problems via self-care/home management, therapeutic activities, therapeutic exercises  Plan of Care Expires: 10/12/24  Plan of Care Reviewed with: patient    Subjective     Pain/Comfort:       Objective:     Communicated with: RN prior to session.  Patient found HOB elevated with   upon OT entry to room.    General Precautions: Standard, aspiration    Orthopedic Precautions:N/A  Braces: N/A  Respiratory Status: Room air       Occupational Performance:  Pt. Soiled upon entry, pt. Requiring Max A for rolling and total A for pericare.  (Bed Mobility- Max A)   (Static sitting balance- Min A) using L UE for support with balance.   Pt. Laid back down and repositioned in bed appropriately.   Therapeutic Positioning    OT interventions performed during the course of today's session in an effort to prevent and/or reduce acquired pressure injuries:   Therapeutic positioning was provided at the conclusion of session to offload all bony prominences for the prevention and/or reduction of pressure injuries      [facility] 6 Click  ADL:      Patient Education:  Patient provided with verbal education education regarding fall prevention, safety awareness, and pressure ulcer prevention.  Additional teaching is warranted.      Patient left HOB elevated with all lines intact and call button in reach.    GOALS:   Multidisciplinary Problems       Occupational Therapy Goals          Problem: Occupational Therapy    Goal Priority Disciplines Outcome Interventions   Occupational Therapy Goal     OT, PT/OT Progressing    Description: Goals to be met by: 10/12/24     Patient will increase functional independence with ADLs by performing:    Grooming while seated with Supervision.  Toileting from bedside commode with Moderate Assistance for hygiene and clothing management.   Sitting at edge of bed x10 minutes with Supervision.  Supine to sit with Supervision.  Squat pivot transfers with Minimal Assistance.  Toilet transfer to bedside commode with Minimal Assistance.  Increased functional strength to 4/5 for self care skills and functional mobility.  Upper extremity exercise program x10 reps per handout, with independence.                         Time Tracking:     OT Date of Treatment: 09/22/24  OT Start Time: 1037  OT Stop Time: 1115  OT Total Time (min): 38 min    Billable Minutes:Self Care/Home Management 3    OT/JOLENE: JOLENE     Number of JOLENE visits since last OT visit: 4    9/22/2024

## 2024-09-22 NOTE — PT/OT/SLP PROGRESS
Physical Therapy Treatment    Patient Name:  Froy Barragan   MRN:  [id number]    Recommendations:     Discharge therapy intensity: Moderate Intensity Therapy   Discharge Equipment Recommendations: to be determined by next level of care  Barriers to discharge: Ongoing medical needs    Assessment:     Froy Barragan is a 64 y.o. male admitted with a medical diagnosis of medical diagnosis of septic shock 2/2 UTI, DWAINE, acute respiratory failure requiring intubation .  He presents with the following impairments/functional limitations: weakness, gait instability, impaired endurance, impaired balance, decreased safety awareness, impaired functional mobility.    Rehab Prognosis: Good; patient would benefit from acute skilled PT services to address these deficits and reach maximum level of function.    Recent Surgery: Procedure(s) (LRB):  MRI (Magnetic Resonance Imagine) (N/A) 3 Days Post-Op    Plan:     During this hospitalization, patient would benefit from acute PT services 5 x/week to address the identified rehab impairments via gait training, therapeutic activities, therapeutic exercises, wheelchair management/training and progress toward the following goals:    Plan of Care Expires:  10/18/24    Subjective     Chief Complaint: none stated  Patient/Family Comments/goals: none stated  Pain/Comfort:  Pain Rating 1: 0/10      Objective:     Communicated with nurse prior to session.  Patient found HOB elevated with peripheral IV, blood pressure cuff, pulse ox (continuous), colbert catheter upon PT entry to room.     General Precautions: Standard, aspiration  Orthopedic Precautions: N/A  Braces: N/A  Respiratory Status: Room air  Blood Pressure: nt  Skin Integrity: Visible skin intact      Functional Mobility:  Bed Mobility:     Scooting: moderate assistance and maximal assistance  Supine to Sit: maximal assistance  Sit to Supine: maximal assistance    Therapeutic Activities/Exercises:  Patient performed  scooting while seated edge of bed and required modA-maxA.     Education:  Patient provided with verbal education education regarding PT role/goals/POC, fall prevention, and safety awareness.  Understanding was verbalized.     Patient left HOB elevated with all lines intact and call button in reach    GOALS:   Multidisciplinary Problems       Physical Therapy Goals          Problem: Physical Therapy    Goal Priority Disciplines Outcome Goal Variances Interventions   Physical Therapy Goal     PT, PT/OT Progressing     Description: Goals to be met by: d/c     Patient will increase functional independence with mobility by performin. Supine to sit with Stand-by Assistance  2. Sit to supine with Stand-by Assistance  3. Sit to stand transfer with Stand-by Assistance  4. Bed to chair transfer with Stand-by Assistance using No Assistive Device scoot pivot vs SB  5. Wheelchair propulsion x100 feet with Supervision using bilateral uppper extremities                         Time Tracking:     PT Received On: 24  PT Start Time: 1037     PT Stop Time: 1115  PT Total Time (min): 38 min     Billable Minutes: Therapeutic Activity 38    Treatment Type: Treatment  PT/PTA: PTA     Number of PTA visits since last PT visit: 2024

## 2024-09-22 NOTE — PROGRESS NOTES
[facility] Hospital Medicine Progress Note        Chief complaint: Follow-up for septic shock     Hospital course 64-year-old male with HTN, HLD, CAD status post PCI, BPH on intermittent self catheterization, chronic diastolic heart failure, progressive physical decline with global weakness, initially presented with hyperglycemia, UTI, DWAINE, chest x-ray concerning for right-sided pneumonia with possible aspiration.  Admitted with septic shock that required vasopressors.  Because of respiratory failure he was intubated and mechanically ventilated.  Eventually, vasopressors were discontinued, the patient was extubated.  Urine cultures demonstrated Klebsiella, Providencia.  The patient was started on NG tube feeds.  He was cleared for oral intake by speech.  The antibiotics were initially deescalated and secondary to worsening leukocytosis were again escalated to include Zosyn, azithromycin.  Imaging demonstrated right-sided large pneumonia, possible empyema.  Pulmonary were consulted.  Because of progressive global weakness, Neurology were consulted who recommended an MRI of the brain and MRI of the entire spine.  Because a need for anesthesia, this has been deferred until 09/16/2024.  The patient did require a brief increase of his supplemental oxygenation given hypoxia in the 80s.  Chest x-ray was unremarkable.  Because of worsening D-dimer, a V/Q scan was ordered     9/15/24-Pulmonary has recommended repeat imaging on 09/16/2024.  The patient was relatively stable on 09/14/2024 and was even weaned down to room air.  However, at some point during the morning of 915th 2024, he was again placed on a non-rebreather, was oxygenating in the low 90s, placed on high-flow nasal cannula.  Was oxygenating 87%.  Labs demonstrate improving leukocytosis from 82570-68.9.  Labs demonstrate worsening acute kidney injury from yesterday, however, still improved from the day prior.  ABG on 09/15/2024  demonstrated pH of 7.45, pCO2 of 34, PO2 of 84.  Stat chest x-ray this morning demonstrated further improvement of right lower lung lobe zone opacities.  Medication list reviewed and demonstrates continuation of Zosyn, bronchodilators, prophylactic dose of Lovenox. Upon evaluation of the patient, his blood pressure was in the 80s.  His heart rate was in the 130s.  Physical examination demonstrates no hypervolemia.  A bolus of lactated Ringer was ordered.  His heart rate is starting to trend down in the 120s.  Blood pressure was at 89.      09/20/2024 Dr. Orozco-patient completely decompensated on 09/15/2024.  Patient remained hypotensive, hypoxic with a high suspicions for pulmonary emboli on full-dose anticoagulation.  Patient had to be transferred to ICU for respiratory decompensation, hypotension not responding to IV boluses requiring vasopressors.  In ICU he was placed on BiPAP/Levophed/isotonic crystalloid hydration and patient was kept on current IV antibiotic regimen.  Chest x-rays done in ICU with a right lower lobe consolidation/effusion concerning for empyema on Zosyn.  Decision was done to do a CTA of the chest.  CT chest abdomen with suspected ill-defined areas of bilateral intramuscular hemorrhage in the psoas muscles.  At that time anticoagulation was placed on hold.  Patient required 2 units PRBC and was able to wean down BiPAP to 8 L Oxymizer.  Neurology wanted MRI brain and whole spine secondary to patient's history of chronic paraplegia/wheelchair-bound over the past few years but patient refused MRIs.  Patient was kept on supplemental oxygen during the day and BiPAP at night.  Eventually patient agreed to have some MRIs done under anesthesia.  At this moment patient has been downgraded again to hospitalist services.      MRI brain with and without 09/19/2024-subacute ischemic changes in the right basal ganglia    MRI cervical spine with and without contrast 09/19/2024 with no evidence of active  demyelination.  Mild degenerative narrowing spinal canal at C3-C5.  Multilevel neural foraminal stenosis.  Chronic cord signal changes at C4-C6    MRI thoracic spine with and without contrast on 09/19/2024 with no definite cord signal abnormality.  Mild degenerative narrowing of the spinal canal at T5-T6 and T7-T9      The patient remains on Zosyn as well as aspirin 81 mg/atorvastatin 20/finasteride 5/tamsulosin 24 hour capsule 0.4 mg and Ringer's lactate at 100 cc/hour.      White blood cell count is trending down to normal, 10.7 at the present moment.  Serum creatinine also improving today at 1 point 5 that appears to be at his baseline.  Patient remains hyperglycemic.    Micro PCR negative on 09/08/2024 blood cultures x6 collected since 09/08/2024 also negative at 5 days    At The present moment patient appears to be tired of nursing staff coming in and out of the room not letting him rest.  At the same time I am unable to get a straight answer from him.    9/21/24 pedrito vaughn complains . Was able to sleep last night             Case was discussed with patient's nurse and  on the floor.    Objective/physical exam:  General: In no acute distress, afebrile  Chest: Clear to auscultation bilaterally  Heart: RRR, +S1, S2, no appreciable murmur  Abdomen: Soft, nontender, BS +  MSK: Warm, no lower extremity edema, no clubbing or cyanosis  Neurologic: Alert and oriented x 3, watching TV,    VITAL SIGNS: 24 HRS MIN & MAX LAST   Temp  Min: 97.4 °F (36.3 °C)  Max: 98.2 °F (36.8 °C) 98.2 °F (36.8 °C)   BP  Min: 109/77  Max: 165/90 136/79   Pulse  Min: 93  Max: 121  (!) 113   Resp  Min: 17  Max: 18 17   SpO2  Min: 91 %  Max: 95 % 95 %     I have reviewed the following labs:  Recent Labs   Lab 09/19/24  0450 09/20/24  0423 09/21/24  0521   WBC 14.24* 10.73 11.11   RBC 3.51* 3.16* 3.34*   HGB 11.0* 9.7* 10.2*   HCT 31.9* 28.9* 30.9*   MCV 90.9 91.5 92.5   MCH 31.3* 30.7 30.5   MCHC 34.5 33.6 33.0   RDW 15.0 15.2  15.4    223 251   MPV 10.5* 10.2 10.2     Recent Labs   Lab 09/15/24  1340 09/15/24  1623 09/15/24  2026 09/16/24  0320 09/16/24  0610 09/17/24  0226 09/19/24  0450 09/20/24  0423 09/21/24  0521   NA  --   --  136 134*  --    < > 139 138 139   K  --   --  4.0 3.9  --    < > 3.5 3.7 3.7   CL  --   --  106 104  --    < > 103 103 104   CO2  --   --  21* 21*  --    < > 23 23 25   BUN  --   --  32.9* 30.3*  --    < > 17.6 20.0 23.2   CREATININE  --   --  1.71* 1.57*  --    < > 1.70* 1.51* 1.50*   CALCIUM  --   --  8.3* 8.2*  --    < > 8.4* 7.7* 7.9*   PH 7.520* 7.530*  --   --  7.430  --   --   --   --    MG  --   --  1.50* 2.80*  --   --   --   --  1.30*   ALBUMIN  --   --  1.9* 1.8*  --    < > 1.9* 1.8* 2.0*   ALKPHOS  --   --  41 39*  --    < > 48 45 47   ALT  --   --  16 17  --    < > 20 16 16   AST  --   --  14 16  --    < > 17 12 14   BILITOT  --   --  0.3 0.5  --    < > 0.7 0.5 0.7    < > = values in this interval not displayed.     Microbiology Results (last 7 days)       Procedure Component Value Units Date/Time    Blood Culture [[id number]]  (Normal) Collected: 09/15/24 1527    Order Status: Completed Specimen: Blood from Arm, Right Updated: 09/20/24 1602     Blood Culture No Growth at 5 days    Blood Culture [[id number]]  (Normal) Collected: 09/15/24 1527    Order Status: Completed Specimen: Blood from Arm, Left Updated: 09/20/24 1602     Blood Culture No Growth at 5 days    Blood Culture [[id number]]  (Normal) Collected: 09/13/24 0806    Order Status: Completed Specimen: Blood Updated: 09/18/24 1002     Blood Culture No Growth at 5 days    Blood Culture [[id number]]  (Normal) Collected: 09/13/24 0806    Order Status: Completed Specimen: Blood Updated: 09/18/24 0902     Blood Culture No Growth at 5 days             See below for Radiology    Scheduled Med:   aspirin  81 mg Oral Daily    atorvastatin  20 mg Oral Daily    finasteride  5 mg Oral Daily    insulin glargine U-100  5 Units Subcutaneous QHS     miconazole NITRATE 2 %   Topical (Top) BID    pantoprazole  40 mg Intravenous BID    piperacillin-tazobactam (Zosyn) IV (PEDS and ADULTS) (extended infusion is not appropriate)  4.5 g Intravenous Q8H    tamsulosin  0.4 mg Oral Daily    zinc oxide   Topical (Top) BID      Continuous Infusions:   D5 and 0.45% NaCl   Intravenous Continuous PRN        lactated ringers   Intravenous Continuous 100 mL/hr at 09/21/24 1812 Rate Verify at 09/21/24 1812      PRN Meds:    Current Facility-Administered Medications:     0.9%  NaCl infusion (for blood administration), , Intravenous, Q24H PRN    0.9%  NaCl infusion (for blood administration), , Intravenous, Q24H PRN    0.9% NaCl, , Intravenous, PRN    albuterol-ipratropium, 3 mL, Nebulization, Q6H PRN    dextrose 10%, 12.5 g, Intravenous, PRN    dextrose 10%, 25 g, Intravenous, PRN    D5 and 0.45% NaCl, , Intravenous, Continuous PRN    haloperidol lactate, 1 mg, Intravenous, Q6H PRN    hydrALAZINE, 10 mg, Intravenous, Q4H PRN    insulin aspart U-100, 0-10 Units, Subcutaneous, QID (AC + HS) PRN    loperamide, 2 mg, Oral, QID PRN    magnesium sulfate IVPB, 2 g, Intravenous, PRN    sodium chloride 0.9%, 10 mL, Intravenous, PRN    sodium phosphate 20.01 mmol in D5W 250 mL IVPB, 20.01 mmol, Intravenous, PRN     Assessment/Plan:    Acute hypoxic respiratory failure requiring noninvasive positive pressure ventilation    Septic shock requiring vasopressors as well as transfusion PRBC    Right lower lobe pneumonia, likely aspiration     Duodenitis   Type 2 diabetes mellitus, non insulin with hyperglycemia  Acute kidney injury superimposed on chronic kidney disease stage 3  Progressive global weakness   Essential hypertension   Mixed hyperlipidemia   Previous history of CAD status post PCI   Chronic heart failure with a preserved ejection fraction, euvolemic  Subacute right basal ganglia infarct on aspirin 81 mg  Symptomatic anemia requiring transfusion PRBC  Septic shock secondary to UTI  with Klebsiella and Providencia treated appropriately with Zosyn    The patient remains alert, active and oriented.  Able to participate in HPI.  We will continue to hold Lovenox.  Today Zosyn day number 9/10.  Blood cultures negative as well as micro PCR.    Titrate Lantus 5 units at bedtime       Cc time 35 minutes   Cc diagnosis includes hypomagnesemia requiring IV magnesium replacement.        VTE prophylaxis: scd    Patient condition:  guarded    All diagnosis and differential diagnosis have been reviewed; assessment and plan has been documented; I have personally reviewed the labs and test results that are presently available; I have reviewed the patients medication list; I have reviewed the consulting providers response and recommendations. I have reviewed or attempted to review medical records based upon their availability    All of the patient's questions have been  addressed and answered. Patient's is agreeable to the above stated plan. I will continue to monitor closely and make adjustments to medical management as needed.        Alexis Orozco MD   09/21/2024

## 2024-09-23 LAB
ALBUMIN SERPL-MCNC: 2 G/DL (ref 3.4–4.8)
ALBUMIN/GLOB SERPL: 0.6 RATIO (ref 1.1–2)
ALP SERPL-CCNC: 49 UNIT/L (ref 40–150)
ALT SERPL-CCNC: 15 UNIT/L (ref 0–55)
ANION GAP SERPL CALC-SCNC: 11 MEQ/L
AST SERPL-CCNC: 15 UNIT/L (ref 5–34)
BASOPHILS # BLD AUTO: 0.1 X10(3)/MCL
BASOPHILS NFR BLD AUTO: 1.2 %
BILIRUB SERPL-MCNC: 0.8 MG/DL
BUN SERPL-MCNC: 25.1 MG/DL (ref 8.4–25.7)
CALCIUM SERPL-MCNC: 8.1 MG/DL (ref 8.8–10)
CHLORIDE SERPL-SCNC: 104 MMOL/L (ref 98–107)
CO2 SERPL-SCNC: 23 MMOL/L (ref 23–31)
CREAT SERPL-MCNC: 1.71 MG/DL (ref 0.73–1.18)
CREAT/UREA NIT SERPL: 15
EOSINOPHIL # BLD AUTO: 0.16 X10(3)/MCL (ref 0–0.9)
EOSINOPHIL NFR BLD AUTO: 1.9 %
ERYTHROCYTE [DISTWIDTH] IN BLOOD BY AUTOMATED COUNT: 15.9 % (ref 11.5–17)
GFR SERPLBLD CREATININE-BSD FMLA CKD-EPI: 44 ML/MIN/1.73/M2
GLOBULIN SER-MCNC: 3.1 GM/DL (ref 2.4–3.5)
GLUCOSE SERPL-MCNC: 164 MG/DL (ref 82–115)
HCT VFR BLD AUTO: 30.7 % (ref 42–52)
HGB BLD-MCNC: 10 G/DL (ref 14–18)
IMM GRANULOCYTES # BLD AUTO: 0.06 X10(3)/MCL (ref 0–0.04)
IMM GRANULOCYTES NFR BLD AUTO: 0.7 %
LYMPHOCYTES # BLD AUTO: 0.54 X10(3)/MCL (ref 0.6–4.6)
LYMPHOCYTES NFR BLD AUTO: 6.5 %
MAGNESIUM SERPL-MCNC: 1.6 MG/DL (ref 1.6–2.6)
MCH RBC QN AUTO: 30.4 PG (ref 27–31)
MCHC RBC AUTO-ENTMCNC: 32.6 G/DL (ref 33–36)
MCV RBC AUTO: 93.3 FL (ref 80–94)
MONOCYTES # BLD AUTO: 1 X10(3)/MCL (ref 0.1–1.3)
MONOCYTES NFR BLD AUTO: 12 %
NEUTROPHILS # BLD AUTO: 6.48 X10(3)/MCL (ref 2.1–9.2)
NEUTROPHILS NFR BLD AUTO: 77.7 %
NRBC BLD AUTO-RTO: 0 %
PLATELET # BLD AUTO: 259 X10(3)/MCL (ref 130–400)
PMV BLD AUTO: 10.4 FL (ref 7.4–10.4)
POCT GLUCOSE: 200 MG/DL (ref 70–110)
POCT GLUCOSE: 90 MG/DL (ref 70–110)
POTASSIUM SERPL-SCNC: 3.6 MMOL/L (ref 3.5–5.1)
PROT SERPL-MCNC: 5.1 GM/DL (ref 5.8–7.6)
RBC # BLD AUTO: 3.29 X10(6)/MCL (ref 4.7–6.1)
SODIUM SERPL-SCNC: 138 MMOL/L (ref 136–145)
WBC # BLD AUTO: 8.34 X10(3)/MCL (ref 4.5–11.5)

## 2024-09-23 PROCEDURE — 99900035 HC TECH TIME PER 15 MIN (STAT)

## 2024-09-23 PROCEDURE — 25000003 PHARM REV CODE 250

## 2024-09-23 PROCEDURE — 63600175 PHARM REV CODE 636 W HCPCS: Performed by: INTERNAL MEDICINE

## 2024-09-23 PROCEDURE — 21400001 HC TELEMETRY ROOM

## 2024-09-23 PROCEDURE — 36415 COLL VENOUS BLD VENIPUNCTURE: CPT

## 2024-09-23 PROCEDURE — 97530 THERAPEUTIC ACTIVITIES: CPT | Mod: CQ

## 2024-09-23 PROCEDURE — 83735 ASSAY OF MAGNESIUM: CPT | Performed by: INTERNAL MEDICINE

## 2024-09-23 PROCEDURE — 63600175 PHARM REV CODE 636 W HCPCS

## 2024-09-23 PROCEDURE — 80053 COMPREHEN METABOLIC PANEL: CPT

## 2024-09-23 PROCEDURE — 85025 COMPLETE CBC W/AUTO DIFF WBC: CPT

## 2024-09-23 PROCEDURE — 94799 UNLISTED PULMONARY SVC/PX: CPT

## 2024-09-23 PROCEDURE — 25000003 PHARM REV CODE 250: Performed by: INTERNAL MEDICINE

## 2024-09-23 RX ADMIN — SODIUM CHLORIDE, POTASSIUM CHLORIDE, SODIUM LACTATE AND CALCIUM CHLORIDE: 600; 310; 30; 20 INJECTION, SOLUTION INTRAVENOUS at 10:09

## 2024-09-23 RX ADMIN — ATORVASTATIN CALCIUM 20 MG: 10 TABLET, FILM COATED ORAL at 10:09

## 2024-09-23 RX ADMIN — PANTOPRAZOLE SODIUM 40 MG: 40 INJECTION, POWDER, FOR SOLUTION INTRAVENOUS at 10:09

## 2024-09-23 RX ADMIN — RUGBY ZINC OXIDE 20%: 20 OINTMENT TOPICAL at 10:09

## 2024-09-23 RX ADMIN — FINASTERIDE 5 MG: 5 TABLET, FILM COATED ORAL at 10:09

## 2024-09-23 RX ADMIN — ASPIRIN 81 MG CHEWABLE TABLET 81 MG: 81 TABLET CHEWABLE at 10:09

## 2024-09-23 RX ADMIN — PANTOPRAZOLE SODIUM 40 MG: 40 INJECTION, POWDER, FOR SOLUTION INTRAVENOUS at 09:09

## 2024-09-23 RX ADMIN — TAMSULOSIN HYDROCHLORIDE 0.4 MG: 0.4 CAPSULE ORAL at 10:09

## 2024-09-23 RX ADMIN — INSULIN ASPART 2 UNITS: 100 INJECTION, SOLUTION INTRAVENOUS; SUBCUTANEOUS at 06:09

## 2024-09-23 RX ADMIN — INSULIN GLARGINE 7 UNITS: 100 INJECTION, SOLUTION SUBCUTANEOUS at 10:09

## 2024-09-23 RX ADMIN — MICONAZOLE NITRATE 2 % TOPICAL POWDER: at 10:09

## 2024-09-23 RX ADMIN — MICONAZOLE NITRATE 2 % TOPICAL POWDER: at 09:09

## 2024-09-23 RX ADMIN — INSULIN ASPART 1 UNITS: 100 INJECTION, SOLUTION INTRAVENOUS; SUBCUTANEOUS at 10:09

## 2024-09-23 RX ADMIN — INSULIN GLARGINE 7 UNITS: 100 INJECTION, SOLUTION SUBCUTANEOUS at 09:09

## 2024-09-23 RX ADMIN — RUGBY ZINC OXIDE 20%: 20 OINTMENT TOPICAL at 09:09

## 2024-09-23 NOTE — PT/OT/SLP PROGRESS
Physical Therapy Treatment    Patient Name:  Froy Barragan   MRN:  [id number]    Recommendations:     Discharge therapy intensity: Moderate Intensity Therapy   Discharge Equipment Recommendations: to be determined by next level of care  Barriers to discharge: Impaired mobility    Assessment:     Froy Barragan is a 64 y.o. male admitted with a medical diagnosis of septic shock 2/2 UTI, DWAINE, acute respiratory failure requiring intubation . .  He presents with the following impairments/functional limitations: weakness, gait instability, impaired endurance, impaired balance, decreased safety awareness, impaired functional mobility .    Pt able to T/F to EOB with modA, good effort by pt. While prepping for chair T/F pt with huge LOB forward and was maxA to correct. Pt states he was not aware he was falling. Pt able to T/F to bedside chair via squat pivot maxA. Pt with situational confusion throughout tx session (mostly during functional mobility) but able to answer all orientation questions appropriately.     Rehab Prognosis: Good; patient would benefit from acute skilled PT services to address these deficits and reach maximum level of function.    Recent Surgery: Procedure(s) (LRB):  MRI (Magnetic Resonance Imagine) (N/A) 4 Days Post-Op    Plan:     During this hospitalization, patient would benefit from acute PT services 5 x/week to address the identified rehab impairments via gait training, therapeutic activities, therapeutic exercises, wheelchair management/training and progress toward the following goals:    Plan of Care Expires:  10/18/24    Subjective     Chief Complaint:   Patient/Family Comments/goals:   Pain/Comfort:  Pain Rating 1: 0/10      Objective:     Communicated with NSG prior to session.  Patient found HOB elevated with peripheral IV, colbert catheter, telemetry upon PT entry to room.     General Precautions: Standard, aspiration  Orthopedic Precautions: N/A  Braces: N/A  Respiratory  Status: Room air  Blood Pressure:   Skin Integrity: Visible skin intact      Functional Mobility:  Bed Mobility:     Rolling Left:  moderate assistance  Rolling Right: moderate assistance  Scooting: moderate assistance  Bridging: minimum assistance and to pull up brief  Supine to Sit: moderate assistance  Transfers:     Bed to Chair: maximal assistance with  no AD  using  Squat Pivot  Stat sitting: pt SBA with random forward LOB and was maxA to prevent fall.       ~pt assisted in rolling R/L to remove soiled chucks and finish pulling up brief for OOB mobility.       Patient left up in chair with all lines intact, call button in reach, chair alarm on, geomat cushion, and leif pad in place    GOALS:   Multidisciplinary Problems       Physical Therapy Goals          Problem: Physical Therapy    Goal Priority Disciplines Outcome Goal Variances Interventions   Physical Therapy Goal     PT, PT/OT Progressing     Description: Goals to be met by: d/c     Patient will increase functional independence with mobility by performin. Supine to sit with Stand-by Assistance  2. Sit to supine with Stand-by Assistance  3. Sit to stand transfer with Stand-by Assistance  4. Bed to chair transfer with Stand-by Assistance using No Assistive Device scoot pivot vs SB  5. Wheelchair propulsion x100 feet with Supervision using bilateral uppper extremities                         Time Tracking:     PT Received On: 24  PT Start Time: 1039     PT Stop Time: 1112  PT Total Time (min): 33 min     Billable Minutes: Therapeutic Activity 33    Treatment Type: Treatment  PT/PTA: PTA     Number of PTA visits since last PT visit: 2     2024

## 2024-09-23 NOTE — PROGRESS NOTES
[facility] Hospital Medicine Progress Note        Chief complaint: Follow-up for septic shock     Hospital course 64-year-old male with HTN, HLD, CAD status post PCI, BPH on intermittent self catheterization, chronic diastolic heart failure, progressive physical decline with global weakness, initially presented with hyperglycemia, UTI, DWAINE, chest x-ray concerning for right-sided pneumonia with possible aspiration.  Admitted with septic shock that required vasopressors.  Because of respiratory failure he was intubated and mechanically ventilated.  Eventually, vasopressors were discontinued, the patient was extubated.  Urine cultures demonstrated Klebsiella, Providencia.  The patient was started on NG tube feeds.  He was cleared for oral intake by speech.  The antibiotics were initially deescalated and secondary to worsening leukocytosis were again escalated to include Zosyn, azithromycin.  Imaging demonstrated right-sided large pneumonia, possible empyema.  Pulmonary were consulted.  Because of progressive global weakness, Neurology were consulted who recommended an MRI of the brain and MRI of the entire spine.  Because a need for anesthesia, this has been deferred until 09/16/2024.  The patient did require a brief increase of his supplemental oxygenation given hypoxia in the 80s.  Chest x-ray was unremarkable.  Because of worsening D-dimer, a V/Q scan was ordered     9/15/24-Pulmonary has recommended repeat imaging on 09/16/2024.  The patient was relatively stable on 09/14/2024 and was even weaned down to room air.  However, at some point during the morning of 915th 2024, he was again placed on a non-rebreather, was oxygenating in the low 90s, placed on high-flow nasal cannula.  Was oxygenating 87%.  Labs demonstrate improving leukocytosis from 59277-10.9.  Labs demonstrate worsening acute kidney injury from yesterday, however, still improved from the day prior.  ABG on 09/15/2024  demonstrated pH of 7.45, pCO2 of 34, PO2 of 84.  Stat chest x-ray this morning demonstrated further improvement of right lower lung lobe zone opacities.  Medication list reviewed and demonstrates continuation of Zosyn, bronchodilators, prophylactic dose of Lovenox. Upon evaluation of the patient, his blood pressure was in the 80s.  His heart rate was in the 130s.  Physical examination demonstrates no hypervolemia.  A bolus of lactated Ringer was ordered.  His heart rate is starting to trend down in the 120s.  Blood pressure was at 89.      09/20/2024 Dr. Orozco-patient completely decompensated on 09/15/2024.  Patient remained hypotensive, hypoxic with a high suspicions for pulmonary emboli on full-dose anticoagulation.  Patient had to be transferred to ICU for respiratory decompensation, hypotension not responding to IV boluses requiring vasopressors.  In ICU he was placed on BiPAP/Levophed/isotonic crystalloid hydration and patient was kept on current IV antibiotic regimen.  Chest x-rays done in ICU with a right lower lobe consolidation/effusion concerning for empyema on Zosyn.  Decision was done to do a CTA of the chest.  CT chest abdomen with suspected ill-defined areas of bilateral intramuscular hemorrhage in the psoas muscles.  At that time anticoagulation was placed on hold.  Patient required 2 units PRBC and was able to wean down BiPAP to 8 L Oxymizer.  Neurology wanted MRI brain and whole spine secondary to patient's history of chronic paraplegia/wheelchair-bound over the past few years but patient refused MRIs.  Patient was kept on supplemental oxygen during the day and BiPAP at night.  Eventually patient agreed to have some MRIs done under anesthesia.  At this moment patient has been downgraded again to hospitalist services.      MRI brain with and without 09/19/2024-subacute ischemic changes in the right basal ganglia    MRI cervical spine with and without contrast 09/19/2024 with no evidence of active  demyelination.  Mild degenerative narrowing spinal canal at C3-C5.  Multilevel neural foraminal stenosis.  Chronic cord signal changes at C4-C6    MRI thoracic spine with and without contrast on 09/19/2024 with no definite cord signal abnormality.  Mild degenerative narrowing of the spinal canal at T5-T6 and T7-T9      The patient remains on Zosyn as well as aspirin 81 mg/atorvastatin 20/finasteride 5/tamsulosin 24 hour capsule 0.4 mg and Ringer's lactate at 100 cc/hour.      White blood cell count is trending down to normal, 10.7 at the present moment.  Serum creatinine also improving today at 1 point 5 that appears to be at his baseline.  Patient remains hyperglycemic.    Micro PCR negative on 09/08/2024 blood cultures x6 collected since 09/08/2024 also negative at 5 days    At The present moment patient appears to be tired of nursing staff coming in and out of the room not letting him rest.  At the same time I am unable to get a straight answer from him.    9/21/24 pedrito vaughn complains . Was able to sleep last night     09/22/2024 Dr. Guy day 10/10, we will discontinue.  Patient complains of nurses going in the room at any time during the night and waking him up.  He has no complaints.  We will discuss case with  tomorrow            Case was discussed with patient's nurse and  on the floor.    Objective/physical exam:  General: In no acute distress, afebrile  Chest: Clear to auscultation bilaterally  Heart: RRR, +S1, S2, no appreciable murmur  Abdomen: Soft, nontender, BS +  MSK: Warm, no lower extremity edema, no clubbing or cyanosis  Neurologic: Alert and oriented x 3, watching TV,    VITAL SIGNS: 24 HRS MIN & MAX LAST   Temp  Min: 97.5 °F (36.4 °C)  Max: 98.2 °F (36.8 °C) 97.7 °F (36.5 °C)   BP  Min: 124/74  Max: 164/95 (!) 164/95   Pulse  Min: 87  Max: 116  94   Resp  Min: 15  Max: 17 17   SpO2  Min: 94 %  Max: 96 % 96 %     I have reviewed the following labs:  Recent Labs    Lab 09/20/24 0423 09/21/24 0521 09/22/24  0526   WBC 10.73 11.11 10.06   RBC 3.16* 3.34* 3.30*   HGB 9.7* 10.2* 10.3*   HCT 28.9* 30.9* 31.3*   MCV 91.5 92.5 94.8*   MCH 30.7 30.5 31.2*   MCHC 33.6 33.0 32.9*   RDW 15.2 15.4 15.9    251 255   MPV 10.2 10.2 10.2     Recent Labs   Lab 09/16/24  0320 09/16/24  0610 09/17/24 0226 09/20/24 0423 09/21/24 0521 09/22/24  0524   *  --    < > 138 139 138   K 3.9  --    < > 3.7 3.7 4.0     --    < > 103 104 104   CO2 21*  --    < > 23 25 25   BUN 30.3*  --    < > 20.0 23.2 25.4   CREATININE 1.57*  --    < > 1.51* 1.50* 1.57*   CALCIUM 8.2*  --    < > 7.7* 7.9* 8.0*   PH  --  7.430  --   --   --   --    MG 2.80*  --   --   --  1.30* 1.90   ALBUMIN 1.8*  --    < > 1.8* 2.0* 2.0*   ALKPHOS 39*  --    < > 45 47 50   ALT 17  --    < > 16 16 15   AST 16  --    < > 12 14 12   BILITOT 0.5  --    < > 0.5 0.7 0.6    < > = values in this interval not displayed.     Microbiology Results (last 7 days)       Procedure Component Value Units Date/Time    Blood Culture [[id number]]  (Normal) Collected: 09/15/24 1527    Order Status: Completed Specimen: Blood from Arm, Right Updated: 09/20/24 1602     Blood Culture No Growth at 5 days    Blood Culture [[id number]]  (Normal) Collected: 09/15/24 1527    Order Status: Completed Specimen: Blood from Arm, Left Updated: 09/20/24 1602     Blood Culture No Growth at 5 days    Blood Culture [[id number]]  (Normal) Collected: 09/13/24 0806    Order Status: Completed Specimen: Blood Updated: 09/18/24 1002     Blood Culture No Growth at 5 days    Blood Culture [[id number]]  (Normal) Collected: 09/13/24 0806    Order Status: Completed Specimen: Blood Updated: 09/18/24 0902     Blood Culture No Growth at 5 days             See below for Radiology    Scheduled Med:   aspirin  81 mg Oral Daily    atorvastatin  20 mg Oral Daily    finasteride  5 mg Oral Daily    insulin glargine U-100  5 Units Subcutaneous BID    miconazole NITRATE 2 %    Topical (Top) BID    pantoprazole  40 mg Intravenous BID    piperacillin-tazobactam (Zosyn) IV (PEDS and ADULTS) (extended infusion is not appropriate)  4.5 g Intravenous Q8H    tamsulosin  0.4 mg Oral Daily    zinc oxide   Topical (Top) BID      Continuous Infusions:   D5 and 0.45% NaCl   Intravenous Continuous PRN        lactated ringers   Intravenous Continuous 50 mL/hr at 09/22/24 1803 Rate Verify at 09/22/24 1803      PRN Meds:    Current Facility-Administered Medications:     0.9%  NaCl infusion (for blood administration), , Intravenous, Q24H PRN    0.9%  NaCl infusion (for blood administration), , Intravenous, Q24H PRN    0.9% NaCl, , Intravenous, PRN    albuterol-ipratropium, 3 mL, Nebulization, Q6H PRN    dextrose 10%, 12.5 g, Intravenous, PRN    dextrose 10%, 25 g, Intravenous, PRN    D5 and 0.45% NaCl, , Intravenous, Continuous PRN    haloperidol lactate, 1 mg, Intravenous, Q6H PRN    hydrALAZINE, 10 mg, Intravenous, Q4H PRN    insulin aspart U-100, 0-10 Units, Subcutaneous, QID (AC + HS) PRN    loperamide, 2 mg, Oral, QID PRN    magnesium sulfate IVPB, 2 g, Intravenous, PRN    sodium chloride 0.9%, 10 mL, Intravenous, PRN    sodium phosphate 20.01 mmol in D5W 250 mL IVPB, 20.01 mmol, Intravenous, PRN     Assessment/Plan:    Acute hypoxic respiratory failure requiring noninvasive positive pressure ventilation    Septic shock requiring vasopressors as well as transfusion PRBC    Right lower lobe pneumonia, likely aspiration     Duodenitis   Type 2 diabetes mellitus, non insulin with hyperglycemia  Acute kidney injury superimposed on chronic kidney disease stage 3  Progressive global weakness   Essential hypertension   Mixed hyperlipidemia   Previous history of CAD status post PCI   Chronic heart failure with a preserved ejection fraction, euvolemic  Subacute right basal ganglia infarct on aspirin 81 mg  Symptomatic anemia requiring transfusion PRBC  Septic shock secondary to UTI with Klebsiella and  Providencia treated appropriately with Zosyn    The patient remains alert, active and oriented.  Able to participate in HPI.  We will continue to hold Lovenox.  Completed Zosyn day number 10/10 on 9-22-24.  Blood cultures negative as well as micro PCR.    Titrate Lantus 5 units at bedtime             VTE prophylaxis: scd    Patient condition:  guarded    All diagnosis and differential diagnosis have been reviewed; assessment and plan has been documented; I have personally reviewed the labs and test results that are presently available; I have reviewed the patients medication list; I have reviewed the consulting providers response and recommendations. I have reviewed or attempted to review medical records based upon their availability    All of the patient's questions have been  addressed and answered. Patient's is agreeable to the above stated plan. I will continue to monitor closely and make adjustments to medical management as needed.        Alexis Orozco MD   09/22/2024

## 2024-09-23 NOTE — NURSING
Pt Sister (POA) is requesting that if a Physician rounds on the pt can she be called. She states that he can not make any decisions for himself. I notified hospitalist on call today and the manger.

## 2024-09-23 NOTE — NURSING
Nurses Note -- 4 Eyes      9/22/24   1900      Skin assessed during: Q Shift Change      [] No Altered Skin Integrity Present    []Prevention Measures Documented      [x] Yes- Altered Skin Integrity Present or Discovered   [] LDA Added if Not in Epic (Describe Wound)   [] New Altered Skin Integrity was Present on Admit and Documented in LDA   [] Wound Image Taken    Wound Care Consulted? No    Attending Nurse:  Alex Campo RN/Staff Member:  Rachel

## 2024-09-23 NOTE — PLAN OF CARE
Chari with [facility] called and is coming to visit patient this morning. Updated clinicals were sent to [facility] and [facility].

## 2024-09-24 LAB
ANION GAP SERPL CALC-SCNC: 10 MEQ/L
BASOPHILS # BLD AUTO: 0.09 X10(3)/MCL
BASOPHILS NFR BLD AUTO: 1.3 %
BUN SERPL-MCNC: 26.3 MG/DL (ref 8.4–25.7)
CALCIUM SERPL-MCNC: 8.2 MG/DL (ref 8.8–10)
CHLORIDE SERPL-SCNC: 107 MMOL/L (ref 98–107)
CO2 SERPL-SCNC: 22 MMOL/L (ref 23–31)
CREAT SERPL-MCNC: 1.55 MG/DL (ref 0.73–1.18)
CREAT/UREA NIT SERPL: 17
EOSINOPHIL # BLD AUTO: 0.14 X10(3)/MCL (ref 0–0.9)
EOSINOPHIL NFR BLD AUTO: 2 %
ERYTHROCYTE [DISTWIDTH] IN BLOOD BY AUTOMATED COUNT: 15.9 % (ref 11.5–17)
GFR SERPLBLD CREATININE-BSD FMLA CKD-EPI: 49 ML/MIN/1.73/M2
GLUCOSE SERPL-MCNC: 67 MG/DL (ref 82–115)
HCT VFR BLD AUTO: 30.7 % (ref 42–52)
HGB BLD-MCNC: 10.4 G/DL (ref 14–18)
IMM GRANULOCYTES # BLD AUTO: 0.03 X10(3)/MCL (ref 0–0.04)
IMM GRANULOCYTES NFR BLD AUTO: 0.4 %
LYMPHOCYTES # BLD AUTO: 0.67 X10(3)/MCL (ref 0.6–4.6)
LYMPHOCYTES NFR BLD AUTO: 9.8 %
MAGNESIUM SERPL-MCNC: 1.7 MG/DL (ref 1.6–2.6)
MCH RBC QN AUTO: 31.1 PG (ref 27–31)
MCHC RBC AUTO-ENTMCNC: 33.9 G/DL (ref 33–36)
MCV RBC AUTO: 91.9 FL (ref 80–94)
MONOCYTES # BLD AUTO: 0.82 X10(3)/MCL (ref 0.1–1.3)
MONOCYTES NFR BLD AUTO: 12 %
NEUTROPHILS # BLD AUTO: 5.09 X10(3)/MCL (ref 2.1–9.2)
NEUTROPHILS NFR BLD AUTO: 74.5 %
NRBC BLD AUTO-RTO: 0 %
PLATELET # BLD AUTO: 295 X10(3)/MCL (ref 130–400)
PMV BLD AUTO: 10.4 FL (ref 7.4–10.4)
POCT GLUCOSE: 170 MG/DL (ref 70–110)
POCT GLUCOSE: 194 MG/DL (ref 70–110)
POCT GLUCOSE: 285 MG/DL (ref 70–110)
POTASSIUM SERPL-SCNC: 3.6 MMOL/L (ref 3.5–5.1)
RBC # BLD AUTO: 3.34 X10(6)/MCL (ref 4.7–6.1)
SODIUM SERPL-SCNC: 139 MMOL/L (ref 136–145)
WBC # BLD AUTO: 6.84 X10(3)/MCL (ref 4.5–11.5)

## 2024-09-24 PROCEDURE — 97535 SELF CARE MNGMENT TRAINING: CPT | Mod: CO

## 2024-09-24 PROCEDURE — 94799 UNLISTED PULMONARY SVC/PX: CPT

## 2024-09-24 PROCEDURE — 63600175 PHARM REV CODE 636 W HCPCS: Performed by: INTERNAL MEDICINE

## 2024-09-24 PROCEDURE — 99900035 HC TECH TIME PER 15 MIN (STAT)

## 2024-09-24 PROCEDURE — 25000003 PHARM REV CODE 250: Performed by: INTERNAL MEDICINE

## 2024-09-24 PROCEDURE — 36415 COLL VENOUS BLD VENIPUNCTURE: CPT

## 2024-09-24 PROCEDURE — 80048 BASIC METABOLIC PNL TOTAL CA: CPT | Performed by: INTERNAL MEDICINE

## 2024-09-24 PROCEDURE — 63600175 PHARM REV CODE 636 W HCPCS

## 2024-09-24 PROCEDURE — 21400001 HC TELEMETRY ROOM

## 2024-09-24 PROCEDURE — 85025 COMPLETE CBC W/AUTO DIFF WBC: CPT

## 2024-09-24 PROCEDURE — 83735 ASSAY OF MAGNESIUM: CPT | Performed by: INTERNAL MEDICINE

## 2024-09-24 PROCEDURE — 25000003 PHARM REV CODE 250

## 2024-09-24 PROCEDURE — 97530 THERAPEUTIC ACTIVITIES: CPT

## 2024-09-24 RX ORDER — PANTOPRAZOLE SODIUM 40 MG/1
40 TABLET, DELAYED RELEASE ORAL 2 TIMES DAILY
Status: DISCONTINUED | OUTPATIENT
Start: 2024-09-24 | End: 2024-10-01 | Stop reason: HOSPADM

## 2024-09-24 RX ADMIN — RUGBY ZINC OXIDE 20%: 20 OINTMENT TOPICAL at 07:09

## 2024-09-24 RX ADMIN — INSULIN GLARGINE 7 UNITS: 100 INJECTION, SOLUTION SUBCUTANEOUS at 07:09

## 2024-09-24 RX ADMIN — ATORVASTATIN CALCIUM 20 MG: 10 TABLET, FILM COATED ORAL at 09:09

## 2024-09-24 RX ADMIN — TAMSULOSIN HYDROCHLORIDE 0.4 MG: 0.4 CAPSULE ORAL at 09:09

## 2024-09-24 RX ADMIN — PANTOPRAZOLE SODIUM 40 MG: 40 TABLET, DELAYED RELEASE ORAL at 09:09

## 2024-09-24 RX ADMIN — RUGBY ZINC OXIDE 20%: 20 OINTMENT TOPICAL at 09:09

## 2024-09-24 RX ADMIN — INSULIN ASPART 6 UNITS: 100 INJECTION, SOLUTION INTRAVENOUS; SUBCUTANEOUS at 05:09

## 2024-09-24 RX ADMIN — MICONAZOLE NITRATE 2 % TOPICAL POWDER: at 07:09

## 2024-09-24 RX ADMIN — ASPIRIN 81 MG CHEWABLE TABLET 81 MG: 81 TABLET CHEWABLE at 09:09

## 2024-09-24 RX ADMIN — PANTOPRAZOLE SODIUM 40 MG: 40 TABLET, DELAYED RELEASE ORAL at 07:09

## 2024-09-24 RX ADMIN — FINASTERIDE 5 MG: 5 TABLET, FILM COATED ORAL at 09:09

## 2024-09-24 RX ADMIN — MICONAZOLE NITRATE 2 % TOPICAL POWDER: at 09:09

## 2024-09-24 NOTE — PT/OT/SLP PROGRESS
Physical Therapy Treatment    Patient Name:  Froy Barragan   MRN:  [id number]    Recommendations:     Discharge therapy intensity: Moderate Intensity Therapy   Discharge Equipment Recommendations: to be determined by next level of care  Barriers to discharge: Impaired mobility    Assessment:     Froy Barragan is a 64 y.o. male admitted with a medical diagnosis of septic shock 2/2 UTI, DWAINE, acute respiratory failure requiring intubation.  He presents with the following impairments/functional limitations: weakness, gait instability, impaired endurance, impaired balance, decreased safety awareness, impaired functional mobility, decreased lower extremity function. The pt tolerate session well. He is able to perform pivot transfer from bedside to recliner chair with max A. Pt with noted breakdown to buttocks which NSG assessed and dressed during PT session. Will progress as able.     Rehab Prognosis: Good; patient would benefit from acute skilled PT services to address these deficits and reach maximum level of function.    Recent Surgery: Procedure(s) (LRB):  MRI (Magnetic Resonance Imagine) (N/A) 5 Days Post-Op    Plan:     During this hospitalization, patient would benefit from acute PT services 5 x/week to address the identified rehab impairments via gait training, therapeutic activities, therapeutic exercises and progress toward the following goals:    Plan of Care Expires:  10/24/24    Subjective     Chief Complaint: none  Patient/Family Comments/goals: return to PLOF  Pain/Comfort:         Objective:     Communicated with NSG prior to session.  Patient found supine with peripheral IV, colbert catheter, telemetry upon PT entry to room.     General Precautions: Standard, fall  Orthopedic Precautions: N/A  Braces: N/A  Respiratory Status: Room air  Blood Pressure: NA  Skin Integrity: Visible skin intact      Functional Mobility:  Bed Mobility:     Supine to Sit: moderate assistance- assisted with trunk  and LEs  Sit to Supine: moderate assistance  Transfers:     Bed to Chair: maximal assistance with  no AD  using  Squat Pivot    Education:  Patient provided with verbal education education regarding PT role/goals/POC, safety awareness, and discharge/DME recommendations.  Understanding was verbalized.     Patient left supine with all lines intact, call button in reach, and NSG notified    GOALS:   Multidisciplinary Problems       Physical Therapy Goals          Problem: Physical Therapy    Goal Priority Disciplines Outcome Goal Variances Interventions   Physical Therapy Goal     PT, PT/OT Progressing     Description: Goals to be met by: d/c     Patient will increase functional independence with mobility by performin. Supine to sit with Stand-by Assistance  2. Sit to supine with Stand-by Assistance  3. Sit to stand transfer with Stand-by Assistance  4. Bed to chair transfer with Stand-by Assistance using No Assistive Device scoot pivot vs SB  5. Wheelchair propulsion x100 feet with Supervision using bilateral uppper extremities                         Time Tracking:     PT Received On: 24  PT Start Time: 1019     PT Stop Time: 1042  PT Total Time (min): 23 min     Billable Minutes: Therapeutic Activity 23    Treatment Type: Treatment  PT/PTA: PT     Number of PTA visits since last PT visit: 3     2024

## 2024-09-24 NOTE — PLAN OF CARE
[facility] called to inform denial of placement; Called and left message for [facility]umn (stated on 9/20 that [facility] consider acceptance if 3 denials - third denial received from [facility].) Awaiting call back from [facility].

## 2024-09-24 NOTE — NURSING
Nurses Note -- 4 Eyes      9/24/2024   12:40 PM      Skin assessed during: Q Shift Change      [] No Altered Skin Integrity Present    []Prevention Measures Documented      [x] Yes- Altered Skin Integrity Present or Discovered   [] LDA Added if Not in Epic (Describe Wound)   [] New Altered Skin Integrity was Present on Admit and Documented in LDA   [] Wound Image Taken    Wound Care Consulted? No    Attending Nurse:  Steve Campo RN/Staff Member:  Alejandra

## 2024-09-24 NOTE — PLAN OF CARE
Patient has received 3 denials from in-network facilities.  Andra with [facility] will now attempt to insurance for auth with their facility.  Will continue to follow.

## 2024-09-24 NOTE — PROGRESS NOTES
Inpatient Nutrition Assessment    Admit Date: 9/8/2024   Total duration of encounter: 16 days   Patient Age: 64 y.o.    Nutrition Recommendation/Prescription     -Continue oral diet per SLP recs with diabetic diet restriction. Assist with meals.   -Monitor wt, labs, and intake.     Communication of Recommendations: reviewed with patient and reviewed with family    Nutrition Assessment     Malnutrition Assessment/Nutrition-Focused Physical Exam    Malnutrition Context: chronic illness (09/09/24 1049)  Malnutrition Level: severe (09/09/24 1049)  Energy Intake (Malnutrition): less than 75% for greater than or equal to 1 month (09/09/24 1049)  Weight Loss (Malnutrition):  (does not meet criteria) (09/09/24 1049)  Subcutaneous Fat (Malnutrition): severe depletion (09/09/24 1049)  Orbital Region (Subcutaneous Fat Loss): severe depletion  Upper Arm Region (Subcutaneous Fat Loss): severe depletion     Muscle Mass (Malnutrition): severe depletion (09/09/24 1049)  [place] Region (Muscle Loss): severe depletion     Clavicle and Acromion Bone Region (Muscle Loss): severe depletion           Anterior Thigh Region (Muscle Loss): severe depletion  Posterior Calf Region (Muscle Loss): severe depletion           A minimum of two characteristics is recommended for diagnosis of either severe or non-severe malnutrition.    Chart Review    Reason Seen: follow-up    Malnutrition Screening Tool Results   Have you recently lost weight without trying?: Unsure  Have you been eating poorly because of a decreased appetite?: Yes   MST Score: 3   Diagnosis:  Septic shock secondary to UTI  Uncontrolled T2DM with hyperglycemia  DWAINE on CKD secondary to volume depletion  Metabolic acidosis; likely secondary to lactic acidosis vs diarrhea  Hypomagnesemia  Hypophosphatemia  Diarrhea  Normocytic anemia  Malnutrition    Relevant Medical History: HTN, HLD, CAD S/P PCI, BPH, HF     Scheduled Medications:  aspirin, 81 mg, Daily  atorvastatin, 20 mg,  Daily  finasteride, 5 mg, Daily  insulin glargine U-100, 7 Units, BID  miconazole NITRATE 2 %, , BID  pantoprazole, 40 mg, BID  tamsulosin, 0.4 mg, Daily  zinc oxide, , BID    Continuous Infusions:  D5 and 0.45% NaCl    PRN Medications:   0.9%  NaCl infusion (for blood administration), , Q24H PRN  0.9%  NaCl infusion (for blood administration), , Q24H PRN  0.9% NaCl, , PRN  albuterol-ipratropium, 3 mL, Q6H PRN  dextrose 10%, 12.5 g, PRN  dextrose 10%, 25 g, PRN  D5 and 0.45% NaCl, , Continuous PRN  haloperidol lactate, 1 mg, Q6H PRN  hydrALAZINE, 10 mg, Q4H PRN  insulin aspart U-100, 0-10 Units, QID (AC + HS) PRN  loperamide, 2 mg, QID PRN  magnesium sulfate IVPB, 2 g, PRN  sodium chloride 0.9%, 10 mL, PRN  sodium phosphate 20.01 mmol in D5W 250 mL IVPB, 20.01 mmol, PRN    Calorie Containing IV Medications: no significant kcals from medications at this time    Recent Labs   Lab 09/18/24  0338 09/19/24  0450 09/20/24  0423 09/21/24  0521 09/22/24  0524 09/22/24  0526 09/23/24  0436 09/24/24  0440    139 138 139 138  --  138 139   K 3.3* 3.5 3.7 3.7 4.0  --  3.6 3.6   CALCIUM 7.7* 8.4* 7.7* 7.9* 8.0*  --  8.1* 8.2*   MG  --   --   --  1.30* 1.90  --  1.60 1.70    103 103 104 104  --  104 107   CO2 20* 23 23 25 25  --  23 22*   BUN 16.6 17.6 20.0 23.2 25.4  --  25.1 26.3*   CREATININE 1.50* 1.70* 1.51* 1.50* 1.57*  --  1.71* 1.55*   EGFRNORACEVR 51 44 51 51 48  --  44 49   GLUCOSE 134* 170* 236* 127* 181*  --  164* 67*   BILITOT 0.7 0.7 0.5 0.7 0.6  --  0.8  --    ALKPHOS 42 48 45 47 50  --  49  --    ALT 19 20 16 16 15  --  15  --    AST 21 17 12 14 12  --  15  --    ALBUMIN 1.7* 1.9* 1.8* 2.0* 2.0*  --  2.0*  --    WBC 11.48 14.24* 10.73 11.11  --  10.06 8.34 6.84   HGB 9.0* 11.0* 9.7* 10.2*  --  10.3* 10.0* 10.4*   HCT 26.1* 31.9* 28.9* 30.9*  --  31.3* 30.7* 30.7*     Nutrition Orders:  Diet Pureed (IDDSI Level 4) Supervision with Meals, Diabetic; 2000 Calorie      Appetite/Oral Intake: good/% of  "meals  Factors Affecting Nutritional Intake: impaired cognitive status/motor control and difficulty/impaired swallowing  Social Needs Impacting Access to Food: none identified  Food/Denominational/Cultural Preferences: none reported  Food Allergies: no known food allergies  Last Bowel Movement: 24  Wound(s):     Wound 24 Moisture associated dermatitis Buttocks-Tissue loss description: Partial thickness    Comments    24 Patient on ventilator, propofol off during rounds, plans to start tube feeding. Spoke with patient's sister at bedside. She reports very poor intake for the past month and weight loss, weight history in chart reveals no significant weight changes over the past year. Patient is malnourished and at risk for refeeding syndrome; recommend slow initiation/advancement of tube feeding.    24 Patient extubated, tube feeding discontinued, started on dysphagia diet. Patient reports a fair appetite, eating about 25-50% of meals, agreeable to vanilla Boost BID.    24: Discussed with RN, possible plans for NG placement since pt unable to consume adequate nutrition. Will provide TF recommendations in case needed. Pt mental status prohibiting obtaining subjective info.     24: Pt reports good appetite/intake; denies n/v and chewing/swallowing difficulty. Pt is on oral diet per SLP recs.     24: Pt eating well and tolerating diet well per pt and family member.     Anthropometrics    Height: 5' 4" (162.6 cm), Height Method: Stated  Last Weight: 50 kg (110 lb 3.7 oz) (24 1044), Weight Method: Bed Scale  BMI (Calculated): 18.9  BMI Classification: underweight (BMI less than 22 if >65 years of age)        Ideal Body Weight (IBW), Male: 130 lb     % Ideal Body Weight, Male (lb): 79.31 %                 Usual Body Weight (UBW), k.4 kg (10/24/23)  % Usual Body Weight: 92.1  % Weight Change From Usual Weight: -8.09 %  Usual Weight Provided By: family/caregiver and EMR weight " history    Wt Readings from Last 8 Encounters:   09/09/24 50 kg (110 lb 3.7 oz)   07/30/24 49.4 kg (108 lb 14.5 oz)   07/22/24 49.4 kg (109 lb)   04/24/24 49.9 kg (110 lb)   04/17/24 50 kg (110 lb 3.2 oz)   01/22/24 46.3 kg (102 lb 1.6 oz)   10/24/23 54.4 kg (120 lb)   10/19/23 54.4 kg (120 lb)     Weight Change(s) Since Admission:   (9/9) fluctuations over past 24 hours, question accuracy, took bed weight of 50 kg during rounds today  Wt Readings from Last 1 Encounters:   09/09/24 1044 50 kg (110 lb 3.7 oz)   09/08/24 2045 46.8 kg (103 lb 1.6 oz)   09/08/24 1617 54.4 kg (120 lb)   Admit Weight: 54.4 kg (120 lb) (09/08/24 1617), Weight Method: Bed Scale    Estimated Needs    Weight Used For Calorie Calculations: 50 kg (110 lb 3.7 oz)  Energy Calorie Requirements (kcal): 4621-3578, 35-40 kcal/kg  Weight Used For Protein Calculations: 50 kg (110 lb 3.7 oz)  Protein Requirements:  g, 1.5-2 g/kg  Fluid Requirements (mL): 1500  CHO Requirement: 175-250 g, 40-50% of kcal    Enteral Nutrition Patient not receiving enteral nutrition at this time.    Parenteral Nutrition Patient not receiving parenteral nutrition support at this time.    Evaluation of Received Nutrient Intake    Calories: meeting estimated needs  Protein: meeting estimated needs    Patient Education Not applicable.    Nutrition Diagnosis     PES: Inadequate energy intake related to inability to consume sufficient nutrients as evidenced by less than 80% needs met. (resolved)  PES: Severe chronic disease or condition related malnutrition related to suboptimal protein/energy intake as evidenced by less than 75% needs met for greater than or equal to 1 month, severe fat depletion, and severe muscle depletion. (active)    Nutrition Interventions     Intervention(s): modified composition of enteral nutrition, modified rate of enteral nutrition, and collaboration with other providers  Goal: Meet greater than 80% of nutritional needs by follow-up. (goal  progressing)  Goal: Tolerate enteral feeding at goal rate by follow-up. (goal discontinued)    Nutrition Goals & Monitoring     Dietitian will monitor: energy intake, weight, weight change, electrolyte/renal panel, glucose/endocrine profile, and gastrointestinal profile  Discharge planning: continue pureed, diabetic diet   Nutrition Risk/Follow-Up: high (follow-up in 1-4 days)   Please consult if re-assessment needed sooner.

## 2024-09-24 NOTE — PROGRESS NOTES
[facility] Hospital Medicine Progress Note        Chief complaint: Follow-up for septic shock     Hospital course 64-year-old male with HTN, HLD, CAD status post PCI, BPH on intermittent self catheterization, chronic diastolic heart failure, progressive physical decline with global weakness, initially presented with hyperglycemia, UTI, DWAINE, chest x-ray concerning for right-sided pneumonia with possible aspiration.  Admitted with septic shock that required vasopressors.  Because of respiratory failure he was intubated and mechanically ventilated.  Eventually, vasopressors were discontinued, the patient was extubated.  Urine cultures demonstrated Klebsiella, Providencia.  The patient was started on NG tube feeds.  He was cleared for oral intake by speech.  The antibiotics were initially deescalated and secondary to worsening leukocytosis were again escalated to include Zosyn, azithromycin.  Imaging demonstrated right-sided large pneumonia, possible empyema.  Pulmonary were consulted.  Because of progressive global weakness, Neurology were consulted who recommended an MRI of the brain and MRI of the entire spine.  Because a need for anesthesia, this has been deferred until 09/16/2024.  The patient did require a brief increase of his supplemental oxygenation given hypoxia in the 80s.  Chest x-ray was unremarkable.  Because of worsening D-dimer, a V/Q scan was ordered     9/15/24-Pulmonary has recommended repeat imaging on 09/16/2024.  The patient was relatively stable on 09/14/2024 and was even weaned down to room air.  However, at some point during the morning of 915th 2024, he was again placed on a non-rebreather, was oxygenating in the low 90s, placed on high-flow nasal cannula.  Was oxygenating 87%.  Labs demonstrate improving leukocytosis from 28864-43.9.  Labs demonstrate worsening acute kidney injury from yesterday, however, still improved from the day prior.  ABG on 09/15/2024  demonstrated pH of 7.45, pCO2 of 34, PO2 of 84.  Stat chest x-ray this morning demonstrated further improvement of right lower lung lobe zone opacities.  Medication list reviewed and demonstrates continuation of Zosyn, bronchodilators, prophylactic dose of Lovenox. Upon evaluation of the patient, his blood pressure was in the 80s.  His heart rate was in the 130s.  Physical examination demonstrates no hypervolemia.  A bolus of lactated Ringer was ordered.  His heart rate is starting to trend down in the 120s.  Blood pressure was at 89.      09/20/2024 Dr. Orozco-patient completely decompensated on 09/15/2024.  Patient remained hypotensive, hypoxic with a high suspicions for pulmonary emboli on full-dose anticoagulation.  Patient had to be transferred to ICU for respiratory decompensation, hypotension not responding to IV boluses requiring vasopressors.  In ICU he was placed on BiPAP/Levophed/isotonic crystalloid hydration and patient was kept on current IV antibiotic regimen.  Chest x-rays done in ICU with a right lower lobe consolidation/effusion concerning for empyema on Zosyn.  Decision was done to do a CTA of the chest.  CT chest abdomen with suspected ill-defined areas of bilateral intramuscular hemorrhage in the psoas muscles.  At that time anticoagulation was placed on hold.  Patient required 2 units PRBC and was able to wean down BiPAP to 8 L Oxymizer.  Neurology wanted MRI brain and whole spine secondary to patient's history of chronic paraplegia/wheelchair-bound over the past few years but patient refused MRIs.  Patient was kept on supplemental oxygen during the day and BiPAP at night.  Eventually patient agreed to have some MRIs done under anesthesia.  At this moment patient has been downgraded again to hospitalist services.      MRI brain with and without 09/19/2024-subacute ischemic changes in the right basal ganglia    MRI cervical spine with and without contrast 09/19/2024 with no evidence of active  demyelination.  Mild degenerative narrowing spinal canal at C3-C5.  Multilevel neural foraminal stenosis.  Chronic cord signal changes at C4-C6    MRI thoracic spine with and without contrast on 09/19/2024 with no definite cord signal abnormality.  Mild degenerative narrowing of the spinal canal at T5-T6 and T7-T9      The patient remains on Zosyn as well as aspirin 81 mg/atorvastatin 20/finasteride 5/tamsulosin 24 hour capsule 0.4 mg and Ringer's lactate at 100 cc/hour.      White blood cell count is trending down to normal, 10.7 at the present moment.  Serum creatinine also improving today at 1 point 5 that appears to be at his baseline.  Patient remains hyperglycemic.    Micro PCR negative on 09/08/2024 blood cultures x6 collected since 09/08/2024 also negative at 5 days    At The present moment patient appears to be tired of nursing staff coming in and out of the room not letting him rest.  At the same time I am unable to get a straight answer from him.    9/21/24 pedrito - no complains . Was able to sleep last night     09/22/2024 Dr. Orozco-Martina day 10/10, we will discontinue.  Patient complains of nurses going in the room at any time during the night and waking him up.  He has no complaints.  We will discuss case with  tomorrow      09/23/2024 Dr. Orozco-patient completed Zosyn his white blood cell count is within normal limits.  Creatinine at 1.7.  Patient was seen by Physical therapy recommendations for moderate intensity therapy.   is working to get patient into skilled nursing facility.  I discuss case with family members and they all agree that after a skilled nursing facility patient will go to nursing home.  Patient voices no complaints and feels fine.  Glycemia is controlled      Case was discussed with patient's nurse and  on the floor.    Objective/physical exam:  General: In no acute distress, afebrile  Chest: Clear to auscultation bilaterally  Heart: RRR, +S1,  S2, no appreciable murmur  Abdomen: Soft, nontender, BS +  MSK: Warm, no lower extremity edema, no clubbing or cyanosis  Neurologic: Alert and oriented x 3, watching TV,    VITAL SIGNS: 24 HRS MIN & MAX LAST   Temp  Min: 97.6 °F (36.4 °C)  Max: 98.1 °F (36.7 °C) 97.9 °F (36.6 °C)   BP  Min: 129/74  Max: 154/99 (!) 150/97   Pulse  Min: 96  Max: 110  96   Resp  Min: 16  Max: 20 16   SpO2  Min: 94 %  Max: 100 % 100 %     I have reviewed the following labs:  Recent Labs   Lab 09/21/24  0521 09/22/24  0526 09/23/24  0436   WBC 11.11 10.06 8.34   RBC 3.34* 3.30* 3.29*   HGB 10.2* 10.3* 10.0*   HCT 30.9* 31.3* 30.7*   MCV 92.5 94.8* 93.3   MCH 30.5 31.2* 30.4   MCHC 33.0 32.9* 32.6*   RDW 15.4 15.9 15.9    255 259   MPV 10.2 10.2 10.4     Recent Labs   Lab 09/21/24  0521 09/22/24  0524 09/23/24  0436    138 138   K 3.7 4.0 3.6    104 104   CO2 25 25 23   BUN 23.2 25.4 25.1   CREATININE 1.50* 1.57* 1.71*   CALCIUM 7.9* 8.0* 8.1*   MG 1.30* 1.90 1.60   ALBUMIN 2.0* 2.0* 2.0*   ALKPHOS 47 50 49   ALT 16 15 15   AST 14 12 15   BILITOT 0.7 0.6 0.8     Microbiology Results (last 7 days)       Procedure Component Value Units Date/Time    Blood Culture [[id number]]  (Normal) Collected: 09/15/24 1527    Order Status: Completed Specimen: Blood from Arm, Right Updated: 09/20/24 1602     Blood Culture No Growth at 5 days    Blood Culture [[id number]]  (Normal) Collected: 09/15/24 1527    Order Status: Completed Specimen: Blood from Arm, Left Updated: 09/20/24 1602     Blood Culture No Growth at 5 days    Blood Culture [[id number]]  (Normal) Collected: 09/13/24 0806    Order Status: Completed Specimen: Blood Updated: 09/18/24 1002     Blood Culture No Growth at 5 days    Blood Culture [[id number]]  (Normal) Collected: 09/13/24 0806    Order Status: Completed Specimen: Blood Updated: 09/18/24 0902     Blood Culture No Growth at 5 days             See below for Radiology    Scheduled Med:   aspirin  81 mg Oral Daily     atorvastatin  20 mg Oral Daily    finasteride  5 mg Oral Daily    insulin glargine U-100  7 Units Subcutaneous BID    miconazole NITRATE 2 %   Topical (Top) BID    pantoprazole  40 mg Intravenous BID    tamsulosin  0.4 mg Oral Daily    zinc oxide   Topical (Top) BID      Continuous Infusions:   D5 and 0.45% NaCl   Intravenous Continuous PRN        lactated ringers   Intravenous Continuous 50 mL/hr at 09/23/24 1028 New Bag at 09/23/24 1028      PRN Meds:    Current Facility-Administered Medications:     0.9%  NaCl infusion (for blood administration), , Intravenous, Q24H PRN    0.9%  NaCl infusion (for blood administration), , Intravenous, Q24H PRN    0.9% NaCl, , Intravenous, PRN    albuterol-ipratropium, 3 mL, Nebulization, Q6H PRN    dextrose 10%, 12.5 g, Intravenous, PRN    dextrose 10%, 25 g, Intravenous, PRN    D5 and 0.45% NaCl, , Intravenous, Continuous PRN    haloperidol lactate, 1 mg, Intravenous, Q6H PRN    hydrALAZINE, 10 mg, Intravenous, Q4H PRN    insulin aspart U-100, 0-10 Units, Subcutaneous, QID (AC + HS) PRN    loperamide, 2 mg, Oral, QID PRN    magnesium sulfate IVPB, 2 g, Intravenous, PRN    sodium chloride 0.9%, 10 mL, Intravenous, PRN    sodium phosphate 20.01 mmol in D5W 250 mL IVPB, 20.01 mmol, Intravenous, PRN     Assessment/Plan:    Acute hypoxic respiratory failure requiring noninvasive positive pressure ventilation    Septic shock requiring vasopressors as well as transfusion PRBC    Right lower lobe pneumonia, likely aspiration     Duodenitis   Type 2 diabetes mellitus, non insulin with hyperglycemia  Acute kidney injury superimposed on chronic kidney disease stage 3  Progressive global weakness   Essential hypertension   Mixed hyperlipidemia   Previous history of CAD status post PCI   Chronic heart failure with a preserved ejection fraction, euvolemic  Subacute right basal ganglia infarct on aspirin 81 mg  Symptomatic anemia requiring transfusion PRBC  Septic shock secondary to UTI with  Klebsiella and Providencia treated appropriately with Zosyn    Subacute right basal ganglia infarct by MRI    The patient remains alert, active and oriented.  Able to participate in HPI.  We will continue to hold Lovenox.  Completed Zosyn day number 10/10 on 9-22-24.  Blood cultures negative as well as micro PCR.    Titrate Lantus     Moderate intensity therapy/ working into skilled nursing facility.              VTE prophylaxis: scd    Patient condition:  guarded    All diagnosis and differential diagnosis have been reviewed; assessment and plan has been documented; I have personally reviewed the labs and test results that are presently available; I have reviewed the patients medication list; I have reviewed the consulting providers response and recommendations. I have reviewed or attempted to review medical records based upon their availability    All of the patient's questions have been  addressed and answered. Patient's is agreeable to the above stated plan. I will continue to monitor closely and make adjustments to medical management as needed.        Alexis Orozco MD   09/23/2024

## 2024-09-24 NOTE — NURSING
Nurses Note -- 4 Eyes      9/23/2024   7:32 PM      Skin assessed during: Q Shift Change      [] No Altered Skin Integrity Present    []Prevention Measures Documented      [x] Yes- Altered Skin Integrity Present or Discovered   [] LDA Added if Not in Epic (Describe Wound)   [] New Altered Skin Integrity was Present on Admit and Documented in LDA   [] Wound Image Taken    Wound Care Consulted? No    Attending Nurse:  Alejandra Campo RN/Staff Member:  Kit

## 2024-09-24 NOTE — PT/OT/SLP PROGRESS
Occupational Therapy   Treatment    Name: Froy Barragan  MRN: [id number]  Admitting Diagnosis:  Severe Malnutrition, Hyperglycemia, UTI/DWAINE  5 Days Post-Op    Recommendations:     Recommended therapy intensity at discharge: Moderate Intensity Therapy   Discharge Equipment Recommendations:  to be determined by next level of care  Barriers to discharge:  Decreased caregiver support, Other (Comment) (ongoing medical needs)    Assessment:     Froy Barragan is a 64 y.o. male with a medical diagnosis of Severe Malnutrition, Hyperglycemia, UTI/DWAINE, 5 days post-op.  He presents with decreased mobilization in/out of bed secondary to increased weakness. Performance deficits affecting function are weakness, impaired endurance, impaired self care skills, impaired functional mobility, impaired balance.     Rehab Prognosis:  Fair; patient would benefit from acute skilled OT services to address these deficits and reach maximum level of function.       Plan:     Patient to be seen 4 x/week to address the above listed problems via self-care/home management, therapeutic activities, therapeutic exercises  Plan of Care Expires: 10/12/24  Plan of Care Reviewed with: patient    Subjective     Pain/Comfort:       Objective:     Communicated with: RN prior to session.  Patient found HOB elevated with peripheral IV, colbert catheter, telemetry upon OT entry to room.    General Precautions: Standard, aspiration, fall    Orthopedic Precautions:N/A  Braces: N/A  Respiratory Status: Room air     Occupational Performance:     Bed Mobility:    Patient completed Supine to Sit with moderate assistance   Pt demonstrated fair static sitting balance; fatigues and loses balance quickly, unable to lift one UE without losing balance posteriorly, requiring max-A for dynamic sitting balance.  Pt completed rolling L<>R for pericare requiring mod-A    Functional Mobility/Transfers:  Patient completed Bed <> Chair Transfer using Stand Pivot  technique with maximal assistance with no assistive device    Activities of Daily Living:  Toileting: Total-A pericare after BM in supine.   LB Dress: maximal assistance Pt unable to vijaya socks; attempted figure-four position with BLE utilizing BUE for lifting each LE, with inability to maintain position due to decreased BUE/LUE strength.  Grooming: After therapist applied  to patient's hand, pt presented confused of what to do, asking for napkin, requiring max-vc for understanding to rub hands together to clean hands utilizing .    Therapeutic Positioning    OT interventions performed during the course of today's session in an effort to prevent and/or reduce acquired pressure injuries:   Education was provided on benefits of and recommendations for therapeutic positioning  Therapeutic positioning was provided at the conclusion of session to offload all bony prominences for the prevention and/or reduction of pressure injuries  Positioning recommendations were communicated to care team   Therapist informed nursing of pt sitting up in chair with need to be transferred back to bed after 1 hour. Therapist explained to therapist that pt is able to transfer stand-pivot but also has a leif pad under him.    Skin assessment:  Therapist found open sacral wounds when providing pericare. Nursing aware of wounds, but unaware that the wounds had become open. Nursing called to room for providing cream and bandage to protect wounds.    [facility] 6 Click ADL:      Patient Education:  Patient provided with verbal education education regarding OT role/goals/POC, fall prevention, safety awareness, and pressure ulcer prevention.  Understanding was verbalized, however additional teaching warranted.      Patient left up in chair with all lines intact, call button in reach, and geomat cushion.    GOALS:   Multidisciplinary Problems       Occupational Therapy Goals          Problem: Occupational Therapy    Goal Priority  Disciplines Outcome Interventions   Occupational Therapy Goal     OT, PT/OT Progressing    Description: Goals to be met by: 10/12/24     Patient will increase functional independence with ADLs by performing:    Grooming while seated with Supervision.  Toileting from bedside commode with Moderate Assistance for hygiene and clothing management.   Sitting at edge of bed x10 minutes with Supervision.  Supine to sit with Supervision.  Squat pivot transfers with Minimal Assistance.  Toilet transfer to bedside commode with Minimal Assistance.  Increased functional strength to 4/5 for self care skills and functional mobility.  Upper extremity exercise program x10 reps per handout, with independence.                         Time Tracking:     OT Date of Treatment: 09/24/24  OT Start Time: 1018  OT Stop Time: 1041  OT Total Time (min): 23 min    Billable Minutes:Self Care/Home Management 2    OT/JOLENE: JOLENE     Number of JOLENE visits since last OT visit: 5    9/24/2024

## 2024-09-25 LAB
BASOPHILS # BLD AUTO: 0.1 X10(3)/MCL
BASOPHILS NFR BLD AUTO: 1.3 %
EOSINOPHIL # BLD AUTO: 0.19 X10(3)/MCL (ref 0–0.9)
EOSINOPHIL NFR BLD AUTO: 2.5 %
ERYTHROCYTE [DISTWIDTH] IN BLOOD BY AUTOMATED COUNT: 16.1 % (ref 11.5–17)
HCT VFR BLD AUTO: 32 % (ref 42–52)
HGB BLD-MCNC: 10.4 G/DL (ref 14–18)
IMM GRANULOCYTES # BLD AUTO: 0.05 X10(3)/MCL (ref 0–0.04)
IMM GRANULOCYTES NFR BLD AUTO: 0.7 %
LYMPHOCYTES # BLD AUTO: 0.8 X10(3)/MCL (ref 0.6–4.6)
LYMPHOCYTES NFR BLD AUTO: 10.5 %
MCH RBC QN AUTO: 31 PG (ref 27–31)
MCHC RBC AUTO-ENTMCNC: 32.5 G/DL (ref 33–36)
MCV RBC AUTO: 95.2 FL (ref 80–94)
MONOCYTES # BLD AUTO: 0.86 X10(3)/MCL (ref 0.1–1.3)
MONOCYTES NFR BLD AUTO: 11.3 %
NEUTROPHILS # BLD AUTO: 5.59 X10(3)/MCL (ref 2.1–9.2)
NEUTROPHILS NFR BLD AUTO: 73.7 %
NRBC BLD AUTO-RTO: 0 %
PLATELET # BLD AUTO: 321 X10(3)/MCL (ref 130–400)
PMV BLD AUTO: 10.6 FL (ref 7.4–10.4)
POCT GLUCOSE: 140 MG/DL (ref 70–110)
POCT GLUCOSE: 185 MG/DL (ref 70–110)
POCT GLUCOSE: 221 MG/DL (ref 70–110)
POCT GLUCOSE: 273 MG/DL (ref 70–110)
RBC # BLD AUTO: 3.36 X10(6)/MCL (ref 4.7–6.1)
WBC # BLD AUTO: 7.59 X10(3)/MCL (ref 4.5–11.5)

## 2024-09-25 PROCEDURE — 94799 UNLISTED PULMONARY SVC/PX: CPT

## 2024-09-25 PROCEDURE — 94760 N-INVAS EAR/PLS OXIMETRY 1: CPT

## 2024-09-25 PROCEDURE — 36415 COLL VENOUS BLD VENIPUNCTURE: CPT

## 2024-09-25 PROCEDURE — 97168 OT RE-EVAL EST PLAN CARE: CPT

## 2024-09-25 PROCEDURE — 63600175 PHARM REV CODE 636 W HCPCS: Performed by: INTERNAL MEDICINE

## 2024-09-25 PROCEDURE — 63600175 PHARM REV CODE 636 W HCPCS

## 2024-09-25 PROCEDURE — 25000003 PHARM REV CODE 250: Performed by: INTERNAL MEDICINE

## 2024-09-25 PROCEDURE — 25000003 PHARM REV CODE 250

## 2024-09-25 PROCEDURE — 97535 SELF CARE MNGMENT TRAINING: CPT

## 2024-09-25 PROCEDURE — 99900035 HC TECH TIME PER 15 MIN (STAT)

## 2024-09-25 PROCEDURE — 21400001 HC TELEMETRY ROOM

## 2024-09-25 PROCEDURE — 97530 THERAPEUTIC ACTIVITIES: CPT | Mod: CQ

## 2024-09-25 PROCEDURE — 85025 COMPLETE CBC W/AUTO DIFF WBC: CPT

## 2024-09-25 RX ADMIN — FINASTERIDE 5 MG: 5 TABLET, FILM COATED ORAL at 08:09

## 2024-09-25 RX ADMIN — MICONAZOLE NITRATE 2 % TOPICAL POWDER: at 08:09

## 2024-09-25 RX ADMIN — INSULIN GLARGINE 7 UNITS: 100 INJECTION, SOLUTION SUBCUTANEOUS at 10:09

## 2024-09-25 RX ADMIN — PANTOPRAZOLE SODIUM 40 MG: 40 TABLET, DELAYED RELEASE ORAL at 08:09

## 2024-09-25 RX ADMIN — INSULIN ASPART 6 UNITS: 100 INJECTION, SOLUTION INTRAVENOUS; SUBCUTANEOUS at 11:09

## 2024-09-25 RX ADMIN — ASPIRIN 81 MG CHEWABLE TABLET 81 MG: 81 TABLET CHEWABLE at 08:09

## 2024-09-25 RX ADMIN — TAMSULOSIN HYDROCHLORIDE 0.4 MG: 0.4 CAPSULE ORAL at 08:09

## 2024-09-25 RX ADMIN — INSULIN GLARGINE 7 UNITS: 100 INJECTION, SOLUTION SUBCUTANEOUS at 08:09

## 2024-09-25 RX ADMIN — RUGBY ZINC OXIDE 20%: 20 OINTMENT TOPICAL at 10:09

## 2024-09-25 RX ADMIN — MICONAZOLE NITRATE 2 % TOPICAL POWDER: at 10:09

## 2024-09-25 RX ADMIN — ATORVASTATIN CALCIUM 20 MG: 10 TABLET, FILM COATED ORAL at 08:09

## 2024-09-25 RX ADMIN — PANTOPRAZOLE SODIUM 40 MG: 40 TABLET, DELAYED RELEASE ORAL at 10:09

## 2024-09-25 RX ADMIN — INSULIN ASPART 2 UNITS: 100 INJECTION, SOLUTION INTRAVENOUS; SUBCUTANEOUS at 11:09

## 2024-09-25 RX ADMIN — RUGBY ZINC OXIDE 20%: 20 OINTMENT TOPICAL at 08:09

## 2024-09-25 NOTE — PROGRESS NOTES
[facility] Hospital Medicine Progress Note        Chief Complaint: Inpatient Follow-up     HPI:    64-year-old male with HTN, HLD, CAD status post PCI, BPH on intermittent self catheterization, chronic diastolic heart failure, progressive physical decline with global weakness, initially presented with hyperglycemia, UTI, DWAINE, chest x-ray concerning for right-sided pneumonia with possible aspiration.  Admitted with septic shock that required vasopressors.  Because of respiratory failure he was intubated and mechanically ventilated.  Eventually, vasopressors were discontinued, the patient was extubated.  Urine cultures demonstrated Klebsiella, Providencia.  The patient was started on NG tube feeds.  He was cleared for oral intake by speech.  The antibiotics were initially deescalated and secondary to worsening leukocytosis were again escalated to include Zosyn, azithromycin.  Imaging demonstrated right-sided large pneumonia, possible empyema.  Pulmonary were consulted.  Because of progressive global weakness, Neurology were consulted who recommended an MRI of the brain and MRI of the entire spine.  Because a need for anesthesia, this has been deferred until 09/16/2024.  The patient did require a brief increase of his supplemental oxygenation given hypoxia in the 80s.  Chest x-ray was unremarkable.  Because of worsening D-dimer, a V/Q scan was ordered      Patient completely decompensated on 09/15/2024.  Patient remained hypotensive, hypoxic with a high suspicions for pulmonary emboli on full-dose anticoagulation.  Patient had to be transferred to ICU for respiratory decompensation, hypotension not responding to IV boluses requiring vasopressors.  In ICU he was placed on BiPAP/Levophed/isotonic crystalloid hydration and patient was kept on current IV antibiotic regimen.  Chest x-rays done in ICU with a right lower lobe consolidation/effusion concerning for empyema on Zosyn.  Decision was  done to do a CTA of the chest.  CT chest abdomen with suspected ill-defined areas of bilateral intramuscular hemorrhage in the psoas muscles.  At that time anticoagulation was placed on hold.  Patient required 2 units PRBC and was able to wean down BiPAP to 8 L Oxymizer.  Neurology wanted MRI brain and whole spine secondary to patient's history of chronic paraplegia/wheelchair-bound over the past few years but patient refused MRIs.  Patient was kept on supplemental oxygen during the day and BiPAP at night.  Eventually patient agreed to have some MRIs done under anesthesia.  At this moment patient has been downgraded again to hospitalist services on 9/20.  Completed Zosyn on 09/23.  Weaned to room air.  Case management consulted for SNF placement     Interval Hx:  No significant changes overnight.  No new complaints.  Feeling well.  Remains on room air.  Denies cough or shortness a breath.  Discussed with nursing states that he was good night.  Vital signs are stable    Objective/physical exam:  General: In no acute distress, afebrile  Chest: Clear to auscultation bilaterally  Heart: RRR, +S1, S2, no appreciable murmur  Abdomen: Soft, nontender, BS +  MSK: Warm, no lower extremity edema, no clubbing or cyanosis  Neurologic: Alert and oriented x4, Cranial nerve II-XII intact, Strength 5/5 in all 4 extremities    VITAL SIGNS: 24 HRS MIN & MAX LAST   Temp  Min: 97.6 °F (36.4 °C)  Max: 98.3 °F (36.8 °C) 97.9 °F (36.6 °C)   BP  Min: 96/60  Max: 136/85 116/78   Pulse  Min: 81  Max: 111  95   Resp  Min: 18  Max: 19 19   SpO2  Min: 93 %  Max: 97 % 97 %       Recent Labs   Lab 09/23/24  0436 09/24/24  0440 09/25/24  0500   WBC 8.34 6.84 7.59   RBC 3.29* 3.34* 3.36*   HGB 10.0* 10.4* 10.4*   HCT 30.7* 30.7* 32.0*   MCV 93.3 91.9 95.2*   MCH 30.4 31.1* 31.0   MCHC 32.6* 33.9 32.5*   RDW 15.9 15.9 16.1    295 321   MPV 10.4 10.4 10.6*       Recent Labs   Lab 09/21/24  0521 09/22/24  0524 09/23/24  0436 09/24/24  0440   NA  139 138 138 139   K 3.7 4.0 3.6 3.6    104 104 107   CO2 25 25 23 22*   BUN 23.2 25.4 25.1 26.3*   CREATININE 1.50* 1.57* 1.71* 1.55*   CALCIUM 7.9* 8.0* 8.1* 8.2*   MG 1.30* 1.90 1.60 1.70   ALBUMIN 2.0* 2.0* 2.0*  --    ALKPHOS 47 50 49  --    ALT 16 15 15  --    AST 14 12 15  --    BILITOT 0.7 0.6 0.8  --           Microbiology Results (last 7 days)       Procedure Component Value Units Date/Time    Blood Culture [[id number]]  (Normal) Collected: 09/15/24 1527    Order Status: Completed Specimen: Blood from Arm, Right Updated: 09/20/24 1602     Blood Culture No Growth at 5 days    Blood Culture [[id number]]  (Normal) Collected: 09/15/24 1527    Order Status: Completed Specimen: Blood from Arm, Left Updated: 09/20/24 1602     Blood Culture No Growth at 5 days    Blood Culture [[id number]]  (Normal) Collected: 09/13/24 0806    Order Status: Completed Specimen: Blood Updated: 09/18/24 1002     Blood Culture No Growth at 5 days    Blood Culture [[id number]]  (Normal) Collected: 09/13/24 0806    Order Status: Completed Specimen: Blood Updated: 09/18/24 0902     Blood Culture No Growth at 5 days             Radiology:  Interventional Radiology  Procedure performed in the Invasive Lab    - See Procedure Log link below for nursing documentation    - See OpNote on Surgeries Tab for physician findings    - See Imaging Tab for radiologist dictation        Medications:  Scheduled Meds:   aspirin  81 mg Oral Daily    atorvastatin  20 mg Oral Daily    finasteride  5 mg Oral Daily    insulin glargine U-100  7 Units Subcutaneous BID    miconazole NITRATE 2 %   Topical (Top) BID    pantoprazole  40 mg Oral BID    tamsulosin  0.4 mg Oral Daily    zinc oxide   Topical (Top) BID     Continuous Infusions:   D5 and 0.45% NaCl   Intravenous Continuous PRN         PRN Meds:.  Current Facility-Administered Medications:     0.9%  NaCl infusion (for blood administration), , Intravenous, Q24H PRN    0.9%  NaCl infusion (for blood  administration), , Intravenous, Q24H PRN    0.9% NaCl, , Intravenous, PRN    albuterol-ipratropium, 3 mL, Nebulization, Q6H PRN    dextrose 10%, 12.5 g, Intravenous, PRN    dextrose 10%, 25 g, Intravenous, PRN    D5 and 0.45% NaCl, , Intravenous, Continuous PRN    haloperidol lactate, 1 mg, Intravenous, Q6H PRN    hydrALAZINE, 10 mg, Intravenous, Q4H PRN    insulin aspart U-100, 0-10 Units, Subcutaneous, QID (AC + HS) PRN    loperamide, 2 mg, Oral, QID PRN    magnesium sulfate IVPB, 2 g, Intravenous, PRN    sodium chloride 0.9%, 10 mL, Intravenous, PRN    sodium phosphate 20.01 mmol in D5W 250 mL IVPB, 20.01 mmol, Intravenous, PRN        Assessment/Plan:   Acute hypoxic respiratory failure requiring noninvasive positive pressure ventilation  Septic shock requiring vasopressors as well as transfusion PRBC  Right lower lobe pneumonia, likely aspiration   Duodenitis   Type 2 diabetes mellitus, non insulin with hyperglycemia  Acute kidney injury superimposed on chronic kidney disease stage 3  Progressive global weakness   Essential hypertension   Mixed hyperlipidemia   Previous history of CAD status post PCI   Chronic heart failure with a preserved ejection fraction, euvolemic  Subacute right basal ganglia infarct on aspirin 81 mg  Symptomatic anemia requiring transfusion PRBC  Septic shock secondary to UTI with Klebsiella and Providencia treated appropriately with Zosyn  Subacute right basal ganglia infarct by MRI     Discussed with the patient that at this point he is off of all antibiotics and appears to be medically stable.    This part of his treatment will focus on getting him stronger.  Will continue PT and OT.  Case management working on SNF placement     Afternoon update:  Worked with PT/OT.  Not safe to go home at this time.   Patient has received multiple denials form SNF facilities due to complex nature of his care.   CM broadening search.       Ok Yost MD   09/25/2024     All diagnosis and  differential diagnosis have been reviewed; assessment and plan has been documented; I have personally reviewed the labs and test results that are presently available; I have reviewed the patients medication list; I have reviewed the consulting providers response and recommendations. I have reviewed or attempted to review medical records based upon their availability    All of the patient's questions have been  addressed and answered. Patient's is agreeable to the above stated plan. I will continue to monitor closely and make adjustments to medical management as needed.  _____________________________________________________________________

## 2024-09-25 NOTE — PLAN OF CARE
Problem: Occupational Therapy  Goal: Occupational Therapy Goal  Description: Goals to be met by: 10/12/24     Patient will increase functional independence with ADLs by performing:    Grooming while seated with Supervision.  Toileting from bedside commode with Moderate Assistance for hygiene and clothing management.   Sitting at edge of bed x10 minutes with Supervision.  Supine to sit with Supervision.  Squat pivot transfers with Minimal Assistance.  Toilet transfer to bedside commode with Minimal Assistance.  Increased functional strength to 4/5 for self care skills and functional mobility.  Upper extremity exercise program x10 reps per handout, with independence.    Outcome: Progressing       Pt seen for re-eval this date. Pt with improvement in performance of ADLs and functional mobility. Continues to require assist, but improvement noted in strength and sitting balance. Goals remain appropriate at this time. Cont OT POC

## 2024-09-25 NOTE — PROGRESS NOTES
[facility] - 8th Floor Med Surg  Wound Care    Patient Name:  Froy Barragan   MRN:  [id number]  Date: 9/25/2024  Diagnosis: <principal problem not specified>    History:     Past Medical History:   Diagnosis Date    Anemia of chronic disease 7/6/2022    Arteriosclerosis of coronary artery 7/6/2022    Cervical myelopathy 7/6/2022    Cobalamin deficiency 7/6/2022    Gastroesophageal reflux disease 7/6/2022    Hypertension 7/6/2022    Low vitamin D level 7/6/2022    Mixed hyperlipidemia 7/6/2022    Paraparesis 7/6/2022    Stage 3a chronic kidney disease 7/6/2022    Type 2 diabetes mellitus 7/6/2022       Social History     Socioeconomic History    Marital status: Single   Tobacco Use    Smoking status: Never    Smokeless tobacco: Never   Substance and Sexual Activity    Alcohol use: Never    Drug use: Never     Social Determinants of Health     Financial Resource Strain: Patient Unable To Answer (9/17/2024)    Overall Financial Resource Strain (CARDIA)     Difficulty of Paying Living Expenses: Patient unable to answer   Food Insecurity: Patient Unable To Answer (9/17/2024)    Hunger Vital Sign     Worried About Running Out of Food in the Last Year: Patient unable to answer     Ran Out of Food in the Last Year: Patient unable to answer   Transportation Needs: Patient Unable To Answer (9/17/2024)    TRANSPORTATION NEEDS     Transportation : Patient unable to answer   Physical Activity: Inactive (4/25/2024)    Exercise Vital Sign     Days of Exercise per Week: 0 days     Minutes of Exercise per Session: 10 min   Stress: Patient Unable To Answer (9/17/2024)    [employer] - Occupational Stress Questionnaire     Feeling of Stress : Patient unable to answer   Housing Stability: Patient Unable To Answer (9/17/2024)    Housing Stability Vital Sign     Unable to Pay for Housing in the Last Year: Patient unable to answer     Homeless in the Last Year: Patient unable to answer        Precautions:     Allergies as of 09/08/2024    (No Known Allergies)       [facility] Assessment Details/Treatment        09/25/24 0956   [facility] Assessment   Visit Date 09/25/24   Visit Time 0956   Consult Type Follow Up   [facility] Speciality Wound   Intervention assessed;chart review;orders   Teaching on-going   Skin Interventions   Device Skin Pressure Protection absorbent pad utilized/changed   Pressure Reduction Devices specialty bed utilized        Wound 09/09/24 Moisture associated dermatitis Buttocks   Date First Assessed: 09/09/24   Present on Original Admission: Yes  Primary Wound Type: (c) Moisture associated dermatitis  Location: Buttocks   Wound Image    Dressing Appearance Dry;Intact;Clean   Drainage Amount Small   Drainage Characteristics/Odor Serosanguineous   Appearance Pink;Fibrin   Periwound Area Redness   Wound Edges Irregular   Wound Length (cm) 3 cm   Wound Width (cm) 2.5 cm   Wound Depth (cm) 1 cm   Wound Volume (cm^3) 7.5 cm^3   Wound Surface Area (cm^2) 7.5 cm^2   Care Cleansed with:;Sterile normal saline   Dressing Applied;Hydrocolloid     WO follow up for sacrum, Family at bedside. SDTI that was purple over the sacrum now open and appears to be a stage 2. Sacrum:  Cleanse with NS. Apply Desitin, cover with ABD pad, secure with medipore tape. BID and PRN. Keep areas clean and dry, no adult briefs while in bed. Nursing to continue with turning every two hours, wedge, and floating heels.  Head of bed elevated, bed in lowest position. On ANGELIQUE mattress. Recommend to consult Wound Care Physician.     09/25/2024

## 2024-09-25 NOTE — NURSING
Nurses Note -- 4 Eyes      9/25/2024   0645 AM      Skin assessed during: Q Shift Change      [] No Altered Skin Integrity Present    []Prevention Measures Documented      [x] Yes- Altered Skin Integrity Present or Discovered   [x] LDA Added if Not in Epic (Describe Wound)   [] New Altered Skin Integrity was Present on Admit and Documented in LDA   [] Wound Image Taken    Wound Care Consulted? No    Attending Nurse:  Rachel Sparrow RN/Staff Member:  Delmis  Stage 2 to buttocks with WC orders, celan , dry and intact, scattered bruises to (B) UE

## 2024-09-25 NOTE — NURSING
Nurses Note -- 4 Eyes      9/24/2024   9:10 PM      Skin assessed during: Q Shift Change      [] No Altered Skin Integrity Present    []Prevention Measures Documented      [x] Yes- Altered Skin Integrity Present or Discovered   [x] LDA Added if Not in Epic (Describe Wound)   [x] New Altered Skin Integrity was Present on Admit and Documented in LDA   [x] Wound Image Taken    Wound Care Consulted? Yes    Attending Nurse:  Delmis Sarabia RN    Second RN/Staff Member:  Steve Luna LPN

## 2024-09-25 NOTE — PT/OT/SLP PROGRESS
Physical Therapy Treatment    Patient Name:  Froy Barragan   MRN:  [id number]    Recommendations:     Discharge therapy intensity: Moderate Intensity Therapy   Discharge Equipment Recommendations: to be determined by next level of care  Barriers to discharge: Impaired mobility    Assessment:     Froy Barragan is a 64 y.o. male admitted with a medical diagnosis of septic shock 2/2 UTI, DWAINE, acute respiratory failure requiring intubation . .  He presents with the following impairments/functional limitations: weakness, gait instability, impaired endurance, impaired balance, decreased safety awareness, impaired functional mobility, decreased lower extremity function .    Upon arrival pt irritated and ready to T/F B2B.      Rehab Prognosis: Good; patient would benefit from acute skilled PT services to address these deficits and reach maximum level of function.    Recent Surgery: Procedure(s) (LRB):  MRI (Magnetic Resonance Imagine) (N/A) 6 Days Post-Op    Plan:     During this hospitalization, patient would benefit from acute PT services 5 x/week to address the identified rehab impairments via gait training, therapeutic activities, therapeutic exercises and progress toward the following goals:    Plan of Care Expires:  10/24/24    Subjective     Chief Complaint:   Patient/Family Comments/goals:   Pain/Comfort:  Pain Rating 1: 0/10      Objective:     Communicated with NSG prior to session.  Patient found up in chair with telemetry upon PT entry to room.     General Precautions: Standard, fall  Orthopedic Precautions: N/A  Braces: N/A  Respiratory Status: Room air  Blood Pressure:   Skin Integrity: Visible skin intact      Functional Mobility:  Bed Mobility:     Rolling Left:  moderate assistance  Rolling Right: moderate assistance  Scooting: moderate assistance  Sit to Supine: moderate assistance  Transfers:     Bed to Chair: maximal assistance with  no AD  using  Squat Pivot  Stat sitting:pt sat EOB  while soiled gown changed       ~pt assisted in rolling R/L to remove soiled chucks and for pericare 2/2 BM.      Patient left HOB elevated with all lines intact, call button in reach, and wedge under R side    GOALS:   Multidisciplinary Problems       Physical Therapy Goals          Problem: Physical Therapy    Goal Priority Disciplines Outcome Goal Variances Interventions   Physical Therapy Goal     PT, PT/OT Progressing     Description: Goals to be met by: d/c     Patient will increase functional independence with mobility by performin. Supine to sit with Stand-by Assistance  2. Sit to supine with Stand-by Assistance  3. Sit to stand transfer with Stand-by Assistance  4. Bed to chair transfer with Stand-by Assistance using No Assistive Device scoot pivot vs SB  5. Wheelchair propulsion x100 feet with Supervision using bilateral uppper extremities                         Time Tracking:     PT Received On: 24  PT Start Time: 1330     PT Stop Time: 1342  PT Total Time (min): 12 min     Billable Minutes: Therapeutic Activity 12    Treatment Type: Treatment  PT/PTA: PTA     Number of PTA visits since last PT visit: 4     2024

## 2024-09-25 NOTE — PLAN OF CARE
Problem: Adult Inpatient Plan of Care  Goal: Readiness for Transition of Care  Outcome: Progressing     Problem: Skin Injury Risk Increased  Goal: Skin Health and Integrity  Outcome: Progressing     Problem: Wound  Goal: Skin Health and Integrity  Outcome: Progressing  Goal: Optimal Wound Healing  Outcome: Progressing     Problem: Infection  Goal: Absence of Infection Signs and Symptoms  Outcome: Met     Problem: Adult Inpatient Plan of Care  Goal: Plan of Care Review  Outcome: Met  Goal: Patient-Specific Goal (Individualized)  Outcome: Met  Goal: Absence of Hospital-Acquired Illness or Injury  Outcome: Met  Goal: Optimal Comfort and Wellbeing  Outcome: Met     Problem: Diabetes Comorbidity  Goal: Blood Glucose Level Within Targeted Range  Outcome: Met     Problem: Fall Injury Risk  Goal: Absence of Fall and Fall-Related Injury  Outcome: Met     Problem: Wound  Goal: Optimal Coping  Outcome: Met  Goal: Optimal Functional Ability  Outcome: Met  Goal: Absence of Infection Signs and Symptoms  Outcome: Met  Goal: Improved Oral Intake  Outcome: Met  Goal: Optimal Pain Control and Function  Outcome: Met     Problem: Restraint, Nonviolent  Goal: Absence of Harm or Injury  Outcome: Met

## 2024-09-25 NOTE — PLAN OF CARE
Patient has a total of 4 SNF denials; [facility], [facility], [facility], and [facility]. Left VM for patient's sister Chantal, CM needs more facilities options to send referral to. Barrier for placement is medical complexity during hospital stay per intake liaison at [facility].

## 2024-09-25 NOTE — PT/OT/SLP PROGRESS
Attempted to see pt for PT tx. Pt working with OT at this time. PT to f/u this afternoon as schedule allows.

## 2024-09-25 NOTE — PT/OT/SLP EVAL
Occupational Therapy   Re-evaluation    Name: Froy Barragan  MRN: [id number]  Admitting Diagnosis: severe malnutrition   Recent Surgery: Procedure(s) (LRB):  MRI (Magnetic Resonance Imagine) (N/A) 6 Days Post-Op    Recommendations:     Discharge therapy intensity: Moderate Intensity Therapy   Discharge Equipment Recommendations:  to be determined by next level of care  Barriers to discharge:  Decreased caregiver support (increased physical assist required)    Assessment:     Froy Barragan is a 64 y.o. male with a medical diagnosis of The primary encounter diagnosis was Diabetic ketoacidosis without coma associated with type 2 diabetes mellitus. Diagnoses of Hypotension, unspecified hypotension type, Acute kidney injury, Acute dehydration, Hyperglycemia, QT prolongation, Swelling, Acute heart failure with preserved ejection fraction, Tachycardia, Staphylococcal arthritis, septic arthritis of unspecified location, Urinary tract disorder, and Cervical myelopathy were also pertinent to this visit.  .  He presents with the following performance deficits affecting function: weakness, impaired endurance, impaired cognition, decreased ROM, impaired self care skills, impaired functional mobility, impaired balance, decreased safety awareness, decreased lower extremity function, decreased upper extremity function.      Pt seen for re-eval this date. Pt with improvement in performance of ADLs and functional mobility. Continues to require assist, but improvement noted in strength and sitting balance. Goals remain appropriate at this time. Cont OT POC    Rehab Prognosis: Fair; patient would benefit from acute skilled OT services to address these deficits and reach maximum level of function.       Plan:     Patient to be seen 4 x/week to address the above listed problems via self-care/home management, therapeutic activities, therapeutic exercises  Plan of Care Expires: 10/12/24  Plan of Care Reviewed with:  patient    Subjective     Chief Complaint: no complaints this date   Patient/Family Comments/goals: to get rehab     Pain/Comfort:  Pain Rating 1: 0/10    Patients cultural, spiritual, Mandaeism conflicts given the current situation: no    Objective:     OT communicated with nsg prior to session.      Patient was found supine with peripheral IV, telemetry upon OT entry to room.    General Precautions: Standard, fall  Orthopedic Precautions: N/A  Braces: N/A    Vital Signs: HR: 125 while seated EOB;    /83     Bed Mobility:    Patient completed Scooting/Bridging with moderate assistance and maximal assistance  Patient completed Supine to Sit with maximal assistance    Functional Mobility/Transfers:  Patient completed Bed <> Chair Transfer using Squat Pivot technique with maximal assistance with no assistive device  Functional Mobility: pt does not ambulate     Activities of Daily Living:  Grooming: stand by assistance while seated EOB   Lower Body Dressing: total assistance socks     AMPA 6 Click ADL:  AMPAC Total Score: 15    Functional Cognition:  Pt remains with some confusion and impaired memory/recall     Visual Perceptual Skills:  Intact    Upper Extremity Function:  Right Upper Extremity:   Range of Motion: WFL for pt's needs   Strength: 3+ to 4-     Left Upper Extremity:  Limited shoulder flexion and external rotation, WFL distally   Grossly 3+/5    Balance:   Sitting balance CGA- SBA during performance of G&H axs     Therapeutic Positioning  Risk for acquired pressure injuries is increased due to relative decrease in mobility d/t hospitalization  and impaired mobility.    OT interventions performed during the course of today's session in an effort to prevent and/or reduce acquired pressure injuries:   Education was provided on benefits of and recommendations for therapeutic positioning        OT recommendations for therapeutic positioning throughout hospitalization:   Follow [facility] Pressure Injury  Prevention Protocol    Additional Treatment:  ADL Training: pt performing G&H axs while seated EOB   Improvement noted in sitting balance; statically able to hold self in midline without UE support; able to manage G&H items with only 1 lateral LOB   G&H axs increased time EOB; unilateral  UE support on tray table/bed rail during session  Squat pivot to b/s chair; pt attempted to assist by pushing with BUEs from bed     Patient Education:  Patient and family were provided with verbal education education regarding OT role/goals/POC, fall prevention, safety awareness, and pressure ulcer prevention.  Understanding was verbalized.     Patient left up in chair with all lines intact, call button in reach, geomat cushion, leif pad in place, nsg notified, and sister  present    GOALS:   Multidisciplinary Problems       Occupational Therapy Goals          Problem: Occupational Therapy    Goal Priority Disciplines Outcome Interventions   Occupational Therapy Goal     OT, PT/OT Progressing    Description: Goals to be met by: 10/12/24     Patient will increase functional independence with ADLs by performing:    Grooming while seated with Supervision.  Toileting from bedside commode with Moderate Assistance for hygiene and clothing management.   Sitting at edge of bed x10 minutes with Supervision.  Supine to sit with Supervision.  Squat pivot transfers with Minimal Assistance.  Toilet transfer to bedside commode with Minimal Assistance.  Increased functional strength to 4/5 for self care skills and functional mobility.  Upper extremity exercise program x10 reps per handout, with independence.                         History:     Past Medical History:   Diagnosis Date    Anemia of chronic disease 7/6/2022    Arteriosclerosis of coronary artery 7/6/2022    Cervical myelopathy 7/6/2022    Cobalamin deficiency 7/6/2022    Gastroesophageal reflux disease 7/6/2022    Hypertension 7/6/2022    Low vitamin D level 7/6/2022    Mixed  hyperlipidemia 7/6/2022    Paraparesis 7/6/2022    Stage 3a chronic kidney disease 7/6/2022    Type 2 diabetes mellitus 7/6/2022         Past Surgical History:   Procedure Laterality Date    MAGNETIC RESONANCE IMAGING N/A 9/19/2024    Procedure: MRI (Magnetic Resonance Imagine);  Surgeon: David Amin DO;  [facility]: [facility];  Service: Anesthesiology;  Laterality: N/A;    SPINE SURGERY         Time Tracking:     OT Date of Treatment: 09/25/24  OT Start Time: 1040  OT Stop Time: 1107  OT Total Time (min): 27 min    Billable Minutes:Re-eval 10  Self Care/Home Management 17    9/25/2024

## 2024-09-25 NOTE — PROGRESS NOTES
[facility] Hospital Medicine Progress Note        Chief complaint: Follow-up for septic shock     Hospital course 64-year-old male with HTN, HLD, CAD status post PCI, BPH on intermittent self catheterization, chronic diastolic heart failure, progressive physical decline with global weakness, initially presented with hyperglycemia, UTI, DWAINE, chest x-ray concerning for right-sided pneumonia with possible aspiration.  Admitted with septic shock that required vasopressors.  Because of respiratory failure he was intubated and mechanically ventilated.  Eventually, vasopressors were discontinued, the patient was extubated.  Urine cultures demonstrated Klebsiella, Providencia.  The patient was started on NG tube feeds.  He was cleared for oral intake by speech.  The antibiotics were initially deescalated and secondary to worsening leukocytosis were again escalated to include Zosyn, azithromycin.  Imaging demonstrated right-sided large pneumonia, possible empyema.  Pulmonary were consulted.  Because of progressive global weakness, Neurology were consulted who recommended an MRI of the brain and MRI of the entire spine.  Because a need for anesthesia, this has been deferred until 09/16/2024.  The patient did require a brief increase of his supplemental oxygenation given hypoxia in the 80s.  Chest x-ray was unremarkable.  Because of worsening D-dimer, a V/Q scan was ordered     9/15/24-Pulmonary has recommended repeat imaging on 09/16/2024.  The patient was relatively stable on 09/14/2024 and was even weaned down to room air.  However, at some point during the morning of 915th 2024, he was again placed on a non-rebreather, was oxygenating in the low 90s, placed on high-flow nasal cannula.  Was oxygenating 87%.  Labs demonstrate improving leukocytosis from 28431-79.9.  Labs demonstrate worsening acute kidney injury from yesterday, however, still improved from the day prior.  ABG on 09/15/2024  demonstrated pH of 7.45, pCO2 of 34, PO2 of 84.  Stat chest x-ray this morning demonstrated further improvement of right lower lung lobe zone opacities.  Medication list reviewed and demonstrates continuation of Zosyn, bronchodilators, prophylactic dose of Lovenox. Upon evaluation of the patient, his blood pressure was in the 80s.  His heart rate was in the 130s.  Physical examination demonstrates no hypervolemia.  A bolus of lactated Ringer was ordered.  His heart rate is starting to trend down in the 120s.  Blood pressure was at 89.      09/20/2024 Dr. Orozco-patient completely decompensated on 09/15/2024.  Patient remained hypotensive, hypoxic with a high suspicions for pulmonary emboli on full-dose anticoagulation.  Patient had to be transferred to ICU for respiratory decompensation, hypotension not responding to IV boluses requiring vasopressors.  In ICU he was placed on BiPAP/Levophed/isotonic crystalloid hydration and patient was kept on current IV antibiotic regimen.  Chest x-rays done in ICU with a right lower lobe consolidation/effusion concerning for empyema on Zosyn.  Decision was done to do a CTA of the chest.  CT chest abdomen with suspected ill-defined areas of bilateral intramuscular hemorrhage in the psoas muscles.  At that time anticoagulation was placed on hold.  Patient required 2 units PRBC and was able to wean down BiPAP to 8 L Oxymizer.  Neurology wanted MRI brain and whole spine secondary to patient's history of chronic paraplegia/wheelchair-bound over the past few years but patient refused MRIs.  Patient was kept on supplemental oxygen during the day and BiPAP at night.  Eventually patient agreed to have some MRIs done under anesthesia.  At this moment patient has been downgraded again to hospitalist services.      MRI brain with and without 09/19/2024-subacute ischemic changes in the right basal ganglia    MRI cervical spine with and without contrast 09/19/2024 with no evidence of active  demyelination.  Mild degenerative narrowing spinal canal at C3-C5.  Multilevel neural foraminal stenosis.  Chronic cord signal changes at C4-C6    MRI thoracic spine with and without contrast on 09/19/2024 with no definite cord signal abnormality.  Mild degenerative narrowing of the spinal canal at T5-T6 and T7-T9      The patient remains on Zosyn as well as aspirin 81 mg/atorvastatin 20/finasteride 5/tamsulosin 24 hour capsule 0.4 mg and Ringer's lactate at 100 cc/hour.      White blood cell count is trending down to normal, 10.7 at the present moment.  Serum creatinine also improving today at 1 point 5 that appears to be at his baseline.  Patient remains hyperglycemic.    Micro PCR negative on 09/08/2024 blood cultures x6 collected since 09/08/2024 also negative at 5 days    At The present moment patient appears to be tired of nursing staff coming in and out of the room not letting him rest.  At the same time I am unable to get a straight answer from him.    9/21/24 pedrito - no complains . Was able to sleep last night     09/22/2024 Dr. Orozco-Zosyn day 10/10, we will discontinue.  Patient complains of nurses going in the room at any time during the night and waking him up.  He has no complaints.  We will discuss case with  tomorrow      09/23/2024 Dr. Orozco-patient completed Zosyn his white blood cell count is within normal limits.  Creatinine at 1.7.  Patient was seen by Physical therapy recommendations for moderate intensity therapy.   is working to get patient into skilled nursing facility.  I discuss case with family members and they all agree that after a skilled nursing facility patient will go to nursing home.  Patient voices no complaints and feels fine.  Glycemia is controlled    9/24/25 dr orozco - no new issues , awaiting placement       Case was discussed with patient's nurse and  on the floor.    Objective/physical exam:  General: In no acute distress,  afebrile  Chest: Clear to auscultation bilaterally  Heart: RRR, +S1, S2, no appreciable murmur  Abdomen: Soft, nontender, BS +  MSK: Warm, no lower extremity edema, no clubbing or cyanosis  Neurologic: Alert and oriented x 3, watching TV,    VITAL SIGNS: 24 HRS MIN & MAX LAST   Temp  Min: 97.6 °F (36.4 °C)  Max: 98.3 °F (36.8 °C) 97.6 °F (36.4 °C)   BP  Min: 94/64  Max: 136/85 96/60   Pulse  Min: 81  Max: 111  (!) 111   Resp  Min: 17  Max: 19 18   SpO2  Min: 93 %  Max: 97 % (!) 93 %     I have reviewed the following labs:  Recent Labs   Lab 09/22/24  0526 09/23/24  0436 09/24/24  0440   WBC 10.06 8.34 6.84   RBC 3.30* 3.29* 3.34*   HGB 10.3* 10.0* 10.4*   HCT 31.3* 30.7* 30.7*   MCV 94.8* 93.3 91.9   MCH 31.2* 30.4 31.1*   MCHC 32.9* 32.6* 33.9   RDW 15.9 15.9 15.9    259 295   MPV 10.2 10.4 10.4     Recent Labs   Lab 09/21/24  0521 09/22/24  0524 09/23/24  0436 09/24/24  0440    138 138 139   K 3.7 4.0 3.6 3.6    104 104 107   CO2 25 25 23 22*   BUN 23.2 25.4 25.1 26.3*   CREATININE 1.50* 1.57* 1.71* 1.55*   CALCIUM 7.9* 8.0* 8.1* 8.2*   MG 1.30* 1.90 1.60 1.70   ALBUMIN 2.0* 2.0* 2.0*  --    ALKPHOS 47 50 49  --    ALT 16 15 15  --    AST 14 12 15  --    BILITOT 0.7 0.6 0.8  --      Microbiology Results (last 7 days)       Procedure Component Value Units Date/Time    Blood Culture [[id number]]  (Normal) Collected: 09/15/24 1527    Order Status: Completed Specimen: Blood from Arm, Right Updated: 09/20/24 1602     Blood Culture No Growth at 5 days    Blood Culture [[id number]]  (Normal) Collected: 09/15/24 1527    Order Status: Completed Specimen: Blood from Arm, Left Updated: 09/20/24 1602     Blood Culture No Growth at 5 days    Blood Culture [[id number]]  (Normal) Collected: 09/13/24 0806    Order Status: Completed Specimen: Blood Updated: 09/18/24 1002     Blood Culture No Growth at 5 days    Blood Culture [[id number]]  (Normal) Collected: 09/13/24 0806    Order Status: Completed Specimen:  Blood Updated: 09/18/24 0902     Blood Culture No Growth at 5 days             See below for Radiology    Scheduled Med:   aspirin  81 mg Oral Daily    atorvastatin  20 mg Oral Daily    finasteride  5 mg Oral Daily    insulin glargine U-100  7 Units Subcutaneous BID    miconazole NITRATE 2 %   Topical (Top) BID    pantoprazole  40 mg Oral BID    tamsulosin  0.4 mg Oral Daily    zinc oxide   Topical (Top) BID      Continuous Infusions:   D5 and 0.45% NaCl   Intravenous Continuous PRN          PRN Meds:    Current Facility-Administered Medications:     0.9%  NaCl infusion (for blood administration), , Intravenous, Q24H PRN    0.9%  NaCl infusion (for blood administration), , Intravenous, Q24H PRN    0.9% NaCl, , Intravenous, PRN    albuterol-ipratropium, 3 mL, Nebulization, Q6H PRN    dextrose 10%, 12.5 g, Intravenous, PRN    dextrose 10%, 25 g, Intravenous, PRN    D5 and 0.45% NaCl, , Intravenous, Continuous PRN    haloperidol lactate, 1 mg, Intravenous, Q6H PRN    hydrALAZINE, 10 mg, Intravenous, Q4H PRN    insulin aspart U-100, 0-10 Units, Subcutaneous, QID (AC + HS) PRN    loperamide, 2 mg, Oral, QID PRN    magnesium sulfate IVPB, 2 g, Intravenous, PRN    sodium chloride 0.9%, 10 mL, Intravenous, PRN    sodium phosphate 20.01 mmol in D5W 250 mL IVPB, 20.01 mmol, Intravenous, PRN     Assessment/Plan:    Acute hypoxic respiratory failure requiring noninvasive positive pressure ventilation    Septic shock requiring vasopressors as well as transfusion PRBC    Right lower lobe pneumonia, likely aspiration     Duodenitis   Type 2 diabetes mellitus, non insulin with hyperglycemia  Acute kidney injury superimposed on chronic kidney disease stage 3  Progressive global weakness   Essential hypertension   Mixed hyperlipidemia   Previous history of CAD status post PCI   Chronic heart failure with a preserved ejection fraction, euvolemic  Subacute right basal ganglia infarct on aspirin 81 mg  Symptomatic anemia requiring  transfusion PRBC  Septic shock secondary to UTI with Klebsiella and Providencia treated appropriately with Zosyn    Subacute right basal ganglia infarct by MRI    The patient remains alert, active and oriented.  Able to participate in HPI.  We will continue to hold Lovenox.  Completed Zosyn day number 10/10 on 9-22-24.  Blood cultures negative as well as micro PCR.    Titrate Lantus     Moderate intensity therapy/ working into skilled nursing facility.              VTE prophylaxis: scd    Patient condition:  guarded    All diagnosis and differential diagnosis have been reviewed; assessment and plan has been documented; I have personally reviewed the labs and test results that are presently available; I have reviewed the patients medication list; I have reviewed the consulting providers response and recommendations. I have reviewed or attempted to review medical records based upon their availability    All of the patient's questions have been  addressed and answered. Patient's is agreeable to the above stated plan. I will continue to monitor closely and make adjustments to medical management as needed.        Alexis Orozco MD   09/24/2024

## 2024-09-25 NOTE — PLAN OF CARE
Andra at [facility] is working on auth now. Will confirm status soon. Clinical updates sent to [facility] we well.

## 2024-09-25 NOTE — PLAN OF CARE
Chantal, patient's sister, has lawrence contacted and notified of current SNF status. Radha Spivey requesting wound care order and clinicals before submitting for authorization. Clinicals sent via [employer].

## 2024-09-26 PROBLEM — E11.10 DIABETIC KETOACIDOSIS WITHOUT COMA ASSOCIATED WITH TYPE 2 DIABETES MELLITUS: Status: ACTIVE | Noted: 2024-09-26

## 2024-09-26 PROBLEM — L89.152 PRESSURE ULCER OF SACRAL REGION, STAGE 2: Status: ACTIVE | Noted: 2024-09-26

## 2024-09-26 LAB
BASOPHILS # BLD AUTO: 0.13 X10(3)/MCL
BASOPHILS NFR BLD AUTO: 1.7 %
EOSINOPHIL # BLD AUTO: 0.22 X10(3)/MCL (ref 0–0.9)
EOSINOPHIL NFR BLD AUTO: 2.8 %
ERYTHROCYTE [DISTWIDTH] IN BLOOD BY AUTOMATED COUNT: 16.3 % (ref 11.5–17)
HCT VFR BLD AUTO: 33.2 % (ref 42–52)
HGB BLD-MCNC: 10.9 G/DL (ref 14–18)
IMM GRANULOCYTES # BLD AUTO: 0.04 X10(3)/MCL (ref 0–0.04)
IMM GRANULOCYTES NFR BLD AUTO: 0.5 %
LEFT CCA DIST DIAS: 17 CM/S
LEFT CCA DIST SYS: 64 CM/S
LEFT CCA PROX DIAS: 15 CM/S
LEFT CCA PROX SYS: 62 CM/S
LEFT ECA DIAS: 8 CM/S
LEFT ECA SYS: 62 CM/S
LEFT ICA DIST DIAS: 13 CM/S
LEFT ICA DIST SYS: 34 CM/S
LEFT ICA MID DIAS: 29 CM/S
LEFT ICA MID SYS: 79 CM/S
LEFT ICA PROX DIAS: 11 CM/S
LEFT ICA PROX SYS: 47 CM/S
LEFT VERTEBRAL DIAS: 13 CM/S
LEFT VERTEBRAL SYS: 35 CM/S
LYMPHOCYTES # BLD AUTO: 0.77 X10(3)/MCL (ref 0.6–4.6)
LYMPHOCYTES NFR BLD AUTO: 9.8 %
MCH RBC QN AUTO: 31.3 PG (ref 27–31)
MCHC RBC AUTO-ENTMCNC: 32.8 G/DL (ref 33–36)
MCV RBC AUTO: 95.4 FL (ref 80–94)
MONOCYTES # BLD AUTO: 0.95 X10(3)/MCL (ref 0.1–1.3)
MONOCYTES NFR BLD AUTO: 12.1 %
NEUTROPHILS # BLD AUTO: 5.73 X10(3)/MCL (ref 2.1–9.2)
NEUTROPHILS NFR BLD AUTO: 73.1 %
NRBC BLD AUTO-RTO: 0 %
OHS CV CAROTID RIGHT ICA EDV HIGHEST: 14
OHS CV CAROTID ULTRASOUND LEFT ICA/CCA RATIO: 1.23
OHS CV CAROTID ULTRASOUND RIGHT ICA/CCA RATIO: 0.77
OHS CV PV CAROTID LEFT HIGHEST CCA: 64
OHS CV PV CAROTID LEFT HIGHEST ICA: 79
OHS CV PV CAROTID RIGHT HIGHEST CCA: 62
OHS CV PV CAROTID RIGHT HIGHEST ICA: 48
OHS CV US CAROTID LEFT HIGHEST EDV: 29
PLATELET # BLD AUTO: 384 X10(3)/MCL (ref 130–400)
PMV BLD AUTO: 10.3 FL (ref 7.4–10.4)
POCT GLUCOSE: 217 MG/DL (ref 70–110)
POCT GLUCOSE: 287 MG/DL (ref 70–110)
POCT GLUCOSE: 84 MG/DL (ref 70–110)
RBC # BLD AUTO: 3.48 X10(6)/MCL (ref 4.7–6.1)
RIGHT CCA DIST DIAS: 17 CM/S
RIGHT CCA DIST SYS: 62 CM/S
RIGHT CCA PROX DIAS: 10 CM/S
RIGHT CCA PROX SYS: 51 CM/S
RIGHT ECA SYS: 69 CM/S
RIGHT ICA DIST DIAS: 9 CM/S
RIGHT ICA DIST SYS: 33 CM/S
RIGHT ICA MID DIAS: 14 CM/S
RIGHT ICA MID SYS: 48 CM/S
RIGHT ICA PROX DIAS: 10 CM/S
RIGHT ICA PROX SYS: 45 CM/S
WBC # BLD AUTO: 7.84 X10(3)/MCL (ref 4.5–11.5)

## 2024-09-26 PROCEDURE — 99223 1ST HOSP IP/OBS HIGH 75: CPT | Mod: ,,,

## 2024-09-26 PROCEDURE — 92526 ORAL FUNCTION THERAPY: CPT

## 2024-09-26 PROCEDURE — 21400001 HC TELEMETRY ROOM

## 2024-09-26 PROCEDURE — 36415 COLL VENOUS BLD VENIPUNCTURE: CPT

## 2024-09-26 PROCEDURE — 63600175 PHARM REV CODE 636 W HCPCS

## 2024-09-26 PROCEDURE — 97110 THERAPEUTIC EXERCISES: CPT | Mod: CQ

## 2024-09-26 PROCEDURE — 25000003 PHARM REV CODE 250

## 2024-09-26 PROCEDURE — 25000003 PHARM REV CODE 250: Performed by: INTERNAL MEDICINE

## 2024-09-26 PROCEDURE — 63600175 PHARM REV CODE 636 W HCPCS: Performed by: INTERNAL MEDICINE

## 2024-09-26 PROCEDURE — 97530 THERAPEUTIC ACTIVITIES: CPT | Mod: CQ

## 2024-09-26 PROCEDURE — 85025 COMPLETE CBC W/AUTO DIFF WBC: CPT

## 2024-09-26 PROCEDURE — 99233 SBSQ HOSP IP/OBS HIGH 50: CPT | Mod: ,,, | Performed by: SPECIALIST

## 2024-09-26 PROCEDURE — 95819 EEG AWAKE AND ASLEEP: CPT

## 2024-09-26 PROCEDURE — 97535 SELF CARE MNGMENT TRAINING: CPT | Mod: CO

## 2024-09-26 PROCEDURE — 95816 EEG AWAKE AND DROWSY: CPT | Mod: 26,,, | Performed by: SPECIALIST

## 2024-09-26 RX ORDER — INSULIN GLARGINE 100 [IU]/ML
10 INJECTION, SOLUTION SUBCUTANEOUS 2 TIMES DAILY
Status: DISCONTINUED | OUTPATIENT
Start: 2024-09-26 | End: 2024-09-29

## 2024-09-26 RX ADMIN — PANTOPRAZOLE SODIUM 40 MG: 40 TABLET, DELAYED RELEASE ORAL at 10:09

## 2024-09-26 RX ADMIN — INSULIN GLARGINE 7 UNITS: 100 INJECTION, SOLUTION SUBCUTANEOUS at 09:09

## 2024-09-26 RX ADMIN — INSULIN ASPART 6 UNITS: 100 INJECTION, SOLUTION INTRAVENOUS; SUBCUTANEOUS at 02:09

## 2024-09-26 RX ADMIN — MICONAZOLE NITRATE 2 % TOPICAL POWDER: at 10:09

## 2024-09-26 RX ADMIN — FINASTERIDE 5 MG: 5 TABLET, FILM COATED ORAL at 09:09

## 2024-09-26 RX ADMIN — MICONAZOLE NITRATE 2 % TOPICAL POWDER: at 09:09

## 2024-09-26 RX ADMIN — ATORVASTATIN CALCIUM 20 MG: 10 TABLET, FILM COATED ORAL at 09:09

## 2024-09-26 RX ADMIN — ASPIRIN 81 MG CHEWABLE TABLET 81 MG: 81 TABLET CHEWABLE at 09:09

## 2024-09-26 RX ADMIN — INSULIN GLARGINE 10 UNITS: 100 INJECTION, SOLUTION SUBCUTANEOUS at 10:09

## 2024-09-26 RX ADMIN — TAMSULOSIN HYDROCHLORIDE 0.4 MG: 0.4 CAPSULE ORAL at 09:09

## 2024-09-26 RX ADMIN — RUGBY ZINC OXIDE 20%: 20 OINTMENT TOPICAL at 09:09

## 2024-09-26 RX ADMIN — PANTOPRAZOLE SODIUM 40 MG: 40 TABLET, DELAYED RELEASE ORAL at 09:09

## 2024-09-26 RX ADMIN — RUGBY ZINC OXIDE 20%: 20 OINTMENT TOPICAL at 10:09

## 2024-09-26 NOTE — HPI
Wound medicine re-check    The patient is a 64 year old WM brought into [facility] ED from home on 9/8/24 with N/V/D x 1 week and progressive weakness, he  was admitted to ICU for acute hypoxic respiratory failure with right basilar pneumonia and developing progressive shock ultimately requiring endotracheal intubation, vasopressors and steroid administration. In the ED his blood glucose was >500.   His PMHx significant for HTN, HLD, CAD s/p PCI, BPH with history of self catheterization, and HFpEF.   Urine cultures demonstrated Klebsiella and Providencia, received Zosyn and azithromycin  Downgraded to HM on 9/12/24  Patient decompensated on 9/15/24 and was transferred back to ICU, for respiratory decompensation, hypotension not responding to IV boluses and required vasopressors. He was placed on BiPAP.   Downgraded to HM again on 9/20/24 and zosyn completed on 9/23/24. Weaned to room air.     Patient and family provide history, reports that patient was living alone with normal function until May 2021 when he was hospitalized due to extremely elevated blood sugars and diabetic coma; after this hospitalization he went to a SNF for therapy and returned home where he functioned independently for some time with the use of a walker and wheel chair for locomotion due to chronic weakness of his legs. Family reports that his debility was progressive over the last 2 years and about 6 weeks ago he fell while transferring himself from his bed to his wheel chair and since this time has been primarily in bed due to fear of falling again; family reports he was receiving assistance at home with private sitters for a few hours during the day.    Images were reviewed in EPIC, patient came in with redness to sacrum/buttocks but not noted with any skin breaks. He was being followed by inpatient wound nurse team and being treated for moisture associated skin breakdown as well as candidal skin infection. Wound medicine has been consulted for  evaluation of a wound of the sacral region that is concerning for deterioration.   Initial visit done on 9/26/24; patient with shallow crater over coccyx with mixed pink tissue and yellow waxy covering. No infection; local wound care initiated.   9/30/24 - wound re-check today. Met patient in room 804.  Agreeable for wound assessment at this time. No acute distress, appears agitated, wants to be repositioned and legs straightened out and wants everything off of his bed. Refusing heel boots, remains on low air loss bed side lying, not using purple wedge.

## 2024-09-26 NOTE — PT/OT/SLP PROGRESS
[facility]  Speech Language Pathology Department  Dysphagia Therapy Progress Note    Patient Name:  Froy Barragan   MRN:  [id number]  Admitting Diagnosis: septic shock    Recommendations     General recommendations:  dysphagia therapy  Solid texture recommendation:  Puree  Liquid consistency recommendation: Thin   Medications: crushed in puree  Aspiration precautions: small bites/sips, slow rate, and alternate solids/liquids    Discharge therapy intensity: Moderate Intensity Therapy   Barriers to safe discharge:  complicated medical history and acuity of illness    Subjective     Patient awake, alert, and cooperative.  Spiritual/Cultural/Denominational Beliefs/Practices that affect care: no    Pain/Comfort: Pain Rating 1: 0/10    Respiratory Status:  room air    Objective     Therapeutic PO Trials:  Consistency Amount Fed By Oral Symptoms Pharyngeal Symptoms   Minced & Moist solid Half tray Self Prolonged bolus formation/mastication  Slowed oral transit time  Oral residue  Cued liquid sips cleared residue Multiple swallows     Assessment     Pt continues to present with oropharyngeal dysphagia requiring diet modification to improve swallow safety and efficiency.    Outcome Measures     Functional Oral Intake Scale: 4 - Total oral diet of a single consistency    Goals     Multidisciplinary Problems       SLP Goals          Problem: SLP    Goal Priority Disciplines Outcome   SLP Goal     SLP Progressing   Description:   LTG: Tolerate least restrictive PO diet with no clinical signs/sx aspiration- progressing   STG: Tolerate half meal of Minced & Moist solids with adequate bolus formation and no clinical signs/sx aspiration- revised                       Patient Education     Patient provided with verbal education regarding SLP POC.  Understanding was verbalized.    Plan     Will continue to follow and tx as appropriate.    SLP Follow-Up:  Yes   Patient to be seen:  5 x/week   Plan of  Care expires:  10/02/24  Plan of Care reviewed with:  patient       Time Tracking     SLP Treatment Date:   09/26/24  Speech Start Time:  1000  Speech Stop Time:  1015     Speech Total Time (min):  15 min    Billable minutes:  Treatment of Swallow Dysfunction, 15 minutes       09/26/2024

## 2024-09-26 NOTE — ASSESSMENT & PLAN NOTE
MRI brain w/o in 2021: L middle cerebellar peduncle restricting lesion concerning for acute infarct versus demyelinating plaque  MRI brain w w/o: subacute R basal ganglia infarct and encephalomalacia to R occipital lobe  MRI C-spine w w/o:  Chronic cord signal changes at C4-C6    -continue therapy efforts  -agree with placement in a facility following discharge  -ASA 81 daily and atorvastatin 20 mg daily

## 2024-09-26 NOTE — PLAN OF CARE
Problem: Adult Inpatient Plan of Care  Goal: Readiness for Transition of Care  Outcome: Progressing     Problem: Skin Injury Risk Increased  Goal: Skin Health and Integrity  Outcome: Progressing     Problem: Wound  Goal: Skin Health and Integrity  Outcome: Progressing  Goal: Optimal Wound Healing  Outcome: Progressing

## 2024-09-26 NOTE — NURSING
Nurses Note -- 4 Eyes      9/26/2024   8:07 AM      Skin assessed during: Q Shift Change      [] No Altered Skin Integrity Present    []Prevention Measures Documented      [x] Yes- Altered Skin Integrity Present or Discovered   [x] LDA Added if Not in Epic (Describe Wound)   [] New Altered Skin Integrity was Present on Admit and Documented in LDA   [] Wound Image Taken    Wound Care Consulted? No    Attending Nurse:  Kyara Campo RN/Staff Member:  Kathy    Wounds already noted-- WC orders in place

## 2024-09-26 NOTE — PROCEDURES
"Froy Barragan is a 64 y.o. male patient.    Temp: 98.5 °F (36.9 °C) (24 1111)  Pulse: 107 (24 1111)  Resp: 18 (24 1111)  BP: 102/69 (24 1111)  SpO2: 98 % (24 1111)  Weight: 50 kg (110 lb 3.7 oz) (24 1044)  Height: 5' 4" (162.6 cm) (24)       EEG    Date/Time: 2024 12:51 PM    Performed by: Dennis Smyth MD  Authorized by: Dinorah Trujillo FNP      EEG REPORT    PATIENT: Froy Barragan  : 1960  George Bran MD  [address]  [place] [address]   @[email or web address]@  DATE:   INTERPRETING PHYSICIAN: Dennis Smyth     Reason for EEG: Encephalopathy       Digital EEG reviewed.  Electrodes placed in customary 10-20 fashion.    Video recorded:  yes    Duration of recordin minutes   Patient Awake Asleep.  Background consists of symmetrical  theta frequencies.                                           Lateralizing, focal, or epileptiform features were not present.  Activations:  photic performed and not associated with abnormalities. []Activations not performed.   Technical character: adequate   EKG: NSR         IMPRESSION:  This is a nonspecific EEG with diffuse slow activity.     Comments:   The lack of focal or epileptiform features does not negate or exclude a diagnosis of seizure or epilepsy, as the sensitivity of interictal EEG may be < or equal to 50%.    Dennis Smyth MD WILLIE FAAN FAA         2024    "

## 2024-09-26 NOTE — PROGRESS NOTES
[facility] Hospital Medicine Progress Note        Chief Complaint: Inpatient Follow-up for weakness     HPI:    64-year-old male with HTN, HLD, CAD status post PCI, BPH on intermittent self catheterization, chronic diastolic heart failure, progressive physical decline with global weakness, initially presented with hyperglycemia, UTI, DWAINE, chest x-ray concerning for right-sided pneumonia with possible aspiration.  Admitted with septic shock that required vasopressors.  Because of respiratory failure he was intubated and mechanically ventilated.  Eventually, vasopressors were discontinued, the patient was extubated.  Urine cultures demonstrated Klebsiella, Providencia.  The patient was started on NG tube feeds.  He was cleared for oral intake by speech.  The antibiotics were initially deescalated and secondary to worsening leukocytosis were again escalated to include Zosyn, azithromycin.  Imaging demonstrated right-sided large pneumonia, possible empyema.  Pulmonary were consulted.  Because of progressive global weakness, Neurology were consulted who recommended an MRI of the brain and MRI of the entire spine.  Because a need for anesthesia, this has been deferred until 09/16/2024.  The patient did require a brief increase of his supplemental oxygenation given hypoxia in the 80s.  Chest x-ray was unremarkable.  Because of worsening D-dimer, a V/Q scan was ordered      Patient completely decompensated on 09/15/2024.  Patient remained hypotensive, hypoxic with a high suspicions for pulmonary emboli on full-dose anticoagulation.  Patient had to be transferred to ICU for respiratory decompensation, hypotension not responding to IV boluses requiring vasopressors.  In ICU he was placed on BiPAP/Levophed/isotonic crystalloid hydration and patient was kept on current IV antibiotic regimen.  Chest x-rays done in ICU with a right lower lobe consolidation/effusion concerning for empyema on Zosyn.   Decision was done to do a CTA of the chest.  CT chest abdomen with suspected ill-defined areas of bilateral intramuscular hemorrhage in the psoas muscles.  At that time anticoagulation was placed on hold.  Patient required 2 units PRBC and was able to wean down BiPAP to 8 L Oxymizer.  Neurology wanted MRI brain and whole spine secondary to patient's history of chronic paraplegia/wheelchair-bound over the past few years but patient refused MRIs.  Patient was kept on supplemental oxygen during the day and BiPAP at night.  Eventually patient agreed to have some MRIs done under anesthesia.  At this moment patient has been downgraded again to hospitalist services on 9/20.  Completed Zosyn on 09/23.  Weaned to room air.  Case management consulted for SNF placement     MRI C- spine   Impression:  1. No evidence of active demyelination.  2. Mild degenerative narrowing spinal canal at C3-C5.  3. Multilevel neural foraminal stenoses as described.  4. Chronic cord signal changes at C4-C6.    MRI brain   Impression:  1. Subacute ischemic changes in the right basal ganglia.  2. Mild chronic microvascular ischemic changes.       Interval Hx:   Patient today awake and comfortable. He is lethargic. Has chronic griselda LE weakness. Not ambulated for sometime. Tolerating dysphagia diet.    Case was discussed with patient's nurse and  on the floor.    Objective/physical exam:  General: In no acute distress, frail. Generalized muscle loss  Chest: Clear to auscultation bilaterally  Heart: RRR, +S1, S2, no appreciable murmur  Abdomen: Soft, nontender, BS +  Neurologic: Cranial nerve II-XII intact, Strength 4/5 griselda UE and 1/5 griselda LE     VITAL SIGNS: 24 HRS MIN & MAX LAST   Temp  Min: 97.6 °F (36.4 °C)  Max: 98.5 °F (36.9 °C) 97.7 °F (36.5 °C)   BP  Min: 102/69  Max: 128/84 103/69   Pulse  Min: 98  Max: 119  (!) 119   Resp  Min: 17  Max: 18 18   SpO2  Min: 95 %  Max: 98 % 97 %     I have reviewed the following labs:  Recent Labs    Lab 09/24/24  0440 09/25/24  0500 09/26/24  0451   WBC 6.84 7.59 7.84   RBC 3.34* 3.36* 3.48*   HGB 10.4* 10.4* 10.9*   HCT 30.7* 32.0* 33.2*   MCV 91.9 95.2* 95.4*   MCH 31.1* 31.0 31.3*   MCHC 33.9 32.5* 32.8*   RDW 15.9 16.1 16.3    321 384   MPV 10.4 10.6* 10.3     Recent Labs   Lab 09/21/24  0521 09/22/24  0524 09/23/24  0436 09/24/24  0440    138 138 139   K 3.7 4.0 3.6 3.6    104 104 107   CO2 25 25 23 22*   BUN 23.2 25.4 25.1 26.3*   CREATININE 1.50* 1.57* 1.71* 1.55*   CALCIUM 7.9* 8.0* 8.1* 8.2*   MG 1.30* 1.90 1.60 1.70   ALBUMIN 2.0* 2.0* 2.0*  --    ALKPHOS 47 50 49  --    ALT 16 15 15  --    AST 14 12 15  --    BILITOT 0.7 0.6 0.8  --      Microbiology Results (last 7 days)       Procedure Component Value Units Date/Time    Blood Culture [[id number]]  (Normal) Collected: 09/15/24 1527    Order Status: Completed Specimen: Blood from Arm, Right Updated: 09/20/24 1602     Blood Culture No Growth at 5 days    Blood Culture [[id number]]  (Normal) Collected: 09/15/24 1527    Order Status: Completed Specimen: Blood from Arm, Left Updated: 09/20/24 1602     Blood Culture No Growth at 5 days             See below for Radiology    Assessment/Plan:  Acute hypoxic respiratory failure requiring noninvasive positive pressure ventilation  Septic shock requiring vasopressors as well as transfusion PRBC  Right lower lobe pneumonia, likely aspiration   Duodenitis   Type 2 diabetes mellitus, non insulin with hyperglycemia  Acute kidney injury superimposed on chronic kidney disease stage 3  Progressive global weakness   Essential hypertension   Mixed hyperlipidemia   Previous history of CAD status post PCI   Chronic heart failure with a preserved ejection fraction, euvolemic  Subacute right basal ganglia infarct on aspirin 81 mg  Symptomatic anemia requiring transfusion PRBC  Septic shock secondary to UTI with Klebsiella and Providencia treated appropriately with Zosyn  Subacute right basal ganglia  infarct by MRI  Chencho LE weakness     Plan:  Patient still very weak and needing full assist for ADLs  He has been afebrile.   Continue OT/PT    Continue strict aspiration, fall and decubitus precautions     Will consult palliative care in am      VTE prophylaxis: SCD    Patient condition:  Fair    Anticipated discharge and Disposition:   SNF      All diagnosis and differential diagnosis have been reviewed; assessment and plan has been documented; I have personally reviewed the labs and test results that are presently available; I have reviewed the patients medication list; I have reviewed the consulting providers response and recommendations. I have reviewed or attempted to review medical records based upon their availability    All of the patient's questions have been  addressed and answered. Patient's is agreeable to the above stated plan. I will continue to monitor closely and make adjustments to medical management as needed.    Portions of this note dictated using EMR integrated voice recognition software, and may be subject to voice recognition errors not corrected at proofreading. Please contact writer for clarification if needed.   _____________________________________________________________________    Malnutrition Status:    Scheduled Med:   aspirin  81 mg Oral Daily    atorvastatin  20 mg Oral Daily    finasteride  5 mg Oral Daily    insulin glargine U-100  10 Units Subcutaneous BID    miconazole NITRATE 2 %   Topical (Top) BID    pantoprazole  40 mg Oral BID    tamsulosin  0.4 mg Oral Daily    zinc oxide   Topical (Top) BID      Continuous Infusions:   D5 and 0.45% NaCl   Intravenous Continuous PRN          PRN Meds:    Current Facility-Administered Medications:     0.9%  NaCl infusion (for blood administration), , Intravenous, Q24H PRN    0.9%  NaCl infusion (for blood administration), , Intravenous, Q24H PRN    0.9% NaCl, , Intravenous, PRN    albuterol-ipratropium, 3 mL, Nebulization, Q6H PRN     dextrose 10%, 12.5 g, Intravenous, PRN    dextrose 10%, 25 g, Intravenous, PRN    D5 and 0.45% NaCl, , Intravenous, Continuous PRN    haloperidol lactate, 1 mg, Intravenous, Q6H PRN    hydrALAZINE, 10 mg, Intravenous, Q4H PRN    insulin aspart U-100, 0-10 Units, Subcutaneous, QID (AC + HS) PRN    loperamide, 2 mg, Oral, QID PRN    magnesium sulfate IVPB, 2 g, Intravenous, PRN    sodium chloride 0.9%, 10 mL, Intravenous, PRN    sodium phosphate 20.01 mmol in D5W 250 mL IVPB, 20.01 mmol, Intravenous, PRN     Radiology:  I have personally reviewed the following imaging and agree with the radiologist.     Interventional Radiology  Procedure performed in the Invasive Lab    - See Procedure Log link below for nursing documentation    - See OpNote on Surgeries Tab for physician findings    - See Imaging Tab for radiologist dictation      Zelalem Jules MD  Department of Hospital Medicine   [facility]   09/26/2024

## 2024-09-26 NOTE — PLAN OF CARE
SNF referral sent electronically to [facility], [facility], and [facility] via [facility] [employer] [employer]. Awaiting response

## 2024-09-26 NOTE — SUBJECTIVE & OBJECTIVE
Scheduled Meds:   aspirin  81 mg Oral Daily    atorvastatin  20 mg Oral Daily    finasteride  5 mg Oral Daily    insulin glargine U-100  7 Units Subcutaneous BID    miconazole NITRATE 2 %   Topical (Top) BID    pantoprazole  40 mg Oral BID    tamsulosin  0.4 mg Oral Daily    zinc oxide   Topical (Top) BID     Continuous Infusions:   D5 and 0.45% NaCl   Intravenous Continuous PRN         PRN Meds:  Current Facility-Administered Medications:     0.9%  NaCl infusion (for blood administration), , Intravenous, Q24H PRN    0.9%  NaCl infusion (for blood administration), , Intravenous, Q24H PRN    0.9% NaCl, , Intravenous, PRN    albuterol-ipratropium, 3 mL, Nebulization, Q6H PRN    dextrose 10%, 12.5 g, Intravenous, PRN    dextrose 10%, 25 g, Intravenous, PRN    D5 and 0.45% NaCl, , Intravenous, Continuous PRN    haloperidol lactate, 1 mg, Intravenous, Q6H PRN    hydrALAZINE, 10 mg, Intravenous, Q4H PRN    insulin aspart U-100, 0-10 Units, Subcutaneous, QID (AC + HS) PRN    loperamide, 2 mg, Oral, QID PRN    magnesium sulfate IVPB, 2 g, Intravenous, PRN    sodium chloride 0.9%, 10 mL, Intravenous, PRN    sodium phosphate 20.01 mmol in D5W 250 mL IVPB, 20.01 mmol, Intravenous, PRN    Review of patient's allergies indicates:  No Known Allergies     Past Medical History:   Diagnosis Date    Anemia of chronic disease 7/6/2022    Arteriosclerosis of coronary artery 7/6/2022    Cervical myelopathy 7/6/2022    Cobalamin deficiency 7/6/2022    Gastroesophageal reflux disease 7/6/2022    Hypertension 7/6/2022    Low vitamin D level 7/6/2022    Mixed hyperlipidemia 7/6/2022    Paraparesis 7/6/2022    Stage 3a chronic kidney disease 7/6/2022    Type 2 diabetes mellitus 7/6/2022     Past Surgical History:   Procedure Laterality Date    MAGNETIC RESONANCE IMAGING N/A 9/19/2024    Procedure: MRI (Magnetic Resonance Imagine);  Surgeon: David Amin DO;  Location: [facility];  Service: Anesthesiology;  Laterality: N/A;    SPINE SURGERY          Family History    None       Tobacco Use    Smoking status: Never    Smokeless tobacco: Never   Substance and Sexual Activity    Alcohol use: Never    Drug use: Never    Sexual activity: Not on file     Review of Systems   Constitutional:  Positive for activity change, appetite change and fatigue. Negative for chills, diaphoresis and fever.   Skin:  Positive for wound.   Neurological:  Positive for weakness.       Objective:     Vital Signs (Most Recent):  Temp: 98.5 °F (36.9 °C) (09/26/24 1111)  Pulse: 107 (09/26/24 1111)  Resp: 18 (09/26/24 1111)  BP: 102/69 (09/26/24 1111)  SpO2: 98 % (09/26/24 1111) Vital Signs (24h Range):  Temp:  [97.6 °F (36.4 °C)-98.5 °F (36.9 °C)] 98.5 °F (36.9 °C)  Pulse:  [] 107  Resp:  [17-19] 18  SpO2:  [95 %-98 %] 98 %  BP: (102-128)/(68-84) 102/69     Weight: 50 kg (110 lb 3.7 oz)  Body mass index is 18.92 kg/m².     Physical Exam  Vitals reviewed.   Constitutional:       General: He is not in acute distress.     Appearance: He is cachectic. He is ill-appearing (chronic).      Comments: Thin frail appearing white male; prominent bony landmarks    HENT:      Head: Normocephalic and atraumatic.      Nose: Nose normal.   Cardiovascular:      Rate and Rhythm: Normal rate and regular rhythm.      Pulses: Normal pulses.   Pulmonary:      Effort: Pulmonary effort is normal. No respiratory distress.   Abdominal:      General: Abdomen is flat.      Palpations: Abdomen is soft.   Genitourinary:     Comments: Indwelling colbert catheter to  bag with clear yellow urine   Bowel incontinence   Feet:      Comments: Bilateral heels/feet without redness, breaks or blisters   Skin:     General: Skin is warm and dry.      Capillary Refill: Capillary refill takes less than 2 seconds.             Comments: Shallow ulceration of coccyx mixed with pink tissue and waxy yellow covering, partial thickness loss.  no bogginess or evidence of purulent drainage, no odor. Sakina wound skin without  evidence of cellulitis. Small skin breaks inferior to wound appears to be from moisture and stool.    Neurological:      Mental Status: He is alert and oriented to person, place, and time.      Motor: Weakness present.   Psychiatric:         Mood and Affect: Mood normal.         Behavior: Behavior normal. Behavior is cooperative.       Sacrum/coccyx: 3.5 x 2.4 cm              Laboratory:  A1C:   Recent Labs   Lab 04/16/24  2300 07/21/24  2300 09/14/24  0336   HGBA1C 6.1* 6.1* 6.2     BMP:   Recent Labs   Lab 09/24/24  0440      K 3.6      CO2 22*   BUN 26.3*   CREATININE 1.55*   CALCIUM 8.2*   MG 1.70       CBC:   Recent Labs   Lab 09/26/24  0451   WBC 7.84   RBC 3.48*   HGB 10.9*   HCT 33.2*      MCV 95.4*   MCH 31.3*   MCHC 32.8*     CMP:   Recent Labs   Lab 09/23/24  0436 09/24/24  0440   CALCIUM 8.1* 8.2*   ALBUMIN 2.0*  --     139   K 3.6 3.6   CO2 23 22*    107   BUN 25.1 26.3*   CREATININE 1.71* 1.55*   ALKPHOS 49  --    ALT 15  --    AST 15  --    BILITOT 0.8  --        LFTs:   Recent Labs   Lab 09/23/24  0436   ALT 15   AST 15   ALKPHOS 49   BILITOT 0.8   ALBUMIN 2.0*       Microbiology Results (last 7 days)       Procedure Component Value Units Date/Time    Blood Culture [[id number]]  (Normal) Collected: 09/15/24 1527    Order Status: Completed Specimen: Blood from Arm, Right Updated: 09/20/24 1602     Blood Culture No Growth at 5 days    Blood Culture [[id number]]  (Normal) Collected: 09/15/24 1527    Order Status: Completed Specimen: Blood from Arm, Left Updated: 09/20/24 1602     Blood Culture No Growth at 5 days                Diagnostic Results:  I have reviewed all pertinent imaging results/findings within the past 24 hours.

## 2024-09-26 NOTE — PLAN OF CARE
Chantal contacted CM with more SNF choices: [facility], [facility], [facility], and [facility]. [facility]SC notified of the new choices.

## 2024-09-26 NOTE — SUBJECTIVE & OBJECTIVE
Past Medical History:   Diagnosis Date    Anemia of chronic disease 7/6/2022    Arteriosclerosis of coronary artery 7/6/2022    Cervical myelopathy 7/6/2022    Cobalamin deficiency 7/6/2022    Gastroesophageal reflux disease 7/6/2022    Hypertension 7/6/2022    Low vitamin D level 7/6/2022    Mixed hyperlipidemia 7/6/2022    Paraparesis 7/6/2022    Stage 3a chronic kidney disease 7/6/2022    Type 2 diabetes mellitus 7/6/2022       Past Surgical History:   Procedure Laterality Date    MAGNETIC RESONANCE IMAGING N/A 9/19/2024    Procedure: MRI (Magnetic Resonance Imagine);  Surgeon: David Amin DO;  Location: [facility];  Service: Anesthesiology;  Laterality: N/A;    SPINE SURGERY         Review of patient's allergies indicates:  No Known Allergies    Current Neurological Medications:     No current facility-administered medications on file prior to encounter.     Current Outpatient Medications on File Prior to Encounter   Medication Sig    alcohol swabs (BD ALCOHOL SWABS) PadM Apply 1 each topically once daily.    alfuzosin (UROXATRAL) 10 mg Tb24 TAKE 1 TABLET EVERY DAY WITH BREAKFAST    amLODIPine (NORVASC) 2.5 MG tablet TAKE 1 TABLET ONE TIME DAILY    aspirin (ECOTRIN) 81 MG EC tablet Take 81 mg by mouth once daily.    b complex vitamins capsule Take 1 capsule by mouth once daily.    blood glucose control, low (TRUE METRIX LEVEL 1) Soln 1 Bottle by Misc.(Non-Drug; Combo Route) route once daily.    blood-glucose meter (TRUE METRIX AIR GLUCOSE METER) Misc 1 each by Misc.(Non-Drug; Combo Route) route once daily.    blood-glucose sensor (DEXCOM G7 SENSOR) Carmen 1 each by Misc.(Non-Drug; Combo Route) route once daily.    carvediloL (COREG) 25 MG tablet TAKE 1 TABLET TWICE DAILY    ezetimibe (ZETIA) 10 mg tablet Take 1 tablet (10 mg total) by mouth once daily.    finasteride (PROSCAR) 5 mg tablet Take 1 tablet (5 mg total) by mouth once daily.    glimepiride (AMARYL) 2 MG tablet Take 1 tablet (2 mg total) by mouth before  breakfast.    lancets (TRUEPLUS LANCETS) 33 gauge Misc 1 lancet by Misc.(Non-Drug; Combo Route) route once daily.    metFORMIN (GLUCOPHAGE) 1000 MG tablet Take 1 tablet (1,000 mg total) by mouth 2 (two) times a day.    pantoprazole (PROTONIX) 40 MG tablet TAKE 1 TABLET ONE TIME DAILY    rosuvastatin (CRESTOR) 40 MG Tab Take 1 tablet (40 mg total) by mouth once daily.    TRUE METRIX GLUCOSE TEST STRIP Strp TEST BLOOD SUGAR EVERY DAY    vitamin D (VITAMIN D3) 1000 units Tab Take 1,000 Units by mouth once daily.     Family History    None       Tobacco Use    Smoking status: Never    Smokeless tobacco: Never   Substance and Sexual Activity    Alcohol use: Never    Drug use: Never    Sexual activity: Not on file     Review of Systems   Unable to perform ROS: Other     Objective:     Vital Signs (Most Recent):  Temp: 98.5 °F (36.9 °C) (09/26/24 1111)  Pulse: 107 (09/26/24 1111)  Resp: 18 (09/26/24 1111)  BP: 102/69 (09/26/24 1111)  SpO2: 98 % (09/26/24 1111) Vital Signs (24h Range):  Temp:  [97.6 °F (36.4 °C)-98.5 °F (36.9 °C)] 98.5 °F (36.9 °C)  Pulse:  [] 107  Resp:  [17-18] 18  SpO2:  [95 %-98 %] 98 %  BP: (102-128)/(69-84) 102/69     Weight: 50 kg (110 lb 3.7 oz)  Body mass index is 18.92 kg/m².     Physical Exam  Vitals reviewed.   Constitutional:       General: He is awake.      Appearance: He is underweight.   HENT:      Head: Normocephalic and atraumatic.      Nose: Nose normal.      Mouth/Throat:      Mouth: Mucous membranes are moist.      Pharynx: Oropharynx is clear.   Eyes:      Extraocular Movements: Extraocular movements intact and EOM normal.      Pupils: Pupils are equal, round, and reactive to light.   Pulmonary:      Effort: Pulmonary effort is normal.   Skin:     General: Skin is warm and dry.   Neurological:      Mental Status: He is alert.   Psychiatric:         Speech: Speech normal.          NEUROLOGICAL EXAMINATION:     MENTAL STATUS   Oriented to person.   Oriented to place.   Follows 1  step commands.   Attention: decreased. Concentration: decreased.   Speech: speech is normal   Level of consciousness: alert  Knowledge: poor.     CRANIAL NERVES     CN II   Visual fields full to confrontation.     CN III, IV, VI   Pupils are equal, round, and reactive to light.  Extraocular motions are normal.   Nystagmus: none   Conjugate gaze: present    MOTOR EXAM         BUE 3/5, BLE 0/5        Significant Labs:   Recent Lab Results  (Last 5 results in the past 24 hours)        09/26/24  1031   09/26/24  0528   09/26/24  0451   09/25/24  2311   09/25/24  1628        Baso #     0.13           Basophil %     1.7           Eos #     0.22           Eos %     2.8           Hematocrit     33.2           Hemoglobin     10.9           Immature Grans (Abs)     0.04           Immature Granulocytes     0.5           Lymph #     0.77           LYMPH %     9.8           MCH     31.3           MCHC     32.8           MCV     95.4           Mono #     0.95           Mono %     12.1           MPV     10.3           Neut #     5.73           Neut %     73.1           nRBC     0.0           Platelet Count     384           POCT Glucose 287   84     221   185       RBC     3.48           RDW     16.3           WBC     7.84                                  Significant Imaging:  MRI brain w w/o:  FINDINGS:  There is subacute appearing restricted diffusion in the right basal ganglia measuring 1.4 cm in size.  There is encephalomalacia in the right occipital lobe.  Mild patchy T2/FLAIR hyperintensities in the subcortical and periventricular white matter likely represent chronic microvascular ischemic changes.     There is no mass effect or midline shift.  The basal cisterns are patent.  There is mild diffuse parenchymal volume loss.  There is no hydrocephalus or abnormal extra-axial fluid collection.  The right vertebral artery flow void is abnormal.  The major intracranial flow voids are otherwise patent.     Impression:     1.  Subacute ischemic changes in the right basal ganglia.  2. Mild chronic microvascular ischemic changes.     MRI c-spine w w/o:  Impression:     1. No evidence of active demyelination.  2. Mild degenerative narrowing spinal canal at C3-C5.  3. Multilevel neural foraminal stenoses as described.  4. Chronic cord signal changes at C4-C6.     MRI t-spine w w/o:  Impression:     1. No definite cord signal abnormality.  No evidence of active demyelination.  2. Mild degenerative narrowing of the spinal canal at T5-T6 and T7-T9.        I have reviewed all pertinent imaging results/findings within the past 24 hours.

## 2024-09-26 NOTE — PT/OT/SLP PROGRESS
Physical Therapy Treatment    Patient Name:  Froy Barragan   MRN:  [id number]    Recommendations:     Discharge therapy intensity: Moderate Intensity Therapy   Discharge Equipment Recommendations: to be determined by next level of care  Barriers to discharge: Impaired mobility    Assessment:     Froy Barragan is a 64 y.o. male admitted with a medical diagnosis of septic shock 2/2 UTI, DWAINE, acute respiratory failure requiring intubation .  He presents with the following impairments/functional limitations: weakness, gait instability, impaired endurance, impaired balance, decreased safety awareness, impaired functional mobility, decreased lower extremity function .    Pt oriented when asked direct questions. Unable to follow vcs for mobilization. Pt required MAX cueing for all activities and even for therapist to initiate movement. Pt seeming to not understand what therapist is asking of him.     Rehab Prognosis: Good; patient would benefit from acute skilled PT services to address these deficits and reach maximum level of function.    Recent Surgery: Procedure(s) (LRB):  MRI (Magnetic Resonance Imagine) (N/A) 7 Days Post-Op    Plan:     During this hospitalization, patient would benefit from acute PT services 5 x/week to address the identified rehab impairments via gait training, therapeutic activities, therapeutic exercises and progress toward the following goals:    Plan of Care Expires:  10/24/24    Subjective     Chief Complaint:   Patient/Family Comments/goals:   Pain/Comfort:  Pain Rating 1: 0/10      Objective:     Communicated with NSG prior to session.  Patient found up in chair with telemetry upon PT entry to room.     General Precautions: Standard, fall  Orthopedic Precautions: N/A  Braces: N/A  Respiratory Status: Room air  Blood Pressure:   Skin Integrity: Visible skin intact      Functional Mobility:  Bed Mobility:     Scooting: moderate assistance  Sit to Supine: moderate  assistance  Transfers:     Bed to Chair: maximal assistance with  no AD  using  Squat Pivot and pt instructed to attempt T/F by reaching for bed rail. Pt unable to comprehend task therapist asking of him and and to be assist chair>EOB maxA.    Stat sitting:pt sat EOB for roughly 5min after T/F. Pt with large LOB forward and required modA to correct.     BLE AROM/AAROM: pt unable to perform ankle DF, ankle PF, knee flex/ext, alt hip flexion, hip add/abd. 10-12 reps. Required MAX vcs to stay on task and for proper technique.       Patient left right sidelying with all lines intact and call button in reach    GOALS:   Multidisciplinary Problems       Physical Therapy Goals          Problem: Physical Therapy    Goal Priority Disciplines Outcome Goal Variances Interventions   Physical Therapy Goal     PT, PT/OT Progressing     Description: Goals to be met by: d/c     Patient will increase functional independence with mobility by performin. Supine to sit with Stand-by Assistance  2. Sit to supine with Stand-by Assistance  3. Sit to stand transfer with Stand-by Assistance  4. Bed to chair transfer with Stand-by Assistance using No Assistive Device scoot pivot vs SB  5. Wheelchair propulsion x100 feet with Supervision using bilateral uppper extremities                         Time Tracking:     PT Received On: 24  PT Start Time: 1018     PT Stop Time: 1048  PT Total Time (min): 30 min     Billable Minutes: Therapeutic Activity 20 and Therapeutic Exercise 10    Treatment Type: Treatment  PT/PTA: PTA     Number of PTA visits since last PT visit: 5     2024

## 2024-09-26 NOTE — PT/OT/SLP PROGRESS
Occupational Therapy   Treatment    Name: Froy Barragan  MRN: [id number]  Admitting Diagnosis:  severe malnutrition   Recent Surgery: Procedure(s) (LRB):  MRI (Magnetic Resonance Imagine) (N/A)  7 Days Post-Op    Recommendations:     Recommended therapy intensity at discharge: Moderate Intensity Therapy   Discharge Equipment Recommendations:  to be determined by next level of care  Barriers to discharge:  Decreased caregiver support, Other (Comment) (ongoing medical needs)    Assessment:     Froy Barragan is a 64 y.o. male with a medical diagnosis of severe malnutrition.  He presents with decreased safety awareness. Performance deficits affecting function are weakness, impaired endurance, impaired cognition, decreased ROM, impaired self care skills, impaired functional mobility, impaired balance, decreased safety awareness, decreased lower extremity function, decreased upper extremity function.     Rehab Prognosis:  Fair; patient would benefit from acute skilled OT services to address these deficits and reach maximum level of function.       Plan:     Patient to be seen 4 x/week to address the above listed problems via self-care/home management, therapeutic activities, therapeutic exercises  Plan of Care Expires: 10/12/24  Plan of Care Reviewed with: patient    Subjective     Pain/Comfort:       Objective:     Communicated with: RN prior to session.  Patient found sitting edge of bed, in unsafe position, with family in room attempting to assist pt in grooming ADLs, with telemetry upon OT entry to room.  Once therapist repositioned pt, therapist provided safety education to family for pt's need for calling for nursing before mobilizing to EOB or as well as before standing.    General Precautions: Standard, fall    Orthopedic Precautions:N/A  Braces: N/A  Respiratory Status: Room air      Occupational Performance:     Bed Mobility:    Patient completed Rolling/Turning to Left with  contact guard  assistance  Patient completed Rolling/Turning to Right with contact guard assistance  Patient completed Supine to Sit with stand by assistance     Functional Mobility/Transfers:  Patient completed Bed <> Chair Transfer using Stand Pivot technique with maximal assistance and of 2 persons with no assistive device  Functional Mobility: unsafe to mobilize at this time 2/2 decreased BLE strength.     Activities of Daily Living:  Upper Body Dressing: maximal assistance to doff/vijaya gown over anterior, threading BUE through arm holes.  Toileting: total assistance and of 2 persons while lying on L side. Therapist provided yvette-care after BM, requiring wound care nurse for addressing and providing medication and patch over 3 small, open sacral wounds.     Therapeutic Positioning    OT interventions performed during the course of today's session in an effort to prevent and/or reduce acquired pressure injuries:   Education was provided on benefits of and recommendations for therapeutic positioning  Therapeutic positioning was provided at the conclusion of session to offload all bony prominences for the prevention and/or reduction of pressure injuries  Wound Care Nurse educated therapist, pt, and RN on pt's need for only side lying while in bed in order to decrease pressure on wounds for increased healing, and pt's need for sitting on SARA cushion while seated upright for no more than 1 hour at a time.    Skin assessment:  buttocks assessed during yvette-care   Findings: known area of altered skin integrity at sacral bones    [facility] 6 Click ADL:      Patient Education:  Patient provided with verbal education education regarding OT role/goals/POC, fall prevention, safety awareness, and pressure ulcer prevention.  Understanding was verbalized, however additional teaching warranted.      Patient left up in chair with all lines intact, call button in reach, geomat cushion, and PT notified of need to transfer pt back to bed in 1  hour.    GOALS:   Multidisciplinary Problems       Occupational Therapy Goals          Problem: Occupational Therapy    Goal Priority Disciplines Outcome Interventions   Occupational Therapy Goal     OT, PT/OT Progressing    Description: Goals to be met by: 10/12/24     Patient will increase functional independence with ADLs by performing:    Grooming while seated with Supervision.  Toileting from bedside commode with Moderate Assistance for hygiene and clothing management.   Sitting at edge of bed x10 minutes with Supervision.  Supine to sit with Supervision.  Squat pivot transfers with Minimal Assistance.  Toilet transfer to bedside commode with Minimal Assistance.  Increased functional strength to 4/5 for self care skills and functional mobility.  Upper extremity exercise program x10 reps per handout, with independence.                         Time Tracking:     OT Date of Treatment: 09/26/24  OT Start Time: 0911  OT Stop Time: 0945  OT Total Time (min): 34 min    Billable Minutes:Self Care/Home Management 2    OT/JOLENE: JOLENE     Number of JOLENE visits since last OT visit: 1    9/26/2024

## 2024-09-26 NOTE — CONSULTS
[facility] - 8th Floor Med Surg  Wound Care  Consult Note    Patient Name: Froy Barragan  MRN: [id number]  Admission Date: 9/8/2024  Hospital Length of Stay: 18 days  Attending Physician: Zelalem Jules MD  Primary Care Provider: George Bran MD     Inpatient consult to Wound Care Physician  Consult performed by: Erika Rahman FNP  Consult ordered by: Ok Yost MD        Subjective:     History of Present Illness:  Wound medicine consult    The patient is a 64 year old WM brought into [facility] ED from home on 9/8/24 with N/V/D x 1 week and progressive weakness he  was admitted to ICU for acute hypoxic respiratory failure with right basilar pneumonia and developing progressive shock ultimately requiring endotracheal intubation, vasopressors and steroid administration. In the ED his blood glucose was >500.   His PMHx significant for HTN, HLD, CAD s/p PCI, BPH with history of self catheterization, and HFpEF.   Urine cultures demonstrated Klebsiella and Providencia, received Zosyn and azithromycin  Downgraded to HM on 9/12/24  Patient decompensated on 9/15/24 and was transferred back to ICU, for respiratory decompensation, hypotension not responding to IV boluses and required vasopressors. He was placed on BiPAP.   Downgraded to HM again on 9/20/24 and zosyn completed on 9/23/24. Weaned to room air.     Images were reviewed in EPIC, patient came in with redness to sacrum/buttocks but not noted with any skin breaks. He was being followed by inpatient wound nurse team and being treated for moisture associated skin breakdown as well as candidal skin infection. Wound medicine has been consulted for evaluation of a wound of the sacral region that is concerning for deterioration.     9/26/24 - initial visit with patient today. Met patient in room 804, while OT assisting with cleaning BM and getting him out of bed to chair. His sister is also in the room. He is agreeable to wound  evaluation and treatment. No acute distress; reports generalized weakness and fatigue but states it is nice to get out of the bed.   Patient and family provide history, reports that patient was living alone with normal function until May 2021 when he was hospitalized due to extremely elevated blood sugars and diabetic coma; after this hospitalization he went to a SNF for therapy and returned home where he functioned independently for some time with the use of a walker and wheel chair for locomotion due to chronic weakness of his legs. Family reports that his debility was progressive over the last 2 years and about 6 weeks ago he fell while transferring himself from his bed to his wheel chair and since this time has been primarily in bed due to fear of falling again; family reports he was receiving assistance at home with private sitters for a few hours during the day.              Scheduled Meds:   aspirin  81 mg Oral Daily    atorvastatin  20 mg Oral Daily    finasteride  5 mg Oral Daily    insulin glargine U-100  7 Units Subcutaneous BID    miconazole NITRATE 2 %   Topical (Top) BID    pantoprazole  40 mg Oral BID    tamsulosin  0.4 mg Oral Daily    zinc oxide   Topical (Top) BID     Continuous Infusions:   D5 and 0.45% NaCl   Intravenous Continuous PRN         PRN Meds:  Current Facility-Administered Medications:     0.9%  NaCl infusion (for blood administration), , Intravenous, Q24H PRN    0.9%  NaCl infusion (for blood administration), , Intravenous, Q24H PRN    0.9% NaCl, , Intravenous, PRN    albuterol-ipratropium, 3 mL, Nebulization, Q6H PRN    dextrose 10%, 12.5 g, Intravenous, PRN    dextrose 10%, 25 g, Intravenous, PRN    D5 and 0.45% NaCl, , Intravenous, Continuous PRN    haloperidol lactate, 1 mg, Intravenous, Q6H PRN    hydrALAZINE, 10 mg, Intravenous, Q4H PRN    insulin aspart U-100, 0-10 Units, Subcutaneous, QID (AC + HS) PRN    loperamide, 2 mg, Oral, QID PRN    magnesium sulfate IVPB, 2 g,  Intravenous, PRN    sodium chloride 0.9%, 10 mL, Intravenous, PRN    sodium phosphate 20.01 mmol in D5W 250 mL IVPB, 20.01 mmol, Intravenous, PRN    Review of patient's allergies indicates:  No Known Allergies     Past Medical History:   Diagnosis Date    Anemia of chronic disease 7/6/2022    Arteriosclerosis of coronary artery 7/6/2022    Cervical myelopathy 7/6/2022    Cobalamin deficiency 7/6/2022    Gastroesophageal reflux disease 7/6/2022    Hypertension 7/6/2022    Low vitamin D level 7/6/2022    Mixed hyperlipidemia 7/6/2022    Paraparesis 7/6/2022    Stage 3a chronic kidney disease 7/6/2022    Type 2 diabetes mellitus 7/6/2022     Past Surgical History:   Procedure Laterality Date    MAGNETIC RESONANCE IMAGING N/A 9/19/2024    Procedure: MRI (Magnetic Resonance Imagine);  Surgeon: David Amin DO;  Location: [facility];  Service: Anesthesiology;  Laterality: N/A;    SPINE SURGERY         Family History    None       Tobacco Use    Smoking status: Never    Smokeless tobacco: Never   Substance and Sexual Activity    Alcohol use: Never    Drug use: Never    Sexual activity: Not on file     Review of Systems   Constitutional:  Positive for activity change, appetite change and fatigue. Negative for chills, diaphoresis and fever.   Skin:  Positive for wound.   Neurological:  Positive for weakness.       Objective:     Vital Signs (Most Recent):  Temp: 98.5 °F (36.9 °C) (09/26/24 1111)  Pulse: 107 (09/26/24 1111)  Resp: 18 (09/26/24 1111)  BP: 102/69 (09/26/24 1111)  SpO2: 98 % (09/26/24 1111) Vital Signs (24h Range):  Temp:  [97.6 °F (36.4 °C)-98.5 °F (36.9 °C)] 98.5 °F (36.9 °C)  Pulse:  [] 107  Resp:  [17-19] 18  SpO2:  [95 %-98 %] 98 %  BP: (102-128)/(68-84) 102/69     Weight: 50 kg (110 lb 3.7 oz)  Body mass index is 18.92 kg/m².     Physical Exam  Vitals reviewed.   Constitutional:       General: He is not in acute distress.     Appearance: He is cachectic. He is ill-appearing (chronic).      Comments:  Thin frail appearing white male; prominent bony landmarks    HENT:      Head: Normocephalic and atraumatic.      Nose: Nose normal.   Cardiovascular:      Rate and Rhythm: Normal rate and regular rhythm.      Pulses: Normal pulses.   Pulmonary:      Effort: Pulmonary effort is normal. No respiratory distress.   Abdominal:      General: Abdomen is flat.      Palpations: Abdomen is soft.   Genitourinary:     Comments: Indwelling colbert catheter to  bag with clear yellow urine   Bowel incontinence   Feet:      Comments: Bilateral heels/feet without redness, breaks or blisters   Skin:     General: Skin is warm and dry.      Capillary Refill: Capillary refill takes less than 2 seconds.             Comments: Shallow ulceration of coccyx mixed with pink tissue and waxy yellow covering, partial thickness loss.  no bogginess or evidence of purulent drainage, no odor. Sakina wound skin without evidence of cellulitis. Small skin breaks inferior to wound appears to be from moisture and stool.    Neurological:      Mental Status: He is alert and oriented to person, place, and time.      Motor: Weakness present.   Psychiatric:         Mood and Affect: Mood normal.         Behavior: Behavior normal. Behavior is cooperative.       Sacrum/coccyx: 3.5 x 2.4 cm              Laboratory:  A1C:   Recent Labs   Lab 04/16/24  2300 07/21/24  2300 09/14/24  0336   HGBA1C 6.1* 6.1* 6.2     BMP:   Recent Labs   Lab 09/24/24  0440      K 3.6      CO2 22*   BUN 26.3*   CREATININE 1.55*   CALCIUM 8.2*   MG 1.70       CBC:   Recent Labs   Lab 09/26/24  0451   WBC 7.84   RBC 3.48*   HGB 10.9*   HCT 33.2*      MCV 95.4*   MCH 31.3*   MCHC 32.8*     CMP:   Recent Labs   Lab 09/23/24  0436 09/24/24  0440   CALCIUM 8.1* 8.2*   ALBUMIN 2.0*  --     139   K 3.6 3.6   CO2 23 22*    107   BUN 25.1 26.3*   CREATININE 1.71* 1.55*   ALKPHOS 49  --    ALT 15  --    AST 15  --    BILITOT 0.8  --        LFTs:   Recent Labs   Lab  09/23/24  0436   ALT 15   AST 15   ALKPHOS 49   BILITOT 0.8   ALBUMIN 2.0*       Microbiology Results (last 7 days)       Procedure Component Value Units Date/Time    Blood Culture [[id number]]  (Normal) Collected: 09/15/24 1527    Order Status: Completed Specimen: Blood from Arm, Right Updated: 09/20/24 1602     Blood Culture No Growth at 5 days    Blood Culture [[id number]]  (Normal) Collected: 09/15/24 1527    Order Status: Completed Specimen: Blood from Arm, Left Updated: 09/20/24 1602     Blood Culture No Growth at 5 days                Diagnostic Results:  I have reviewed all pertinent imaging results/findings within the past 24 hours.    Physical Exam  Assessment/Plan:       Stage 2 pressure ulcer of sacrum/coccyx - hospital acquired  Contact dermatitis from urine and stool  Recent hypoxic respiratory failure requirng mechanical ventilation as well as noninvasive positive pressure ventilation on a separate date.   Septic shock requiring vasopressors as well as transfusion  Right lower lobe pneumonia  UTI - treated  Progressive global weakness - chronic, initially starting around May 2021  Bed/chair bound  Adult failure to thrive  Hypoalbuminemia       PLAN:    Chart reviewed, patient examined and wounds assessed.  Opinion: The patient has had progressive global weakness over the past few years and currently  unable to stand or transfer self, also with incontinence of bowel and bladder. He is cachectic, reports always being a small man but has recently lost significant weight due to illness. He came in with redness to sacrum/buttocks and developed bruising of this area while in ICU; I reviewed images and it appears that the area is evolving and is currently partial thickness skin loss as a shallow crater with half covered with waxy adherent white/yellow covering. No evidence of infection. Will continue current treatment that was initiated by wound nurse and monitor for further deterioration and adjust  treatment as needed.   He is very thin with prominent bony areas, he is at risk for further skin breakdown and deterioration. He will need aggressive offloading and staying off of his coccyx.   Monitor for signs & symptoms of deterioration  Spoke with patient as well as family about how this wound developed and how to prevent further deterioration and healing with methods of offloading using wedge and wearing heel protectors while in bed to prevent breakdown to heels. Also discussed need for adequate nutrition and getting Jayson with his meals. He is currently on a low air loss bed and PUP kit in room; transferred into bedside chair with GeoMat; instructed only to stay up in chair for 1 hour intervals.   Offloading of sacrum/buttocks/heels at all times: ANGELIQUE mattress, turning q 2 hrs; use of wedges and heel offloading devices to be used at all times while in bed; ( TUTU Garcia). This needs to be reinforced by every staff nurse caring for patient on every shift of every day. May still use PT/OT services as long as use of geomat/ROHO on wheelchair seat and small pillow to lower back.  Incontinence: control moisture/wound contamination: No briefs; use things such as purewick or colbert catheter; rectal tube  Nutrition: Recommend aggressive nutritional support, protein supplementation along with vitamin and mineral supplements  and jayson to support wound healing; follow prealbumin, dietitian consults  Diabetes: A1C - good   Will try to follow weekly while admitted, but every nurse assigned to patient on every shift of every day needs to address daily wound care dressing changes and offloading modalities including using heel offloading devices, wedges, ANGELIQUE mattress etc  Discussed with patient and family as well as nurse caring for patient today        The time spent including preparing to see the patient, obtaining patient history and assessment, evaluation of the plan of care, patient/caregiver counseling and education,  orders, documentation, coordination of care, and other professional medical management activities for today's encounter was  75  minutes         Thank you for your consult. I will follow-up with patient. Please contact us if you have any additional questions.    IRLANDA Stewart  Wound Care  [facility] 8th Floor Med S[facility]

## 2024-09-26 NOTE — PLAN OF CARE
Problem: Skin Injury Risk Increased  Goal: Skin Health and Integrity  9/26/2024 0238 by Kathy Gonzalez, RN  Outcome: Progressing  9/26/2024 0235 by Kathy Gonzalez, RN  Outcome: Progressing  Intervention: Optimize Skin Protection  Flowsheets (Taken 9/26/2024 0235)  Pressure Reduction Techniques:   frequent weight shift encouraged   pressure points protected   weight shift assistance provided  Pressure Reduction Devices: specialty bed utilized  Activity Management:   Arm raise - L1   Rolling - L1  Head of Bed (HOB) Positioning: HOB at 20-30 degrees     Problem: Wound  Goal: Skin Health and Integrity  Intervention: Optimize Skin Protection  Flowsheets (Taken 9/26/2024 0235)  Pressure Reduction Techniques:   frequent weight shift encouraged   pressure points protected   weight shift assistance provided  Pressure Reduction Devices: specialty bed utilized  Activity Management:   Arm raise - L1   Rolling - L1  Head of Bed (HOB) Positioning: HOB at 20-30 degrees     Problem: Wound  Goal: Optimal Wound Healing  Intervention: Promote Wound Healing  Flowsheets (Taken 9/26/2024 0235)  Sleep/Rest Enhancement: regular sleep/rest pattern promoted

## 2024-09-26 NOTE — CONSULTS
[facility] - 8th Floor Med Surg  Neurology  Consult Note    Patient Name: Froy Barragan  MRN: [id number]  Admission Date: 9/8/2024  Hospital Length of Stay: 18 days  Code Status: No Order   Attending Provider: Zelalem Jules MD   Consulting Provider: Dinorah Trujillo [facility]  Primary Care Physician: George Bran MD  Principal Problem:<principal problem not specified>    Inpatient consult to Neurology  Consult performed by: Dinorah Trujillo FNP  Consult ordered by: Zelalem Jules MD         Subjective:     Chief Complaint:       HPI:   64-year-old male with HTN, HLD, CAD s/p PCI, BPH with self catheterization, GERD, CKD 3, DM2, paraparesis and wheelchair-bound who presented to ED on 09/08 with complaints of weakness, elevated blood glucose, N/V/D, and UTI.  On admission, pt found to be in acute hypoxic respiratory failure requiring mechanical ventilation, AGMA with ketoacidosis requiring IV insulin for DKA, and urosepsis requiring vasopressor support and broad-spectrum antimicrobials.  Patient eventually hemodynamically stable and was extubated on 09/11.  Once patient stable patient's family at bedside endorsing concern for functional and cognitive decline over the last few weeks.  Strep bedside reports patient lives alone, is able to get around his home with his wheelchair and is able to transfer himself with BUE from wheelchair at baseline.  Reports his siblings visits him daily to help with medication management and meal preparation.    MRI brain w w/o on 9/19 significant for R basal ganglia subacute infarct and encephalomalacia to R occipital lobe.  MRI C-spine w w/o revealed chronic cord signal changes at C4-C6 (also seen on MRI C-spine in 2021), unrevealing for abnormal enhancement or other abnormalities.    MRI T-spine w w/o unrevealing for abnormal enhancement or any other significant findings.    Patient has suffered with paraparesis over the last few years and has been  wheelchair-bound, for which she underwent neurologic evaluation for in 2021, including MRI brain which revealed L middle cerebellar peduncle restricting lesion reflecting acute lacunar infarct versus active demyelinating plaque.       Past Medical History:   Diagnosis Date    Anemia of chronic disease 7/6/2022    Arteriosclerosis of coronary artery 7/6/2022    Cervical myelopathy 7/6/2022    Cobalamin deficiency 7/6/2022    Gastroesophageal reflux disease 7/6/2022    Hypertension 7/6/2022    Low vitamin D level 7/6/2022    Mixed hyperlipidemia 7/6/2022    Paraparesis 7/6/2022    Stage 3a chronic kidney disease 7/6/2022    Type 2 diabetes mellitus 7/6/2022       Past Surgical History:   Procedure Laterality Date    MAGNETIC RESONANCE IMAGING N/A 9/19/2024    Procedure: MRI (Magnetic Resonance Imagine);  Surgeon: David Amin DO;  Location: [facility];  Service: Anesthesiology;  Laterality: N/A;    SPINE SURGERY         Review of patient's allergies indicates:  No Known Allergies    Current Neurological Medications:     No current facility-administered medications on file prior to encounter.     Current Outpatient Medications on File Prior to Encounter   Medication Sig    alcohol swabs (BD ALCOHOL SWABS) PadM Apply 1 each topically once daily.    alfuzosin (UROXATRAL) 10 mg Tb24 TAKE 1 TABLET EVERY DAY WITH BREAKFAST    amLODIPine (NORVASC) 2.5 MG tablet TAKE 1 TABLET ONE TIME DAILY    aspirin (ECOTRIN) 81 MG EC tablet Take 81 mg by mouth once daily.    b complex vitamins capsule Take 1 capsule by mouth once daily.    blood glucose control, low (TRUE METRIX LEVEL 1) Soln 1 Bottle by Misc.(Non-Drug; Combo Route) route once daily.    blood-glucose meter (TRUE METRIX AIR GLUCOSE METER) Misc 1 each by Misc.(Non-Drug; Combo Route) route once daily.    blood-glucose sensor (DEXCOM G7 SENSOR) Carmen 1 each by Misc.(Non-Drug; Combo Route) route once daily.    carvediloL (COREG) 25 MG tablet TAKE 1 TABLET TWICE DAILY     ezetimibe (ZETIA) 10 mg tablet Take 1 tablet (10 mg total) by mouth once daily.    finasteride (PROSCAR) 5 mg tablet Take 1 tablet (5 mg total) by mouth once daily.    glimepiride (AMARYL) 2 MG tablet Take 1 tablet (2 mg total) by mouth before breakfast.    lancets (TRUEPLUS LANCETS) 33 gauge Misc 1 lancet by Misc.(Non-Drug; Combo Route) route once daily.    metFORMIN (GLUCOPHAGE) 1000 MG tablet Take 1 tablet (1,000 mg total) by mouth 2 (two) times a day.    pantoprazole (PROTONIX) 40 MG tablet TAKE 1 TABLET ONE TIME DAILY    rosuvastatin (CRESTOR) 40 MG Tab Take 1 tablet (40 mg total) by mouth once daily.    TRUE METRIX GLUCOSE TEST STRIP Strp TEST BLOOD SUGAR EVERY DAY    vitamin D (VITAMIN D3) 1000 units Tab Take 1,000 Units by mouth once daily.     Family History    None       Tobacco Use    Smoking status: Never    Smokeless tobacco: Never   Substance and Sexual Activity    Alcohol use: Never    Drug use: Never    Sexual activity: Not on file     Review of Systems   Unable to perform ROS: Other     Objective:     Vital Signs (Most Recent):  Temp: 98.5 °F (36.9 °C) (09/26/24 1111)  Pulse: 107 (09/26/24 1111)  Resp: 18 (09/26/24 1111)  BP: 102/69 (09/26/24 1111)  SpO2: 98 % (09/26/24 1111) Vital Signs (24h Range):  Temp:  [97.6 °F (36.4 °C)-98.5 °F (36.9 °C)] 98.5 °F (36.9 °C)  Pulse:  [] 107  Resp:  [17-18] 18  SpO2:  [95 %-98 %] 98 %  BP: (102-128)/(69-84) 102/69     Weight: 50 kg (110 lb 3.7 oz)  Body mass index is 18.92 kg/m².     Physical Exam  Vitals reviewed.   Constitutional:       General: He is awake.      Appearance: He is underweight.   HENT:      Head: Normocephalic and atraumatic.      Nose: Nose normal.      Mouth/Throat:      Mouth: Mucous membranes are moist.      Pharynx: Oropharynx is clear.   Eyes:      Extraocular Movements: Extraocular movements intact and EOM normal.      Pupils: Pupils are equal, round, and reactive to light.   Pulmonary:      Effort: Pulmonary effort is normal.    Skin:     General: Skin is warm and dry.   Neurological:      Mental Status: He is alert.   Psychiatric:         Speech: Speech normal.          NEUROLOGICAL EXAMINATION:     MENTAL STATUS   Oriented to person.   Oriented to place.   Follows 1 step commands.   Attention: decreased. Concentration: decreased.   Speech: speech is normal   Level of consciousness: alert  Knowledge: poor.     CRANIAL NERVES     CN II   Visual fields full to confrontation.     CN III, IV, VI   Pupils are equal, round, and reactive to light.  Extraocular motions are normal.   Nystagmus: none   Conjugate gaze: present    MOTOR EXAM         BUE 3/5, BLE 0/5        Significant Labs:   Recent Lab Results  (Last 5 results in the past 24 hours)        09/26/24  1031   09/26/24  0528   09/26/24  0451   09/25/24  2311   09/25/24  1628        Baso #     0.13           Basophil %     1.7           Eos #     0.22           Eos %     2.8           Hematocrit     33.2           Hemoglobin     10.9           Immature Grans (Abs)     0.04           Immature Granulocytes     0.5           Lymph #     0.77           LYMPH %     9.8           MCH     31.3           MCHC     32.8           MCV     95.4           Mono #     0.95           Mono %     12.1           MPV     10.3           Neut #     5.73           Neut %     73.1           nRBC     0.0           Platelet Count     384           POCT Glucose 287   84     221   185       RBC     3.48           RDW     16.3           WBC     7.84                                  Significant Imaging:  MRI brain w w/o:  FINDINGS:  There is subacute appearing restricted diffusion in the right basal ganglia measuring 1.4 cm in size.  There is encephalomalacia in the right occipital lobe.  Mild patchy T2/FLAIR hyperintensities in the subcortical and periventricular white matter likely represent chronic microvascular ischemic changes.     There is no mass effect or midline shift.  The basal cisterns are patent.  There  is mild diffuse parenchymal volume loss.  There is no hydrocephalus or abnormal extra-axial fluid collection.  The right vertebral artery flow void is abnormal.  The major intracranial flow voids are otherwise patent.     Impression:     1. Subacute ischemic changes in the right basal ganglia.  2. Mild chronic microvascular ischemic changes.     MRI c-spine w w/o:  Impression:     1. No evidence of active demyelination.  2. Mild degenerative narrowing spinal canal at C3-C5.  3. Multilevel neural foraminal stenoses as described.  4. Chronic cord signal changes at C4-C6.     MRI t-spine w w/o:  Impression:     1. No definite cord signal abnormality.  No evidence of active demyelination.  2. Mild degenerative narrowing of the spinal canal at T5-T6 and T7-T9.        I have reviewed all pertinent imaging results/findings within the past 24 hours.  Assessment and Plan:     Paraparesis  MRI brain w/o in 2021: L middle cerebellar peduncle restricting lesion concerning for acute infarct versus demyelinating plaque  MRI brain w w/o: subacute R basal ganglia infarct and encephalomalacia to R occipital lobe  MRI C-spine w w/o:  Chronic cord signal changes at C4-C6    -continue therapy efforts  -agree with placement in a facility following discharge  -ASA 81 daily and atorvastatin 20 mg daily          VTE Risk Mitigation (From admission, onward)           Ordered     IP VTE LOW RISK PATIENT  Once         09/09/24 0224     Place sequential compression device  Until discontinued         09/08/24 2032                    Thank you for your consult.  Further recommendations to follow per MD Dinorah Trujillo, [facility]  Inpatient Neurology  [facility] - 8th Floor Med Surg

## 2024-09-26 NOTE — NURSING
Nurses Note -- 4 Eyes      9/26/2024   2:34 AM      Skin assessed during: Q Shift Change      [] No Altered Skin Integrity Present    []Prevention Measures Documented      [x] Yes- Altered Skin Integrity Present or Discovered   [] LDA Added if Not in Epic (Describe Wound)   [] New Altered Skin Integrity was Present on Admit and Documented in LDA   [] Wound Image Taken    Wound Care Consulted? Yes    Attending Nurse:  Kathy Gonzalez RN    Second RN/Staff Member:  PJ Ramos

## 2024-09-27 LAB
BASOPHILS # BLD AUTO: 0.1 X10(3)/MCL
BASOPHILS NFR BLD AUTO: 1.4 %
EOSINOPHIL # BLD AUTO: 0.19 X10(3)/MCL (ref 0–0.9)
EOSINOPHIL NFR BLD AUTO: 2.7 %
ERYTHROCYTE [DISTWIDTH] IN BLOOD BY AUTOMATED COUNT: 16.3 % (ref 11.5–17)
HCT VFR BLD AUTO: 33.7 % (ref 42–52)
HGB BLD-MCNC: 10.7 G/DL (ref 14–18)
IMM GRANULOCYTES # BLD AUTO: 0.05 X10(3)/MCL (ref 0–0.04)
IMM GRANULOCYTES NFR BLD AUTO: 0.7 %
LYMPHOCYTES # BLD AUTO: 0.86 X10(3)/MCL (ref 0.6–4.6)
LYMPHOCYTES NFR BLD AUTO: 12.2 %
MCH RBC QN AUTO: 30.6 PG (ref 27–31)
MCHC RBC AUTO-ENTMCNC: 31.8 G/DL (ref 33–36)
MCV RBC AUTO: 96.3 FL (ref 80–94)
MONOCYTES # BLD AUTO: 0.8 X10(3)/MCL (ref 0.1–1.3)
MONOCYTES NFR BLD AUTO: 11.4 %
NEUTROPHILS # BLD AUTO: 5.04 X10(3)/MCL (ref 2.1–9.2)
NEUTROPHILS NFR BLD AUTO: 71.6 %
NRBC BLD AUTO-RTO: 0 %
PLATELET # BLD AUTO: 474 X10(3)/MCL (ref 130–400)
PMV BLD AUTO: 10.4 FL (ref 7.4–10.4)
POCT GLUCOSE: 113 MG/DL (ref 70–110)
POCT GLUCOSE: 137 MG/DL (ref 70–110)
POCT GLUCOSE: 186 MG/DL (ref 70–110)
POCT GLUCOSE: 200 MG/DL (ref 70–110)
POCT GLUCOSE: 228 MG/DL (ref 70–110)
RBC # BLD AUTO: 3.5 X10(6)/MCL (ref 4.7–6.1)
WBC # BLD AUTO: 7.04 X10(3)/MCL (ref 4.5–11.5)

## 2024-09-27 PROCEDURE — 63600175 PHARM REV CODE 636 W HCPCS

## 2024-09-27 PROCEDURE — 25000003 PHARM REV CODE 250

## 2024-09-27 PROCEDURE — 25000003 PHARM REV CODE 250: Performed by: INTERNAL MEDICINE

## 2024-09-27 PROCEDURE — 85025 COMPLETE CBC W/AUTO DIFF WBC: CPT

## 2024-09-27 PROCEDURE — 99900031 HC PATIENT EDUCATION (STAT)

## 2024-09-27 PROCEDURE — 36415 COLL VENOUS BLD VENIPUNCTURE: CPT

## 2024-09-27 PROCEDURE — 21400001 HC TELEMETRY ROOM

## 2024-09-27 PROCEDURE — 94799 UNLISTED PULMONARY SVC/PX: CPT

## 2024-09-27 PROCEDURE — 99233 SBSQ HOSP IP/OBS HIGH 50: CPT | Mod: ,,,

## 2024-09-27 PROCEDURE — 99221 1ST HOSP IP/OBS SF/LOW 40: CPT | Mod: ,,, | Performed by: NURSE PRACTITIONER

## 2024-09-27 PROCEDURE — 99900035 HC TECH TIME PER 15 MIN (STAT)

## 2024-09-27 PROCEDURE — 63600175 PHARM REV CODE 636 W HCPCS: Performed by: INTERNAL MEDICINE

## 2024-09-27 RX ADMIN — INSULIN ASPART 2 UNITS: 100 INJECTION, SOLUTION INTRAVENOUS; SUBCUTANEOUS at 05:09

## 2024-09-27 RX ADMIN — RUGBY ZINC OXIDE 20%: 20 OINTMENT TOPICAL at 10:09

## 2024-09-27 RX ADMIN — MICONAZOLE NITRATE 2 % TOPICAL POWDER: at 10:09

## 2024-09-27 RX ADMIN — INSULIN ASPART 4 UNITS: 100 INJECTION, SOLUTION INTRAVENOUS; SUBCUTANEOUS at 11:09

## 2024-09-27 RX ADMIN — FINASTERIDE 5 MG: 5 TABLET, FILM COATED ORAL at 09:09

## 2024-09-27 RX ADMIN — ATORVASTATIN CALCIUM 20 MG: 10 TABLET, FILM COATED ORAL at 09:09

## 2024-09-27 RX ADMIN — PANTOPRAZOLE SODIUM 40 MG: 40 TABLET, DELAYED RELEASE ORAL at 09:09

## 2024-09-27 RX ADMIN — TAMSULOSIN HYDROCHLORIDE 0.4 MG: 0.4 CAPSULE ORAL at 09:09

## 2024-09-27 RX ADMIN — PANTOPRAZOLE SODIUM 40 MG: 40 TABLET, DELAYED RELEASE ORAL at 10:09

## 2024-09-27 RX ADMIN — INSULIN GLARGINE 10 UNITS: 100 INJECTION, SOLUTION SUBCUTANEOUS at 09:09

## 2024-09-27 RX ADMIN — INSULIN GLARGINE 10 UNITS: 100 INJECTION, SOLUTION SUBCUTANEOUS at 10:09

## 2024-09-27 RX ADMIN — RIVAROXABAN 10 MG: 10 TABLET, FILM COATED ORAL at 05:09

## 2024-09-27 RX ADMIN — ASPIRIN 81 MG CHEWABLE TABLET 81 MG: 81 TABLET CHEWABLE at 10:09

## 2024-09-27 NOTE — PLAN OF CARE
[facility] sent clinical updates via [facility] to :     [place] [facility] [place]  [facility]

## 2024-09-27 NOTE — CONSULTS
Inpatient consult to Palliative Care  Consult performed by: Clara Yang FNP  Consult ordered by: Zelalem Jules MD      Patient Name: Froy Barragan   MRN: [id number]   Admission Date: 9/8/2024   Hospital Length of Stay: 19   Attending Provider: Zelalem Jules MD   Consulting Provider: Clara FOURNIER  Reason for Consult: Goals of Care  Primary Care Physician: George Bran MD     Principal Problem: <principal problem not specified>     Patient information was obtained from patient and ER records.      Final diagnoses:  [E11.10] Diabetic ketoacidosis without coma associated with type 2 diabetes mellitus (Primary)  [I95.9] Hypotension, unspecified hypotension type  [N17.9] Acute kidney injury  [E86.0] Acute dehydration  [R73.9] Hyperglycemia     Assessment/Plan:     I reviewed the patient and family's understanding of the seriousness of the illness and its expected prognosis. We discussed the patient's goals of care and treatment preferences.  I clarified current code status. I identified the surrogate decision maker or health care POA.  I answered all questions and we formulated a plan including recommendations for symptom management and how to best achieve goals of care.  Advance Care Planning     Date: 09/27/2024    [facility]  I engaged the patient in a voluntary conversation about advance care planning and we specifically addressed what the goals of care would be moving forward, in light of the patient's change in clinical status, specifically current condition.  We did specifically address the patient's likely prognosis, which is fair .  We explored the patient's values and preferences for future care.  The patient endorses that what is most important right now is to focus on curative/life-prolongation (regardless of treatment burdens)    Accordingly, we have decided that the best plan to meet the patient's goals includes continuing with treatment             Met with  patient-introduced service and discussed patient's history and current condition.  Patient states that he has been bed-bound for about 5 months and that his father and his uncle had a neurodegenerative disease.  States that he lives alone and gets most of his medical assistance from his family.    Discussed AD to which his sister has POA but is not sure whether he has completed a LW.  Discussed the importance of the medical team understanding with the patient's medical wishes would be in the event of an emergency.  Seems somewhat confused and reports that his goal is to return to his room where he would like to recover.  Offered support and informed that Palliative Medicine would continue to follow.         History of Present Illness:     Patient is a 64-year-old male with a HTN, HLD, CAD s/p PCI, BPH on intermittent self catheterization, chronic diastolic heart failure, progressive physical decline with global weakness, who initially presented with hyperglycemia, UTI, DWAINE, chest x-ray concerning for right-sided pneumonia with possible aspiration and was initially admitted with septic shock requiring vasopressors and intubated due to respiratory failure.  Eventually patient was weaned from ventilator and transferred to Hospital Medicine Services he was initiated on tube feeds and then cleared for oral intake by speech therapy.  Antibiotics were initially deescalated and secondary to worsening leukocytosis were again escalated to include Zosyn, azithromycin with imaging demonstrating right-sided large pneumonia possible empyema for which Pulmonary was consulted.    Patient experienced decompensation again on 09/15/2024 becoming hypotensive and hypoxic with high suspicion for pulmonary emboli on full-dose anticoagulation.  Patient was transferred to ICU and placed on vasopressors and BiPAP.  Chest x-rays once again revealed right lower lobe consolidation concerning for empyema on Zosyn.  CTA of the  chest/abdomen/pelvis revealed ill-defined area of bilateral intramuscular hemorrhage in the psoas muscles for which patient received 2 units PRBCs.  Neurology recommended MRI of the brain and whole spine secondary to patient's history of chronic paraplegia/wheelchair-bound status over the past few years but patient refused MRI.  Patient eventually agree to have MRI done under anesthesia and was once again transferred to Hospital Medicine Services on 09/20.  Case management is working on skilled placement.  Palliative Medicine consulted to discuss code status and goals of care.        Active Ambulatory Problems     Diagnosis Date Noted    Anemia of chronic disease 07/06/2022    Arteriosclerosis of coronary artery 07/06/2022    Cervical myelopathy 07/06/2022    Cobalamin deficiency 07/06/2022    Gastroesophageal reflux disease 07/06/2022    Hypertension 07/06/2022    Low vitamin D level 07/06/2022    Mixed hyperlipidemia 07/06/2022    Paraparesis 07/06/2022    Stage 3a chronic kidney disease 07/06/2022    Type 2 diabetes mellitus 07/06/2022    Other cardiomyopathy 07/06/2022    Urinary retention 07/25/2022    H/O: stroke 07/25/2022    Lumbar radicular pain 07/25/2022    Clonus 07/25/2022     Resolved Ambulatory Problems     Diagnosis Date Noted    Wellness examination 10/20/2022     No Additional Past Medical History        Past Surgical History:   Procedure Laterality Date    MAGNETIC RESONANCE IMAGING N/A 9/19/2024    Procedure: MRI (Magnetic Resonance Imagine);  Surgeon: David Amin DO;  Location: [facility];  Service: Anesthesiology;  Laterality: N/A;    SPINE SURGERY          Review of patient's allergies indicates:  No Known Allergies       Current Facility-Administered Medications:     0.9%  NaCl infusion (for blood administration), , Intravenous, Q24H PRN, Marianela Gaona MD    0.9%  NaCl infusion (for blood administration), , Intravenous, Q24H PRN, Sanjuanita Conrad MD    0.9%  NaCl infusion, , Intravenous, PRN,  Hayden Quintanilla MD, Last Rate: 5 mL/hr at 09/19/24 0548, Rate Verify at 09/19/24 0548    albuterol-ipratropium 2.5 mg-0.5 mg/3 mL nebulizer solution 3 mL, 3 mL, Nebulization, Q6H PRN, Hayden Quintanilla MD    aspirin chewable tablet 81 mg, 81 mg, Oral, Daily, Jose E Tristan DO, 81 mg at 09/27/24 1023    atorvastatin tablet 20 mg, 20 mg, Oral, Daily, Debbie Trotter MD, 20 mg at 09/27/24 0956    dextrose 10% bolus 125 mL 125 mL, 12.5 g, Intravenous, PRN, Jose Vo MD    dextrose 10% bolus 250 mL 250 mL, 25 g, Intravenous, PRN, Jose Vo MD    dextrose 5 % and 0.45 % NaCl infusion, , Intravenous, Continuous PRN, Anthony Cochran MD    finasteride tablet 5 mg, 5 mg, Oral, Daily, Debbie Trotter MD, 5 mg at 09/27/24 0956    haloperidol lactate injection 1 mg, 1 mg, Intravenous, Q6H PRN, Jose E Tristan DO, 1 mg at 09/11/24 2056    hydrALAZINE injection 10 mg, 10 mg, Intravenous, Q4H PRN, Jessica Martinez, FNP, 10 mg at 09/21/24 0541    insulin aspart U-100 injection 0-10 Units, 0-10 Units, Subcutaneous, QID (AC + HS) PRN, Jake Lugo MD, 4 Units at 09/27/24 1135    insulin glargine U-100 (Lantus) injection 10 Units, 10 Units, Subcutaneous, BID, Zelalem Jules MD, 10 Units at 09/27/24 0957    loperamide capsule 2 mg, 2 mg, Oral, QID PRN, Frank Awad MD, 2 mg at 09/13/24 2158    magnesium sulfate 2g in water 50mL IVPB (premix), 2 g, Intravenous, PRN, Jose E Tristan DO, Stopped at 09/16/24 0041    miconazole NITRATE 2 % top powder, , Topical (Top), BID, Zelalem Jules MD, Given at 09/27/24 1002    pantoprazole EC tablet 40 mg, 40 mg, Oral, BID, Alexis Orozco MD, 40 mg at 09/27/24 0956    rivaroxaban tablet 10 mg, 10 mg, Oral, Daily with dinner, Zelalem Jules MD    sodium chloride 0.9% flush 10 mL, 10 mL, Intravenous, PRN, Anthony Cochran MD    sodium phosphate 20.01 mmol in D5W 250 mL IVPB, 20.01 mmol, Intravenous, PRN, Jose E Tristan DO    tamsulosin 24 hr  "capsule 0.4 mg, 0.4 mg, Oral, Daily, Debbie Trotter MD, 0.4 mg at 09/27/24 0956    zinc oxide 20 % ointment, , Topical (Top), BID, Hayden Quintanilla MD, Given at 09/27/24 1024       Current Facility-Administered Medications:     0.9%  NaCl infusion (for blood administration), , Intravenous, Q24H PRN    0.9%  NaCl infusion (for blood administration), , Intravenous, Q24H PRN    0.9% NaCl, , Intravenous, PRN    albuterol-ipratropium, 3 mL, Nebulization, Q6H PRN    dextrose 10%, 12.5 g, Intravenous, PRN    dextrose 10%, 25 g, Intravenous, PRN    D5 and 0.45% NaCl, , Intravenous, Continuous PRN    haloperidol lactate, 1 mg, Intravenous, Q6H PRN    hydrALAZINE, 10 mg, Intravenous, Q4H PRN    insulin aspart U-100, 0-10 Units, Subcutaneous, QID (AC + HS) PRN    loperamide, 2 mg, Oral, QID PRN    magnesium sulfate IVPB, 2 g, Intravenous, PRN    sodium chloride 0.9%, 10 mL, Intravenous, PRN    sodium phosphate 20.01 mmol in D5W 250 mL IVPB, 20.01 mmol, Intravenous, PRN     No family history on file.     Review of Systems   Constitutional:  Positive for activity change and appetite change.   Neurological:  Positive for weakness.            Objective:   BP 95/65   Pulse 102   Temp 98.1 °F (36.7 °C) (Oral)   Resp 18   Ht 5' 4" (1.626 m)   Wt 50 kg (110 lb 3.7 oz)   SpO2 97%   BMI 18.92 kg/m²      Physical Exam  Constitutional:       Appearance: He is ill-appearing.   HENT:      Head: Normocephalic.   Eyes:      Pupils: Pupils are equal, round, and reactive to light.   Cardiovascular:      Rate and Rhythm: Normal rate.   Pulmonary:      Effort: Pulmonary effort is normal.   Abdominal:      Palpations: Abdomen is soft.   Musculoskeletal:      Comments: Paraplegia   Skin:     Coloration: Skin is pale.   Neurological:      Comments: Person and place             Review of Symptoms      Symptom Assessment (ESAS 0-10 Scale)  Pain:  0  Dyspnea:  0  Anxiety:  0  Nausea:  0  Depression:  0  Anorexia:  0  Fatigue:  0  Insomnia:  " 0  Restlessness:  0  Agitation:  0         Bowel Management Plan (BMP):  Yes        Advance Care Planning   Advance Directives:     Decision Making:  Patient answered questions  Goals of Care: The patient endorses that what is most important right now is to focus on curative/life-prolongation (regardless of treatment burdens)    Accordingly, we have decided that the best plan to meet the patient's goals includes continuing with treatment          PAINAD: NA    Caregiver burden formerly assessed: Yes and No        > 50% of 45 min of encounter was spent in chart review, face to face discussion of goals of care, symptom assessment, coordination of care and emotional support.       Clara Yang FNP, [facility]  Palliative Medicine  [facility]

## 2024-09-27 NOTE — PLAN OF CARE
Cece Ramirez contacted patient's sister Chantal. Chantal will make an appointment today to [facility]. She will contact  with her thoughts regarding Cece.

## 2024-09-27 NOTE — NURSING
Nurses Note -- 4 Eyes      9/27/2024   8:05 AM      Skin assessed during: Q Shift Change      [] No Altered Skin Integrity Present    []Prevention Measures Documented      [x] Yes- Altered Skin Integrity Present or Discovered   [] LDA Added if Not in Epic (Describe Wound)   [] New Altered Skin Integrity was Present on Admit and Documented in LDA   [] Wound Image Taken    Wound Care Consulted? Yes      Attending Nurse:  Sintia Campo RN/Staff Member:

## 2024-09-27 NOTE — PROGRESS NOTES
[facility] Hospital Medicine Progress Note        Chief Complaint: Inpatient Follow-up for weakness     HPI:    64-year-old male with HTN, HLD, CAD status post PCI, BPH on intermittent self catheterization, chronic diastolic heart failure, progressive physical decline with global weakness, initially presented with hyperglycemia, UTI, DWAINE, chest x-ray concerning for right-sided pneumonia with possible aspiration.  Admitted with septic shock that required vasopressors.  Because of respiratory failure he was intubated and mechanically ventilated.  Eventually, vasopressors were discontinued, the patient was extubated.  Urine cultures demonstrated Klebsiella, Providencia.  The patient was started on NG tube feeds.  He was cleared for oral intake by speech.  The antibiotics were initially deescalated and secondary to worsening leukocytosis were again escalated to include Zosyn, azithromycin.  Imaging demonstrated right-sided large pneumonia, possible empyema.  Pulmonary were consulted.  Because of progressive global weakness, Neurology were consulted who recommended an MRI of the brain and MRI of the entire spine.  Because a need for anesthesia, this has been deferred until 09/16/2024.  The patient did require a brief increase of his supplemental oxygenation given hypoxia in the 80s.  Chest x-ray was unremarkable.  Because of worsening D-dimer, a V/Q scan was ordered      Patient completely decompensated on 09/15/2024.  Patient remained hypotensive, hypoxic with a high suspicions for pulmonary emboli on full-dose anticoagulation.  Patient had to be transferred to ICU for respiratory decompensation, hypotension not responding to IV boluses requiring vasopressors.  In ICU he was placed on BiPAP/Levophed/isotonic crystalloid hydration and patient was kept on current IV antibiotic regimen.  Chest x-rays done in ICU with a right lower lobe consolidation/effusion concerning for empyema on Zosyn.   Decision was done to do a CTA of the chest.  CT chest abdomen with suspected ill-defined areas of bilateral intramuscular hemorrhage in the psoas muscles.  At that time anticoagulation was placed on hold.  Patient required 2 units PRBC and was able to wean down BiPAP to 8 L Oxymizer.  Neurology wanted MRI brain and whole spine secondary to patient's history of chronic paraplegia/wheelchair-bound over the past few years but patient refused MRIs.  Patient was kept on supplemental oxygen during the day and BiPAP at night.  Eventually patient agreed to have some MRIs done under anesthesia.  At this moment patient has been downgraded again to hospitalist services on 9/20.  Completed Zosyn on 09/23.  Weaned to room air.  Case management consulted for SNF placement     MRI C- spine   Impression:  1. No evidence of active demyelination.  2. Mild degenerative narrowing spinal canal at C3-C5.  3. Multilevel neural foraminal stenoses as described.  4. Chronic cord signal changes at C4-C6.    MRI brain   Impression:  1. Subacute ischemic changes in the right basal ganglia.  2. Mild chronic microvascular ischemic changes.    Patient had persistent griselda LE weakness and contractures. Palliative care team consulted.        Interval Hx:   Patient today awake and comfortable. Wants someone to help him stretch his legs. PT consulted and will be working with patient.     No family at bedside.     Case was discussed with patient's nurse and  on the floor.    Objective/physical exam:  General: In no acute distress, frail. Generalized muscle loss  Chest: Clear to auscultation bilaterally  Heart: RRR, +S1, S2, no appreciable murmur  Abdomen: Soft, nontender, BS +  Neurologic: Cranial nerve II-XII intact, Strength 4/5 griselda UE and 1/5 griselda LE     VITAL SIGNS: 24 HRS MIN & MAX LAST   Temp  Min: 97.5 °F (36.4 °C)  Max: 98.5 °F (36.9 °C) 97.5 °F (36.4 °C)   BP  Min: 102/71  Max: 118/72 108/74   Pulse  Min: 96  Max: 119  109   Resp   Min: 18  Max: 18 18   SpO2  Min: 93 %  Max: 99 % (!) 93 %     I have reviewed the following labs:  Recent Labs   Lab 09/25/24  0500 09/26/24  0451 09/27/24  0407   WBC 7.59 7.84 7.04   RBC 3.36* 3.48* 3.50*   HGB 10.4* 10.9* 10.7*   HCT 32.0* 33.2* 33.7*   MCV 95.2* 95.4* 96.3*   MCH 31.0 31.3* 30.6   MCHC 32.5* 32.8* 31.8*   RDW 16.1 16.3 16.3    384 474*   MPV 10.6* 10.3 10.4     Recent Labs   Lab 09/21/24  0521 09/22/24  0524 09/23/24  0436 09/24/24  0440    138 138 139   K 3.7 4.0 3.6 3.6    104 104 107   CO2 25 25 23 22*   BUN 23.2 25.4 25.1 26.3*   CREATININE 1.50* 1.57* 1.71* 1.55*   CALCIUM 7.9* 8.0* 8.1* 8.2*   MG 1.30* 1.90 1.60 1.70   ALBUMIN 2.0* 2.0* 2.0*  --    ALKPHOS 47 50 49  --    ALT 16 15 15  --    AST 14 12 15  --    BILITOT 0.7 0.6 0.8  --      Microbiology Results (last 7 days)       Procedure Component Value Units Date/Time    Blood Culture [[id number]]  (Normal) Collected: 09/15/24 1527    Order Status: Completed Specimen: Blood from Arm, Right Updated: 09/20/24 1602     Blood Culture No Growth at 5 days    Blood Culture [[id number]]  (Normal) Collected: 09/15/24 1527    Order Status: Completed Specimen: Blood from Arm, Left Updated: 09/20/24 1602     Blood Culture No Growth at 5 days             See below for Radiology    Assessment/Plan:  [place] LE weakness with contractures  Subacute right basal ganglia infarct on aspirin 81 mg  Type 2 diabetes mellitus, non insulin with hyperglycemia  Acute kidney injury superimposed on chronic kidney disease stage 3  Progressive global weakness   Essential hypertension   Mixed hyperlipidemia   Previous history of CAD status post PCI     Chronic heart failure with a preserved ejection fraction, euvolemic  Symptomatic anemia requiring transfusion PRBC: resolved   Septic shock secondary to UTI with Klebsiella and Providencia treated appropriately with Zosyn: resolved   Acute hypoxic respiratory failure requiring noninvasive positive  pressure ventilation: resolved   Septic shock requiring vasopressors as well as transfusion PRBC: resolved   Right lower lobe pneumonia, likely aspiration : resolved   Duodenitis : resolved     Plan:  Patient looks comfortable  Will consult palliative care team to address goals of care   He has been afebrile.   Continue OT/PT    Continue strict aspiration, fall and decubitus precautions     Lab as needed     VTE prophylaxis: Xarelto     Patient condition:  Fair     Anticipated discharge and Disposition:   [facility]      All diagnosis and differential diagnosis have been reviewed; assessment and plan has been documented; I have personally reviewed the labs and test results that are presently available; I have reviewed the patients medication list; I have reviewed the consulting providers response and recommendations. I have reviewed or attempted to review medical records based upon their availability    All of the patient's questions have been  addressed and answered. Patient's is agreeable to the above stated plan. I will continue to monitor closely and make adjustments to medical management as needed.    Portions of this note dictated using EMR integrated voice recognition software, and may be subject to voice recognition errors not corrected at proofreading. Please contact writer for clarification if needed.   _____________________________________________________________________    Malnutrition Status:    Scheduled Med:   aspirin  81 mg Oral Daily    atorvastatin  20 mg Oral Daily    finasteride  5 mg Oral Daily    insulin glargine U-100  10 Units Subcutaneous BID    miconazole NITRATE 2 %   Topical (Top) BID    pantoprazole  40 mg Oral BID    rivaroxaban  10 mg Oral Daily with dinner    tamsulosin  0.4 mg Oral Daily    zinc oxide   Topical (Top) BID      Continuous Infusions:   D5 and 0.45% NaCl   Intravenous Continuous PRN          PRN Meds:    Current Facility-Administered Medications:     0.9%  NaCl infusion (for  blood administration), , Intravenous, Q24H PRN    0.9%  NaCl infusion (for blood administration), , Intravenous, Q24H PRN    0.9% NaCl, , Intravenous, PRN    albuterol-ipratropium, 3 mL, Nebulization, Q6H PRN    dextrose 10%, 12.5 g, Intravenous, PRN    dextrose 10%, 25 g, Intravenous, PRN    D5 and 0.45% NaCl, , Intravenous, Continuous PRN    haloperidol lactate, 1 mg, Intravenous, Q6H PRN    hydrALAZINE, 10 mg, Intravenous, Q4H PRN    insulin aspart U-100, 0-10 Units, Subcutaneous, QID (AC + HS) PRN    loperamide, 2 mg, Oral, QID PRN    magnesium sulfate IVPB, 2 g, Intravenous, PRN    sodium chloride 0.9%, 10 mL, Intravenous, PRN    sodium phosphate 20.01 mmol in D5W 250 mL IVPB, 20.01 mmol, Intravenous, PRN     Radiology:  I have personally reviewed the following imaging and agree with the radiologist.     CV Ultrasound Bilateral Doppler Carotid  Technically difficult study due to excessive movements.   The right internal carotid artery is patent with less than 50% stenosis.   The left internal carotid artery is patent with less than 50% stenosis.   The right vertebral artery was not well visualized.  The left vertebral artery were patent with antegrade flow.      Zelalem Jules MD  Department of Hospital Medicine   [facility]   09/27/2024

## 2024-09-27 NOTE — PROGRESS NOTES
[facility] - 8th Floor Med Surg  Wound Care  Progress Note    Patient Name: Froy Barragan  MRN: [id number]  Admission Date: 9/8/2024  Hospital Length of Stay: 19 days  Attending Physician: Zelalem Jules MD  Primary Care Provider: George Bran MD     Subjective:     HPI:  Wound medicine re-check    The patient is a 64 year old WM brought into [facility] ED from home on 9/8/24 with N/V/D x 1 week and progressive weakness, he  was admitted to ICU for acute hypoxic respiratory failure with right basilar pneumonia and developing progressive shock ultimately requiring endotracheal intubation, vasopressors and steroid administration. In the ED his blood glucose was >500.   His PMHx significant for HTN, HLD, CAD s/p PCI, BPH with history of self catheterization, and HFpEF.   Urine cultures demonstrated Klebsiella and Providencia, received Zosyn and azithromycin  Downgraded to HM on 9/12/24  Patient decompensated on 9/15/24 and was transferred back to ICU, for respiratory decompensation, hypotension not responding to IV boluses and required vasopressors. He was placed on BiPAP.   Downgraded to HM again on 9/20/24 and zosyn completed on 9/23/24. Weaned to room air.     Patient and family provide history, reports that patient was living alone with normal function until May 2021 when he was hospitalized due to extremely elevated blood sugars and diabetic coma; after this hospitalization he went to a SNF for therapy and returned home where he functioned independently for some time with the use of a walker and wheel chair for locomotion due to chronic weakness of his legs. Family reports that his debility was progressive over the last 2 years and about 6 weeks ago he fell while transferring himself from his bed to his wheel chair and since this time has been primarily in bed due to fear of falling again; family reports he was receiving assistance at home with private sitters for a few hours during the  day.    Images were reviewed in EPIC, patient came in with redness to sacrum/buttocks but not noted with any skin breaks. He was being followed by inpatient wound nurse team and being treated for moisture associated skin breakdown as well as candidal skin infection. Wound medicine has been consulted for evaluation of a wound of the sacral region that is concerning for deterioration.   Initial visit done on 9/26/24; patient with shallow crater over coccyx with mixed pink tissue and yellow waxy covering. No infection; local wound care initiated.   9/27/24 - wound re-check today. Met patient in room 804, CNA in room with patient repositioning for lunch. Agreeable for wound assessment at this time. No acute distress, no new concerns or complaints.               Hospital Course:   No notes on file      Follow-up For: Procedure(s) (LRB):  MRI (Magnetic Resonance Imagine) (N/A)    Post-Operative Day: 8 Days Post-Op    Scheduled Meds:   aspirin  81 mg Oral Daily    atorvastatin  20 mg Oral Daily    finasteride  5 mg Oral Daily    insulin glargine U-100  10 Units Subcutaneous BID    miconazole NITRATE 2 %   Topical (Top) BID    pantoprazole  40 mg Oral BID    rivaroxaban  10 mg Oral Daily with dinner    tamsulosin  0.4 mg Oral Daily    zinc oxide   Topical (Top) BID     Continuous Infusions:   D5 and 0.45% NaCl   Intravenous Continuous PRN         PRN Meds:  Current Facility-Administered Medications:     0.9%  NaCl infusion (for blood administration), , Intravenous, Q24H PRN    0.9%  NaCl infusion (for blood administration), , Intravenous, Q24H PRN    0.9% NaCl, , Intravenous, PRN    albuterol-ipratropium, 3 mL, Nebulization, Q6H PRN    dextrose 10%, 12.5 g, Intravenous, PRN    dextrose 10%, 25 g, Intravenous, PRN    D5 and 0.45% NaCl, , Intravenous, Continuous PRN    haloperidol lactate, 1 mg, Intravenous, Q6H PRN    hydrALAZINE, 10 mg, Intravenous, Q4H PRN    insulin aspart U-100, 0-10 Units, Subcutaneous, QID (AC + HS)  PRN    loperamide, 2 mg, Oral, QID PRN    magnesium sulfate IVPB, 2 g, Intravenous, PRN    sodium chloride 0.9%, 10 mL, Intravenous, PRN    sodium phosphate 20.01 mmol in D5W 250 mL IVPB, 20.01 mmol, Intravenous, PRN    Review of Systems   Constitutional:  Positive for activity change and fatigue. Negative for chills, diaphoresis and fever.   Skin:  Positive for wound.   Neurological:  Positive for weakness.     Objective:     Vital Signs (Most Recent):  Temp: 98.1 °F (36.7 °C) (09/27/24 1145)  Pulse: 102 (09/27/24 1145)  Resp: 18 (09/27/24 1145)  BP: 95/65 (09/27/24 1145)  SpO2: 97 % (09/27/24 1145) Vital Signs (24h Range):  Temp:  [97.5 °F (36.4 °C)-98.1 °F (36.7 °C)] 98.1 °F (36.7 °C)  Pulse:  [] 102  Resp:  [18] 18  SpO2:  [93 %-99 %] 97 %  BP: ()/(65-74) 95/65     Weight: 50 kg (110 lb 3.7 oz)  Body mass index is 18.92 kg/m².     Physical Exam  Vitals reviewed.   Constitutional:       General: He is not in acute distress.     Appearance: He is cachectic. He is ill-appearing (chronic).      Comments: Thin frail appearing white male; prominent bony landmarks    HENT:      Head: Normocephalic and atraumatic.      Nose: Nose normal.   Cardiovascular:      Rate and Rhythm: Normal rate and regular rhythm.      Pulses: Normal pulses.   Pulmonary:      Effort: Pulmonary effort is normal. No respiratory distress.   Abdominal:      General: Abdomen is flat.      Palpations: Abdomen is soft.   Genitourinary:     Comments: Indwelling colbert catheter to  bag with clear yellow urine   Bowel incontinence   Feet:      Comments: Bilateral heels/feet without redness, breaks or blisters   Skin:     General: Skin is warm and dry.      Capillary Refill: Capillary refill takes less than 2 seconds.             Comments: Shallow ulceration of coccyx mixed with pink tissue and dry waxy yellow covering, partial thickness loss evident.  no bogginess or evidence of purulent drainage, no odor. Sakina wound skin without evidence  of cellulitis. Small skin breaks inferior to wound healing.    Neurological:      Mental Status: He is alert.      Motor: Weakness present.   Psychiatric:         Mood and Affect: Affect is flat.         Behavior: Behavior is cooperative.      Comments: Not overly interactive during visit, very short when responding to questions. Unable to determine orientation.        Sacrum/coccyx: 2.8 x 2 cm            Laboratory:  A1C:   Recent Labs   Lab 04/16/24  2300 07/21/24  2300 09/14/24  0336   HGBA1C 6.1* 6.1* 6.2       BMP:   Recent Labs   Lab 09/24/24  0440      K 3.6      CO2 22*   BUN 26.3*   CREATININE 1.55*   CALCIUM 8.2*   MG 1.70       CBC:   Recent Labs   Lab 09/27/24  0407   WBC 7.04   RBC 3.50*   HGB 10.7*   HCT 33.7*   *   MCV 96.3*   MCH 30.6   MCHC 31.8*     CMP:   Recent Labs   Lab 09/23/24  0436 09/24/24  0440   CALCIUM 8.1* 8.2*   ALBUMIN 2.0*  --     139   K 3.6 3.6   CO2 23 22*    107   BUN 25.1 26.3*   CREATININE 1.71* 1.55*   ALKPHOS 49  --    ALT 15  --    AST 15  --    BILITOT 0.8  --        LFTs:   Recent Labs   Lab 09/23/24  0436   ALT 15   AST 15   ALKPHOS 49   BILITOT 0.8   ALBUMIN 2.0*       Microbiology Results (last 7 days)       Procedure Component Value Units Date/Time    Blood Culture [[id number]]  (Normal) Collected: 09/15/24 1527    Order Status: Completed Specimen: Blood from Arm, Right Updated: 09/20/24 1602     Blood Culture No Growth at 5 days    Blood Culture [[id number]]  (Normal) Collected: 09/15/24 1527    Order Status: Completed Specimen: Blood from Arm, Left Updated: 09/20/24 1602     Blood Culture No Growth at 5 days              Diagnostic Results:  I have reviewed all pertinent imaging results/findings within the past 24 hours.    Assessment/Plan:     Stage 2 pressure ulcer of sacrum/coccyx - hospital acquired  Contact dermatitis from urine and stool  Recent hypoxic respiratory failure requirng mechanical ventilation as well as noninvasive  positive pressure ventilation on a separate date.   Septic shock requiring vasopressors as well as transfusion  Right lower lobe pneumonia  UTI - treated  Progressive global weakness - chronic, initially starting around May 2021  Bed/chair bound  Adult failure to thrive  Hypoalbuminemia         PLAN:     Chart reviewed, patient examined and wounds assessed.  Opinion: re-checked sacral wound today; no dressing in place when I arrived in his room, tissue is dry with 50% covered with dry yellow waxy covering. DuoDerm applied today; new orders discussed with nurse.   He is very thin with prominent bony areas, he is at risk for further skin breakdown and deterioration. He will need aggressive offloading and staying off of his coccyx.   Monitor for signs & symptoms of deterioration  Offloading of sacrum/buttocks/heels at all times: ANGELIQUE mattress, turning q 2 hrs; use of wedges and heel offloading devices to be used at all times while in bed; ( TUTU Garcia). This needs to be reinforced by every staff nurse caring for patient on every shift of every day. May still use PT/OT services as long as use of geomat/ROHO on wheelchair seat and small pillow to lower back.  Incontinence: control moisture/wound contamination: No briefs; use things such as purewick or colbert catheter; rectal tube  Nutrition: Recommend aggressive nutritional support, protein supplementation along with vitamin and mineral supplements  and nirmal to support wound healing; follow prealbumin, Follow RD recommendations   Diabetes: A1C - good   Will try to follow weekly while admitted, but every nurse assigned to patient on every shift of every day needs to address daily wound care dressing changes and offloading modalities including using heel offloading devices, wedges, ANGELIQUE mattress etc  Discussed with patient and family as well as nurse caring for patient today           The time spent including preparing to see the patient, obtaining patient history and  assessment, evaluation of the plan of care, patient/caregiver counseling and education, orders, documentation, coordination of care, and other professional medical management activities for today's encounter was  40  minutes           IRLANDA Stewart  Wound Care  [facility] - 8th Floor Med Surg

## 2024-09-27 NOTE — SUBJECTIVE & OBJECTIVE
Subjective:     HPI:  Wound medicine re-check    The patient is a 64 year old WM brought into [facility] ED from home on 9/8/24 with N/V/D x 1 week and progressive weakness, he  was admitted to ICU for acute hypoxic respiratory failure with right basilar pneumonia and developing progressive shock ultimately requiring endotracheal intubation, vasopressors and steroid administration. In the ED his blood glucose was >500.   His PMHx significant for HTN, HLD, CAD s/p PCI, BPH with history of self catheterization, and HFpEF.   Urine cultures demonstrated Klebsiella and Providencia, received Zosyn and azithromycin  Downgraded to HM on 9/12/24  Patient decompensated on 9/15/24 and was transferred back to ICU, for respiratory decompensation, hypotension not responding to IV boluses and required vasopressors. He was placed on BiPAP.   Downgraded to HM again on 9/20/24 and zosyn completed on 9/23/24. Weaned to room air.     Patient and family provide history, reports that patient was living alone with normal function until May 2021 when he was hospitalized due to extremely elevated blood sugars and diabetic coma; after this hospitalization he went to a SNF for therapy and returned home where he functioned independently for some time with the use of a walker and wheel chair for locomotion due to chronic weakness of his legs. Family reports that his debility was progressive over the last 2 years and about 6 weeks ago he fell while transferring himself from his bed to his wheel chair and since this time has been primarily in bed due to fear of falling again; family reports he was receiving assistance at home with private sitters for a few hours during the day.    Images were reviewed in EPIC, patient came in with redness to sacrum/buttocks but not noted with any skin breaks. He was being followed by inpatient wound nurse team and being treated for moisture associated skin breakdown as well as candidal skin infection. Wound medicine  has been consulted for evaluation of a wound of the sacral region that is concerning for deterioration.   Initial visit done on 9/26/24; patient with shallow crater over coccyx with mixed pink tissue and yellow waxy covering. No infection; local wound care initiated.   9/27/24 - wound re-check today. Met patient in room 804, CNA in room with patient repositioning for lunch. Agreeable for wound assessment at this time. No acute distress, no new concerns or complaints.               Hospital Course:   No notes on file      Follow-up For: Procedure(s) (LRB):  MRI (Magnetic Resonance Imagine) (N/A)    Post-Operative Day: 8 Days Post-Op    Scheduled Meds:   aspirin  81 mg Oral Daily    atorvastatin  20 mg Oral Daily    finasteride  5 mg Oral Daily    insulin glargine U-100  10 Units Subcutaneous BID    miconazole NITRATE 2 %   Topical (Top) BID    pantoprazole  40 mg Oral BID    rivaroxaban  10 mg Oral Daily with dinner    tamsulosin  0.4 mg Oral Daily    zinc oxide   Topical (Top) BID     Continuous Infusions:   D5 and 0.45% NaCl   Intravenous Continuous PRN         PRN Meds:  Current Facility-Administered Medications:     0.9%  NaCl infusion (for blood administration), , Intravenous, Q24H PRN    0.9%  NaCl infusion (for blood administration), , Intravenous, Q24H PRN    0.9% NaCl, , Intravenous, PRN    albuterol-ipratropium, 3 mL, Nebulization, Q6H PRN    dextrose 10%, 12.5 g, Intravenous, PRN    dextrose 10%, 25 g, Intravenous, PRN    D5 and 0.45% NaCl, , Intravenous, Continuous PRN    haloperidol lactate, 1 mg, Intravenous, Q6H PRN    hydrALAZINE, 10 mg, Intravenous, Q4H PRN    insulin aspart U-100, 0-10 Units, Subcutaneous, QID (AC + HS) PRN    loperamide, 2 mg, Oral, QID PRN    magnesium sulfate IVPB, 2 g, Intravenous, PRN    sodium chloride 0.9%, 10 mL, Intravenous, PRN    sodium phosphate 20.01 mmol in D5W 250 mL IVPB, 20.01 mmol, Intravenous, PRN    Review of Systems   Constitutional:  Positive for activity  change and fatigue. Negative for chills, diaphoresis and fever.   Skin:  Positive for wound.   Neurological:  Positive for weakness.     Objective:     Vital Signs (Most Recent):  Temp: 98.1 °F (36.7 °C) (09/27/24 1145)  Pulse: 102 (09/27/24 1145)  Resp: 18 (09/27/24 1145)  BP: 95/65 (09/27/24 1145)  SpO2: 97 % (09/27/24 1145) Vital Signs (24h Range):  Temp:  [97.5 °F (36.4 °C)-98.1 °F (36.7 °C)] 98.1 °F (36.7 °C)  Pulse:  [] 102  Resp:  [18] 18  SpO2:  [93 %-99 %] 97 %  BP: ()/(65-74) 95/65     Weight: 50 kg (110 lb 3.7 oz)  Body mass index is 18.92 kg/m².     Physical Exam  Vitals reviewed.   Constitutional:       General: He is not in acute distress.     Appearance: He is cachectic. He is ill-appearing (chronic).      Comments: Thin frail appearing white male; prominent bony landmarks    HENT:      Head: Normocephalic and atraumatic.      Nose: Nose normal.   Cardiovascular:      Rate and Rhythm: Normal rate and regular rhythm.      Pulses: Normal pulses.   Pulmonary:      Effort: Pulmonary effort is normal. No respiratory distress.   Abdominal:      General: Abdomen is flat.      Palpations: Abdomen is soft.   Genitourinary:     Comments: Indwelling colbert catheter to  bag with clear yellow urine   Bowel incontinence   Feet:      Comments: Bilateral heels/feet without redness, breaks or blisters   Skin:     General: Skin is warm and dry.      Capillary Refill: Capillary refill takes less than 2 seconds.             Comments: Shallow ulceration of coccyx mixed with pink tissue and dry waxy yellow covering, partial thickness loss evident.  no bogginess or evidence of purulent drainage, no odor. [place] wound skin without evidence of cellulitis. Small skin breaks inferior to wound healing.    Neurological:      Mental Status: He is alert.      Motor: Weakness present.   Psychiatric:         Mood and Affect: Affect is flat.         Behavior: Behavior is cooperative.      Comments: Not overly interactive  during visit, very short when responding to questions. Unable to determine orientation.        Sacrum/coccyx: 2.8 x 2 cm            Laboratory:  A1C:   Recent Labs   Lab 04/16/24  2300 07/21/24  2300 09/14/24  0336   HGBA1C 6.1* 6.1* 6.2       BMP:   Recent Labs   Lab 09/24/24  0440      K 3.6      CO2 22*   BUN 26.3*   CREATININE 1.55*   CALCIUM 8.2*   MG 1.70       CBC:   Recent Labs   Lab 09/27/24  0407   WBC 7.04   RBC 3.50*   HGB 10.7*   HCT 33.7*   *   MCV 96.3*   MCH 30.6   MCHC 31.8*     CMP:   Recent Labs   Lab 09/23/24  0436 09/24/24  0440   CALCIUM 8.1* 8.2*   ALBUMIN 2.0*  --     139   K 3.6 3.6   CO2 23 22*    107   BUN 25.1 26.3*   CREATININE 1.71* 1.55*   ALKPHOS 49  --    ALT 15  --    AST 15  --    BILITOT 0.8  --        LFTs:   Recent Labs   Lab 09/23/24  0436   ALT 15   AST 15   ALKPHOS 49   BILITOT 0.8   ALBUMIN 2.0*       Microbiology Results (last 7 days)       Procedure Component Value Units Date/Time    Blood Culture [[id number]]  (Normal) Collected: 09/15/24 1527    Order Status: Completed Specimen: Blood from Arm, Right Updated: 09/20/24 1602     Blood Culture No Growth at 5 days    Blood Culture [[id number]]  (Normal) Collected: 09/15/24 1527    Order Status: Completed Specimen: Blood from Arm, Left Updated: 09/20/24 1602     Blood Culture No Growth at 5 days              Diagnostic Results:  I have reviewed all pertinent imaging results/findings within the past 24 hours.

## 2024-09-27 NOTE — PROGRESS NOTES
Inpatient Nutrition Assessment    Admit Date: 9/8/2024   Total duration of encounter: 19 days   Patient Age: 64 y.o.    Nutrition Recommendation/Prescription     -Continue oral diet per SLP recs with diabetic diet restriction. Assist with meals.   -Monitor wt, labs, and intake.     Communication of Recommendations: reviewed with patient and reviewed with family    Nutrition Assessment     Malnutrition Assessment/Nutrition-Focused Physical Exam    Malnutrition Context: chronic illness (09/09/24 1049)  Malnutrition Level: severe (09/09/24 1049)  Energy Intake (Malnutrition): less than 75% for greater than or equal to 1 month (09/09/24 1049)  Weight Loss (Malnutrition):  (does not meet criteria) (09/09/24 1049)  Subcutaneous Fat (Malnutrition): severe depletion (09/09/24 1049)  Orbital Region (Subcutaneous Fat Loss): severe depletion  Upper Arm Region (Subcutaneous Fat Loss): severe depletion     Muscle Mass (Malnutrition): severe depletion (09/09/24 1049)  [place] Region (Muscle Loss): severe depletion     Clavicle and Acromion Bone Region (Muscle Loss): severe depletion           Anterior Thigh Region (Muscle Loss): severe depletion  Posterior Calf Region (Muscle Loss): severe depletion           A minimum of two characteristics is recommended for diagnosis of either severe or non-severe malnutrition.    Chart Review    Reason Seen: follow-up    Malnutrition Screening Tool Results   Have you recently lost weight without trying?: Unsure  Have you been eating poorly because of a decreased appetite?: Yes   MST Score: 3   Diagnosis:  Septic shock secondary to UTI  Uncontrolled T2DM with hyperglycemia  DWAINE on CKD secondary to volume depletion  Metabolic acidosis; likely secondary to lactic acidosis vs diarrhea  Hypomagnesemia  Hypophosphatemia  Diarrhea  Normocytic anemia  Malnutrition    Relevant Medical History: HTN, HLD, CAD S/P PCI, BPH, HF     Scheduled Medications:  aspirin, 81 mg, Daily  atorvastatin, 20 mg,  Daily  finasteride, 5 mg, Daily  insulin glargine U-100, 10 Units, BID  miconazole NITRATE 2 %, , BID  pantoprazole, 40 mg, BID  tamsulosin, 0.4 mg, Daily  zinc oxide, , BID    Continuous Infusions:  D5 and 0.45% NaCl    PRN Medications:   0.9%  NaCl infusion (for blood administration), , Q24H PRN  0.9%  NaCl infusion (for blood administration), , Q24H PRN  0.9% NaCl, , PRN  albuterol-ipratropium, 3 mL, Q6H PRN  dextrose 10%, 12.5 g, PRN  dextrose 10%, 25 g, PRN  D5 and 0.45% NaCl, , Continuous PRN  haloperidol lactate, 1 mg, Q6H PRN  hydrALAZINE, 10 mg, Q4H PRN  insulin aspart U-100, 0-10 Units, QID (AC + HS) PRN  loperamide, 2 mg, QID PRN  magnesium sulfate IVPB, 2 g, PRN  sodium chloride 0.9%, 10 mL, PRN  sodium phosphate 20.01 mmol in D5W 250 mL IVPB, 20.01 mmol, PRN    Calorie Containing IV Medications: no significant kcals from medications at this time    Recent Labs   Lab 09/21/24  0521 09/22/24  0524 09/22/24  0526 09/23/24  0436 09/24/24  0440 09/25/24  0500 09/26/24  0451 09/27/24  0407    138  --  138 139  --   --   --    K 3.7 4.0  --  3.6 3.6  --   --   --    CALCIUM 7.9* 8.0*  --  8.1* 8.2*  --   --   --    MG 1.30* 1.90  --  1.60 1.70  --   --   --     104  --  104 107  --   --   --    CO2 25 25  --  23 22*  --   --   --    BUN 23.2 25.4  --  25.1 26.3*  --   --   --    CREATININE 1.50* 1.57*  --  1.71* 1.55*  --   --   --    EGFRNORACEVR 51 48  --  44 49  --   --   --    GLUCOSE 127* 181*  --  164* 67*  --   --   --    BILITOT 0.7 0.6  --  0.8  --   --   --   --    ALKPHOS 47 50  --  49  --   --   --   --    ALT 16 15  --  15  --   --   --   --    AST 14 12  --  15  --   --   --   --    ALBUMIN 2.0* 2.0*  --  2.0*  --   --   --   --    WBC 11.11  --  10.06 8.34 6.84 7.59 7.84 7.04   HGB 10.2*  --  10.3* 10.0* 10.4* 10.4* 10.9* 10.7*   HCT 30.9*  --  31.3* 30.7* 30.7* 32.0* 33.2* 33.7*     Nutrition Orders:  Diet Pureed (IDDSI Level 4) Supervision with Meals, Diabetic; 2000  "Calorie      Appetite/Oral Intake: good/% of meals  Factors Affecting Nutritional Intake: impaired cognitive status/motor control and difficulty/impaired swallowing  Social Needs Impacting Access to Food: none identified  Food/Church/Cultural Preferences: none reported  Food Allergies: no known food allergies  Last Bowel Movement: 24  Wound(s):     Wound 24 Moisture associated dermatitis Buttocks-Tissue loss description: Partial thickness    Comments    24 Patient on ventilator, propofol off during rounds, plans to start tube feeding. Spoke with patient's sister at bedside. She reports very poor intake for the past month and weight loss, weight history in chart reveals no significant weight changes over the past year. Patient is malnourished and at risk for refeeding syndrome; recommend slow initiation/advancement of tube feeding.    24 Patient extubated, tube feeding discontinued, started on dysphagia diet. Patient reports a fair appetite, eating about 25-50% of meals, agreeable to vanilla Boost BID.    24: Discussed with RN, possible plans for NG placement since pt unable to consume adequate nutrition. Will provide TF recommendations in case needed. Pt mental status prohibiting obtaining subjective info.     24: Pt reports good appetite/intake; denies n/v and chewing/swallowing difficulty. Pt is on oral diet per SLP recs.     24: Pt eating well and tolerating diet well per pt and family member.    24: Pt asleep during rounds but noted all of breakfast tray consumed.      Anthropometrics    Height: 5' 4" (162.6 cm), Height Method: Stated  Last Weight: 50 kg (110 lb 3.7 oz) (24 1044), Weight Method: Bed Scale  BMI (Calculated): 18.9  BMI Classification: underweight (BMI less than 22 if >65 years of age)        Ideal Body Weight (IBW), Male: 130 lb     % Ideal Body Weight, Male (lb): 79.31 %                 Usual Body Weight (UBW), k.4 kg (10/24/23)  % " Usual Body Weight: 92.1  % Weight Change From Usual Weight: -8.09 %  Usual Weight Provided By: family/caregiver and EMR weight history    Wt Readings from Last 8 Encounters:   09/09/24 50 kg (110 lb 3.7 oz)   07/30/24 49.4 kg (108 lb 14.5 oz)   07/22/24 49.4 kg (109 lb)   04/24/24 49.9 kg (110 lb)   04/17/24 50 kg (110 lb 3.2 oz)   01/22/24 46.3 kg (102 lb 1.6 oz)   10/24/23 54.4 kg (120 lb)   10/19/23 54.4 kg (120 lb)     Weight Change(s) Since Admission:   (9/9) fluctuations over past 24 hours, question accuracy, took bed weight of 50 kg during rounds today  Wt Readings from Last 1 Encounters:   09/09/24 1044 50 kg (110 lb 3.7 oz)   09/08/24 2045 46.8 kg (103 lb 1.6 oz)   09/08/24 1617 54.4 kg (120 lb)   Admit Weight: 54.4 kg (120 lb) (09/08/24 1617), Weight Method: Bed Scale    Estimated Needs    Weight Used For Calorie Calculations: 50 kg (110 lb 3.7 oz)  Energy Calorie Requirements (kcal): 6802-9615, 35-40 kcal/kg  Weight Used For Protein Calculations: 50 kg (110 lb 3.7 oz)  Protein Requirements:  g, 1.5-2 g/kg  Fluid Requirements (mL): 1500  CHO Requirement: 175-250 g, 40-50% of kcal    Enteral Nutrition Patient not receiving enteral nutrition at this time.    Parenteral Nutrition Patient not receiving parenteral nutrition support at this time.    Evaluation of Received Nutrient Intake    Calories: meeting estimated needs  Protein: meeting estimated needs    Patient Education Not applicable.    Nutrition Diagnosis     PES: Inadequate energy intake related to inability to consume sufficient nutrients as evidenced by less than 80% needs met. (resolved)  PES: Severe chronic disease or condition related malnutrition related to suboptimal protein/energy intake as evidenced by less than 75% needs met for greater than or equal to 1 month, severe fat depletion, and severe muscle depletion. (active)    Nutrition Interventions     Intervention(s): modified composition of enteral nutrition, modified rate of enteral  nutrition, and collaboration with other providers  Goal: Meet greater than 80% of nutritional needs by follow-up. (goal progressing)  Goal: Tolerate enteral feeding at goal rate by follow-up. (goal discontinued)    Nutrition Goals & Monitoring     Dietitian will monitor: energy intake, weight, weight change, electrolyte/renal panel, glucose/endocrine profile, and gastrointestinal profile  Discharge planning: continue pureed, diabetic diet   Nutrition Risk/Follow-Up: high (follow-up in 1-4 days)   Please consult if re-assessment needed sooner.

## 2024-09-27 NOTE — NURSING
Nurses Note -- 4 Eyes      9/26  1900      Skin assessed during: Q Shift Change      [] No Altered Skin Integrity Present    []Prevention Measures Documented      [x] Yes- Altered Skin Integrity Present or Discovered   [] LDA Added if Not in Epic (Describe Wound)   [] New Altered Skin Integrity was Present on Admit and Documented in LDA   [] Wound Image Taken    Wound Care Consulted? Yes    Attending Nurse:  Alex Campo RN/Staff Member: Kyara

## 2024-09-28 LAB
POCT GLUCOSE: 175 MG/DL (ref 70–110)
POCT GLUCOSE: 197 MG/DL (ref 70–110)
POCT GLUCOSE: 206 MG/DL (ref 70–110)
POCT GLUCOSE: 259 MG/DL (ref 70–110)
POCT GLUCOSE: 272 MG/DL (ref 70–110)

## 2024-09-28 PROCEDURE — 63600175 PHARM REV CODE 636 W HCPCS: Performed by: INTERNAL MEDICINE

## 2024-09-28 PROCEDURE — 11000001 HC ACUTE MED/SURG PRIVATE ROOM

## 2024-09-28 PROCEDURE — 63600175 PHARM REV CODE 636 W HCPCS

## 2024-09-28 PROCEDURE — 25000003 PHARM REV CODE 250

## 2024-09-28 PROCEDURE — 25000003 PHARM REV CODE 250: Performed by: INTERNAL MEDICINE

## 2024-09-28 PROCEDURE — 25000003 PHARM REV CODE 250: Performed by: NURSE PRACTITIONER

## 2024-09-28 PROCEDURE — 21400001 HC TELEMETRY ROOM

## 2024-09-28 RX ORDER — TALC
9 POWDER (GRAM) TOPICAL NIGHTLY PRN
Status: DISCONTINUED | OUTPATIENT
Start: 2024-09-28 | End: 2024-10-01 | Stop reason: HOSPADM

## 2024-09-28 RX ADMIN — RUGBY ZINC OXIDE 20%: 20 OINTMENT TOPICAL at 09:09

## 2024-09-28 RX ADMIN — PANTOPRAZOLE SODIUM 40 MG: 40 TABLET, DELAYED RELEASE ORAL at 08:09

## 2024-09-28 RX ADMIN — MICONAZOLE NITRATE 2 % TOPICAL POWDER: at 09:09

## 2024-09-28 RX ADMIN — ASPIRIN 81 MG CHEWABLE TABLET 81 MG: 81 TABLET CHEWABLE at 09:09

## 2024-09-28 RX ADMIN — INSULIN ASPART 4 UNITS: 100 INJECTION, SOLUTION INTRAVENOUS; SUBCUTANEOUS at 11:09

## 2024-09-28 RX ADMIN — INSULIN GLARGINE 10 UNITS: 100 INJECTION, SOLUTION SUBCUTANEOUS at 08:09

## 2024-09-28 RX ADMIN — INSULIN GLARGINE 10 UNITS: 100 INJECTION, SOLUTION SUBCUTANEOUS at 09:09

## 2024-09-28 RX ADMIN — MICONAZOLE NITRATE 2 % TOPICAL POWDER: at 08:09

## 2024-09-28 RX ADMIN — PANTOPRAZOLE SODIUM 40 MG: 40 TABLET, DELAYED RELEASE ORAL at 09:09

## 2024-09-28 RX ADMIN — RUGBY ZINC OXIDE 20%: 20 OINTMENT TOPICAL at 08:09

## 2024-09-28 RX ADMIN — INSULIN ASPART 2 UNITS: 100 INJECTION, SOLUTION INTRAVENOUS; SUBCUTANEOUS at 05:09

## 2024-09-28 RX ADMIN — ATORVASTATIN CALCIUM 20 MG: 10 TABLET, FILM COATED ORAL at 09:09

## 2024-09-28 RX ADMIN — FINASTERIDE 5 MG: 5 TABLET, FILM COATED ORAL at 09:09

## 2024-09-28 RX ADMIN — TAMSULOSIN HYDROCHLORIDE 0.4 MG: 0.4 CAPSULE ORAL at 09:09

## 2024-09-28 RX ADMIN — RIVAROXABAN 10 MG: 10 TABLET, FILM COATED ORAL at 05:09

## 2024-09-28 RX ADMIN — Medication 9 MG: at 08:09

## 2024-09-28 NOTE — PROGRESS NOTES
[facility] Hospital Medicine Progress Note        Chief Complaint: Inpatient Follow-up for weakness     HPI:    64-year-old male with HTN, HLD, CAD status post PCI, BPH on intermittent self catheterization, chronic diastolic heart failure, progressive physical decline with global weakness, initially presented with hyperglycemia, UTI, DWAINE, chest x-ray concerning for right-sided pneumonia with possible aspiration.  Admitted with septic shock that required vasopressors.  Because of respiratory failure he was intubated and mechanically ventilated.  Eventually, vasopressors were discontinued, the patient was extubated.  Urine cultures demonstrated Klebsiella, Providencia.  The patient was started on NG tube feeds.  He was cleared for oral intake by speech.  The antibiotics were initially deescalated and secondary to worsening leukocytosis were again escalated to include Zosyn, azithromycin.  Imaging demonstrated right-sided large pneumonia, possible empyema.  Pulmonary were consulted.  Because of progressive global weakness, Neurology were consulted who recommended an MRI of the brain and MRI of the entire spine.  Because a need for anesthesia, this has been deferred until 09/16/2024.  The patient did require a brief increase of his supplemental oxygenation given hypoxia in the 80s.  Chest x-ray was unremarkable.  Because of worsening D-dimer, a V/Q scan was ordered      Patient completely decompensated on 09/15/2024.  Patient remained hypotensive, hypoxic with a high suspicions for pulmonary emboli on full-dose anticoagulation.  Patient had to be transferred to ICU for respiratory decompensation, hypotension not responding to IV boluses requiring vasopressors.  In ICU he was placed on BiPAP/Levophed/isotonic crystalloid hydration and patient was kept on current IV antibiotic regimen.  Chest x-rays done in ICU with a right lower lobe consolidation/effusion concerning for empyema on Zosyn.   Decision was done to do a CTA of the chest.  CT chest abdomen with suspected ill-defined areas of bilateral intramuscular hemorrhage in the psoas muscles.  At that time anticoagulation was placed on hold.  Patient required 2 units PRBC and was able to wean down BiPAP to 8 L Oxymizer.  Neurology wanted MRI brain and whole spine secondary to patient's history of chronic paraplegia/wheelchair-bound over the past few years but patient refused MRIs.  Patient was kept on supplemental oxygen during the day and BiPAP at night.  Eventually patient agreed to have some MRIs done under anesthesia.  At this moment patient has been downgraded again to hospitalist services on 9/20.  Completed Zosyn on 09/23.  Weaned to room air.  Case management consulted for SNF placement     MRI C- spine   Impression:  1. No evidence of active demyelination.  2. Mild degenerative narrowing spinal canal at C3-C5.  3. Multilevel neural foraminal stenoses as described.  4. Chronic cord signal changes at C4-C6.    MRI brain   Impression:  1. Subacute ischemic changes in the right basal ganglia.  2. Mild chronic microvascular ischemic changes.    Patient had persistent griselda LE weakness and contractures. Palliative care team consulted.        Interval Hx:   Patient today awake and comfortable. Slept well last night. Has no new issues.   No family at bedside.     Case was discussed with patient's nurse and  on the floor.    Objective/physical exam:  General: In no acute distress, frail. Generalized muscle loss  Chest: Clear to auscultation bilaterally  Heart: RRR, +S1, S2, no appreciable murmur  Abdomen: Soft, nontender, BS +  Neurologic: Cranial nerve II-XII intact, Strength 4/5 griselda UE and 1/5 griselda LE     VITAL SIGNS: 24 HRS MIN & MAX LAST   Temp  Min: 97.6 °F (36.4 °C)  Max: 98.1 °F (36.7 °C) 97.8 °F (36.6 °C)   BP  Min: 95/65  Max: 119/84 119/84   Pulse  Min: 98  Max: 118  101   Resp  Min: 16  Max: 20 20   SpO2  Min: 95 %  Max: 99 % 99 %      I have reviewed the following labs:  Recent Labs   Lab 09/25/24  0500 09/26/24  0451 09/27/24  0407   WBC 7.59 7.84 7.04   RBC 3.36* 3.48* 3.50*   HGB 10.4* 10.9* 10.7*   HCT 32.0* 33.2* 33.7*   MCV 95.2* 95.4* 96.3*   MCH 31.0 31.3* 30.6   MCHC 32.5* 32.8* 31.8*   RDW 16.1 16.3 16.3    384 474*   MPV 10.6* 10.3 10.4     Recent Labs   Lab 09/22/24  0524 09/23/24  0436 09/24/24  0440    138 139   K 4.0 3.6 3.6    104 107   CO2 25 23 22*   BUN 25.4 25.1 26.3*   CREATININE 1.57* 1.71* 1.55*   CALCIUM 8.0* 8.1* 8.2*   MG 1.90 1.60 1.70   ALBUMIN 2.0* 2.0*  --    ALKPHOS 50 49  --    ALT 15 15  --    AST 12 15  --    BILITOT 0.6 0.8  --      Microbiology Results (last 7 days)       ** No results found for the last 168 hours. **             See below for Radiology    Assessment/Plan:  Chencho LE weakness with contractures  Subacute right basal ganglia infarct on aspirin 81 mg  Type 2 diabetes mellitus, non insulin with hyperglycemia  Acute kidney injury superimposed on chronic kidney disease stage 3  Progressive global weakness   Essential hypertension   Mixed hyperlipidemia   Previous history of CAD status post PCI     Chronic heart failure with a preserved ejection fraction, euvolemic  Symptomatic anemia requiring transfusion PRBC: resolved   Septic shock secondary to UTI with Klebsiella and Providencia treated appropriately with Zosyn: resolved   Acute hypoxic respiratory failure requiring noninvasive positive pressure ventilation: resolved   Septic shock requiring vasopressors as well as transfusion PRBC: resolved   Right lower lobe pneumonia, likely aspiration : resolved   Duodenitis : resolved     Plan:  Patient continues to do well.   Wants assistance to move in bed and stretch his legs   F/MANZANO by palliative care team to address goals of care   He has been afebrile.   Continue OT/PT    Continue strict aspiration, fall and decubitus precautions     Lab as needed     VTE prophylaxis: Xarelto      Patient condition:  Fair     Anticipated discharge and Disposition:   [facility]      All diagnosis and differential diagnosis have been reviewed; assessment and plan has been documented; I have personally reviewed the labs and test results that are presently available; I have reviewed the patients medication list; I have reviewed the consulting providers response and recommendations. I have reviewed or attempted to review medical records based upon their availability    All of the patient's questions have been  addressed and answered. Patient's is agreeable to the above stated plan. I will continue to monitor closely and make adjustments to medical management as needed.    Portions of this note dictated using EMR integrated voice recognition software, and may be subject to voice recognition errors not corrected at proofreading. Please contact writer for clarification if needed.   _____________________________________________________________________    Malnutrition Status:    Scheduled Med:   aspirin  81 mg Oral Daily    atorvastatin  20 mg Oral Daily    finasteride  5 mg Oral Daily    insulin glargine U-100  10 Units Subcutaneous BID    miconazole NITRATE 2 %   Topical (Top) BID    pantoprazole  40 mg Oral BID    rivaroxaban  10 mg Oral Daily with dinner    tamsulosin  0.4 mg Oral Daily    zinc oxide   Topical (Top) BID      Continuous Infusions:   D5 and 0.45% NaCl   Intravenous Continuous PRN          PRN Meds:    Current Facility-Administered Medications:     0.9%  NaCl infusion (for blood administration), , Intravenous, Q24H PRN    0.9%  NaCl infusion (for blood administration), , Intravenous, Q24H PRN    0.9% NaCl, , Intravenous, PRN    albuterol-ipratropium, 3 mL, Nebulization, Q6H PRN    dextrose 10%, 12.5 g, Intravenous, PRN    dextrose 10%, 25 g, Intravenous, PRN    D5 and 0.45% NaCl, , Intravenous, Continuous PRN    haloperidol lactate, 1 mg, Intravenous, Q6H PRN    hydrALAZINE, 10 mg, Intravenous, Q4H PRN     insulin aspart U-100, 0-10 Units, Subcutaneous, QID (AC + HS) PRN    loperamide, 2 mg, Oral, QID PRN    magnesium sulfate IVPB, 2 g, Intravenous, PRN    sodium chloride 0.9%, 10 mL, Intravenous, PRN    sodium phosphate 20.01 mmol in D5W 250 mL IVPB, 20.01 mmol, Intravenous, PRN     Radiology:  I have personally reviewed the following imaging and agree with the radiologist.     CV Ultrasound Bilateral Doppler Carotid  Technically difficult study due to excessive movements.   The right internal carotid artery is patent with less than 50% stenosis.   The left internal carotid artery is patent with less than 50% stenosis.   The right vertebral artery was not well visualized.  The left vertebral artery were patent with antegrade flow.      Zelalem Jules MD  Department of Hospital Medicine   [facility]   09/28/2024

## 2024-09-28 NOTE — NURSING
Nurses Note -- 4 Eyes      9/28/2024   4:46 AM      Skin assessed during: Q Shift Change      [] No Altered Skin Integrity Present    [x]Prevention Measures Documented      [] Yes- Altered Skin Integrity Present or Discovered   [] LDA Added if Not in Epic (Describe Wound)   [] New Altered Skin Integrity was Present on Admit and Documented in LDA   [] Wound Image Taken    Wound Care Consulted? No    Attending Nurse:  Alan Campo RN/Staff Member:  vanesa

## 2024-09-28 NOTE — PLAN OF CARE
Problem: Adult Inpatient Plan of Care  Goal: Readiness for Transition of Care  Outcome: Progressing     Problem: Skin Injury Risk Increased  Goal: Skin Health and Integrity  Outcome: Progressing     Problem: Wound  Goal: Skin Health and Integrity  Outcome: Progressing  Goal: Optimal Wound Healing  Outcome: Progressing     Problem: Coping Ineffective  Goal: Effective Coping  Outcome: Progressing

## 2024-09-28 NOTE — NURSING
Nurses Note -- 4 Eyes      9/28/2024   8:13 AM      Skin assessed during: Q Shift Change      [x] No Altered Skin Integrity Present    []Prevention Measures Documented      [] Yes- Altered Skin Integrity Present or Discovered   [] LDA Added if Not in Epic (Describe Wound)   [] New Altered Skin Integrity was Present on Admit and Documented in LDA   [] Wound Image Taken    Wound Care Consulted? No    Attending Nurse:  Naomi Campo RN/Staff Member:  konstantin

## 2024-09-29 LAB
POCT GLUCOSE: 105 MG/DL (ref 70–110)
POCT GLUCOSE: 149 MG/DL (ref 70–110)
POCT GLUCOSE: 186 MG/DL (ref 70–110)
POCT GLUCOSE: 235 MG/DL (ref 70–110)
POCT GLUCOSE: 268 MG/DL (ref 70–110)

## 2024-09-29 PROCEDURE — 63600175 PHARM REV CODE 636 W HCPCS

## 2024-09-29 PROCEDURE — 94799 UNLISTED PULMONARY SVC/PX: CPT

## 2024-09-29 PROCEDURE — 25000003 PHARM REV CODE 250: Performed by: INTERNAL MEDICINE

## 2024-09-29 PROCEDURE — 99900031 HC PATIENT EDUCATION (STAT)

## 2024-09-29 PROCEDURE — 25000003 PHARM REV CODE 250: Performed by: NURSE PRACTITIONER

## 2024-09-29 PROCEDURE — 25000003 PHARM REV CODE 250

## 2024-09-29 PROCEDURE — 11000001 HC ACUTE MED/SURG PRIVATE ROOM

## 2024-09-29 PROCEDURE — 63600175 PHARM REV CODE 636 W HCPCS: Performed by: INTERNAL MEDICINE

## 2024-09-29 RX ORDER — INSULIN GLARGINE 100 [IU]/ML
12 INJECTION, SOLUTION SUBCUTANEOUS 2 TIMES DAILY
Status: DISCONTINUED | OUTPATIENT
Start: 2024-09-29 | End: 2024-10-01 | Stop reason: HOSPADM

## 2024-09-29 RX ADMIN — RIVAROXABAN 10 MG: 10 TABLET, FILM COATED ORAL at 10:09

## 2024-09-29 RX ADMIN — MICONAZOLE NITRATE 2 % TOPICAL POWDER: at 10:09

## 2024-09-29 RX ADMIN — PANTOPRAZOLE SODIUM 40 MG: 40 TABLET, DELAYED RELEASE ORAL at 09:09

## 2024-09-29 RX ADMIN — MICONAZOLE NITRATE 2 % TOPICAL POWDER: at 09:09

## 2024-09-29 RX ADMIN — ASPIRIN 81 MG CHEWABLE TABLET 81 MG: 81 TABLET CHEWABLE at 09:09

## 2024-09-29 RX ADMIN — INSULIN ASPART 6 UNITS: 100 INJECTION, SOLUTION INTRAVENOUS; SUBCUTANEOUS at 06:09

## 2024-09-29 RX ADMIN — INSULIN GLARGINE 12 UNITS: 100 INJECTION, SOLUTION SUBCUTANEOUS at 10:09

## 2024-09-29 RX ADMIN — RUGBY ZINC OXIDE 20%: 20 OINTMENT TOPICAL at 09:09

## 2024-09-29 RX ADMIN — RUGBY ZINC OXIDE 20%: 20 OINTMENT TOPICAL at 10:09

## 2024-09-29 RX ADMIN — TAMSULOSIN HYDROCHLORIDE 0.4 MG: 0.4 CAPSULE ORAL at 09:09

## 2024-09-29 RX ADMIN — FINASTERIDE 5 MG: 5 TABLET, FILM COATED ORAL at 09:09

## 2024-09-29 RX ADMIN — PANTOPRAZOLE SODIUM 40 MG: 40 TABLET, DELAYED RELEASE ORAL at 10:09

## 2024-09-29 RX ADMIN — Medication 9 MG: at 10:09

## 2024-09-29 RX ADMIN — ATORVASTATIN CALCIUM 20 MG: 10 TABLET, FILM COATED ORAL at 09:09

## 2024-09-29 NOTE — NURSING
Nurses Note -- 4 Eyes      9/29/2024   7:43 AM      Skin assessed during: Q Shift Change      [x] No Altered Skin Integrity Present    []Prevention Measures Documented      [] Yes- Altered Skin Integrity Present or Discovered   [] LDA Added if Not in Epic (Describe Wound)   [] New Altered Skin Integrity was Present on Admit and Documented in LDA   [] Wound Image Taken    Wound Care Consulted? No    Attending Nurse:  Naomi Campo RN/Staff Member:  konstantin

## 2024-09-29 NOTE — NURSING
"While making rounds around 0200 this morning, pt. was found lying down consciously on the floor. Pt. placed back in bed with the help of CN and other RNs. Pt. A/O X4. Stated, "I hit my head. It's my fault." Denied pain. A small abrasion noted to center of forehead. No distress observed. VS WNL post and prior fall. Oncall provider informed by CN. Received orders for STAT CT of head and spine. Scans performed. Result pending. Per CN, house sup was also notified regarding pt. unwitnessed fall. Pt. reiterated to call for help using call light and not attempt to get OOB. Call light and table placed within reach. Pt. verbalized understanding. Bed in lowest position and bed locked. Bed alarm missing in bed. Hence, chair alarm placed for now. CN aware. Will monitor.     0412  CT results back. No acute fracture or dislocation.   "

## 2024-09-29 NOTE — PROGRESS NOTES
[facility] Hospital Medicine Progress Note        Chief Complaint: Inpatient Follow-up for weakness     HPI:    64-year-old male with HTN, HLD, CAD status post PCI, BPH on intermittent self catheterization, chronic diastolic heart failure, progressive physical decline with global weakness, initially presented with hyperglycemia, UTI, DWAINE, chest x-ray concerning for right-sided pneumonia with possible aspiration.  Admitted with septic shock that required vasopressors.  Because of respiratory failure he was intubated and mechanically ventilated.  Eventually, vasopressors were discontinued, the patient was extubated.  Urine cultures demonstrated Klebsiella, Providencia.  The patient was started on NG tube feeds.  He was cleared for oral intake by speech.  The antibiotics were initially deescalated and secondary to worsening leukocytosis were again escalated to include Zosyn, azithromycin.  Imaging demonstrated right-sided large pneumonia, possible empyema.  Pulmonary were consulted.  Because of progressive global weakness, Neurology were consulted who recommended an MRI of the brain and MRI of the entire spine.  Because a need for anesthesia, this has been deferred until 09/16/2024.  The patient did require a brief increase of his supplemental oxygenation given hypoxia in the 80s.  Chest x-ray was unremarkable.  Because of worsening D-dimer, a V/Q scan was ordered      Patient completely decompensated on 09/15/2024.  Patient remained hypotensive, hypoxic with a high suspicions for pulmonary emboli on full-dose anticoagulation.  Patient had to be transferred to ICU for respiratory decompensation, hypotension not responding to IV boluses requiring vasopressors.  In ICU he was placed on BiPAP/Levophed/isotonic crystalloid hydration and patient was kept on current IV antibiotic regimen.  Chest x-rays done in ICU with a right lower lobe consolidation/effusion concerning for empyema on Zosyn.   Decision was done to do a CTA of the chest.  CT chest abdomen with suspected ill-defined areas of bilateral intramuscular hemorrhage in the psoas muscles.  At that time anticoagulation was placed on hold.  Patient required 2 units PRBC and was able to wean down BiPAP to 8 L Oxymizer.  Neurology wanted MRI brain and whole spine secondary to patient's history of chronic paraplegia/wheelchair-bound over the past few years but patient refused MRIs.  Patient was kept on supplemental oxygen during the day and BiPAP at night.  Eventually patient agreed to have some MRIs done under anesthesia.  At this moment patient has been downgraded again to hospitalist services on 9/20.  Completed Zosyn on 09/23.  Weaned to room air.  Case management consulted for SNF placement     MRI C- spine   Impression:  1. No evidence of active demyelination.  2. Mild degenerative narrowing spinal canal at C3-C5.  3. Multilevel neural foraminal stenoses as described.  4. Chronic cord signal changes at C4-C6.    MRI brain   Impression:  1. Subacute ischemic changes in the right basal ganglia.  2. Mild chronic microvascular ischemic changes.     Palliative care team consulted.        Interval Hx:   RN reported patient had an unwitnessed fall last night, CT head, CT cervical spine was obtained , reported no acute pathology.    Patient was seen at bedside, alert awake he reported no new complaints, when further questioned about fall patient did not give any answers.  He denied any headache, new focal weakness    No family member at bedside during my interview.  Patient's nurse at bedside during my interview, Care discussed, patient on Xarelto recommend to maintain fall precautions repeat head CT prior to administering Xarelto.    Patient hemodynamically stable on room air.    Objective/physical exam:  General: In no acute distress, frail. Generalized muscle loss  Chest:  Unlabored breathing   Heart:  Normal rate  Abdomen: Soft, nontender  Neurologic:   Bilateral lower extremity weak     VITAL SIGNS: 24 HRS MIN & MAX LAST   Temp  Min: 97.7 °F (36.5 °C)  Max: 98.3 °F (36.8 °C) 97.7 °F (36.5 °C)   BP  Min: 100/62  Max: 119/84 110/75   Pulse  Min: 93  Max: 106  95   Resp  Min: 18  Max: 20 20   SpO2  Min: 97 %  Max: 99 % 97 %     I have reviewed the following labs:  Recent Labs   Lab 09/25/24  0500 09/26/24  0451 09/27/24  0407   WBC 7.59 7.84 7.04   RBC 3.36* 3.48* 3.50*   HGB 10.4* 10.9* 10.7*   HCT 32.0* 33.2* 33.7*   MCV 95.2* 95.4* 96.3*   MCH 31.0 31.3* 30.6   MCHC 32.5* 32.8* 31.8*   RDW 16.1 16.3 16.3    384 474*   MPV 10.6* 10.3 10.4     Recent Labs   Lab 09/23/24  0436 09/24/24  0440    139   K 3.6 3.6    107   CO2 23 22*   BUN 25.1 26.3*   CREATININE 1.71* 1.55*   CALCIUM 8.1* 8.2*   MG 1.60 1.70   ALBUMIN 2.0*  --    ALKPHOS 49  --    ALT 15  --    AST 15  --    BILITOT 0.8  --      Microbiology Results (last 7 days)       ** No results found for the last 168 hours. **             See below for Radiology    Assessment/Plan:  Chencho LE weakness  Subacute right basal ganglia infarct on aspirin 81 mg  Type 2 diabetes mellitus, non insulin with hyperglycemia  Acute kidney injury superimposed on chronic kidney disease stage 3  Progressive global weakness   Essential hypertension   Mixed hyperlipidemia   Previous history of CAD status post PCI     Chronic heart failure with a preserved ejection fraction, euvolemic  Symptomatic anemia requiring transfusion PRBC: resolved   Septic shock secondary to UTI with Klebsiella and Providencia treated appropriately with Zosyn: resolved   Acute hypoxic respiratory failure requiring noninvasive positive pressure ventilation: resolved   Septic shock requiring vasopressors as well as transfusion PRBC: resolved   Right lower lobe pneumonia, likely aspiration : resolved   Duodenitis : resolved     Plan:  Repeat head CT today for interval follow up prior to administering Xarelto  Maintain fall precautions    Palliative care team following  Continue OT/PT  Monitor sugars, increase Lantus, continue sliding scale coverage  Continue strict aspiration,decubitus precautions   Case management on board working on placement  Lab as needed     VTE prophylaxis: Xarelto     Patient condition:  Fair     Anticipated discharge and Disposition:   SNF      All diagnosis and differential diagnosis have been reviewed; assessment and plan has been documented; I have personally reviewed the labs and test results that are presently available; I have reviewed the patients medication list; I have reviewed the consulting providers response and recommendations. I have reviewed or attempted to review medical records based upon their availability    All of the patient's questions have been  addressed and answered. Patient's is agreeable to the above stated plan. I will continue to monitor closely and make adjustments to medical management as needed.    Portions of this note dictated using EMR integrated voice recognition software, and may be subject to voice recognition errors not corrected at proofreading. Please contact writer for clarification if needed.   _____________________________________________________________________    Malnutrition Status:    Scheduled Med:   aspirin  81 mg Oral Daily    atorvastatin  20 mg Oral Daily    finasteride  5 mg Oral Daily    insulin glargine U-100  10 Units Subcutaneous BID    miconazole NITRATE 2 %   Topical (Top) BID    pantoprazole  40 mg Oral BID    rivaroxaban  10 mg Oral Daily with dinner    tamsulosin  0.4 mg Oral Daily    zinc oxide   Topical (Top) BID      Continuous Infusions:   D5 and 0.45% NaCl   Intravenous Continuous PRN          PRN Meds:    Current Facility-Administered Medications:     0.9%  NaCl infusion (for blood administration), , Intravenous, Q24H PRN    0.9%  NaCl infusion (for blood administration), , Intravenous, Q24H PRN    0.9% NaCl, , Intravenous, PRN    albuterol-ipratropium, 3  mL, Nebulization, Q6H PRN    dextrose 10%, 12.5 g, Intravenous, PRN    dextrose 10%, 25 g, Intravenous, PRN    D5 and 0.45% NaCl, , Intravenous, Continuous PRN    haloperidol lactate, 1 mg, Intravenous, Q6H PRN    hydrALAZINE, 10 mg, Intravenous, Q4H PRN    insulin aspart U-100, 0-10 Units, Subcutaneous, QID (AC + HS) PRN    loperamide, 2 mg, Oral, QID PRN    magnesium sulfate IVPB, 2 g, Intravenous, PRN    melatonin, 9 mg, Oral, Nightly PRN    sodium chloride 0.9%, 10 mL, Intravenous, PRN    sodium phosphate 20.01 mmol in D5W 250 mL IVPB, 20.01 mmol, Intravenous, PRN     Radiology:  I have personally reviewed the following imaging and agree with the radiologist.     CT Cervical Spine Without Contrast  Narrative: Technique: CT of the cervical spine was performed without intravenous contrast with axial as well as sagittal and coronal images.    Comparison: None.    Dosage Information: Automated Exposure Control was utilized 1212.36 mGy.cm.    Clinical history: Neck pain, acute, no red flags.    Findings:    Position: Supine.    Lung apices: The visualized lung apices appear unremarkable..    Mineralization: Within normal limits for age.    Rotation: No significant rotation is seen.    Scoliosis: No significant scoliosis is seen.    Vertebral Fusion: No vertebral fusion is identified.    Listhesis: No significant listhesis is identified.    Lordosis: The cervical lordosis is maintained.    Intervertebral disc spaces: Moderately decreased disc height is seen at C6-C7.    Osteophytes: Mild multilevel endplate osteophytes are seen.    Endplate Sclerosis: Mild endplate sclerosis is seen off the opposing endplates at C6-C7.    Uncovertebral degenerative changes: No significant uncovertebral degenerative changes are seen.    Facet degenerative changes: Mild multilevel facet degenerative changes are seen.    Calcifications: None.    Fractures: No acute cervical spine fracture dislocation or subluxation is seen.    Orthopedic  Hardware: Orthopedic plates and screws are affixed to the chronic fractures of the spinous processes and the left vertebral arch from C4 to C7.    This exam does not exclude the possibility intrathecal soft tissue, ligamentous or vascular abnormality.    Miscellaneous:    Mastoid air cells: Mild opacity is seen in the right mastoid air cells. This suggests an element of right mastoiditis.    Soft Tissues: Unremarkable.  Impression: Impression:    1. No acute cervical spine fracture dislocation or subluxation is seen.    2. Mild opacity is seen in the right mastoid air cells. This suggests an element of right mastoiditis. Correlate with clinical and laboratory findings as regards additional evaluation and follow up.    3. Degenerative and postsurgical changes and other details as above. Details and other findings as noted above.    No significant discrepancy with overnight report.    Electronically signed by: Jose J Do  Date:    09/29/2024  Time:    07:57  CT Head Without Contrast  Narrative: Technique: CT of the head was performed without intravenous contrast with axial as well as coronal and sagittal images.    Comparison: Comparison is with study dated May 18, 2021.  MRI brain September 19, 2024.    Dosage Information: Automated Exposure Control was utilized 1212.36 mGy.cm.    Clinical history: Fall.    Findings:    Artifact: There is severe motion artifact on some of the images, worst at the level of the cerebellum and skull base which decreases sensitivity and specificity of the study for subtle intracranial hemorrhage and subtle nondisplaced fractures.    Hemorrhage: No acute intracranial hemorrhage is seen in the optimally visualized images.    CSF spaces: The ventricles, sulci and basal cisterns all appear prominent consistent with global cerebral atrophy, stable from prior.    Brain parenchyma: There is no acute large vessel territory infarct identified.  Scattered microvascular change is seen in portions  of the periventricular and deep white matter tracts.    Cerebellum: Unremarkable.    Atheromatous calcification of the intracranial arteries is seen.    Calvarium: No acute linear or depressed skull fracture is seen in the optimally visualized osseous structures.    Maxillofacial Structures:    Paranasal sinuses: The visualized paranasal sinuses appear clear with no mucoperiosteal thickening or air fluid levels identified.  Impression: No acute intracranial traumatic injury identified. Details and other findings as noted above.    No significant discrepancy with overnight report.    Electronically signed by: Jose J Do  Date:    09/29/2024  Time:    07:55      Elayne Torres MD  Department of Hospital Medicine   [facility]   09/29/2024

## 2024-09-29 NOTE — NURSING
Nurses Note -- 4 Eyes      9/28/2024   10:44 PM      Skin assessed during: Q Shift Change      [] No Altered Skin Integrity Present    [x]Prevention Measures Documented      [] Yes- Altered Skin Integrity Present or Discovered   [] LDA Added if Not in Epic (Describe Wound)   [] New Altered Skin Integrity was Present on Admit and Documented in LDA   [] Wound Image Taken    Wound Care Consulted? No    Attending Nurse:  Alan Campo RN/Staff Member:  TMOMIE

## 2024-09-29 NOTE — PLAN OF CARE
Problem: Adult Inpatient Plan of Care  Goal: Readiness for Transition of Care  Outcome: Progressing     Problem: Skin Injury Risk Increased  Goal: Skin Health and Integrity  Outcome: Progressing     Problem: Fall Injury Risk  Goal: Absence of Fall and Fall-Related Injury  9/29/2024 0742 by Naomi Chandler RN  Outcome: Progressing  9/29/2024 0742 by Naomi Chandler RN  Reactivated     Problem: Wound  Goal: Optimal Coping  9/29/2024 0742 by Naomi Chandler RN  Outcome: Progressing  9/29/2024 0742 by Naomi Chandler RN  Reactivated  Goal: Optimal Functional Ability  9/29/2024 0742 by Naomi Chandler RN  Outcome: Progressing  9/29/2024 0742 by Naomi Chandler RN  Reactivated  Goal: Absence of Infection Signs and Symptoms  9/29/2024 0742 by Naomi Chandler RN  Outcome: Progressing  9/29/2024 0742 by Naomi Chandler RN  Reactivated  Goal: Improved Oral Intake  9/29/2024 0742 by Naomi Chandler RN  Outcome: Progressing  9/29/2024 0742 by Naomi Chandler RN  Reactivated  Goal: Optimal Pain Control and Function  9/29/2024 0742 by Naomi Chandler RN  Outcome: Progressing  9/29/2024 0742 by Naomi Chandler RN  Reactivated  Goal: Skin Health and Integrity  Outcome: Progressing  Goal: Optimal Wound Healing  Outcome: Progressing

## 2024-09-30 LAB
POCT GLUCOSE: 157 MG/DL (ref 70–110)
POCT GLUCOSE: 245 MG/DL (ref 70–110)
POCT GLUCOSE: 379 MG/DL (ref 70–110)
POCT GLUCOSE: 93 MG/DL (ref 70–110)

## 2024-09-30 PROCEDURE — 99233 SBSQ HOSP IP/OBS HIGH 50: CPT | Mod: ,,, | Performed by: NURSE PRACTITIONER

## 2024-09-30 PROCEDURE — 25000003 PHARM REV CODE 250: Performed by: INTERNAL MEDICINE

## 2024-09-30 PROCEDURE — 99900035 HC TECH TIME PER 15 MIN (STAT)

## 2024-09-30 PROCEDURE — 63600175 PHARM REV CODE 636 W HCPCS: Performed by: INTERNAL MEDICINE

## 2024-09-30 PROCEDURE — 63600175 PHARM REV CODE 636 W HCPCS

## 2024-09-30 PROCEDURE — 97164 PT RE-EVAL EST PLAN CARE: CPT

## 2024-09-30 PROCEDURE — 11000001 HC ACUTE MED/SURG PRIVATE ROOM

## 2024-09-30 PROCEDURE — 25000003 PHARM REV CODE 250: Performed by: NURSE PRACTITIONER

## 2024-09-30 PROCEDURE — 99900031 HC PATIENT EDUCATION (STAT)

## 2024-09-30 PROCEDURE — 25000003 PHARM REV CODE 250

## 2024-09-30 PROCEDURE — 99233 SBSQ HOSP IP/OBS HIGH 50: CPT | Mod: ,,,

## 2024-09-30 PROCEDURE — 94799 UNLISTED PULMONARY SVC/PX: CPT

## 2024-09-30 RX ADMIN — HALOPERIDOL LACTATE 1 MG: 5 INJECTION, SOLUTION INTRAMUSCULAR at 11:09

## 2024-09-30 RX ADMIN — TAMSULOSIN HYDROCHLORIDE 0.4 MG: 0.4 CAPSULE ORAL at 09:09

## 2024-09-30 RX ADMIN — ATORVASTATIN CALCIUM 20 MG: 10 TABLET, FILM COATED ORAL at 09:09

## 2024-09-30 RX ADMIN — FINASTERIDE 5 MG: 5 TABLET, FILM COATED ORAL at 09:09

## 2024-09-30 RX ADMIN — RUGBY ZINC OXIDE 20%: 20 OINTMENT TOPICAL at 09:09

## 2024-09-30 RX ADMIN — RUGBY ZINC OXIDE 20%: 20 OINTMENT TOPICAL at 08:09

## 2024-09-30 RX ADMIN — INSULIN ASPART 4 UNITS: 100 INJECTION, SOLUTION INTRAVENOUS; SUBCUTANEOUS at 11:09

## 2024-09-30 RX ADMIN — PANTOPRAZOLE SODIUM 40 MG: 40 TABLET, DELAYED RELEASE ORAL at 09:09

## 2024-09-30 RX ADMIN — ASPIRIN 81 MG CHEWABLE TABLET 81 MG: 81 TABLET CHEWABLE at 09:09

## 2024-09-30 RX ADMIN — Medication 9 MG: at 08:09

## 2024-09-30 RX ADMIN — INSULIN GLARGINE 12 UNITS: 100 INJECTION, SOLUTION SUBCUTANEOUS at 09:09

## 2024-09-30 RX ADMIN — INSULIN ASPART 6 UNITS: 100 INJECTION, SOLUTION INTRAVENOUS; SUBCUTANEOUS at 09:09

## 2024-09-30 RX ADMIN — HALOPERIDOL LACTATE 1 MG: 5 INJECTION, SOLUTION INTRAMUSCULAR at 03:09

## 2024-09-30 RX ADMIN — RIVAROXABAN 10 MG: 10 TABLET, FILM COATED ORAL at 05:09

## 2024-09-30 RX ADMIN — PANTOPRAZOLE SODIUM 40 MG: 40 TABLET, DELAYED RELEASE ORAL at 08:09

## 2024-09-30 RX ADMIN — MICONAZOLE NITRATE 2 % TOPICAL POWDER: at 09:09

## 2024-09-30 RX ADMIN — MICONAZOLE NITRATE 2 % TOPICAL POWDER: at 08:09

## 2024-09-30 RX ADMIN — INSULIN ASPART 2 UNITS: 100 INJECTION, SOLUTION INTRAVENOUS; SUBCUTANEOUS at 03:09

## 2024-09-30 NOTE — PLAN OF CARE
Problem: Adult Inpatient Plan of Care  Goal: Readiness for Transition of Care  Outcome: Progressing     Problem: Skin Injury Risk Increased  Goal: Skin Health and Integrity  Outcome: Progressing     Problem: Fall Injury Risk  Goal: Absence of Fall and Fall-Related Injury  Outcome: Progressing     Problem: Wound  Goal: Optimal Coping  Outcome: Progressing  Goal: Optimal Functional Ability  Outcome: Progressing  Goal: Absence of Infection Signs and Symptoms  Outcome: Progressing  Goal: Improved Oral Intake  Outcome: Progressing  Goal: Optimal Pain Control and Function  Outcome: Progressing  Goal: Skin Health and Integrity  Outcome: Progressing  Goal: Optimal Wound Healing  Outcome: Progressing     Problem: Coping Ineffective  Goal: Effective Coping  Outcome: Progressing

## 2024-09-30 NOTE — PROGRESS NOTES
[facility] - 8th Floor Med Surg  Wound Care  Progress Note    Patient Name: Froy Barragan  MRN: [id number]  Admission Date: 9/8/2024  Hospital Length of Stay: 22 days  Attending Physician: Elayne Torres MD  Primary Care Provider: George Bran MD     Subjective:     HPI:  Wound medicine re-check    The patient is a 64 year old WM brought into [facility] ED from home on 9/8/24 with N/V/D x 1 week and progressive weakness, he  was admitted to ICU for acute hypoxic respiratory failure with right basilar pneumonia and developing progressive shock ultimately requiring endotracheal intubation, vasopressors and steroid administration. In the ED his blood glucose was >500.   His PMHx significant for HTN, HLD, CAD s/p PCI, BPH with history of self catheterization, and HFpEF.   Urine cultures demonstrated Klebsiella and Providencia, received Zosyn and azithromycin  Downgraded to HM on 9/12/24  Patient decompensated on 9/15/24 and was transferred back to ICU, for respiratory decompensation, hypotension not responding to IV boluses and required vasopressors. He was placed on BiPAP.   Downgraded to HM again on 9/20/24 and zosyn completed on 9/23/24. Weaned to room air.     Patient and family provide history, reports that patient was living alone with normal function until May 2021 when he was hospitalized due to extremely elevated blood sugars and diabetic coma; after this hospitalization he went to a SNF for therapy and returned home where he functioned independently for some time with the use of a walker and wheel chair for locomotion due to chronic weakness of his legs. Family reports that his debility was progressive over the last 2 years and about 6 weeks ago he fell while transferring himself from his bed to his wheel chair and since this time has been primarily in bed due to fear of falling again; family reports he was receiving assistance at home with private sitters for a few hours during the  day.    Images were reviewed in EPIC, patient came in with redness to sacrum/buttocks but not noted with any skin breaks. He was being followed by inpatient wound nurse team and being treated for moisture associated skin breakdown as well as candidal skin infection. Wound medicine has been consulted for evaluation of a wound of the sacral region that is concerning for deterioration.   Initial visit done on 9/26/24; patient with shallow crater over coccyx with mixed pink tissue and yellow waxy covering. No infection; local wound care initiated.   9/30/24 - wound re-check today. Met patient in room 804.  Agreeable for wound assessment at this time. No acute distress, appears agitated, wants to be repositioned and legs straightened out and wants everything off of his bed. Refusing heel boots, remains on low air loss bed side lying, not using purple wedge.               Hospital Course:   No notes on file      Follow-up For: Procedure(s) (LRB):  MRI (Magnetic Resonance Imagine) (N/A)    Post-Operative Day: 11 Days Post-Op    Scheduled Meds:   aspirin  81 mg Oral Daily    atorvastatin  20 mg Oral Daily    finasteride  5 mg Oral Daily    insulin glargine U-100  12 Units Subcutaneous BID    miconazole NITRATE 2 %   Topical (Top) BID    pantoprazole  40 mg Oral BID    rivaroxaban  10 mg Oral Daily with dinner    tamsulosin  0.4 mg Oral Daily    zinc oxide   Topical (Top) BID     Continuous Infusions:   D5 and 0.45% NaCl   Intravenous Continuous PRN         PRN Meds:  Current Facility-Administered Medications:     0.9%  NaCl infusion (for blood administration), , Intravenous, Q24H PRN    0.9%  NaCl infusion (for blood administration), , Intravenous, Q24H PRN    0.9% NaCl, , Intravenous, PRN    albuterol-ipratropium, 3 mL, Nebulization, Q6H PRN    dextrose 10%, 12.5 g, Intravenous, PRN    dextrose 10%, 25 g, Intravenous, PRN    D5 and 0.45% NaCl, , Intravenous, Continuous PRN    haloperidol lactate, 1 mg, Intravenous, Q6H  PRN    hydrALAZINE, 10 mg, Intravenous, Q4H PRN    insulin aspart U-100, 0-10 Units, Subcutaneous, QID (AC + HS) PRN    loperamide, 2 mg, Oral, QID PRN    magnesium sulfate IVPB, 2 g, Intravenous, PRN    melatonin, 9 mg, Oral, Nightly PRN    sodium chloride 0.9%, 10 mL, Intravenous, PRN    sodium phosphate 20.01 mmol in D5W 250 mL IVPB, 20.01 mmol, Intravenous, PRN    Review of Systems   Constitutional:  Positive for activity change and fatigue. Negative for chills, diaphoresis and fever.   Skin:  Positive for wound.   Neurological:  Positive for weakness.     Objective:     Vital Signs (Most Recent):  Temp: 98 °F (36.7 °C) (09/30/24 1116)  Pulse: 103 (09/30/24 1116)  Resp: 18 (09/30/24 1116)  BP: 107/67 (09/30/24 1116)  SpO2: 96 % (09/30/24 1116) Vital Signs (24h Range):  Temp:  [97.5 °F (36.4 °C)-98.5 °F (36.9 °C)] 98 °F (36.7 °C)  Pulse:  [] 103  Resp:  [16-19] 18  SpO2:  [96 %-100 %] 96 %  BP: ()/(61-88) 107/67     Weight: 50 kg (110 lb 3.7 oz)  Body mass index is 18.92 kg/m².     Physical Exam  Vitals reviewed.   Constitutional:       General: He is not in acute distress.     Appearance: He is cachectic. He is ill-appearing (chronic).      Comments: Thin frail appearing white male; prominent bony landmarks    HENT:      Head: Normocephalic and atraumatic.      Nose: Nose normal.   Cardiovascular:      Rate and Rhythm: Normal rate and regular rhythm.      Pulses: Normal pulses.   Pulmonary:      Effort: Pulmonary effort is normal. No respiratory distress.   Abdominal:      General: Abdomen is flat.      Palpations: Abdomen is soft.   Genitourinary:     Comments: Bowel/bladder incontinence   External urinary collection device   Skin:     General: Skin is warm and dry.      Capillary Refill: Capillary refill takes less than 2 seconds.             Comments: Shallow ulceration of coccyx mixed with pink/white tissue.  No signs of infection    Neurological:      Mental Status: He is alert.      Motor:  Weakness present.   Psychiatric:         Mood and Affect: Affect is flat.         Behavior: Behavior is agitated. Behavior is cooperative.      Comments: Not overly interactive during visit, agrees to teaching regarding offloading and repositioning. Seems agitated today          Sacrum/coccyx: 2.6 x 1.8 cm              Laboratory:  A1C:   Recent Labs   Lab 04/16/24  2300 07/21/24  2300 09/14/24  0336   HGBA1C 6.1* 6.1* 6.2       BMP:   Recent Labs   Lab 09/24/24  0440      K 3.6      CO2 22*   BUN 26.3*   CREATININE 1.55*   CALCIUM 8.2*   MG 1.70       CBC:   Recent Labs   Lab 09/27/24  0407   WBC 7.04   RBC 3.50*   HGB 10.7*   HCT 33.7*   *   MCV 96.3*   MCH 30.6   MCHC 31.8*     CMP:   Recent Labs   Lab 09/24/24  0440   CALCIUM 8.2*      K 3.6   CO2 22*      BUN 26.3*   CREATININE 1.55*             Diagnostic Results:  I have reviewed all pertinent imaging results/findings within the past 24 hours.    Assessment/Plan:       Stage 2 pressure ulcer of sacrum/coccyx - hospital acquired  Contact dermatitis from urine and stool  Recent hypoxic respiratory failure requirng mechanical ventilation as well as noninvasive positive pressure ventilation on a separate date.   Septic shock requiring vasopressors as well as transfusion  Right lower lobe pneumonia  UTI - treated  Progressive global weakness - chronic, initially starting around May 2021  Bed/chair bound  Adult failure to thrive  Hypoalbuminemia         PLAN:     Chart reviewed, patient examined and wounds assessed.  Opinion: re-checked sacral wound today; evidence of healing, smaller in size, no signs of infection. Continue current wound care.   He is very thin with prominent bony areas, he is at risk for further skin breakdown and deterioration. He will need aggressive offloading and staying off of his coccyx.   On low air loss bed; refusing heel protectors.   Monitor for signs & symptoms of deterioration  Offloading of  sacrum/buttocks/heels at all times: ANGELIQUE mattress, turning q 2 hrs; use of wedges and heel offloading devices to be used at all times while in bed; ( TUTU Garcia). This needs to be reinforced by every staff nurse caring for patient on every shift of every day. May still use PT/OT services as long as use of geomat/ROHO on wheelchair seat and small pillow to lower back.  Incontinence: control moisture/wound contamination: No briefs; use things such as purewick or colbert catheter; rectal tube  Nutrition: Recommend aggressive nutritional support, protein supplementation along with vitamin and mineral supplements  and nirmal to support wound healing; follow prealbumin, Follow RD recommendations   Diabetes: A1C - good   Will try to follow weekly while admitted, but every nurse assigned to patient on every shift of every day needs to address daily wound care dressing changes and offloading modalities including using heel offloading devices, wedges, ANGELIQUE mattress etc          The time spent including preparing to see the patient, obtaining patient history and assessment, evaluation of the plan of care, patient/caregiver counseling and education, orders, documentation, coordination of care, and other professional medical management activities for today's encounter was  35  minutes            IRLANDA Stewart  Wound Care  [facility] - 8th Floor Med Surg

## 2024-09-30 NOTE — CONSULTS
Consults    Patient Name: Froy Barragan   MRN: [id number]   Admission Date: 9/8/2024   Hospital Length of Stay: 22   Attending Provider: Elayne Torres MD   Consulting Provider: Clara FOURNIER  Reason for Consult: Goals of Care  Primary Care Physician:  George Bran MD     Principal Problem: <principal problem not specified>       Final diagnoses:  [E11.10] Diabetic ketoacidosis without coma associated with type 2 diabetes mellitus (Primary)  [I95.9] Hypotension, unspecified hypotension type  [N17.9] Acute kidney injury  [E86.0] Acute dehydration  [R73.9] Hyperglycemia      Assessment/Plan:     I reviewed the patient and family's understanding of the seriousness of the illness and its expected prognosis. We discussed the patient's goals of care and treatment preferences.        Advance Care Planning     Date: 09/30/2024    [facility]  I engaged the family in a voluntary conversation about advance care planning and we specifically addressed what the goals of care would be moving forward, in light of the patient's change in clinical status, specifically current condition.  We did specifically address the patient's likely prognosis, which is fair .  We explored the patient's values and preferences for future care.  The family endorses that what is most important right now is to focus on curative/life-prolongation (regardless of treatment burdens)    Accordingly, we have decided that the best plan to meet the patient's goals includes continuing with treatment    Spoke to patient's sister Chantal--introduced service and discussed patient's history and current condition.  Reports that patient's decline has been progressive and that he had been able to walk with a walker a year ago.  Sister confirmed that family does have history of neurodegenerative disease.  Sister states that she spoke to Neurology who felt that patient's current decline was d/t stroke. Sister states that they had hired a CG to help him  with medications, but he was still having difficulty maintaining his independence.      Discussed AD and LW--she states that patient has completed LW in the past, and he was asked in the ED whether he would want resuscitation to which he informed doctor that he would want all aggressive measures.  Encouraged sister to continue to have conversations with patient and her sisters as patient's baseline changes. She verbalized understanding.  She informed that they want to give patient opportunity for improvement, but are realistic that he may become permanent resident. Offered support and encourage to call for any questions/concerns.       Interval History:     Patient lying in bed sleeping and easily wakened.  CM working on placement with family.        Active Ambulatory Problems     Diagnosis Date Noted    Anemia of chronic disease 07/06/2022    Arteriosclerosis of coronary artery 07/06/2022    Cervical myelopathy 07/06/2022    Cobalamin deficiency 07/06/2022    Gastroesophageal reflux disease 07/06/2022    Hypertension 07/06/2022    Low vitamin D level 07/06/2022    Mixed hyperlipidemia 07/06/2022    Paraparesis 07/06/2022    Stage 3a chronic kidney disease 07/06/2022    Type 2 diabetes mellitus 07/06/2022    Other cardiomyopathy 07/06/2022    Urinary retention 07/25/2022    H/O: stroke 07/25/2022    Lumbar radicular pain 07/25/2022    Clonus 07/25/2022     Resolved Ambulatory Problems     Diagnosis Date Noted    Wellness examination 10/20/2022     No Additional Past Medical History        Past Surgical History:   Procedure Laterality Date    MAGNETIC RESONANCE IMAGING N/A 9/19/2024    Procedure: MRI (Magnetic Resonance Imagine);  Surgeon: David Amin DO;  Location: [facility];  Service: Anesthesiology;  Laterality: N/A;    SPINE SURGERY          Review of patient's allergies indicates:  No Known Allergies       Current Facility-Administered Medications:     0.9%  NaCl infusion (for blood administration), ,  Intravenous, Q24H PRN, Marianela Gaona MD    0.9%  NaCl infusion (for blood administration), , Intravenous, Q24H PRN, Sanjuanita Conrad MD    0.9%  NaCl infusion, , Intravenous, PRN, Hayden Quintanilla MD, Last Rate: 5 mL/hr at 09/19/24 0548, Rate Verify at 09/19/24 0548    albuterol-ipratropium 2.5 mg-0.5 mg/3 mL nebulizer solution 3 mL, 3 mL, Nebulization, Q6H PRN, Hayden Quintanilla MD    aspirin chewable tablet 81 mg, 81 mg, Oral, Daily, Jose E Tristan DO, 81 mg at 09/30/24 0905    atorvastatin tablet 20 mg, 20 mg, Oral, Daily, Debbie Trotter MD, 20 mg at 09/30/24 0905    dextrose 10% bolus 125 mL 125 mL, 12.5 g, Intravenous, PRN, Jose Vo MD    dextrose 10% bolus 250 mL 250 mL, 25 g, Intravenous, PRN, Jose Vo MD    dextrose 5 % and 0.45 % NaCl infusion, , Intravenous, Continuous PRN, Anthony Cochran MD    finasteride tablet 5 mg, 5 mg, Oral, Daily, Debbie Trotter MD, 5 mg at 09/30/24 0905    haloperidol lactate injection 1 mg, 1 mg, Intravenous, Q6H PRN, Jose E Tristan DO, 1 mg at 09/30/24 0322    hydrALAZINE injection 10 mg, 10 mg, Intravenous, Q4H PRN, Jessica Martinez, FNP, 10 mg at 09/21/24 0541    insulin aspart U-100 injection 0-10 Units, 0-10 Units, Subcutaneous, QID (AC + HS) PRN, Jake Lugo MD, 4 Units at 09/30/24 1143    insulin glargine U-100 (Lantus) injection 12 Units, 12 Units, Subcutaneous, BID, Elayne Torres MD, 12 Units at 09/30/24 0905    loperamide capsule 2 mg, 2 mg, Oral, QID PRN, Frank Awad MD, 2 mg at 09/13/24 2158    magnesium sulfate 2g in water 50mL IVPB (premix), 2 g, Intravenous, PRN, Jose E Tristan DO, Stopped at 09/16/24 0041    melatonin tablet 9 mg, 9 mg, Oral, Nightly PRN, Jessica Martinez FNP, 9 mg at 09/29/24 2237    miconazole NITRATE 2 % top powder, , Topical (Top), BID, Zelalem Jules MD, Given at 09/30/24 0911    pantoprazole EC tablet 40 mg, 40 mg, Oral, BID, Alexis Orozco MD, 40 mg at 09/30/24 0905    rivaroxaban tablet 10  "mg, 10 mg, Oral, Daily with dinner, Zelalem Jules MD, 10 mg at 09/29/24 2242    sodium chloride 0.9% flush 10 mL, 10 mL, Intravenous, PRN, Anthony Cochran MD    sodium phosphate 20.01 mmol in D5W 250 mL IVPB, 20.01 mmol, Intravenous, PRN, Jose E Tristan DO    tamsulosin 24 hr capsule 0.4 mg, 0.4 mg, Oral, Daily, Debbie Trotter MD, 0.4 mg at 09/30/24 0905    zinc oxide 20 % ointment, , Topical (Top), BID, Hayden Quintanilla MD, Given at 09/30/24 0911       Current Facility-Administered Medications:     0.9%  NaCl infusion (for blood administration), , Intravenous, Q24H PRN    0.9%  NaCl infusion (for blood administration), , Intravenous, Q24H PRN    0.9% NaCl, , Intravenous, PRN    albuterol-ipratropium, 3 mL, Nebulization, Q6H PRN    dextrose 10%, 12.5 g, Intravenous, PRN    dextrose 10%, 25 g, Intravenous, PRN    D5 and 0.45% NaCl, , Intravenous, Continuous PRN    haloperidol lactate, 1 mg, Intravenous, Q6H PRN    hydrALAZINE, 10 mg, Intravenous, Q4H PRN    insulin aspart U-100, 0-10 Units, Subcutaneous, QID (AC + HS) PRN    loperamide, 2 mg, Oral, QID PRN    magnesium sulfate IVPB, 2 g, Intravenous, PRN    melatonin, 9 mg, Oral, Nightly PRN    sodium chloride 0.9%, 10 mL, Intravenous, PRN    sodium phosphate 20.01 mmol in D5W 250 mL IVPB, 20.01 mmol, Intravenous, PRN     No family history on file.       Review of Systems   Unable to perform ROS: Mental status change            Objective:   /67   Pulse 98   Temp 98.7 °F (37.1 °C) (Oral)   Resp 18   Ht 5' 4" (1.626 m)   Wt 50 kg (110 lb 3.7 oz)   SpO2 99%   BMI 18.92 kg/m²      Physical Exam   Constitutional: He appears ill.   Eyes: Pupils are equal, round, and reactive to light.   Cardiovascular: Normal rate. Pulmonary:      Effort: Pulmonary effort is normal.     Abdominal: Soft.   Musculoskeletal:      Comments: paraplegia   Neurological:   To person   Skin: There is pallor.            Review of Symptoms      Symptom Assessment (ESAS " 0-10 Scale)  Pain:  0  Dyspnea:  0  Anxiety:  0  Nausea:  0  Depression:  0  Anorexia:  0  Fatigue:  0  Insomnia:  0  Restlessness:  0  Agitation:  0         Bowel Management Plan (BMP):  Yes      Psychosocial/Cultural:   See Palliative Psychosocial Note: Yes  **Primary  to Follow**  Palliative Care  Consult: No      Advance Care Planning   Advance Care Planning        PAINAD: NA    Caregiver burden formerly assessed: Yes      No results displayed because visit has over 200 results.               > 50% of 40 min of encounter was spent in chart review, face to face discussion of goals of care, symptom assessment, coordination of care and emotional support.    Clara Yang FNP, [facility]  Palliative Medicine  [facility]

## 2024-09-30 NOTE — SUBJECTIVE & OBJECTIVE
Subjective:     HPI:  Wound medicine re-check    The patient is a 64 year old WM brought into [facility] ED from home on 9/8/24 with N/V/D x 1 week and progressive weakness, he  was admitted to ICU for acute hypoxic respiratory failure with right basilar pneumonia and developing progressive shock ultimately requiring endotracheal intubation, vasopressors and steroid administration. In the ED his blood glucose was >500.   His PMHx significant for HTN, HLD, CAD s/p PCI, BPH with history of self catheterization, and HFpEF.   Urine cultures demonstrated Klebsiella and Providencia, received Zosyn and azithromycin  Downgraded to HM on 9/12/24  Patient decompensated on 9/15/24 and was transferred back to ICU, for respiratory decompensation, hypotension not responding to IV boluses and required vasopressors. He was placed on BiPAP.   Downgraded to HM again on 9/20/24 and zosyn completed on 9/23/24. Weaned to room air.     Patient and family provide history, reports that patient was living alone with normal function until May 2021 when he was hospitalized due to extremely elevated blood sugars and diabetic coma; after this hospitalization he went to a SNF for therapy and returned home where he functioned independently for some time with the use of a walker and wheel chair for locomotion due to chronic weakness of his legs. Family reports that his debility was progressive over the last 2 years and about 6 weeks ago he fell while transferring himself from his bed to his wheel chair and since this time has been primarily in bed due to fear of falling again; family reports he was receiving assistance at home with private sitters for a few hours during the day.    Images were reviewed in EPIC, patient came in with redness to sacrum/buttocks but not noted with any skin breaks. He was being followed by inpatient wound nurse team and being treated for moisture associated skin breakdown as well as candidal skin infection. Wound medicine  has been consulted for evaluation of a wound of the sacral region that is concerning for deterioration.   Initial visit done on 9/26/24; patient with shallow crater over coccyx with mixed pink tissue and yellow waxy covering. No infection; local wound care initiated.   9/30/24 - wound re-check today. Met patient in room 804.  Agreeable for wound assessment at this time. No acute distress, appears agitated, wants to be repositioned and legs straightened out and wants everything off of his bed. Refusing heel boots, remains on low air loss bed side lying, not using purple wedge.               Hospital Course:   No notes on file      Follow-up For: Procedure(s) (LRB):  MRI (Magnetic Resonance Imagine) (N/A)    Post-Operative Day: 11 Days Post-Op    Scheduled Meds:   aspirin  81 mg Oral Daily    atorvastatin  20 mg Oral Daily    finasteride  5 mg Oral Daily    insulin glargine U-100  12 Units Subcutaneous BID    miconazole NITRATE 2 %   Topical (Top) BID    pantoprazole  40 mg Oral BID    rivaroxaban  10 mg Oral Daily with dinner    tamsulosin  0.4 mg Oral Daily    zinc oxide   Topical (Top) BID     Continuous Infusions:   D5 and 0.45% NaCl   Intravenous Continuous PRN         PRN Meds:  Current Facility-Administered Medications:     0.9%  NaCl infusion (for blood administration), , Intravenous, Q24H PRN    0.9%  NaCl infusion (for blood administration), , Intravenous, Q24H PRN    0.9% NaCl, , Intravenous, PRN    albuterol-ipratropium, 3 mL, Nebulization, Q6H PRN    dextrose 10%, 12.5 g, Intravenous, PRN    dextrose 10%, 25 g, Intravenous, PRN    D5 and 0.45% NaCl, , Intravenous, Continuous PRN    haloperidol lactate, 1 mg, Intravenous, Q6H PRN    hydrALAZINE, 10 mg, Intravenous, Q4H PRN    insulin aspart U-100, 0-10 Units, Subcutaneous, QID (AC + HS) PRN    loperamide, 2 mg, Oral, QID PRN    magnesium sulfate IVPB, 2 g, Intravenous, PRN    melatonin, 9 mg, Oral, Nightly PRN    sodium chloride 0.9%, 10 mL, Intravenous,  PRN    sodium phosphate 20.01 mmol in D5W 250 mL IVPB, 20.01 mmol, Intravenous, PRN    Review of Systems   Constitutional:  Positive for activity change and fatigue. Negative for chills, diaphoresis and fever.   Skin:  Positive for wound.   Neurological:  Positive for weakness.     Objective:     Vital Signs (Most Recent):  Temp: 98 °F (36.7 °C) (09/30/24 1116)  Pulse: 103 (09/30/24 1116)  Resp: 18 (09/30/24 1116)  BP: 107/67 (09/30/24 1116)  SpO2: 96 % (09/30/24 1116) Vital Signs (24h Range):  Temp:  [97.5 °F (36.4 °C)-98.5 °F (36.9 °C)] 98 °F (36.7 °C)  Pulse:  [] 103  Resp:  [16-19] 18  SpO2:  [96 %-100 %] 96 %  BP: ()/(61-88) 107/67     Weight: 50 kg (110 lb 3.7 oz)  Body mass index is 18.92 kg/m².     Physical Exam  Vitals reviewed.   Constitutional:       General: He is not in acute distress.     Appearance: He is cachectic. He is ill-appearing (chronic).      Comments: Thin frail appearing white male; prominent bony landmarks    HENT:      Head: Normocephalic and atraumatic.      Nose: Nose normal.   Cardiovascular:      Rate and Rhythm: Normal rate and regular rhythm.      Pulses: Normal pulses.   Pulmonary:      Effort: Pulmonary effort is normal. No respiratory distress.   Abdominal:      General: Abdomen is flat.      Palpations: Abdomen is soft.   Genitourinary:     Comments: Bowel/bladder incontinence   External urinary collection device   Skin:     General: Skin is warm and dry.      Capillary Refill: Capillary refill takes less than 2 seconds.             Comments: Shallow ulceration of coccyx mixed with pink/white tissue.  No signs of infection    Neurological:      Mental Status: He is alert.      Motor: Weakness present.   Psychiatric:         Mood and Affect: Affect is flat.         Behavior: Behavior is agitated. Behavior is cooperative.      Comments: Not overly interactive during visit, agrees to teaching regarding offloading and repositioning. Seems agitated today           Sacrum/coccyx: 2.6 x 1.8 cm              Laboratory:  A1C:   Recent Labs   Lab 04/16/24  2300 07/21/24  2300 09/14/24  0336   HGBA1C 6.1* 6.1* 6.2       BMP:   Recent Labs   Lab 09/24/24  0440      K 3.6      CO2 22*   BUN 26.3*   CREATININE 1.55*   CALCIUM 8.2*   MG 1.70       CBC:   Recent Labs   Lab 09/27/24  0407   WBC 7.04   RBC 3.50*   HGB 10.7*   HCT 33.7*   *   MCV 96.3*   MCH 30.6   MCHC 31.8*     CMP:   Recent Labs   Lab 09/24/24  0440   CALCIUM 8.2*      K 3.6   CO2 22*      BUN 26.3*   CREATININE 1.55*             Diagnostic Results:  I have reviewed all pertinent imaging results/findings within the past 24 hours.

## 2024-09-30 NOTE — NURSING
Nurses Note -- 4 Eyes      9/30/2024   2:42 AM      Skin assessed during: Q Shift Change      [] No Altered Skin Integrity Present    [x]Prevention Measures Documented      [] Yes- Altered Skin Integrity Present or Discovered   [] LDA Added if Not in Epic (Describe Wound)   [] New Altered Skin Integrity was Present on Admit and Documented in LDA   [] Wound Image Taken    Wound Care Consulted? No    Attending Nurse:  Alan Campo RN/Staff Member:  TOMMIE

## 2024-09-30 NOTE — PROGRESS NOTES
[facility] Hospital Medicine Progress Note        Chief Complaint: Inpatient Follow-up for weakness     HPI:    64-year-old male with HTN, HLD, CAD status post PCI, BPH on intermittent self catheterization, chronic diastolic heart failure, progressive physical decline with global weakness, initially presented with hyperglycemia, UTI, DWAINE, chest x-ray concerning for right-sided pneumonia with possible aspiration.  Admitted with septic shock that required vasopressors.  Because of respiratory failure he was intubated and mechanically ventilated.  Eventually, vasopressors were discontinued, the patient was extubated.  Urine cultures demonstrated Klebsiella, Providencia.  The patient was started on NG tube feeds.  He was cleared for oral intake by speech.  The antibiotics were initially deescalated and secondary to worsening leukocytosis were again escalated to include Zosyn, azithromycin.  Imaging demonstrated right-sided large pneumonia, possible empyema.  Pulmonary were consulted.  Because of progressive global weakness, Neurology were consulted who recommended an MRI of the brain and MRI of the entire spine.  Because a need for anesthesia, this has been deferred until 09/16/2024.  The patient did require a brief increase of his supplemental oxygenation given hypoxia in the 80s.  Chest x-ray was unremarkable.  Because of worsening D-dimer, a V/Q scan was ordered      Patient completely decompensated on 09/15/2024.  Patient remained hypotensive, hypoxic with a high suspicions for pulmonary emboli on full-dose anticoagulation.  Patient had to be transferred to ICU for respiratory decompensation, hypotension not responding to IV boluses requiring vasopressors.  In ICU he was placed on BiPAP/Levophed/isotonic crystalloid hydration and patient was kept on current IV antibiotic regimen.  Chest x-rays done in ICU with a right lower lobe consolidation/effusion concerning for empyema on Zosyn.   Decision was done to do a CTA of the chest.  CT chest abdomen with suspected ill-defined areas of bilateral intramuscular hemorrhage in the psoas muscles.  At that time anticoagulation was placed on hold.  Patient required 2 units PRBC and was able to wean down BiPAP to 8 L Oxymizer.  Neurology wanted MRI brain and whole spine secondary to patient's history of chronic paraplegia/wheelchair-bound over the past few years but patient refused MRIs.  Patient was kept on supplemental oxygen during the day and BiPAP at night.  Eventually patient agreed to have some MRIs done under anesthesia.  At this moment patient has been downgraded again to hospitalist services on 9/20.  Completed Zosyn on 09/23.  Weaned to room air.  Case management consulted for SNF placement     MRI C- spine   Impression:  1. No evidence of active demyelination.  2. Mild degenerative narrowing spinal canal at C3-C5.  3. Multilevel neural foraminal stenoses as described.  4. Chronic cord signal changes at C4-C6.    MRI brain   Impression:  1. Subacute ischemic changes in the right basal ganglia.  2. Mild chronic microvascular ischemic changes.     Palliative care team consulted.       patient had an unwitnessed fall, 9/29/24 CT head, CT cervical spine was obtained , reported no acute pathology.    Interval Hx:   No acute events reported overnight.  Patient was seen at bedside, alert awake , he reported he is doing fine he has no active issues he denied any pain.     Patient hemodynamically stable on room air.    Objective/physical exam:  General: In no acute distress, frail. Generalized muscle loss  Chest:  Unlabored breathing   Heart:  Normal rate  Abdomen: Soft, nontender  Neurologic:  Bilateral lower extremity weak     VITAL SIGNS: 24 HRS MIN & MAX LAST   Temp  Min: 97.5 °F (36.4 °C)  Max: 98.5 °F (36.9 °C) 98 °F (36.7 °C)   BP  Min: 95/62  Max: 127/88 107/67   Pulse  Min: 97  Max: 104  103   Resp  Min: 16  Max: 20 18   SpO2  Min: 94 %  Max: 100 %  96 %     I have reviewed the following labs:  Recent Labs   Lab 09/25/24  0500 09/26/24  0451 09/27/24  0407   WBC 7.59 7.84 7.04   RBC 3.36* 3.48* 3.50*   HGB 10.4* 10.9* 10.7*   HCT 32.0* 33.2* 33.7*   MCV 95.2* 95.4* 96.3*   MCH 31.0 31.3* 30.6   MCHC 32.5* 32.8* 31.8*   RDW 16.1 16.3 16.3    384 474*   MPV 10.6* 10.3 10.4     Recent Labs   Lab 09/24/24  0440      K 3.6      CO2 22*   BUN 26.3*   CREATININE 1.55*   CALCIUM 8.2*   MG 1.70     Microbiology Results (last 7 days)       ** No results found for the last 168 hours. **             See below for Radiology    Assessment/Plan:  Subacute right basal ganglia infarct and encephalomalacia  to right occipital lobe  Chronic paraparesis from chronic myelopathy    Septic shock secondary to UTI with Klebsiella and Providencia treated appropriately with Zosyn: resolved   Acute hypoxic respiratory failure requiring noninvasive positive pressure ventilation: resolved   Septic shock requiring vasopressors as well as transfusion PRBC: resolved   Right lower lobe pneumonia, likely aspiration : resolved   Type 2 diabetes mellitus, non insulin with hyperglycemia  Acute kidney injury superimposed on chronic kidney disease stage 3  Duodenitis : resolved  Symptomatic anemia requiring transfusion PRBC: resolved  Essential hypertension ,Mixed hyperlipidemia , Previous history of CAD status post PCI   Chronic heart failure with a preserved ejection fraction, euvolemic            Plan:  Repeat head CT last PM stable, patient received Xarelto no issues reported  Maintain fall precautions   Palliative care team following  Continue OT/PT services  Monitor sugars, continue Lantus, continue sliding scale coverage  Continue strict aspiration,decubitus precautions   Case discussed with Case management today, working on placement reported pending insurance authorization  Lab as needed   Labs in a.m.    VTE prophylaxis: Xarelto     Patient condition:  Fair      Anticipated discharge and Disposition:   SNF        Portions of this note dictated using EMR integrated voice recognition software, and may be subject to voice recognition errors not corrected at proofreading. Please contact writer for clarification if needed.   _____________________________________________________________________    Malnutrition Status:    Scheduled Med:   aspirin  81 mg Oral Daily    atorvastatin  20 mg Oral Daily    finasteride  5 mg Oral Daily    insulin glargine U-100  12 Units Subcutaneous BID    miconazole NITRATE 2 %   Topical (Top) BID    pantoprazole  40 mg Oral BID    rivaroxaban  10 mg Oral Daily with dinner    tamsulosin  0.4 mg Oral Daily    zinc oxide   Topical (Top) BID      Continuous Infusions:   D5 and 0.45% NaCl   Intravenous Continuous PRN          PRN Meds:    Current Facility-Administered Medications:     0.9%  NaCl infusion (for blood administration), , Intravenous, Q24H PRN    0.9%  NaCl infusion (for blood administration), , Intravenous, Q24H PRN    0.9% NaCl, , Intravenous, PRN    albuterol-ipratropium, 3 mL, Nebulization, Q6H PRN    dextrose 10%, 12.5 g, Intravenous, PRN    dextrose 10%, 25 g, Intravenous, PRN    D5 and 0.45% NaCl, , Intravenous, Continuous PRN    haloperidol lactate, 1 mg, Intravenous, Q6H PRN    hydrALAZINE, 10 mg, Intravenous, Q4H PRN    insulin aspart U-100, 0-10 Units, Subcutaneous, QID (AC + HS) PRN    loperamide, 2 mg, Oral, QID PRN    magnesium sulfate IVPB, 2 g, Intravenous, PRN    melatonin, 9 mg, Oral, Nightly PRN    sodium chloride 0.9%, 10 mL, Intravenous, PRN    sodium phosphate 20.01 mmol in D5W 250 mL IVPB, 20.01 mmol, Intravenous, PRN     Radiology:  I have personally reviewed the following imaging and agree with the radiologist.     CT Head Without Contrast  Narrative: EXAMINATION:  CT HEAD WITHOUT CONTRAST    CLINICAL HISTORY:  Head trauma, minor (Age >= 65y);repeat CT following fall overnight; on xarelto; Age:64  y/o    TECHNIQUE:  Low dose axial images were obtained through the head.  Coronal and sagittal reformations were also performed. Contrast was not administered.  Dose reduction techniques including automatic exposure control (AEC) were utilized.  DLP: 991mGycm    COMPARISON:  CT head without 09/29/2024    FINDINGS:  INTRACRANIAL: There is age-appropriate global loss of volume.  Stable moderate periventricular and subcortical white matter hypodense changes, which are nonspecific, but can be seen with chronic microvascular ischemic disease.  Atherosclerotic calcifications of the intracranial carotid arteries are noted.   No acute intracranial hemorrhage.  No hydrocephalus.  No intracranial mass effect.    SINUSES: Unchanged trace amount of fluid seen within the right mastoid sinus.  Otherwise, the visualized paranasal sinuses and left mastoids are clear.    SKULL/SCALP: Visualized osseous structures are normal.    ORBITS: Visualized orbits are normal.  Impression: Stable findings compared to prior study.    Electronically signed by: Stuart Monge  Date:    09/29/2024  Time:    20:05  CT Cervical Spine Without Contrast  Narrative: Technique: CT of the cervical spine was performed without intravenous contrast with axial as well as sagittal and coronal images.    Comparison: None.    Dosage Information: Automated Exposure Control was utilized 1212.36 mGy.cm.    Clinical history: Neck pain, acute, no red flags.    Findings:    Position: Supine.    Lung apices: The visualized lung apices appear unremarkable..    Mineralization: Within normal limits for age.    Rotation: No significant rotation is seen.    Scoliosis: No significant scoliosis is seen.    Vertebral Fusion: No vertebral fusion is identified.    Listhesis: No significant listhesis is identified.    Lordosis: The cervical lordosis is maintained.    Intervertebral disc spaces: Moderately decreased disc height is seen at C6-C7.    Osteophytes: Mild multilevel  endplate osteophytes are seen.    Endplate Sclerosis: Mild endplate sclerosis is seen off the opposing endplates at C6-C7.    Uncovertebral degenerative changes: No significant uncovertebral degenerative changes are seen.    Facet degenerative changes: Mild multilevel facet degenerative changes are seen.    Calcifications: None.    Fractures: No acute cervical spine fracture dislocation or subluxation is seen.    Orthopedic Hardware: Orthopedic plates and screws are affixed to the chronic fractures of the spinous processes and the left vertebral arch from C4 to C7.    This exam does not exclude the possibility intrathecal soft tissue, ligamentous or vascular abnormality.    Miscellaneous:    Mastoid air cells: Mild opacity is seen in the right mastoid air cells. This suggests an element of right mastoiditis.    Soft Tissues: Unremarkable.  Impression: Impression:    1. No acute cervical spine fracture dislocation or subluxation is seen.    2. Mild opacity is seen in the right mastoid air cells. This suggests an element of right mastoiditis. Correlate with clinical and laboratory findings as regards additional evaluation and follow up.    3. Degenerative and postsurgical changes and other details as above. Details and other findings as noted above.    No significant discrepancy with overnight report.    Electronically signed by: Jose J Do  Date:    09/29/2024  Time:    07:57  CT Head Without Contrast  Narrative: Technique: CT of the head was performed without intravenous contrast with axial as well as coronal and sagittal images.    Comparison: Comparison is with study dated May 18, 2021.  MRI brain September 19, 2024.    Dosage Information: Automated Exposure Control was utilized 1212.36 mGy.cm.    Clinical history: Fall.    Findings:    Artifact: There is severe motion artifact on some of the images, worst at the level of the cerebellum and skull base which decreases sensitivity and specificity of the study for  subtle intracranial hemorrhage and subtle nondisplaced fractures.    Hemorrhage: No acute intracranial hemorrhage is seen in the optimally visualized images.    CSF spaces: The ventricles, sulci and basal cisterns all appear prominent consistent with global cerebral atrophy, stable from prior.    Brain parenchyma: There is no acute large vessel territory infarct identified.  Scattered microvascular change is seen in portions of the periventricular and deep white matter tracts.    Cerebellum: Unremarkable.    Atheromatous calcification of the intracranial arteries is seen.    Calvarium: No acute linear or depressed skull fracture is seen in the optimally visualized osseous structures.    Maxillofacial Structures:    Paranasal sinuses: The visualized paranasal sinuses appear clear with no mucoperiosteal thickening or air fluid levels identified.  Impression: No acute intracranial traumatic injury identified. Details and other findings as noted above.    No significant discrepancy with overnight report.    Electronically signed by: Jose J Do  Date:    09/29/2024  Time:    07:55      Elayne Torres MD  Department of Hospital Medicine   [facility]   09/30/2024

## 2024-09-30 NOTE — PLAN OF CARE
09/30/24 1129   Discharge Reassessment   Assessment Type Discharge Planning Reassessment   Did the patient's condition or plan change since previous assessment? Yes   Discharge Plan discussed with: Sibling   Communicated RISSA with patient/caregiver Yes   Discharge Plan A Skilled Nursing Facility   Discharge Plan B Skilled Nursing Facility   DME Needed Upon Discharge  none   Transition of Care Barriers None   Why the patient remains in the hospital Requires continued medical care   Post-Acute Status   Post-Acute Authorization Placement   Post-Acute Placement Status Pending payor review/awaiting authorization (if required)   Discharge Delays None known at this time     Patient has been clinically accepted by [facility]. Contacted patient's sister Chantal, she confirms she has already met with facility and is getting the funds together.  Placement pending insurance authorization. Facility will submit for auth today.

## 2024-09-30 NOTE — PHYSICIAN QUERY
Please clarify the nutritional diagnosis associated with the clinical findings listed in the query:  Severe Protein Calorie Malnutrition

## 2024-09-30 NOTE — PT/OT/SLP RE-EVAL
Physical Therapy Re-Evaluation    Patient Name:  Froy Barragan   MRN:  [id number]    Recommendations:     Discharge therapy intensity: Moderate Intensity Therapy   Discharge Equipment Recommendations: to be determined by next level of care   Barriers to discharge: Impaired mobility    Assessment:     Froy Barragan is a 64 y.o. male admitted with a medical diagnosis of septic shock 2/2 UTI, DWAINE, acute respiratory failure requiring intubation . .  He presents with the following impairments/functional limitations: weakness, impaired endurance, impaired functional mobility, impaired self care skills, gait instability, impaired balance, impaired cognition, decreased coordination     Rehab Prognosis: Good; patient would benefit from acute skilled PT services to address these deficits and reach maximum level of function.    Recent Surgery: Procedure(s) (LRB):  MRI (Magnetic Resonance Imagine) (N/A) 11 Days Post-Op    Plan:     During this hospitalization, patient would benefit from acute PT services 5 x/week to address the identified rehab impairments via therapeutic exercises, therapeutic activities and progress toward the following goals:    Plan of Care Expires:  10/24/24    PT/PTA conference to discuss PT POC and patient's progression towards goals held with Luisa Fair PTA.     Subjective     Chief Complaint:   Patient/Family Comments/goals:   Pain/Comfort:       Patients cultural, spiritual, Mormon conflicts given the current situation: no    Objective:     Communicated with NURSE prior to session.  Patient found supine with telemetry  upon PT entry to room.    General Precautions: Standard, fall  Orthopedic Precautions:N/A   Braces: N/A  Respiratory Status: Room air  Blood Pressure:       Exams:    Skin integrity: Visible skin intact      Functional Mobility:  Bed Mobility:     Supine to Sit: moderate assistance  Sit to Supine: moderate assistance  Transfers:     Sit to Stand:  maximal  assistance with no AD  Pt stood 4 times with PT but PT had to manually push back his knees to straighten him to a somewhat erect stand posture      AM-PAC 6 CLICK MOBILITY  Total Score:        Treatment & Education:      Patient provided with verbal education education regarding PT role/goals/POC and safety awareness.  Understanding was verbalized, however additional teaching warranted.     Patient left left sidelying with wedge under R side.    GOALS:   Multidisciplinary Problems       Physical Therapy Goals          Problem: Physical Therapy    Goal Priority Disciplines Outcome Interventions   Physical Therapy Goal     PT, PT/OT Progressing    Description: Goals to be met by: d/c     Patient will increase functional independence with mobility by performin. Supine to sit with Stand-by Assistance  2. Sit to supine with Stand-by Assistance  3. Sit to stand transfer with Stand-by Assistance  4. Bed to chair transfer with Stand-by Assistance using No Assistive Device scoot pivot vs SB  5. Wheelchair propulsion x100 feet with Supervision using bilateral uppper extremities                         History:     Past Medical History:   Diagnosis Date    Anemia of chronic disease 2022    Arteriosclerosis of coronary artery 2022    Cervical myelopathy 2022    Cobalamin deficiency 2022    Gastroesophageal reflux disease 2022    Hypertension 2022    Low vitamin D level 2022    Mixed hyperlipidemia 2022    Paraparesis 2022    Stage 3a chronic kidney disease 2022    Type 2 diabetes mellitus 2022       Past Surgical History:   Procedure Laterality Date    MAGNETIC RESONANCE IMAGING N/A 2024    Procedure: MRI (Magnetic Resonance Imagine);  Surgeon: David Amin DO;  Location: [facility];  Service: Anesthesiology;  Laterality: N/A;    SPINE SURGERY         Time Tracking:     PT Received On:    PT Start Time: 1545     PT Stop Time: 1604  PT Total Time (min): 19 min     Billable  Minutes: Re-eval 19      09/30/2024

## 2024-10-01 VITALS
HEART RATE: 100 BPM | TEMPERATURE: 98 F | DIASTOLIC BLOOD PRESSURE: 74 MMHG | OXYGEN SATURATION: 97 % | RESPIRATION RATE: 18 BRPM | HEIGHT: 64 IN | WEIGHT: 110.25 LBS | SYSTOLIC BLOOD PRESSURE: 109 MMHG | BODY MASS INDEX: 18.82 KG/M2

## 2024-10-01 LAB
ANION GAP SERPL CALC-SCNC: 5 MEQ/L
BASOPHILS # BLD AUTO: 0.12 X10(3)/MCL
BASOPHILS NFR BLD AUTO: 1.4 %
BUN SERPL-MCNC: 53.7 MG/DL (ref 8.4–25.7)
CALCIUM SERPL-MCNC: 8.4 MG/DL (ref 8.8–10)
CHLORIDE SERPL-SCNC: 109 MMOL/L (ref 98–107)
CO2 SERPL-SCNC: 27 MMOL/L (ref 23–31)
CREAT SERPL-MCNC: 1.51 MG/DL (ref 0.73–1.18)
CREAT/UREA NIT SERPL: 36
EOSINOPHIL # BLD AUTO: 0.22 X10(3)/MCL (ref 0–0.9)
EOSINOPHIL NFR BLD AUTO: 2.5 %
ERYTHROCYTE [DISTWIDTH] IN BLOOD BY AUTOMATED COUNT: 16.6 % (ref 11.5–17)
GFR SERPLBLD CREATININE-BSD FMLA CKD-EPI: 51 ML/MIN/1.73/M2
GLUCOSE SERPL-MCNC: 92 MG/DL (ref 82–115)
HCT VFR BLD AUTO: 29.9 % (ref 42–52)
HGB BLD-MCNC: 9.7 G/DL (ref 14–18)
IMM GRANULOCYTES # BLD AUTO: 0.06 X10(3)/MCL (ref 0–0.04)
IMM GRANULOCYTES NFR BLD AUTO: 0.7 %
LYMPHOCYTES # BLD AUTO: 0.99 X10(3)/MCL (ref 0.6–4.6)
LYMPHOCYTES NFR BLD AUTO: 11.5 %
MCH RBC QN AUTO: 31.5 PG (ref 27–31)
MCHC RBC AUTO-ENTMCNC: 32.4 G/DL (ref 33–36)
MCV RBC AUTO: 97.1 FL (ref 80–94)
MONOCYTES # BLD AUTO: 1.2 X10(3)/MCL (ref 0.1–1.3)
MONOCYTES NFR BLD AUTO: 13.9 %
NEUTROPHILS # BLD AUTO: 6.05 X10(3)/MCL (ref 2.1–9.2)
NEUTROPHILS NFR BLD AUTO: 70 %
NRBC BLD AUTO-RTO: 0 %
PLATELET # BLD AUTO: 471 X10(3)/MCL (ref 130–400)
PMV BLD AUTO: 10.4 FL (ref 7.4–10.4)
POCT GLUCOSE: 221 MG/DL (ref 70–110)
POCT GLUCOSE: 92 MG/DL (ref 70–110)
POTASSIUM SERPL-SCNC: 4.2 MMOL/L (ref 3.5–5.1)
RBC # BLD AUTO: 3.08 X10(6)/MCL (ref 4.7–6.1)
SODIUM SERPL-SCNC: 141 MMOL/L (ref 136–145)
WBC # BLD AUTO: 8.64 X10(3)/MCL (ref 4.5–11.5)

## 2024-10-01 PROCEDURE — 25000003 PHARM REV CODE 250: Performed by: INTERNAL MEDICINE

## 2024-10-01 PROCEDURE — 63600175 PHARM REV CODE 636 W HCPCS: Performed by: INTERNAL MEDICINE

## 2024-10-01 PROCEDURE — 97535 SELF CARE MNGMENT TRAINING: CPT | Mod: CO

## 2024-10-01 PROCEDURE — 63600175 PHARM REV CODE 636 W HCPCS

## 2024-10-01 PROCEDURE — 25000003 PHARM REV CODE 250

## 2024-10-01 PROCEDURE — 97110 THERAPEUTIC EXERCISES: CPT | Mod: CQ

## 2024-10-01 PROCEDURE — 99900031 HC PATIENT EDUCATION (STAT)

## 2024-10-01 PROCEDURE — 84134 ASSAY OF PREALBUMIN: CPT

## 2024-10-01 PROCEDURE — 94799 UNLISTED PULMONARY SVC/PX: CPT

## 2024-10-01 PROCEDURE — 85025 COMPLETE CBC W/AUTO DIFF WBC: CPT | Performed by: INTERNAL MEDICINE

## 2024-10-01 PROCEDURE — 36415 COLL VENOUS BLD VENIPUNCTURE: CPT | Performed by: INTERNAL MEDICINE

## 2024-10-01 PROCEDURE — 97530 THERAPEUTIC ACTIVITIES: CPT | Mod: CQ

## 2024-10-01 PROCEDURE — 80048 BASIC METABOLIC PNL TOTAL CA: CPT | Performed by: INTERNAL MEDICINE

## 2024-10-01 RX ORDER — MICONAZOLE NITRATE 2 G/100G
POWDER TOPICAL 2 TIMES DAILY
Qty: 43 G | Refills: 0 | Status: SHIPPED | OUTPATIENT
Start: 2024-10-01 | End: 2024-10-31

## 2024-10-01 RX ORDER — ZINC OXIDE 20 G/100G
OINTMENT TOPICAL 2 TIMES DAILY
COMMUNITY
Start: 2024-10-01

## 2024-10-01 RX ADMIN — ASPIRIN 81 MG CHEWABLE TABLET 81 MG: 81 TABLET CHEWABLE at 09:10

## 2024-10-01 RX ADMIN — PANTOPRAZOLE SODIUM 40 MG: 40 TABLET, DELAYED RELEASE ORAL at 09:10

## 2024-10-01 RX ADMIN — MICONAZOLE NITRATE 2 % TOPICAL POWDER: at 09:10

## 2024-10-01 RX ADMIN — TAMSULOSIN HYDROCHLORIDE 0.4 MG: 0.4 CAPSULE ORAL at 09:10

## 2024-10-01 RX ADMIN — INSULIN ASPART 2 UNITS: 100 INJECTION, SOLUTION INTRAVENOUS; SUBCUTANEOUS at 01:10

## 2024-10-01 RX ADMIN — ATORVASTATIN CALCIUM 20 MG: 10 TABLET, FILM COATED ORAL at 09:10

## 2024-10-01 RX ADMIN — INSULIN GLARGINE 12 UNITS: 100 INJECTION, SOLUTION SUBCUTANEOUS at 09:10

## 2024-10-01 RX ADMIN — RUGBY ZINC OXIDE 20%: 20 OINTMENT TOPICAL at 09:10

## 2024-10-01 RX ADMIN — FINASTERIDE 5 MG: 5 TABLET, FILM COATED ORAL at 09:10

## 2024-10-01 NOTE — PLAN OF CARE
10/01/24 1050   Final Note   Assessment Type Final Discharge Note   Anticipated Discharge Disposition SNF   Post-Acute Status   Post-Acute Authorization Placement   Post-Acute Placement Status Set-up Complete/Auth obtained   Discharge Delays None known at this time     Patient will dc to [facility] to receive skilled nursing services. ALEX Cornejo has been notified and confirmed with CM she will sign paperwork with facility today. NH van will provide transportation. Nurse will be given dc packet and report information when it becomes available.

## 2024-10-01 NOTE — PLAN OF CARE
I just received a call back from Yessenia at [facility] and they are ready to admit today if he is medically cleared.

## 2024-10-01 NOTE — DISCHARGE SUMMARY
[facility] Hospital Medicine Discharge Summary    Admit Date: 9/8/2024  Discharge Date and Time: 10/1/555384:48 AM  Admitting Physician:  Team  Discharging Physician: Niranjan Rankin MD.  Primary Care Physician: George Bran MD  Consults: {consultation: 29925}    Discharge Diagnoses:  ***    Hospital Course:   ***  Pt was seen and examined on the day of discharge  Vitals:  VITAL SIGNS: 24 HRS MIN & MAX LAST   Temp  Min: 98 °F (36.7 °C)  Max: 98.7 °F (37.1 °C) 98.5 °F (36.9 °C)   BP  Min: 101/67  Max: 155/86 122/81   Pulse  Min: 97  Max: 107  107   Resp  Min: 17  Max: 18 18   SpO2  Min: 96 %  Max: 99 % 98 %       Physical Exam:  ***    Procedures Performed: No admission procedures for hospital encounter.     Significant Diagnostic Studies: See Full reports for all details    Recent Labs   Lab 09/26/24  0451 09/27/24  0407 10/01/24  0504   WBC 7.84 7.04 8.64   RBC 3.48* 3.50* 3.08*   HGB 10.9* 10.7* 9.7*   HCT 33.2* 33.7* 29.9*   MCV 95.4* 96.3* 97.1*   MCH 31.3* 30.6 31.5*   MCHC 32.8* 31.8* 32.4*   RDW 16.3 16.3 16.6    474* 471*   MPV 10.3 10.4 10.4       Recent Labs   Lab 10/01/24  0504      K 4.2   *   CO2 27   BUN 53.7*   CREATININE 1.51*   CALCIUM 8.4*        Microbiology Results (last 7 days)       ** No results found for the last 168 hours. **             CT Head Without Contrast  Narrative: EXAMINATION:  CT HEAD WITHOUT CONTRAST    CLINICAL HISTORY:  Head trauma, minor (Age >= 65y);repeat CT following fall overnight; on xarelto; Age:63 y/o    TECHNIQUE:  Low dose axial images were obtained through the head.  Coronal and sagittal reformations were also performed. Contrast was not administered.  Dose reduction techniques including automatic exposure control (AEC) were utilized.  DLP: 991mGycm    COMPARISON:  CT head without 09/29/2024    FINDINGS:  INTRACRANIAL: There is age-appropriate global loss of volume.  Stable moderate periventricular and subcortical  white matter hypodense changes, which are nonspecific, but can be seen with chronic microvascular ischemic disease.  Atherosclerotic calcifications of the intracranial carotid arteries are noted.   No acute intracranial hemorrhage.  No hydrocephalus.  No intracranial mass effect.    SINUSES: Unchanged trace amount of fluid seen within the right mastoid sinus.  Otherwise, the visualized paranasal sinuses and left mastoids are clear.    SKULL/SCALP: Visualized osseous structures are normal.    ORBITS: Visualized orbits are normal.  Impression: Stable findings compared to prior study.    Electronically signed by: Stuart Monge  Date:    09/29/2024  Time:    20:05  CT Cervical Spine Without Contrast  Narrative: Technique: CT of the cervical spine was performed without intravenous contrast with axial as well as sagittal and coronal images.    Comparison: None.    Dosage Information: Automated Exposure Control was utilized 1212.36 mGy.cm.    Clinical history: Neck pain, acute, no red flags.    Findings:    Position: Supine.    Lung apices: The visualized lung apices appear unremarkable..    Mineralization: Within normal limits for age.    Rotation: No significant rotation is seen.    Scoliosis: No significant scoliosis is seen.    Vertebral Fusion: No vertebral fusion is identified.    Listhesis: No significant listhesis is identified.    Lordosis: The cervical lordosis is maintained.    Intervertebral disc spaces: Moderately decreased disc height is seen at C6-C7.    Osteophytes: Mild multilevel endplate osteophytes are seen.    Endplate Sclerosis: Mild endplate sclerosis is seen off the opposing endplates at C6-C7.    Uncovertebral degenerative changes: No significant uncovertebral degenerative changes are seen.    Facet degenerative changes: Mild multilevel facet degenerative changes are seen.    Calcifications: None.    Fractures: No acute cervical spine fracture dislocation or subluxation is seen.    Orthopedic  Hardware: Orthopedic plates and screws are affixed to the chronic fractures of the spinous processes and the left vertebral arch from C4 to C7.    This exam does not exclude the possibility intrathecal soft tissue, ligamentous or vascular abnormality.    Miscellaneous:    Mastoid air cells: Mild opacity is seen in the right mastoid air cells. This suggests an element of right mastoiditis.    Soft Tissues: Unremarkable.  Impression: Impression:    1. No acute cervical spine fracture dislocation or subluxation is seen.    2. Mild opacity is seen in the right mastoid air cells. This suggests an element of right mastoiditis. Correlate with clinical and laboratory findings as regards additional evaluation and follow up.    3. Degenerative and postsurgical changes and other details as above. Details and other findings as noted above.    No significant discrepancy with overnight report.    Electronically signed by: Jose J Do  Date:    09/29/2024  Time:    07:57  CT Head Without Contrast  Narrative: Technique: CT of the head was performed without intravenous contrast with axial as well as coronal and sagittal images.    Comparison: Comparison is with study dated May 18, 2021.  MRI brain September 19, 2024.    Dosage Information: Automated Exposure Control was utilized 1212.36 mGy.cm.    Clinical history: Fall.    Findings:    Artifact: There is severe motion artifact on some of the images, worst at the level of the cerebellum and skull base which decreases sensitivity and specificity of the study for subtle intracranial hemorrhage and subtle nondisplaced fractures.    Hemorrhage: No acute intracranial hemorrhage is seen in the optimally visualized images.    CSF spaces: The ventricles, sulci and basal cisterns all appear prominent consistent with global cerebral atrophy, stable from prior.    Brain parenchyma: There is no acute large vessel territory infarct identified.  Scattered microvascular change is seen in portions  of the periventricular and deep white matter tracts.    Cerebellum: Unremarkable.    Atheromatous calcification of the intracranial arteries is seen.    Calvarium: No acute linear or depressed skull fracture is seen in the optimally visualized osseous structures.    Maxillofacial Structures:    Paranasal sinuses: The visualized paranasal sinuses appear clear with no mucoperiosteal thickening or air fluid levels identified.  Impression: No acute intracranial traumatic injury identified. Details and other findings as noted above.    No significant discrepancy with overnight report.    Electronically signed by: Jose J Do  Date:    09/29/2024  Time:    07:55         Medication List        START taking these medications      miconazole NITRATE 2 % 2 % top powder  Commonly known as: MICOTIN  Apply topically 2 (two) times daily.     zinc oxide 20 % ointment  Apply topically 2 (two) times a day.            CONTINUE taking these medications      alcohol swabs Padm  Commonly known as: ALCOHOL PREP  Apply 1 each topically once daily.     alfuzosin 10 mg Tb24  Commonly known as: UROXATRAL  TAKE 1 TABLET EVERY DAY WITH BREAKFAST     aspirin 81 MG EC tablet  Commonly known as: ECOTRIN     b complex vitamins capsule     blood-glucose meter Misc  Commonly known as: TRUE METRIX AIR GLUCOSE METER  1 each by Misc.(Non-Drug; Combo Route) route once daily.     DEXCOM G7 SENSOR Carmen  Generic drug: blood-glucose sensor  1 each by Misc.(Non-Drug; Combo Route) route once daily.     ezetimibe 10 mg tablet  Commonly known as: ZETIA  Take 1 tablet (10 mg total) by mouth once daily.     finasteride 5 mg tablet  Commonly known as: PROSCAR  Take 1 tablet (5 mg total) by mouth once daily.     glimepiride 2 MG tablet  Commonly known as: AMARYL  Take 1 tablet (2 mg total) by mouth before breakfast.     lancets 33 gauge Misc  Commonly known as: TRUEPLUS LANCETS  1 lancet by Misc.(Non-Drug; Combo Route) route once daily.     metFORMIN 1000 MG  tablet  Commonly known as: GLUCOPHAGE  Take 1 tablet (1,000 mg total) by mouth 2 (two) times a day.     pantoprazole 40 MG tablet  Commonly known as: PROTONIX  TAKE 1 TABLET ONE TIME DAILY     rosuvastatin 40 MG Tab  Commonly known as: CRESTOR  Take 1 tablet (40 mg total) by mouth once daily.     TRUE METRIX GLUCOSE TEST STRIP Strp  Generic drug: blood sugar diagnostic  TEST BLOOD SUGAR EVERY DAY     TRUE METRIX LEVEL 1 Soln  Generic drug: blood glucose control, low  1 Bottle by Misc.(Non-Drug; Combo Route) route once daily.     vitamin D 1000 units Tab  Commonly known as: VITAMIN D3            STOP taking these medications      amLODIPine 2.5 MG tablet  Commonly known as: NORVASC     carvediloL 25 MG tablet  Commonly known as: COREG               Where to Get Your Medications        These medications were sent to [facility] - [address] [address]      Phone: [phone]   miconazole NITRATE 2 % 2 % top powder       You can get these medications from any pharmacy    You don't need a prescription for these medications  zinc oxide 20 % ointment          Explained in detail to the patient about the discharge plan, medications, and follow-up visits. Pt understands and agrees with the treatment plan  Discharge Disposition:    Discharged Condition: stable  Diet-   Dietary Orders (From admission, onward)       Start     Ordered    09/27/24 1159  Dietary nutrition supplements BID; Jayson - Orange  Continuous        Question Answer Comment   Frequency: BID    Select PO Supplement: Jayson - Orange        09/27/24 1158    09/20/24 2149  Diet Pureed (IDDSI Level 4) Supervision with Meals, Diabetic; 2000 Calorie  Diet effective now        Question Answer Comment   Diet Modifier: Supervision with Meals    Diet Modifier: Diabetic    Total calories / carbs: 2000 Calorie        09/20/24 2150                   Medications Per DC med rec  Activities as tolerated    Follow-up Information       George Bran MD Follow up.    Specialty: Internal Medicine  Why: As needed  Contact information:  [address]  [place] [id number] [phone]                           For further questions contact hospitalist office    Discharge time 33 minutes    For worsening symptoms, chest pain, shortness of breath, increased abdominal pain, high grade fever, stroke or stroke like symptoms, immediately go to the nearest Emergency Room or call 911 as soon as possible.      Niranjan Ramachandran M.D on 10/1/2024. at 10:48 AM.           GLUCOSE METER  1 each by Misc.(Non-Drug; Combo Route) route once daily.     DEXCOM G7 SENSOR Carmen  Generic drug: blood-glucose sensor  1 each by Misc.(Non-Drug; Combo Route) route once daily.     ezetimibe 10 mg tablet  Commonly known as: ZETIA  Take 1 tablet (10 mg total) by mouth once daily.     finasteride 5 mg tablet  Commonly known as: PROSCAR  Take 1 tablet (5 mg total) by mouth once daily.     glimepiride 2 MG tablet  Commonly known as: AMARYL  Take 1 tablet (2 mg total) by mouth before breakfast.     lancets 33 gauge Misc  Commonly known as: TRUEPLUS LANCETS  1 lancet by Misc.(Non-Drug; Combo Route) route once daily.     metFORMIN 1000 MG tablet  Commonly known as: GLUCOPHAGE  Take 1 tablet (1,000 mg total) by mouth 2 (two) times a day.     pantoprazole 40 MG tablet  Commonly known as: PROTONIX  TAKE 1 TABLET ONE TIME DAILY     rosuvastatin 40 MG Tab  Commonly known as: CRESTOR  Take 1 tablet (40 mg total) by mouth once daily.     TRUE METRIX GLUCOSE TEST STRIP Strp  Generic drug: blood sugar diagnostic  TEST BLOOD SUGAR EVERY DAY     TRUE METRIX LEVEL 1 Soln  Generic drug: blood glucose control, low  1 Bottle by Misc.(Non-Drug; Combo Route) route once daily.     vitamin D 1000 units Tab  Commonly known as: VITAMIN D3            STOP taking these medications      amLODIPine 2.5 MG tablet  Commonly known as: NORVASC     carvediloL 25 MG tablet  Commonly known as: COREG               Where to Get Your Medications        These medications were sent to [facility] [address] [address]      Phone: [phone]   miconazole NITRATE 2 % 2 % top powder       You can get these medications from any pharmacy    You don't need a prescription for these medications  zinc oxide 20 % ointment          Explained in detail to the patient about the discharge plan, medications, and follow-up visits. Pt understands and agrees with the treatment plan  Discharge  Disposition:    Discharged Condition: stable  Diet-   Dietary Orders (From admission, onward)       Start     Ordered    09/27/24 1159  Dietary nutrition supplements BID; Jayson - Orange  Continuous        Question Answer Comment   Frequency: BID    Select PO Supplement: Jayson - Orange        09/27/24 1158    09/20/24 2149  Diet Pureed (IDDSI Level 4) Supervision with Meals, Diabetic; 2000 Calorie  Diet effective now        Question Answer Comment   Diet Modifier: Supervision with Meals    Diet Modifier: Diabetic    Total calories / carbs: 2000 Calorie        09/20/24 2150                   Medications Per DC med rec  Activities as tolerated   Follow-up Information       George Bran MD Follow up.    Specialty: Internal Medicine  Why: As needed  Contact information:  [address]  [facility] [address] [phone]                           For further questions contact hospitalist office    Discharge time 33 minutes    For worsening symptoms, chest pain, shortness of breath, increased abdominal pain, high grade fever, stroke or stroke like symptoms, immediately go to the nearest Emergency Room or call 911 as soon as possible.      Niranjan Ramachandran M.D on 10/1/2024. at 10:48 AM.

## 2024-10-01 NOTE — PLAN OF CARE
Patient transported via van. AVS and paperwork with patient. Patient belongings given to sister and patient. IV discontinued. NADN. No questions or concerns at this time.

## 2024-10-01 NOTE — PT/OT/SLP PROGRESS
Physical Therapy Treatment    Patient Name:  Froy Barragan   MRN:  [id number]    Recommendations:     Discharge therapy intensity: Moderate Intensity Therapy   Discharge Equipment Recommendations: to be determined by next level of care  Barriers to discharge: Impaired mobility    Assessment:     Froy Barragan is a 64 y.o. male admitted with a medical diagnosis of septic shock 2/2 UTI, DWAINE, acute respiratory failure requiring intubation .  He presents with the following impairments/functional limitations: weakness, impaired endurance, impaired functional mobility, impaired self care skills, gait instability, impaired balance, impaired cognition, decreased coordination .    Pt oriented when asked direct questions. Unable to follow vcs for mobilization. Pt required MAX cueing for all activities.   Pt unable to manage cell phone without assistance from therapist.     Rehab Prognosis: Good; patient would benefit from acute skilled PT services to address these deficits and reach maximum level of function.    Recent Surgery: Procedure(s) (LRB):  MRI (Magnetic Resonance Imagine) (N/A) 12 Days Post-Op    Plan:     During this hospitalization, patient would benefit from acute PT services 5 x/week to address the identified rehab impairments via therapeutic exercises, therapeutic activities and progress toward the following goals:    Plan of Care Expires:  10/24/24    Subjective     Chief Complaint:   Patient/Family Comments/goals:   Pain/Comfort:  Pain Rating 1: 0/10      Objective:     Communicated with NSG prior to session.  Patient found up in chair with telemetry upon PT entry to room.     General Precautions: Standard, fall  Orthopedic Precautions: N/A  Braces: N/A  Respiratory Status: Room air  Blood Pressure:   Skin Integrity: Visible skin intact      Functional Mobility:  Bed Mobility:     Scooting: maximal assistance  Sit to Supine: maximal assistance and pt impulsively layed himself back into bed.  Therapist quickly assisted pt so he did not hit head on bedrail. Pt educated on safety  Transfers:     Bed to Chair: maximal assistance with  no AD  using  Squat Pivot and      BLE AROM/AAROM:ankle pumps (very minimal movement), knee flex/ext, alt hip flexion, hip add/abd. 10-12 reps. Required MAX vcs to stay on task and for proper technique.       Patient left right sidelying with all lines intact and call button in reach    GOALS:   Multidisciplinary Problems       Physical Therapy Goals          Problem: Physical Therapy    Goal Priority Disciplines Outcome Interventions   Physical Therapy Goal     PT, PT/OT Progressing    Description: Goals to be met by: d/c     Patient will increase functional independence with mobility by performin. Supine to sit with Stand-by Assistance  2. Sit to supine with Stand-by Assistance  3. Sit to stand transfer with Stand-by Assistance  4. Bed to chair transfer with Stand-by Assistance using No Assistive Device scoot pivot vs SB  5. Wheelchair propulsion x100 feet with Supervision using bilateral uppper extremities                         Time Tracking:     PT Received On: 10/01/24  PT Start Time: 1010     PT Stop Time: 1033  PT Total Time (min): 23 min     Billable Minutes: Therapeutic Activity 15 and Therapeutic Exercise 8    Treatment Type: Treatment  PT/PTA: PTA     Number of PTA visits since last PT visit: 1     10/01/2024

## 2024-10-01 NOTE — PT/OT/SLP PROGRESS
Occupational Therapy   Treatment    Name: Froy Barragan  MRN: [id number]  Admitting Diagnosis:  Diabetic ketoacidosis without coma associated with type 2 diabetes mellitus  12 Days Post-Op    Recommendations:     Recommended therapy intensity at discharge: Moderate Intensity Therapy   Discharge Equipment Recommendations:  to be determined by next level of care  Barriers to discharge:  Decreased caregiver support    Assessment:     Froy Barragan is a 64 y.o. male with a medical diagnosis of Diabetic ketoacidosis without coma associated with type 2 diabetes mellitus.  He presents with desire to go home, with decreased safety awareness. Performance deficits affecting function are weakness, impaired endurance, impaired cognition, decreased ROM, impaired self care skills, impaired functional mobility, impaired balance, decreased safety awareness, decreased lower extremity function, decreased upper extremity function.     Rehab Prognosis:  Good; patient would benefit from acute skilled OT services to address these deficits and reach maximum level of function.       Plan:     Patient to be seen 4 x/week to address the above listed problems via self-care/home management, therapeutic activities, therapeutic exercises  Plan of Care Expires: 10/12/24  Plan of Care Reviewed with: patient    Subjective     Pain/Comfort:       Objective:     Communicated with: RN prior to session.  Patient found HOB elevated with telemetry upon OT entry to room.    General Precautions: Standard, fall    Orthopedic Precautions:N/A  Braces: N/A  Respiratory Status: Room air     Occupational Performance:     Bed Mobility:    Patient completed Supine to Sit with minimum assistance and 2 persons   Pt scooted EOB with min-A after verbal prompts    Functional Mobility/Transfers:  Bed>chair via Squat-Pivot t/f, no AE, requiring mod/max-A X2 persons  Functional Mobility: not attempted on this date    Activities of Daily  Living:  Grooming: stand by assistance combed hair using LUE while seated in chair  Lower Body Dressing: moderate assistance pt demo'd ability to pull B socks up     Therapeutic Positioning    OT interventions performed during the course of today's session in an effort to prevent and/or reduce acquired pressure injuries:   Education was provided on benefits of and recommendations for therapeutic positioning  Therapeutic positioning was provided at the conclusion of session to offload all bony prominences for the prevention and/or reduction of pressure injuries      [facility] 6 Click ADL:      Patient Education:  Patient provided with verbal education education regarding OT role/goals/POC, fall prevention, Discharge/DME recommendations, and pressure ulcer prevention.  Understanding was verbalized, however additional teaching warranted.      Patient left  up in chair, desk over BLE for increased reminding not to get up alone  with all lines intact, call button in reach, and chair alarm on.    GOALS:   Multidisciplinary Problems       Occupational Therapy Goals          Problem: Occupational Therapy    Goal Priority Disciplines Outcome Interventions   Occupational Therapy Goal     OT, PT/OT Progressing    Description: Goals to be met by: 10/12/24     Patient will increase functional independence with ADLs by performing:    Grooming while seated with Supervision.  Toileting from bedside commode with Moderate Assistance for hygiene and clothing management.   Sitting at edge of bed x10 minutes with Supervision.  Supine to sit with Supervision.  Squat pivot transfers with Minimal Assistance.  Toilet transfer to bedside commode with Minimal Assistance.  Increased functional strength to 4/5 for self care skills and functional mobility.  Upper extremity exercise program x10 reps per handout, with independence.                         Time Tracking:     OT Date of Treatment: 10/01/24  OT Start Time: 0910  OT Stop Time: 0928  OT  Total Time (min): 18 min    Billable Minutes:Self Care/Home Management 1    OT/JOLENE: JOLENE     Number of JOLENE visits since last OT visit: 2    10/1/2024

## 2024-10-01 NOTE — PROGRESS NOTES
Inpatient Nutrition Assessment    Admit Date: 9/8/2024   Total duration of encounter: 23 days   Patient Age: 64 y.o.    Nutrition Recommendation/Prescription     -Continue oral diet per SLP recs with diabetic diet restriction. Assist with meals.   -Continue Jayson BID.  -Monitor wt, labs, and intake.     Communication of Recommendations: reviewed with patient and reviewed with family    Nutrition Assessment     Malnutrition Assessment/Nutrition-Focused Physical Exam    Malnutrition Context: chronic illness (09/09/24 1049)  Malnutrition Level: severe (09/09/24 1049)  Energy Intake (Malnutrition): less than 75% for greater than or equal to 1 month (09/09/24 1049)  Weight Loss (Malnutrition):  (does not meet criteria) (09/09/24 1049)  Subcutaneous Fat (Malnutrition): severe depletion (09/09/24 1049)  Orbital Region (Subcutaneous Fat Loss): severe depletion  Upper Arm Region (Subcutaneous Fat Loss): severe depletion     Muscle Mass (Malnutrition): severe depletion (09/09/24 1049)  [place] Region (Muscle Loss): severe depletion     Clavicle and Acromion Bone Region (Muscle Loss): severe depletion           Anterior Thigh Region (Muscle Loss): severe depletion  Posterior Calf Region (Muscle Loss): severe depletion           A minimum of two characteristics is recommended for diagnosis of either severe or non-severe malnutrition.    Chart Review    Reason Seen: follow-up    Malnutrition Screening Tool Results   Have you recently lost weight without trying?: Unsure  Have you been eating poorly because of a decreased appetite?: Yes   MST Score: 3   Diagnosis:  Septic shock secondary to UTI  Uncontrolled T2DM with hyperglycemia  DWAINE on CKD secondary to volume depletion  Metabolic acidosis; likely secondary to lactic acidosis vs diarrhea  Hypomagnesemia  Hypophosphatemia  Diarrhea  Normocytic anemia  Malnutrition    Relevant Medical History: HTN, HLD, CAD S/P PCI, BPH, HF     Scheduled Medications:  aspirin, 81 mg,  Daily  atorvastatin, 20 mg, Daily  finasteride, 5 mg, Daily  insulin glargine U-100, 12 Units, BID  miconazole NITRATE 2 %, , BID  pantoprazole, 40 mg, BID  rivaroxaban, 10 mg, Daily with dinner  tamsulosin, 0.4 mg, Daily  zinc oxide, , BID    Continuous Infusions:  D5 and 0.45% NaCl    PRN Medications:   0.9%  NaCl infusion (for blood administration), , Q24H PRN  0.9%  NaCl infusion (for blood administration), , Q24H PRN  0.9% NaCl, , PRN  albuterol-ipratropium, 3 mL, Q6H PRN  dextrose 10%, 12.5 g, PRN  dextrose 10%, 25 g, PRN  D5 and 0.45% NaCl, , Continuous PRN  haloperidol lactate, 1 mg, Q6H PRN  hydrALAZINE, 10 mg, Q4H PRN  insulin aspart U-100, 0-10 Units, QID (AC + HS) PRN  loperamide, 2 mg, QID PRN  magnesium sulfate IVPB, 2 g, PRN  melatonin, 9 mg, Nightly PRN  sodium chloride 0.9%, 10 mL, PRN  sodium phosphate 20.01 mmol in D5W 250 mL IVPB, 20.01 mmol, PRN    Calorie Containing IV Medications: no significant kcals from medications at this time    Recent Labs   Lab 09/25/24  0500 09/26/24  0451 09/27/24  0407 10/01/24  0504   NA  --   --   --  141   K  --   --   --  4.2   CALCIUM  --   --   --  8.4*   CL  --   --   --  109*   CO2  --   --   --  27   BUN  --   --   --  53.7*   CREATININE  --   --   --  1.51*   EGFRNORACEVR  --   --   --  51   GLUCOSE  --   --   --  92   WBC 7.59 7.84 7.04 8.64   HGB 10.4* 10.9* 10.7* 9.7*   HCT 32.0* 33.2* 33.7* 29.9*     Nutrition Orders:  Diet Pureed (IDDSI Level 4) Supervision with Meals, Diabetic; 2000 Calorie  Dietary nutrition supplements BID; Jayson - Orange    Appetite/Oral Intake: good/% of meals  Factors Affecting Nutritional Intake: impaired cognitive status/motor control and difficulty/impaired swallowing  Social Needs Impacting Access to Food: none identified  Food/Druze/Cultural Preferences: none reported  Food Allergies: no known food allergies  Last Bowel Movement: 09/30/24  Wound(s):     Wound 09/09/24 Moisture associated dermatitis Buttocks-Tissue  "loss description: Partial thickness    Comments    24 Patient on ventilator, propofol off during rounds, plans to start tube feeding. Spoke with patient's sister at bedside. She reports very poor intake for the past month and weight loss, weight history in chart reveals no significant weight changes over the past year. Patient is malnourished and at risk for refeeding syndrome; recommend slow initiation/advancement of tube feeding.    24 Patient extubated, tube feeding discontinued, started on dysphagia diet. Patient reports a fair appetite, eating about 25-50% of meals, agreeable to vanilla Boost BID.    24: Discussed with RN, possible plans for NG placement since pt unable to consume adequate nutrition. Will provide TF recommendations in case needed. Pt mental status prohibiting obtaining subjective info.     24: Pt reports good appetite/intake; denies n/v and chewing/swallowing difficulty. Pt is on oral diet per SLP recs.     24: Pt eating well and tolerating diet well per pt and family member.    24: Pt asleep during rounds but noted all of breakfast tray consumed.      10/1/24: Pt continues with good po intake; tolerating meals well.     Anthropometrics    Height: 5' 4" (162.6 cm), Height Method: Stated  Last Weight: 50 kg (110 lb 3.7 oz) (24 1044), Weight Method: Bed Scale  BMI (Calculated): 18.9  BMI Classification: underweight (BMI less than 22 if >65 years of age)        Ideal Body Weight (IBW), Male: 130 lb     % Ideal Body Weight, Male (lb): 79.31 %                 Usual Body Weight (UBW), k.4 kg (10/24/23)  % Usual Body Weight: 92.1  % Weight Change From Usual Weight: -8.09 %  Usual Weight Provided By: family/caregiver and EMR weight history    Wt Readings from Last 8 Encounters:   24 50 kg (110 lb 3.7 oz)   24 49.4 kg (108 lb 14.5 oz)   24 49.4 kg (109 lb)   24 49.9 kg (110 lb)   24 50 kg (110 lb 3.2 oz)   24 46.3 kg (102 lb 1.6 " oz)   10/24/23 54.4 kg (120 lb)   10/19/23 54.4 kg (120 lb)     Weight Change(s) Since Admission:   (9/9) fluctuations over past 24 hours, question accuracy, took bed weight of 50 kg during rounds today  Wt Readings from Last 1 Encounters:   09/09/24 1044 50 kg (110 lb 3.7 oz)   09/08/24 2045 46.8 kg (103 lb 1.6 oz)   09/08/24 1617 54.4 kg (120 lb)   Admit Weight: 54.4 kg (120 lb) (09/08/24 1617), Weight Method: Bed Scale    Estimated Needs    Weight Used For Calorie Calculations: 50 kg (110 lb 3.7 oz)  Energy Calorie Requirements (kcal): 4835-5972, 35-40 kcal/kg  Weight Used For Protein Calculations: 50 kg (110 lb 3.7 oz)  Protein Requirements:  g, 1.5-2 g/kg  Fluid Requirements (mL): 1500  CHO Requirement: 175-250 g, 40-50% of kcal    Enteral Nutrition Patient not receiving enteral nutrition at this time.    Parenteral Nutrition Patient not receiving parenteral nutrition support at this time.    Evaluation of Received Nutrient Intake    Calories: meeting estimated needs  Protein: meeting estimated needs    Patient Education Not applicable.    Nutrition Diagnosis     PES: Inadequate energy intake related to inability to consume sufficient nutrients as evidenced by less than 80% needs met. (resolved)  PES: Severe chronic disease or condition related malnutrition related to suboptimal protein/energy intake as evidenced by less than 75% needs met for greater than or equal to 1 month, severe fat depletion, and severe muscle depletion. (active)    Nutrition Interventions     Intervention(s): modified composition of enteral nutrition, modified rate of enteral nutrition, and collaboration with other providers  Goal: Meet greater than 80% of nutritional needs by follow-up. (goal progressing)  Goal: Tolerate enteral feeding at goal rate by follow-up. (goal discontinued)    Nutrition Goals & Monitoring     Dietitian will monitor: energy intake, weight, weight change, electrolyte/renal panel, glucose/endocrine profile,  and gastrointestinal profile  Discharge planning: continue pureed, diabetic diet   Nutrition Risk/Follow-Up: high (follow-up in 1-4 days)   Please consult if re-assessment needed sooner.

## 2024-10-01 NOTE — PLAN OF CARE
[facility] uploaded discharge orders/AVS and clinicals to [facility]. Report info. Packet was given to nurse . NH will set up van transport.

## 2024-10-02 ENCOUNTER — LAB REQUISITION (OUTPATIENT)
Dept: LAB | Facility: HOSPITAL | Age: 64
End: 2024-10-02
Payer: MEDICARE

## 2024-10-02 DIAGNOSIS — E11.10 TYPE 2 DIABETES MELLITUS WITH KETOACIDOSIS WITHOUT COMA: ICD-10-CM

## 2024-10-02 LAB
25(OH)D3+25(OH)D2 SERPL-MCNC: 33 NG/ML (ref 30–80)
ALBUMIN SERPL-MCNC: 2.9 G/DL (ref 3.4–4.8)
ALBUMIN/GLOB SERPL: 0.8 RATIO (ref 1.1–2)
ALP SERPL-CCNC: 83 UNIT/L (ref 40–150)
ALT SERPL-CCNC: 24 UNIT/L (ref 0–55)
ANION GAP SERPL CALC-SCNC: 11 MEQ/L
AST SERPL-CCNC: 20 UNIT/L (ref 5–34)
BASOPHILS # BLD AUTO: 0.11 X10(3)/MCL
BASOPHILS NFR BLD AUTO: 1.4 %
BILIRUB SERPL-MCNC: 0.7 MG/DL
BUN SERPL-MCNC: 48.2 MG/DL (ref 8.4–25.7)
CALCIUM SERPL-MCNC: 9.4 MG/DL (ref 8.8–10)
CHLORIDE SERPL-SCNC: 105 MMOL/L (ref 98–107)
CHOLEST SERPL-MCNC: 284 MG/DL
CHOLEST/HDLC SERPL: 6 {RATIO} (ref 0–5)
CO2 SERPL-SCNC: 27 MMOL/L (ref 23–31)
CREAT SERPL-MCNC: 1.35 MG/DL (ref 0.73–1.18)
CREAT/UREA NIT SERPL: 36
EOSINOPHIL # BLD AUTO: 0.18 X10(3)/MCL (ref 0–0.9)
EOSINOPHIL NFR BLD AUTO: 2.2 %
ERYTHROCYTE [DISTWIDTH] IN BLOOD BY AUTOMATED COUNT: 16.6 % (ref 11.5–17)
EST. AVERAGE GLUCOSE BLD GHB EST-MCNC: 125.5 MG/DL
FERRITIN SERPL-MCNC: 896.57 NG/ML (ref 21.81–274.66)
GFR SERPLBLD CREATININE-BSD FMLA CKD-EPI: 58 ML/MIN/1.73/M2
GLOBULIN SER-MCNC: 3.8 GM/DL (ref 2.4–3.5)
GLUCOSE SERPL-MCNC: 68 MG/DL (ref 82–115)
HBA1C MFR BLD: 6 %
HCT VFR BLD AUTO: 33.8 % (ref 42–52)
HDLC SERPL-MCNC: 46 MG/DL (ref 35–60)
HGB BLD-MCNC: 10.9 G/DL (ref 14–18)
IMM GRANULOCYTES # BLD AUTO: 0.07 X10(3)/MCL (ref 0–0.04)
IMM GRANULOCYTES NFR BLD AUTO: 0.9 %
IRON SATN MFR SERPL: 19 % (ref 20–50)
IRON SERPL-MCNC: 40 UG/DL (ref 65–175)
LDLC SERPL CALC-MCNC: 221 MG/DL (ref 50–140)
LYMPHOCYTES # BLD AUTO: 1.24 X10(3)/MCL (ref 0.6–4.6)
LYMPHOCYTES NFR BLD AUTO: 15.3 %
MCH RBC QN AUTO: 31.1 PG (ref 27–31)
MCHC RBC AUTO-ENTMCNC: 32.2 G/DL (ref 33–36)
MCV RBC AUTO: 96.3 FL (ref 80–94)
MONOCYTES # BLD AUTO: 0.99 X10(3)/MCL (ref 0.1–1.3)
MONOCYTES NFR BLD AUTO: 12.2 %
NEUTROPHILS # BLD AUTO: 5.52 X10(3)/MCL (ref 2.1–9.2)
NEUTROPHILS NFR BLD AUTO: 68 %
NRBC BLD AUTO-RTO: 0 %
PLATELET # BLD AUTO: 528 X10(3)/MCL (ref 130–400)
PMV BLD AUTO: 10.7 FL (ref 7.4–10.4)
POTASSIUM SERPL-SCNC: 4.7 MMOL/L (ref 3.5–5.1)
PREALB SERPL-MCNC: 21.6 MG/DL (ref 16–42)
PREALB SERPL-MCNC: 23 MG/DL (ref 16–42)
PROT SERPL-MCNC: 6.7 GM/DL (ref 5.8–7.6)
RBC # BLD AUTO: 3.51 X10(6)/MCL (ref 4.7–6.1)
SODIUM SERPL-SCNC: 143 MMOL/L (ref 136–145)
TIBC SERPL-MCNC: 168 UG/DL (ref 69–240)
TIBC SERPL-MCNC: 208 UG/DL (ref 250–450)
TRANSFERRIN SERPL-MCNC: 192 MG/DL (ref 163–344)
TRIGL SERPL-MCNC: 83 MG/DL (ref 34–140)
TSH SERPL-ACNC: 1.21 UIU/ML (ref 0.35–4.94)
VLDLC SERPL CALC-MCNC: 17 MG/DL
WBC # BLD AUTO: 8.11 X10(3)/MCL (ref 4.5–11.5)

## 2024-10-02 PROCEDURE — 85025 COMPLETE CBC W/AUTO DIFF WBC: CPT | Performed by: NURSE PRACTITIONER

## 2024-10-02 PROCEDURE — 84134 ASSAY OF PREALBUMIN: CPT | Performed by: NURSE PRACTITIONER

## 2024-10-02 PROCEDURE — 83550 IRON BINDING TEST: CPT | Performed by: NURSE PRACTITIONER

## 2024-10-02 PROCEDURE — 83036 HEMOGLOBIN GLYCOSYLATED A1C: CPT | Performed by: NURSE PRACTITIONER

## 2024-10-02 PROCEDURE — 82306 VITAMIN D 25 HYDROXY: CPT | Performed by: NURSE PRACTITIONER

## 2024-10-02 PROCEDURE — 80053 COMPREHEN METABOLIC PANEL: CPT | Performed by: NURSE PRACTITIONER

## 2024-10-02 PROCEDURE — 80061 LIPID PANEL: CPT | Performed by: NURSE PRACTITIONER

## 2024-10-02 PROCEDURE — 84443 ASSAY THYROID STIM HORMONE: CPT | Performed by: NURSE PRACTITIONER

## 2024-10-02 PROCEDURE — 83540 ASSAY OF IRON: CPT | Performed by: NURSE PRACTITIONER

## 2024-10-02 PROCEDURE — 82728 ASSAY OF FERRITIN: CPT | Performed by: NURSE PRACTITIONER

## 2024-10-12 ENCOUNTER — LAB REQUISITION (OUTPATIENT)
Dept: LAB | Facility: HOSPITAL | Age: 64
End: 2024-10-12
Payer: MEDICARE

## 2024-10-12 DIAGNOSIS — E16.2 HYPOGLYCEMIA, UNSPECIFIED: ICD-10-CM

## 2024-10-12 LAB
ALBUMIN SERPL-MCNC: 2.9 G/DL (ref 3.4–4.8)
ALBUMIN/GLOB SERPL: 0.9 RATIO (ref 1.1–2)
ALP SERPL-CCNC: 82 UNIT/L (ref 40–150)
ALT SERPL-CCNC: 17 UNIT/L (ref 0–55)
ANION GAP SERPL CALC-SCNC: 13 MEQ/L
AST SERPL-CCNC: 19 UNIT/L (ref 5–34)
BASOPHILS # BLD AUTO: 0.07 X10(3)/MCL
BASOPHILS NFR BLD AUTO: 0.6 %
BILIRUB SERPL-MCNC: 0.3 MG/DL
BUN SERPL-MCNC: 43.5 MG/DL (ref 8.4–25.7)
CALCIUM SERPL-MCNC: 8.9 MG/DL (ref 8.8–10)
CHLORIDE SERPL-SCNC: 111 MMOL/L (ref 98–107)
CO2 SERPL-SCNC: 20 MMOL/L (ref 23–31)
CREAT SERPL-MCNC: 2.63 MG/DL (ref 0.73–1.18)
CREAT/UREA NIT SERPL: 17
EOSINOPHIL # BLD AUTO: 0.11 X10(3)/MCL (ref 0–0.9)
EOSINOPHIL NFR BLD AUTO: 0.9 %
ERYTHROCYTE [DISTWIDTH] IN BLOOD BY AUTOMATED COUNT: 15.9 % (ref 11.5–17)
GFR SERPLBLD CREATININE-BSD FMLA CKD-EPI: 26 ML/MIN/1.73/M2
GLOBULIN SER-MCNC: 3.4 GM/DL (ref 2.4–3.5)
GLUCOSE SERPL-MCNC: 110 MG/DL (ref 82–115)
HCT VFR BLD AUTO: 34.1 % (ref 42–52)
HGB BLD-MCNC: 10.8 G/DL (ref 14–18)
IMM GRANULOCYTES # BLD AUTO: 0.05 X10(3)/MCL (ref 0–0.04)
IMM GRANULOCYTES NFR BLD AUTO: 0.4 %
LYMPHOCYTES # BLD AUTO: 0.93 X10(3)/MCL (ref 0.6–4.6)
LYMPHOCYTES NFR BLD AUTO: 8 %
MCH RBC QN AUTO: 31.1 PG (ref 27–31)
MCHC RBC AUTO-ENTMCNC: 31.7 G/DL (ref 33–36)
MCV RBC AUTO: 98.3 FL (ref 80–94)
MONOCYTES # BLD AUTO: 0.77 X10(3)/MCL (ref 0.1–1.3)
MONOCYTES NFR BLD AUTO: 6.6 %
NEUTROPHILS # BLD AUTO: 9.74 X10(3)/MCL (ref 2.1–9.2)
NEUTROPHILS NFR BLD AUTO: 83.5 %
NRBC BLD AUTO-RTO: 0 %
PLATELET # BLD AUTO: 208 X10(3)/MCL (ref 130–400)
PMV BLD AUTO: 11.7 FL (ref 7.4–10.4)
POTASSIUM SERPL-SCNC: 4.9 MMOL/L (ref 3.5–5.1)
PROT SERPL-MCNC: 6.3 GM/DL (ref 5.8–7.6)
RBC # BLD AUTO: 3.47 X10(6)/MCL (ref 4.7–6.1)
SODIUM SERPL-SCNC: 144 MMOL/L (ref 136–145)
WBC # BLD AUTO: 11.67 X10(3)/MCL (ref 4.5–11.5)

## 2024-10-12 PROCEDURE — 80053 COMPREHEN METABOLIC PANEL: CPT | Performed by: NURSE PRACTITIONER

## 2024-10-12 PROCEDURE — 85025 COMPLETE CBC W/AUTO DIFF WBC: CPT | Performed by: NURSE PRACTITIONER

## 2024-10-18 ENCOUNTER — LAB REQUISITION (OUTPATIENT)
Dept: LAB | Facility: HOSPITAL | Age: 64
End: 2024-10-18
Payer: MEDICARE

## 2024-10-18 DIAGNOSIS — R31.9 HEMATURIA, UNSPECIFIED: ICD-10-CM

## 2024-10-18 LAB
COLOR STL: ABNORMAL
CONSISTENCY STL: ABNORMAL
HEMOCCULT SP1 STL QL: POSITIVE

## 2024-10-18 PROCEDURE — 82270 OCCULT BLOOD FECES: CPT | Performed by: NURSE PRACTITIONER

## 2025-01-09 PROBLEM — E11.10 DIABETIC KETOACIDOSIS WITHOUT COMA ASSOCIATED WITH TYPE 2 DIABETES MELLITUS: Status: RESOLVED | Noted: 2024-09-26 | Resolved: 2025-01-09

## 2025-01-10 ENCOUNTER — HOSPITAL ENCOUNTER (EMERGENCY)
Facility: HOSPITAL | Age: 65
Discharge: HOME | End: 2025-01-10
Attending: STUDENT IN AN ORGANIZED HEALTH CARE EDUCATION/TRAINING PROGRAM
Payer: MEDICARE

## 2025-01-10 VITALS
TEMPERATURE: 98 F | HEIGHT: 62 IN | HEART RATE: 83 BPM | WEIGHT: 115 LBS | BODY MASS INDEX: 21.16 KG/M2 | OXYGEN SATURATION: 99 % | SYSTOLIC BLOOD PRESSURE: 123 MMHG | DIASTOLIC BLOOD PRESSURE: 83 MMHG | RESPIRATION RATE: 15 BRPM

## 2025-01-10 DIAGNOSIS — E11.65 TYPE 2 DIABETES MELLITUS WITH HYPERGLYCEMIA, WITHOUT LONG-TERM CURRENT USE OF INSULIN: Chronic | ICD-10-CM

## 2025-01-10 DIAGNOSIS — R73.9 HYPERGLYCEMIA: Primary | ICD-10-CM

## 2025-01-10 DIAGNOSIS — E13.9 DIABETES MELLITUS OF OTHER TYPE WITHOUT COMPLICATION, UNSPECIFIED WHETHER LONG TERM INSULIN USE: ICD-10-CM

## 2025-01-10 LAB
ALBUMIN SERPL-MCNC: 3.7 G/DL (ref 3.4–4.8)
ALBUMIN/GLOB SERPL: 1 RATIO (ref 1.1–2)
ALP SERPL-CCNC: 77 UNIT/L (ref 40–150)
ALT SERPL-CCNC: 59 UNIT/L (ref 0–55)
ANION GAP SERPL CALC-SCNC: 9 MEQ/L
AST SERPL-CCNC: 35 UNIT/L (ref 5–34)
B-OH-BUTYR SERPL-MCNC: 0.2 MMOL/L
BASOPHILS # BLD AUTO: 0.05 X10(3)/MCL
BASOPHILS NFR BLD AUTO: 0.6 %
BILIRUB SERPL-MCNC: 0.3 MG/DL
BUN SERPL-MCNC: 53.9 MG/DL (ref 8.4–25.7)
CALCIUM SERPL-MCNC: 9.1 MG/DL (ref 8.8–10)
CHLORIDE SERPL-SCNC: 108 MMOL/L (ref 98–107)
CO2 SERPL-SCNC: 23 MMOL/L (ref 23–31)
CREAT SERPL-MCNC: 2.52 MG/DL (ref 0.72–1.25)
CREAT/UREA NIT SERPL: 21
EOSINOPHIL # BLD AUTO: 0.23 X10(3)/MCL (ref 0–0.9)
EOSINOPHIL NFR BLD AUTO: 2.9 %
ERYTHROCYTE [DISTWIDTH] IN BLOOD BY AUTOMATED COUNT: 14.2 % (ref 11.5–17)
GFR SERPLBLD CREATININE-BSD FMLA CKD-EPI: 27 ML/MIN/1.73/M2
GLOBULIN SER-MCNC: 3.7 GM/DL (ref 2.4–3.5)
GLUCOSE SERPL-MCNC: 400 MG/DL (ref 82–115)
HCT VFR BLD AUTO: 29.8 % (ref 42–52)
HGB BLD-MCNC: 9.6 G/DL (ref 14–18)
IMM GRANULOCYTES # BLD AUTO: 0.02 X10(3)/MCL (ref 0–0.04)
IMM GRANULOCYTES NFR BLD AUTO: 0.2 %
LYMPHOCYTES # BLD AUTO: 1.04 X10(3)/MCL (ref 0.6–4.6)
LYMPHOCYTES NFR BLD AUTO: 13 %
MCH RBC QN AUTO: 32.2 PG (ref 27–31)
MCHC RBC AUTO-ENTMCNC: 32.2 G/DL (ref 33–36)
MCV RBC AUTO: 100 FL (ref 80–94)
MONOCYTES # BLD AUTO: 0.77 X10(3)/MCL (ref 0.1–1.3)
MONOCYTES NFR BLD AUTO: 9.6 %
NEUTROPHILS # BLD AUTO: 5.92 X10(3)/MCL (ref 2.1–9.2)
NEUTROPHILS NFR BLD AUTO: 73.7 %
NRBC BLD AUTO-RTO: 0 %
PLATELET # BLD AUTO: 211 X10(3)/MCL (ref 130–400)
PMV BLD AUTO: 11.2 FL (ref 7.4–10.4)
POCT GLUCOSE: 230 MG/DL (ref 70–110)
POCT GLUCOSE: 342 MG/DL (ref 70–110)
POCT GLUCOSE: 440 MG/DL (ref 70–110)
POTASSIUM SERPL-SCNC: 5.1 MMOL/L (ref 3.5–5.1)
PROT SERPL-MCNC: 7.4 GM/DL (ref 5.8–7.6)
RBC # BLD AUTO: 2.98 X10(6)/MCL (ref 4.7–6.1)
SODIUM SERPL-SCNC: 140 MMOL/L (ref 136–145)
WBC # BLD AUTO: 8.03 X10(3)/MCL (ref 4.5–11.5)

## 2025-01-10 PROCEDURE — 82962 GLUCOSE BLOOD TEST: CPT

## 2025-01-10 PROCEDURE — 63600175 PHARM REV CODE 636 W HCPCS: Performed by: STUDENT IN AN ORGANIZED HEALTH CARE EDUCATION/TRAINING PROGRAM

## 2025-01-10 PROCEDURE — 82010 KETONE BODYS QUAN: CPT

## 2025-01-10 PROCEDURE — 25000003 PHARM REV CODE 250: Performed by: STUDENT IN AN ORGANIZED HEALTH CARE EDUCATION/TRAINING PROGRAM

## 2025-01-10 PROCEDURE — 25000003 PHARM REV CODE 250

## 2025-01-10 PROCEDURE — 99284 EMERGENCY DEPT VISIT MOD MDM: CPT | Mod: 25

## 2025-01-10 PROCEDURE — 80053 COMPREHEN METABOLIC PANEL: CPT

## 2025-01-10 PROCEDURE — 85025 COMPLETE CBC W/AUTO DIFF WBC: CPT

## 2025-01-10 PROCEDURE — 96374 THER/PROPH/DIAG INJ IV PUSH: CPT

## 2025-01-10 RX ORDER — SODIUM CHLORIDE 9 MG/ML
1000 INJECTION, SOLUTION INTRAVENOUS
Status: COMPLETED | OUTPATIENT
Start: 2025-01-10 | End: 2025-01-10

## 2025-01-10 RX ORDER — METFORMIN HYDROCHLORIDE 500 MG/1
500 TABLET ORAL
Status: COMPLETED | OUTPATIENT
Start: 2025-01-10 | End: 2025-01-10

## 2025-01-10 RX ORDER — GLIMEPIRIDE 1 MG/1
1 TABLET ORAL
Qty: 30 TABLET | Refills: 0 | Status: SHIPPED | OUTPATIENT
Start: 2025-01-10 | End: 2025-02-09

## 2025-01-10 RX ADMIN — SODIUM CHLORIDE 1000 ML: 9 INJECTION, SOLUTION INTRAVENOUS at 09:01

## 2025-01-10 RX ADMIN — METFORMIN HYDROCHLORIDE 500 MG: 500 TABLET, FILM COATED ORAL at 11:01

## 2025-01-10 RX ADMIN — HUMAN INSULIN 5 UNITS: 100 INJECTION, SOLUTION SUBCUTANEOUS at 10:01

## 2025-01-11 NOTE — ED PROVIDER NOTES
Encounter Date: 1/10/2025    SCRIBE #1 NOTE: IPau, am scribing for, and in the presence of,  Vini Novoa MD. I have scribed the following portions of the note - Other sections scribed: HPI, ROS, PE.       History     Chief Complaint   Patient presents with    Hyperglycemia     Hyperglycemia today. Just go home from rehab and was not sent home with insulin. Denies any pain, vomiting, diarrhea. Takes metformin was on insulin at [facility].  in triage     Patient is a 64-year-old male with a past medical history anemia, hypertension, CKD stage 3, presents to the emergency department for hyperglycemia.  The patient has recently admitted to rehab.  While I rehab he is receiving insulin.  Now is taking metformin.  Was previously on glimepiride.  Not taking currently.    The history is provided by the patient, a relative and medical records.     Review of patient's allergies indicates:  No Known Allergies  Past Medical History:   Diagnosis Date    Anemia of chronic disease 7/6/2022    Arteriosclerosis of coronary artery 7/6/2022    Cervical myelopathy 7/6/2022    Cobalamin deficiency 7/6/2022    Gastroesophageal reflux disease 7/6/2022    Hypertension 7/6/2022    Low vitamin D level 7/6/2022    Mixed hyperlipidemia 7/6/2022    Paraparesis 7/6/2022    Stage 3a chronic kidney disease 7/6/2022    Type 2 diabetes mellitus 7/6/2022     Past Surgical History:   Procedure Laterality Date    MAGNETIC RESONANCE IMAGING N/A 9/19/2024    Procedure: MRI (Magnetic Resonance Imagine);  Surgeon: David Amin DO;  Location: [facility];  Service: Anesthesiology;  Laterality: N/A;    SPINE SURGERY       No family history on file.  Social History     Tobacco Use    Smoking status: Never    Smokeless tobacco: Never   Substance Use Topics    Alcohol use: Never    Drug use: Never     Review of Systems   Constitutional:  Negative for fever.   HENT:  Negative for sore throat.    Eyes:  Negative for visual disturbance.    Respiratory:  Negative for shortness of breath.    Cardiovascular:  Negative for chest pain.   Gastrointestinal:  Negative for abdominal pain.   Genitourinary:  Negative for dysuria.   Musculoskeletal:  Negative for joint swelling.   Skin:  Negative for rash.   Neurological:  Negative for weakness.   Psychiatric/Behavioral:  Negative for confusion.    All other systems reviewed and are negative.      Physical Exam     Initial Vitals [01/10/25 2004]   BP Pulse Resp Temp SpO2   123/83 99 18 97.8 °F (36.6 °C) 99 %      MAP       --         Physical Exam    Nursing note and vitals reviewed.  Constitutional: He appears well-developed and well-nourished. He is not diaphoretic. No distress.   HENT:   Head: Normocephalic and atraumatic.   Eyes: Conjunctivae and EOM are normal. Pupils are equal, round, and reactive to light.   Neck:   Normal range of motion.  Cardiovascular:  Normal rate, regular rhythm, normal heart sounds and intact distal pulses.           No murmur heard.  Pulmonary/Chest: Breath sounds normal. No respiratory distress. He has no wheezes. He has no rales.   Abdominal: Abdomen is soft. He exhibits no distension. There is no abdominal tenderness.   Musculoskeletal:         General: No tenderness or edema. Normal range of motion.      Cervical back: Normal range of motion.     Neurological: He is alert and oriented to person, place, and time. No cranial nerve deficit. GCS eye subscore is 4. GCS verbal subscore is 5. GCS motor subscore is 6.   Skin: Skin is warm and dry. Capillary refill takes less than 2 seconds. No rash noted. No erythema.   Psychiatric: He has a normal mood and affect.         ED Course   Procedures  Labs Reviewed   COMPREHENSIVE METABOLIC PANEL - Abnormal       Result Value    Sodium 140      Potassium 5.1      Chloride 108 (*)     CO2 23      Glucose 400 (*)     Blood Urea Nitrogen 53.9 (*)     Creatinine 2.52 (*)     Calcium 9.1      Protein Total 7.4      Albumin 3.7      Globulin  3.7 (*)     Albumin/Globulin Ratio 1.0 (*)     Bilirubin Total 0.3      ALP 77      ALT 59 (*)     AST 35 (*)     eGFR 27      Anion Gap 9.0      BUN/Creatinine Ratio 21     CBC WITH DIFFERENTIAL - Abnormal    WBC 8.03      RBC 2.98 (*)     Hgb 9.6 (*)     Hct 29.8 (*)     .0 (*)     MCH 32.2 (*)     MCHC 32.2 (*)     RDW 14.2      Platelet 211      MPV 11.2 (*)     Neut % 73.7      Lymph % 13.0      Mono % 9.6      Eos % 2.9      Basophil % 0.6      Imm Grans % 0.2      Neut # 5.92      Lymph # 1.04      Mono # 0.77      Eos # 0.23      Baso # 0.05      Imm Gran # 0.02      NRBC% 0.0     POCT GLUCOSE - Abnormal    POCT Glucose 440 (*)    POCT GLUCOSE - Abnormal    POCT Glucose 342 (*)    POCT GLUCOSE - Abnormal    POCT Glucose 230 (*)    POCT GLUCOSE - Abnormal    POCT Glucose 450 (*)    BETA - HYDROXYBUTYRATE, SERUM - Normal    Beta Hydroxybutyrate 0.20     CBC W/ AUTO DIFFERENTIAL    Narrative:     The following orders were created for panel order CBC auto differential.  Procedure                               Abnormality         Status                     ---------                               -----------         ------                     CBC with Differential[[id number]]       Abnormal            Final result                 Please view results for these tests on the individual orders.          Imaging Results    None          Medications   0.9% NaCl infusion (1,000 mLs Intravenous New Bag 1/10/25 2100)   insulin regular injection 5 Units 0.05 mL (5 Units Intravenous Given 1/10/25 2218)   metFORMIN tablet 500 mg (500 mg Oral Given 1/10/25 7684)     Medical Decision Making  Judging by the patient's chief complaint and pertinent history, the patient has the following possible differential diagnoses, including but not limited to the following.  Some of these are deemed to be lower likelihood and some more likely based on my physical exam and history combined with possible lab work and/or imaging studies.    Please see the pertinent studies, and refer to the HPI.  Some of these diagnoses will take further evaluation to fully rule out, perhaps as an outpatient and the patient was encouraged to follow up when discharged for more comprehensive evaluation.      Hyperglycemia, DKA, HHS, DWAINE    Patient is a 64-year-old male presents to emergency department for hyperglycemia.  See HPI.  See physical exam.  Given IV fluids, started back on glimepiride.  Does not appear to be in DKA.  Does not appear to be in HHS at this time.  Discussed all results.  Glucose improved.  Reassessed patient.  Patient is resting comfortably.  Discussed all results.  Discussed need for follow-up with the PCP.  Will resume glimepiride.  Discussed return precautions.  Answered all questions at this time.  Hemodynamically stable for continued outpatient management with strict return precautions.  Patient and family verbalized understanding agreed to plan.      Problems Addressed:  Diabetes mellitus of other type without complication, unspecified whether long term insulin use: acute illness or injury that poses a threat to life or bodily functions  Hyperglycemia: acute illness or injury that poses a threat to life or bodily functions    Amount and/or Complexity of Data Reviewed  Labs: ordered.    Risk  OTC drugs.  Prescription drug management.  Parenteral controlled substances.  Decision regarding hospitalization.            Scribe Attestation:   Scribe #1: I performed the above scribed service and the documentation accurately describes the services I performed. I attest to the accuracy of the note.    Attending Attestation:           Physician Attestation for Scribe:  Physician Attestation Statement for Scribe #1: I, Vini Novoa MD, reviewed documentation, as scribed by Pau Garrison in my presence, and it is both accurate and complete.                                    Clinical Impression:  Final diagnoses:  [R73.9] Hyperglycemia  (Primary)  [E13.9] Diabetes mellitus of other type without complication, unspecified whether long term insulin use          ED Disposition Condition    Discharge Stable          ED Prescriptions       Medication Sig Dispense Start Date End Date Auth. Provider    glimepiride (AMARYL) 1 MG tablet Take 1 tablet (1 mg total) by mouth before breakfast. 30 tablet 1/10/2025 2/9/2025 Vini Novoa MD          Follow-up Information    None          Vini Novoa MD  01/20/25 0827

## 2025-01-11 NOTE — FIRST PROVIDER EVALUATION
"Medical screening examination initiated.  I have conducted a focused provider triage encounter, findings are as follows:    Brief history of present illness:  arrived to ED due to hyperglycemia. Patient recently discharged home from rehab facility two days ago without any insulin. States cbg >500. Is on Metformin. Has no complaints currently.     Vitals:    01/10/25 2004   BP: 123/83   Pulse: 99   Resp: 18   Temp: 97.8 °F (36.6 °C)   TempSrc: Oral   SpO2: 99%   Weight: 52.2 kg (115 lb)   Height: 5' 2" (1.575 m)       Pertinent physical exam:  awake, alert, has non-labored breathing, in wheelchair    Brief workup plan:  labs, IVF    Preliminary workup initiated; this workup will be continued and followed by the physician or advanced practice provider that is assigned to the patient when roomed.  "

## 2025-01-11 NOTE — DISCHARGE INSTRUCTIONS
Follow-up with the primary care physician.      Began taking Amaryl in addition to your regularly prescribed metformin.      Return to the emergency department if any new or worsening symptoms.

## 2025-01-13 LAB — POCT GLUCOSE: 450 MG/DL (ref 70–110)

## 2025-01-15 PROBLEM — E43 SEVERE MALNUTRITION: Chronic | Status: ACTIVE | Noted: 2024-09-09

## 2025-02-05 PROBLEM — E43 SEVERE MALNUTRITION: Chronic | Status: RESOLVED | Noted: 2024-09-09 | Resolved: 2025-02-05

## 2025-02-19 ENCOUNTER — LAB REQUISITION (OUTPATIENT)
Dept: LAB | Facility: HOSPITAL | Age: 65
End: 2025-02-19
Payer: MEDICARE

## 2025-02-19 DIAGNOSIS — R79.0 ABNORMAL LEVEL OF BLOOD MINERAL: ICD-10-CM

## 2025-02-19 DIAGNOSIS — I10 ESSENTIAL (PRIMARY) HYPERTENSION: ICD-10-CM

## 2025-02-19 DIAGNOSIS — E61.1 IRON DEFICIENCY: ICD-10-CM

## 2025-02-19 DIAGNOSIS — E83.10 DISORDER OF IRON METABOLISM, UNSPECIFIED: ICD-10-CM

## 2025-02-19 DIAGNOSIS — Z79.899 OTHER LONG TERM (CURRENT) DRUG THERAPY: ICD-10-CM

## 2025-02-19 DIAGNOSIS — E78.00 PURE HYPERCHOLESTEROLEMIA, UNSPECIFIED: ICD-10-CM

## 2025-02-19 DIAGNOSIS — E11.22 TYPE 2 DIABETES MELLITUS WITH DIABETIC CHRONIC KIDNEY DISEASE: ICD-10-CM

## 2025-02-19 DIAGNOSIS — E11.69 TYPE 2 DIABETES MELLITUS WITH OTHER SPECIFIED COMPLICATION: ICD-10-CM

## 2025-02-19 DIAGNOSIS — E78.5 HYPERLIPIDEMIA, UNSPECIFIED: ICD-10-CM

## 2025-02-19 DIAGNOSIS — D50.8 OTHER IRON DEFICIENCY ANEMIAS: ICD-10-CM

## 2025-02-19 LAB
ALBUMIN SERPL-MCNC: 3.6 G/DL (ref 3.4–4.8)
ALBUMIN/GLOB SERPL: 1 RATIO (ref 1.1–2)
ALP SERPL-CCNC: 60 UNIT/L (ref 40–150)
ALT SERPL-CCNC: 29 UNIT/L (ref 0–55)
ANION GAP SERPL CALC-SCNC: 8 MEQ/L
AST SERPL-CCNC: 28 UNIT/L (ref 5–34)
BASOPHILS # BLD AUTO: 0.05 X10(3)/MCL
BASOPHILS NFR BLD AUTO: 0.9 %
BILIRUB SERPL-MCNC: 0.3 MG/DL
BUN SERPL-MCNC: 39.9 MG/DL (ref 8.4–25.7)
CALCIUM SERPL-MCNC: 9.1 MG/DL (ref 8.8–10)
CHLORIDE SERPL-SCNC: 111 MMOL/L (ref 98–107)
CHOLEST SERPL-MCNC: 134 MG/DL
CHOLEST/HDLC SERPL: 5 {RATIO} (ref 0–5)
CO2 SERPL-SCNC: 25 MMOL/L (ref 23–31)
CREAT SERPL-MCNC: 1.95 MG/DL (ref 0.72–1.25)
CREAT/UREA NIT SERPL: 20
EOSINOPHIL # BLD AUTO: 0.16 X10(3)/MCL (ref 0–0.9)
EOSINOPHIL NFR BLD AUTO: 2.9 %
ERYTHROCYTE [DISTWIDTH] IN BLOOD BY AUTOMATED COUNT: 14 % (ref 11.5–17)
FERRITIN SERPL-MCNC: 197.26 NG/ML (ref 21.81–274.66)
GFR SERPLBLD CREATININE-BSD FMLA CKD-EPI: 37 ML/MIN/1.73/M2
GLOBULIN SER-MCNC: 3.5 GM/DL (ref 2.4–3.5)
GLUCOSE SERPL-MCNC: 85 MG/DL (ref 82–115)
HCT VFR BLD AUTO: 33 % (ref 42–52)
HDLC SERPL-MCNC: 27 MG/DL (ref 35–60)
HGB BLD-MCNC: 10.4 G/DL (ref 14–18)
IMM GRANULOCYTES # BLD AUTO: 0.02 X10(3)/MCL (ref 0–0.04)
IMM GRANULOCYTES NFR BLD AUTO: 0.4 %
IRON SATN MFR SERPL: 29 % (ref 20–50)
IRON SERPL-MCNC: 78 UG/DL (ref 65–175)
LDLC SERPL CALC-MCNC: 87 MG/DL (ref 50–140)
LYMPHOCYTES # BLD AUTO: 0.89 X10(3)/MCL (ref 0.6–4.6)
LYMPHOCYTES NFR BLD AUTO: 16.2 %
MCH RBC QN AUTO: 31.4 PG (ref 27–31)
MCHC RBC AUTO-ENTMCNC: 31.5 G/DL (ref 33–36)
MCV RBC AUTO: 99.7 FL (ref 80–94)
MONOCYTES # BLD AUTO: 0.43 X10(3)/MCL (ref 0.1–1.3)
MONOCYTES NFR BLD AUTO: 7.8 %
NEUTROPHILS # BLD AUTO: 3.95 X10(3)/MCL (ref 2.1–9.2)
NEUTROPHILS NFR BLD AUTO: 71.8 %
NRBC BLD AUTO-RTO: 0 %
PLATELET # BLD AUTO: 229 X10(3)/MCL (ref 130–400)
PMV BLD AUTO: 11.1 FL (ref 7.4–10.4)
POTASSIUM SERPL-SCNC: 4.6 MMOL/L (ref 3.5–5.1)
PROT SERPL-MCNC: 7.1 GM/DL (ref 5.8–7.6)
RBC # BLD AUTO: 3.31 X10(6)/MCL (ref 4.7–6.1)
SODIUM SERPL-SCNC: 144 MMOL/L (ref 136–145)
TIBC SERPL-MCNC: 188 UG/DL (ref 60–240)
TIBC SERPL-MCNC: 266 UG/DL (ref 250–450)
TRANSFERRIN SERPL-MCNC: 232 MG/DL (ref 163–344)
TRIGL SERPL-MCNC: 100 MG/DL (ref 34–140)
VLDLC SERPL CALC-MCNC: 20 MG/DL
WBC # BLD AUTO: 5.5 X10(3)/MCL (ref 4.5–11.5)

## 2025-02-19 PROCEDURE — 80053 COMPREHEN METABOLIC PANEL: CPT | Performed by: INTERNAL MEDICINE

## 2025-02-19 PROCEDURE — 85025 COMPLETE CBC W/AUTO DIFF WBC: CPT | Performed by: INTERNAL MEDICINE

## 2025-02-19 PROCEDURE — 82728 ASSAY OF FERRITIN: CPT | Performed by: INTERNAL MEDICINE

## 2025-02-19 PROCEDURE — 83550 IRON BINDING TEST: CPT | Performed by: INTERNAL MEDICINE

## 2025-02-19 PROCEDURE — 80061 LIPID PANEL: CPT | Performed by: INTERNAL MEDICINE

## 2025-02-28 ENCOUNTER — HOSPITAL ENCOUNTER (INPATIENT)
Facility: HOSPITAL | Age: 65
LOS: 13 days | Discharge: HOME HEALTH | DRG: 698 | End: 2025-03-13
Attending: INTERNAL MEDICINE | Admitting: INTERNAL MEDICINE
Payer: MEDICARE

## 2025-02-28 DIAGNOSIS — N28.9 ACUTE ON CHRONIC RENAL INSUFFICIENCY: Primary | ICD-10-CM

## 2025-02-28 DIAGNOSIS — R79.89 TROPONIN I ABOVE REFERENCE RANGE: ICD-10-CM

## 2025-02-28 DIAGNOSIS — J21.0 RSV (ACUTE BRONCHIOLITIS DUE TO RESPIRATORY SYNCYTIAL VIRUS): ICD-10-CM

## 2025-02-28 DIAGNOSIS — A41.9 SEPSIS, DUE TO UNSPECIFIED ORGANISM, UNSPECIFIED WHETHER ACUTE ORGAN DYSFUNCTION PRESENT: ICD-10-CM

## 2025-02-28 DIAGNOSIS — I21.4 NSTEMI (NON-ST ELEVATED MYOCARDIAL INFARCTION): ICD-10-CM

## 2025-02-28 DIAGNOSIS — I25.10 CAD (CORONARY ARTERY DISEASE): ICD-10-CM

## 2025-02-28 DIAGNOSIS — E11.65 TYPE 2 DIABETES MELLITUS WITH HYPERGLYCEMIA, WITHOUT LONG-TERM CURRENT USE OF INSULIN: ICD-10-CM

## 2025-02-28 DIAGNOSIS — N39.0 ACUTE UTI: ICD-10-CM

## 2025-02-28 DIAGNOSIS — I49.9 ARRHYTHMIA: ICD-10-CM

## 2025-02-28 DIAGNOSIS — E11.10 DIABETIC KETOACIDOSIS WITHOUT COMA ASSOCIATED WITH TYPE 2 DIABETES MELLITUS: ICD-10-CM

## 2025-02-28 DIAGNOSIS — N18.9 ACUTE ON CHRONIC RENAL INSUFFICIENCY: Primary | ICD-10-CM

## 2025-02-28 DIAGNOSIS — E87.20 LACTIC ACIDOSIS: ICD-10-CM

## 2025-02-28 DIAGNOSIS — R53.1 GENERALIZED WEAKNESS: ICD-10-CM

## 2025-02-28 LAB
ABS NEUT (OLG): 21.85 X10(3)/MCL (ref 2.1–9.2)
ACANTHOCYTES (OLG): ABNORMAL
ALBUMIN SERPL-MCNC: 4.1 G/DL (ref 3.4–4.8)
ALBUMIN/GLOB SERPL: 1.1 RATIO (ref 1.1–2)
ALP SERPL-CCNC: 71 UNIT/L (ref 40–150)
ALT SERPL-CCNC: 32 UNIT/L (ref 0–55)
ANION GAP SERPL CALC-SCNC: 10 MEQ/L
ANION GAP SERPL CALC-SCNC: 17 MEQ/L
ANION GAP SERPL CALC-SCNC: 20 MEQ/L
AST SERPL-CCNC: 21 UNIT/L (ref 5–34)
B-OH-BUTYR SERPL-MCNC: 2.9 MMOL/L
BACTERIA #/AREA URNS AUTO: ABNORMAL /HPF
BASE EXCESS BLD CALC-SCNC: -7.6 MMOL/L
BILIRUB SERPL-MCNC: 0.6 MG/DL
BILIRUB UR QL STRIP.AUTO: NEGATIVE
BLOOD GAS SAMPLE TYPE (OHS): ABNORMAL
BUN SERPL-MCNC: 56.4 MG/DL (ref 8.4–25.7)
BUN SERPL-MCNC: 56.7 MG/DL (ref 8.4–25.7)
BUN SERPL-MCNC: 66.9 MG/DL (ref 8.4–25.7)
BURR CELLS (OLG): ABNORMAL
CA-I BLD-SCNC: 1.18 MMOL/L (ref 1.12–1.23)
CALCIUM SERPL-MCNC: 8 MG/DL (ref 8.8–10)
CALCIUM SERPL-MCNC: 8.3 MG/DL (ref 8.8–10)
CALCIUM SERPL-MCNC: 9.6 MG/DL (ref 8.8–10)
CHLORIDE SERPL-SCNC: 110 MMOL/L (ref 98–107)
CHLORIDE SERPL-SCNC: 114 MMOL/L (ref 98–107)
CHLORIDE SERPL-SCNC: 119 MMOL/L (ref 98–107)
CLARITY UR: ABNORMAL
CO2 BLDA-SCNC: 19.3 MMOL/L
CO2 SERPL-SCNC: 14 MMOL/L (ref 23–31)
CO2 SERPL-SCNC: 15 MMOL/L (ref 23–31)
CO2 SERPL-SCNC: 16 MMOL/L (ref 23–31)
COHGB MFR BLDA: 4.3 %
COLOR UR AUTO: ABNORMAL
CREAT SERPL-MCNC: 2.64 MG/DL (ref 0.72–1.25)
CREAT SERPL-MCNC: 3 MG/DL (ref 0.72–1.25)
CREAT SERPL-MCNC: 3.69 MG/DL (ref 0.72–1.25)
CREAT/UREA NIT SERPL: 18
CREAT/UREA NIT SERPL: 19
CREAT/UREA NIT SERPL: 21
DRAWN BY BLOOD GAS (OHS): ABNORMAL
ERYTHROCYTE [DISTWIDTH] IN BLOOD BY AUTOMATED COUNT: 13.9 % (ref 11.5–17)
EST. AVERAGE GLUCOSE BLD GHB EST-MCNC: 148.5 MG/DL
FLUAV AG UPPER RESP QL IA.RAPID: NOT DETECTED
FLUBV AG UPPER RESP QL IA.RAPID: NOT DETECTED
GFR SERPLBLD CREATININE-BSD FMLA CKD-EPI: 17 ML/MIN/1.73/M2
GFR SERPLBLD CREATININE-BSD FMLA CKD-EPI: 22 ML/MIN/1.73/M2
GFR SERPLBLD CREATININE-BSD FMLA CKD-EPI: 26 ML/MIN/1.73/M2
GLOBULIN SER-MCNC: 3.8 GM/DL (ref 2.4–3.5)
GLUCOSE SERPL-MCNC: 155 MG/DL (ref 82–115)
GLUCOSE SERPL-MCNC: 312 MG/DL (ref 82–115)
GLUCOSE SERPL-MCNC: 87 MG/DL (ref 82–115)
GLUCOSE UR QL STRIP: ABNORMAL
HBA1C MFR BLD: 6.8 %
HCO3 BLDA-SCNC: 18.2 MMOL/L
HCT VFR BLD AUTO: 36.9 % (ref 42–52)
HGB BLD-MCNC: 11.8 G/DL (ref 14–18)
HGB UR QL STRIP: ABNORMAL
INSTRUMENT WBC (OLG): 24.83 X10(3)/MCL
KETONES UR QL STRIP: ABNORMAL
LACTATE SERPL-SCNC: 1.6 MMOL/L (ref 0.5–2.2)
LACTATE SERPL-SCNC: 3.3 MMOL/L (ref 0.5–2.2)
LEUKOCYTE ESTERASE UR QL STRIP: 500
LIPASE SERPL-CCNC: 18 U/L
LYMPHOCYTES NFR BLD MANUAL: 0.99 X10(3)/MCL (ref 0.6–4.6)
LYMPHOCYTES NFR BLD MANUAL: 4 %
MAGNESIUM SERPL-MCNC: 1.2 MG/DL (ref 1.6–2.6)
MAGNESIUM SERPL-MCNC: 1.4 MG/DL (ref 1.6–2.6)
MAGNESIUM SERPL-MCNC: 1.6 MG/DL (ref 1.6–2.6)
MCH RBC QN AUTO: 31.2 PG (ref 27–31)
MCHC RBC AUTO-ENTMCNC: 32 G/DL (ref 33–36)
MCV RBC AUTO: 97.6 FL (ref 80–94)
METHGB MFR BLDA: 0.4 %
MONOCYTES NFR BLD MANUAL: 2.23 X10(3)/MCL (ref 0.1–1.3)
MONOCYTES NFR BLD MANUAL: 9 %
MRSA PCR SCRN (OHS): NOT DETECTED
NEUTROPHILS NFR BLD MANUAL: 88 %
NITRITE UR QL STRIP: NEGATIVE
O2 HB BLOOD GAS (OHS): 54.1 %
OVALOCYTES (OLG): ABNORMAL
OXYGEN DEVICE BLOOD GAS (OHS): ABNORMAL
OXYHGB MFR BLDA: 11.7 G/DL
PCO2 BLDA: 37 MMHG (ref 20–50)
PH BLDA: 7.3 [PH] (ref 7.3–7.6)
PH UR STRIP: 8 [PH]
PHOSPHATE SERPL-MCNC: 3 MG/DL (ref 2.3–4.7)
PHOSPHATE SERPL-MCNC: 3.3 MG/DL (ref 2.3–4.7)
PLATELET # BLD AUTO: 223 X10(3)/MCL (ref 130–400)
PLATELET # BLD EST: NORMAL 10*3/UL
PMV BLD AUTO: 11.2 FL (ref 7.4–10.4)
PO2 BLDA: <38 MMHG
POCT GLUCOSE: 100 MG/DL (ref 70–110)
POCT GLUCOSE: 105 MG/DL (ref 70–110)
POCT GLUCOSE: 106 MG/DL (ref 70–110)
POCT GLUCOSE: 133 MG/DL (ref 70–110)
POCT GLUCOSE: 162 MG/DL (ref 70–110)
POCT GLUCOSE: 241 MG/DL (ref 70–110)
POCT GLUCOSE: 275 MG/DL (ref 70–110)
POCT GLUCOSE: 297 MG/DL (ref 70–110)
POCT GLUCOSE: 92 MG/DL (ref 70–110)
POIKILOCYTOSIS BLD QL SMEAR: ABNORMAL
POTASSIUM BLOOD GAS (OHS): 5.2 MMOL/L (ref 3.5–5)
POTASSIUM SERPL-SCNC: 4.2 MMOL/L (ref 3.5–5.1)
POTASSIUM SERPL-SCNC: 4.2 MMOL/L (ref 3.5–5.1)
POTASSIUM SERPL-SCNC: 5.5 MMOL/L (ref 3.5–5.1)
PROT SERPL-MCNC: 7.9 GM/DL (ref 5.8–7.6)
PROT UR QL STRIP: ABNORMAL
RBC # BLD AUTO: 3.78 X10(6)/MCL (ref 4.7–6.1)
RBC #/AREA URNS AUTO: >100 /HPF
RBC MORPH BLD: ABNORMAL
RSV A 5' UTR RNA NPH QL NAA+PROBE: DETECTED
SAO2 % BLDA: 44.8 %
SARS-COV-2 RNA RESP QL NAA+PROBE: NOT DETECTED
SCHISTOCYTE (OLG): ABNORMAL
SODIUM BLOOD GAS (OHS): 140 MMOL/L (ref 137–145)
SODIUM SERPL-SCNC: 143 MMOL/L (ref 136–145)
SODIUM SERPL-SCNC: 145 MMOL/L (ref 136–145)
SODIUM SERPL-SCNC: 147 MMOL/L (ref 136–145)
SP GR UR STRIP.AUTO: 1.02 (ref 1–1.03)
SQUAMOUS #/AREA URNS LPF: ABNORMAL /HPF
TOXIC GRANULES BLD QL SMEAR: ABNORMAL
TRI-PHOS CRY UR QL COMP ASSIST: ABNORMAL
TROPONIN I SERPL-MCNC: 0.04 NG/ML (ref 0–0.04)
UROBILINOGEN UR STRIP-ACNC: NORMAL
WBC # BLD AUTO: 24.83 X10(3)/MCL (ref 4.5–11.5)
WBC #/AREA URNS AUTO: >100 /HPF
WBC CLUMPS UR QL AUTO: ABNORMAL

## 2025-02-28 PROCEDURE — 27000207 HC ISOLATION

## 2025-02-28 PROCEDURE — 25000003 PHARM REV CODE 250

## 2025-02-28 PROCEDURE — 84484 ASSAY OF TROPONIN QUANT: CPT

## 2025-02-28 PROCEDURE — 36415 COLL VENOUS BLD VENIPUNCTURE: CPT

## 2025-02-28 PROCEDURE — 99291 CRITICAL CARE FIRST HOUR: CPT

## 2025-02-28 PROCEDURE — 25000003 PHARM REV CODE 250: Performed by: INTERNAL MEDICINE

## 2025-02-28 PROCEDURE — 96365 THER/PROPH/DIAG IV INF INIT: CPT

## 2025-02-28 PROCEDURE — 63600175 PHARM REV CODE 636 W HCPCS: Performed by: NURSE PRACTITIONER

## 2025-02-28 PROCEDURE — 25000003 PHARM REV CODE 250: Performed by: NURSE PRACTITIONER

## 2025-02-28 PROCEDURE — 82962 GLUCOSE BLOOD TEST: CPT

## 2025-02-28 PROCEDURE — 20000000 HC ICU ROOM

## 2025-02-28 PROCEDURE — 83690 ASSAY OF LIPASE: CPT

## 2025-02-28 PROCEDURE — 82803 BLOOD GASES ANY COMBINATION: CPT

## 2025-02-28 PROCEDURE — 85027 COMPLETE CBC AUTOMATED: CPT

## 2025-02-28 PROCEDURE — 84100 ASSAY OF PHOSPHORUS: CPT

## 2025-02-28 PROCEDURE — 83605 ASSAY OF LACTIC ACID: CPT | Performed by: NURSE PRACTITIONER

## 2025-02-28 PROCEDURE — 83735 ASSAY OF MAGNESIUM: CPT

## 2025-02-28 PROCEDURE — 63600175 PHARM REV CODE 636 W HCPCS

## 2025-02-28 PROCEDURE — 80053 COMPREHEN METABOLIC PANEL: CPT

## 2025-02-28 PROCEDURE — 87040 BLOOD CULTURE FOR BACTERIA: CPT | Performed by: NURSE PRACTITIONER

## 2025-02-28 PROCEDURE — 87086 URINE CULTURE/COLONY COUNT: CPT

## 2025-02-28 PROCEDURE — 0241U COVID/RSV/FLU A&B PCR: CPT | Performed by: NURSE PRACTITIONER

## 2025-02-28 PROCEDURE — 82010 KETONE BODYS QUAN: CPT | Performed by: NURSE PRACTITIONER

## 2025-02-28 PROCEDURE — 96361 HYDRATE IV INFUSION ADD-ON: CPT

## 2025-02-28 PROCEDURE — S5010 5% DEXTROSE AND 0.45% SALINE: HCPCS | Performed by: INTERNAL MEDICINE

## 2025-02-28 PROCEDURE — 81001 URINALYSIS AUTO W/SCOPE: CPT

## 2025-02-28 PROCEDURE — S5010 5% DEXTROSE AND 0.45% SALINE: HCPCS | Performed by: NURSE PRACTITIONER

## 2025-02-28 PROCEDURE — 96375 TX/PRO/DX INJ NEW DRUG ADDON: CPT

## 2025-02-28 PROCEDURE — 87641 MR-STAPH DNA AMP PROBE: CPT | Performed by: NURSE PRACTITIONER

## 2025-02-28 PROCEDURE — 99900035 HC TECH TIME PER 15 MIN (STAT)

## 2025-02-28 PROCEDURE — 63600175 PHARM REV CODE 636 W HCPCS: Performed by: INTERNAL MEDICINE

## 2025-02-28 PROCEDURE — 83036 HEMOGLOBIN GLYCOSYLATED A1C: CPT

## 2025-02-28 RX ORDER — DEXTROSE MONOHYDRATE, SODIUM CHLORIDE, AND POTASSIUM CHLORIDE 50; 1.49; 4.5 G/1000ML; G/1000ML; G/1000ML
INJECTION, SOLUTION INTRAVENOUS CONTINUOUS
Status: DISCONTINUED | OUTPATIENT
Start: 2025-02-28 | End: 2025-02-28

## 2025-02-28 RX ORDER — VANCOMYCIN HCL IN 5 % DEXTROSE 1.25 G/25
25 PLASTIC BAG, INJECTION (ML) INTRAVENOUS ONCE
Status: DISCONTINUED | OUTPATIENT
Start: 2025-02-28 | End: 2025-02-28 | Stop reason: SDUPTHER

## 2025-02-28 RX ORDER — ACETAMINOPHEN 325 MG/1
650 TABLET ORAL EVERY 6 HOURS PRN
Status: DISCONTINUED | OUTPATIENT
Start: 2025-02-28 | End: 2025-03-02

## 2025-02-28 RX ORDER — CEFTRIAXONE 2 G/1
2 INJECTION, POWDER, FOR SOLUTION INTRAMUSCULAR; INTRAVENOUS
Status: DISCONTINUED | OUTPATIENT
Start: 2025-02-28 | End: 2025-02-28

## 2025-02-28 RX ORDER — SODIUM CHLORIDE 9 MG/ML
1000 INJECTION, SOLUTION INTRAVENOUS CONTINUOUS
Status: DISCONTINUED | OUTPATIENT
Start: 2025-02-28 | End: 2025-02-28

## 2025-02-28 RX ORDER — IBUPROFEN 200 MG
24 TABLET ORAL
Status: DISCONTINUED | OUTPATIENT
Start: 2025-02-28 | End: 2025-02-28

## 2025-02-28 RX ORDER — ATORVASTATIN CALCIUM 40 MG/1
80 TABLET, FILM COATED ORAL DAILY
Status: DISCONTINUED | OUTPATIENT
Start: 2025-03-01 | End: 2025-03-11

## 2025-02-28 RX ORDER — IBUPROFEN 200 MG
16 TABLET ORAL
Status: DISCONTINUED | OUTPATIENT
Start: 2025-02-28 | End: 2025-02-28

## 2025-02-28 RX ORDER — NOREPINEPHRINE BITARTRATE/D5W 8 MG/250ML
0-3 PLASTIC BAG, INJECTION (ML) INTRAVENOUS CONTINUOUS
Status: DISCONTINUED | OUTPATIENT
Start: 2025-02-28 | End: 2025-03-01

## 2025-02-28 RX ORDER — POTASSIUM CHLORIDE 7.45 MG/ML
40 INJECTION INTRAVENOUS
Status: DISCONTINUED | OUTPATIENT
Start: 2025-02-28 | End: 2025-03-06

## 2025-02-28 RX ORDER — ENOXAPARIN SODIUM 100 MG/ML
30 INJECTION SUBCUTANEOUS EVERY 24 HOURS
Status: DISCONTINUED | OUTPATIENT
Start: 2025-02-28 | End: 2025-03-02

## 2025-02-28 RX ORDER — ONDANSETRON HYDROCHLORIDE 2 MG/ML
4 INJECTION, SOLUTION INTRAVENOUS EVERY 6 HOURS PRN
Status: DISCONTINUED | OUTPATIENT
Start: 2025-02-28 | End: 2025-03-13 | Stop reason: HOSPADM

## 2025-02-28 RX ORDER — CEFEPIME HYDROCHLORIDE 2 G/1
2 INJECTION, POWDER, FOR SOLUTION INTRAVENOUS
Status: DISCONTINUED | OUTPATIENT
Start: 2025-02-28 | End: 2025-03-03

## 2025-02-28 RX ORDER — DEXTROSE MONOHYDRATE AND SODIUM CHLORIDE 5; .45 G/100ML; G/100ML
INJECTION, SOLUTION INTRAVENOUS CONTINUOUS PRN
Status: DISCONTINUED | OUTPATIENT
Start: 2025-02-28 | End: 2025-03-13 | Stop reason: HOSPADM

## 2025-02-28 RX ORDER — SODIUM CHLORIDE, SODIUM LACTATE, POTASSIUM CHLORIDE, CALCIUM CHLORIDE 600; 310; 30; 20 MG/100ML; MG/100ML; MG/100ML; MG/100ML
INJECTION, SOLUTION INTRAVENOUS CONTINUOUS
Status: DISCONTINUED | OUTPATIENT
Start: 2025-02-28 | End: 2025-03-02

## 2025-02-28 RX ORDER — INSULIN ASPART 100 [IU]/ML
0-5 INJECTION, SOLUTION INTRAVENOUS; SUBCUTANEOUS EVERY 6 HOURS PRN
Status: DISCONTINUED | OUTPATIENT
Start: 2025-02-28 | End: 2025-03-07

## 2025-02-28 RX ORDER — INSULIN ASPART 100 [IU]/ML
0-5 INJECTION, SOLUTION INTRAVENOUS; SUBCUTANEOUS
Status: DISCONTINUED | OUTPATIENT
Start: 2025-02-28 | End: 2025-02-28

## 2025-02-28 RX ORDER — POTASSIUM CHLORIDE 7.45 MG/ML
10 INJECTION INTRAVENOUS
Status: DISCONTINUED | OUTPATIENT
Start: 2025-02-28 | End: 2025-03-06

## 2025-02-28 RX ORDER — MUPIROCIN 20 MG/G
OINTMENT TOPICAL 2 TIMES DAILY
Status: DISPENSED | OUTPATIENT
Start: 2025-03-01 | End: 2025-03-06

## 2025-02-28 RX ORDER — SODIUM CHLORIDE 0.9 % (FLUSH) 0.9 %
10 SYRINGE (ML) INJECTION
Status: DISCONTINUED | OUTPATIENT
Start: 2025-02-28 | End: 2025-03-13 | Stop reason: HOSPADM

## 2025-02-28 RX ORDER — ASPIRIN 81 MG/1
81 TABLET ORAL DAILY
Status: DISCONTINUED | OUTPATIENT
Start: 2025-03-01 | End: 2025-03-13 | Stop reason: HOSPADM

## 2025-02-28 RX ORDER — EZETIMIBE 10 MG/1
10 TABLET ORAL DAILY
Status: DISCONTINUED | OUTPATIENT
Start: 2025-03-01 | End: 2025-03-11

## 2025-02-28 RX ORDER — PANTOPRAZOLE SODIUM 40 MG/1
40 TABLET, DELAYED RELEASE ORAL DAILY
Status: DISCONTINUED | OUTPATIENT
Start: 2025-03-01 | End: 2025-03-13 | Stop reason: HOSPADM

## 2025-02-28 RX ORDER — MIRTAZAPINE 15 MG/1
15 TABLET, FILM COATED ORAL DAILY
Status: DISCONTINUED | OUTPATIENT
Start: 2025-03-01 | End: 2025-03-02

## 2025-02-28 RX ORDER — GLUCAGON 1 MG
1 KIT INJECTION
Status: DISCONTINUED | OUTPATIENT
Start: 2025-02-28 | End: 2025-02-28

## 2025-02-28 RX ADMIN — PIPERACILLIN SODIUM AND TAZOBACTAM SODIUM 4.5 G: 4; .5 INJECTION, POWDER, LYOPHILIZED, FOR SOLUTION INTRAVENOUS at 01:02

## 2025-02-28 RX ADMIN — DEXTROSE AND SODIUM CHLORIDE: 5; 450 INJECTION, SOLUTION INTRAVENOUS at 05:02

## 2025-02-28 RX ADMIN — ENOXAPARIN SODIUM 30 MG: 30 INJECTION SUBCUTANEOUS at 05:02

## 2025-02-28 RX ADMIN — SODIUM CHLORIDE 1000 ML: 9 INJECTION, SOLUTION INTRAVENOUS at 11:02

## 2025-02-28 RX ADMIN — SODIUM CHLORIDE 1593 ML: 9 INJECTION, SOLUTION INTRAVENOUS at 01:02

## 2025-02-28 RX ADMIN — POTASSIUM CHLORIDE: 2 INJECTION, SOLUTION, CONCENTRATE INTRAVENOUS at 08:02

## 2025-02-28 RX ADMIN — INSULIN HUMAN 0.1 UNITS/KG/HR: 1 INJECTION, SOLUTION INTRAVENOUS at 02:02

## 2025-02-28 RX ADMIN — CEFEPIME 2 G: 2 INJECTION, POWDER, FOR SOLUTION INTRAVENOUS at 05:02

## 2025-02-28 RX ADMIN — SODIUM CHLORIDE, POTASSIUM CHLORIDE, SODIUM LACTATE AND CALCIUM CHLORIDE 1000 ML: 600; 310; 30; 20 INJECTION, SOLUTION INTRAVENOUS at 07:02

## 2025-02-28 RX ADMIN — VANCOMYCIN HYDROCHLORIDE 1250 MG: 1.25 INJECTION, POWDER, LYOPHILIZED, FOR SOLUTION INTRAVENOUS at 01:02

## 2025-02-28 NOTE — FIRST PROVIDER EVALUATION
"Medical screening examination initiated.  I have conducted a focused provider triage encounter, findings are as follows:    Brief history of present illness:  64 year old male presents to ER with c/o generalized weakness with N/V/D    Vitals:    02/28/25 1100 02/28/25 1104   BP: 122/75    Pulse: 89 (!) 121   Resp: 20    Temp: 98.4 °F (36.9 °C)    TempSrc: Oral    SpO2: 99%  Comment: skin pale    Weight: 53.1 kg (117 lb)    Height: 5' 2" (1.575 m)        Pertinent physical exam:  awake and alert, nad    Brief workup plan:  labs, EKG,     Preliminary workup initiated; this workup will be continued and followed by the physician or advanced practice provider that is assigned to the patient when roomed.  "

## 2025-02-28 NOTE — PROGRESS NOTES
Pharmacist Renal Dose Adjustment Note    Froy Barragan is a 64 y.o. male being treated with the medication Zosyn    Patient Data:    Vital Signs (Most Recent):  Temp: 98.4 °F (36.9 °C) (02/28/25 1100)  Pulse: (!) 121 (02/28/25 1104)  Resp: 20 (02/28/25 1100)  BP: 122/75 (02/28/25 1100)  SpO2: 99 % (skin pale) (02/28/25 1100) Vital Signs (72h Range):  Temp:  [98.4 °F (36.9 °C)]   Pulse:  []   Resp:  [20]   BP: (122)/(75)   SpO2:  [99 %]      Recent Labs   Lab 02/28/25  1123   CREATININE 3.69*     Serum creatinine: 3.69 mg/dL (H) 02/28/25 1123  Estimated creatinine clearance: 14.8 mL/min (A)    Zosyn 4.5 g IVPB Q8H will be changed to Zosyn 4.5 g IVPB Q12H based on patient's eCrCl < 20 mL/min    Pharmacist's Name: Tamara Sebastian  Pharmacist's Extension: [id number]

## 2025-02-28 NOTE — H&P
[facility]  Pulmonary Critical Care Note    Patient Name: Froy Barragan  MRN: [id number]  Admission Date: 2/28/2025  Hospital Length of Stay: 0 days  Code Status: No Order  Attending Provider: No att. providers found  Primary Care Provider: George Bran MD     Subjective:     HPI:   Mr. Barragan is a 65 y/o male with PMH of T2DM, HLD, CAD, CHF, GERD, CKD Stage 3, ischemic CVA 9/2024, and elevated PSA w/ urinary retention requiring indwelling colbert catheter placed approx 1 month ago who presented to [facility] ED with complaints of generalized weakness, N/V, and diarrhea.  Patient is somewhat of a poor historian and has memory impairment s/p CVA.  States he lives near his sister who is patients primary caretaker and also has home health that helps care for him.  Spoke with sister over the phone who states patient has been having significant amount of diarrhea starting early this morning with associated non-bloody, non-bilious vomiting.  Reports he has had similar presentations in the past resulting in hospitalization for DKA.  Currently taking metformin 1000mg BID and Januvia 50mg daily daily for DM but reports blood sugars are not well controlled and they have been working with PCP to titrate regimen.  He is not currently on any insulin therapy.  Also reports he has been following with a urologist for history of elevated PSA and urinary retention and had an indwelling colbert catheter placed approx 1 month ago.  Denies any history of hydronephrosis or other kidney problems except for CKD which has been stable. Denies any recent fevers, chills, abdominal pain, skin changes, headaches, vision changes.  Patient arrives to the ICU on insulin gtt with no acute complaints.  Vitals stable. Labs notable for WBC 24.83, K 5.5, CO2 15, BUN/Cr 66.9/3.69, glucose 312, lactic acid 3.3, beta hydroxybutyrate 2.9, RSV positive, UA with evidence of infection.  CXR showing no acute cardiopulmonary  process.  CT A/P showing extensive urinary bladder wall thickening seen diffusely with inflammatory changes around the urinary bladder consistent with acute cystitis which is causing secondary obstructive changes and secondary bilateral hydronephrosis and hydroureter.     Hospital Course/Significant events:  2/28/2025: Patient admitted to ICU for treatment of DKA     24 Hour Interval History:  Continue to monitor     Past Medical History:   Diagnosis Date    Anemia of chronic disease 7/6/2022    Arteriosclerosis of coronary artery 7/6/2022    Cervical myelopathy 7/6/2022    Cobalamin deficiency 7/6/2022    Gastroesophageal reflux disease 7/6/2022    Hypertension 7/6/2022    Low vitamin D level 7/6/2022    Mixed hyperlipidemia 7/6/2022    Paraparesis 7/6/2022    Stage 3a chronic kidney disease 7/6/2022    Type 2 diabetes mellitus 7/6/2022       Past Surgical History:   Procedure Laterality Date    MAGNETIC RESONANCE IMAGING N/A 9/19/2024    Procedure: MRI (Magnetic Resonance Imagine);  Surgeon: David Amin DO;  Location: [facility];  Service: Anesthesiology;  Laterality: N/A;    SPINE SURGERY         Social History[1]        Current Outpatient Medications   Medication Instructions    alcohol swabs (BD ALCOHOL SWABS) PadM 1 each, Topical (Top), Daily    alfuzosin (UROXATRAL) 10 mg, Oral, With breakfast    aspirin (ECOTRIN) 81 mg, Oral, Daily    b complex vitamins capsule 1 capsule, Oral, Daily    blood glucose control, low (TRUE METRIX LEVEL 1) Soln 1 Bottle, Misc.(Non-Drug; Combo Route), Daily    blood-glucose meter (TRUE METRIX AIR GLUCOSE METER) Misc 1 each, Misc.(Non-Drug; Combo Route), Daily    blood-glucose sensor (DEXCOM G7 SENSOR) Carmen 1 each, Misc.(Non-Drug; Combo Route), Daily    ezetimibe (ZETIA) 10 mg, Oral, Daily    finasteride (PROSCAR) 5 mg, Oral, Daily    lancets (TRUEPLUS LANCETS) 33 gauge Misc 1 lancet , Misc.(Non-Drug; Combo Route), Daily    metFORMIN (GLUCOPHAGE) 1,000 mg, Oral, 2 times daily     miconazole NITRATE 2 % (MICOTIN) 2 % top powder Topical (Top), 2 times daily    mirtazapine (REMERON) 15 mg, Oral, Daily    pantoprazole (PROTONIX) 40 mg, Oral, Daily    rosuvastatin (CRESTOR) 40 mg, Oral, Daily    SITagliptin phosphate (JANUVIA) 50 mg, Oral, Daily    TRUE METRIX GLUCOSE TEST STRIP Strp TEST BLOOD SUGAR EVERY DAY    vitamin D (VITAMIN D3) 1,000 Units, Oral, Daily    zinc oxide 20 % ointment Topical (Top), 2 times daily       Current Inpatient Medications   [START ON 3/1/2025] mupirocin   Nasal BID    piperacillin-tazobactam (Zosyn) IV (PEDS and ADULTS) (extended infusion is not appropriate)  4.5 g Intravenous Q12H       Current Intravenous Infusions   D5 and 0.45% NaCl   Intravenous Continuous PRN        insulin regular 1 units/mL infusion orderable (DKA)  0-0.2 Units/kg/hr Intravenous Continuous 5.3 mL/hr at 02/28/25 1450 0.1 Units/kg/hr at 02/28/25 1450    lactated ringers   Intravenous Continuous             Review of Systems   Constitutional:  Negative for chills and fever.   HENT:  Negative for congestion and sinus pain.    Respiratory:  Negative for cough and shortness of breath.    Cardiovascular:  Negative for chest pain and palpitations.   Gastrointestinal:  Positive for diarrhea, nausea and vomiting. Negative for abdominal pain, blood in stool and melena.   Genitourinary:  Negative for dysuria.   Musculoskeletal:  Positive for back pain.   Neurological:  Positive for weakness. Negative for dizziness and headaches.          Objective:       Intake/Output Summary (Last 24 hours) at 2/28/2025 1611  Last data filed at 2/28/2025 1421  Gross per 24 hour   Intake 1100 ml   Output --   Net 1100 ml         Vital Signs (Most Recent):  Temp: (!) 100.6 °F (38.1 °C) (02/28/25 1526)  Pulse: 108 (02/28/25 1501)  Resp: 18 (02/28/25 1501)  BP: 121/85 (02/28/25 1501)  SpO2: 100 % (02/28/25 1501)  Body mass index is 21.4 kg/m².  Weight: 53.1 kg (117 lb) Vital Signs (24h Range):  Temp:  [98.4 °F (36.9  °C)-100.6 °F (38.1 °C)] 100.6 °F (38.1 °C)  Pulse:  [] 108  Resp:  [18-22] 18  SpO2:  [97 %-100 %] 100 %  BP: (121-139)/(75-94) 121/85     Physical Exam  Constitutional:       General: He is not in acute distress.     Comments: Thin appearing    HENT:      Head: Normocephalic and atraumatic.   Eyes:      Extraocular Movements: Extraocular movements intact.      Pupils: Pupils are equal, round, and reactive to light.   Cardiovascular:      Rate and Rhythm: Regular rhythm. Tachycardia present.      Pulses: Normal pulses.      Heart sounds: Normal heart sounds. No murmur heard.  Pulmonary:      Effort: Pulmonary effort is normal. No respiratory distress.      Breath sounds: Normal breath sounds. No wheezing, rhonchi or rales.   Abdominal:      General: There is no distension.      Palpations: Abdomen is soft.      Tenderness: There is no abdominal tenderness.      Comments: Hypoactive bowel sounds   Genitourinary:     Comments: Gandara in place   Musculoskeletal:         General: No swelling.      Cervical back: Normal range of motion.   Skin:     General: Skin is warm and dry.   Neurological:      General: No focal deficit present.      Mental Status: He is alert. Mental status is at baseline.   Psychiatric:         Mood and Affect: Mood normal.         Behavior: Behavior normal.           Lines/Drains/Airways       Drain  Duration                  Urethral Catheter 02/28/25 1305 Non-latex 16 Fr. <1 day              Peripheral Intravenous Line  Duration                  Peripheral IV - Single Lumen 02/28/25 1125 20 G Anterior;Distal;Left Upper Arm <1 day         Peripheral IV - Single Lumen 02/28/25 1245 22 G Posterior;Right Hand <1 day                    Significant Labs:    Lab Results   Component Value Date    WBC 24.83 (H) 02/28/2025    WBC 24.83 02/28/2025    HGB 11.8 (L) 02/28/2025    HCT 36.9 (L) 02/28/2025    MCV 97.6 (H) 02/28/2025     02/28/2025         BMP  Lab Results   Component Value Date      02/28/2025    K 5.5 (H) 02/28/2025    CO2 15 (L) 02/28/2025    BUN 66.9 (H) 02/28/2025    CREATININE 3.69 (H) 02/28/2025    CALCIUM 9.6 02/28/2025    AGAP 20.0 02/28/2025    EGFRNONAA 47 (L) 01/11/2022       ABG  Recent Labs   Lab 02/28/25  1240   PH 7.300   PO2 <38.0   PCO2 37.0   HCO3 18.2       Mechanical Ventilation Support:       Significant Imaging:  I have reviewed the pertinent imaging within the past 24 hours.        Assessment/Plan:     Assessment  DKA   Uncontrolled T2DM  RSV infection   Acute gastroenteritis   Acute cystitis w/ bilateral hydronephrosis and hydroureter   DWAINE on CKD   Lactic acidosis   Electrolyte derangements   Hx of HLD, CAD, CHF, GERD, CKD Stage 3, ischemic CVA 9/2024, and elevated PSA w/ urinary retention requiring indwelling colbert catheter placed approx 1 month ago    Plan  -Admitted to ICU with cardiac monitoring, DKA Protochol initiated  -Home DM regimen:  Metformin 1000mg BID, Januvia 50mg daily   -Start insulin gtt per protocol with q1h accuchecks while on the drip.    -Transition to SQ insulin and continue the drip for additional 2 hours if able to tolerate PO w/o N/V  Bicarb > 18  Anion gap < 12  Glucose < 250 on 2 consecutive readings.    SQ insulin dosing: SSI or 0.5-0.8 U/kg divided between Long and short acting  -Once blood sugar reaches 200, transition to D5 1/2 NS @ 150 cc/h  -Monitor electrolytes with BMP q4h, replete per DKA protocol. Keep K > 4, Mg > 2, Phos > 3  -Bariatric clear liquid diet while on insulin gtt then change to Diabetic diet when initiating subq insulin with accuchecks 4x daily before meals and at bedtime (AC and HS)  -continue rocephin for treatment of acute cystitis.  Blood cultures and urinary cultures pending.  Deescalate abx regimen per speciation and sensitivities.   -urology consulted, appreciate recommendations.   -GI panel and stool culture ordered, continue symptomatic management for acute diarrheal illness.  -monitor renal indices  closely.  Can consider nephrology consult if indices show minimal improvement despite IVF and escalation of care.   -continue home medications     DVT Prophylaxis:  GI Prophylaxis:     32 minutes of critical care was time spent personally by me on the following activities: development of treatment plan with patient or surrogate and bedside caregivers, discussions with consultants, evaluation of patient's response to treatment, examination of patient, ordering and performing treatments and interventions, ordering and review of laboratory studies, ordering and review of radiographic studies, pulse oximetry, re-evaluation of patient's condition.  This critical care time did not overlap with that of any other provider or involve time for any procedures.     Petty Martell MD  Pulmonary Critical Care Medicine  [facility] - 7th Floor ICU        [1]   Social History  Socioeconomic History    Marital status: Single   Tobacco Use    Smoking status: Never    Smokeless tobacco: Never   Substance and Sexual Activity    Alcohol use: Never    Drug use: Never     Social Drivers of Health     Financial Resource Strain: Patient Unable To Answer (9/17/2024)    Overall Financial Resource Strain (CARDIA)     Difficulty of Paying Living Expenses: Patient unable to answer   Food Insecurity: Patient Unable To Answer (9/17/2024)    Hunger Vital Sign     Worried About Running Out of Food in the Last Year: Patient unable to answer     Ran Out of Food in the Last Year: Patient unable to answer   Transportation Needs: Patient Unable To Answer (9/17/2024)    TRANSPORTATION NEEDS     Transportation : Patient unable to answer   Physical Activity: Inactive (4/25/2024)    Exercise Vital Sign     Days of Exercise per Week: 0 days     Minutes of Exercise per Session: 10 min   Stress: Patient Unable To Answer (9/17/2024)    Citizen of [place] [employer] - Occupational Stress Questionnaire     Feeling of Stress : Patient  unable to answer   Housing Stability: Patient Unable To Answer (9/17/2024)    Housing Stability Vital Sign     Unable to Pay for Housing in the Last Year: Patient unable to answer     Homeless in the Last Year: Patient unable to answer

## 2025-02-28 NOTE — PROGRESS NOTES
Pharmacist Renal Dose Adjustment Note    Froy Barragan is a 64 y.o. male being treated with the medication lovenox    Patient Data:    Vital Signs (Most Recent):  Temp: (!) 100.6 °F (38.1 °C) (02/28/25 1526)  Pulse: 106 (02/28/25 1540)  Resp: 18 (02/28/25 1501)  BP: 121/85 (02/28/25 1501)  SpO2: 100 % (02/28/25 1501) Vital Signs (72h Range):  Temp:  [98.4 °F (36.9 °C)-100.6 °F (38.1 °C)]   Pulse:  []   Resp:  [18-22]   BP: (121-139)/(75-94)   SpO2:  [97 %-100 %]      Recent Labs   Lab 02/28/25  1123   CREATININE 3.69*     Serum creatinine: 3.69 mg/dL (H) 02/28/25 1123  Estimated creatinine clearance: 14.8 mL/min (A)    Medication:lovenox dose: 40mg frequency q24h will be changed to medication:lovenox dose:30mg frequency:q24h for CrCl < 30    Pharmacist's Name: Frances Sarabia  Pharmacist's Extension: [phone]

## 2025-02-28 NOTE — ED PROVIDER NOTES
Encounter Date: 2/28/2025       History     Chief Complaint   Patient presents with    Nausea    Vomiting    Diarrhea    Weakness     Pt co weakness (from old stroke) , n/v/d. Denies fever. Colbert in place. Last BM normal 5 days ago     See MDM    The history is provided by the patient and a relative. No  was used.     Review of patient's allergies indicates:  No Known Allergies  Past Medical History:   Diagnosis Date    Anemia of chronic disease 7/6/2022    Arteriosclerosis of coronary artery 7/6/2022    Cervical myelopathy 7/6/2022    Cobalamin deficiency 7/6/2022    Gastroesophageal reflux disease 7/6/2022    Hypertension 7/6/2022    Low vitamin D level 7/6/2022    Mixed hyperlipidemia 7/6/2022    Paraparesis 7/6/2022    Stage 3a chronic kidney disease 7/6/2022    Type 2 diabetes mellitus 7/6/2022     Past Surgical History:   Procedure Laterality Date    MAGNETIC RESONANCE IMAGING N/A 9/19/2024    Procedure: MRI (Magnetic Resonance Imagine);  Surgeon: David Amin DO;  Location: [facility];  Service: Anesthesiology;  Laterality: N/A;    SPINE SURGERY       No family history on file.  Social History[1]  Review of Systems   Gastrointestinal:  Positive for diarrhea, nausea and vomiting.   Neurological:  Positive for weakness.   All other systems reviewed and are negative.      Physical Exam     Initial Vitals [02/28/25 1100]   BP Pulse Resp Temp SpO2   122/75 89 20 98.4 °F (36.9 °C) 99 %      MAP       --         Physical Exam    Nursing note and vitals reviewed.  Constitutional:   Thin, frail appearing   Eyes: Conjunctivae are normal.   Cardiovascular:  Regular rhythm and normal heart sounds.   Tachycardia present.         Pulmonary/Chest: Breath sounds normal. No respiratory distress.   Abdominal: Abdomen is soft. He exhibits no distension. There is no abdominal tenderness.   Genitourinary:    Genitourinary Comments: Indwelling colbert catheter noted     Musculoskeletal:         General: Normal  range of motion.      Comments: Mild weakness from previous CVA     Neurological: He is alert and oriented to person, place, and time.   Skin: Skin is warm and dry.   Psychiatric: He has a normal mood and affect.         ED Course   Critical Care    Date/Time: 2/28/2025 4:49 PM    Performed by: Gia Peoples MD  Authorized by: Gia Peoples MD  Direct patient critical care time: 24 minutes  Additional history critical care time: 5 minutes  Documentation critical care time: 12 minutes  Consulting other physicians critical care time: 5 minutes  Consult with family critical care time: 2 minutes  Total critical care time (exclusive of procedural time) : 48 minutes  Critical care was necessary to treat or prevent imminent or life-threatening deterioration of the following conditions: dehydration, metabolic crisis, renal failure and sepsis.  Critical care was time spent personally by me on the following activities: blood draw for specimens, development of treatment plan with patient or surrogate, discussions with consultants, re-evaluation of patient's condition, ordering and review of laboratory studies, ordering and review of radiographic studies, ordering and performing treatments and interventions, examination of patient and evaluation of patient's response to treatment.        Labs Reviewed   COMPREHENSIVE METABOLIC PANEL - Abnormal       Result Value    Sodium 145      Potassium 5.5 (*)     Chloride 110 (*)     CO2 15 (*)     Glucose 312 (*)     Blood Urea Nitrogen 66.9 (*)     Creatinine 3.69 (*)     Calcium 9.6      Protein Total 7.9 (*)     Albumin 4.1      Globulin 3.8 (*)     Albumin/Globulin Ratio 1.1      Bilirubin Total 0.6      ALP 71      ALT 32      AST 21      eGFR 17      Anion Gap 20.0      BUN/Creatinine Ratio 18     URINALYSIS, REFLEX TO URINE CULTURE - Abnormal    Color, UA Dark-Brown (*)     Appearance, UA Turbid (*)     Specific Gravity, UA 1.021      pH, UA 8.0      Protein, UA 3+ (*)      Glucose, UA 2+ (*)     Ketones, UA 1+ (*)     Blood, UA 3+ (*)     Bilirubin, UA Negative      Urobilinogen, UA Normal      Nitrites, UA Negative      Leukocyte Esterase,  (*)     RBC, UA >100 (*)     WBC, UA >100 (*)     WBC Clumps, UA Occasional (*)     Bacteria, UA Occasional (*)     Squamous Epithelial Cells, UA None Seen      Triple Phosphate Crystals, UA Trace (*)    CBC WITH DIFFERENTIAL - Abnormal    WBC 24.83 (*)     RBC 3.78 (*)     Hgb 11.8 (*)     Hct 36.9 (*)     MCV 97.6 (*)     MCH 31.2 (*)     MCHC 32.0 (*)     RDW 13.9      Platelet 223      MPV 11.2 (*)    MANUAL DIFFERENTIAL - Abnormal    WBC 24.83      Neutrophils % 88      Lymphs % 4      Monocytes % 9      Neutrophils Abs 21.8504 (*)     Lymphs Abs 0.9932      Monocytes Abs 2.2347 (*)     Platelets Normal      RBC Morph Abnormal (*)     Poikilocytosis 2+ (*)     Acanthocytes 1+ (*)     Arjun Cells 2+ (*)     Ovalocytes 1+ (*)     Schistocytes 2+ (*)     Toxic Granulation 2+ (*)    LACTIC ACID, PLASMA - Abnormal    Lactic Acid Level 3.3 (*)    COVID/RSV/FLU A&B PCR - Abnormal    Influenza A PCR Not Detected      Influenza B PCR Not Detected      Respiratory Syncytial Virus PCR Detected (*)     SARS-CoV-2 PCR Not Detected      Narrative:     The Xpert Xpress SARS-CoV-2/FLU/RSV plus is a rapid, multiplexed real-time PCR test intended for the simultaneous qualitative detection and differentiation of SARS-CoV-2, Influenza A, Influenza B, and respiratory syncytial virus (RSV) viral RNA in either nasopharyngeal swab or nasal swab specimens.         BLOOD GAS - Abnormal    Sample Type Venous Blood      Drawn by ER RN      pH, Blood gas 7.300      pCO2, Blood gas 37.0      pO2, Blood gas <38.0      Sodium, Blood Gas 140      Potassium, Blood Gas 5.2 (*)     Calcium Level Ionized 1.18      TOC2, Blood gas 19.3      Base Excess, Blood gas -7.60      sO2, Blood gas 44.8      HCO3, Blood gas 18.2      THb, Blood gas 11.7      O2 Hb, Blood Gas 54.1       CO Hgb 4.3      Met Hgb 0.4      Oxygen Device, Blood gas RA     BETA - HYDROXYBUTYRATE, SERUM - Abnormal    Beta Hydroxybutyrate 2.90 (*)    POCT GLUCOSE - Abnormal    POCT Glucose 275 (*)    POCT GLUCOSE - Abnormal    POCT Glucose 297 (*)    POCT GLUCOSE - Abnormal    POCT Glucose 241 (*)    LIPASE - Normal    Lipase Level 18     MAGNESIUM - Normal    Magnesium Level 1.60     TROPONIN I - Normal    Troponin-I 0.037     LACTIC ACID, PLASMA - Normal    Lactic Acid Level 1.6     CBC W/ AUTO DIFFERENTIAL    Narrative:     The following orders were created for panel order CBC auto differential.  Procedure                               Abnormality         Status                     ---------                               -----------         ------                     CBC with Differential[[id number]]       Abnormal            Final result               Manual Differential[[id number]]         Abnormal            Final result                 Please view results for these tests on the individual orders.   POCT GLUCOSE, HAND-HELD DEVICE     EKG Readings: (Independently Interpreted)   Initial Reading: No STEMI. Rhythm: Sinus Tachycardia. Heart Rate: 115. Ectopy: No Ectopy. Clinical Impression: Sinus Tachycardia Other Impression: nonspecific sT changes     ECG Results    None       Imaging Results              CT Chest Abdomen Pelvis Without Contrast (XPD) (Final result)  Result time 02/28/25 14:00:48      Final result by Jean Muhammad MD (02/28/25 14:00:48)                   Impression:      Extensive urinary bladder wall thickening seen diffusely with inflammatory changes seen around the urinary bladder consistent with acute cystitis.  This is causing secondary obstructive changes and secondary bilateral hydronephrosis and hydroureter mild    Right lower lobe atelectasis    Degenerative and postsurgical changes in the lower lumbar spine      Electronically signed by: Raji  [facility]  Date:    02/28/2025  Time:    14:00               Narrative:    EXAMINATION:  CT CHEST ABDOMEN PELVIS WITHOUT CONTRAST(XPD)    CLINICAL HISTORY:  Sepsis;    TECHNIQUE:  Low dose axial images, sagittal and coronal reformations were obtained from the thoracic inlet to the pubic symphysis without IV and oral contrast administration.  Automatic exposure control is utilized to reduce patient radiation exposure.    COMPARISON:  09/16/2024    FINDINGS:  Examination is limited due to lack of intravenous contrast    There is mild right lower lobe atelectasis..  No mass is seen.  No nodule is seen.  No pleural thickening is seen.  No pleural effusion is seen.  No infiltrate is seen.    The thoracic aorta is normal in caliber..  Calcified plaque is seen throughout the thoracic and abdominal aorta.  Some coronary artery calcifications are seen as well.    No mediastinal adenopathy is seen.    The heart appears normal in size..    The liver appears normal.  No liver mass or lesion is seen.  Portal and hepatic veins are not well evaluated on this non contrasted CT...    The gallbladder appears normal.  No gallstones are seen.    The spleen appears normal.  No obvious splenic mass or lesion is seen.    The pancreas appears grossly unremarkable.  No pancreatic mass or lesion is seen.  No inflammation is seen.    No adrenal abnormality is seen.  No adrenal nodule is seen.    There is bilateral hydronephrosis and hydroureter secondary to obstructive changes from markedly thickened urinary bladder wall with inflammatory changes seen throughout the urinary bladder and around the urinary bladder.  Findings are consistent with a cystitis.  There is a Gandara catheter in the urinary bladder.  No nephro or ureterolithiasis is seen.  No renal lesion is seen.    Visualized portions of the bowel shows no acute abnormality.  No colitis is seen.  No diverticulitis is seen.  No colonic mass is seen.    No free air is seen.  No free  fluid is seen.    No acute bony abnormality is seen.  There are degenerative changes seen in the lower lumbar spine the patient is status post posterior spinal fusion at L4-5.                                       X-Ray Chest AP Portable (Final result)  Result time 02/28/25 13:21:33      Final result by Jose J Do MD (02/28/25 13:21:33)                   Impression:      No acute cardiopulmonary process identified.      Electronically signed by: Jose J Do  Date:    02/28/2025  Time:    13:21               Narrative:    EXAMINATION:  XR CHEST AP PORTABLE    CLINICAL HISTORY:  Generalized weakness.  Sepsis.    TECHNIQUE:  One view    COMPARISON:  September 18, 2024.    FINDINGS:  Cardiopericardial silhouette is within normal limits. Lungs are without dense focal or segmental consolidation, congestive process, pleural effusions or pneumothorax.                                       Medications   sodium chloride 0.9% flush 10 mL (has no administration in time range)   dextrose 5 % and 0.45 % NaCl infusion ( Intravenous New Bag 3/4/25 1356)   dextrose 50% injection 25 g (25 g Intravenous Given 3/2/25 2241)   dextrose 50% injection 12.5 g (has no administration in time range)   mupirocin 2 % ointment ( Nasal Given 3/4/25 2043)   potassium chloride 10 mEq in 100 mL IVPB (has no administration in time range)     And   potassium chloride 10 mEq in 100 mL IVPB (has no administration in time range)     And   potassium chloride 10 mEq in 100 mL IVPB (has no administration in time range)   ondansetron injection 4 mg (has no administration in time range)   aspirin EC tablet 81 mg (81 mg Oral Given 3/4/25 0800)   ezetimibe tablet 10 mg (10 mg Oral Given 3/4/25 0800)   pantoprazole EC tablet 40 mg (40 mg Oral Given 3/4/25 0755)   atorvastatin tablet 80 mg (80 mg Oral Given 3/4/25 0800)   insulin aspart U-100 injection 0-5 Units ( Subcutaneous Not Given 3/4/25 1635)   glucose chewable tablet 16 g (has no administration in  time range)   glucose chewable tablet 24 g (has no administration in time range)   dextrose 50% injection 12.5 g (12.5 g Intravenous Given 3/2/25 1945)   dextrose 50% injection 25 g ( Intravenous Canceled Entry 3/2/25 2256)   glucagon (human recombinant) injection 1 mg (has no administration in time range)   insulin aspart U-100 injection 0-15 Units (2 Units Subcutaneous Given 3/4/25 2056)   potassium, sodium phosphates 280-160-250 mg packet 1 packet (0 packets Oral Hold 3/2/25 0902)   enoxaparin injection 50 mg (50 mg Subcutaneous Given 3/4/25 1352)   haloperidol lactate injection 5 mg (5 mg Intravenous Given 3/2/25 2035)   metoprolol injection 5 mg (5 mg Intravenous Given 3/2/25 1411)   acetaminophen tablet 650 mg (has no administration in time range)   dextrose 10% bolus 250 mL 250 mL (has no administration in time range)   cefTRIAXone injection 2 g (2 g Intravenous Given 3/4/25 1352)   insulin glargine U-100 (Lantus) injection 15 Units (has no administration in time range)   metoprolol tartrate (LOPRESSOR) tablet 50 mg (50 mg Oral Given 3/4/25 2042)   dextrose 5 % and 0.9 % NaCl infusion ( Intravenous New Bag 3/5/25 0320)   sodium chloride 0.9% bolus 1,000 mL 1,000 mL (0 mLs Intravenous Stopped 2/28/25 1240)   sodium chloride 0.9% bolus 1,593 mL 1,593 mL (0 mLs Intravenous Stopped 2/28/25 1447)   vancomycin 1,250 mg in D5W 250 mL IVPB (admixture device) (0 mg Intravenous Stopped 2/28/25 1525)   insulin regular bolus from bag/infusion 5.31 Units 5.31 mL (5.31 Units Intravenous Bolus from Bag 2/28/25 1457)   lactated ringers bolus 1,000 mL (0 mLs Intravenous Stopped 2/28/25 2015)   magnesium sulfate 2g in water 50mL IVPB (premix) (0 g Intravenous Stopped 3/1/25 0454)   sodium chloride 0.9% bolus 250 mL 250 mL (250 mLs Intravenous Bolus from Bag 3/2/25 0645)   lactated ringers bolus 500 mL (0 mLs Intravenous Stopped 3/2/25 4143)   magnesium sulfate 2g in water 50mL IVPB (premix) (0 g Intravenous Stopped 3/2/25  1104)   octreotide injection 50 mcg (50 mcg Subcutaneous Given 3/2/25 2115)   magnesium sulfate 2g in water 50mL IVPB (premix) (0 g Intravenous Stopped 3/4/25 1257)     Medical Decision Making  64-year-old male presents from home with his sister for nausea vomiting and diarrhea that started around 2:00 a.m. this morning.  Sister states that the patient has a mild cough as well for a couple days now.  Patient denies any abdominal pain ear pain chest pain shortness a breath.  States that he has not any fever for to his knowledge.  He states that he has been taking his medications as prescribed.  He has not indwelling catheter noted and he has mild weakness from a previous stroke but does get around with a walker at home.    Labs show a significant increase in his white blood cell count at 24,000 with a shift compared to normal 9 days ago.  He also has a anion gap of 20 with a mild DWAINE.  He does not have CKD stage 3 however his baseline creatinine is usually 2.5-2.8 and today he is 3.6. lactate 3.3. bohb 2.9.  RSV positive.  Urinalysis positive for infection and this was a new specimen collected from a new inserted Gandara catheter using sterile procedure.  Chest x-ray no acute findings.  CT chest abdomen and pelvis done for sepsis without a known source did not use contrast due to GFR being 17 but it does show some atelectasis as well as significant bladder wall thickening.      Prior to CT returning and urinalysis returning patient was placed on Zosyn and vancomycin for sepsis of unknown origin since his lactate was 3.3 white count 62419 tachycardic in the 120s.  He was also noted to be in DKA so insulin drip started as well.  30 mL/kilos bolus given.  Patient will be admitted to ICU    Amount and/or Complexity of Data Reviewed  Independent Historian: guardian     Details: Patient's sister states that the patient has been having nausea vomiting and diarrhea since 2:00 a.m. this morning.  She also states that he has had  a mild cough.  She states that the last time she brought him in for this he was really bad off and ended up having a stroke  External Data Reviewed: labs, radiology and notes.  Labs: ordered. Decision-making details documented in ED Course.  Radiology: ordered. Decision-making details documented in ED Course.  ECG/medicine tests: independent interpretation performed. Decision-making details documented in ED Course.  Discussion of management or test interpretation with external provider(s): Discussed with Dr. Peoples regarding the patient's being septic along with DKA.  Agrees with plan for vanc Zosyn IV hydration insulin drip    Discussed with ICU resident regarding sepsis and DKA. Accepts     Risk  OTC drugs.  Prescription drug management.  Drug therapy requiring intensive monitoring for toxicity.  Decision regarding hospitalization.    Critical Care  Total time providing critical care: 48 minutes              Attending Attestation:     Physician Attestation Statement for NP/PA:   I personally made/approved the management plan and take responsibility for the patient management.    Other NP/PA Attestation Additions:    History of Present Illness: See ed course for attestation               ED Course as of 03/05/25 0858   Fri Feb 28, 2025   1343 I personally made/approved the management plan for this patient and take responsibility for the patient management. I reviewed the TRSITAN/resident roderick's documentation, agree with the TRISTAN/resident's assessment, and I had face to face time with the patient. See my independent MDM below.     My MDM:  ED assessment:  64-year-old male presents ED with sudden onset nausea and vomiting.  He became tachycardic in the ED but states that he is feeling better to me.  He states he has some back pain with exertion but none currently but does have chronic weakness from a previous stroke  Pertinent vitals/exam findings:  Mildly hypertensive tachycardic and tachypneic  Normocephalic,  atraumatic  Appears slightly older than stated age  Mildly tachycardic lungs are clear no respiratory distress  Abdomen is soft and nontender  Indwelling Gandara catheter in place  DDx:  DKA, sepsis, renal failure, pneumonia, intra-abdominal pathology, bowel obstruction  Review of labs/results:  Leukocytosis with lactic acidosis DWAINE on CKD and hyperglycemia with metabolic acidosis with elevated anion gap  My independent EKG/radiology interpretation:  S x-ray without acute cardiopulmonary abnormality  Discussion of management with other providers:  Nurse practitioner  ED management:  30 cc/kilos of IV fluids, broad-spectrum IV antibiotics and workup  ED disposition/plan:  Admission ICU if in DKA versus floor septic  Critical Care: 45 minutes         Gia Peoples MD  Emergency Medicine        [BS]   1400 Sepsis perfusion exam performed at 1400 [BS]      ED Course User Index  [BS] Gia Peoples MD                           Clinical Impression:  Final diagnoses:  [R53.1] Generalized weakness  [E11.10] Diabetic ketoacidosis without coma associated with type 2 diabetes mellitus  [A41.9] Sepsis, due to unspecified organism, unspecified whether acute organ dysfunction present  [E87.20] Lactic acidosis  [N28.9, N18.9] Acute on chronic renal insufficiency (Primary)  [J21.0] RSV (acute bronchiolitis due to respiratory syncytial virus)  [N39.0] Acute UTI          ED Disposition Condition    Admit Stable                  BradfordAmandaElidaIRLANDA  02/28/25 1651         [1]   Social History  Tobacco Use    Smoking status: Never    Smokeless tobacco: Never   Substance Use Topics    Alcohol use: Never    Drug use: Never        Gia Peoples MD  03/05/25 0167

## 2025-03-01 LAB
ALBUMIN SERPL-MCNC: 2.7 G/DL (ref 3.4–4.8)
ALBUMIN/GLOB SERPL: 1 RATIO (ref 1.1–2)
ALP SERPL-CCNC: 44 UNIT/L (ref 40–150)
ALT SERPL-CCNC: 19 UNIT/L (ref 0–55)
ANION GAP SERPL CALC-SCNC: 10 MEQ/L
ANION GAP SERPL CALC-SCNC: 7 MEQ/L
ANION GAP SERPL CALC-SCNC: 8 MEQ/L
AST SERPL-CCNC: 17 UNIT/L (ref 5–34)
BASOPHILS # BLD AUTO: 0.08 X10(3)/MCL
BASOPHILS NFR BLD AUTO: 0.4 %
BILIRUB SERPL-MCNC: 0.3 MG/DL
BUN SERPL-MCNC: 46 MG/DL (ref 8.4–25.7)
BUN SERPL-MCNC: 48.8 MG/DL (ref 8.4–25.7)
BUN SERPL-MCNC: 53 MG/DL (ref 8.4–25.7)
CALCIUM SERPL-MCNC: 7.9 MG/DL (ref 8.8–10)
CALCIUM SERPL-MCNC: 7.9 MG/DL (ref 8.8–10)
CALCIUM SERPL-MCNC: 8.1 MG/DL (ref 8.8–10)
CHLORIDE SERPL-SCNC: 117 MMOL/L (ref 98–107)
CO2 SERPL-SCNC: 16 MMOL/L (ref 23–31)
CO2 SERPL-SCNC: 16 MMOL/L (ref 23–31)
CO2 SERPL-SCNC: 17 MMOL/L (ref 23–31)
CREAT SERPL-MCNC: 2.44 MG/DL (ref 0.72–1.25)
CREAT SERPL-MCNC: 2.62 MG/DL (ref 0.72–1.25)
CREAT SERPL-MCNC: 2.66 MG/DL (ref 0.72–1.25)
CREAT/UREA NIT SERPL: 19
CREAT/UREA NIT SERPL: 19
CREAT/UREA NIT SERPL: 20
EOSINOPHIL # BLD AUTO: 0.09 X10(3)/MCL (ref 0–0.9)
EOSINOPHIL NFR BLD AUTO: 0.4 %
ERYTHROCYTE [DISTWIDTH] IN BLOOD BY AUTOMATED COUNT: 14.1 % (ref 11.5–17)
GFR SERPLBLD CREATININE-BSD FMLA CKD-EPI: 26 ML/MIN/1.73/M2
GFR SERPLBLD CREATININE-BSD FMLA CKD-EPI: 26 ML/MIN/1.73/M2
GFR SERPLBLD CREATININE-BSD FMLA CKD-EPI: 28 ML/MIN/1.73/M2
GLOBULIN SER-MCNC: 2.6 GM/DL (ref 2.4–3.5)
GLUCOSE SERPL-MCNC: 107 MG/DL (ref 82–115)
GLUCOSE SERPL-MCNC: 182 MG/DL (ref 82–115)
GLUCOSE SERPL-MCNC: 217 MG/DL (ref 82–115)
HCT VFR BLD AUTO: 25.5 % (ref 42–52)
HGB BLD-MCNC: 8.5 G/DL (ref 14–18)
IMM GRANULOCYTES # BLD AUTO: 0.07 X10(3)/MCL (ref 0–0.04)
IMM GRANULOCYTES NFR BLD AUTO: 0.3 %
LYMPHOCYTES # BLD AUTO: 0.73 X10(3)/MCL (ref 0.6–4.6)
LYMPHOCYTES NFR BLD AUTO: 3.6 %
MAGNESIUM SERPL-MCNC: 1.3 MG/DL (ref 1.6–2.6)
MAGNESIUM SERPL-MCNC: 1.3 MG/DL (ref 1.6–2.6)
MAGNESIUM SERPL-MCNC: 1.9 MG/DL (ref 1.6–2.6)
MCH RBC QN AUTO: 31.6 PG (ref 27–31)
MCHC RBC AUTO-ENTMCNC: 33.3 G/DL (ref 33–36)
MCV RBC AUTO: 94.8 FL (ref 80–94)
MONOCYTES # BLD AUTO: 1.01 X10(3)/MCL (ref 0.1–1.3)
MONOCYTES NFR BLD AUTO: 5 %
NEUTROPHILS # BLD AUTO: 18.07 X10(3)/MCL (ref 2.1–9.2)
NEUTROPHILS NFR BLD AUTO: 90.3 %
NRBC BLD AUTO-RTO: 0 %
PHOSPHATE SERPL-MCNC: 2.1 MG/DL (ref 2.3–4.7)
PHOSPHATE SERPL-MCNC: 3 MG/DL (ref 2.3–4.7)
PHOSPHATE SERPL-MCNC: 3 MG/DL (ref 2.3–4.7)
PLATELET # BLD AUTO: 152 X10(3)/MCL (ref 130–400)
PMV BLD AUTO: 11.2 FL (ref 7.4–10.4)
POCT GLUCOSE: 104 MG/DL (ref 70–110)
POCT GLUCOSE: 111 MG/DL (ref 70–110)
POCT GLUCOSE: 111 MG/DL (ref 70–110)
POCT GLUCOSE: 112 MG/DL (ref 70–110)
POCT GLUCOSE: 121 MG/DL (ref 70–110)
POCT GLUCOSE: 137 MG/DL (ref 70–110)
POCT GLUCOSE: 137 MG/DL (ref 70–110)
POCT GLUCOSE: 142 MG/DL (ref 70–110)
POCT GLUCOSE: 190 MG/DL (ref 70–110)
POCT GLUCOSE: 206 MG/DL (ref 70–110)
POCT GLUCOSE: 96 MG/DL (ref 70–110)
POTASSIUM SERPL-SCNC: 4.2 MMOL/L (ref 3.5–5.1)
POTASSIUM SERPL-SCNC: 4.2 MMOL/L (ref 3.5–5.1)
POTASSIUM SERPL-SCNC: 4.3 MMOL/L (ref 3.5–5.1)
PROT SERPL-MCNC: 5.3 GM/DL (ref 5.8–7.6)
RBC # BLD AUTO: 2.69 X10(6)/MCL (ref 4.7–6.1)
SODIUM SERPL-SCNC: 141 MMOL/L (ref 136–145)
SODIUM SERPL-SCNC: 141 MMOL/L (ref 136–145)
SODIUM SERPL-SCNC: 143 MMOL/L (ref 136–145)
WBC # BLD AUTO: 20.05 X10(3)/MCL (ref 4.5–11.5)

## 2025-03-01 PROCEDURE — 83735 ASSAY OF MAGNESIUM: CPT

## 2025-03-01 PROCEDURE — 63600175 PHARM REV CODE 636 W HCPCS: Performed by: INTERNAL MEDICINE

## 2025-03-01 PROCEDURE — 36415 COLL VENOUS BLD VENIPUNCTURE: CPT

## 2025-03-01 PROCEDURE — 99900035 HC TECH TIME PER 15 MIN (STAT)

## 2025-03-01 PROCEDURE — 94760 N-INVAS EAR/PLS OXIMETRY 1: CPT

## 2025-03-01 PROCEDURE — 27000207 HC ISOLATION

## 2025-03-01 PROCEDURE — 80053 COMPREHEN METABOLIC PANEL: CPT | Performed by: INTERNAL MEDICINE

## 2025-03-01 PROCEDURE — 84100 ASSAY OF PHOSPHORUS: CPT

## 2025-03-01 PROCEDURE — 25000003 PHARM REV CODE 250

## 2025-03-01 PROCEDURE — 11000001 HC ACUTE MED/SURG PRIVATE ROOM

## 2025-03-01 PROCEDURE — 63600175 PHARM REV CODE 636 W HCPCS

## 2025-03-01 PROCEDURE — 25000003 PHARM REV CODE 250: Performed by: NURSE PRACTITIONER

## 2025-03-01 PROCEDURE — 85025 COMPLETE CBC W/AUTO DIFF WBC: CPT

## 2025-03-01 RX ORDER — GLUCAGON 1 MG
1 KIT INJECTION
Status: DISCONTINUED | OUTPATIENT
Start: 2025-03-01 | End: 2025-03-10

## 2025-03-01 RX ORDER — MAGNESIUM SULFATE HEPTAHYDRATE 40 MG/ML
2 INJECTION, SOLUTION INTRAVENOUS ONCE
Status: COMPLETED | OUTPATIENT
Start: 2025-03-01 | End: 2025-03-01

## 2025-03-01 RX ORDER — INSULIN GLARGINE 100 [IU]/ML
15 INJECTION, SOLUTION SUBCUTANEOUS 2 TIMES DAILY
Status: DISCONTINUED | OUTPATIENT
Start: 2025-03-01 | End: 2025-03-04

## 2025-03-01 RX ORDER — IBUPROFEN 200 MG
24 TABLET ORAL
Status: DISCONTINUED | OUTPATIENT
Start: 2025-03-01 | End: 2025-03-10

## 2025-03-01 RX ORDER — INSULIN ASPART 100 [IU]/ML
0-15 INJECTION, SOLUTION INTRAVENOUS; SUBCUTANEOUS
Status: DISCONTINUED | OUTPATIENT
Start: 2025-03-01 | End: 2025-03-05

## 2025-03-01 RX ORDER — IBUPROFEN 200 MG
16 TABLET ORAL
Status: DISCONTINUED | OUTPATIENT
Start: 2025-03-01 | End: 2025-03-10

## 2025-03-01 RX ADMIN — ATORVASTATIN CALCIUM 80 MG: 40 TABLET, FILM COATED ORAL at 09:03

## 2025-03-01 RX ADMIN — MAGNESIUM SULFATE HEPTAHYDRATE 2 G: 40 INJECTION, SOLUTION INTRAVENOUS at 02:03

## 2025-03-01 RX ADMIN — MUPIROCIN: 20 OINTMENT TOPICAL at 09:03

## 2025-03-01 RX ADMIN — INSULIN GLARGINE 15 UNITS: 100 INJECTION, SOLUTION SUBCUTANEOUS at 09:03

## 2025-03-01 RX ADMIN — CEFEPIME 2 G: 2 INJECTION, POWDER, FOR SOLUTION INTRAVENOUS at 05:03

## 2025-03-01 RX ADMIN — SODIUM CHLORIDE, POTASSIUM CHLORIDE, SODIUM LACTATE AND CALCIUM CHLORIDE: 600; 310; 30; 20 INJECTION, SOLUTION INTRAVENOUS at 10:03

## 2025-03-01 RX ADMIN — INSULIN ASPART 6 UNITS: 100 INJECTION, SOLUTION INTRAVENOUS; SUBCUTANEOUS at 05:03

## 2025-03-01 RX ADMIN — EZETIMIBE 10 MG: 10 TABLET ORAL at 09:03

## 2025-03-01 RX ADMIN — ASPIRIN 81 MG: 81 TABLET, COATED ORAL at 09:03

## 2025-03-01 RX ADMIN — PANTOPRAZOLE SODIUM 40 MG: 40 TABLET, DELAYED RELEASE ORAL at 09:03

## 2025-03-01 RX ADMIN — INSULIN HUMAN 0.1 UNITS/KG/HR: 1 INJECTION, SOLUTION INTRAVENOUS at 01:03

## 2025-03-01 RX ADMIN — ENOXAPARIN SODIUM 30 MG: 30 INJECTION SUBCUTANEOUS at 05:03

## 2025-03-01 RX ADMIN — MIRTAZAPINE 15 MG: 15 TABLET, FILM COATED ORAL at 09:03

## 2025-03-01 RX ADMIN — CEFEPIME 2 G: 2 INJECTION, POWDER, FOR SOLUTION INTRAVENOUS at 02:03

## 2025-03-01 RX ADMIN — ACETAMINOPHEN 650 MG: 325 TABLET, FILM COATED ORAL at 09:03

## 2025-03-01 RX ADMIN — CEFEPIME 2 G: 2 INJECTION, POWDER, FOR SOLUTION INTRAVENOUS at 09:03

## 2025-03-01 NOTE — PROGRESS NOTES
[facility] - 7th Floor ICU  Pulmonary/Critical Care  Progress Note  3/1/2025    Patient Name: Froy Barragan  MRN: [id number]  Admission Date: 2/28/2025  Code Status: No Order      Subjective:     HPI:  The patient is a 64-year-old male who has a history of type 2 diabetes mellitus, stage 3 chronic kidney disease, cerebrovascular disease with previous history of stroke 09/2024, and chronic urinary retention requiring indwelling Gandara placement about 1 month ago.  He is reported as living alone with home health, and living near his sister who is his primary caretaker.  He presented to ER 02/28/2025 with complaints of nonbloody diarrhea with nausea and vomiting.  Laboratory on presentation to ED consistent with combined lactic and diabetic ketoacidosis.  He was also noted to have worsening of his BUN/creatinine.  CT of abdomen and pelvis demonstrated extensive urinary bladder wall thickening, suspicious for acute cystitis.  Bilateral hydronephrosis also appreciated.  He was initiated on insulin infusion by diabetic ketoacidosis protocol and admitted to ICU.    Hospital Course:      24hr Interval History:  I have extensively reviewed this patient's hospital presentation and stay thus far, as being seen by me for the first time this a.m..  T-max 100.6° oral over the previous 24 hours.  Intake 3870 cc, output 2425 cc over the previous 24 hours.  Blood cultures no growth less than 24 hours.  Stool C diff pending, stool culture pending.  Urine culture growing greater than 100 K CFU of Gram-negative rods.    Scheduled Medications:   aspirin  81 mg Oral Daily    atorvastatin  80 mg Oral Daily    ceFEPime IV (PEDS and ADULTS)  2 g Intravenous Q8H    enoxparin  30 mg Subcutaneous Q24H (prophylaxis, 1700)    ezetimibe  10 mg Oral Daily    mirtazapine  15 mg Oral Daily    mupirocin   Nasal BID    pantoprazole  40 mg Oral Daily     PRN Medications:    Current Facility-Administered Medications:      acetaminophen, 650 mg, Oral, Q6H PRN    D5 and 0.45% NaCl, , Intravenous, Continuous PRN    dextrose 50%, 12.5 g, Intravenous, PRN    dextrose 50%, 25 g, Intravenous, PRN    insulin aspart U-100, 0-5 Units, Subcutaneous, Q6H PRN    ondansetron, 4 mg, Intravenous, Q6H PRN    potassium chloride, 40 mEq, Intravenous, PRN **AND** potassium chloride, 10 mEq, Intravenous, PRN **AND** potassium chloride, 10 mEq, Intravenous, PRN    sodium chloride 0.9%, 10 mL, Intravenous, PRN  Continuous Infusions:   D5 and 0.45% NaCl 1,000 mL with potassium chloride 20 mEq infusion   Intravenous Continuous 150 mL/hr at 03/01/25 0713 Rate Verify at 03/01/25 0713    D5 and 0.45% NaCl   Intravenous Continuous  mL/hr at 02/28/25 2058 Rate Verify at 02/28/25 2058    insulin regular 1 units/mL infusion orderable (DKA)  0-0.2 Units/kg/hr Intravenous Continuous 1.1 mL/hr at 03/01/25 0713 0.02 Units/kg/hr at 03/01/25 0713    lactated ringers   Intravenous Continuous   Held at 02/28/25 1721    NORepinephrine bitartrate-D5W  0-3 mcg/kg/min Intravenous Continuous           Past Medical History:   Diagnosis Date    Anemia of chronic disease 7/6/2022    Arteriosclerosis of coronary artery 7/6/2022    Cervical myelopathy 7/6/2022    Cobalamin deficiency 7/6/2022    Gastroesophageal reflux disease 7/6/2022    Hypertension 7/6/2022    Low vitamin D level 7/6/2022    Mixed hyperlipidemia 7/6/2022    Paraparesis 7/6/2022    Stage 3a chronic kidney disease 7/6/2022    Type 2 diabetes mellitus 7/6/2022       Past Surgical History:   Procedure Laterality Date    MAGNETIC RESONANCE IMAGING N/A 9/19/2024    Procedure: MRI (Magnetic Resonance Imagine);  Surgeon: David Amin DO;  Location: [facility];  Service: Anesthesiology;  Laterality: N/A;    SPINE SURGERY         Objective:     Input/output:    Intake/Output Summary (Last 24 hours) at 3/1/2025 0757  Last data filed at 3/1/2025 0713  Gross per 24 hour   Intake 4907.33 ml   Output 2425 ml   Net  2482.33 ml       Vital Signs (Most Recent):  Temp: 97.4 °F (36.3 °C) (03/01/25 0400)  Pulse: 86 (03/01/25 0615)  Resp: 18 (03/01/25 0615)  BP: 93/66 (03/01/25 0615)  SpO2: 95 % (03/01/25 0615)  Body mass index is 21.41 kg/m².  Weight: 53.1 kg (117 lb 1 oz) Vital Signs (24h Range):  Temp:  [97.4 °F (36.3 °C)-100.6 °F (38.1 °C)] 97.4 °F (36.3 °C)  Pulse:  [] 86  Resp:  [14-26] 18  SpO2:  [90 %-100 %] 95 %  BP: ()/(35-94) 93/66     Physical Exam  Constitutional:       Appearance: Normal appearance.   HENT:      Mouth/Throat:      Mouth: Mucous membranes are moist.      Pharynx: Oropharynx is clear.   Eyes:      Pupils: Pupils are equal, round, and reactive to light.   Cardiovascular:      Rate and Rhythm: Normal rate and regular rhythm.      Heart sounds: No murmur heard.  Pulmonary:      Breath sounds: No wheezing, rhonchi or rales.   Abdominal:      General: Bowel sounds are normal.      Palpations: Abdomen is soft.   Musculoskeletal:      Right lower leg: No edema.      Left lower leg: No edema.   Skin:     General: Skin is warm and dry.   Neurological:      Mental Status: He is alert and oriented to person, place, and time.         Lines/Drains/Airways       Drain  Duration                  Urethral Catheter 02/28/25 1305 Non-latex 16 Fr. <1 day              Peripheral Intravenous Line  Duration                  Peripheral IV - Single Lumen 02/28/25 1125 20 G Anterior;Distal;Left Upper Arm <1 day         Peripheral IV - Single Lumen 02/28/25 1245 22 G Posterior;Right Hand <1 day         Peripheral IV - Single Lumen 02/28/25 2003 20 G Anterior;Left Forearm <1 day                    Significant Labs:    Lab Results   Component Value Date    WBC 20.05 (H) 03/01/2025    HGB 8.5 (L) 03/01/2025    HCT 25.5 (L) 03/01/2025    MCV 94.8 (H) 03/01/2025     03/01/2025         Recent Labs   Lab 02/28/25  1123 02/28/25  1621 03/01/25  0146      < > 141   K 5.5*   < > 4.3   *   < > 117*   CO2 15*    < > 16*   BUN 66.9*   < > 48.8*   CREATININE 3.69*   < > 2.62*   CALCIUM 9.6   < > 7.9*   MG 1.60   < > 1.30*   PHOS  --    < > 3.0   AST 21  --  17   ALT 32  --  19   ALKPHOS 71  --  44   ALBUMIN 4.1  --  2.7*   TROPONINI 0.037  --   --     < > = values in this interval not displayed.     Imaging:   None this a.m.    Assessment:     Wide anion gap metabolic acidosis of combined diabetic ketoacidotic and lactic acidotic etiologies (resolved).  Superimposed hyperchloremic normal anion gap metabolic acidosis.  Reported poorly-controlled type 2 diabetes mellitus, on metformin and Januvia.  Acute gastroenteritis  Acute cystitis with bilateral hydronephrosis, patient with known bladder outlet obstruction and chronic Gandara catheter in place over the past month.  Acute on chronic kidney disease with baseline stage III CKD, significantly improved since admission, remains nonoliguric.  Likely primarily intravascular volume depletion.  RSV antigen positive    Plan:     Continue IV cefepime (day 2).  Closely follow blood and urine cultures.  Begin Lantus, and transition off of insulin infusion at this point.  Begin p.o. intake as tolerated  PT/OT assistance with mobilization out of bed as tolerated     Loren Hayes MD, FCCP  Pulmonary/Critical Care

## 2025-03-01 NOTE — H&P
[facility] Hospital Medicine History & Physical Examination       Patient Name: Froy Barragan  MRN: [id number]  Patient Class: IP- Inpatient   Admission Date: 2/28/2025   Admitting Physician: ADA Service   Length of Stay: 1  Attending Physician: Frank Awad MD  Primary Care Provider: George Bran MD  Face-to-Face encounter date: 03/01/2025  Code Status:Code Status Discussion Note   Chief Complaint: Nausea, Vomiting, Diarrhea, and Weakness (Pt co weakness (from old stroke) , n/v/d. Denies fever. Gandara in place. Last BM normal 5 days ago)        Patient information was obtained from patient, patient's family, past medical records and ER records.     HISTORY OF PRESENT ILLNESS:     64-year-old gentleman with DM type 2, CKD stage 3, CVA 09/2024, chronic indwelling Gandara who was admitted after he presented with complaints of nausea with several episodes of vomiting and nonbloody diarrhea.  He denied having any chest pain, abdominal pain, SOB, cough, sputum production, headache, numbness, weakness, dizziness/lightheadedness or loss of consciousness.  On admission his labs showed leukocytosis of 24.8, normocytic anemia with H/H of 11.8/36.9, hyperkalemia of 5.5, CO2 15 with anion gap 20, BUN/creatinine 66.9/3.69, negative troponins, lactic acid of 3.3, beta hydroxybutyrate of 2.9.  UA showed 3+ protein, 2+ glucose, 1+ ketones, 3+ blood, leukocyte esterase, WBCs as well as bacteria.  Blood cultures and urine culture were sent off.  CT C/A/P was done which showed extensive urinary bladder wall thickening with inflammatory changes around urinary bladder consistent with acute cystitis causing secondary obstructive changes and secondary B/L hydronephrosis and hydroureter, RLL atelectasis.  Admitted to ICU on insulin infusion per DKA protocol.  Started on broad-spectrum antibiotics.  Given IVF resuscitation.  Urine culture growing GNR.  Noted to have low-grade fevers overnight on 03/01.   Insulin infusion discontinued and patient is started on long-acting insulin.  Downgraded to .    PAST MEDICAL HISTORY:     Past Medical History:   Diagnosis Date    Anemia of chronic disease 7/6/2022    Arteriosclerosis of coronary artery 7/6/2022    Cervical myelopathy 7/6/2022    Cobalamin deficiency 7/6/2022    Gastroesophageal reflux disease 7/6/2022    Hypertension 7/6/2022    Low vitamin D level 7/6/2022    Mixed hyperlipidemia 7/6/2022    Paraparesis 7/6/2022    Stage 3a chronic kidney disease 7/6/2022    Type 2 diabetes mellitus 7/6/2022       PAST SURGICAL HISTORY:     Past Surgical History:   Procedure Laterality Date    MAGNETIC RESONANCE IMAGING N/A 9/19/2024    Procedure: MRI (Magnetic Resonance Imagine);  Surgeon: David Amin DO;  Location: [facility];  Service: Anesthesiology;  Laterality: N/A;    SPINE SURGERY         ALLERGIES:   Patient has no known allergies.    FAMILY HISTORY:   Reviewed and negative    SOCIAL HISTORY:     Social History     Tobacco Use    Smoking status: Never    Smokeless tobacco: Never   Substance Use Topics    Alcohol use: Never        HOME MEDICATIONS:     Prior to Admission medications    Medication Sig Start Date End Date Taking? Authorizing Provider   alcohol swabs (BD ALCOHOL SWABS) PadM Apply 1 each topically once daily. 6/7/23   George Bran MD   alfuzosin (UROXATRAL) 10 mg Tb24 Take 1 tablet (10 mg total) by mouth daily with breakfast. 2/24/25   George Bran MD   aspirin (ECOTRIN) 81 MG EC tablet Take 81 mg by mouth once daily.    Provider, Historical   b complex vitamins capsule Take 1 capsule by mouth once daily.    Provider, Historical   blood glucose control, low (TRUE METRIX LEVEL 1) Soln 1 Bottle by Misc.(Non-Drug; Combo Route) route once daily. 6/7/23   George Bran MD   blood-glucose meter (TRUE METRIX AIR GLUCOSE METER) Misc 1 each by Misc.(Non-Drug; Combo Route) route once daily. 6/7/23   George Bran MD   blood-glucose sensor (DEXCOM G7 SENSOR)  Carmen 1 each by Misc.(Non-Drug; Combo Route) route once daily. 9/5/24 9/5/25  George Bran MD   ezetimibe (ZETIA) 10 mg tablet Take 1 tablet (10 mg total) by mouth once daily. 2/24/25   George Bran MD   finasteride (PROSCAR) 5 mg tablet Take 1 tablet (5 mg total) by mouth once daily. 2/19/25 2/19/26  George Bran MD   lancets (TRUEPLUS LANCETS) 33 gauge Misc 1 lancet by Misc.(Non-Drug; Combo Route) route once daily. 6/7/23   George Bran MD   metFORMIN (GLUCOPHAGE) 1000 MG tablet Take 1 tablet (1,000 mg total) by mouth 2 (two) times a day. 2/18/25   George Bran MD   miconazole NITRATE 2 % (MICOTIN) 2 % top powder Apply topically 2 (two) times daily. 10/1/24 10/31/24  Niranjan Rankin MD   mirtazapine (REMERON) 15 MG tablet Take 1 tablet (15 mg total) by mouth once daily. 2/24/25   George Bran MD   pantoprazole (PROTONIX) 40 MG tablet Take 1 tablet (40 mg total) by mouth once daily. 2/24/25   George Bran MD   rosuvastatin (CRESTOR) 40 MG Tab Take 1 tablet (40 mg total) by mouth once daily. 2/24/25   George Bran MD   SITagliptin phosphate (JANUVIA) 50 MG Tab Take 1 tablet (50 mg total) by mouth once daily. 2/26/25   George Bran MD   TRUE METRIX GLUCOSE TEST STRIP Strp TEST BLOOD SUGAR EVERY DAY 9/4/24   George Bran MD   vitamin D (VITAMIN D3) 1000 units Tab Take 1,000 Units by mouth once daily.    Provider, Historical   zinc oxide 20 % ointment Apply topically 2 (two) times a day. 10/1/24   Niranjan Rankin MD       REVIEW OF SYSTEMS:   Except as documented, all other systems reviewed and negative     PHYSICAL EXAM:     VITAL SIGNS: 24 HRS MIN & MAX LAST   Temp  Min: 97.4 °F (36.3 °C)  Max: 98.2 °F (36.8 °C) 97.4 °F (36.3 °C)   BP  Min: 58/35  Max: 118/75 93/66   Pulse  Min: 86  Max: 117  86   Resp  Min: 14  Max: 26 18   SpO2  Min: 90 %  Max: 99 % 97 %     General: alert male lying comfortably in bed, in no acute distress  HENT: oral and oropharyngeal mucosa moist, pink, with no erythema or  exudates, no ear pain or discharge  Neck: normal neck movement, no lymph nodes or swellings, no JVD or Carotid bruit  Respiratory: clear breathing sounds bilaterally, no crackles, rales, ronchi or wheezes  Cardiovascular: clear S1 and S2, no murmurs, rubs or gallops  Peripheral Vascular: no lesions, ulcers or erosions, normal peripheral pulses and no pedal edema  Gastrointestinal: soft, non-tender, non-distended abdomen, no guarding, rigidity or rebound tenderness, normal bowel sounds  Integumentary: normal skin color, no rashes or lesions  Neuro: AAO x 3; motor strength 5/5 in B/L UEs & LEs; sensation intact to gross and fine touch B/L; CN II-XII grossly intact    LABS AND IMAGING:     Recent Labs   Lab 02/28/25  1123 03/01/25  0146   WBC 24.83  24.83* 20.05*   RBC 3.78* 2.69*   HGB 11.8* 8.5*   HCT 36.9* 25.5*   MCV 97.6* 94.8*   MCH 31.2* 31.6*   MCHC 32.0* 33.3   RDW 13.9 14.1    152   MPV 11.2* 11.2*       Recent Labs   Lab 02/28/25  1123 02/28/25  1240 02/28/25  1621 02/28/25  2345 03/01/25  0146 03/01/25  0801     --    < > 143 141 141   K 5.5*  --    < > 4.2 4.3 4.2   *  --    < > 117* 117* 117*   CO2 15*  --    < > 16* 16* 17*   BUN 66.9*  --    < > 53.0* 48.8* 46.0*   CREATININE 3.69*  --    < > 2.66* 2.62* 2.44*   CALCIUM 9.6  --    < > 7.9* 7.9* 8.1*   PH  --  7.300  --   --   --   --    MG 1.60  --    < > 1.30* 1.30* 1.90   ALBUMIN 4.1  --   --   --  2.7*  --    ALKPHOS 71  --   --   --  44  --    ALT 32  --   --   --  19  --    AST 21  --   --   --  17  --    BILITOT 0.6  --   --   --  0.3  --     < > = values in this interval not displayed.       CT Chest Abdomen Pelvis Without Contrast (XPD)  Narrative: EXAMINATION:  CT CHEST ABDOMEN PELVIS WITHOUT CONTRAST(XPD)    CLINICAL HISTORY:  Sepsis;    TECHNIQUE:  Low dose axial images, sagittal and coronal reformations were obtained from the thoracic inlet to the pubic symphysis without IV and oral contrast administration.  Automatic  exposure control is utilized to reduce patient radiation exposure.    COMPARISON:  09/16/2024    FINDINGS:  Examination is limited due to lack of intravenous contrast    There is mild right lower lobe atelectasis..  No mass is seen.  No nodule is seen.  No pleural thickening is seen.  No pleural effusion is seen.  No infiltrate is seen.    The thoracic aorta is normal in caliber..  Calcified plaque is seen throughout the thoracic and abdominal aorta.  Some coronary artery calcifications are seen as well.    No mediastinal adenopathy is seen.    The heart appears normal in size..    The liver appears normal.  No liver mass or lesion is seen.  Portal and hepatic veins are not well evaluated on this non contrasted CT...    The gallbladder appears normal.  No gallstones are seen.    The spleen appears normal.  No obvious splenic mass or lesion is seen.    The pancreas appears grossly unremarkable.  No pancreatic mass or lesion is seen.  No inflammation is seen.    No adrenal abnormality is seen.  No adrenal nodule is seen.    There is bilateral hydronephrosis and hydroureter secondary to obstructive changes from markedly thickened urinary bladder wall with inflammatory changes seen throughout the urinary bladder and around the urinary bladder.  Findings are consistent with a cystitis.  There is a Gandara catheter in the urinary bladder.  No nephro or ureterolithiasis is seen.  No renal lesion is seen.    Visualized portions of the bowel shows no acute abnormality.  No colitis is seen.  No diverticulitis is seen.  No colonic mass is seen.    No free air is seen.  No free fluid is seen.    No acute bony abnormality is seen.  There are degenerative changes seen in the lower lumbar spine the patient is status post posterior spinal fusion at L4-5.  Impression: Extensive urinary bladder wall thickening seen diffusely with inflammatory changes seen around the urinary bladder consistent with acute cystitis.  This is causing  secondary obstructive changes and secondary bilateral hydronephrosis and hydroureter mild    Right lower lobe atelectasis    Degenerative and postsurgical changes in the lower lumbar spine    Electronically signed by: Raji Muhammad  Date:    02/28/2025  Time:    14:00  X-Ray Chest AP Portable  Narrative: EXAMINATION:  XR CHEST AP PORTABLE    CLINICAL HISTORY:  Generalized weakness.  Sepsis.    TECHNIQUE:  One view    COMPARISON:  September 18, 2024.    FINDINGS:  Cardiopericardial silhouette is within normal limits. Lungs are without dense focal or segmental consolidation, congestive process, pleural effusions or pneumothorax.  Impression: No acute cardiopulmonary process identified.    Electronically signed by: Jose J Do  Date:    02/28/2025  Time:    13:21        ASSESSMENT & PLAN:   DKA-resolved  UTI  Leukocytosis 2/2 above  B/L Hydronephrosis & Hydroureter  DWAINE on CKD III 2/2 Prerenal vs Postrenal Azotemia  RSV antigen positive  Acute gastroenteritis  Prior CVA 09/2024  Normocytic anemia    Admitted to    Reporting no new complaints   BUN/creatinine slightly improved at 46/2.44; will consult Urology  Leukocytosis improving  Remains on IV cefepime 2 g t.i.d.   Remains on IV LR 75 mL/hour   Urine culture growing GNR   Blood cultures negative   Started on glargine 50 units b.i.d. on ISS LD   Continued on home aspirin, atorvastatin, Zetia, mirtazapine, Protonix  PT/OT consulted    VTE Prophylaxis:  Lovenox    Patient condition:  Stable    Discharge Planning and Disposition: No mobility needs. Ambulating well. Good social support system.   Anticipated discharge    All diagnosis and differential diagnosis have been reviewed; assessment and plan has been documented; I have personally reviewed the labs and test results that are presently available; I have reviewed the patients medication list; I have reviewed the consulting providers response and recommendations. I have reviewed or attempted to review medical  records based upon their availability.    All of the patient and family questions have been addressed and answered. Patient's is agreeable to the above stated plan. I will continue to monitor closely and make adjustments to medical management as needed.      Frank Awad MD   03/01/2025

## 2025-03-01 NOTE — PROGRESS NOTES
Inpatient Nutrition Assessment    Admit Date: 2/28/2025   Total duration of encounter: 1 day   Patient Age: 64 y.o.    Nutrition Recommendation/Prescription     Continue diabetic diet.    Communication of Recommendations: reviewed with nurse and reviewed with patient    Nutrition Assessment     Chart Review    Reason Seen: malnutrition screening tool (MST)    Malnutrition Screening Tool Results   Have you recently lost weight without trying?: Unsure  Have you been eating poorly because of a decreased appetite?: Yes   MST Score: 3   Diagnosis:  Wide anion gap metabolic acidosis of combined diabetic ketoacidotic and lactic acidotic etiologies (resolved)  Acute gastroenteritis  Acute cystitis with bilateral hydronephrosis  Acute on chronic kidney disease with baseline stage III CKD, significantly improved since admission, remains nonoliguric  RSV antigen positive    Relevant Medical History: type 2 diabetes mellitus, stage 3 chronic kidney disease, cerebrovascular disease with previous history of stroke 09/2024, and chronic urinary retention requiring indwelling Gandara placement about 1 month ago     Scheduled Medications:  aspirin, 81 mg, Daily  atorvastatin, 80 mg, Daily  ceFEPime IV (PEDS and ADULTS), 2 g, Q8H  enoxparin, 30 mg, Q24H (prophylaxis, 1700)  ezetimibe, 10 mg, Daily  insulin glargine U-100, 15 Units, BID  mirtazapine, 15 mg, Daily  mupirocin, , BID  pantoprazole, 40 mg, Daily    Continuous Infusions:  D5 and 0.45% NaCl, Last Rate: 150 mL/hr at 02/28/25 2058  lactated ringers, Last Rate: 75 mL/hr at 03/01/25 1024    PRN Medications:   acetaminophen, 650 mg, Q6H PRN  D5 and 0.45% NaCl, , Continuous PRN  dextrose 50%, 12.5 g, PRN  dextrose 50%, 12.5 g, PRN  dextrose 50%, 25 g, PRN  dextrose 50%, 25 g, PRN  glucagon (human recombinant), 1 mg, PRN  glucose, 16 g, PRN  glucose, 24 g, PRN  insulin aspart U-100, 0-15 Units, QID (AC + HS) PRN  insulin aspart U-100, 0-5 Units, Q6H PRN  ondansetron, 4 mg, Q6H  PRN  potassium chloride, 40 mEq, PRN   And  potassium chloride, 10 mEq, PRN   And  potassium chloride, 10 mEq, PRN  sodium chloride 0.9%, 10 mL, PRN    Calorie Containing IV Medications: no significant kcals from medications at this time    Recent Labs   Lab 02/28/25  1123 02/28/25  1621 02/28/25  1957 02/28/25  2345 03/01/25  0146 03/01/25  0801    147* 143 143 141 141   K 5.5* 4.2 4.2 4.2 4.3 4.2   CALCIUM 9.6 8.3* 8.0* 7.9* 7.9* 8.1*   PHOS  --  3.3 3.0 3.0 3.0 2.1*   MG 1.60 1.40* 1.20* 1.30* 1.30* 1.90   * 114* 119* 117* 117* 117*   CO2 15* 16* 14* 16* 16* 17*   BUN 66.9* 56.7* 56.4* 53.0* 48.8* 46.0*   CREATININE 3.69* 3.00* 2.64* 2.66* 2.62* 2.44*   EGFRNORACEVR 17 22 26 26 26 28   GLUCOSE 312* 155* 87 182* 217* 107   BILITOT 0.6  --   --   --  0.3  --    ALKPHOS 71  --   --   --  44  --    ALT 32  --   --   --  19  --    AST 21  --   --   --  17  --    ALBUMIN 4.1  --   --   --  2.7*  --    HGBA1C  --  6.8  --   --   --   --    LIPASE 18  --   --   --   --   --    WBC 24.83  24.83*  --   --   --  20.05*  --    HGB 11.8*  --   --   --  8.5*  --    HCT 36.9*  --   --   --  25.5*  --      Nutrition Orders:  Diet diabetic 2000 Calories (up to 75 gm per meal)      Appetite/Oral Intake: poor/% of meals  Factors Affecting Nutritional Intake: decreased appetite  Social Needs Impacting Access to Food: none identified on admission screening  Food/Mosque/Cultural Preferences: none reported  Food Allergies: none reported  Last Bowel Movement: 02/28/25  Wound(s):     Wound 09/09/24 Moisture associated dermatitis Buttocks-Tissue loss description: Not applicable     Comments    3/1/25 Patient reports a poor appetite, would not elaborate. Diet advanced from NPO for lunch today, nurse reports he ate 100%. Patient denies weight loss. Offered to send oral supplements but patient not interested at this time. He did not answer further questions, reports he would prefer to rest  "now.    Anthropometrics    Height: 5' 2" (157.5 cm), Height Method: Measured  Last Weight: 53 kg (116 lb 13.5 oz) (25 1610), Weight Method: Bed Scale  BMI (Calculated): 21.4  BMI Classification: underweight (BMI less than 22 if >65 years of age)        Ideal Body Weight (IBW), Male: 118 lb     % Ideal Body Weight, Male (lb): 99.21 %                 Usual Body Weight (UBW), k.2 kg  % Usual Body Weight: 101.74  % Weight Change From Usual Weight: 1.53 %  Usual Weight Provided By: patient    Wt Readings from Last 5 Encounters:   25 53 kg (116 lb 13.5 oz)   25 52.2 kg (115 lb)   01/15/25 52.2 kg (115 lb)   01/10/25 52.2 kg (115 lb)   24 50 kg (110 lb 3.7 oz)     Weight Change(s) Since Admission:   (3/1) admission weight stated, took bed weight 53.0 kg during rounds  Wt Readings from Last 1 Encounters:   25 1610 53 kg (116 lb 13.5 oz)   25 1600 53.1 kg (117 lb 1 oz)   25 1100 53.1 kg (117 lb)   Admit Weight: 53.1 kg (117 lb) (25 1100), Weight Method: Stated    Nutrition Goals & Monitoring     Dietitian will monitor: food and beverage intake  Discharge planning: resume home regimen  Nutrition Risk/Follow-Up: low (follow-up in 5-7 days)   Please consult if re-assessment needed sooner.    "

## 2025-03-02 LAB
ALLENS TEST BLOOD GAS (OHS): YES
ANION GAP SERPL CALC-SCNC: 9 MEQ/L
APICAL FOUR CHAMBER EJECTION FRACTION: 44 %
APICAL TWO CHAMBER EJECTION FRACTION: 42 %
AV INDEX (PROSTH): 0.49
AV MEAN GRADIENT: 4 MMHG
AV PEAK GRADIENT: 8 MMHG
AV VALVE AREA BY VELOCITY RATIO: 1.7 CM²
AV VALVE AREA: 1.7 CM²
AV VELOCITY RATIO: 0.5
BACTERIA UR CULT: ABNORMAL
BASE EXCESS BLD CALC-SCNC: -2.2 MMOL/L
BASOPHILS # BLD AUTO: 0.08 X10(3)/MCL
BASOPHILS NFR BLD AUTO: 0.7 %
BLOOD GAS SAMPLE TYPE (OHS): ABNORMAL
BSA FOR ECHO PROCEDURE: 1.52 M2
BUN SERPL-MCNC: 38.1 MG/DL (ref 8.4–25.7)
CA-I BLD-SCNC: 1.15 MMOL/L (ref 1.12–1.23)
CALCIUM SERPL-MCNC: 8.4 MG/DL (ref 8.8–10)
CHLORIDE SERPL-SCNC: 113 MMOL/L (ref 98–107)
CO2 BLDA-SCNC: 21.5 MMOL/L
CO2 SERPL-SCNC: 18 MMOL/L (ref 23–31)
CREAT SERPL-MCNC: 2.28 MG/DL (ref 0.72–1.25)
CREAT/UREA NIT SERPL: 17
CV ECHO LV RWT: 0.37 CM
DOP CALC AO PEAK VEL: 1.4 M/S
DOP CALC AO VTI: 20.6 CM
DOP CALC LVOT AREA: 3.5 CM2
DOP CALC LVOT DIAMETER: 2.1 CM
DOP CALC LVOT PEAK VEL: 0.7 M/S
DOP CALC LVOT STROKE VOLUME: 35 CM3
DOP CALCLVOT PEAK VEL VTI: 10.1 CM
DRAWN BY BLOOD GAS (OHS): ABNORMAL
E WAVE DECELERATION TIME: 119 MSEC
E/E' RATIO: 11 M/S
ECHO LV POSTERIOR WALL: 1 CM (ref 0.6–1.1)
EJECTION FRACTION: 35 %
EOSINOPHIL # BLD AUTO: 0.34 X10(3)/MCL (ref 0–0.9)
EOSINOPHIL NFR BLD AUTO: 2.8 %
ERYTHROCYTE [DISTWIDTH] IN BLOOD BY AUTOMATED COUNT: 14.2 % (ref 11.5–17)
FRACTIONAL SHORTENING: 25.9 % (ref 28–44)
GFR SERPLBLD CREATININE-BSD FMLA CKD-EPI: 31 ML/MIN/1.73/M2
GLUCOSE SERPL-MCNC: 133 MG/DL (ref 82–115)
HCO3 BLDA-SCNC: 20.6 MMOL/L (ref 22–26)
HCT VFR BLD AUTO: 27 % (ref 42–52)
HGB BLD-MCNC: 8.7 G/DL (ref 14–18)
IMM GRANULOCYTES # BLD AUTO: 0.05 X10(3)/MCL (ref 0–0.04)
IMM GRANULOCYTES NFR BLD AUTO: 0.4 %
INTERVENTRICULAR SEPTUM: 1 CM (ref 0.6–1.1)
LACTATE SERPL-SCNC: 1.5 MMOL/L (ref 0.5–2.2)
LACTATE SERPL-SCNC: 2.8 MMOL/L (ref 0.5–2.2)
LEFT ATRIUM AREA SYSTOLIC (APICAL 2 CHAMBER): 10.4 CM2
LEFT ATRIUM AREA SYSTOLIC (APICAL 4 CHAMBER): 10.3 CM2
LEFT ATRIUM SIZE: 3.1 CM
LEFT ATRIUM VOLUME INDEX MOD: 14 ML/M2
LEFT ATRIUM VOLUME MOD: 21 ML
LEFT INTERNAL DIMENSION IN SYSTOLE: 4 CM (ref 2.1–4)
LEFT VENTRICLE DIASTOLIC VOLUME INDEX: 92.76 ML/M2
LEFT VENTRICLE DIASTOLIC VOLUME: 141 ML
LEFT VENTRICLE END DIASTOLIC VOLUME APICAL 2 CHAMBER: 83.8 ML
LEFT VENTRICLE END DIASTOLIC VOLUME APICAL 4 CHAMBER: 103 ML
LEFT VENTRICLE END SYSTOLIC VOLUME APICAL 2 CHAMBER: 22.8 ML
LEFT VENTRICLE END SYSTOLIC VOLUME APICAL 4 CHAMBER: 19.5 ML
LEFT VENTRICLE MASS INDEX: 136 G/M2
LEFT VENTRICLE SYSTOLIC VOLUME INDEX: 46.1 ML/M2
LEFT VENTRICLE SYSTOLIC VOLUME: 70 ML
LEFT VENTRICULAR INTERNAL DIMENSION IN DIASTOLE: 5.4 CM (ref 3.5–6)
LEFT VENTRICULAR MASS: 206.7 G
LV LATERAL E/E' RATIO: 9.3 M/S
LV SEPTAL E/E' RATIO: 12.3 M/S
LVED V (TEICH): 141 ML
LVES V (TEICH): 70 ML
LVOT MG: 1 MMHG
LVOT MV: 0.54 CM/S
LYMPHOCYTES # BLD AUTO: 0.76 X10(3)/MCL (ref 0.6–4.6)
LYMPHOCYTES NFR BLD AUTO: 6.2 %
MAGNESIUM SERPL-MCNC: 1.6 MG/DL (ref 1.6–2.6)
MCH RBC QN AUTO: 31.1 PG (ref 27–31)
MCHC RBC AUTO-ENTMCNC: 32.2 G/DL (ref 33–36)
MCV RBC AUTO: 96.4 FL (ref 80–94)
MONOCYTES # BLD AUTO: 0.75 X10(3)/MCL (ref 0.1–1.3)
MONOCYTES NFR BLD AUTO: 6.1 %
MV PEAK E VEL: 1.11 M/S
NEUTROPHILS # BLD AUTO: 10.27 X10(3)/MCL (ref 2.1–9.2)
NEUTROPHILS NFR BLD AUTO: 83.8 %
NRBC BLD AUTO-RTO: 0 %
OHS LV EJECTION FRACTION SIMPSONS BIPLANE MOD: 44 %
OXYGEN DEVICE BLOOD GAS (OHS): ABNORMAL
PCO2 BLDA: 29 MMHG (ref 35–45)
PH BLDA: 7.46 [PH] (ref 7.35–7.45)
PHOSPHATE SERPL-MCNC: 2.2 MG/DL (ref 2.3–4.7)
PISA TR MAX VEL: 1.9 M/S
PLATELET # BLD AUTO: 159 X10(3)/MCL (ref 130–400)
PMV BLD AUTO: 11.5 FL (ref 7.4–10.4)
PO2 BLDA: 93 MMHG (ref 80–100)
POCT GLUCOSE: 139 MG/DL (ref 70–110)
POCT GLUCOSE: 146 MG/DL (ref 70–110)
POCT GLUCOSE: 206 MG/DL (ref 70–110)
POCT GLUCOSE: 226 MG/DL (ref 70–110)
POCT GLUCOSE: 44 MG/DL (ref 70–110)
POCT GLUCOSE: 63 MG/DL (ref 70–110)
POCT GLUCOSE: 64 MG/DL (ref 70–110)
POCT GLUCOSE: 80 MG/DL (ref 70–110)
POCT GLUCOSE: 92 MG/DL (ref 70–110)
POCT GLUCOSE: 99 MG/DL (ref 70–110)
POTASSIUM BLOOD GAS (OHS): 3.8 MMOL/L (ref 3.5–5)
POTASSIUM SERPL-SCNC: 3.9 MMOL/L (ref 3.5–5.1)
PV PEAK GRADIENT: 4 MMHG
PV PEAK VELOCITY: 0.98 M/S
RA PRESSURE ESTIMATED: 3 MMHG
RBC # BLD AUTO: 2.8 X10(6)/MCL (ref 4.7–6.1)
RV TB RVSP: 5 MMHG
SAMPLE SITE BLOOD GAS (OHS): ABNORMAL
SAO2 % BLDA: 98 %
SODIUM BLOOD GAS (OHS): 141 MMOL/L (ref 137–145)
SODIUM SERPL-SCNC: 140 MMOL/L (ref 136–145)
TDI LATERAL: 0.12 M/S
TDI SEPTAL: 0.09 M/S
TDI: 0.11 M/S
TRICUSPID ANNULAR PLANE SYSTOLIC EXCURSION: 1.39 CM
TROPONIN I SERPL-MCNC: 0.11 NG/ML (ref 0–0.04)
TROPONIN I SERPL-MCNC: 0.6 NG/ML (ref 0–0.04)
TV REST PULMONARY ARTERY PRESSURE: 17 MMHG
WBC # BLD AUTO: 12.25 X10(3)/MCL (ref 4.5–11.5)
Z-SCORE OF LEFT VENTRICULAR DIMENSION IN END DIASTOLE: 1.93
Z-SCORE OF LEFT VENTRICULAR DIMENSION IN END SYSTOLE: 2.92

## 2025-03-02 PROCEDURE — 25000003 PHARM REV CODE 250: Performed by: INTERNAL MEDICINE

## 2025-03-02 PROCEDURE — 85025 COMPLETE CBC W/AUTO DIFF WBC: CPT | Performed by: INTERNAL MEDICINE

## 2025-03-02 PROCEDURE — 93005 ELECTROCARDIOGRAM TRACING: CPT

## 2025-03-02 PROCEDURE — 83605 ASSAY OF LACTIC ACID: CPT

## 2025-03-02 PROCEDURE — 93010 ELECTROCARDIOGRAM REPORT: CPT | Mod: ,,, | Performed by: INTERNAL MEDICINE

## 2025-03-02 PROCEDURE — 11000001 HC ACUTE MED/SURG PRIVATE ROOM

## 2025-03-02 PROCEDURE — 36415 COLL VENOUS BLD VENIPUNCTURE: CPT | Performed by: INTERNAL MEDICINE

## 2025-03-02 PROCEDURE — 25000003 PHARM REV CODE 250: Performed by: NURSE PRACTITIONER

## 2025-03-02 PROCEDURE — 25000003 PHARM REV CODE 250

## 2025-03-02 PROCEDURE — 83605 ASSAY OF LACTIC ACID: CPT | Performed by: STUDENT IN AN ORGANIZED HEALTH CARE EDUCATION/TRAINING PROGRAM

## 2025-03-02 PROCEDURE — 94760 N-INVAS EAR/PLS OXIMETRY 1: CPT | Mod: XB

## 2025-03-02 PROCEDURE — 25000003 PHARM REV CODE 250: Performed by: STUDENT IN AN ORGANIZED HEALTH CARE EDUCATION/TRAINING PROGRAM

## 2025-03-02 PROCEDURE — 36600 WITHDRAWAL OF ARTERIAL BLOOD: CPT

## 2025-03-02 PROCEDURE — 99900035 HC TECH TIME PER 15 MIN (STAT)

## 2025-03-02 PROCEDURE — 82803 BLOOD GASES ANY COMBINATION: CPT

## 2025-03-02 PROCEDURE — 63600175 PHARM REV CODE 636 W HCPCS: Performed by: INTERNAL MEDICINE

## 2025-03-02 PROCEDURE — 63600175 PHARM REV CODE 636 W HCPCS: Mod: JB | Performed by: NURSE PRACTITIONER

## 2025-03-02 PROCEDURE — 63600175 PHARM REV CODE 636 W HCPCS: Performed by: STUDENT IN AN ORGANIZED HEALTH CARE EDUCATION/TRAINING PROGRAM

## 2025-03-02 PROCEDURE — 84484 ASSAY OF TROPONIN QUANT: CPT | Performed by: STUDENT IN AN ORGANIZED HEALTH CARE EDUCATION/TRAINING PROGRAM

## 2025-03-02 PROCEDURE — 84100 ASSAY OF PHOSPHORUS: CPT | Performed by: INTERNAL MEDICINE

## 2025-03-02 PROCEDURE — 36415 COLL VENOUS BLD VENIPUNCTURE: CPT | Performed by: STUDENT IN AN ORGANIZED HEALTH CARE EDUCATION/TRAINING PROGRAM

## 2025-03-02 PROCEDURE — 63600175 PHARM REV CODE 636 W HCPCS

## 2025-03-02 PROCEDURE — 80048 BASIC METABOLIC PNL TOTAL CA: CPT | Performed by: INTERNAL MEDICINE

## 2025-03-02 PROCEDURE — 27000207 HC ISOLATION

## 2025-03-02 PROCEDURE — 83735 ASSAY OF MAGNESIUM: CPT | Performed by: INTERNAL MEDICINE

## 2025-03-02 PROCEDURE — 36415 COLL VENOUS BLD VENIPUNCTURE: CPT

## 2025-03-02 RX ORDER — DEXTROSE MONOHYDRATE AND SODIUM CHLORIDE 5; .45 G/100ML; G/100ML
INJECTION, SOLUTION INTRAVENOUS CONTINUOUS
Status: DISCONTINUED | OUTPATIENT
Start: 2025-03-03 | End: 2025-03-04

## 2025-03-02 RX ORDER — SODIUM,POTASSIUM PHOSPHATES 280-250MG
1 POWDER IN PACKET (EA) ORAL ONCE
Status: DISCONTINUED | OUTPATIENT
Start: 2025-03-02 | End: 2025-03-05

## 2025-03-02 RX ORDER — MAGNESIUM SULFATE HEPTAHYDRATE 40 MG/ML
2 INJECTION, SOLUTION INTRAVENOUS ONCE
Status: COMPLETED | OUTPATIENT
Start: 2025-03-02 | End: 2025-03-02

## 2025-03-02 RX ORDER — ACETAMINOPHEN 325 MG/1
650 TABLET ORAL EVERY 6 HOURS PRN
Status: DISCONTINUED | OUTPATIENT
Start: 2025-03-02 | End: 2025-03-13 | Stop reason: HOSPADM

## 2025-03-02 RX ORDER — OCTREOTIDE ACETATE 50 UG/ML
50 INJECTION, SOLUTION INTRAVENOUS; SUBCUTANEOUS ONCE
Status: COMPLETED | OUTPATIENT
Start: 2025-03-02 | End: 2025-03-02

## 2025-03-02 RX ORDER — METOPROLOL TARTRATE 1 MG/ML
5 INJECTION, SOLUTION INTRAVENOUS EVERY 5 MIN PRN
Status: DISCONTINUED | OUTPATIENT
Start: 2025-03-02 | End: 2025-03-13 | Stop reason: HOSPADM

## 2025-03-02 RX ORDER — HALOPERIDOL LACTATE 5 MG/ML
5 INJECTION, SOLUTION INTRAMUSCULAR EVERY 4 HOURS PRN
Status: DISCONTINUED | OUTPATIENT
Start: 2025-03-02 | End: 2025-03-13 | Stop reason: HOSPADM

## 2025-03-02 RX ORDER — ENOXAPARIN SODIUM 100 MG/ML
1 INJECTION SUBCUTANEOUS
Status: DISCONTINUED | OUTPATIENT
Start: 2025-03-02 | End: 2025-03-07

## 2025-03-02 RX ADMIN — DEXTROSE MONOHYDRATE 12.5 G: 25 INJECTION, SOLUTION INTRAVENOUS at 07:03

## 2025-03-02 RX ADMIN — DEXTROSE MONOHYDRATE 12.5 G: 25 INJECTION, SOLUTION INTRAVENOUS at 05:03

## 2025-03-02 RX ADMIN — CEFEPIME 2 G: 2 INJECTION, POWDER, FOR SOLUTION INTRAVENOUS at 05:03

## 2025-03-02 RX ADMIN — DEXTROSE MONOHYDRATE 25 G: 25 INJECTION, SOLUTION INTRAVENOUS at 10:03

## 2025-03-02 RX ADMIN — METOPROLOL TARTRATE 5 MG: 1 INJECTION, SOLUTION INTRAVENOUS at 02:03

## 2025-03-02 RX ADMIN — OCTREOTIDE ACETATE 50 MCG: 50 INJECTION, SOLUTION INTRAVENOUS; SUBCUTANEOUS at 09:03

## 2025-03-02 RX ADMIN — HALOPERIDOL LACTATE 5 MG: 5 INJECTION, SOLUTION INTRAMUSCULAR at 08:03

## 2025-03-02 RX ADMIN — DEXTROSE AND SODIUM CHLORIDE: 5; 450 INJECTION, SOLUTION INTRAVENOUS at 10:03

## 2025-03-02 RX ADMIN — CEFEPIME 2 G: 2 INJECTION, POWDER, FOR SOLUTION INTRAVENOUS at 09:03

## 2025-03-02 RX ADMIN — HALOPERIDOL LACTATE 5 MG: 5 INJECTION, SOLUTION INTRAMUSCULAR at 12:03

## 2025-03-02 RX ADMIN — MUPIROCIN: 20 OINTMENT TOPICAL at 08:03

## 2025-03-02 RX ADMIN — SODIUM CHLORIDE, POTASSIUM CHLORIDE, SODIUM LACTATE AND CALCIUM CHLORIDE 500 ML: 600; 310; 30; 20 INJECTION, SOLUTION INTRAVENOUS at 08:03

## 2025-03-02 RX ADMIN — INSULIN GLARGINE 15 UNITS: 100 INJECTION, SOLUTION SUBCUTANEOUS at 09:03

## 2025-03-02 RX ADMIN — MAGNESIUM SULFATE HEPTAHYDRATE 2 G: 40 INJECTION, SOLUTION INTRAVENOUS at 09:03

## 2025-03-02 RX ADMIN — MUPIROCIN: 20 OINTMENT TOPICAL at 09:03

## 2025-03-02 RX ADMIN — ENOXAPARIN SODIUM 50 MG: 60 INJECTION SUBCUTANEOUS at 01:03

## 2025-03-02 RX ADMIN — SODIUM CHLORIDE, POTASSIUM CHLORIDE, SODIUM LACTATE AND CALCIUM CHLORIDE: 600; 310; 30; 20 INJECTION, SOLUTION INTRAVENOUS at 04:03

## 2025-03-02 RX ADMIN — CEFEPIME 2 G: 2 INJECTION, POWDER, FOR SOLUTION INTRAVENOUS at 02:03

## 2025-03-02 NOTE — CONSULTS
Froy Barragan 1960  [id number]  3/2/2025    CONSULTING PHYSICIAN: Dr. Awad    CC:  Acute cystitis, bilateral hydroureteronephrosis, DWAINE      HPI:   Mr. Barragan is a 65 yo M with a history of type 2 DM, CKD stage 3, CVA 09/2024, and chronic indwelling Gandara catheter who was admitted on 02/28/2025 following several episodes of emesis and nonbloody diarrhea.  History provided by patient's sister, as patient is nonverbal and delirious, this time.  She notes this is his 3rd admission in 2 years for DKA.    On admission, lab significant for leukocytosis of 24.8, hyperkalemia to 5.5, DWAINE with BUN/creatinine 66.9/3.69, lactic acid of 3.3, and UA concerning for infection.  Blood and urine cultures sent off.  CT AP significant for extensive urinary bladder wall thickening with bilateral hydroureteronephrosis, suspected to be from obstructive changes from thickened bladder wall.  Patient subsequently admitted to the ICU on insulin infusion for DKA and started on broad-spectrum antibiotics.  Urology further consulted for DWAINE, acute cystitis, and bilateral hydroureteronephrosis.      Past Medical History:   Diagnosis Date    Anemia of chronic disease 7/6/2022    Arteriosclerosis of coronary artery 7/6/2022    Cervical myelopathy 7/6/2022    Cobalamin deficiency 7/6/2022    Gastroesophageal reflux disease 7/6/2022    Hypertension 7/6/2022    Low vitamin D level 7/6/2022    Mixed hyperlipidemia 7/6/2022    Paraparesis 7/6/2022    Stage 3a chronic kidney disease 7/6/2022    Type 2 diabetes mellitus 7/6/2022       Past Surgical History:   Procedure Laterality Date    MAGNETIC RESONANCE IMAGING N/A 9/19/2024    Procedure: MRI (Magnetic Resonance Imagine);  Surgeon: David Amin DO;  Location: [facility];  Service: Anesthesiology;  Laterality: N/A;    SPINE SURGERY         No family history on file.    Social History[1]    Current Medications[2]    Review of patient's allergies indicates:  No Known  Allergies    ROS: 12 point review of systems negative other than the HPI      PHYSICAL EXAM:  Vitals:    03/02/25 0700 03/02/25 0715 03/02/25 0727 03/02/25 0730   BP: (!) 158/105      BP Location: Left arm      Patient Position: Lying      Pulse: (!) 125 (!) 135  (!) 134   Resp:       Temp:       TempSrc:       SpO2: 98% 97% 97% 96%   Weight:       Height:             Intake/Output Summary (Last 24 hours) at 3/2/2025 0850  Last data filed at 3/2/2025 0400  Gross per 24 hour   Intake 1081.6 ml   Output 3125 ml   Net -2043.4 ml       GEN: agitated, delirious, not following commands  HEENT: NCAT, PERRLA, EOMI, OP clear, nares patent  CV: RRR  RESP: Even and unlabored  ABD: soft, NT, ND  : 2-way colbert in place draining clear yellow urine  EXT: no C/C/E  NEURO: no focal deficits, MAEW, AAOx4      LABS:  [unfilled]      Recent Results (from the past 24 hours)   POCT glucose    Collection Time: 03/01/25  9:17 AM   Result Value Ref Range    POCT Glucose 96 70 - 110 mg/dL   POCT glucose    Collection Time: 03/01/25 10:13 AM   Result Value Ref Range    POCT Glucose 111 (H) 70 - 110 mg/dL   POCT glucose    Collection Time: 03/01/25 11:10 AM   Result Value Ref Range    POCT Glucose 104 70 - 110 mg/dL   POCT glucose    Collection Time: 03/01/25  4:54 PM   Result Value Ref Range    POCT Glucose 226 (H) 70 - 110 mg/dL   POCT glucose    Collection Time: 03/01/25  9:17 PM   Result Value Ref Range    POCT Glucose 137 (H) 70 - 110 mg/dL   Magnesium    Collection Time: 03/02/25  3:20 AM   Result Value Ref Range    Magnesium Level 1.60 1.60 - 2.60 mg/dL   Phosphorus    Collection Time: 03/02/25  3:20 AM   Result Value Ref Range    Phosphorus Level 2.2 (L) 2.3 - 4.7 mg/dL   Basic Metabolic Panel    Collection Time: 03/02/25  3:20 AM   Result Value Ref Range    Sodium 140 136 - 145 mmol/L    Potassium 3.9 3.5 - 5.1 mmol/L    Chloride 113 (H) 98 - 107 mmol/L    CO2 18 (L) 23 - 31 mmol/L    Glucose 133 (H) 82 - 115 mg/dL    Blood Urea  Nitrogen 38.1 (H) 8.4 - 25.7 mg/dL    Creatinine 2.28 (H) 0.72 - 1.25 mg/dL    BUN/Creatinine Ratio 17     Calcium 8.4 (L) 8.8 - 10.0 mg/dL    Anion Gap 9.0 mEq/L    eGFR 31 mL/min/1.73/m2   CBC with Differential    Collection Time: 03/02/25  3:20 AM   Result Value Ref Range    WBC 12.25 (H) 4.50 - 11.50 x10(3)/mcL    RBC 2.80 (L) 4.70 - 6.10 x10(6)/mcL    Hgb 8.7 (L) 14.0 - 18.0 g/dL    Hct 27.0 (L) 42.0 - 52.0 %    MCV 96.4 (H) 80.0 - 94.0 fL    MCH 31.1 (H) 27.0 - 31.0 pg    MCHC 32.2 (L) 33.0 - 36.0 g/dL    RDW 14.2 11.5 - 17.0 %    Platelet 159 130 - 400 x10(3)/mcL    MPV 11.5 (H) 7.4 - 10.4 fL    Neut % 83.8 %    Lymph % 6.2 %    Mono % 6.1 %    Eos % 2.8 %    Basophil % 0.7 %    Imm Grans % 0.4 %    Neut # 10.27 (H) 2.1 - 9.2 x10(3)/mcL    Lymph # 0.76 0.6 - 4.6 x10(3)/mcL    Mono # 0.75 0.1 - 1.3 x10(3)/mcL    Eos # 0.34 0 - 0.9 x10(3)/mcL    Baso # 0.08 <=0.2 x10(3)/mcL    Imm Gran # 0.05 (H) 0.00 - 0.04 x10(3)/mcL    NRBC% 0.0 %   POCT glucose    Collection Time: 03/02/25  4:40 AM   Result Value Ref Range    POCT Glucose 139 (H) 70 - 110 mg/dL   Blood Gas    Collection Time: 03/02/25  7:20 AM   Result Value Ref Range    Sample Type Arterial Blood     Sample site Right Radial Artery     Drawn by DA RRT     pH, Blood gas 7.460 (H) 7.350 - 7.450    pCO2, Blood gas 29.0 (L) 35.0 - 45.0 mmHg    pO2, Blood gas 93.0 80.0 - 100.0 mmHg    Sodium, Blood Gas 141 137 - 145 mmol/L    Potassium, Blood Gas 3.8 3.5 - 5.0 mmol/L    Calcium Level Ionized 1.15 1.12 - 1.23 mmol/L    TOC2, Blood gas 21.5 mmol/L    Base Excess, Blood gas -2.20 mmol/L    sO2, Blood gas 98.0 %    HCO3, Blood gas 20.6 (L) 22.0 - 26.0 mmol/L    Allens Test Yes     Oxygen Device, Blood gas RA    Lactic Acid, Plasma    Collection Time: 03/02/25  7:54 AM   Result Value Ref Range    Lactic Acid Level 2.8 (H) 0.5 - 2.2 mmol/L         IMAGING:  Imaging Results              CT Chest Abdomen Pelvis Without Contrast (XPD) (Final result)  Result time  02/28/25 14:00:48      Final result by Jean Muhammad MD (02/28/25 14:00:48)                   Impression:      Extensive urinary bladder wall thickening seen diffusely with inflammatory changes seen around the urinary bladder consistent with acute cystitis.  This is causing secondary obstructive changes and secondary bilateral hydronephrosis and hydroureter mild    Right lower lobe atelectasis    Degenerative and postsurgical changes in the lower lumbar spine      Electronically signed by: Raji Muhammad  Date:    02/28/2025  Time:    14:00               Narrative:    EXAMINATION:  CT CHEST ABDOMEN PELVIS WITHOUT CONTRAST(XPD)    CLINICAL HISTORY:  Sepsis;    TECHNIQUE:  Low dose axial images, sagittal and coronal reformations were obtained from the thoracic inlet to the pubic symphysis without IV and oral contrast administration.  Automatic exposure control is utilized to reduce patient radiation exposure.    COMPARISON:  09/16/2024    FINDINGS:  Examination is limited due to lack of intravenous contrast    There is mild right lower lobe atelectasis..  No mass is seen.  No nodule is seen.  No pleural thickening is seen.  No pleural effusion is seen.  No infiltrate is seen.    The thoracic aorta is normal in caliber..  Calcified plaque is seen throughout the thoracic and abdominal aorta.  Some coronary artery calcifications are seen as well.    No mediastinal adenopathy is seen.    The heart appears normal in size..    The liver appears normal.  No liver mass or lesion is seen.  Portal and hepatic veins are not well evaluated on this non contrasted CT...    The gallbladder appears normal.  No gallstones are seen.    The spleen appears normal.  No obvious splenic mass or lesion is seen.    The pancreas appears grossly unremarkable.  No pancreatic mass or lesion is seen.  No inflammation is seen.    No adrenal abnormality is seen.  No adrenal nodule is seen.    There is bilateral hydronephrosis and  hydroureter secondary to obstructive changes from markedly thickened urinary bladder wall with inflammatory changes seen throughout the urinary bladder and around the urinary bladder.  Findings are consistent with a cystitis.  There is a Colbert catheter in the urinary bladder.  No nephro or ureterolithiasis is seen.  No renal lesion is seen.    Visualized portions of the bowel shows no acute abnormality.  No colitis is seen.  No diverticulitis is seen.  No colonic mass is seen.    No free air is seen.  No free fluid is seen.    No acute bony abnormality is seen.  There are degenerative changes seen in the lower lumbar spine the patient is status post posterior spinal fusion at L4-5.                                       X-Ray Chest AP Portable (Final result)  Result time 02/28/25 13:21:33      Final result by Jose J oD MD (02/28/25 13:21:33)                   Impression:      No acute cardiopulmonary process identified.      Electronically signed by: Jose J Do  Date:    02/28/2025  Time:    13:21               Narrative:    EXAMINATION:  XR CHEST AP PORTABLE    CLINICAL HISTORY:  Generalized weakness.  Sepsis.    TECHNIQUE:  One view    COMPARISON:  September 18, 2024.    FINDINGS:  Cardiopericardial silhouette is within normal limits. Lungs are without dense focal or segmental consolidation, congestive process, pleural effusions or pneumothorax.                                        ASSESSMENT:  Mr. Barragan is a 63yo M with a history of type 2 DM, CKD stage 3, CVA 09/2024, and chronic indwelling Colbert catheter who was admitted on 02/28/2025 following several episodes of emesis and nonbloody diarrhea. Urology further consulted for DWAINE, acute cystitis, and bilateral hydroureteronephrosis.    PLAN:  - CT AP reviewed  - Continue colbert catheter to gravity drainage.  - Urine Cx (2/28/25): proteus  - Blood Cx (2/28/25): (-)  - Abx: cefepime -> may tailor to PO meds as appropriate  - suspect bilateral  hydroureteronephrosis and DWAINE secondary to diffuse bladder wall thickening with acute cystitis, as DWAINE now improving with IV antibiotics.  Would recommend repeat renal ultrasound tomorrow, 03/03/2025 to assess for improvement.  - urology will continue to follow.  Please call with any further questions or concerns.    Indigo Buchanan MD             [1]   Social History  Tobacco Use    Smoking status: Never    Smokeless tobacco: Never   Substance Use Topics    Alcohol use: Never    Drug use: Never   [2]   Current Facility-Administered Medications   Medication Dose Route Frequency Provider Last Rate Last Admin    acetaminophen tablet 650 mg  650 mg Oral Q6H PRN Petty Martell MD   650 mg at 03/01/25 2118    aspirin EC tablet 81 mg  81 mg Oral Daily Petty Martell MD   81 mg at 03/01/25 0914    atorvastatin tablet 80 mg  80 mg Oral Daily Petty Martell MD   80 mg at 03/01/25 0914    ceFEPIme injection 2 g  2 g Intravenous Q8H Ptety Martell MD   2 g at 03/02/25 0259    dextrose 5 % and 0.45 % NaCl infusion   Intravenous Continuous PRN Elida Felder,  mL/hr at 03/01/25 1758 Rate Verify at 03/01/25 1758    dextrose 50% injection 12.5 g  12.5 g Intravenous PRN Elida Felder, TERRELLP        dextrose 50% injection 12.5 g  12.5 g Intravenous PRN Emigdio Hayes Jr., MD, FCCP        dextrose 50% injection 25 g  25 g Intravenous PRN Elida Felder, TERRELLP        dextrose 50% injection 25 g  25 g Intravenous PRN [facility] Jr., [facility], [facility]        enoxaparin injection 30 mg  30 mg Subcutaneous Q24H (prophylaxis, 1700) Petty Martell MD   30 mg at 03/01/25 1702    ezetimibe tablet 10 mg  10 mg Oral Daily Petty Martell MD   10 mg at 03/01/25 0914    glucagon (human recombinant) injection 1 mg  1 mg Intramuscular PRN Emigdio Hayes Jr., [facility]        glucose chewable tablet 16 g  16 g Oral PRN Emigdio Hayes Jr., [facility], [facility]        glucose chewable tablet 24 g  24 g Oral PRN Emigdio Hayes Jr., [facility], [facility]         insulin aspart U-100 injection 0-15 Units  0-15 Units Subcutaneous QID (AC + HS) PRN Emigdio Hayes Jr., MD, FCCP   6 Units at 03/01/25 1703    insulin aspart U-100 injection 0-5 Units  0-5 Units Subcutaneous Q6H PRN Blake Herndon MD        insulin glargine U-100 (Lantus) injection 15 Units  15 Units Subcutaneous BID Emigdio Hayes Jr., MD, FCCP   15 Units at 03/01/25 2118    lactated ringers bolus 500 mL  500 mL Intravenous Once Frank Awad MD        lactated ringers infusion   Intravenous Continuous Emigdio Hayes Jr., MD, FCCP 75 mL/hr at 03/01/25 1758 Rate Verify at 03/01/25 1758    magnesium sulfate 2g in water 50mL IVPB (premix)  2 g Intravenous Once Frank Awad MD        mupirocin 2 % ointment   Nasal BID Elida Felder, IRLANDA   Given at 03/01/25 2118    ondansetron injection 4 mg  4 mg Intravenous Q6H PRN Petty Martell MD        pantoprazole EC tablet 40 mg  40 mg Oral Daily Petty Martell MD   40 mg at 03/01/25 0914    potassium chloride 10 mEq in 100 mL IVPB  40 mEq Intravenous PRN Petty Martell MD        And    potassium chloride 10 mEq in 100 mL IVPB  10 mEq Intravenous PRN Petty Martell MD        And    potassium chloride 10 mEq in 100 mL IVPB  10 mEq Intravenous PRN Petty Martell MD        potassium, sodium phosphates 280-160-250 mg packet 1 packet  1 packet Oral Once Frank Awad MD        sodium chloride 0.9% flush 10 mL  10 mL Intravenous PRN Elida Felder FNP

## 2025-03-02 NOTE — PROGRESS NOTES
"0620 -- Patient noted to be very restless and turning back and forth in bed with tachycardia up to 140's with movement, 110's when still, tangled in wires and sheets. RN entered room to assess and assist patient. Alert but not cooperative with following commands or answering questions, only oriented to self. Briefly follows command to lay back in attempt to fix cardiac monitoring leads but quickly proceeds to turning on side and stating "I need to sleep" repeatedly. Pulling at gown and removed one IV. Patient not cooperative for full detailed neuro exam and not holding conversation, but no notable focal deficits appreciated. Asked if in pain or distressed but looks anxious and frantic and only answering "I need to sleep". . Previously thru night HR 90's NSR at rest, mild tachycardia with movement but patient turned self and slept through night and answered questions with short conversations, oriented to self and place. MD paged, rectal temp obtained WNL (98.8*F rectal, 98*F oral), order for 250mL bolus, STAT ABG and LA.  "

## 2025-03-02 NOTE — PT/OT/SLP PROGRESS
Physical Therapy Treatment    Patient Name:  Froy Barragan   MRN:  [id number]    PT eval held as patients resting HR was in 140's. PT to f/u tomorrow.

## 2025-03-02 NOTE — PLAN OF CARE
Problem: Infection  Goal: Absence of Infection Signs and Symptoms  Outcome: Progressing     Problem: Adult Inpatient Plan of Care  Goal: Plan of Care Review  Outcome: Progressing  Goal: Patient-Specific Goal (Individualized)  Outcome: Progressing  Goal: Absence of Hospital-Acquired Illness or Injury  Outcome: Progressing  Goal: Optimal Comfort and Wellbeing  Outcome: Progressing  Goal: Readiness for Transition of Care  Outcome: Progressing     Problem: Diabetes Comorbidity  Goal: Blood Glucose Level Within Targeted Range  Outcome: Progressing     Problem: Wound  Goal: Optimal Coping  Outcome: Progressing  Goal: Optimal Functional Ability  Outcome: Progressing  Goal: Absence of Infection Signs and Symptoms  Outcome: Progressing  Goal: Improved Oral Intake  Outcome: Progressing  Goal: Optimal Pain Control and Function  Outcome: Progressing  Goal: Skin Health and Integrity  Outcome: Progressing  Goal: Optimal Wound Healing  Outcome: Progressing     Problem: Skin Injury Risk Increased  Goal: Skin Health and Integrity  Outcome: Progressing     Problem: Fall Injury Risk  Goal: Absence of Fall and Fall-Related Injury  Outcome: Progressing

## 2025-03-02 NOTE — PROGRESS NOTES
[facility] Hospital Medicine Progress Note        Chief Complaint: Inpatient Follow-up for     HPI:   64-year-old gentleman with DM type 2, CKD stage 3, CVA 09/2024, chronic indwelling Gandara who was admitted after he presented with complaints of nausea with several episodes of vomiting and nonbloody diarrhea.  He denied having any chest pain, abdominal pain, SOB, cough, sputum production, headache, numbness, weakness, dizziness/lightheadedness or loss of consciousness.  On admission his labs showed leukocytosis of 24.8, normocytic anemia with H/H of 11.8/36.9, hyperkalemia of 5.5, CO2 15 with anion gap 20, BUN/creatinine 66.9/3.69, negative troponins, lactic acid of 3.3, beta hydroxybutyrate of 2.9.  UA showed 3+ protein, 2+ glucose, 1+ ketones, 3+ blood, leukocyte esterase, WBCs as well as bacteria.  Blood cultures and urine culture were sent off.  CT C/A/P was done which showed extensive urinary bladder wall thickening with inflammatory changes around urinary bladder consistent with acute cystitis causing secondary obstructive changes and secondary B/L hydronephrosis and hydroureter, RLL atelectasis.  Admitted to ICU on insulin infusion per DKA protocol.  Started on broad-spectrum antibiotics.  Given IVF resuscitation.  Urine culture growing GNR.  Noted to have low-grade fevers overnight on 03/01.  Insulin infusion discontinued and patient is started on long-acting insulin.  Downgraded to .     Interval Hx:   Today, patient was noted to be confused, disoriented & restless. Noted to have tachycardia; given IV LR bolus. Noted to have lactic acidemia, will continue IVF resuscitation. Trop elevated at 0.116; will trend and get Echo. CIS to be consulted.    Case was discussed with patient's nurse on the floor.    Objective/physical exam:  General: alert male lying comfortably in bed, in no acute distress  HENT: oral and oropharyngeal mucosa moist, pink, with no erythema or exudates, no  ear pain or discharge  Neck: normal neck movement, no lymph nodes or swellings, no JVD or Carotid bruit  Respiratory: clear breathing sounds bilaterally, no crackles, rales, ronchi or wheezes  Cardiovascular: clear S1 and S2, no murmurs, rubs or gallops  Peripheral Vascular: no lesions, ulcers or erosions, normal peripheral pulses and no pedal edema  Gastrointestinal: soft, non-tender, non-distended abdomen, no guarding, rigidity or rebound tenderness, normal bowel sounds  Integumentary: normal skin color, no rashes or lesions  Neuro: AAO x 1; motor strength 5/5 in B/L UEs & LEs; sensation intact to gross and fine touch B/L; CN II-XII grossly intact    VITAL SIGNS: 24 HRS MIN & MAX LAST   Temp  Min: 97.8 °F (36.6 °C)  Max: 99 °F (37.2 °C) 97.8 °F (36.6 °C)   BP  Min: 88/70  Max: 179/119 (!) 158/105   Pulse  Min: 80  Max: 143  (!) 118   Resp  Min: 13  Max: 31 18   SpO2  Min: 93 %  Max: 100 % 96 %     I have reviewed the following labs:  Recent Labs   Lab 02/28/25  1123 03/01/25  0146 03/02/25  0320   WBC 24.83  24.83* 20.05* 12.25*   RBC 3.78* 2.69* 2.80*   HGB 11.8* 8.5* 8.7*   HCT 36.9* 25.5* 27.0*   MCV 97.6* 94.8* 96.4*   MCH 31.2* 31.6* 31.1*   MCHC 32.0* 33.3 32.2*   RDW 13.9 14.1 14.2    152 159   MPV 11.2* 11.2* 11.5*     Recent Labs   Lab 02/28/25  1123 02/28/25  1240 02/28/25  1621 03/01/25  0146 03/01/25  0801 03/02/25  0320 03/02/25  0720     --    < > 141 141 140  --    K 5.5*  --    < > 4.3 4.2 3.9  --    *  --    < > 117* 117* 113*  --    CO2 15*  --    < > 16* 17* 18*  --    BUN 66.9*  --    < > 48.8* 46.0* 38.1*  --    CREATININE 3.69*  --    < > 2.62* 2.44* 2.28*  --    CALCIUM 9.6  --    < > 7.9* 8.1* 8.4*  --    PH  --  7.300  --   --   --   --  7.460*   MG 1.60  --    < > 1.30* 1.90 1.60  --    ALBUMIN 4.1  --   --  2.7*  --   --   --    ALKPHOS 71  --   --  44  --   --   --    ALT 32  --   --  19  --   --   --    AST 21  --   --  17  --   --   --    BILITOT 0.6  --   --  0.3   --   --   --     < > = values in this interval not displayed.     Assessment/Plan:  DKA-resolved  AMS 2/2 Delirium  Tachycardia possibly 2/2 Dehydration  Lactic Acidemia 2/2 Intravascular Depletion  UTI  Leukocytosis 2/2 above  B/L Hydronephrosis & Hydroureter  DWAINE on CKD III 2/2 Prerenal vs Postrenal Azotemia  RSV antigen positive  Acute gastroenteritis  Prior CVA 09/2024  Normocytic anemia     Reporting no new complaints   Will trend Troponins & Echo ordered  CIS consult for troponinemia  BUN/creatinine slightly improved at 38.1/2.28; consulted Urology  Remains on IV cefepime 2 g t.i.d.   Remains on IV LR 75 mL/hour; giving IV LR boluses as needed  Urine culture growing Proteus  Blood cultures negative   Started on glargine 50 units b.i.d. on ISS LD   Continued on home aspirin, atorvastatin, Zetia, mirtazapine, Protonix  PT/OT consulted    VTE prophylaxis: Lovenox    Patient condition:  Stable    Anticipated discharge and Disposition:     Pending    All diagnosis and differential diagnosis have been reviewed; assessment and plan has been documented; I have personally reviewed the labs and test results that are presently available; I have reviewed the patients medication list; I have reviewed the consulting providers response and recommendations. I have reviewed or attempted to review medical records based upon their availability    All of the patient's questions have been  addressed and answered. Patient's is agreeable to the above stated plan. I will continue to monitor closely and make adjustments to medical management as needed.    Portions of this note dictated using EMR integrated voice recognition software, and may be subject to voice recognition errors not corrected at proofreading. Please contact writer for clarification if needed.     Radiology:  I have personally reviewed the following imaging and agree with the radiologist.     CT Chest Abdomen Pelvis Without Contrast (XPD)  Narrative: EXAMINATION:  CT  CHEST ABDOMEN PELVIS WITHOUT CONTRAST(XPD)    CLINICAL HISTORY:  Sepsis;    TECHNIQUE:  Low dose axial images, sagittal and coronal reformations were obtained from the thoracic inlet to the pubic symphysis without IV and oral contrast administration.  Automatic exposure control is utilized to reduce patient radiation exposure.    COMPARISON:  09/16/2024    FINDINGS:  Examination is limited due to lack of intravenous contrast    There is mild right lower lobe atelectasis..  No mass is seen.  No nodule is seen.  No pleural thickening is seen.  No pleural effusion is seen.  No infiltrate is seen.    The thoracic aorta is normal in caliber..  Calcified plaque is seen throughout the thoracic and abdominal aorta.  Some coronary artery calcifications are seen as well.    No mediastinal adenopathy is seen.    The heart appears normal in size..    The liver appears normal.  No liver mass or lesion is seen.  Portal and hepatic veins are not well evaluated on this non contrasted CT...    The gallbladder appears normal.  No gallstones are seen.    The spleen appears normal.  No obvious splenic mass or lesion is seen.    The pancreas appears grossly unremarkable.  No pancreatic mass or lesion is seen.  No inflammation is seen.    No adrenal abnormality is seen.  No adrenal nodule is seen.    There is bilateral hydronephrosis and hydroureter secondary to obstructive changes from markedly thickened urinary bladder wall with inflammatory changes seen throughout the urinary bladder and around the urinary bladder.  Findings are consistent with a cystitis.  There is a Gandara catheter in the urinary bladder.  No nephro or ureterolithiasis is seen.  No renal lesion is seen.    Visualized portions of the bowel shows no acute abnormality.  No colitis is seen.  No diverticulitis is seen.  No colonic mass is seen.    No free air is seen.  No free fluid is seen.    No acute bony abnormality is seen.  There are degenerative changes seen in the  lower lumbar spine the patient is status post posterior spinal fusion at L4-5.  Impression: Extensive urinary bladder wall thickening seen diffusely with inflammatory changes seen around the urinary bladder consistent with acute cystitis.  This is causing secondary obstructive changes and secondary bilateral hydronephrosis and hydroureter mild    Right lower lobe atelectasis    Degenerative and postsurgical changes in the lower lumbar spine    Electronically signed by: Raji Muhammad  Date:    02/28/2025  Time:    14:00  X-Ray Chest AP Portable  Narrative: EXAMINATION:  XR CHEST AP PORTABLE    CLINICAL HISTORY:  Generalized weakness.  Sepsis.    TECHNIQUE:  One view    COMPARISON:  September 18, 2024.    FINDINGS:  Cardiopericardial silhouette is within normal limits. Lungs are without dense focal or segmental consolidation, congestive process, pleural effusions or pneumothorax.  Impression: No acute cardiopulmonary process identified.    Electronically signed by: Jose J Do  Date:    02/28/2025  Time:    13:21      Frank Awad MD  Department of Hospital Medicine   [facility]   03/02/2025

## 2025-03-02 NOTE — PROGRESS NOTES
Pharmacist Renal Dose Adjustment Note    Froy Barragan is a 64 y.o. male being treated with the medication lovenox    Patient Data:    Vital Signs (Most Recent):  Temp: 98.8 °F (37.1 °C) (03/02/25 0800)  Pulse: (!) 147 (03/02/25 1130)  Resp: 17 (03/02/25 1100)  BP: (!) 146/97 (03/02/25 1100)  SpO2: 99 % (03/02/25 1130) Vital Signs (72h Range):  Temp:  [97.4 °F (36.3 °C)-100.6 °F (38.1 °C)]   Pulse:  []   Resp:  [6-31]   BP: ()/()   SpO2:  [78 %-100 %]      Recent Labs   Lab 03/01/25  0146 03/01/25  0801 03/02/25  0320   CREATININE 2.62* 2.44* 2.28*     Serum creatinine: 2.28 mg/dL (H) 03/02/25 0320  Estimated creatinine clearance: 23.9 mL/min (A)    Medication:lovenox dose: 1mg/kg frequency q12h will be changed to medication:lovenox dose:1mg/kg frequency:q24h for CrCl < 30    Pharmacist's Name: Frances Sarabia  Pharmacist's Extension: [phone]

## 2025-03-03 LAB
ALBUMIN SERPL-MCNC: 3.1 G/DL (ref 3.4–4.8)
ALBUMIN/GLOB SERPL: 0.8 RATIO (ref 1.1–2)
ALP SERPL-CCNC: 54 UNIT/L (ref 40–150)
ALT SERPL-CCNC: 32 UNIT/L (ref 0–55)
ANION GAP SERPL CALC-SCNC: 11 MEQ/L
AST SERPL-CCNC: 35 UNIT/L (ref 5–34)
BASOPHILS # BLD AUTO: 0.06 X10(3)/MCL
BASOPHILS NFR BLD AUTO: 0.6 %
BILIRUB SERPL-MCNC: 0.5 MG/DL
BUN SERPL-MCNC: 25.9 MG/DL (ref 8.4–25.7)
CALCIUM SERPL-MCNC: 8.9 MG/DL (ref 8.8–10)
CHLORIDE SERPL-SCNC: 107 MMOL/L (ref 98–107)
CO2 SERPL-SCNC: 20 MMOL/L (ref 23–31)
CREAT SERPL-MCNC: 2.17 MG/DL (ref 0.72–1.25)
CREAT UR-MCNC: 35.9 MG/DL (ref 63–166)
CREAT/UREA NIT SERPL: 12
EOSINOPHIL # BLD AUTO: 0.11 X10(3)/MCL (ref 0–0.9)
EOSINOPHIL NFR BLD AUTO: 1 %
ERYTHROCYTE [DISTWIDTH] IN BLOOD BY AUTOMATED COUNT: 13.8 % (ref 11.5–17)
FERRITIN SERPL-MCNC: 322.2 NG/ML (ref 21.81–274.66)
GFR SERPLBLD CREATININE-BSD FMLA CKD-EPI: 33 ML/MIN/1.73/M2
GLOBULIN SER-MCNC: 3.8 GM/DL (ref 2.4–3.5)
GLUCOSE SERPL-MCNC: 144 MG/DL (ref 82–115)
HCT VFR BLD AUTO: 33.8 % (ref 42–52)
HGB BLD-MCNC: 11.1 G/DL (ref 14–18)
IMM GRANULOCYTES # BLD AUTO: 0.04 X10(3)/MCL (ref 0–0.04)
IMM GRANULOCYTES NFR BLD AUTO: 0.4 %
IRON SATN MFR SERPL: 45 % (ref 20–50)
IRON SERPL-MCNC: 99 UG/DL (ref 65–175)
LYMPHOCYTES # BLD AUTO: 0.84 X10(3)/MCL (ref 0.6–4.6)
LYMPHOCYTES NFR BLD AUTO: 7.7 %
MCH RBC QN AUTO: 31.1 PG (ref 27–31)
MCHC RBC AUTO-ENTMCNC: 32.8 G/DL (ref 33–36)
MCV RBC AUTO: 94.7 FL (ref 80–94)
MONOCYTES # BLD AUTO: 0.99 X10(3)/MCL (ref 0.1–1.3)
MONOCYTES NFR BLD AUTO: 9.1 %
NEUTROPHILS # BLD AUTO: 8.85 X10(3)/MCL (ref 2.1–9.2)
NEUTROPHILS NFR BLD AUTO: 81.2 %
NRBC BLD AUTO-RTO: 0 %
OHS QRS DURATION: 84 MS
OHS QRS DURATION: 90 MS
OHS QTC CALCULATION: 428 MS
OHS QTC CALCULATION: 452 MS
PLATELET # BLD AUTO: 202 X10(3)/MCL (ref 130–400)
PMV BLD AUTO: 11.5 FL (ref 7.4–10.4)
POCT GLUCOSE: 131 MG/DL (ref 70–110)
POCT GLUCOSE: 152 MG/DL (ref 70–110)
POCT GLUCOSE: 155 MG/DL (ref 70–110)
POCT GLUCOSE: 174 MG/DL (ref 70–110)
POCT GLUCOSE: 175 MG/DL (ref 70–110)
POCT GLUCOSE: 177 MG/DL (ref 70–110)
POCT GLUCOSE: 237 MG/DL (ref 70–110)
POCT GLUCOSE: 284 MG/DL (ref 70–110)
POTASSIUM SERPL-SCNC: 4 MMOL/L (ref 3.5–5.1)
PROT SERPL-MCNC: 6.9 GM/DL (ref 5.8–7.6)
PROT UR STRIP-MCNC: 78.3 MG/DL
PTH-INTACT SERPL-MCNC: 92.4 PG/ML (ref 8.7–77)
RBC # BLD AUTO: 3.57 X10(6)/MCL (ref 4.7–6.1)
SODIUM SERPL-SCNC: 138 MMOL/L (ref 136–145)
TIBC SERPL-MCNC: 119 UG/DL (ref 60–240)
TIBC SERPL-MCNC: 218 UG/DL (ref 250–450)
TRANSFERRIN SERPL-MCNC: 199 MG/DL (ref 163–344)
TROPONIN I SERPL-MCNC: 0.5 NG/ML (ref 0–0.04)
TROPONIN I SERPL-MCNC: 0.53 NG/ML (ref 0–0.04)
TROPONIN I SERPL-MCNC: 0.55 NG/ML (ref 0–0.04)
TROPONIN I SERPL-MCNC: 0.68 NG/ML (ref 0–0.04)
URINE PROTEIN/CREATININE RATIO (OLG): 2.2
WBC # BLD AUTO: 10.89 X10(3)/MCL (ref 4.5–11.5)

## 2025-03-03 PROCEDURE — 36415 COLL VENOUS BLD VENIPUNCTURE: CPT | Performed by: NURSE PRACTITIONER

## 2025-03-03 PROCEDURE — 27000207 HC ISOLATION

## 2025-03-03 PROCEDURE — 80053 COMPREHEN METABOLIC PANEL: CPT | Performed by: STUDENT IN AN ORGANIZED HEALTH CARE EDUCATION/TRAINING PROGRAM

## 2025-03-03 PROCEDURE — 63600175 PHARM REV CODE 636 W HCPCS: Performed by: STUDENT IN AN ORGANIZED HEALTH CARE EDUCATION/TRAINING PROGRAM

## 2025-03-03 PROCEDURE — 97166 OT EVAL MOD COMPLEX 45 MIN: CPT

## 2025-03-03 PROCEDURE — 97162 PT EVAL MOD COMPLEX 30 MIN: CPT

## 2025-03-03 PROCEDURE — 36415 COLL VENOUS BLD VENIPUNCTURE: CPT | Performed by: STUDENT IN AN ORGANIZED HEALTH CARE EDUCATION/TRAINING PROGRAM

## 2025-03-03 PROCEDURE — 25000003 PHARM REV CODE 250: Performed by: NURSE PRACTITIONER

## 2025-03-03 PROCEDURE — 84484 ASSAY OF TROPONIN QUANT: CPT | Performed by: NURSE PRACTITIONER

## 2025-03-03 PROCEDURE — 84484 ASSAY OF TROPONIN QUANT: CPT | Performed by: STUDENT IN AN ORGANIZED HEALTH CARE EDUCATION/TRAINING PROGRAM

## 2025-03-03 PROCEDURE — 83970 ASSAY OF PARATHORMONE: CPT | Performed by: INTERNAL MEDICINE

## 2025-03-03 PROCEDURE — 85025 COMPLETE CBC W/AUTO DIFF WBC: CPT | Performed by: STUDENT IN AN ORGANIZED HEALTH CARE EDUCATION/TRAINING PROGRAM

## 2025-03-03 PROCEDURE — 82570 ASSAY OF URINE CREATININE: CPT | Performed by: INTERNAL MEDICINE

## 2025-03-03 PROCEDURE — 93005 ELECTROCARDIOGRAM TRACING: CPT

## 2025-03-03 PROCEDURE — 63600175 PHARM REV CODE 636 W HCPCS

## 2025-03-03 PROCEDURE — 63600175 PHARM REV CODE 636 W HCPCS: Performed by: INTERNAL MEDICINE

## 2025-03-03 PROCEDURE — S5010 5% DEXTROSE AND 0.45% SALINE: HCPCS | Performed by: NURSE PRACTITIONER

## 2025-03-03 PROCEDURE — 82728 ASSAY OF FERRITIN: CPT | Performed by: INTERNAL MEDICINE

## 2025-03-03 PROCEDURE — 25000003 PHARM REV CODE 250

## 2025-03-03 PROCEDURE — 93010 ELECTROCARDIOGRAM REPORT: CPT | Mod: ,,, | Performed by: STUDENT IN AN ORGANIZED HEALTH CARE EDUCATION/TRAINING PROGRAM

## 2025-03-03 PROCEDURE — 11000001 HC ACUTE MED/SURG PRIVATE ROOM

## 2025-03-03 PROCEDURE — 83540 ASSAY OF IRON: CPT | Performed by: INTERNAL MEDICINE

## 2025-03-03 RX ORDER — METOPROLOL TARTRATE 25 MG/1
25 TABLET, FILM COATED ORAL 2 TIMES DAILY
Status: DISCONTINUED | OUTPATIENT
Start: 2025-03-03 | End: 2025-03-04

## 2025-03-03 RX ORDER — CEFTRIAXONE 2 G/1
2 INJECTION, POWDER, FOR SOLUTION INTRAMUSCULAR; INTRAVENOUS
Status: DISCONTINUED | OUTPATIENT
Start: 2025-03-03 | End: 2025-03-06

## 2025-03-03 RX ADMIN — PANTOPRAZOLE SODIUM 40 MG: 40 TABLET, DELAYED RELEASE ORAL at 08:03

## 2025-03-03 RX ADMIN — ASPIRIN 81 MG: 81 TABLET, COATED ORAL at 08:03

## 2025-03-03 RX ADMIN — CEFEPIME 2 G: 2 INJECTION, POWDER, FOR SOLUTION INTRAVENOUS at 02:03

## 2025-03-03 RX ADMIN — CEFEPIME 2 G: 2 INJECTION, POWDER, FOR SOLUTION INTRAVENOUS at 09:03

## 2025-03-03 RX ADMIN — INSULIN ASPART 9 UNITS: 100 INJECTION, SOLUTION INTRAVENOUS; SUBCUTANEOUS at 11:03

## 2025-03-03 RX ADMIN — INSULIN GLARGINE 15 UNITS: 100 INJECTION, SOLUTION SUBCUTANEOUS at 09:03

## 2025-03-03 RX ADMIN — INSULIN GLARGINE 15 UNITS: 100 INJECTION, SOLUTION SUBCUTANEOUS at 08:03

## 2025-03-03 RX ADMIN — DEXTROSE AND SODIUM CHLORIDE: 5; 450 INJECTION, SOLUTION INTRAVENOUS at 11:03

## 2025-03-03 RX ADMIN — EZETIMIBE 10 MG: 10 TABLET ORAL at 08:03

## 2025-03-03 RX ADMIN — METOPROLOL TARTRATE 25 MG: 25 TABLET, FILM COATED ORAL at 08:03

## 2025-03-03 RX ADMIN — METOPROLOL TARTRATE 25 MG: 25 TABLET, FILM COATED ORAL at 09:03

## 2025-03-03 RX ADMIN — ATORVASTATIN CALCIUM 80 MG: 40 TABLET, FILM COATED ORAL at 08:03

## 2025-03-03 RX ADMIN — ENOXAPARIN SODIUM 50 MG: 60 INJECTION SUBCUTANEOUS at 12:03

## 2025-03-03 RX ADMIN — MUPIROCIN: 20 OINTMENT TOPICAL at 08:03

## 2025-03-03 RX ADMIN — CEFTRIAXONE SODIUM 2 G: 2 INJECTION, POWDER, FOR SOLUTION INTRAMUSCULAR; INTRAVENOUS at 12:03

## 2025-03-03 NOTE — PLAN OF CARE
Problem: Physical Therapy  Goal: Physical Therapy Goal  Description: Goals to be met by: 4/3/25     Patient will increase functional independence with mobility by performin. Supine to sit with min Assistance  2. Sit to supine with min Assistance  3. Sit to stand transfer with mod Assistance  4. Bed to chair transfer with mod Assistance using LRAD    Outcome: Progressing

## 2025-03-03 NOTE — PLAN OF CARE
03/03/25 0913   Discharge Assessment   Assessment Type Discharge Planning Assessment   Confirmed/corrected address, phone number and insurance Yes   Confirmed Demographics Correct on Facesheet   Source of Information family   When was your last doctors appointment? 02/26/25   Communicated RISSA with patient/caregiver Date not available/Unable to determine   Reason For Admission Lactic Acidosis, RSV   People in Home alone   Facility Arrived From: none   Do you expect to return to your current living situation? No   Do you have help at home or someone to help you manage your care at home? Yes   Who are your caregiver(s) and their phone number(s)? siblings, Chantal Ricardo and paid caregivers   Prior to hospitilization cognitive status: Inappropriate Behavior   Current cognitive status: Unable to Assess   Walking or Climbing Stairs Difficulty yes   Walking or Climbing Stairs ambulation difficulty, assistance 1 person;ambulation difficulty, dependent;transferring difficulty, assistance 1 person;transferring difficulty, requires equipment   Mobility Management wheelchair, slide board   Dressing/Bathing Difficulty yes   Dressing/Bathing bathing difficulty, dependent;bathing difficulty, assistance 1 person;dressing difficulty, dependent;dressing difficulty, assistance 1 person   Dressing/Bathing Management bath bench and assist from caregivers   Home Accessibility wheelchair accessible   Equipment Currently Used at Home wheelchair;shower chair;slide board;glucometer   Patient currently being followed by outpatient case management? No   Do you currently have service(s) that help you manage your care at home? Yes   How Many hours does patient receive services 2   Name and Contact number of agency [facility]   Is the pt/caregiver preference to resume services with current agency No   Do you take prescription medications? Yes   Is the patient taking medications as prescribed? yes   Who is going to help you get home at  discharge? NH transport   How do you get to doctors appointments? family or friend will provide   Are you on dialysis? No   Do you take coumadin? No   Discharge Plan A New Nursing Home placement - retirement care facility   Discharge Plan B Skilled Nursing Facility   DME Needed Upon Discharge  none   Discharge Plan discussed with: Sibling   Name(s) and Number(s) Chantla lawton   Transition of Care Barriers None     Spoke to sisterChantal, over the phone.  Patient lives in his home with paid caregivers 8 hours a day 7 days a week.  Family checks on him daily.  He is wheelchair bound, transfers slide board.  Requires assist with all ADLs.  Sister states that they are having family meeting and will most likely opt for long term NH placement. She mentioned wanting him to go to [facility] but will let CM know decision.  Was with [facility]. Sent updates.

## 2025-03-03 NOTE — PLAN OF CARE
Problem: Physical Therapy  Goal: Physical Therapy Goal  Description: Goals to be met by: 4/3/25     Patient will increase functional independence with mobility by performin. Supine to sit with min Assistance  2. Sit to supine with min Assistance  3. Sit to stand transfer with mod Assistance  4. Bed to chair transfer with mod using LRAD    3/3/2025 1349 by Rachel Johnson, SPT  Outcome: Progressing  3/3/2025 1313 by Rachel Johnson, SPT  Outcome: Progressing

## 2025-03-03 NOTE — CONSULTS
Nephrology Initial Consult Note    Patient Name: Froy Barragan  Age: 64 y.o.  : 1960  MRN: [id number]  Admission Date: 2025    Reason for Consult:      CKD needs renal optimization for left heart catheterization    HPI:     Froy Barragan is a 64 y.o. male has multiple medical problems and has been admitted for UTI, weakness, nausea vomiting diarrhea.  He is also found to have RSV positive.  Patient is nonverbal at present time and does not give any history but the nurses report the intermittently he gets agitation anal and he has been given Haldol.  He does not follow any command and does not give any answers to any questions.    I spoke to patient's sister Ms. Chantal Ricardo who told me that patient has had stroke in the past and has been getting some physical therapy at home.  He lives alone.  He has sitters come in the morning for 4-5 hours and again in the evening and they take care of all his meals.  He has no control on his bowels or bladder.  He uses depends.  He does have indwelling Gandara catheter because of the urinary incontinence.  He is able to take few steps on his own and also has intermittent understand to the questions and follows command.  He has 3 sisters who help him with his all business.  He has no wife or children.    His other medical problems include type 2 diabetes mellitus, hyperlipidemia, coronary artery disease, congestive heart failure, GERD, elevated PSA and urinary retention and has chronic indwelling Gandara catheter placed by Dr. Tyson Berry.  No history of kidney stones.  Now admitted for UTI and RSV.    Current Medications[1]    George Bran MD    Past Medical History:   Diagnosis Date    Anemia of chronic disease 2022    Arteriosclerosis of coronary artery 2022    Cervical myelopathy 2022    Cobalamin deficiency 2022    Gastroesophageal reflux disease 2022    Hypertension 2022    Low vitamin D level 2022     Mixed hyperlipidemia 7/6/2022    Paraparesis 7/6/2022    Stage 3a chronic kidney disease 7/6/2022    Type 2 diabetes mellitus 7/6/2022      Past Surgical History:   Procedure Laterality Date    MAGNETIC RESONANCE IMAGING N/A 9/19/2024    Procedure: MRI (Magnetic Resonance Imagine);  Surgeon: David Amin DO;  Location: [facility];  Service: Anesthesiology;  Laterality: N/A;    SPINE SURGERY        No family history on file.  Social History     Tobacco Use    Smoking status: Never    Smokeless tobacco: Never   Substance Use Topics    Alcohol use: Never     Prescriptions Prior to Admission[2]  Review of patient's allergies indicates:  No Known Allergies         Review of Systems:     Unable to obtain because of his mental status.    Objective:       VITAL SIGNS: 24 HR MIN & MAX LAST    Temp  Min: 97.2 °F (36.2 °C)  Max: 98.6 °F (37 °C)  98.5 °F (36.9 °C)        BP  Min: 96/70  Max: 169/86  132/86     Pulse  Min: 85  Max: 140  106     Resp  Min: 10  Max: 40  18    SpO2  Min: 95 %  Max: 100 %  97 %      GEN: Chronically ill appearing, nonverbal and not responding to any questions although he has his eyes open and can look around.  Looks emaciated.  HEENT:  Pupils are reactive.  Extraocular movements seems intact.  No oral lesion.  No JVD noted.  CV: RRR +S1,S2 without murmur  PULM: CTAB, unlabored  ABD: Soft, NT/ND abdomen with NABS  EXT: No cyanosis or edema  SKIN: Warm and dry  PSYCH: Awake, alert, nonverbal.            Component Value Date/Time     03/03/2025 0237     03/02/2025 0320     01/14/2025 2300     (H) 07/21/2024 2300    K 4.0 03/03/2025 0237    K 3.9 03/02/2025 0320    K 4.8 01/14/2025 2300    K 4.0 07/21/2024 2300    CO2 20 (L) 03/03/2025 0237    CO2 18 (L) 03/02/2025 0320    CO2 20 01/14/2025 2300    CO2 23 07/21/2024 2300    BUN 25.9 (H) 03/03/2025 0237    BUN 38.1 (H) 03/02/2025 0320    BUN 46 (H) 01/14/2025 2300    BUN 22 07/21/2024 2300    CREATININE 2.17 (H) 03/03/2025 0237     CREATININE 2.28 (H) 03/02/2025 0320    CREATININE 2.11 (H) 01/14/2025 2300    CREATININE 1.25 07/21/2024 2300    CALCIUM 8.9 03/03/2025 0237    CALCIUM 8.4 (L) 03/02/2025 0320    CALCIUM 9.3 01/14/2025 2300    CALCIUM 9.1 07/21/2024 2300    PHOS 2.2 (L) 03/02/2025 0320            Component Value Date/Time    WBC 10.89 03/03/2025 0437    WBC 12.25 (H) 03/02/2025 0320    WBC 24.83 02/28/2025 1123    WBC 6.86 09/09/2024 0120    HGB 11.1 (L) 03/03/2025 0437    HGB 8.7 (L) 03/02/2025 0320    HCT 33.8 (L) 03/03/2025 0437    HCT 27.0 (L) 03/02/2025 0320     03/03/2025 0437     03/02/2025 0320         US Retroperitoneal Complete   Final Result      Simple cyst of the left kidney.      Changes consistent with thickening of the wall of the urinary bladder.         Electronically signed by: Tyrese Díaz MD   Date:    03/03/2025   Time:    08:59      CT Chest Abdomen Pelvis Without Contrast (XPD)   Final Result      Extensive urinary bladder wall thickening seen diffusely with inflammatory changes seen around the urinary bladder consistent with acute cystitis.  This is causing secondary obstructive changes and secondary bilateral hydronephrosis and hydroureter mild      Right lower lobe atelectasis      Degenerative and postsurgical changes in the lower lumbar spine         Electronically signed by: aRji Muhammad   Date:    02/28/2025   Time:    14:00      X-Ray Chest AP Portable   Final Result      No acute cardiopulmonary process identified.         Electronically signed by: Jose J Do   Date:    02/28/2025   Time:    13:21          Assessment / Plan:   DWAINE probably secondary to ATN secondary to UTI   Underlying chronic kidney disease stage 3b to stage IV probably secondary diabetic nephrosclerosis   Metabolic acidosis secondary to above  Diabetes mellitus type 2   Hypertension   Hyperlipidemia   RSV positive   UTI   Previous stroke   History of coronary artery disease  Incontinent of urine and the  bowels.  Anemia    Recommendations  Renal ultrasound   Urine protein creatinine, serum iron TIBC ferritin  Avoid all NSAIDs and Sparrow 2 inhibitors   Patient's sister Ms. Hurd detailed about patient's renal status and she expressed understanding.  She also informed me that patient's siblings are trying to figure it out what to do with him once he gets better and out of the hospital.  Cardiology need to figure it out about the quality of patient's life and the benefits of any left heart catheterization will give it to the patient.    Thank you for this consultation       [1]   Current Facility-Administered Medications   Medication Dose Route Frequency Provider Last Rate Last Admin    acetaminophen tablet 650 mg  650 mg Oral Q6H PRN Jessica Martinez FNP        aspirin EC tablet 81 mg  81 mg Oral Daily Petty Martell MD   81 mg at 03/03/25 0818    atorvastatin tablet 80 mg  80 mg Oral Daily Petty Martell MD   80 mg at 03/03/25 0818    ceFEPIme injection 2 g  2 g Intravenous Q8H Petty Martell MD   2 g at 03/03/25 0947    dextrose 10% bolus 250 mL 250 mL  250 mL Intravenous bolus from bag Frank Awad MD        dextrose 5 % and 0.45 % NaCl infusion   Intravenous Continuous PRN Elida Felder  mL/hr at 03/01/25 1758 Rate Verify at 03/01/25 1758    dextrose 5 % and 0.45 % NaCl infusion   Intravenous Continuous Jessica Martinez FNP 75 mL/hr at 03/03/25 1000 Rate Verify at 03/03/25 1000    dextrose 50% injection 12.5 g  12.5 g Intravenous PRN Elida Felder FNP        dextrose 50% injection 12.5 g  12.5 g Intravenous PRN Emigdio Hayes Jr., MD, FCCP   12.5 g at 03/02/25 1945    dextrose 50% injection 25 g  25 g Intravenous PRN Elida Felder FNP   25 g at 03/02/25 2241    dextrose 50% injection 25 g  25 g Intravenous PRN Emigdio Hayes Jr., MD, FCCP        enoxaparin injection 50 mg  1 mg/kg Subcutaneous Q24H Frank Awad MD   50 mg at 03/02/25 1337    ezetimibe tablet 10 mg  10 mg Oral Daily  Petty Martell MD   10 mg at 03/03/25 0818    glucagon (human recombinant) injection 1 mg  1 mg Intramuscular PRN Emigdio Hayes Jr., MD, FCCP        glucose chewable tablet 16 g  16 g Oral PRN Emigdio Hayes Jr., MD, FCCP        glucose chewable tablet 24 g  24 g Oral PRN Emigdio Hayes Jr., MD, FCCP        haloperidol lactate injection 5 mg  5 mg Intravenous Q4H PRN Frank Awad MD   5 mg at 03/02/25 2035    insulin aspart U-100 injection 0-15 Units  0-15 Units Subcutaneous QID (AC + HS) PRN Emigdio Hayes Jr., MD, FCCP   6 Units at 03/01/25 1703    insulin aspart U-100 injection 0-5 Units  0-5 Units Subcutaneous Q6H PRN Blake Herndon MD        insulin glargine U-100 (Lantus) injection 15 Units  15 Units Subcutaneous BID Emigdio Hayes Jr., MD, FCCP   15 Units at 03/03/25 0900    metoprolol injection 5 mg  5 mg Intravenous Q5 Min PRN Frank Awad MD   5 mg at 03/02/25 1411    metoprolol tartrate (LOPRESSOR) tablet 25 mg  25 mg Oral BID Eugenio Mario ANP   25 mg at 03/03/25 0947    mupirocin 2 % ointment   Nasal BID Elida Felder FNP   Given at 03/03/25 0818    ondansetron injection 4 mg  4 mg Intravenous Q6H PRN Petty Martell MD        pantoprazole EC tablet 40 mg  40 mg Oral Daily Petty Martell MD   40 mg at 03/03/25 0818    potassium chloride 10 mEq in 100 mL IVPB  40 mEq Intravenous PRN Petty Martell MD        And    potassium chloride 10 mEq in 100 mL IVPB  10 mEq Intravenous PRN Petty Martell MD        And    potassium chloride 10 mEq in 100 mL IVPB  10 mEq Intravenous PRN Petty Martell MD        potassium, sodium phosphates 280-160-250 mg packet 1 packet  1 packet Oral Once Frank Awad MD        sodium chloride 0.9% flush 10 mL  10 mL Intravenous PRN Elida Felder FNP       [2]   Medications Prior to Admission   Medication Sig Dispense Refill Last Dose/Taking    alcohol swabs (BD ALCOHOL SWABS) PadM Apply 1 each topically once daily. 200 each 12     alfuzosin  (UROXATRAL) 10 mg Tb24 Take 1 tablet (10 mg total) by mouth daily with breakfast. 90 tablet 3     aspirin (ECOTRIN) 81 MG EC tablet Take 81 mg by mouth once daily.       b complex vitamins capsule Take 1 capsule by mouth once daily.       blood glucose control, low (TRUE METRIX LEVEL 1) Soln 1 Bottle by Misc.(Non-Drug; Combo Route) route once daily. 1 each 12     blood-glucose meter (TRUE METRIX AIR GLUCOSE METER) Misc 1 each by Misc.(Non-Drug; Combo Route) route once daily. 1 each 0     blood-glucose sensor (DEXCOM G7 SENSOR) Carmen 1 each by Misc.(Non-Drug; Combo Route) route once daily. 3 each 12     ezetimibe (ZETIA) 10 mg tablet Take 1 tablet (10 mg total) by mouth once daily. 90 tablet 3     finasteride (PROSCAR) 5 mg tablet Take 1 tablet (5 mg total) by mouth once daily. 30 tablet 11     lancets (TRUEPLUS LANCETS) 33 gauge Misc 1 lancet by Misc.(Non-Drug; Combo Route) route once daily. 200 each 12     metFORMIN (GLUCOPHAGE) 1000 MG tablet Take 1 tablet (1,000 mg total) by mouth 2 (two) times a day. 180 tablet 3     miconazole NITRATE 2 % (MICOTIN) 2 % top powder Apply topically 2 (two) times daily. 43 g 0     mirtazapine (REMERON) 15 MG tablet Take 1 tablet (15 mg total) by mouth once daily. 90 tablet 3     pantoprazole (PROTONIX) 40 MG tablet Take 1 tablet (40 mg total) by mouth once daily. 90 tablet 3     rosuvastatin (CRESTOR) 40 MG Tab Take 1 tablet (40 mg total) by mouth once daily. 90 tablet 3     SITagliptin phosphate (JANUVIA) 50 MG Tab Take 1 tablet (50 mg total) by mouth once daily. 90 tablet 0     TRUE METRIX GLUCOSE TEST STRIP Strp TEST BLOOD SUGAR EVERY  strip 3     vitamin D (VITAMIN D3) 1000 units Tab Take 1,000 Units by mouth once daily.       zinc oxide 20 % ointment Apply topically 2 (two) times a day.

## 2025-03-03 NOTE — CONSULTS
Inpatient consult to Cardiology  Consult performed by: Eugenio Mario ANP  Consult ordered by: Frank Awad MD  Reason for consult: NSTEMI        [facility]    Cardiology  Consult Note    Patient Name: Froy Barragan  MRN: [id number]  Admission Date: 2/28/2025  Hospital Length of Stay: 3 days  Code Status: No Order   Attending Provider: Frank Awad MD   Consulting Provider: ALISA Villegas  Primary Care Physician: George Bran MD  Principal Problem:<principal problem not specified>    Patient information was obtained from patient, past medical records, and ER records.     Subjective:     Chief Complaint/Reason for Consult: NSTEMI     HPI: Mr. Barragan is a 65 y/o male who is known to [facility], Dr. Johns. The patient presented to [facility] on 2.28.25 with c/o N/V/D. The symptoms started the day prior and progressively worsened. Initial workup in the ER revealed: WBC 24.83, H&H 11.8/36.9, , BUN/Crea 53.0/2.66, Mg 1.3, Troponin 0.037, Lactic Acid 3.3, + RSV, UA with +3 Protein, +2 Glucose, +1 Ketones, +3 Blood, Leuk Estrace and WBC as well as Bacteria and an Abnormal CT/CTA regarding his Bladder (See Report). He was noted to be in DKA and placed on an Insulin Infusion per Protocol and BCX and Urine CX were drawn. He was started on Broad Spectrum Abx and Admitted to ICU for further workup and management. He has since been downgraded to . He was found to be confused and disoriented and had a Troponin Level Drawn which resulted as 0.116 and for this reason [facility] was consulted.     PMH: CAD/Stents, DM II, HTN, ICMO/EF 35%, HLD, Anemia of Chronic Disease, Cervical Myelopathy, Cobalamin Deficiency, GERD, Vitamin D Deficiency, Paraparesis, CKD Stage IIIa, SHERMAN/Bilaterally Moderate, CVA (2009), Chronic Indwelling Catheter  PSH: Spine Surgery, Angiogram, Back Surgery  Family History: Reviewed and Unremarkable for Heart Disease  Social History: Denies Illicit Drug, ETOH and Tobacco  Pt with sudden onset nausea and headache.  Vitals , /87.  Spoke with Dr Gee 
and orders received, also he advised to notify cardiology. Use     Previous Cardiac Diagnostics:   ECHO 3.2.25:  Left Ventricle: The left ventricle is normal in size. Normal wall thickness. There is moderately reduced systolic function. Ejection fraction is approximately 35%.  Right Ventricle: The right ventricle is normal in size. Systolic function is reduced.  Aortic Valve: There is mild aortic regurgitation.  Mitral Valve: There is mild regurgitation.  Tricuspid Valve: There is mild regurgitation.  IVC/SVC: Normal venous pressure at 3 mmHg.  Pericardium: There is no pericardial effusion.    ECHO 2.27.25:  The study quality is average.   The left ventricle is normal in size. Global left ventricular systolic function is moderately decreased.  The left ventricular ejection fraction is 35%. Left ventricular diastolic function is normal. GLS AVG= -11.4%.  Right ventricular systolic function is moderately decreased.  Mild calcification of the aortic valve is noted with adequate cuspal excursion.   Mild to moderate (1-2+) aortic regurgitation. Mild (1+) tricuspid regurgitation. Mild (1+) mitral regurgitation.   The pulmonary artery systolic pressure is 19.8 mmHg.     Carotid 9.26.24:  Bilateral ICAs Patent with Less then 50% Stenosis   R Vertebral Artery was not well Visualized  L Vertebral Artery was patent with Antegrade Flow    ECHO 9.15.24:  Limited to assess function. EF visually appears normal at 55-60%, however heart rate is in the 130s. No pericardial effusion noted. Possibly repeat echo if necessary once heart rate is controlled.     MPI 7.22.21:  This is a normal perfusion study, no perfusion defects noted. There is no evidence of ischemia.   This scan is suggestive of low risk for future cardiovascular events.   The left ventricular cavity is noted to be normal on the stress study. The left ventricular ejection fraction was calculated to be 45% and left ventricular global function is mildly reduced.   The study quality is good.     [facility] 12.6.07:  L Main Normal  LAD -  Prox 20%  LCX - 50% Prox LCX at Ostium of OM1  Mid LCX 75%  RCA Patent     Review of patient's allergies indicates:  No Known Allergies  No current facility-administered medications on file prior to encounter.     Current Outpatient Medications on File Prior to Encounter   Medication Sig    alcohol swabs (BD ALCOHOL SWABS) PadM Apply 1 each topically once daily.    alfuzosin (UROXATRAL) 10 mg Tb24 Take 1 tablet (10 mg total) by mouth daily with breakfast.    aspirin (ECOTRIN) 81 MG EC tablet Take 81 mg by mouth once daily.    b complex vitamins capsule Take 1 capsule by mouth once daily.    blood glucose control, low (TRUE METRIX LEVEL 1) Soln 1 Bottle by Misc.(Non-Drug; Combo Route) route once daily.    blood-glucose meter (TRUE METRIX AIR GLUCOSE METER) Misc 1 each by Misc.(Non-Drug; Combo Route) route once daily.    blood-glucose sensor (DEXCOM G7 SENSOR) Carmen 1 each by Misc.(Non-Drug; Combo Route) route once daily.    ezetimibe (ZETIA) 10 mg tablet Take 1 tablet (10 mg total) by mouth once daily.    finasteride (PROSCAR) 5 mg tablet Take 1 tablet (5 mg total) by mouth once daily.    lancets (TRUEPLUS LANCETS) 33 gauge Misc 1 lancet by Misc.(Non-Drug; Combo Route) route once daily.    metFORMIN (GLUCOPHAGE) 1000 MG tablet Take 1 tablet (1,000 mg total) by mouth 2 (two) times a day.    miconazole NITRATE 2 % (MICOTIN) 2 % top powder Apply topically 2 (two) times daily.    mirtazapine (REMERON) 15 MG tablet Take 1 tablet (15 mg total) by mouth once daily.    pantoprazole (PROTONIX) 40 MG tablet Take 1 tablet (40 mg total) by mouth once daily.    rosuvastatin (CRESTOR) 40 MG Tab Take 1 tablet (40 mg total) by mouth once daily.    SITagliptin phosphate (JANUVIA) 50 MG Tab Take 1 tablet (50 mg total) by mouth once daily.    TRUE METRIX GLUCOSE TEST STRIP Strp TEST BLOOD SUGAR EVERY DAY    vitamin D (VITAMIN D3) 1000 units Tab Take 1,000 Units by mouth once daily.    zinc oxide 20 % ointment Apply topically 2 (two)  times a day.     Review of Systems   Unable to perform ROS: Acuity of condition     Objective:     Vital Signs (Most Recent):  Temp: 97.2 °F (36.2 °C) (03/03/25 0400)  Pulse: (!) 119 (03/03/25 0600)  Resp: (!) 22 (03/03/25 0600)  BP: (!) 142/73 (03/03/25 0600)  SpO2: 98 % (03/03/25 0600) Vital Signs (24h Range):  Temp:  [97.2 °F (36.2 °C)-98.6 °F (37 °C)] 97.2 °F (36.2 °C)  Pulse:  [] 119  Resp:  [10-40] 22  SpO2:  [78 %-100 %] 98 %  BP: ()/() 142/73   Weight: 53 kg (116 lb 13.5 oz)  Body mass index is 21.37 kg/m².  SpO2: 98 %       Intake/Output Summary (Last 24 hours) at 3/3/2025 0828  Last data filed at 3/3/2025 0600  Gross per 24 hour   Intake --   Output 3475 ml   Net -3475 ml     Lines/Drains/Airways       Drain  Duration                  Urethral Catheter 02/28/25 1305 Non-latex 16 Fr. 2 days              Peripheral Intravenous Line  Duration                  Peripheral IV - Single Lumen 02/28/25 2003 20 G Anterior;Left Forearm 2 days         Peripheral IV - Single Lumen 03/02/25 2330 20 G Anterior;Right Forearm <1 day                  Significant Labs:   Chemistries:   Recent Labs   Lab 02/28/25  1123 02/28/25  1621 02/28/25  2345 03/01/25  0146 03/01/25  0801 03/02/25  0320 03/02/25  1021 03/02/25  1906 03/03/25  0237      < > 143 141 141 140  --   --  138   K 5.5*   < > 4.2 4.3 4.2 3.9  --   --  4.0   *   < > 117* 117* 117* 113*  --   --  107   CO2 15*   < > 16* 16* 17* 18*  --   --  20*   BUN 66.9*   < > 53.0* 48.8* 46.0* 38.1*  --   --  25.9*   CREATININE 3.69*   < > 2.66* 2.62* 2.44* 2.28*  --   --  2.17*   CALCIUM 9.6   < > 7.9* 7.9* 8.1* 8.4*  --   --  8.9   BILITOT 0.6  --   --  0.3  --   --   --   --  0.5   ALKPHOS 71  --   --  44  --   --   --   --  54   ALT 32  --   --  19  --   --   --   --  32   AST 21  --   --  17  --   --   --   --  35*   GLUCOSE 312*   < > 182* 217* 107 133*  --   --  144*   MG 1.60   < > 1.30* 1.30* 1.90 1.60  --   --   --    PHOS  --    < > 3.0  "3.0 2.1* 2.2*  --   --   --    TROPONINI 0.037  --   --   --   --   --  0.114* 0.598* 0.675*    < > = values in this interval not displayed.        CBC/Anemia Labs: Coags:    Recent Labs   Lab 03/01/25  0146 03/02/25  0320 03/03/25  0437   WBC 20.05* 12.25* 10.89   HGB 8.5* 8.7* 11.1*   HCT 25.5* 27.0* 33.8*    159 202   MCV 94.8* 96.4* 94.7*   RDW 14.1 14.2 13.8    No results for input(s): "PT", "INR", "APTT" in the last 168 hours.     Significant Imaging:  Imaging Results              CT Chest Abdomen Pelvis Without Contrast (XPD) (Final result)  Result time 02/28/25 14:00:48      Final result by Jean Muhammad MD (02/28/25 14:00:48)                   Impression:      Extensive urinary bladder wall thickening seen diffusely with inflammatory changes seen around the urinary bladder consistent with acute cystitis.  This is causing secondary obstructive changes and secondary bilateral hydronephrosis and hydroureter mild    Right lower lobe atelectasis    Degenerative and postsurgical changes in the lower lumbar spine      Electronically signed by: Raji Muhammad  Date:    02/28/2025  Time:    14:00               Narrative:    EXAMINATION:  CT CHEST ABDOMEN PELVIS WITHOUT CONTRAST(XPD)    CLINICAL HISTORY:  Sepsis;    TECHNIQUE:  Low dose axial images, sagittal and coronal reformations were obtained from the thoracic inlet to the pubic symphysis without IV and oral contrast administration.  Automatic exposure control is utilized to reduce patient radiation exposure.    COMPARISON:  09/16/2024    FINDINGS:  Examination is limited due to lack of intravenous contrast    There is mild right lower lobe atelectasis..  No mass is seen.  No nodule is seen.  No pleural thickening is seen.  No pleural effusion is seen.  No infiltrate is seen.    The thoracic aorta is normal in caliber..  Calcified plaque is seen throughout the thoracic and abdominal aorta.  Some coronary artery calcifications are seen as " well.    No mediastinal adenopathy is seen.    The heart appears normal in size..    The liver appears normal.  No liver mass or lesion is seen.  Portal and hepatic veins are not well evaluated on this non contrasted CT...    The gallbladder appears normal.  No gallstones are seen.    The spleen appears normal.  No obvious splenic mass or lesion is seen.    The pancreas appears grossly unremarkable.  No pancreatic mass or lesion is seen.  No inflammation is seen.    No adrenal abnormality is seen.  No adrenal nodule is seen.    There is bilateral hydronephrosis and hydroureter secondary to obstructive changes from markedly thickened urinary bladder wall with inflammatory changes seen throughout the urinary bladder and around the urinary bladder.  Findings are consistent with a cystitis.  There is a Gandara catheter in the urinary bladder.  No nephro or ureterolithiasis is seen.  No renal lesion is seen.    Visualized portions of the bowel shows no acute abnormality.  No colitis is seen.  No diverticulitis is seen.  No colonic mass is seen.    No free air is seen.  No free fluid is seen.    No acute bony abnormality is seen.  There are degenerative changes seen in the lower lumbar spine the patient is status post posterior spinal fusion at L4-5.                                       X-Ray Chest AP Portable (Final result)  Result time 02/28/25 13:21:33      Final result by Jose J Do MD (02/28/25 13:21:33)                   Impression:      No acute cardiopulmonary process identified.      Electronically signed by: Jose J Do  Date:    02/28/2025  Time:    13:21               Narrative:    EXAMINATION:  XR CHEST AP PORTABLE    CLINICAL HISTORY:  Generalized weakness.  Sepsis.    TECHNIQUE:  One view    COMPARISON:  September 18, 2024.    FINDINGS:  Cardiopericardial silhouette is within normal limits. Lungs are without dense focal or segmental consolidation, congestive process, pleural effusions or  pneumothorax.                                    EKG:       Telemetry: ST    Physical Exam  Constitutional:       General: He is not in acute distress.     Appearance: Normal appearance.      Comments: Confused Conversation   HENT:      Head: Normocephalic.      Mouth/Throat:      Mouth: Mucous membranes are moist.   Eyes:      Extraocular Movements: Extraocular movements intact.   Cardiovascular:      Rate and Rhythm: Normal rate and regular rhythm.      Pulses: Normal pulses.      Heart sounds: Normal heart sounds. No murmur heard.  Pulmonary:      Effort: Pulmonary effort is normal. No respiratory distress.      Breath sounds: Rhonchi present.      Comments: NC O2  Abdominal:      Palpations: Abdomen is soft.   Genitourinary:     Comments: + Indwelling Gandara  Skin:     General: Skin is warm.   Neurological:      General: No focal deficit present.      Mental Status: He is alert.      Comments: Confused Conversation; Oriented x 1 - Self   Psychiatric:         Mood and Affect: Mood normal.       Home Medications:   Medications Ordered Prior to Encounter[1]  Current Schedule Inpatient Medications:   aspirin  81 mg Oral Daily    atorvastatin  80 mg Oral Daily    ceFEPime IV (PEDS and ADULTS)  2 g Intravenous Q8H    enoxparin  1 mg/kg Subcutaneous Q24H    ezetimibe  10 mg Oral Daily    insulin glargine U-100  15 Units Subcutaneous BID    mupirocin   Nasal BID    pantoprazole  40 mg Oral Daily    potassium, sodium phosphates  1 packet Oral Once     Continuous Infusions:   D5 and 0.45% NaCl   Intravenous Continuous  mL/hr at 03/01/25 1758 Rate Verify at 03/01/25 1758    D5 and 0.45% NaCl   Intravenous Continuous 75 mL/hr at 03/02/25 2250 New Bag at 03/02/25 2250     Assessment:   NSTEMI Type II in the Setting of DKA, Dehydration with Possibility of Type I given New CMO  Newly Diagnosed CMO/EF 35%    - ECHO (3.2.25) - LVEF 35%    - ECHO (Sept 2024) - Intact LVEF  AMS due to Delirium   Sinus Tachycardia - Volume  Depletion  DKA - Resolved     - DM II   Hypotension requiring Pressors - Resolved    - Hx of HTN   Lactic Acidosis - Improving (Due to Volume Depletion)  Leukocytosis - Resolved   Bilateral Hydronephrosis/Hydroureter  UTI - POA/Chronic Catheter   DWAINE/CKD IIIa  RSV +  Acute Gastroenteritis  Hx of CVA (9.2024)  Anemia  CAD/Nonobstructive 2007  HLD  SHERMAN/Bilaterally Moderate  Chronic Urinary Retention requiring Indwelling Catheter  No Hx of GIB     Plan:   Continue ASA and Statin   Start Metoprolol Tartrate 25mg PO BID  No ACEi/ARB/ARNI given DWAINE/CKD IIIa  Plan for LHC when PTS Overall Clinical Condition Improves   Consult Nephrology for Recommendations on LHC/Renal Preparedness   Labs and EKG in AM: CBC, CMP and Mg     Thank you for your consult.     Eugenio Mario, ALISA  Cardiology  [facility]          [1]   No current facility-administered medications on file prior to encounter.     Current Outpatient Medications on File Prior to Encounter   Medication Sig Dispense Refill    alcohol swabs (BD ALCOHOL SWABS) PadM Apply 1 each topically once daily. 200 each 12    alfuzosin (UROXATRAL) 10 mg Tb24 Take 1 tablet (10 mg total) by mouth daily with breakfast. 90 tablet 3    aspirin (ECOTRIN) 81 MG EC tablet Take 81 mg by mouth once daily.      b complex vitamins capsule Take 1 capsule by mouth once daily.      blood glucose control, low (TRUE METRIX LEVEL 1) Soln 1 Bottle by Misc.(Non-Drug; Combo Route) route once daily. 1 each 12    blood-glucose meter (TRUE METRIX AIR GLUCOSE METER) Misc 1 each by Misc.(Non-Drug; Combo Route) route once daily. 1 each 0    blood-glucose sensor (DEXCOM G7 SENSOR) Carmen 1 each by Misc.(Non-Drug; Combo Route) route once daily. 3 each 12    ezetimibe (ZETIA) 10 mg tablet Take 1 tablet (10 mg total) by mouth once daily. 90 tablet 3    finasteride (PROSCAR) 5 mg tablet Take 1 tablet (5 mg total) by mouth once daily. 30 tablet 11    lancets (TRUEPLUS LANCETS) 33 gauge Misc 1 lancet by  Misc.(Non-Drug; Combo Route) route once daily. 200 each 12    metFORMIN (GLUCOPHAGE) 1000 MG tablet Take 1 tablet (1,000 mg total) by mouth 2 (two) times a day. 180 tablet 3    miconazole NITRATE 2 % (MICOTIN) 2 % top powder Apply topically 2 (two) times daily. 43 g 0    mirtazapine (REMERON) 15 MG tablet Take 1 tablet (15 mg total) by mouth once daily. 90 tablet 3    pantoprazole (PROTONIX) 40 MG tablet Take 1 tablet (40 mg total) by mouth once daily. 90 tablet 3    rosuvastatin (CRESTOR) 40 MG Tab Take 1 tablet (40 mg total) by mouth once daily. 90 tablet 3    SITagliptin phosphate (JANUVIA) 50 MG Tab Take 1 tablet (50 mg total) by mouth once daily. 90 tablet 0    TRUE METRIX GLUCOSE TEST STRIP Strp TEST BLOOD SUGAR EVERY  strip 3    vitamin D (VITAMIN D3) 1000 units Tab Take 1,000 Units by mouth once daily.      zinc oxide 20 % ointment Apply topically 2 (two) times a day.

## 2025-03-03 NOTE — PLAN OF CARE
Problem: Occupational Therapy  Goal: Occupational Therapy Goal  Description: Goals to be met by: 4/3/25     Patient will increase functional independence with ADLs by performing:    UE Dressing with Minimal Assistance.  LE Dressing with Minimal Assistance.  Grooming while seated at sink with Stand-by Assistance.  Toileting from bedside commode with Minimal Assistance for hygiene and clothing management.   Toilet transfer to bedside commode with Minimal Assistance.  Showering from shower chair w/ minimal assistance.   Inc visual attention of the L side w/ less than 3 cues.   Outcome: Progressing

## 2025-03-03 NOTE — PT/OT/SLP EVAL
Physical Therapy Evaluation    Patient Name:  Froy Barragan   MRN:  [id number]    Recommendations:     Discharge therapy intensity: Moderate Intensity Therapy   Discharge Equipment Recommendations: to be determined by next level of care   Barriers to discharge: Decreased caregiver support, Impaired mobility, and Ongoing medical needs    Assessment:     Froy Barragan is a 64 y.o. male admitted with a medical diagnosis of Generalized weakness, Diabetic ketoacidosis, Sepsis, Acute on chronic renal insufficiency, RSV, and Acute UTI, hx of CVA 9/24.  He presents with the following impairments/functional limitations: weakness, impaired endurance, impaired self care skills, impaired functional mobility, impaired cognition, decreased upper extremity function, decreased lower extremity function, decreased safety awareness, abnormal tone, decreased ROM, impaired skin. Pt with decreased attention, command following, and arousal. Pt unresponsive to stimuli initially but eventually opened eyes and moved LUE.History not able to be obtained 2/2 aphasic and decreased cooperation. Pt maxA x 2 for sitting EOB with strong resistance to movement and strong posterior lean. Pt shows decreased ROM in LUE and BLE with increased tone noted additionally. Pt LEs kept adducted and flexed in sitting EOB.    Rehab Prognosis: Fair; patient would benefit from acute skilled PT services to address these deficits and reach maximum level of function.    Recent Surgery: * No surgery found *      Plan:     During this hospitalization, patient would benefit from acute PT services 3 x/week to address the identified rehab impairments via gait training, therapeutic activities, therapeutic exercises, neuromuscular re-education and progress toward the following goals:    Plan of Care Expires:  04/03/25    Subjective     Chief Complaint: none stated  Patient/Family Comments/goals: none stated  Pain/Comfort:  Pain Rating 1: 0/10    Patients  cultural, spiritual, [employer]ism conflicts given the current situation: no    Living Environment:  Pt history obtained from chart. Pt currently lives in [facility] with caregivers 8 hours a day 7 days/week and family checks on him daily. Pt WC bound at baseline, family wants long term NH placement.   Prior to admission, patients level of function was WB bound at baseline, assistance needed for ADLs.  Equipment used at home: Wheelchair, shower chair, slide board .  DME owned (not currently used): none.  Upon discharge, patient will have assistance from NH staff, caregivers.    Objective:     Communicated with RN prior to session.  Patient found HOB elevated with peripheral IV, SCD, colbert catheter, telemetry, blood pressure cuff, pulse ox (continuous) (roll belt and mittens)  upon PT entry to room.    General Precautions: Standard, fall, aphasia  Orthopedic Precautions:N/A   Braces: N/A  Respiratory Status: Room air  Blood Pressure: 114/89      Exams:  Cognitive Exam:  Patient is not oriented and does not attempt to answer orientation questions   Skin integrity:  known sacral wound  MMT testing not formally tested d/t cognition and shows resistance to PROM with increased tone. Pt keeps LE adducted and flexed in sitting with L ankle in DF.       Functional Mobility:  Bed Mobility:     Rolling Left:  maximal assistance  Rolling Right: maximal assistance  Scooting: maximal assistance  Supine to Sit: maximal assistance and of 2 persons  Sit to Supine: maximal assistance and of 2 persons      AM-PAC 6 CLICK MOBILITY  Total Score:10       Treatment & Education:    Patient provided with verbal education education regarding PT role/goals/POC, fall prevention, safety awareness, and discharge/DME recommendations.  Additional teaching is warranted.     Patient left HOB elevated with all lines intact, call button in reach, RN notified, and roll belt and mittens applied .    GOALS:   Multidisciplinary Problems       Physical Therapy Goals           Problem: Physical Therapy    Goal Priority Disciplines Outcome Interventions   Physical Therapy Goal     PT, PT/OT Progressing    Description: Goals to be met by: 4/3/25     Patient will increase functional independence with mobility by performin. Supine to sit with min Assistance  2. Sit to supine with min Assistance  3. Sit to stand transfer with mod Assistance  4. Bed to chair transfer with mod using LRAD                         History:     Past Medical History:   Diagnosis Date    Anemia of chronic disease 2022    Arteriosclerosis of coronary artery 2022    Cervical myelopathy 2022    Cobalamin deficiency 2022    Gastroesophageal reflux disease 2022    Hypertension 2022    Low vitamin D level 2022    Mixed hyperlipidemia 2022    Paraparesis 2022    Stage 3a chronic kidney disease 2022    Type 2 diabetes mellitus 2022       Past Surgical History:   Procedure Laterality Date    MAGNETIC RESONANCE IMAGING N/A 2024    Procedure: MRI (Magnetic Resonance Imagine);  Surgeon: David Amin DO;  Location: [facility];  Service: Anesthesiology;  Laterality: N/A;    SPINE SURGERY         Time Tracking:     PT Received On: 25  PT Start Time: 0950     PT Stop Time: 1029  PT Total Time (min): 39 min     Billable Minutes: Evaluation mod      2025

## 2025-03-03 NOTE — PLAN OF CARE
Noted therapy recommendation. Sister verbalized considering long term placement. Filled out PASRR and notified Amaya RUSHING. Waiting on decision from sister regarding choice.

## 2025-03-03 NOTE — PT/OT/SLP EVAL
Occupational Therapy  Evaluation    Name: Froy Barragan  MRN: [id number]  Admitting Diagnosis:  weakness and tachycardia   Recent Surgery: * No surgery found *      Recommendations:     Discharge therapy intensity: Moderate Intensity Therapy   Discharge Equipment Recommendations:  to be determined by next level of care  Barriers to discharge:  Other (Comment), Decreased caregiver support (ongoing medical needs)    Assessment:     Froy Barragan is a 64 y.o. male with a medical diagnosis of Generalized weakness, Diabetic ketoacidosis without coma associated with type 2 diabetes mellitus, Sepsis, Lactic acidosis, Acute on chronic renal insufficiency, RSV, and Acute UTI.  He presents with the following performance deficits affecting function: weakness, impaired functional mobility, impaired cognition, decreased safety awareness, impaired coordination, impaired endurance, gait instability, decreased coordination, impaired fine motor, impaired joint extensibility, abnormal tone, impaired skin, decreased upper extremity function, decreased lower extremity function, impaired balance, visual deficits, impaired self care skills, decreased ROM.     Pt presented with aphasia this date and was unable to provide a hx. Information provided below was obtained via care management note. According to CM, pt was receiving assist with ADLs and w/c bound prior to admit.   Pt unable to follow commands during assessments w/ impaired cognition. Slight agitation observed during mobility. He was able to sit EOB w/ max A x 2 for sup to sit and maintain sitting balance w/ mod A (significant post lean). Flexor tone noted in the LUE. Mobility away from the bed not attempted due to pt's lack of strength in the BLE. At this time, mod int therapy post d/c is rec to improve pt's participation in ADLs and dec caregiver burden.      Rehab Prognosis: Fair; patient would benefit from acute skilled OT services to address these deficits  and reach maximum level of function.       Plan:     Patient to be seen 4 x/week to address the above listed problems via self-care/home management, therapeutic activities, therapeutic exercises  Plan of Care Expires: 04/03/25  Plan of Care Reviewed with: patient    Subjective     Chief Complaint: Pt aphasic   Patient/Family Comments/goals: Unknown     Occupational Profile:  Living Environment: Lives alone, caregiver assists 8 hr a day. Shower chair/bath bench used in BR. Accessible home.   Previous level of function: Assist w/ ADLs   Roles and Routines: Father   Equipment Used at Home: wheelchair, shower chair, glucometer, slide board, bath bench  Assistance upon Discharge: TBD     Pain/Comfort:  Pain Rating 1:  (not rated)    Patients cultural, spiritual, Mandaeism conflicts given the current situation: no    Objective:     OT communicated with Rn prior to session.      Patient was found HOB elevated with peripheral IV, telemetry, SCD, pulse ox (continuous), blood pressure cuff, colbert catheter (roll belt and mitts) upon OT entry to room.    General Precautions: Standard, fall, aphasia, contact, droplet  Orthopedic Precautions: N/A  Braces: N/A    Vital Signs: Blood Pressure: 114/89    Bed Mobility:    Patient completed Rolling/Turning to Left with  moderate assistance  Patient completed Rolling/Turning to Right with moderate assistance  Patient completed Supine to Sit with maximal assistance and 2 persons  Patient completed Sit to Supine with maximal assistance and 2 persons      Activities of Daily Living:  Lower Body Dressing: total assistance to don socks in bed   Toileting: total assistance for brief management and hygiene     AMPA 6 Click ADL:  AMPA Total Score: 13    Functional Cognition:  A & O x 0   Aphasic     Visual Perceptual Skills:  Observed R gaze preference but was able to cross midline briefly      Upper Extremity Function:  Right Upper Extremity:   Range of Motion: WFL  Strength: No command  following for formal testing, at least 3+/5 grossly observed in function     Left Upper Extremity:  Range of Motion: Impaired. High tone noted.   Able to range shoulder about 30 deg flexion and 15 deg extension, elbow flx to 90 deg, 15 deg forearm supination   WFL     Balance:   Impaired. In static sitting exhibits strong posterior lean.     Therapeutic Positioning  Risk for acquired pressure injuries is increased due to impaired mobility.    OT interventions performed during the course of today's session:   Education was provided on benefits of and recommendations for therapeutic positioning    Skin assessment: all bony prominences were assessed    Findings:  area of altered skin integrity on the sacral area and L elbow, RN aware      OT recommendations for therapeutic positioning throughout hospitalization:   Follow [facility] Pressure Injury Prevention Protocol  Geomat recommended for sacral protection while [facility]    Patient Education:  Patient provided with verbal education education regarding OT role/goals/POC, fall prevention, safety awareness, Discharge/DME recommendations, and pressure ulcer prevention.  Additional teaching is warranted.     Patient left with PT with all lines intact, call button in reach, RN notified, and roll belt in place .    GOALS:   Multidisciplinary Problems       Occupational Therapy Goals          Problem: Occupational Therapy    Goal Priority Disciplines Outcome Interventions   Occupational Therapy Goal     OT, PT/OT Progressing    Description: Goals to be met by: 4/3/25     Patient will increase functional independence with ADLs by performing:    UE Dressing with Minimal Assistance.  LE Dressing with Minimal Assistance.  Grooming while seated at sink with Stand-by Assistance.  Toileting from bedside commode with Minimal Assistance for hygiene and clothing management.   Toilet transfer to bedside commode with Minimal Assistance.  Showering from shower chair w/ minimal assistance.    Inc functional awareness of the L side for safety during daily life tasks.                          History:     Past Medical History:   Diagnosis Date    Anemia of chronic disease 7/6/2022    Arteriosclerosis of coronary artery 7/6/2022    Cervical myelopathy 7/6/2022    Cobalamin deficiency 7/6/2022    Gastroesophageal reflux disease 7/6/2022    Hypertension 7/6/2022    Low vitamin D level 7/6/2022    Mixed hyperlipidemia 7/6/2022    Paraparesis 7/6/2022    Stage 3a chronic kidney disease 7/6/2022    Type 2 diabetes mellitus 7/6/2022         Past Surgical History:   Procedure Laterality Date    MAGNETIC RESONANCE IMAGING N/A 9/19/2024    Procedure: MRI (Magnetic Resonance Imagine);  Surgeon: David Amin DO;  Location: [facility];  Service: Anesthesiology;  Laterality: N/A;    SPINE SURGERY         Time Tracking:     OT Date of Treatment: 03/03/25  OT Start Time: 0949  OT Stop Time: 1020  OT Total Time (min): 31 min    Billable Minutes:Evaluation mod complexity     3/3/2025

## 2025-03-03 NOTE — NURSING
CBG 64 @1944, given 12.5g D50 IV, MD paged. RN suggested switching continuous IVF to something with D5W as pt is refusing oral intake and completely disoriented not following commands and not safe for PO intake, repeated hypoglycemia. MD order to continue D50 pushes prn hypoglycemia. Repeat CBG's 92 @2035, 80 @2119.     Significant Hypoglycemia again @2231 CBG 44*, given 25g D50. MD paged again with RN requesting higher dextrose maintenance fluids or potential D10 gtt, orders for D5W 0.45NS ordered and started, repeat  @6354

## 2025-03-03 NOTE — PLAN OF CARE
Problem: Infection  Goal: Absence of Infection Signs and Symptoms  Outcome: Progressing     Problem: Adult Inpatient Plan of Care  Goal: Plan of Care Review  Outcome: Progressing  Goal: Patient-Specific Goal (Individualized)  Outcome: Progressing  Goal: Absence of Hospital-Acquired Illness or Injury  Outcome: Progressing  Goal: Optimal Comfort and Wellbeing  Outcome: Progressing  Goal: Readiness for Transition of Care  Outcome: Progressing     Problem: Diabetes Comorbidity  Goal: Blood Glucose Level Within Targeted Range  Outcome: Progressing     Problem: Wound  Goal: Optimal Coping  Outcome: Progressing  Goal: Optimal Functional Ability  Outcome: Progressing  Goal: Absence of Infection Signs and Symptoms  Outcome: Progressing  Goal: Improved Oral Intake  Outcome: Progressing  Goal: Optimal Pain Control and Function  Outcome: Progressing  Goal: Skin Health and Integrity  Outcome: Progressing  Goal: Optimal Wound Healing  Outcome: Progressing     Problem: Skin Injury Risk Increased  Goal: Skin Health and Integrity  Outcome: Progressing     Problem: Fall Injury Risk  Goal: Absence of Fall and Fall-Related Injury  Outcome: Progressing     Problem: Delirium  Goal: Optimal Coping  Outcome: Progressing  Goal: Improved Behavioral Control  Outcome: Progressing  Goal: Improved Attention and Thought Clarity  Outcome: Progressing  Goal: Improved Sleep  Outcome: Progressing

## 2025-03-03 NOTE — PROGRESS NOTES
[facility] Hospital Medicine Progress Note        Chief Complaint: Inpatient Follow-up for     HPI:   64-year-old gentleman with DM type 2, CKD stage 3, CVA 09/2024, chronic indwelling Gandara who was admitted after he presented with complaints of nausea with several episodes of vomiting and nonbloody diarrhea.  He denied having any chest pain, abdominal pain, SOB, cough, sputum production, headache, numbness, weakness, dizziness/lightheadedness or loss of consciousness.  On admission his labs showed leukocytosis of 24.8, normocytic anemia with H/H of 11.8/36.9, hyperkalemia of 5.5, CO2 15 with anion gap 20, BUN/creatinine 66.9/3.69, negative troponins, lactic acid of 3.3, beta hydroxybutyrate of 2.9.  UA showed 3+ protein, 2+ glucose, 1+ ketones, 3+ blood, leukocyte esterase, WBCs as well as bacteria.  Blood cultures and urine culture were sent off.  CT C/A/P was done which showed extensive urinary bladder wall thickening with inflammatory changes around urinary bladder consistent with acute cystitis causing secondary obstructive changes and secondary B/L hydronephrosis and hydroureter, RLL atelectasis.  Admitted to ICU on insulin infusion per DKA protocol.  Started on broad-spectrum antibiotics.  Given IVF resuscitation.  Urine culture growing GNR.  Noted to have low-grade fevers overnight on 03/01.  Insulin infusion discontinued and patient is started on long-acting insulin.  Downgraded to HM. Troponins increased to 0.114; trended q8 & Echo ordered. CIS consulted. FD Lovenox ordered. Urology consulted for hydronephrosis; will follow recommendations.    Interval Hx:   Today, patient continued to be confused, disoriented & restless. Noted to have hypoglycemic episodes overnight. Tachycardia improved. Trop increased to 0.675 overnight. Echo showing EF 35%. BUN/Cr improved at 25.9/2.17; remains on IV D5/NS. UCX growing Proteus. Will DC Cefepime in case it is causing AMS & start IV  Rocephin. CT Head ordered.     Case was discussed with patient's nurse on the floor.    Objective/physical exam:  General: alert male lying comfortably in bed, in no acute distress  HENT: oral and oropharyngeal mucosa moist, pink, with no erythema or exudates, no ear pain or discharge  Neck: normal neck movement, no lymph nodes or swellings, no JVD or Carotid bruit  Respiratory: clear breathing sounds bilaterally, no crackles, rales, ronchi or wheezes  Cardiovascular: clear S1 and S2, no murmurs, rubs or gallops  Peripheral Vascular: no lesions, ulcers or erosions, normal peripheral pulses and no pedal edema  Gastrointestinal: soft, non-tender, non-distended abdomen, no guarding, rigidity or rebound tenderness, normal bowel sounds  Integumentary: normal skin color, no rashes or lesions  Neuro: AAO x 0; moving all extremities spontaneously; unable to test motor strength or sensation    VITAL SIGNS: 24 HRS MIN & MAX LAST   Temp  Min: 97.2 °F (36.2 °C)  Max: 98.6 °F (37 °C) 98.5 °F (36.9 °C)   BP  Min: 96/70  Max: 169/86 132/86   Pulse  Min: 85  Max: 137  106   Resp  Min: 10  Max: 40 18   SpO2  Min: 95 %  Max: 100 % 97 %     I have reviewed the following labs:  Recent Labs   Lab 03/01/25  0146 03/02/25  0320 03/03/25  0437   WBC 20.05* 12.25* 10.89   RBC 2.69* 2.80* 3.57*   HGB 8.5* 8.7* 11.1*   HCT 25.5* 27.0* 33.8*   MCV 94.8* 96.4* 94.7*   MCH 31.6* 31.1* 31.1*   MCHC 33.3 32.2* 32.8*   RDW 14.1 14.2 13.8    159 202   MPV 11.2* 11.5* 11.5*     Recent Labs   Lab 02/28/25  1123 02/28/25  1240 02/28/25  1621 03/01/25  0146 03/01/25  0801 03/02/25  0320 03/02/25  0720 03/03/25  0237     --    < > 141 141 140  --  138   K 5.5*  --    < > 4.3 4.2 3.9  --  4.0   *  --    < > 117* 117* 113*  --  107   CO2 15*  --    < > 16* 17* 18*  --  20*   BUN 66.9*  --    < > 48.8* 46.0* 38.1*  --  25.9*   CREATININE 3.69*  --    < > 2.62* 2.44* 2.28*  --  2.17*   CALCIUM 9.6  --    < > 7.9* 8.1* 8.4*  --  8.9   PH   --  7.300  --   --   --   --  7.460*  --    MG 1.60  --    < > 1.30* 1.90 1.60  --   --    ALBUMIN 4.1  --   --  2.7*  --   --   --  3.1*   ALKPHOS 71  --   --  44  --   --   --  54   ALT 32  --   --  19  --   --   --  32   AST 21  --   --  17  --   --   --  35*   BILITOT 0.6  --   --  0.3  --   --   --  0.5    < > = values in this interval not displayed.     Assessment/Plan:  DKA-resolved  AMS 2/2 Delirium vs Cefepime induced  Tachycardia possibly 2/2 NSTEMI vs Intravascular Depletion  Troponinemia 2/2 NSTEMI type I vs type II  Lactic Acidemia 2/2 Intravascular Depletion  UTI  Leukocytosis 2/2 above  B/L Hydronephrosis & Hydroureter  DWAINE on CKD III 2/2 Prerenal vs Postrenal Azotemia  RSV antigen positive  Acute gastroenteritis  Prior CVA 09/2024  Normocytic anemia     Reporting no new complaints   Troponins uptrended overnight  Echo showing EF 35%  On FD Lovenox BID  CIS consulted for troponinemia; follow recommendations  CT Head ordered  BUN/creatinine improved at 25.9/2.17; consulted Urology for B/L hydronephrosis associated with bladder wall thickening  On IV Rocephin  On IV D5/NS  Urine culture growing Proteus  Blood cultures negative   Started on glargine 50 units b.i.d. on ISS LD   Continued on home aspirin, atorvastatin, Zetia, mirtazapine, Protonix  PT/OT consulted; follow recommendations    VTE prophylaxis: Lovenox    Patient condition:  Stable    Anticipated discharge and Disposition:     Pending    All diagnosis and differential diagnosis have been reviewed; assessment and plan has been documented; I have personally reviewed the labs and test results that are presently available; I have reviewed the patients medication list; I have reviewed the consulting providers response and recommendations. I have reviewed or attempted to review medical records based upon their availability    All of the patient's questions have been  addressed and answered. Patient's is agreeable to the above stated plan. I will  continue to monitor closely and make adjustments to medical management as needed.    Portions of this note dictated using EMR integrated voice recognition software, and may be subject to voice recognition errors not corrected at proofreading. Please contact writer for clarification if needed.     Radiology:  I have personally reviewed the following imaging and agree with the radiologist.     US Retroperitoneal Complete  Narrative: EXAMINATION:  US RETROPERITONEAL COMPLETE    CLINICAL HISTORY:  reassess bilateral hydronephrosis;  Disorder of kidney and ureter, unspecified    COMPARISON:  09/08/2024    FINDINGS:  Right kidney is poorly demonstrated due to body habitus.  It measures 10.6 x 5.5 x 5.3 cm.  No focal lesions are noted there is no hydronephrosis.    There is flow present the proximal IVC.  Aorta is incompletely visualized due to overlying bowel gas.  There is a Gandara catheter in place.  The wall of the urinary bladder appears thickened measuring 1.3 cm however the bladder is collapsed.    The left kidney measures 9.8 by 4.6 x 4.8 cm.  There is a simple cyst present measuring 1.5 x 1.7 x 1.4 cm.  There is no hydronephrosis.  Impression: Simple cyst of the left kidney.    Changes consistent with thickening of the wall of the urinary bladder.    Electronically signed by: Tyrese Díaz MD  Date:    03/03/2025  Time:    08:59      Frank Awad MD  Department of Hospital Medicine   [facility]   03/03/2025

## 2025-03-03 NOTE — PROGRESS NOTES
UROLOGY  PROGRESS  NOTE    Froy Barragan 1960  [id number]  3/3/2025    Some confusion  Gandara functioning well    Afebrile, BP stable  Some tachycardia   1175 mL urine output overnight   WBC 10.89  H&H 11.1/33.8   BUN and creatinine 25.9/2.17    Urine culture with Proteus  Blood cultures with no growth at 48 hours  On cefepime    Intake/Output:  No intake/output data recorded.  I/O last 3 completed shifts:  In: -   Out: 5900 [Urine:5900]     Exam:    NAD  Card: RRR  Resp: unlabored  : yellow urine draining to  bag    Recent Results (from the past 24 hours)   Lactic Acid, Plasma    Collection Time: 03/02/25  2:41 PM   Result Value Ref Range    Lactic Acid Level 1.5 0.5 - 2.2 mmol/L   POCT glucose    Collection Time: 03/02/25  5:23 PM   Result Value Ref Range    POCT Glucose 63 (L) 70 - 110 mg/dL   POCT glucose    Collection Time: 03/02/25  5:51 PM   Result Value Ref Range    POCT Glucose 146 (H) 70 - 110 mg/dL   Troponin I    Collection Time: 03/02/25  7:06 PM   Result Value Ref Range    Troponin-I 0.598 (H) 0.000 - 0.045 ng/mL   POCT glucose    Collection Time: 03/02/25  7:44 PM   Result Value Ref Range    POCT Glucose 64 (L) 70 - 110 mg/dL   POCT glucose    Collection Time: 03/02/25  8:35 PM   Result Value Ref Range    POCT Glucose 92 70 - 110 mg/dL   POCT glucose    Collection Time: 03/02/25  9:19 PM   Result Value Ref Range    POCT Glucose 80 70 - 110 mg/dL   POCT glucose    Collection Time: 03/02/25 10:31 PM   Result Value Ref Range    POCT Glucose 44 (LL) 70 - 110 mg/dL   POCT glucose    Collection Time: 03/02/25 11:15 PM   Result Value Ref Range    POCT Glucose 206 (H) 70 - 110 mg/dL   POCT glucose    Collection Time: 03/03/25 12:10 AM   Result Value Ref Range    POCT Glucose 177 (H) 70 - 110 mg/dL   POCT glucose    Collection Time: 03/03/25 12:59 AM   Result Value Ref Range    POCT Glucose 174 (H) 70 - 110 mg/dL   POCT glucose    Collection Time: 03/03/25  2:23 AM   Result Value Ref Range     POCT Glucose 152 (H) 70 - 110 mg/dL   Troponin I    Collection Time: 03/03/25  2:37 AM   Result Value Ref Range    Troponin-I 0.675 (H) 0.000 - 0.045 ng/mL   Comprehensive Metabolic Panel    Collection Time: 03/03/25  2:37 AM   Result Value Ref Range    Sodium 138 136 - 145 mmol/L    Potassium 4.0 3.5 - 5.1 mmol/L    Chloride 107 98 - 107 mmol/L    CO2 20 (L) 23 - 31 mmol/L    Glucose 144 (H) 82 - 115 mg/dL    Blood Urea Nitrogen 25.9 (H) 8.4 - 25.7 mg/dL    Creatinine 2.17 (H) 0.72 - 1.25 mg/dL    Calcium 8.9 8.8 - 10.0 mg/dL    Protein Total 6.9 5.8 - 7.6 gm/dL    Albumin 3.1 (L) 3.4 - 4.8 g/dL    Globulin 3.8 (H) 2.4 - 3.5 gm/dL    Albumin/Globulin Ratio 0.8 (L) 1.1 - 2.0 ratio    Bilirubin Total 0.5 <=1.5 mg/dL    ALP 54 40 - 150 unit/L    ALT 32 0 - 55 unit/L    AST 35 (H) 5 - 34 unit/L    eGFR 33 mL/min/1.73/m2    Anion Gap 11.0 mEq/L    BUN/Creatinine Ratio 12    CBC with Differential    Collection Time: 03/03/25  4:37 AM   Result Value Ref Range    WBC 10.89 4.50 - 11.50 x10(3)/mcL    RBC 3.57 (L) 4.70 - 6.10 x10(6)/mcL    Hgb 11.1 (L) 14.0 - 18.0 g/dL    Hct 33.8 (L) 42.0 - 52.0 %    MCV 94.7 (H) 80.0 - 94.0 fL    MCH 31.1 (H) 27.0 - 31.0 pg    MCHC 32.8 (L) 33.0 - 36.0 g/dL    RDW 13.8 11.5 - 17.0 %    Platelet 202 130 - 400 x10(3)/mcL    MPV 11.5 (H) 7.4 - 10.4 fL    Neut % 81.2 %    Lymph % 7.7 %    Mono % 9.1 %    Eos % 1.0 %    Basophil % 0.6 %    Imm Grans % 0.4 %    Neut # 8.85 2.1 - 9.2 x10(3)/mcL    Lymph # 0.84 0.6 - 4.6 x10(3)/mcL    Mono # 0.99 0.1 - 1.3 x10(3)/mcL    Eos # 0.11 0 - 0.9 x10(3)/mcL    Baso # 0.06 <=0.2 x10(3)/mcL    Imm Gran # 0.04 0.00 - 0.04 x10(3)/mcL    NRBC% 0.0 %            Urine culture [[id number]] (Abnormal)  Collected: 02/28/25 1300   Order Status: Completed Specimen: Urine Updated: 03/02/25 0744    Urine Culture >/= 100,000 colonies/ml Proteus mirabilis Abnormal    Susceptibility     Proteus mirabilis     JARED     Ampicillin <=2 Sensitive     Cefazolin (Other) 4  Intermediate     Cefazolin (Urine) 4 Sensitive     Cefepime <=0.12 Sensitive     Ceftriaxone <=0.25 Sensitive     Ciprofloxacin <=0.06 Sensitive     Gentamicin <=1 Sensitive     Levofloxacin <=0.12 Sensitive     Meropenem <=0.25 Sensitive     Nitrofurantoin 128 Resistant     Piperacillin/Tazobactam <=4 Sensitive     Trimeth/Sulfa <=20 Sensitive                   US Retroperitoneal Complete [[id number]] Resulted: 03/03/25 0859   Order Status: Completed Updated: 03/03/25 0901   Narrative:     EXAMINATION:  US RETROPERITONEAL COMPLETE    CLINICAL HISTORY:  reassess bilateral hydronephrosis;  Disorder of kidney and ureter, unspecified    COMPARISON:  09/08/2024    FINDINGS:  Right kidney is poorly demonstrated due to body habitus.  It measures 10.6 x 5.5 x 5.3 cm.  No focal lesions are noted there is no hydronephrosis.    There is flow present the proximal IVC.  Aorta is incompletely visualized due to overlying bowel gas.  There is a Gandara catheter in place.  The wall of the urinary bladder appears thickened measuring 1.3 cm however the bladder is collapsed.    The left kidney measures 9.8 by 4.6 x 4.8 cm.  There is a simple cyst present measuring 1.5 x 1.7 x 1.4 cm.  There is no hydronephrosis.   Impression:       Simple cyst of the left kidney.    Changes consistent with thickening of the wall of the urinary bladder.      Electronically signed by: Tyrese Díaz MD  Date: 03/03/2025  Time: 08:59       Assessment:  64-year-old male with a past medical history of diabetes, CKD stage 3, CVA in September 20, 2024 with chronic indwelling Gandara presented on 02/28 with vomiting and diarrhea.  Admitted to the ICU DKA, CT with bilateral hydroureteronephrosis and thickened bladder wall; DWAINE on CKD on arrival  -creatinine improving   -leukocytosis improved   -urine culture with Proteus, on cefepime  -ultrasound today reveals thickened bladder wall which is collapsed around Gandara, improvement in bilateral  hydronephrosis    Plan:  -continue indwelling Colbert.   -Patient has chronic colbert. Recommend keep colbert on d/c with monthly catheter exchanges.   -culture specific antibiotics   -no indication for urologic intervention at this time.  -Urology is signing off. Please call as needed with any issues.       Sonam Pastor NP

## 2025-03-04 LAB
ALBUMIN SERPL-MCNC: 2.7 G/DL (ref 3.4–4.8)
ALBUMIN/GLOB SERPL: 0.8 RATIO (ref 1.1–2)
ALP SERPL-CCNC: 55 UNIT/L (ref 40–150)
ALT SERPL-CCNC: 27 UNIT/L (ref 0–55)
ANION GAP SERPL CALC-SCNC: 9 MEQ/L
AST SERPL-CCNC: 24 UNIT/L (ref 5–34)
BASOPHILS # BLD AUTO: 0.07 X10(3)/MCL
BASOPHILS NFR BLD AUTO: 0.8 %
BILIRUB SERPL-MCNC: 0.3 MG/DL
BUN SERPL-MCNC: 26.4 MG/DL (ref 8.4–25.7)
CALCIUM SERPL-MCNC: 8.1 MG/DL (ref 8.8–10)
CHLORIDE SERPL-SCNC: 110 MMOL/L (ref 98–107)
CO2 SERPL-SCNC: 20 MMOL/L (ref 23–31)
CREAT SERPL-MCNC: 2.35 MG/DL (ref 0.72–1.25)
CREAT/UREA NIT SERPL: 11
EOSINOPHIL # BLD AUTO: 0.22 X10(3)/MCL (ref 0–0.9)
EOSINOPHIL NFR BLD AUTO: 2.4 %
ERYTHROCYTE [DISTWIDTH] IN BLOOD BY AUTOMATED COUNT: 14 % (ref 11.5–17)
GFR SERPLBLD CREATININE-BSD FMLA CKD-EPI: 30 ML/MIN/1.73/M2
GLOBULIN SER-MCNC: 3.6 GM/DL (ref 2.4–3.5)
GLUCOSE SERPL-MCNC: 154 MG/DL (ref 82–115)
HCT VFR BLD AUTO: 33.4 % (ref 42–52)
HGB BLD-MCNC: 11.2 G/DL (ref 14–18)
IMM GRANULOCYTES # BLD AUTO: 0.04 X10(3)/MCL (ref 0–0.04)
IMM GRANULOCYTES NFR BLD AUTO: 0.4 %
LYMPHOCYTES # BLD AUTO: 1.13 X10(3)/MCL (ref 0.6–4.6)
LYMPHOCYTES NFR BLD AUTO: 12.3 %
MAGNESIUM SERPL-MCNC: 1.9 MG/DL (ref 1.6–2.6)
MCH RBC QN AUTO: 31.5 PG (ref 27–31)
MCHC RBC AUTO-ENTMCNC: 33.5 G/DL (ref 33–36)
MCV RBC AUTO: 94.1 FL (ref 80–94)
MONOCYTES # BLD AUTO: 0.93 X10(3)/MCL (ref 0.1–1.3)
MONOCYTES NFR BLD AUTO: 10.1 %
NEUTROPHILS # BLD AUTO: 6.82 X10(3)/MCL (ref 2.1–9.2)
NEUTROPHILS NFR BLD AUTO: 74 %
NRBC BLD AUTO-RTO: 0 %
OHS QRS DURATION: 74 MS
OHS QTC CALCULATION: 430 MS
PHOSPHATE SERPL-MCNC: 3.3 MG/DL (ref 2.3–4.7)
PLATELET # BLD AUTO: 228 X10(3)/MCL (ref 130–400)
PMV BLD AUTO: 11.3 FL (ref 7.4–10.4)
POCT GLUCOSE: 108 MG/DL (ref 70–110)
POCT GLUCOSE: 153 MG/DL (ref 70–110)
POCT GLUCOSE: 157 MG/DL (ref 70–110)
POCT GLUCOSE: 160 MG/DL (ref 70–110)
POCT GLUCOSE: 202 MG/DL (ref 70–110)
POCT GLUCOSE: 91 MG/DL (ref 70–110)
POTASSIUM SERPL-SCNC: 3.6 MMOL/L (ref 3.5–5.1)
PROT SERPL-MCNC: 6.3 GM/DL (ref 5.8–7.6)
RBC # BLD AUTO: 3.55 X10(6)/MCL (ref 4.7–6.1)
SODIUM SERPL-SCNC: 139 MMOL/L (ref 136–145)
WBC # BLD AUTO: 9.21 X10(3)/MCL (ref 4.5–11.5)

## 2025-03-04 PROCEDURE — 25000003 PHARM REV CODE 250: Performed by: NURSE PRACTITIONER

## 2025-03-04 PROCEDURE — S5010 5% DEXTROSE AND 0.45% SALINE: HCPCS | Performed by: NURSE PRACTITIONER

## 2025-03-04 PROCEDURE — 11000001 HC ACUTE MED/SURG PRIVATE ROOM

## 2025-03-04 PROCEDURE — 27000207 HC ISOLATION

## 2025-03-04 PROCEDURE — 93010 ELECTROCARDIOGRAM REPORT: CPT | Mod: ,,, | Performed by: INTERNAL MEDICINE

## 2025-03-04 PROCEDURE — 93005 ELECTROCARDIOGRAM TRACING: CPT

## 2025-03-04 PROCEDURE — 25000003 PHARM REV CODE 250

## 2025-03-04 PROCEDURE — 84100 ASSAY OF PHOSPHORUS: CPT | Performed by: INTERNAL MEDICINE

## 2025-03-04 PROCEDURE — 85025 COMPLETE CBC W/AUTO DIFF WBC: CPT | Performed by: NURSE PRACTITIONER

## 2025-03-04 PROCEDURE — 63600175 PHARM REV CODE 636 W HCPCS: Performed by: NURSE PRACTITIONER

## 2025-03-04 PROCEDURE — 83735 ASSAY OF MAGNESIUM: CPT | Performed by: NURSE PRACTITIONER

## 2025-03-04 PROCEDURE — 36415 COLL VENOUS BLD VENIPUNCTURE: CPT | Performed by: NURSE PRACTITIONER

## 2025-03-04 PROCEDURE — 63600175 PHARM REV CODE 636 W HCPCS: Performed by: INTERNAL MEDICINE

## 2025-03-04 PROCEDURE — 63600175 PHARM REV CODE 636 W HCPCS: Performed by: STUDENT IN AN ORGANIZED HEALTH CARE EDUCATION/TRAINING PROGRAM

## 2025-03-04 PROCEDURE — 80053 COMPREHEN METABOLIC PANEL: CPT | Performed by: NURSE PRACTITIONER

## 2025-03-04 RX ORDER — DEXTROSE MONOHYDRATE AND SODIUM CHLORIDE 5; .9 G/100ML; G/100ML
INJECTION, SOLUTION INTRAVENOUS CONTINUOUS
Status: DISCONTINUED | OUTPATIENT
Start: 2025-03-04 | End: 2025-03-05

## 2025-03-04 RX ORDER — MAGNESIUM SULFATE HEPTAHYDRATE 40 MG/ML
2 INJECTION, SOLUTION INTRAVENOUS ONCE
Status: COMPLETED | OUTPATIENT
Start: 2025-03-04 | End: 2025-03-04

## 2025-03-04 RX ORDER — INSULIN GLARGINE 100 [IU]/ML
15 INJECTION, SOLUTION SUBCUTANEOUS NIGHTLY
Status: DISCONTINUED | OUTPATIENT
Start: 2025-03-05 | End: 2025-03-06

## 2025-03-04 RX ORDER — METOPROLOL TARTRATE 50 MG/1
50 TABLET ORAL 2 TIMES DAILY
Status: DISCONTINUED | OUTPATIENT
Start: 2025-03-04 | End: 2025-03-13 | Stop reason: HOSPADM

## 2025-03-04 RX ADMIN — ENOXAPARIN SODIUM 50 MG: 60 INJECTION SUBCUTANEOUS at 01:03

## 2025-03-04 RX ADMIN — ATORVASTATIN CALCIUM 80 MG: 40 TABLET, FILM COATED ORAL at 08:03

## 2025-03-04 RX ADMIN — INSULIN ASPART 6 UNITS: 100 INJECTION, SOLUTION INTRAVENOUS; SUBCUTANEOUS at 04:03

## 2025-03-04 RX ADMIN — DEXTROSE AND SODIUM CHLORIDE: 5; 450 INJECTION, SOLUTION INTRAVENOUS at 01:03

## 2025-03-04 RX ADMIN — EZETIMIBE 10 MG: 10 TABLET ORAL at 08:03

## 2025-03-04 RX ADMIN — METOPROLOL TARTRATE 25 MG: 25 TABLET, FILM COATED ORAL at 08:03

## 2025-03-04 RX ADMIN — INSULIN GLARGINE 15 UNITS: 100 INJECTION, SOLUTION SUBCUTANEOUS at 08:03

## 2025-03-04 RX ADMIN — MUPIROCIN: 20 OINTMENT TOPICAL at 08:03

## 2025-03-04 RX ADMIN — INSULIN ASPART 3 UNITS: 100 INJECTION, SOLUTION INTRAVENOUS; SUBCUTANEOUS at 05:03

## 2025-03-04 RX ADMIN — PANTOPRAZOLE SODIUM 40 MG: 40 TABLET, DELAYED RELEASE ORAL at 07:03

## 2025-03-04 RX ADMIN — INSULIN ASPART 2 UNITS: 100 INJECTION, SOLUTION INTRAVENOUS; SUBCUTANEOUS at 08:03

## 2025-03-04 RX ADMIN — CEFTRIAXONE SODIUM 2 G: 2 INJECTION, POWDER, FOR SOLUTION INTRAMUSCULAR; INTRAVENOUS at 01:03

## 2025-03-04 RX ADMIN — MAGNESIUM SULFATE HEPTAHYDRATE 2 G: 40 INJECTION, SOLUTION INTRAVENOUS at 10:03

## 2025-03-04 RX ADMIN — METOPROLOL TARTRATE 50 MG: 50 TABLET, FILM COATED ORAL at 08:03

## 2025-03-04 RX ADMIN — ASPIRIN 81 MG: 81 TABLET, COATED ORAL at 08:03

## 2025-03-04 NOTE — PROGRESS NOTES
Nephrology  Note    Patient Name: Froy Barragan  Age: 64 y.o.  : 1960  MRN: [id number]  Admission Date: 2025        Froy Barragan is a 64 y.o. male has multiple medical problems and has been admitted for UTI, weakness, nausea vomiting diarrhea.  He is also found to have RSV positive.  Patient is nonverbal at present time and does not give any history but the nurses report the intermittently he gets agitation anal and he has been given Haldol.  He does not follow any command and does not give any answers to any questions.    I spoke to patient's sister Ms. Chantal Ricardo who told me that patient has had stroke in the past and has been getting some physical therapy at home.  He lives alone.  He has sitters come in the morning for 4-5 hours and again in the evening and they take care of all his meals.  He has no control on his bowels or bladder.  He uses depends.  He does have indwelling Gandara catheter because of the urinary incontinence.  He is able to take few steps on his own and also has intermittent understand to the questions and follows command.  He has 3 sisters who help him with his all business.  He has no wife or children.    His other medical problems include type 2 diabetes mellitus, hyperlipidemia, coronary artery disease, congestive heart failure, GERD, elevated PSA and urinary retention and has chronic indwelling Gandara catheter placed by Dr. Tyson Berry.  No history of kidney stones.  Now admitted for UTI and RSV.    2025   Comfortable.  Nurses fed him his breakfast.  Excellent urine output noted.        Current Medications[1]    George Bran MD    Past Medical History:   Diagnosis Date    Anemia of chronic disease 2022    Arteriosclerosis of coronary artery 2022    Cervical myelopathy 2022    Cobalamin deficiency 2022    Gastroesophageal reflux disease 2022    Hypertension 2022    Low vitamin D level 2022    Mixed  hyperlipidemia 7/6/2022    Paraparesis 7/6/2022    Stage 3a chronic kidney disease 7/6/2022    Type 2 diabetes mellitus 7/6/2022      Past Surgical History:   Procedure Laterality Date    MAGNETIC RESONANCE IMAGING N/A 9/19/2024    Procedure: MRI (Magnetic Resonance Imagine);  Surgeon: David Amin DO;  Location: [facility];  Service: Anesthesiology;  Laterality: N/A;    SPINE SURGERY        No family history on file.  Social History     Tobacco Use    Smoking status: Never    Smokeless tobacco: Never   Substance Use Topics    Alcohol use: Never     Prescriptions Prior to Admission[2]  Review of patient's allergies indicates:  No Known Allergies         Review of Systems:     Unable to obtain because of his mental status.    Objective:       VITAL SIGNS: 24 HR MIN & MAX LAST    Temp  Min: 97.5 °F (36.4 °C)  Max: 98.8 °F (37.1 °C)  98.3 °F (36.8 °C)        BP  Min: 85/66  Max: 129/81  129/81     Pulse  Min: 85  Max: 121  104     Resp  Min: 9  Max: 31  19    SpO2  Min: 96 %  Max: 98 %  98 %      GEN: Chronically ill appearing, nonverbal and not responding to any questions although he has his eyes open and can look around.  Looks emaciated.  HEENT:  Pupils are reactive.  Extraocular movements seems intact.  No oral lesion.  No JVD noted.  CV: RRR +S1,S2 without murmur  PULM: CTAB, unlabored  ABD: Soft, NT/ND abdomen with NABS  EXT: No cyanosis or edema  SKIN: Warm and dry  PSYCH: Awake, alert, nonverbal.            Component Value Date/Time     03/04/2025 0340     03/03/2025 0237     01/14/2025 2300     (H) 07/21/2024 2300    K 3.6 03/04/2025 0340    K 4.0 03/03/2025 0237    K 4.8 01/14/2025 2300    K 4.0 07/21/2024 2300    CO2 20 (L) 03/04/2025 0340    CO2 20 (L) 03/03/2025 0237    CO2 20 01/14/2025 2300    CO2 23 07/21/2024 2300    BUN 26.4 (H) 03/04/2025 0340    BUN 25.9 (H) 03/03/2025 0237    BUN 46 (H) 01/14/2025 2300    BUN 22 07/21/2024 2300    CREATININE 2.35 (H) 03/04/2025 0340     CREATININE 2.17 (H) 03/03/2025 0237    CREATININE 2.11 (H) 01/14/2025 2300    CREATININE 1.25 07/21/2024 2300    CALCIUM 8.1 (L) 03/04/2025 0340    CALCIUM 8.9 03/03/2025 0237    CALCIUM 9.3 01/14/2025 2300    CALCIUM 9.1 07/21/2024 2300    PHOS 3.3 03/04/2025 0340            Component Value Date/Time    WBC 9.21 03/04/2025 0340    WBC 10.89 03/03/2025 0437    WBC 24.83 02/28/2025 1123    WBC 6.86 09/09/2024 0120    HGB 11.2 (L) 03/04/2025 0340    HGB 11.1 (L) 03/03/2025 0437    HCT 33.4 (L) 03/04/2025 0340    HCT 33.8 (L) 03/03/2025 0437     03/04/2025 0340     03/03/2025 0437         CT Head Without Contrast   Final Result      No acute intracranial process identified.         Electronically signed by: Ky Reddy   Date:    03/03/2025   Time:    15:30      US Retroperitoneal Complete   Final Result      Simple cyst of the left kidney.      Changes consistent with thickening of the wall of the urinary bladder.         Electronically signed by: Tyrese Díaz MD   Date:    03/03/2025   Time:    08:59      CT Chest Abdomen Pelvis Without Contrast (XPD)   Final Result      Extensive urinary bladder wall thickening seen diffusely with inflammatory changes seen around the urinary bladder consistent with acute cystitis.  This is causing secondary obstructive changes and secondary bilateral hydronephrosis and hydroureter mild      Right lower lobe atelectasis      Degenerative and postsurgical changes in the lower lumbar spine         Electronically signed by: Raji Muhammad   Date:    02/28/2025   Time:    14:00      X-Ray Chest AP Portable   Final Result      No acute cardiopulmonary process identified.         Electronically signed by: Jose J Do   Date:    02/28/2025   Time:    13:21          Assessment / Plan:   DWAINE probably secondary to ATN secondary to UTI   Underlying chronic kidney disease stage 3b to stage IV probably secondary diabetic nephrosclerosis   Metabolic acidosis secondary to  above  Diabetes mellitus type 2   Hypertension   Hyperlipidemia   RSV positive   UTI   Previous stroke   History of coronary artery disease  Incontinent of urine and the bowels.  Anemia    Recommendations  Overall clinically he looks better.  Continue present medical management.  It is noted that he did receive some cefepime which may have added to patient's some confusion problem.  Although he did come with confusion or problem before received any cefepime.         [1]   Current Facility-Administered Medications   Medication Dose Route Frequency Provider Last Rate Last Admin    acetaminophen tablet 650 mg  650 mg Oral Q6H PRN Jessica Martinez FNP        aspirin EC tablet 81 mg  81 mg Oral Daily Petty Martell MD   81 mg at 03/04/25 0800    atorvastatin tablet 80 mg  80 mg Oral Daily Petty Martell MD   80 mg at 03/04/25 0800    cefTRIAXone injection 2 g  2 g Intravenous Q24H Frank Awad MD   2 g at 03/03/25 1228    dextrose 10% bolus 250 mL 250 mL  250 mL Intravenous bolus from bag Frank Awad MD        dextrose 5 % and 0.45 % NaCl infusion   Intravenous Continuous PRN Elida Felder  mL/hr at 03/01/25 1758 Rate Verify at 03/01/25 1758    dextrose 5 % and 0.45 % NaCl infusion   Intravenous Continuous Jessica Martinez FNP 75 mL/hr at 03/04/25 0611 Rate Verify at 03/04/25 0611    dextrose 50% injection 12.5 g  12.5 g Intravenous PRN Elida Felder, FNP        dextrose 50% injection 12.5 g  12.5 g Intravenous PRN Emigdio Hayes Jr., MD, FCCP   12.5 g at 03/02/25 1945    dextrose 50% injection 25 g  25 g Intravenous PRN Elida Felder, FNP   25 g at 03/02/25 2241    dextrose 50% injection 25 g  25 g Intravenous PRN Emigdio Hayes Jr., MD, FCCP        enoxaparin injection 50 mg  1 mg/kg Subcutaneous Q24H Frank Awad MD   50 mg at 03/03/25 1200    ezetimibe tablet 10 mg  10 mg Oral Daily Petty Martell MD   10 mg at 03/04/25 0800    glucagon (human recombinant) injection 1 mg  1 mg  Intramuscular PRN Emigdio Hayes Jr., MD, FCCP        glucose chewable tablet 16 g  16 g Oral PRN Emigdio Hayes Jr., MD, FCCP        glucose chewable tablet 24 g  24 g Oral PRN Emigdio Hayes Jr., MD, FCCP        haloperidol lactate injection 5 mg  5 mg Intravenous Q4H PRN Frank Awad MD   5 mg at 03/02/25 2035    insulin aspart U-100 injection 0-15 Units  0-15 Units Subcutaneous QID (AC + HS) PRN Emigdio Hayes Jr., MD, FCCP   3 Units at 03/04/25 0511    insulin aspart U-100 injection 0-5 Units  0-5 Units Subcutaneous Q6H PRN Blake Herndon MD        [START ON 3/5/2025] insulin glargine U-100 (Lantus) injection 15 Units  15 Units Subcutaneous QHS Frank Awad MD        magnesium sulfate 2g in water 50mL IVPB (premix)  2 g Intravenous Once Eugenio Mario ANP 25 mL/hr at 03/04/25 1057 2 g at 03/04/25 1057    metoprolol injection 5 mg  5 mg Intravenous Q5 Min PRN Frank Awad MD   5 mg at 03/02/25 1411    metoprolol tartrate (LOPRESSOR) tablet 50 mg  50 mg Oral BID Eugenio Mario ANP        mupirocin 2 % ointment   Nasal BID Elida Felder, FNP   Given at 03/04/25 0800    ondansetron injection 4 mg  4 mg Intravenous Q6H PRN Petty Martell MD        pantoprazole EC tablet 40 mg  40 mg Oral Daily Petty Martell MD   40 mg at 03/04/25 0755    potassium chloride 10 mEq in 100 mL IVPB  40 mEq Intravenous PRN Petty Martell MD        And    potassium chloride 10 mEq in 100 mL IVPB  10 mEq Intravenous PRN Petty Martell MD        And    potassium chloride 10 mEq in 100 mL IVPB  10 mEq Intravenous PRN Petty Martell MD        potassium, sodium phosphates 280-160-250 mg packet 1 packet  1 packet Oral Once Frank Awad MD        sodium chloride 0.9% flush 10 mL  10 mL Intravenous PRN Bradford, Elida, FNP       [2]   Medications Prior to Admission   Medication Sig Dispense Refill Last Dose/Taking    alcohol swabs (BD ALCOHOL SWABS) PadM Apply 1 each topically once daily. 200 each 12     alfuzosin  (UROXATRAL) 10 mg Tb24 Take 1 tablet (10 mg total) by mouth daily with breakfast. 90 tablet 3     aspirin (ECOTRIN) 81 MG EC tablet Take 81 mg by mouth once daily.       b complex vitamins capsule Take 1 capsule by mouth once daily.       blood glucose control, low (TRUE METRIX LEVEL 1) Soln 1 Bottle by Misc.(Non-Drug; Combo Route) route once daily. 1 each 12     blood-glucose meter (TRUE METRIX AIR GLUCOSE METER) Misc 1 each by Misc.(Non-Drug; Combo Route) route once daily. 1 each 0     blood-glucose sensor (DEXCOM G7 SENSOR) Carmen 1 each by Misc.(Non-Drug; Combo Route) route once daily. 3 each 12     ezetimibe (ZETIA) 10 mg tablet Take 1 tablet (10 mg total) by mouth once daily. 90 tablet 3     finasteride (PROSCAR) 5 mg tablet Take 1 tablet (5 mg total) by mouth once daily. 30 tablet 11     lancets (TRUEPLUS LANCETS) 33 gauge Misc 1 lancet by Misc.(Non-Drug; Combo Route) route once daily. 200 each 12     metFORMIN (GLUCOPHAGE) 1000 MG tablet Take 1 tablet (1,000 mg total) by mouth 2 (two) times a day. 180 tablet 3     miconazole NITRATE 2 % (MICOTIN) 2 % top powder Apply topically 2 (two) times daily. 43 g 0     mirtazapine (REMERON) 15 MG tablet Take 1 tablet (15 mg total) by mouth once daily. 90 tablet 3     pantoprazole (PROTONIX) 40 MG tablet Take 1 tablet (40 mg total) by mouth once daily. 90 tablet 3     rosuvastatin (CRESTOR) 40 MG Tab Take 1 tablet (40 mg total) by mouth once daily. 90 tablet 3     SITagliptin phosphate (JANUVIA) 50 MG Tab Take 1 tablet (50 mg total) by mouth once daily. 90 tablet 0     TRUE METRIX GLUCOSE TEST STRIP Strp TEST BLOOD SUGAR EVERY  strip 3     vitamin D (VITAMIN D3) 1000 units Tab Take 1,000 Units by mouth once daily.       zinc oxide 20 % ointment Apply topically 2 (two) times a day.

## 2025-03-04 NOTE — PROGRESS NOTES
[facility] Hospital Medicine Progress Note        Chief Complaint: Inpatient Follow-up for     HPI:   64-year-old gentleman with DM type 2, CKD stage 3, CVA 09/2024, chronic indwelling Gandara who was admitted after he presented with complaints of nausea with several episodes of vomiting and nonbloody diarrhea.  He denied having any chest pain, abdominal pain, SOB, cough, sputum production, headache, numbness, weakness, dizziness/lightheadedness or loss of consciousness.  On admission his labs showed leukocytosis of 24.8, normocytic anemia with H/H of 11.8/36.9, hyperkalemia of 5.5, CO2 15 with anion gap 20, BUN/creatinine 66.9/3.69, negative troponins, lactic acid of 3.3, beta hydroxybutyrate of 2.9.  UA showed 3+ protein, 2+ glucose, 1+ ketones, 3+ blood, leukocyte esterase, WBCs as well as bacteria.  Blood cultures and urine culture were sent off.  CT C/A/P was done which showed extensive urinary bladder wall thickening with inflammatory changes around urinary bladder consistent with acute cystitis causing secondary obstructive changes and secondary B/L hydronephrosis and hydroureter, RLL atelectasis.  Admitted to ICU on insulin infusion per DKA protocol.  Started on broad-spectrum antibiotics.  Given IVF resuscitation.  Urine culture growing GNR.  Noted to have low-grade fevers overnight on 03/01.  Insulin infusion discontinued and patient is started on long-acting insulin.  Downgraded to HM. Troponins increased to 0.114; trended q8 & Echo ordered. CIS consulted. FD Lovenox ordered. Urology consulted for hydronephrosis; will follow recommendations. Noted to have hypoglycemic episodes overnight 03/03. On IV D5/NS. Tachycardia improved. Trop increased to 0.675 overnight. Echo showing EF 35%. BUN/Cr improved at 25.9/2.17; remains on IV D5/NS. UCX growing Proteus. Will DC Cefepime in case it is causing AMS & start IV Rocephin. CT Head ordered which was unremarkable.     Interval Hx:    Today, patient denied any new complaints. Decreased Lantus to 15 units nightly till PO intake improves. BUN/Cr stable at 26.4/2.35.    Case was discussed with patient's nurse on the floor.    Objective/physical exam:  General: alert male lying comfortably in bed, in no acute distress  HENT: oral and oropharyngeal mucosa moist, pink, with no erythema or exudates, no ear pain or discharge  Neck: normal neck movement, no lymph nodes or swellings, no JVD or Carotid bruit  Respiratory: clear breathing sounds bilaterally, no crackles, rales, ronchi or wheezes  Cardiovascular: clear S1 and S2, no murmurs, rubs or gallops  Peripheral Vascular: no lesions, ulcers or erosions, normal peripheral pulses and no pedal edema  Gastrointestinal: soft, non-tender, non-distended abdomen, no guarding, rigidity or rebound tenderness, normal bowel sounds  Integumentary: normal skin color, no rashes or lesions  Neuro: AAO x 0; moving all extremities spontaneously; unable to test motor strength or sensation    VITAL SIGNS: 24 HRS MIN & MAX LAST   Temp  Min: 97.5 °F (36.4 °C)  Max: 98.8 °F (37.1 °C) 98.3 °F (36.8 °C)   BP  Min: 85/66  Max: 129/81 129/81   Pulse  Min: 85  Max: 121  104   Resp  Min: 9  Max: 31 19   SpO2  Min: 96 %  Max: 98 % 98 %     I have reviewed the following labs:  Recent Labs   Lab 03/02/25  0320 03/03/25  0437 03/04/25  0340   WBC 12.25* 10.89 9.21   RBC 2.80* 3.57* 3.55*   HGB 8.7* 11.1* 11.2*   HCT 27.0* 33.8* 33.4*   MCV 96.4* 94.7* 94.1*   MCH 31.1* 31.1* 31.5*   MCHC 32.2* 32.8* 33.5   RDW 14.2 13.8 14.0    202 228   MPV 11.5* 11.5* 11.3*     Recent Labs   Lab 02/28/25  1240 02/28/25  1621 03/01/25  0146 03/01/25  0801 03/02/25  0320 03/02/25  0720 03/03/25  0237 03/04/25  0340   NA  --    < > 141 141 140  --  138 139   K  --    < > 4.3 4.2 3.9  --  4.0 3.6   CL  --    < > 117* 117* 113*  --  107 110*   CO2  --    < > 16* 17* 18*  --  20* 20*   BUN  --    < > 48.8* 46.0* 38.1*  --  25.9* 26.4*    CREATININE  --    < > 2.62* 2.44* 2.28*  --  2.17* 2.35*   CALCIUM  --    < > 7.9* 8.1* 8.4*  --  8.9 8.1*   PH 7.300  --   --   --   --  7.460*  --   --    MG  --    < > 1.30* 1.90 1.60  --   --  1.90   ALBUMIN  --   --  2.7*  --   --   --  3.1* 2.7*   ALKPHOS  --   --  44  --   --   --  54 55   ALT  --   --  19  --   --   --  32 27   AST  --   --  17  --   --   --  35* 24   BILITOT  --   --  0.3  --   --   --  0.5 0.3    < > = values in this interval not displayed.     Assessment/Plan:  DKA-resolved  AMS 2/2 Delirium vs Cefepime induced  Tachycardia possibly 2/2 NSTEMI vs Intravascular Depletion  Troponinemia 2/2 NSTEMI type I vs type II  Lactic Acidemia 2/2 Intravascular Depletion  UTI  Leukocytosis 2/2 above  B/L Hydronephrosis & Hydroureter  DWAINE on CKD III 2/2 Prerenal vs Postrenal Azotemia  RSV antigen positive  Acute gastroenteritis  Prior CVA 09/2024  Normocytic anemia     Reporting no new complaints   Troponins uptrended overnight 03/03  Echo showing EF 35%  On FD Lovenox BID  CIS consulted for troponinemia; follow recommendations  Nephrology recommended for renal clearance for LHC  CT Head negative  Consulted Urology for B/L hydronephrosis associated with bladder wall thickening  On IV Rocephin  On IV D5/NS  Urine culture growing Proteus  Blood cultures negative   On Lantus 15 u QHS, on ISS LD   Continued on home aspirin, atorvastatin, Zetia, mirtazapine, Protonix  PT/OT consulted; recommending moderate intensity therapy    VTE prophylaxis: Lovenox    Patient condition:  Stable    Anticipated discharge and Disposition:     Pending    All diagnosis and differential diagnosis have been reviewed; assessment and plan has been documented; I have personally reviewed the labs and test results that are presently available; I have reviewed the patients medication list; I have reviewed the consulting providers response and recommendations. I have reviewed or attempted to review medical records based upon their  availability    All of the patient's questions have been  addressed and answered. Patient's is agreeable to the above stated plan. I will continue to monitor closely and make adjustments to medical management as needed.    Portions of this note dictated using EMR integrated voice recognition software, and may be subject to voice recognition errors not corrected at proofreading. Please contact writer for clarification if needed.     Radiology:  I have personally reviewed the following imaging and agree with the radiologist.     CT Head Without Contrast  Narrative: EXAMINATION:  CT HEAD WITHOUT CONTRAST    CLINICAL HISTORY:  Mental status change, unknown cause;    TECHNIQUE:  CT images of the head without IV contrast. Axial, coronal and sagittal images reviewed. Dose length product 907 mGycm. Automatic exposure control, adjustment of mA/kV or iterative reconstruction technique used to limit radiation dose.    COMPARISON:  CT 09/29/2024    FINDINGS:  Extra-axial spaces/ventricular system: Normal for age.    Intracranial hemorrhage: None identified.    Cerebral parenchyma: No acute large vessel territory infarct or mass effect identified. Remote right basal ganglia lacunar infarct.  Mild to moderate chronic small vessel ischemic changes have similar appearance.    Vascular system: No hyperdense vessel appreciated. Bilateral carotid siphon and distal vertebral artery calcifications.    Cerebellum: Normal.    Sella: Normal.    Included paranasal sinuses and mastoid air cells: Opacified right mastoid air cells.    Visualized orbits: Normal.    Scalp/Calvarium: No depressed skull fracture.  Impression: No acute intracranial process identified.    Electronically signed by: Ky Reddy  Date:    03/03/2025  Time:    15:30  US Retroperitoneal Complete  Narrative: EXAMINATION:  US RETROPERITONEAL COMPLETE    CLINICAL HISTORY:  reassess bilateral hydronephrosis;  Disorder of kidney and ureter,  unspecified    COMPARISON:  09/08/2024    FINDINGS:  Right kidney is poorly demonstrated due to body habitus.  It measures 10.6 x 5.5 x 5.3 cm.  No focal lesions are noted there is no hydronephrosis.    There is flow present the proximal IVC.  Aorta is incompletely visualized due to overlying bowel gas.  There is a Gandara catheter in place.  The wall of the urinary bladder appears thickened measuring 1.3 cm however the bladder is collapsed.    The left kidney measures 9.8 by 4.6 x 4.8 cm.  There is a simple cyst present measuring 1.5 x 1.7 x 1.4 cm.  There is no hydronephrosis.  Impression: Simple cyst of the left kidney.    Changes consistent with thickening of the wall of the urinary bladder.    Electronically signed by: Tyrese Díaz MD  Date:    03/03/2025  Time:    08:59      Frank Awad MD  Department of Hospital Medicine   [facility]   03/04/2025

## 2025-03-04 NOTE — PROGRESS NOTES
[facility]    Cardiology  Progress Note    Patient Name: Froy Barragan  MRN: [id number]  Admission Date: 2/28/2025  Hospital Length of Stay: 4 days  Code Status: No Order   Attending Provider: Frank Awad MD   Consulting Provider: ALISA Villegas  Primary Care Physician: George Bran MD  Principal Problem:<principal problem not specified>    Patient information was obtained from patient, past medical records, and ER records.     Subjective:     Chief Complaint/Reason for Consult: NSTEMI     HPI: Mr. Barragan is a 65 y/o male who is known to [facility], Dr. Johns. The patient presented to [facility] on 2.28.25 with c/o N/V/D. The symptoms started the day prior and progressively worsened. Initial workup in the ER revealed: WBC 24.83, H&H 11.8/36.9, , BUN/Crea 53.0/2.66, Mg 1.3, Troponin 0.037, Lactic Acid 3.3, + RSV, UA with +3 Protein, +2 Glucose, +1 Ketones, +3 Blood, Leuk Estrace and WBC as well as Bacteria and an Abnormal CT/CTA regarding his Bladder (See Report). He was noted to be in DKA and placed on an Insulin Infusion per Protocol and BCX and Urine CX were drawn. He was started on Broad Spectrum Abx and Admitted to ICU for further workup and management. He has since been downgraded to . He was found to be confused and disoriented and had a Troponin Level Drawn which resulted as 0.116 and for this reason [facility] was consulted.     3.4.25: NAD. ST 110s. Remains off Pressors.     PMH: CAD/Stents, DM II, HTN, ICMO/EF 35%, HLD, Anemia of Chronic Disease, Cervical Myelopathy, Cobalamin Deficiency, GERD, Vitamin D Deficiency, Paraparesis, CKD Stage IIIa, SHERMAN/Bilaterally Moderate, CVA (2009), Chronic Indwelling Catheter  PSH: Spine Surgery, Angiogram, Back Surgery  Family History: Reviewed and Unremarkable for Heart Disease  Social History: Denies Illicit Drug, ETOH and Tobacco Use     Previous Cardiac Diagnostics:   ECHO 3.2.25:  Left Ventricle: The left ventricle is normal  in size. Normal wall thickness. There is moderately reduced systolic function. Ejection fraction is approximately 35%.  Right Ventricle: The right ventricle is normal in size. Systolic function is reduced.  Aortic Valve: There is mild aortic regurgitation.  Mitral Valve: There is mild regurgitation.  Tricuspid Valve: There is mild regurgitation.  IVC/SVC: Normal venous pressure at 3 mmHg.  Pericardium: There is no pericardial effusion.    ECHO 2.27.25:  The study quality is average.   The left ventricle is normal in size. Global left ventricular systolic function is moderately decreased.  The left ventricular ejection fraction is 35%. Left ventricular diastolic function is normal. GLS AVG= -11.4%.  Right ventricular systolic function is moderately decreased.  Mild calcification of the aortic valve is noted with adequate cuspal excursion.   Mild to moderate (1-2+) aortic regurgitation. Mild (1+) tricuspid regurgitation. Mild (1+) mitral regurgitation.   The pulmonary artery systolic pressure is 19.8 mmHg.     Carotid 9.26.24:  Bilateral ICAs Patent with Less then 50% Stenosis   R Vertebral Artery was not well Visualized  L Vertebral Artery was patent with Antegrade Flow    ECHO 9.15.24:  Limited to assess function. EF visually appears normal at 55-60%, however heart rate is in the 130s. No pericardial effusion noted. Possibly repeat echo if necessary once heart rate is controlled.     MPI 7.22.21:  This is a normal perfusion study, no perfusion defects noted. There is no evidence of ischemia.   This scan is suggestive of low risk for future cardiovascular events.   The left ventricular cavity is noted to be normal on the stress study. The left ventricular ejection fraction was calculated to be 45% and left ventricular global function is mildly reduced.   The study quality is good.     [facility] 12.6.07:  L Main Normal  LAD - Prox 20%  LCX - 50% Prox LCX at Ostium of OM1  Mid LCX 75%  RCA Patent     Review of patient's  allergies indicates:  No Known Allergies  No current facility-administered medications on file prior to encounter.     Current Outpatient Medications on File Prior to Encounter   Medication Sig    alcohol swabs (BD ALCOHOL SWABS) PadM Apply 1 each topically once daily.    alfuzosin (UROXATRAL) 10 mg Tb24 Take 1 tablet (10 mg total) by mouth daily with breakfast.    aspirin (ECOTRIN) 81 MG EC tablet Take 81 mg by mouth once daily.    b complex vitamins capsule Take 1 capsule by mouth once daily.    blood glucose control, low (TRUE METRIX LEVEL 1) Soln 1 Bottle by Misc.(Non-Drug; Combo Route) route once daily.    blood-glucose meter (TRUE METRIX AIR GLUCOSE METER) Misc 1 each by Misc.(Non-Drug; Combo Route) route once daily.    blood-glucose sensor (DEXCOM G7 SENSOR) Carmen 1 each by Misc.(Non-Drug; Combo Route) route once daily.    ezetimibe (ZETIA) 10 mg tablet Take 1 tablet (10 mg total) by mouth once daily.    finasteride (PROSCAR) 5 mg tablet Take 1 tablet (5 mg total) by mouth once daily.    lancets (TRUEPLUS LANCETS) 33 gauge Misc 1 lancet by Misc.(Non-Drug; Combo Route) route once daily.    metFORMIN (GLUCOPHAGE) 1000 MG tablet Take 1 tablet (1,000 mg total) by mouth 2 (two) times a day.    miconazole NITRATE 2 % (MICOTIN) 2 % top powder Apply topically 2 (two) times daily.    mirtazapine (REMERON) 15 MG tablet Take 1 tablet (15 mg total) by mouth once daily.    pantoprazole (PROTONIX) 40 MG tablet Take 1 tablet (40 mg total) by mouth once daily.    rosuvastatin (CRESTOR) 40 MG Tab Take 1 tablet (40 mg total) by mouth once daily.    SITagliptin phosphate (JANUVIA) 50 MG Tab Take 1 tablet (50 mg total) by mouth once daily.    TRUE METRIX GLUCOSE TEST STRIP Strp TEST BLOOD SUGAR EVERY DAY    vitamin D (VITAMIN D3) 1000 units Tab Take 1,000 Units by mouth once daily.    zinc oxide 20 % ointment Apply topically 2 (two) times a day.     Review of Systems   Unable to perform ROS: Acuity of condition     Objective:      Vital Signs (Most Recent):  Temp: 98.3 °F (36.8 °C) (03/04/25 0400)  Pulse: 104 (03/04/25 0756)  Resp: 19 (03/04/25 0600)  BP: 129/81 (03/04/25 0756)  SpO2: 98 % (03/04/25 0600) Vital Signs (24h Range):  Temp:  [97.5 °F (36.4 °C)-98.8 °F (37.1 °C)] 98.3 °F (36.8 °C)  Pulse:  [] 104  Resp:  [9-31] 19  SpO2:  [96 %-98 %] 98 %  BP: ()/() 129/81   Weight: 53 kg (116 lb 13.5 oz)  Body mass index is 21.37 kg/m².  SpO2: 98 %       Intake/Output Summary (Last 24 hours) at 3/4/2025 0956  Last data filed at 3/4/2025 0745  Gross per 24 hour   Intake 3820.02 ml   Output 775 ml   Net 3045.02 ml     Lines/Drains/Airways       Drain  Duration                  Urethral Catheter 02/28/25 1305 Non-latex 16 Fr. 3 days              Peripheral Intravenous Line  Duration                  Peripheral IV - Single Lumen 02/28/25 2003 20 G Anterior;Left Forearm 3 days         Peripheral IV - Single Lumen 03/02/25 2330 20 G Anterior;Right Forearm 1 day                  Significant Labs:   Chemistries:   Recent Labs   Lab 03/01/25  0146 03/01/25  0801 03/02/25  0320 03/02/25  1021 03/02/25  1906 03/03/25  0237 03/03/25  0939 03/03/25  1713 03/03/25  2149 03/04/25  0340    141 140  --   --  138  --   --   --  139   K 4.3 4.2 3.9  --   --  4.0  --   --   --  3.6   * 117* 113*  --   --  107  --   --   --  110*   CO2 16* 17* 18*  --   --  20*  --   --   --  20*   BUN 48.8* 46.0* 38.1*  --   --  25.9*  --   --   --  26.4*   CREATININE 2.62* 2.44* 2.28*  --   --  2.17*  --   --   --  2.35*   CALCIUM 7.9* 8.1* 8.4*  --   --  8.9  --   --   --  8.1*   BILITOT 0.3  --   --   --   --  0.5  --   --   --  0.3   ALKPHOS 44  --   --   --   --  54  --   --   --  55   ALT 19  --   --   --   --  32  --   --   --  27   AST 17  --   --   --   --  35*  --   --   --  24   GLUCOSE 217* 107 133*  --   --  144*  --   --   --  154*   MG 1.30* 1.90 1.60  --   --   --   --   --   --  1.90   PHOS 3.0 2.1* 2.2*  --   --   --   --   --   --  " 3.3   TROPONINI  --   --   --    < > 0.598* 0.675* 0.503* 0.552* 0.532*  --     < > = values in this interval not displayed.        CBC/Anemia Labs: Coags:    Recent Labs   Lab 03/02/25  0320 03/03/25  0437 03/03/25  0939 03/04/25  0340   WBC 12.25* 10.89  --  9.21   HGB 8.7* 11.1*  --  11.2*   HCT 27.0* 33.8*  --  33.4*    202  --  228   MCV 96.4* 94.7*  --  94.1*   RDW 14.2 13.8  --  14.0   IRON  --   --  99  --    FERRITIN  --   --  322.20*  --     No results for input(s): "PT", "INR", "APTT" in the last 168 hours.     EKG:       Telemetry: ST    Physical Exam  Constitutional:       General: He is not in acute distress.     Appearance: Normal appearance.      Comments: Confused Conversation   HENT:      Head: Normocephalic.      Mouth/Throat:      Mouth: Mucous membranes are dry.   Cardiovascular:      Rate and Rhythm: Normal rate and regular rhythm.      Pulses: Normal pulses.      Heart sounds: Normal heart sounds. No murmur heard.  Pulmonary:      Effort: Pulmonary effort is normal. No respiratory distress.      Breath sounds: Rhonchi present.      Comments: NC O2  Abdominal:      Palpations: Abdomen is soft.   Genitourinary:     Comments: + Indwelling Gandara  Skin:     General: Skin is warm.   Neurological:      General: No focal deficit present.      Mental Status: He is alert.      Comments: Confused Conversation   Psychiatric:         Mood and Affect: Mood normal.       Home Medications:   Medications Ordered Prior to Encounter[1]  Current Schedule Inpatient Medications:   aspirin  81 mg Oral Daily    atorvastatin  80 mg Oral Daily    cefTRIAXone (Rocephin) IV (PEDS and ADULTS)  2 g Intravenous Q24H    enoxparin  1 mg/kg Subcutaneous Q24H    ezetimibe  10 mg Oral Daily    [START ON 3/5/2025] insulin glargine U-100  15 Units Subcutaneous QHS    metoprolol tartrate  25 mg Oral BID    mupirocin   Nasal BID    pantoprazole  40 mg Oral Daily    potassium, sodium phosphates  1 packet Oral Once "     Continuous Infusions:   D5 and 0.45% NaCl   Intravenous Continuous  mL/hr at 03/01/25 1758 Rate Verify at 03/01/25 1758    D5 and 0.45% NaCl   Intravenous Continuous 75 mL/hr at 03/04/25 0611 Rate Verify at 03/04/25 0611     Assessment:   NSTEMI Type II in the Setting of DKA, Dehydration with Possibility of Type I given New CMO  Newly Diagnosed CMO/EF 35%    - ECHO (3.2.25) - LVEF 35%    - ECHO (Sept 2024) - Intact LVEF  AMS due to Delirium   Sinus Tachycardia  DKA - Resolved     - DM II   Hypotension requiring Pressors - Resolved    - Hx of HTN   Lactic Acidosis - Improving (Due to Volume Depletion)  Leukocytosis - Resolved   Bilateral Hydronephrosis/Hydroureter  UTI - POA/Chronic Catheter   DWAINE/CKD IIIa  RSV +  Acute Gastroenteritis  Hx of CVA (9.2024)  Anemia  CAD/Nonobstructive 2007  HLD  SHERMAN/Bilaterally Moderate  Chronic Urinary Retention requiring Indwelling Catheter  Electrolyte Derangements - Hypomagnesemia   No Hx of GIB     Plan:   Continue ASA and Statin   Increase Metoprolol Tartrate to 50mg PO BID with Hold Parameters  No ACEi/ARB/ARNI given DWAINE/CKD IIIa  Plan for LHC when PTS Overall Clinical Condition Improves   Consult Nephrology for Recommendations on LHC/Renal Preparedness   Keep K > 4.0 and Mg  > 2.0  Replete Mg   Labs and EKG in AM: CBC, CMP and Mg     ALISA Villegas  Cardiology  [facility]          [1]   No current facility-administered medications on file prior to encounter.     Current Outpatient Medications on File Prior to Encounter   Medication Sig Dispense Refill    alcohol swabs (BD ALCOHOL SWABS) PadM Apply 1 each topically once daily. 200 each 12    alfuzosin (UROXATRAL) 10 mg Tb24 Take 1 tablet (10 mg total) by mouth daily with breakfast. 90 tablet 3    aspirin (ECOTRIN) 81 MG EC tablet Take 81 mg by mouth once daily.      b complex vitamins capsule Take 1 capsule by mouth once daily.      blood glucose control, low (TRUE METRIX LEVEL 1) Soln 1 Bottle by  Misc.(Non-Drug; Combo Route) route once daily. 1 each 12    blood-glucose meter (TRUE METRIX AIR GLUCOSE METER) Misc 1 each by Misc.(Non-Drug; Combo Route) route once daily. 1 each 0    blood-glucose sensor (DEXCOM G7 SENSOR) Carmen 1 each by Misc.(Non-Drug; Combo Route) route once daily. 3 each 12    ezetimibe (ZETIA) 10 mg tablet Take 1 tablet (10 mg total) by mouth once daily. 90 tablet 3    finasteride (PROSCAR) 5 mg tablet Take 1 tablet (5 mg total) by mouth once daily. 30 tablet 11    lancets (TRUEPLUS LANCETS) 33 gauge Misc 1 lancet by Misc.(Non-Drug; Combo Route) route once daily. 200 each 12    metFORMIN (GLUCOPHAGE) 1000 MG tablet Take 1 tablet (1,000 mg total) by mouth 2 (two) times a day. 180 tablet 3    miconazole NITRATE 2 % (MICOTIN) 2 % top powder Apply topically 2 (two) times daily. 43 g 0    mirtazapine (REMERON) 15 MG tablet Take 1 tablet (15 mg total) by mouth once daily. 90 tablet 3    pantoprazole (PROTONIX) 40 MG tablet Take 1 tablet (40 mg total) by mouth once daily. 90 tablet 3    rosuvastatin (CRESTOR) 40 MG Tab Take 1 tablet (40 mg total) by mouth once daily. 90 tablet 3    SITagliptin phosphate (JANUVIA) 50 MG Tab Take 1 tablet (50 mg total) by mouth once daily. 90 tablet 0    TRUE METRIX GLUCOSE TEST STRIP Strp TEST BLOOD SUGAR EVERY  strip 3    vitamin D (VITAMIN D3) 1000 units Tab Take 1,000 Units by mouth once daily.      zinc oxide 20 % ointment Apply topically 2 (two) times a day.

## 2025-03-05 LAB
ALBUMIN SERPL-MCNC: 2.7 G/DL (ref 3.4–4.8)
ALBUMIN/GLOB SERPL: 0.9 RATIO (ref 1.1–2)
ALP SERPL-CCNC: 56 UNIT/L (ref 40–150)
ALT SERPL-CCNC: 33 UNIT/L (ref 0–55)
ANION GAP SERPL CALC-SCNC: 9 MEQ/L
AST SERPL-CCNC: 31 UNIT/L (ref 5–34)
BACTERIA BLD CULT: NORMAL
BACTERIA BLD CULT: NORMAL
BASOPHILS # BLD AUTO: 0.09 X10(3)/MCL
BASOPHILS NFR BLD AUTO: 1 %
BILIRUB SERPL-MCNC: 0.2 MG/DL
BUN SERPL-MCNC: 23 MG/DL (ref 8.4–25.7)
CALCIUM SERPL-MCNC: 7.9 MG/DL (ref 8.8–10)
CHLORIDE SERPL-SCNC: 111 MMOL/L (ref 98–107)
CO2 SERPL-SCNC: 22 MMOL/L (ref 23–31)
CREAT SERPL-MCNC: 2.43 MG/DL (ref 0.72–1.25)
CREAT/UREA NIT SERPL: 9
EOSINOPHIL # BLD AUTO: 0.26 X10(3)/MCL (ref 0–0.9)
EOSINOPHIL NFR BLD AUTO: 2.7 %
ERYTHROCYTE [DISTWIDTH] IN BLOOD BY AUTOMATED COUNT: 14 % (ref 11.5–17)
GFR SERPLBLD CREATININE-BSD FMLA CKD-EPI: 29 ML/MIN/1.73/M2
GLOBULIN SER-MCNC: 3 GM/DL (ref 2.4–3.5)
GLUCOSE SERPL-MCNC: 96 MG/DL (ref 82–115)
HCT VFR BLD AUTO: 31.7 % (ref 42–52)
HGB BLD-MCNC: 10.3 G/DL (ref 14–18)
IMM GRANULOCYTES # BLD AUTO: 0.03 X10(3)/MCL (ref 0–0.04)
IMM GRANULOCYTES NFR BLD AUTO: 0.3 %
LYMPHOCYTES # BLD AUTO: 1.35 X10(3)/MCL (ref 0.6–4.6)
LYMPHOCYTES NFR BLD AUTO: 14.3 %
MAGNESIUM SERPL-MCNC: 2.5 MG/DL (ref 1.6–2.6)
MCH RBC QN AUTO: 30.8 PG (ref 27–31)
MCHC RBC AUTO-ENTMCNC: 32.5 G/DL (ref 33–36)
MCV RBC AUTO: 94.9 FL (ref 80–94)
MONOCYTES # BLD AUTO: 0.92 X10(3)/MCL (ref 0.1–1.3)
MONOCYTES NFR BLD AUTO: 9.7 %
NEUTROPHILS # BLD AUTO: 6.81 X10(3)/MCL (ref 2.1–9.2)
NEUTROPHILS NFR BLD AUTO: 72 %
NRBC BLD AUTO-RTO: 0 %
PLATELET # BLD AUTO: 220 X10(3)/MCL (ref 130–400)
PMV BLD AUTO: 11 FL (ref 7.4–10.4)
POCT GLUCOSE: 220 MG/DL (ref 70–110)
POCT GLUCOSE: 223 MG/DL (ref 70–110)
POCT GLUCOSE: 315 MG/DL (ref 70–110)
POTASSIUM SERPL-SCNC: 3.3 MMOL/L (ref 3.5–5.1)
PROT SERPL-MCNC: 5.7 GM/DL (ref 5.8–7.6)
RBC # BLD AUTO: 3.34 X10(6)/MCL (ref 4.7–6.1)
SODIUM SERPL-SCNC: 142 MMOL/L (ref 136–145)
WBC # BLD AUTO: 9.46 X10(3)/MCL (ref 4.5–11.5)

## 2025-03-05 PROCEDURE — 93010 ELECTROCARDIOGRAM REPORT: CPT | Mod: ,,, | Performed by: STUDENT IN AN ORGANIZED HEALTH CARE EDUCATION/TRAINING PROGRAM

## 2025-03-05 PROCEDURE — 27000207 HC ISOLATION

## 2025-03-05 PROCEDURE — 85025 COMPLETE CBC W/AUTO DIFF WBC: CPT | Performed by: NURSE PRACTITIONER

## 2025-03-05 PROCEDURE — 36415 COLL VENOUS BLD VENIPUNCTURE: CPT | Performed by: NURSE PRACTITIONER

## 2025-03-05 PROCEDURE — 93005 ELECTROCARDIOGRAM TRACING: CPT

## 2025-03-05 PROCEDURE — 97530 THERAPEUTIC ACTIVITIES: CPT

## 2025-03-05 PROCEDURE — 63600175 PHARM REV CODE 636 W HCPCS: Performed by: STUDENT IN AN ORGANIZED HEALTH CARE EDUCATION/TRAINING PROGRAM

## 2025-03-05 PROCEDURE — 25000003 PHARM REV CODE 250: Performed by: NURSE PRACTITIONER

## 2025-03-05 PROCEDURE — 21400001 HC TELEMETRY ROOM

## 2025-03-05 PROCEDURE — 80053 COMPREHEN METABOLIC PANEL: CPT | Performed by: NURSE PRACTITIONER

## 2025-03-05 PROCEDURE — 63600175 PHARM REV CODE 636 W HCPCS

## 2025-03-05 PROCEDURE — 83735 ASSAY OF MAGNESIUM: CPT | Performed by: NURSE PRACTITIONER

## 2025-03-05 PROCEDURE — 25000003 PHARM REV CODE 250: Performed by: STUDENT IN AN ORGANIZED HEALTH CARE EDUCATION/TRAINING PROGRAM

## 2025-03-05 PROCEDURE — 25000003 PHARM REV CODE 250

## 2025-03-05 RX ADMIN — INSULIN ASPART 4 UNITS: 100 INJECTION, SOLUTION INTRAVENOUS; SUBCUTANEOUS at 11:03

## 2025-03-05 RX ADMIN — CEFTRIAXONE SODIUM 2 G: 2 INJECTION, POWDER, FOR SOLUTION INTRAMUSCULAR; INTRAVENOUS at 02:03

## 2025-03-05 RX ADMIN — ASPIRIN 81 MG: 81 TABLET, COATED ORAL at 09:03

## 2025-03-05 RX ADMIN — INSULIN GLARGINE 15 UNITS: 100 INJECTION, SOLUTION SUBCUTANEOUS at 08:03

## 2025-03-05 RX ADMIN — DEXTROSE AND SODIUM CHLORIDE: 5; 900 INJECTION, SOLUTION INTRAVENOUS at 03:03

## 2025-03-05 RX ADMIN — EZETIMIBE 10 MG: 10 TABLET ORAL at 09:03

## 2025-03-05 RX ADMIN — METOPROLOL TARTRATE 50 MG: 50 TABLET, FILM COATED ORAL at 08:03

## 2025-03-05 RX ADMIN — MUPIROCIN: 20 OINTMENT TOPICAL at 09:03

## 2025-03-05 RX ADMIN — ENOXAPARIN SODIUM 50 MG: 60 INJECTION SUBCUTANEOUS at 02:03

## 2025-03-05 RX ADMIN — METOPROLOL TARTRATE 50 MG: 50 TABLET, FILM COATED ORAL at 09:03

## 2025-03-05 RX ADMIN — ATORVASTATIN CALCIUM 80 MG: 40 TABLET, FILM COATED ORAL at 09:03

## 2025-03-05 RX ADMIN — PANTOPRAZOLE SODIUM 40 MG: 40 TABLET, DELAYED RELEASE ORAL at 09:03

## 2025-03-05 RX ADMIN — INSULIN ASPART 1 UNITS: 100 INJECTION, SOLUTION INTRAVENOUS; SUBCUTANEOUS at 10:03

## 2025-03-05 RX ADMIN — POTASSIUM BICARBONATE 50 MEQ: 977.5 TABLET, EFFERVESCENT ORAL at 10:03

## 2025-03-05 RX ADMIN — INSULIN ASPART 2 UNITS: 100 INJECTION, SOLUTION INTRAVENOUS; SUBCUTANEOUS at 05:03

## 2025-03-05 NOTE — PLAN OF CARE
Spoke to pts sister Chantal, at first she was stating the patient will go home with home health but when therapy recs and current mobility was mentioned, sister stated if need be they want to the patient to go to [facility]. Sister to f/u with the rest of the siblings @12noon and get back with CM.

## 2025-03-05 NOTE — PROGRESS NOTES
[facility] Hospital Medicine Progress Note        Chief Complaint: Inpatient Follow-up for     HPI:   64-year-old gentleman with DM type 2, CKD stage 3, CVA 09/2024, chronic indwelling Gandara who was admitted after he presented with complaints of nausea with several episodes of vomiting and nonbloody diarrhea.  He denied having any chest pain, abdominal pain, SOB, cough, sputum production, headache, numbness, weakness, dizziness/lightheadedness or loss of consciousness.  On admission his labs showed leukocytosis of 24.8, normocytic anemia with H/H of 11.8/36.9, hyperkalemia of 5.5, CO2 15 with anion gap 20, BUN/creatinine 66.9/3.69, negative troponins, lactic acid of 3.3, beta hydroxybutyrate of 2.9.  UA showed 3+ protein, 2+ glucose, 1+ ketones, 3+ blood, leukocyte esterase, WBCs as well as bacteria.  Blood cultures and urine culture were sent off.  CT C/A/P was done which showed extensive urinary bladder wall thickening with inflammatory changes around urinary bladder consistent with acute cystitis causing secondary obstructive changes and secondary B/L hydronephrosis and hydroureter, RLL atelectasis.  Admitted to ICU on insulin infusion per DKA protocol.  Started on broad-spectrum antibiotics.  Given IVF resuscitation.  Urine culture growing GNR.  Noted to have low-grade fevers overnight on 03/01.  Insulin infusion discontinued and patient is started on long-acting insulin.  Downgraded to HM. Troponins increased to 0.114; trended q8 & Echo ordered. [facility] consulted. FD Lovenox ordered. Urology consulted for hydronephrosis; will follow recommendations. Noted to have hypoglycemic episodes overnight 03/03. On IV D5/NS. Tachycardia improved. Trop increased to 0.675 overnight. Echo showing EF 35%. BUN/Cr improved at 25.9/2.17; remains on IV D5/NS. UCX growing Proteus. Will DC Cefepime in case it is causing AMS & start IV Rocephin. CT Head ordered which was unremarkable. Decreased Lantus to  15 units nightly till PO intake improves.     Interval Hx:   Today, patient noted to be awake alert and oriented x3.  He denied any new complaints. BUN/Cr stable at 23/2.43. Will DC IV D5/NS infusion.  Can increase Lantus back to 15 units b.i.d. if patient has good p.o. intake.    Case was discussed with patient's nurse on the floor.    Objective/physical exam:  General: alert male lying comfortably in bed, in no acute distress  HENT: oral and oropharyngeal mucosa moist, pink, with no erythema or exudates, no ear pain or discharge  Neck: normal neck movement, no lymph nodes or swellings, no JVD or Carotid bruit  Respiratory: clear breathing sounds bilaterally, no crackles, rales, ronchi or wheezes  Cardiovascular: clear S1 and S2, no murmurs, rubs or gallops  Peripheral Vascular: no lesions, ulcers or erosions, normal peripheral pulses and no pedal edema  Gastrointestinal: soft, non-tender, non-distended abdomen, no guarding, rigidity or rebound tenderness, normal bowel sounds  Integumentary: normal skin color, no rashes or lesions  Neuro: AAO x 0; moving all extremities spontaneously; unable to test motor strength or sensation    VITAL SIGNS: 24 HRS MIN & MAX LAST   Temp  Min: 97.3 °F (36.3 °C)  Max: 97.8 °F (36.6 °C) 97.8 °F (36.6 °C)   BP  Min: 107/75  Max: 150/93 109/70   Pulse  Min: 71  Max: 100  75   Resp  Min: 6  Max: 23 18   SpO2  Min: 98 %  Max: 100 % 99 %     I have reviewed the following labs:  Recent Labs   Lab 03/03/25  0437 03/04/25  0340 03/05/25  0412   WBC 10.89 9.21 9.46   RBC 3.57* 3.55* 3.34*   HGB 11.1* 11.2* 10.3*   HCT 33.8* 33.4* 31.7*   MCV 94.7* 94.1* 94.9*   MCH 31.1* 31.5* 30.8   MCHC 32.8* 33.5 32.5*   RDW 13.8 14.0 14.0    228 220   MPV 11.5* 11.3* 11.0*     Recent Labs   Lab 02/28/25  1240 02/28/25  1621 03/02/25  0320 03/02/25  0720 03/03/25  0237 03/04/25  0340 03/05/25  0412   NA  --    < > 140  --  138 139 142   K  --    < > 3.9  --  4.0 3.6 3.3*   CL  --    < > 113*  --   107 110* 111*   CO2  --    < > 18*  --  20* 20* 22*   BUN  --    < > 38.1*  --  25.9* 26.4* 23.0   CREATININE  --    < > 2.28*  --  2.17* 2.35* 2.43*   CALCIUM  --    < > 8.4*  --  8.9 8.1* 7.9*   PH 7.300  --   --  7.460*  --   --   --    MG  --    < > 1.60  --   --  1.90 2.50   ALBUMIN  --    < >  --   --  3.1* 2.7* 2.7*   ALKPHOS  --    < >  --   --  54 55 56   ALT  --    < >  --   --  32 27 33   AST  --    < >  --   --  35* 24 31   BILITOT  --    < >  --   --  0.5 0.3 0.2    < > = values in this interval not displayed.     Assessment/Plan:  DKA-resolved  AMS 2/2 Delirium vs Cefepime induced  Tachycardia possibly 2/2 NSTEMI vs Intravascular Depletion  Troponinemia 2/2 NSTEMI type I vs type II  Lactic Acidemia 2/2 Intravascular Depletion  UTI  Leukocytosis 2/2 above  B/L Hydronephrosis & Hydroureter  DWAINE on CKD III 2/2 Prerenal vs Postrenal Azotemia  RSV antigen positive  Acute gastroenteritis  Prior CVA 09/2024  Normocytic anemia     Reporting no new complaints; noted to be much more awake and oriented today  Troponins uptrended overnight 03/03  Echo showing EF 35%  On FD Lovenox BID  [facility] consulted for troponinemia; follow recommendations  Nephrology recommended for renal clearance for [facility]  CT Head negative  Consulted Urology for B/L hydronephrosis associated with bladder wall thickening; signed  On IV Rocephin  Urine culture growing Proteus  Blood cultures negative   On Lantus 15 u QHS; increase to b.i.d. if patient's p.o. intake improves  On ISS LD   Continued on home aspirin, atorvastatin, Zetia, mirtazapine, Protonix  PT/OT consulted; recommending moderate intensity therapy    VTE prophylaxis: Lovenox    Patient condition:  Stable    Anticipated discharge and Disposition:     Pending    All diagnosis and differential diagnosis have been reviewed; assessment and plan has been documented; I have personally reviewed the labs and test results that are presently available; I have reviewed the patients medication  list; I have reviewed the consulting providers response and recommendations. I have reviewed or attempted to review medical records based upon their availability    All of the patient's questions have been  addressed and answered. Patient's is agreeable to the above stated plan. I will continue to monitor closely and make adjustments to medical management as needed.    Portions of this note dictated using EMR integrated voice recognition software, and may be subject to voice recognition errors not corrected at proofreading. Please contact writer for clarification if needed.     Radiology:  I have personally reviewed the following imaging and agree with the radiologist.     CT Head Without Contrast  Narrative: EXAMINATION:  CT HEAD WITHOUT CONTRAST    CLINICAL HISTORY:  Mental status change, unknown cause;    TECHNIQUE:  CT images of the head without IV contrast. Axial, coronal and sagittal images reviewed. Dose length product 907 mGycm. Automatic exposure control, adjustment of mA/kV or iterative reconstruction technique used to limit radiation dose.    COMPARISON:  CT 09/29/2024    FINDINGS:  Extra-axial spaces/ventricular system: Normal for age.    Intracranial hemorrhage: None identified.    Cerebral parenchyma: No acute large vessel territory infarct or mass effect identified. Remote right basal ganglia lacunar infarct.  Mild to moderate chronic small vessel ischemic changes have similar appearance.    Vascular system: No hyperdense vessel appreciated. Bilateral carotid siphon and distal vertebral artery calcifications.    Cerebellum: Normal.    Sella: Normal.    Included paranasal sinuses and mastoid air cells: Opacified right mastoid air cells.    Visualized orbits: Normal.    Scalp/Calvarium: No depressed skull fracture.  Impression: No acute intracranial process identified.    Electronically signed by: Ky Reddy  Date:    03/03/2025  Time:    15:30  US Retroperitoneal Complete  Narrative:  EXAMINATION:  US RETROPERITONEAL COMPLETE    CLINICAL HISTORY:  reassess bilateral hydronephrosis;  Disorder of kidney and ureter, unspecified    COMPARISON:  09/08/2024    FINDINGS:  Right kidney is poorly demonstrated due to body habitus.  It measures 10.6 x 5.5 x 5.3 cm.  No focal lesions are noted there is no hydronephrosis.    There is flow present the proximal IVC.  Aorta is incompletely visualized due to overlying bowel gas.  There is a Gandara catheter in place.  The wall of the urinary bladder appears thickened measuring 1.3 cm however the bladder is collapsed.    The left kidney measures 9.8 by 4.6 x 4.8 cm.  There is a simple cyst present measuring 1.5 x 1.7 x 1.4 cm.  There is no hydronephrosis.  Impression: Simple cyst of the left kidney.    Changes consistent with thickening of the wall of the urinary bladder.    Electronically signed by: Tyrese Díaz MD  Date:    03/03/2025  Time:    08:59      Frank Awad MD  Department of Hospital Medicine   [facility]   03/05/2025

## 2025-03-05 NOTE — PROGRESS NOTES
Nephrology consult follow up note    HPI:      Froy Barragan is a 64 y.o. male has multiple medical problems and has been admitted for UTI, weakness, nausea vomiting diarrhea.  He is also found to have RSV positive.  Patient is nonverbal at present time and does not give any history but the nurses report the intermittently he gets agitation anal and he has been given Haldol.  He does not follow any command and does not give any answers to any questions.     I spoke to patient's sister Ms. Chantal Ricardo who told me that patient has had stroke in the past and has been getting some physical therapy at home.  He lives alone.  He has sitters come in the morning for 4-5 hours and again in the evening and they take care of all his meals.  He has no control on his bowels or bladder.  He uses depends.  He does have indwelling Gandara catheter because of the urinary incontinence.  He is able to take few steps on his own and also has intermittent understand to the questions and follows command.  He has 3 sisters who help him with his all business.  He has no wife or children.     His other medical problems include type 2 diabetes mellitus, hyperlipidemia, coronary artery disease, congestive heart failure, GERD, elevated PSA and urinary retention and has chronic indwelling Gandara catheter placed by Dr. Tyson Berry.  No history of kidney stones.  Now admitted for UTI and RSV.    Interval history:     03/05/2025  No acute events overnight.  No new complaints.  No chest pain, shortness of breath, abdominal pain, nausea, vomiting, or lower extremity edema.     Review of Systems:     Comprehensive 10pt ROS negative except as noted per history.    Past medical, family, surgical, and social history reviewed and unchanged from initial consult note.     Objective:       VITAL SIGNS: 24 HR MIN & MAX LAST    Temp  Min: 97.3 °F (36.3 °C)  Max: 97.8 °F (36.6 °C)  97.8 °F (36.6 °C)        BP  Min: 107/75  Max: 150/93   109/70     Pulse  Min: 71  Max: 100  75     Resp  Min: 6  Max: 23  18    SpO2  Min: 98 %  Max: 100 %  99 %      GEN: Chronically ill appearing male in NAD  CV: RRR +S1,S2 without murmur  PULM: CTAB, unlabored  ABD: Soft, NT/ND abdomen with NABS  EXT: No cyanosis or edema  SKIN: Warm and dry  PSYCH: Awake, alert and answering questions appropriately.  Does seem to have underlying muscle disability, and has repetitive speech  Dialysis access:  No dialysis access            Component Value Date/Time     03/05/2025 0412     03/04/2025 0340     01/14/2025 2300     (H) 07/21/2024 2300    K 3.3 (L) 03/05/2025 0412    K 3.6 03/04/2025 0340    K 4.8 01/14/2025 2300    K 4.0 07/21/2024 2300    CO2 22 (L) 03/05/2025 0412    CO2 20 (L) 03/04/2025 0340    CO2 20 01/14/2025 2300    CO2 23 07/21/2024 2300    BUN 23.0 03/05/2025 0412    BUN 26.4 (H) 03/04/2025 0340    BUN 46 (H) 01/14/2025 2300    BUN 22 07/21/2024 2300    CREATININE 2.43 (H) 03/05/2025 0412    CREATININE 2.35 (H) 03/04/2025 0340    CREATININE 2.11 (H) 01/14/2025 2300    CREATININE 1.25 07/21/2024 2300    CALCIUM 7.9 (L) 03/05/2025 0412    CALCIUM 8.1 (L) 03/04/2025 0340    CALCIUM 9.3 01/14/2025 2300    CALCIUM 9.1 07/21/2024 2300    PHOS 3.3 03/04/2025 0340            Component Value Date/Time    WBC 9.46 03/05/2025 0412    WBC 9.21 03/04/2025 0340    WBC 24.83 02/28/2025 1123    WBC 6.86 09/09/2024 0120    HGB 10.3 (L) 03/05/2025 0412    HGB 11.2 (L) 03/04/2025 0340    HCT 31.7 (L) 03/05/2025 0412    HCT 33.4 (L) 03/04/2025 0340     03/05/2025 0412     03/04/2025 0340         Imaging reviewed      Assessment / Plan:       There are no hospital problems to display for this patient.      DWAINE probably secondary to ATN secondary to UTI   Underlying chronic kidney disease stage 3b to stage IV probably secondary diabetic nephrosclerosis - baseline creatinine 2-2.5  Metabolic acidosis secondary to above  Diabetes mellitus type 2    Hypertension   Hyperlipidemia   RSV positive   UTI   Previous stroke   History of coronary artery disease  Incontinent of urine and the bowels.  Anemia    Plan:  Renal function remains stable, likely at baseline.  I did discuss in detail with patient about potential left heart catheterization and possible worsening of renal function after contrast exposure.  He states that he would prefer to not have left heart catheterization done, and risk further acute kidney injury or needing hemodialysis.  If he is not planning to do left heart catheterization I would say his renal function is at baseline and he is okay to discharge from Nephrology standpoint.  He can follow up with us in clinic in a couple of weeks.  If Cardiology would like to do left heart catheterization while inpatient, and are able to convince him or his caregivers to do heart catheterization I would recommend giving him IV fluids at 100 mL/hr for 5 hours before and after procedure.    Derrell Ledbetter DO  Nephrology  [facility]  Clinic number: [phone]      Note was created with the assistance of electronic Dictation Services.  Note was reviewed to decrease errors, however, these may still be present.  Please contact me about questions or concerns with the dictation.

## 2025-03-05 NOTE — PT/OT/SLP PROGRESS
Physical Therapy Treatment    Patient Name:  Froy Barragan   MRN:  [id number]    Recommendations:     Discharge therapy intensity: Moderate Intensity Therapy   Discharge Equipment Recommendations: to be determined by next level of care  Barriers to discharge: Impaired mobility    Assessment:     Froy Barragan is a 64 y.o. male admitted with a medical diagnosis of  Nausea and vomiting ,DKA,AMS, and weakness He presents with the following impairments/functional limitations: weakness, impaired self care skills, impaired endurance, impaired functional mobility     Rehab Prognosis: Good; patient would benefit from acute skilled PT services to address these deficits and reach maximum level of function.    Recent Surgery: * No surgery found *      Plan:     During this hospitalization, patient would benefit from acute PT services 3 x/week to address the identified rehab impairments via therapeutic activities, therapeutic exercises and progress toward the following goals:    Plan of Care Expires:  04/03/25    Subjective     Chief Complaint:   Patient/Family Comments/goals:   Pain/Comfort:         Objective:     Communicated with nurse prior to session.  Patient found supine with SCD, telemetry, pulse ox (continuous), peripheral IV, colbert catheter upon PT entry to room.     General Precautions: Standard, pureed diet, aphasia  Orthopedic Precautions: N/A  Braces: N/A  Respiratory Status: Room air  Blood Pressure:   Skin Integrity: Visible skin intact      Functional Mobility:  Bed Mobility:     Scooting: maximal assistance  Supine to Sit: maximal assistance  Sit to Supine: maximal assistance  Transfers:     Sit to Stand:  maximal assistance with no AD    Therapeutic Activities/Exercises:  Session shortened due to pt having a BM, CNA informed    Education:  Patient provided with verbal education education regarding safety  Understanding was verbalized, however additional teaching warranted.     Patient left  supine with all lines intact and call button in reach    GOALS:   Multidisciplinary Problems       Physical Therapy Goals          Problem: Physical Therapy    Goal Priority Disciplines Outcome Interventions   Physical Therapy Goal     PT, PT/OT Progressing    Description: Goals to be met by: 4/3/25     Patient will increase functional independence with mobility by performin. Supine to sit with min Assistance  2. Sit to supine with min Assistance  3. Sit to stand transfer with mod Assistance  4. Bed to chair transfer with mod using LRAD                         Time Tracking:     PT Received On: 25  PT Start Time: 1223     PT Stop Time: 1245  PT Total Time (min): 22 min     Billable Minutes: Therapeutic Activity 22    Treatment Type: Treatment  PT/PTA: PT     Number of PTA visits since last PT visit: 2025

## 2025-03-05 NOTE — PROGRESS NOTES
"  [facility]    Cardiology  Progress Note    Patient Name: Froy Barragan  MRN: [id number]  Admission Date: 2/28/2025  Hospital Length of Stay: 5 days  Code Status: No Order   Attending Provider: Frank Awad MD   Consulting Provider: ALISA Villegas  Primary Care Physician: George Bran MD  Principal Problem:<principal problem not specified>    Patient information was obtained from patient, past medical records, and ER records.     Subjective:     Chief Complaint/Reason for Consult: NSTEMI     HPI: Mr. Barragan is a 65 y/o male who is known to [facility], Dr. Johns. The patient presented to [facility] on 2.28.25 with c/o N/V/D. The symptoms started the day prior and progressively worsened. Initial workup in the ER revealed: WBC 24.83, H&H 11.8/36.9, , BUN/Crea 53.0/2.66, Mg 1.3, Troponin 0.037, Lactic Acid 3.3, + RSV, UA with +3 Protein, +2 Glucose, +1 Ketones, +3 Blood, Leuk Estrace and WBC as well as Bacteria and an Abnormal CT/CTA regarding his Bladder (See Report). He was noted to be in DKA and placed on an Insulin Infusion per Protocol and BCX and Urine CX were drawn. He was started on Broad Spectrum Abx and Admitted to ICU for further workup and management. He has since been downgraded to . He was found to be confused and disoriented and had a Troponin Level Drawn which resulted as 0.116 and for this reason [facility] was consulted.     3.4.25: NAD. ST 110s. Remains off Pressors.   3.5.25: NAD. SR. Much Better Today. AMS Improving. Denies CP, SOB and Palps. "I am ok." Sister at Bedside.     PMH: CAD/Stents, DM II, HTN, ICMO/EF 35%, HLD, Anemia of Chronic Disease, Cervical Myelopathy, Cobalamin Deficiency, GERD, Vitamin D Deficiency, Paraparesis, CKD Stage IIIa, SHERMAN/Bilaterally Moderate, CVA (2009), Chronic Indwelling Catheter  PSH: Spine Surgery, Angiogram, Back Surgery  Family History: Reviewed and Unremarkable for Heart Disease  Social History: Denies Illicit Drug, ETOH " and Tobacco Use     Previous Cardiac Diagnostics:   ECHO 3.2.25:  Left Ventricle: The left ventricle is normal in size. Normal wall thickness. There is moderately reduced systolic function. Ejection fraction is approximately 35%.  Right Ventricle: The right ventricle is normal in size. Systolic function is reduced.  Aortic Valve: There is mild aortic regurgitation.  Mitral Valve: There is mild regurgitation.  Tricuspid Valve: There is mild regurgitation.  IVC/SVC: Normal venous pressure at 3 mmHg.  Pericardium: There is no pericardial effusion.    ECHO 2.27.25:  The study quality is average.   The left ventricle is normal in size. Global left ventricular systolic function is moderately decreased.  The left ventricular ejection fraction is 35%. Left ventricular diastolic function is normal. GLS AVG= -11.4%.  Right ventricular systolic function is moderately decreased.  Mild calcification of the aortic valve is noted with adequate cuspal excursion.   Mild to moderate (1-2+) aortic regurgitation. Mild (1+) tricuspid regurgitation. Mild (1+) mitral regurgitation.   The pulmonary artery systolic pressure is 19.8 mmHg.     Carotid 9.26.24:  Bilateral ICAs Patent with Less then 50% Stenosis   R Vertebral Artery was not well Visualized  L Vertebral Artery was patent with Antegrade Flow    ECHO 9.15.24:  Limited to assess function. EF visually appears normal at 55-60%, however heart rate is in the 130s. No pericardial effusion noted. Possibly repeat echo if necessary once heart rate is controlled.     MPI 7.22.21:  This is a normal perfusion study, no perfusion defects noted. There is no evidence of ischemia.   This scan is suggestive of low risk for future cardiovascular events.   The left ventricular cavity is noted to be normal on the stress study. The left ventricular ejection fraction was calculated to be 45% and left ventricular global function is mildly reduced.   The study quality is good.     [facility] 12.6.07:  L Main  Normal  LAD - Prox 20%  LCX - 50% Prox LCX at Ostium of OM1  Mid LCX 75%  RCA Patent     Review of patient's allergies indicates:  No Known Allergies  No current facility-administered medications on file prior to encounter.     Current Outpatient Medications on File Prior to Encounter   Medication Sig    alcohol swabs (BD ALCOHOL SWABS) PadM Apply 1 each topically once daily.    alfuzosin (UROXATRAL) 10 mg Tb24 Take 1 tablet (10 mg total) by mouth daily with breakfast.    aspirin (ECOTRIN) 81 MG EC tablet Take 81 mg by mouth once daily.    b complex vitamins capsule Take 1 capsule by mouth once daily.    blood glucose control, low (TRUE METRIX LEVEL 1) Soln 1 Bottle by Misc.(Non-Drug; Combo Route) route once daily.    blood-glucose meter (TRUE METRIX AIR GLUCOSE METER) Misc 1 each by Misc.(Non-Drug; Combo Route) route once daily.    blood-glucose sensor (DEXCOM G7 SENSOR) Carmen 1 each by Misc.(Non-Drug; Combo Route) route once daily.    ezetimibe (ZETIA) 10 mg tablet Take 1 tablet (10 mg total) by mouth once daily.    finasteride (PROSCAR) 5 mg tablet Take 1 tablet (5 mg total) by mouth once daily.    lancets (TRUEPLUS LANCETS) 33 gauge Misc 1 lancet by Misc.(Non-Drug; Combo Route) route once daily.    metFORMIN (GLUCOPHAGE) 1000 MG tablet Take 1 tablet (1,000 mg total) by mouth 2 (two) times a day.    miconazole NITRATE 2 % (MICOTIN) 2 % top powder Apply topically 2 (two) times daily.    mirtazapine (REMERON) 15 MG tablet Take 1 tablet (15 mg total) by mouth once daily.    pantoprazole (PROTONIX) 40 MG tablet Take 1 tablet (40 mg total) by mouth once daily.    rosuvastatin (CRESTOR) 40 MG Tab Take 1 tablet (40 mg total) by mouth once daily.    SITagliptin phosphate (JANUVIA) 50 MG Tab Take 1 tablet (50 mg total) by mouth once daily.    TRUE METRIX GLUCOSE TEST STRIP Strp TEST BLOOD SUGAR EVERY DAY    vitamin D (VITAMIN D3) 1000 units Tab Take 1,000 Units by mouth once daily.    zinc oxide 20 % ointment Apply  topically 2 (two) times a day.     Review of Systems   Unable to perform ROS: Acuity of condition     Objective:     Vital Signs (Most Recent):  Temp: 97.8 °F (36.6 °C) (03/05/25 1122)  Pulse: 75 (03/05/25 1122)  Resp: 18 (03/05/25 1122)  BP: 109/70 (03/05/25 1122)  SpO2: 99 % (03/05/25 1122) Vital Signs (24h Range):  Temp:  [97.3 °F (36.3 °C)-97.8 °F (36.6 °C)] 97.8 °F (36.6 °C)  Pulse:  [] 75  Resp:  [6-23] 18  SpO2:  [98 %-100 %] 99 %  BP: (107-150)/(70-94) 109/70   Weight: 53 kg (116 lb 13.5 oz)  Body mass index is 21.37 kg/m².  SpO2: 99 %       Intake/Output Summary (Last 24 hours) at 3/5/2025 1218  Last data filed at 3/5/2025 0400  Gross per 24 hour   Intake 2246.22 ml   Output 1480 ml   Net 766.22 ml     Lines/Drains/Airways       Drain  Duration                  Urethral Catheter 02/28/25 1305 Non-latex 16 Fr. 4 days              Peripheral Intravenous Line  Duration                  Peripheral IV - Single Lumen 02/28/25 2003 20 G Anterior;Left Forearm 4 days         Peripheral IV - Single Lumen 03/02/25 2330 20 G Anterior;Right Forearm 2 days                  Significant Labs:   Chemistries:   Recent Labs   Lab 03/01/25  0801 03/02/25  0320 03/02/25  1021 03/02/25  1906 03/03/25  0237 03/03/25  0939 03/03/25  1713 03/03/25  2149 03/04/25  0340 03/05/25  0412    140  --   --  138  --   --   --  139 142   K 4.2 3.9  --   --  4.0  --   --   --  3.6 3.3*   * 113*  --   --  107  --   --   --  110* 111*   CO2 17* 18*  --   --  20*  --   --   --  20* 22*   BUN 46.0* 38.1*  --   --  25.9*  --   --   --  26.4* 23.0   CREATININE 2.44* 2.28*  --   --  2.17*  --   --   --  2.35* 2.43*   CALCIUM 8.1* 8.4*  --   --  8.9  --   --   --  8.1* 7.9*   BILITOT  --   --   --   --  0.5  --   --   --  0.3 0.2   ALKPHOS  --   --   --   --  54  --   --   --  55 56   ALT  --   --   --   --  32  --   --   --  27 33   AST  --   --   --   --  35*  --   --   --  24 31   GLUCOSE 107 133*  --   --  144*  --   --   --   "154* 96   MG 1.90 1.60  --   --   --   --   --   --  1.90 2.50   PHOS 2.1* 2.2*  --   --   --   --   --   --  3.3  --    TROPONINI  --   --    < > 0.598* 0.675* 0.503* 0.552* 0.532*  --   --     < > = values in this interval not displayed.        CBC/Anemia Labs: Coags:    Recent Labs   Lab 03/03/25  0437 03/03/25  0939 03/04/25  0340 03/05/25  0412   WBC 10.89  --  9.21 9.46   HGB 11.1*  --  11.2* 10.3*   HCT 33.8*  --  33.4* 31.7*     --  228 220   MCV 94.7*  --  94.1* 94.9*   RDW 13.8  --  14.0 14.0   IRON  --  99  --   --    FERRITIN  --  322.20*  --   --     No results for input(s): "PT", "INR", "APTT" in the last 168 hours.     EKG:       Telemetry: SR    Physical Exam  Constitutional:       General: He is not in acute distress.     Appearance: Normal appearance.   HENT:      Head: Normocephalic.      Mouth/Throat:      Mouth: Mucous membranes are dry.   Cardiovascular:      Rate and Rhythm: Normal rate and regular rhythm.      Pulses: Normal pulses.      Heart sounds: Normal heart sounds. No murmur heard.  Pulmonary:      Effort: Pulmonary effort is normal. No respiratory distress.      Breath sounds: Rhonchi present.      Comments: NC O2  Abdominal:      Palpations: Abdomen is soft.   Genitourinary:     Comments: + Indwelling Gandara  Skin:     General: Skin is warm.   Neurological:      General: No focal deficit present.      Mental Status: He is alert.   Psychiatric:         Mood and Affect: Mood normal.       Home Medications:   Medications Ordered Prior to Encounter[1]  Current Schedule Inpatient Medications:   aspirin  81 mg Oral Daily    atorvastatin  80 mg Oral Daily    cefTRIAXone (Rocephin) IV (PEDS and ADULTS)  2 g Intravenous Q24H    enoxparin  1 mg/kg Subcutaneous Q24H    ezetimibe  10 mg Oral Daily    insulin glargine U-100  15 Units Subcutaneous QHS    metoprolol tartrate  50 mg Oral BID    mupirocin   Nasal BID    pantoprazole  40 mg Oral Daily     Continuous Infusions:   D5 and 0.45% " NaCl   Intravenous Continuous  mL/hr at 03/04/25 1356 New Bag at 03/04/25 1356    D5 and 0.9% NaCl   Intravenous Continuous 75 mL/hr at 03/05/25 0320 New Bag at 03/05/25 0320     Assessment:   NSTEMI Type II in the Setting of DKA, Dehydration with Possibility of Type I given New CMO  Newly Diagnosed CMO/EF 35%    - ECHO (3.2.25) - LVEF 35%    - ECHO (Sept 2024) - Intact LVEF  AMS due to Delirium - Improving   Sinus Tachycardia - Now SR   DKA - Resolved     - DM II   Hypotension requiring Pressors - Resolved    - Hx of HTN   Lactic Acidosis - Resolved   Leukocytosis - Resolved   Bilateral Hydronephrosis/Hydroureter  UTI - POA/Chronic Catheter   DWAINE/CKD IIIa - Stable   RSV +  Acute Gastroenteritis  Hx of CVA (9.2024)  Anemia  CAD/Nonobstructive 2007  HLD  SHERMAN/Bilaterally Moderate  Chronic Urinary Retention requiring Indwelling Catheter  Electrolyte Derangements - Hypomagnesemia   No Hx of GIB     Plan:   Continue ASA, Statin and BB   No ACEi/ARB/ARNI given DWAINE/CKD IIIa  Plan for LHC when PTS Overall Clinical Condition Improves: Mental Status and Renal Indices   Nephrology Following for Renal Optimization and Timing of LHC Recommendations  Keep K > 4.0 and Mg  > 2.0  Replete K  Labs and EKG in AM: CBC, CMP and Mg     ALISA Villegas  Cardiology  [facility]          [1]   No current facility-administered medications on file prior to encounter.     Current Outpatient Medications on File Prior to Encounter   Medication Sig Dispense Refill    alcohol swabs (BD ALCOHOL SWABS) PadM Apply 1 each topically once daily. 200 each 12    alfuzosin (UROXATRAL) 10 mg Tb24 Take 1 tablet (10 mg total) by mouth daily with breakfast. 90 tablet 3    aspirin (ECOTRIN) 81 MG EC tablet Take 81 mg by mouth once daily.      b complex vitamins capsule Take 1 capsule by mouth once daily.      blood glucose control, low (TRUE METRIX LEVEL 1) Soln 1 Bottle by Misc.(Non-Drug; Combo Route) route once daily. 1 each 12     blood-glucose meter (TRUE METRIX AIR GLUCOSE METER) Misc 1 each by Misc.(Non-Drug; Combo Route) route once daily. 1 each 0    blood-glucose sensor (DEXCOM G7 SENSOR) Carmen 1 each by Misc.(Non-Drug; Combo Route) route once daily. 3 each 12    ezetimibe (ZETIA) 10 mg tablet Take 1 tablet (10 mg total) by mouth once daily. 90 tablet 3    finasteride (PROSCAR) 5 mg tablet Take 1 tablet (5 mg total) by mouth once daily. 30 tablet 11    lancets (TRUEPLUS LANCETS) 33 gauge Misc 1 lancet by Misc.(Non-Drug; Combo Route) route once daily. 200 each 12    metFORMIN (GLUCOPHAGE) 1000 MG tablet Take 1 tablet (1,000 mg total) by mouth 2 (two) times a day. 180 tablet 3    miconazole NITRATE 2 % (MICOTIN) 2 % top powder Apply topically 2 (two) times daily. 43 g 0    mirtazapine (REMERON) 15 MG tablet Take 1 tablet (15 mg total) by mouth once daily. 90 tablet 3    pantoprazole (PROTONIX) 40 MG tablet Take 1 tablet (40 mg total) by mouth once daily. 90 tablet 3    rosuvastatin (CRESTOR) 40 MG Tab Take 1 tablet (40 mg total) by mouth once daily. 90 tablet 3    SITagliptin phosphate (JANUVIA) 50 MG Tab Take 1 tablet (50 mg total) by mouth once daily. 90 tablet 0    TRUE METRIX GLUCOSE TEST STRIP Strp TEST BLOOD SUGAR EVERY  strip 3    vitamin D (VITAMIN D3) 1000 units Tab Take 1,000 Units by mouth once daily.      zinc oxide 20 % ointment Apply topically 2 (two) times a day.

## 2025-03-06 LAB
ALBUMIN SERPL-MCNC: 2.7 G/DL (ref 3.4–4.8)
ALBUMIN/GLOB SERPL: 0.8 RATIO (ref 1.1–2)
ALP SERPL-CCNC: 62 UNIT/L (ref 40–150)
ALT SERPL-CCNC: 45 UNIT/L (ref 0–55)
ANION GAP SERPL CALC-SCNC: 12 MEQ/L
AST SERPL-CCNC: 34 UNIT/L (ref 5–34)
BASOPHILS # BLD AUTO: 0.07 X10(3)/MCL
BASOPHILS NFR BLD AUTO: 0.9 %
BILIRUB SERPL-MCNC: 0.2 MG/DL
BUN SERPL-MCNC: 32.2 MG/DL (ref 8.4–25.7)
CALCIUM SERPL-MCNC: 8.1 MG/DL (ref 8.8–10)
CHLORIDE SERPL-SCNC: 109 MMOL/L (ref 98–107)
CO2 SERPL-SCNC: 23 MMOL/L (ref 23–31)
CREAT SERPL-MCNC: 2.66 MG/DL (ref 0.72–1.25)
CREAT/UREA NIT SERPL: 12
EOSINOPHIL # BLD AUTO: 0.24 X10(3)/MCL (ref 0–0.9)
EOSINOPHIL NFR BLD AUTO: 3.1 %
ERYTHROCYTE [DISTWIDTH] IN BLOOD BY AUTOMATED COUNT: 14.1 % (ref 11.5–17)
GFR SERPLBLD CREATININE-BSD FMLA CKD-EPI: 26 ML/MIN/1.73/M2
GLOBULIN SER-MCNC: 3.3 GM/DL (ref 2.4–3.5)
GLUCOSE SERPL-MCNC: 92 MG/DL (ref 82–115)
HCT VFR BLD AUTO: 32 % (ref 42–52)
HGB BLD-MCNC: 10.3 G/DL (ref 14–18)
IMM GRANULOCYTES # BLD AUTO: 0.03 X10(3)/MCL (ref 0–0.04)
IMM GRANULOCYTES NFR BLD AUTO: 0.4 %
LYMPHOCYTES # BLD AUTO: 1.53 X10(3)/MCL (ref 0.6–4.6)
LYMPHOCYTES NFR BLD AUTO: 19.9 %
MAGNESIUM SERPL-MCNC: 2.2 MG/DL (ref 1.6–2.6)
MCH RBC QN AUTO: 31 PG (ref 27–31)
MCHC RBC AUTO-ENTMCNC: 32.2 G/DL (ref 33–36)
MCV RBC AUTO: 96.4 FL (ref 80–94)
MONOCYTES # BLD AUTO: 0.78 X10(3)/MCL (ref 0.1–1.3)
MONOCYTES NFR BLD AUTO: 10.2 %
NEUTROPHILS # BLD AUTO: 5.02 X10(3)/MCL (ref 2.1–9.2)
NEUTROPHILS NFR BLD AUTO: 65.5 %
NRBC BLD AUTO-RTO: 0 %
OHS QRS DURATION: 74 MS
OHS QTC CALCULATION: 505 MS
PLATELET # BLD AUTO: 230 X10(3)/MCL (ref 130–400)
PMV BLD AUTO: 11.1 FL (ref 7.4–10.4)
POCT GLUCOSE: 231 MG/DL (ref 70–110)
POCT GLUCOSE: 353 MG/DL (ref 70–110)
POCT GLUCOSE: 378 MG/DL (ref 70–110)
POCT GLUCOSE: 65 MG/DL (ref 70–110)
POTASSIUM SERPL-SCNC: 4.3 MMOL/L (ref 3.5–5.1)
PROT SERPL-MCNC: 6 GM/DL (ref 5.8–7.6)
RBC # BLD AUTO: 3.32 X10(6)/MCL (ref 4.7–6.1)
SODIUM SERPL-SCNC: 144 MMOL/L (ref 136–145)
WBC # BLD AUTO: 7.67 X10(3)/MCL (ref 4.5–11.5)

## 2025-03-06 PROCEDURE — S5010 5% DEXTROSE AND 0.45% SALINE: HCPCS | Performed by: NURSE PRACTITIONER

## 2025-03-06 PROCEDURE — 63600175 PHARM REV CODE 636 W HCPCS

## 2025-03-06 PROCEDURE — 21400001 HC TELEMETRY ROOM

## 2025-03-06 PROCEDURE — 25000003 PHARM REV CODE 250

## 2025-03-06 PROCEDURE — 63600175 PHARM REV CODE 636 W HCPCS: Performed by: STUDENT IN AN ORGANIZED HEALTH CARE EDUCATION/TRAINING PROGRAM

## 2025-03-06 PROCEDURE — 85025 COMPLETE CBC W/AUTO DIFF WBC: CPT | Performed by: NURSE PRACTITIONER

## 2025-03-06 PROCEDURE — 83735 ASSAY OF MAGNESIUM: CPT | Performed by: NURSE PRACTITIONER

## 2025-03-06 PROCEDURE — 27000207 HC ISOLATION

## 2025-03-06 PROCEDURE — 80053 COMPREHEN METABOLIC PANEL: CPT | Performed by: NURSE PRACTITIONER

## 2025-03-06 PROCEDURE — 25000003 PHARM REV CODE 250: Performed by: NURSE PRACTITIONER

## 2025-03-06 PROCEDURE — 36415 COLL VENOUS BLD VENIPUNCTURE: CPT | Performed by: NURSE PRACTITIONER

## 2025-03-06 PROCEDURE — 97530 THERAPEUTIC ACTIVITIES: CPT | Mod: CO

## 2025-03-06 PROCEDURE — 93005 ELECTROCARDIOGRAM TRACING: CPT

## 2025-03-06 PROCEDURE — 93010 ELECTROCARDIOGRAM REPORT: CPT | Mod: ,,, | Performed by: STUDENT IN AN ORGANIZED HEALTH CARE EDUCATION/TRAINING PROGRAM

## 2025-03-06 PROCEDURE — 97535 SELF CARE MNGMENT TRAINING: CPT | Mod: CO

## 2025-03-06 RX ORDER — SODIUM CHLORIDE, SODIUM LACTATE, POTASSIUM CHLORIDE, CALCIUM CHLORIDE 600; 310; 30; 20 MG/100ML; MG/100ML; MG/100ML; MG/100ML
INJECTION, SOLUTION INTRAVENOUS CONTINUOUS
Status: DISCONTINUED | OUTPATIENT
Start: 2025-03-06 | End: 2025-03-09

## 2025-03-06 RX ORDER — DAPAGLIFLOZIN 10 MG/1
10 TABLET, FILM COATED ORAL DAILY
Status: DISCONTINUED | OUTPATIENT
Start: 2025-03-07 | End: 2025-03-11

## 2025-03-06 RX ORDER — INSULIN GLARGINE 100 [IU]/ML
10 INJECTION, SOLUTION SUBCUTANEOUS NIGHTLY
Status: DISCONTINUED | OUTPATIENT
Start: 2025-03-06 | End: 2025-03-08

## 2025-03-06 RX ADMIN — METOPROLOL TARTRATE 50 MG: 50 TABLET, FILM COATED ORAL at 09:03

## 2025-03-06 RX ADMIN — ENOXAPARIN SODIUM 50 MG: 60 INJECTION SUBCUTANEOUS at 03:03

## 2025-03-06 RX ADMIN — PANTOPRAZOLE SODIUM 40 MG: 40 TABLET, DELAYED RELEASE ORAL at 09:03

## 2025-03-06 RX ADMIN — INSULIN ASPART 2 UNITS: 100 INJECTION, SOLUTION INTRAVENOUS; SUBCUTANEOUS at 09:03

## 2025-03-06 RX ADMIN — CEFTRIAXONE SODIUM 2 G: 2 INJECTION, POWDER, FOR SOLUTION INTRAMUSCULAR; INTRAVENOUS at 02:03

## 2025-03-06 RX ADMIN — INSULIN GLARGINE 10 UNITS: 100 INJECTION, SOLUTION SUBCUTANEOUS at 09:03

## 2025-03-06 RX ADMIN — ASPIRIN 81 MG: 81 TABLET, COATED ORAL at 09:03

## 2025-03-06 RX ADMIN — ATORVASTATIN CALCIUM 80 MG: 40 TABLET, FILM COATED ORAL at 09:03

## 2025-03-06 RX ADMIN — INSULIN ASPART 5 UNITS: 100 INJECTION, SOLUTION INTRAVENOUS; SUBCUTANEOUS at 06:03

## 2025-03-06 RX ADMIN — SODIUM CHLORIDE, POTASSIUM CHLORIDE, SODIUM LACTATE AND CALCIUM CHLORIDE: 600; 310; 30; 20 INJECTION, SOLUTION INTRAVENOUS at 03:03

## 2025-03-06 RX ADMIN — DEXTROSE AND SODIUM CHLORIDE: 5; 450 INJECTION, SOLUTION INTRAVENOUS at 05:03

## 2025-03-06 RX ADMIN — EZETIMIBE 10 MG: 10 TABLET ORAL at 09:03

## 2025-03-06 NOTE — PROGRESS NOTES
[facility] Hospital Medicine Progress Note        Chief Complaint: Inpatient Follow-up     HPI:   64-year-old gentleman with DM type 2, CKD stage 3, CVA 09/2024, chronic indwelling Gandara who was admitted after he presented with complaints of nausea with several episodes of vomiting and nonbloody diarrhea.  He denied having any chest pain, abdominal pain, SOB, cough, sputum production, headache, numbness, weakness, dizziness/lightheadedness or loss of consciousness.  On admission his labs showed leukocytosis of 24.8, normocytic anemia with H/H of 11.8/36.9, hyperkalemia of 5.5, CO2 15 with anion gap 20, BUN/creatinine 66.9/3.69, negative troponins, lactic acid of 3.3, beta hydroxybutyrate of 2.9.  UA showed 3+ protein, 2+ glucose, 1+ ketones, 3+ blood, leukocyte esterase, WBCs as well as bacteria.  Blood cultures and urine culture were sent off.  CT C/A/P was done which showed extensive urinary bladder wall thickening with inflammatory changes around urinary bladder consistent with acute cystitis causing secondary obstructive changes and secondary B/L hydronephrosis and hydroureter, RLL atelectasis.  Admitted to ICU on insulin infusion per DKA protocol.  Started on broad-spectrum antibiotics.  Given IVF resuscitation.  Urine culture growing GNR.  Noted to have low-grade fevers overnight on 03/01.  Insulin infusion discontinued and patient is started on long-acting insulin. Patient also found to be RSV positive. Downgraded to . Troponins increased to 0.114; trended q8 & Echo ordered. CIS consulted. FD Lovenox ordered. Urology consulted for hydronephrosis; will follow recommendations. Noted to have hypoglycemic episodes overnight 03/03. On IV D5/NS. Tachycardia improved. Trop increased to 0.675 overnight. Echo showing EF 35%. BUN/Cr improved at 25.9/2.17; remains on IV D5/NS. UCX growing Proteus. Will DC Cefepime in case it is causing AMS & start IV Rocephin. CT Head ordered which was  unremarkable. Decreased Lantus to 15 units nightly till PO intake improves.     Patient and family thinking about LH.    Interval Hx:   NAEO. Creatine worsened. Friend at bedside. Family wanting the [facility] - cardiology informed. Patient goes off in tangents.     Case was discussed with patient's nurse and  on the floor.    Objective/physical exam:  General: alert male lying comfortably in bed, in no acute distress  HENT: oral and oropharyngeal mucosa moist, pink, with no erythema or exudates, no ear pain or discharge  Neck: normal neck movement, no lymph nodes or swellings, no JVD or Carotid bruit  Respiratory: clear breathing sounds bilaterally, no crackles, rales, ronchi or wheezes  Cardiovascular: clear S1 and S2, no murmurs, rubs or gallops  Peripheral Vascular: no lesions, ulcers or erosions, normal peripheral pulses and no pedal edema  Gastrointestinal: soft, non-tender, non-distended abdomen, no guarding, rigidity or rebound tenderness, normal bowel sounds  Integumentary: normal skin color, no rashes or lesions  Neuro: AAO x 3, no focal deficits    VITAL SIGNS: 24 HRS MIN & MAX LAST   Temp  Min: 97.2 °F (36.2 °C)  Max: 98.8 °F (37.1 °C) 97.7 °F (36.5 °C)   BP  Min: 96/63  Max: 135/87 135/87   Pulse  Min: 69  Max: 82  80   Resp  Min: 18  Max: 20 18   SpO2  Min: 97 %  Max: 100 % 98 %     I have reviewed the following labs:  Recent Labs   Lab 03/04/25  0340 03/05/25  0412 03/06/25  0306   WBC 9.21 9.46 7.67   RBC 3.55* 3.34* 3.32*   HGB 11.2* 10.3* 10.3*   HCT 33.4* 31.7* 32.0*   MCV 94.1* 94.9* 96.4*   MCH 31.5* 30.8 31.0   MCHC 33.5 32.5* 32.2*   RDW 14.0 14.0 14.1    220 230   MPV 11.3* 11.0* 11.1*     Recent Labs   Lab 02/28/25  1240 02/28/25  1621 03/02/25  0720 03/03/25  0237 03/04/25  0340 03/05/25  0412 03/06/25  0306   NA  --    < >  --    < > 139 142 144   K  --    < >  --    < > 3.6 3.3* 4.3   CL  --    < >  --    < > 110* 111* 109*   CO2  --    < >  --    < > 20* 22* 23   BUN  --     < >  --    < > 26.4* 23.0 32.2*   CREATININE  --    < >  --    < > 2.35* 2.43* 2.66*   CALCIUM  --    < >  --    < > 8.1* 7.9* 8.1*   PH 7.300  --  7.460*  --   --   --   --    MG  --    < >  --   --  1.90 2.50 2.20   ALBUMIN  --    < >  --    < > 2.7* 2.7* 2.7*   ALKPHOS  --    < >  --    < > 55 56 62   ALT  --    < >  --    < > 27 33 45   AST  --    < >  --    < > 24 31 34   BILITOT  --    < >  --    < > 0.3 0.2 0.2    < > = values in this interval not displayed.     Assessment/Plan:  # DKA-resolved  # AMS 2/2 Delirium vs Cefepime induced  # Tachycardia possibly 2/2 NSTEMI vs Intravascular Depletion  # Troponinemia 2/2 NSTEMI type I vs type II  # Acute sytolic heart failure  # Lactic Acidemia 2/2 Intravascular Depletion  # Proteus UTI  # Leukocytosis 2/2 above  # B/L Hydronephrosis & Hydroureter  # DWAINE on CKD III 2/2 Prerenal vs Postrenal Azotemia  # RSV antigen positive  # Acute gastroenteritis  # Prior CVA 09/2024  # Normocytic anemia  # Left kidney cyst     - Reporting no new complaints; noted to be much more awake and oriented today  - Troponins uptrended overnight 03/03  - Echo showing EF 35%  - On FD Lovenox BID  - CIS consulted for troponinemia; follow recommendations   - cardiology informed regarding family is ok with [facility]  - Nephrology recommended for renal clearance for [facility]  - CT Head negative  - Consulted Urology for B/L hydronephrosis associated with bladder wall thickening; signed off  - On IV Rocephin - last day of Abx today (day 7)  - Urine culture growing Proteus  - Blood cultures negative   - decrease glargine to 10 units   - On ISS LD   - Continued on home aspirin, atorvastatin, Zetia, mirtazapine, Protonix  - start gentle fluids  - PT/OT consulted; recommending moderate intensity therapy    VTE prophylaxis: Lovenox    Patient condition:  Stable    Anticipated discharge and Disposition:     Pending    All diagnosis and differential diagnosis have been reviewed; assessment and plan has been  documented; I have personally reviewed the labs and test results that are presently available; I have reviewed the patients medication list; I have reviewed the consulting providers response and recommendations. I have reviewed or attempted to review medical records based upon their availability    All of the patient's questions have been  addressed and answered. Patient's is agreeable to the above stated plan. I will continue to monitor closely and make adjustments to medical management as needed.      Radiology:  I have personally reviewed the following imaging and agree with the radiologist.     CT Head Without Contrast  Narrative: EXAMINATION:  CT HEAD WITHOUT CONTRAST    CLINICAL HISTORY:  Mental status change, unknown cause;    TECHNIQUE:  CT images of the head without IV contrast. Axial, coronal and sagittal images reviewed. Dose length product 907 mGycm. Automatic exposure control, adjustment of mA/kV or iterative reconstruction technique used to limit radiation dose.    COMPARISON:  CT 09/29/2024    FINDINGS:  Extra-axial spaces/ventricular system: Normal for age.    Intracranial hemorrhage: None identified.    Cerebral parenchyma: No acute large vessel territory infarct or mass effect identified. Remote right basal ganglia lacunar infarct.  Mild to moderate chronic small vessel ischemic changes have similar appearance.    Vascular system: No hyperdense vessel appreciated. Bilateral carotid siphon and distal vertebral artery calcifications.    Cerebellum: Normal.    Sella: Normal.    Included paranasal sinuses and mastoid air cells: Opacified right mastoid air cells.    Visualized orbits: Normal.    Scalp/Calvarium: No depressed skull fracture.  Impression: No acute intracranial process identified.    Electronically signed by: Ky Reddy  Date:    03/03/2025  Time:    15:30  US Retroperitoneal Complete  Narrative: EXAMINATION:  US RETROPERITONEAL COMPLETE    CLINICAL HISTORY:  reassess bilateral  hydronephrosis;  Disorder of kidney and ureter, unspecified    COMPARISON:  09/08/2024    FINDINGS:  Right kidney is poorly demonstrated due to body habitus.  It measures 10.6 x 5.5 x 5.3 cm.  No focal lesions are noted there is no hydronephrosis.    There is flow present the proximal IVC.  Aorta is incompletely visualized due to overlying bowel gas.  There is a Gandara catheter in place.  The wall of the urinary bladder appears thickened measuring 1.3 cm however the bladder is collapsed.    The left kidney measures 9.8 by 4.6 x 4.8 cm.  There is a simple cyst present measuring 1.5 x 1.7 x 1.4 cm.  There is no hydronephrosis.  Impression: Simple cyst of the left kidney.    Changes consistent with thickening of the wall of the urinary bladder.    Electronically signed by: Tyrese Díaz MD  Date:    03/03/2025  Time:    08:59      Bull Zaidi MD  Department of Hospital Medicine   [facility]   03/06/2025

## 2025-03-06 NOTE — PROGRESS NOTES
Inpatient Nutrition Assessment    Admit Date: 2/28/2025   Total duration of encounter: 6 days   Patient Age: 64 y.o.    Nutrition Recommendation/Prescription   Diet Soft & Bite Sized (IDDSI Level 6) Diabetic, Low Chol/Sat Fat; 2000 Calories (up to 75 gm per meal) ordered      Communication of Recommendations: reviewed with nurse and reviewed with patient    Nutrition Assessment     Chart Review    Reason Seen: malnutrition screening tool (MST) and follow-up    Malnutrition Screening Tool Results   Have you recently lost weight without trying?: Unsure  Have you been eating poorly because of a decreased appetite?: Yes   MST Score: 3   Diagnosis:  Wide anion gap metabolic acidosis of combined diabetic ketoacidotic and lactic acidotic etiologies (resolved)  Acute gastroenteritis  Acute cystitis with bilateral hydronephrosis  Acute on chronic kidney disease with baseline stage III CKD, significantly improved since admission, remains nonoliguric  RSV antigen positive    Relevant Medical History: type 2 diabetes mellitus, stage 3 chronic kidney disease, cerebrovascular disease with previous history of stroke 09/2024, and chronic urinary retention requiring indwelling Gandara placement about 1 month ago     Scheduled Medications:  aspirin, 81 mg, Daily  atorvastatin, 80 mg, Daily  cefTRIAXone (Rocephin) IV (PEDS and ADULTS), 2 g, Q24H  enoxparin, 1 mg/kg, Q24H  ezetimibe, 10 mg, Daily  insulin glargine U-100, 10 Units, QHS  metoprolol tartrate, 50 mg, BID  pantoprazole, 40 mg, Daily    Continuous Infusions:  D5 and 0.45% NaCl, Last Rate: 125 mL/hr at 03/06/25 0544  lactated ringers    PRN Medications:   acetaminophen, 650 mg, Q6H PRN  dextrose 10%, 250 mL, bolus from bag  D5 and 0.45% NaCl, , Continuous PRN  dextrose 50%, 12.5 g, PRN  dextrose 50%, 12.5 g, PRN  dextrose 50%, 25 g, PRN  dextrose 50%, 25 g, PRN  glucagon (human recombinant), 1 mg, PRN  glucose, 16 g, PRN  glucose, 24 g, PRN  haloperidol lactate, 5 mg, Q4H  PRN  insulin aspart U-100, 0-5 Units, Q6H PRN  metoprolol, 5 mg, Q5 Min PRN  ondansetron, 4 mg, Q6H PRN  sodium chloride 0.9%, 10 mL, PRN    Calorie Containing IV Medications: no significant kcals from medications at this time    Recent Labs   Lab 02/28/25  1123 02/28/25  1621 02/28/25  1957 02/28/25  2345 03/01/25  0146 03/01/25  0801 03/02/25  0320 03/03/25  0237 03/03/25  0437 03/04/25  0340 03/05/25  0412 03/06/25  0306    147* 143 143 141 141 140 138  --  139 142 144   K 5.5* 4.2 4.2 4.2 4.3 4.2 3.9 4.0  --  3.6 3.3* 4.3   CALCIUM 9.6 8.3* 8.0* 7.9* 7.9* 8.1* 8.4* 8.9  --  8.1* 7.9* 8.1*   PHOS  --  3.3 3.0 3.0 3.0 2.1* 2.2*  --   --  3.3  --   --    MG 1.60 1.40* 1.20* 1.30* 1.30* 1.90 1.60  --   --  1.90 2.50 2.20   * 114* 119* 117* 117* 117* 113* 107  --  110* 111* 109*   CO2 15* 16* 14* 16* 16* 17* 18* 20*  --  20* 22* 23   BUN 66.9* 56.7* 56.4* 53.0* 48.8* 46.0* 38.1* 25.9*  --  26.4* 23.0 32.2*   CREATININE 3.69* 3.00* 2.64* 2.66* 2.62* 2.44* 2.28* 2.17*  --  2.35* 2.43* 2.66*   EGFRNORACEVR 17 22 26 26 26 28 31 33  --  30 29 26   GLUCOSE 312* 155* 87 182* 217* 107 133* 144*  --  154* 96 92   BILITOT 0.6  --   --   --  0.3  --   --  0.5  --  0.3 0.2 0.2   ALKPHOS 71  --   --   --  44  --   --  54  --  55 56 62   ALT 32  --   --   --  19  --   --  32  --  27 33 45   AST 21  --   --   --  17  --   --  35*  --  24 31 34   ALBUMIN 4.1  --   --   --  2.7*  --   --  3.1*  --  2.7* 2.7* 2.7*   HGBA1C  --  6.8  --   --   --   --   --   --   --   --   --   --    LIPASE 18  --   --   --   --   --   --   --   --   --   --   --    WBC 24.83  24.83*  --   --   --  20.05*  --  12.25*  --  10.89 9.21 9.46 7.67   HGB 11.8*  --   --   --  8.5*  --  8.7*  --  11.1* 11.2* 10.3* 10.3*   HCT 36.9*  --   --   --  25.5*  --  27.0*  --  33.8* 33.4* 31.7* 32.0*     Nutrition Orders:  Diet Soft & Bite Sized (IDDSI Level 6) Diabetic, Low Chol/Sat Fat; 2000 Calories (up to 75 gm per meal)      Appetite/Oral Intake:  "fair/% of meals  Factors Affecting Nutritional Intake: decreased appetite  Social Needs Impacting Access to Food: none identified on admission screening  Food/Congregation/Cultural Preferences: none reported  Food Allergies: none reported  Last Bowel Movement: 25  Wound(s):     Wound 24 Moisture associated dermatitis Buttocks-Tissue loss description: Not applicable     Comments    3/1/25 Patient reports a poor appetite, would not elaborate. Diet advanced from NPO for lunch today, nurse reports he ate 100%. Patient denies weight loss. Offered to send oral supplements but patient not interested at this time. He did not answer further questions, reports he would prefer to rest now.    3/6/2025: The nurse reports a good appetite/PO intake and denies N/V/D/C. Last BM documented as 3/6/2025. Will monitor.    Anthropometrics    Height: 5' 2" (157.5 cm), Height Method: Measured  Last Weight: 53 kg (116 lb 13.5 oz) (25 1610), Weight Method: Bed Scale  BMI (Calculated): 21.4  BMI Classification: underweight (BMI less than 22 if >65 years of age)        Ideal Body Weight (IBW), Male: 118 lb     % Ideal Body Weight, Male (lb): 99.21 %                 Usual Body Weight (UBW), k.2 kg  % Usual Body Weight: 101.74  % Weight Change From Usual Weight: 1.53 %  Usual Weight Provided By: patient    Wt Readings from Last 5 Encounters:   25 53 kg (116 lb 13.5 oz)   25 52.2 kg (115 lb)   01/15/25 52.2 kg (115 lb)   01/10/25 52.2 kg (115 lb)   24 50 kg (110 lb 3.7 oz)     Weight Change(s) Since Admission:   (3/1) admission weight stated, took bed weight 53.0 kg during rounds  3/6/2025: no new wts  Wt Readings from Last 1 Encounters:   25 1610 53 kg (116 lb 13.5 oz)   25 1600 53.1 kg (117 lb 1 oz)   25 1100 53.1 kg (117 lb)   Admit Weight: 53.1 kg (117 lb) (25 1100), Weight Method: Stated    Nutrition Goals & Monitoring     Dietitian will monitor: food and beverage intake, " weight, electrolyte/renal panel, glucose/endocrine profile, and gastrointestinal profile  Discharge planning: resume home regimen  Nutrition Risk/Follow-Up: low (follow-up in 5-7 days)   Please consult if re-assessment needed sooner.

## 2025-03-06 NOTE — PLAN OF CARE
Problem: Delirium  Goal: Improved Behavioral Control  Outcome: Progressing     Problem: Infection  Goal: Absence of Infection Signs and Symptoms  Outcome: Progressing     Problem: Adult Inpatient Plan of Care  Goal: Absence of Hospital-Acquired Illness or Injury  Outcome: Progressing  Goal: Optimal Comfort and Wellbeing  Outcome: Progressing     Problem: Diabetes Comorbidity  Goal: Blood Glucose Level Within Targeted Range  Outcome: Progressing     Problem: Skin Injury Risk Increased  Goal: Skin Health and Integrity  Outcome: Progressing     Problem: Fall Injury Risk  Goal: Absence of Fall and Fall-Related Injury  Outcome: Progressing     Problem: Delirium  Goal: Improved Behavioral Control  Outcome: Progressing  Goal: Improved Attention and Thought Clarity  Outcome: Progressing

## 2025-03-06 NOTE — PT/OT/SLP PROGRESS
Occupational Therapy   Treatment    Name: Froy Barragan  MRN: [id number]  Admitting Diagnosis:  <principal problem not specified>       Recommendations:     Recommended therapy intensity at discharge: Moderate Intensity Therapy   Discharge Equipment Recommendations:  to be determined by next level of care  Barriers to discharge:       Assessment:     Froy Barragan is a 64 y.o. male with a medical diagnosis of <principal problem not specified>.  He presents with good participation. Performance deficits affecting function are weakness, impaired functional mobility, impaired cognition, decreased safety awareness, impaired coordination, impaired endurance, gait instability, decreased coordination, impaired fine motor, impaired joint extensibility, abnormal tone, impaired skin, decreased upper extremity function, decreased lower extremity function, impaired balance, visual deficits, impaired self care skills, decreased ROM.     Rehab Prognosis:  Good; patient would benefit from acute skilled OT services to address these deficits and reach maximum level of function.       Plan:     Patient to be seen 4 x/week to address the above listed problems via self-care/home management, therapeutic activities, therapeutic exercises  Plan of Care Expires: 04/03/25  Plan of Care Reviewed with: patient    Subjective     Pain/Comfort:  Pain Rating 1: 0/10    Objective:     Communicated with: nurse prior to and following session.  Patient found HOB elevated with pulse ox (continuous), peripheral IV, colbert catheter upon OT entry to room.    General Precautions: Standard, pureed diet, aphasia    Orthopedic Precautions:N/A  Braces: N/A  Respiratory Status: Room air     Occupational Performance:     Bed Mobility:    Patient completed Supine to Sit with moderate assistance, maximal assistance, and 2 persons  Patient completed Sit to Supine with moderate assistance, maximal assistance, and 2 persons     Activities of Daily  Living:  Feeding:  moderate assistance to feed self, does not initiate tasks, food had to be scooped up and placed in patients hand, patient able to complete excursion to mouth, attempted scooping up food on spoon, unsuccessful  Grooming: stand by assistance setup with verbal and tactile cues to complete face washing task    Therapeutic Activities:  Maintain sitting balance edge of bed with max A initially, progressing to CGA for self feeding task     Therapeutic Positioning    OT interventions performed during the course of today's session in an effort to prevent and/or reduce acquired pressure injuries:   Therapeutic positioning was provided at the conclusion of session to offload all bony prominences for the prevention and/or reduction of pressure injuries    [facility] 6 Click ADL: 12    Patient Education:  Patient provided with verbal education and demonstrations education regarding OT role/goals/POC.  Understanding was verbalized, however additional teaching warranted.      Patient left supine with all lines intact and call button in reach.    GOALS:   Multidisciplinary Problems       Occupational Therapy Goals          Problem: Occupational Therapy    Goal Priority Disciplines Outcome Interventions   Occupational Therapy Goal     OT, PT/OT Progressing    Description: Goals to be met by: 4/3/25     Patient will increase functional independence with ADLs by performing:    UE Dressing with Minimal Assistance.  LE Dressing with Minimal Assistance.  Grooming while seated at sink with Stand-by Assistance.  Toileting from bedside commode with Minimal Assistance for hygiene and clothing management.   Toilet transfer to bedside commode with Minimal Assistance.  Showering from shower chair w/ minimal assistance.   Inc functional awareness of the L side for safety during daily life tasks.                          Time Tracking:     OT Date of Treatment: 03/06/25  OT Start Time: 1425  OT Stop Time: 1505  OT Total Time (min):  40 min    Billable Minutes:Self Care/Home Management 25  Therapeutic Activity 15    OT/JOLENE: JOLENE     Number of JOLENE visits since last OT visit: 1    3/6/2025

## 2025-03-06 NOTE — PLAN OF CARE
03/06/25 1401   Discharge Reassessment   Assessment Type Discharge Planning Reassessment   Did the patient's condition or plan change since previous assessment? Yes   Discharge Plan discussed with: Patient;Sibling   Name(s) and Number(s) Chantal Ricardo (Sister)  [phone] (   Discharge Plan A Skilled Nursing Facility   Discharge Plan B Home with family;Home Health   DME Needed Upon Discharge  none   Transition of Care Barriers None   Post-Acute Status   Post-Acute Authorization Placement   Post-Acute Placement Status Patient List Provided   Discharge Delays None known at this time

## 2025-03-06 NOTE — PROGRESS NOTES
"  [facility]    Cardiology  Progress Note    Patient Name: Froy Barragan  MRN: [id number]  Admission Date: 2/28/2025  Hospital Length of Stay: 6 days  Code Status: No Order   Attending Provider: Bull Zaidi,*   Consulting Provider: Mary Rendon NP  Primary Care Physician: George Bran MD  Principal Problem:<principal problem not specified>    Patient information was obtained from patient, past medical records, and ER records.     Subjective:     Reason for Consult: NSTEMI     HPI: Mr. Barragan is a 63 y/o male who is known to [facility], Dr. Johns. The patient presented to [facility] on 2.28.25 with c/o N/V/D. The symptoms started the day prior and progressively worsened. Initial workup in the ER revealed: WBC 24.83, H&H 11.8/36.9, , BUN/Crea 53.0/2.66, Mg 1.3, Troponin 0.037, Lactic Acid 3.3, + RSV, UA with +3 Protein, +2 Glucose, +1 Ketones, +3 Blood, Leuk Estrace and WBC as well as Bacteria and an Abnormal CT/CTA regarding his Bladder (See Report). He was noted to be in DKA and placed on an Insulin Infusion per Protocol and BCX and Urine CX were drawn. He was started on Broad Spectrum Abx and Admitted to ICU for further workup and management. He has since been downgraded to . He was found to be confused and disoriented and had a Troponin Level Drawn which resulted as 0.116 and for this reason [facility] was consulted.     3.4.25: NAD. ST 110s. Remains off Pressors.   3.5.25: NAD. SR. Much Better Today. AMS Improving. Denies CP, SOB and Palps. "I am ok." Sister at Bedside.   3.6.25: NAD, AMS slightly improved today. Cr worsened today. Family at bedside     PMH: CAD/Stents, DM II, HTN, ICMO/EF 35%, HLD, Anemia of Chronic Disease, Cervical Myelopathy, Cobalamin Deficiency, GERD, Vitamin D Deficiency, Paraparesis, CKD Stage IIIa, SHERMAN/Bilaterally Moderate, CVA (2009), Chronic Indwelling Catheter  PSH: Spine Surgery, Angiogram, Back Surgery  Family History: Reviewed and " Unremarkable for Heart Disease  Social History: Denies Illicit Drug, ETOH and Tobacco Use     Previous Cardiac Diagnostics:   ECHO 3.2.25:  Left Ventricle: The left ventricle is normal in size. Normal wall thickness. There is moderately reduced systolic function. Ejection fraction is approximately 35%.  Right Ventricle: The right ventricle is normal in size. Systolic function is reduced.  Aortic Valve: There is mild aortic regurgitation.  Mitral Valve: There is mild regurgitation.  Tricuspid Valve: There is mild regurgitation.  IVC/SVC: Normal venous pressure at 3 mmHg.  Pericardium: There is no pericardial effusion.    ECHO 2.27.25:  The study quality is average.   The left ventricle is normal in size. Global left ventricular systolic function is moderately decreased.  The left ventricular ejection fraction is 35%. Left ventricular diastolic function is normal. GLS AVG= -11.4%.  Right ventricular systolic function is moderately decreased.  Mild calcification of the aortic valve is noted with adequate cuspal excursion.   Mild to moderate (1-2+) aortic regurgitation. Mild (1+) tricuspid regurgitation. Mild (1+) mitral regurgitation.   The pulmonary artery systolic pressure is 19.8 mmHg.     Carotid 9.26.24:  Bilateral ICAs Patent with Less then 50% Stenosis   R Vertebral Artery was not well Visualized  L Vertebral Artery was patent with Antegrade Flow    ECHO 9.15.24:  Limited to assess function. EF visually appears normal at 55-60%, however heart rate is in the 130s. No pericardial effusion noted. Possibly repeat echo if necessary once heart rate is controlled.     MPI 7.22.21:  This is a normal perfusion study, no perfusion defects noted. There is no evidence of ischemia.   This scan is suggestive of low risk for future cardiovascular events.   The left ventricular cavity is noted to be normal on the stress study. The left ventricular ejection fraction was calculated to be 45% and left ventricular global function  is mildly reduced.   The study quality is good.     [facility] 12.6.07:  L Main Normal  LAD - Prox 20%  LCX - 50% Prox LCX at Ostium of OM1  Mid LCX 75%  RCA Patent     Review of patient's allergies indicates:  No Known Allergies  No current facility-administered medications on file prior to encounter.     Current Outpatient Medications on File Prior to Encounter   Medication Sig    alcohol swabs (BD ALCOHOL SWABS) PadM Apply 1 each topically once daily.    alfuzosin (UROXATRAL) 10 mg Tb24 Take 1 tablet (10 mg total) by mouth daily with breakfast.    aspirin (ECOTRIN) 81 MG EC tablet Take 81 mg by mouth once daily.    b complex vitamins capsule Take 1 capsule by mouth once daily.    blood glucose control, low (TRUE METRIX LEVEL 1) Soln 1 Bottle by Misc.(Non-Drug; Combo Route) route once daily.    blood-glucose meter (TRUE METRIX AIR GLUCOSE METER) Misc 1 each by Misc.(Non-Drug; Combo Route) route once daily.    blood-glucose sensor (DEXCOM G7 SENSOR) Carmen 1 each by Misc.(Non-Drug; Combo Route) route once daily.    ezetimibe (ZETIA) 10 mg tablet Take 1 tablet (10 mg total) by mouth once daily.    finasteride (PROSCAR) 5 mg tablet Take 1 tablet (5 mg total) by mouth once daily.    lancets (TRUEPLUS LANCETS) 33 gauge Misc 1 lancet by Misc.(Non-Drug; Combo Route) route once daily.    metFORMIN (GLUCOPHAGE) 1000 MG tablet Take 1 tablet (1,000 mg total) by mouth 2 (two) times a day.    miconazole NITRATE 2 % (MICOTIN) 2 % top powder Apply topically 2 (two) times daily.    mirtazapine (REMERON) 15 MG tablet Take 1 tablet (15 mg total) by mouth once daily.    pantoprazole (PROTONIX) 40 MG tablet Take 1 tablet (40 mg total) by mouth once daily.    rosuvastatin (CRESTOR) 40 MG Tab Take 1 tablet (40 mg total) by mouth once daily.    SITagliptin phosphate (JANUVIA) 50 MG Tab Take 1 tablet (50 mg total) by mouth once daily.    TRUE METRIX GLUCOSE TEST STRIP Strp TEST BLOOD SUGAR EVERY DAY    vitamin D (VITAMIN D3) 1000 units Tab Take  1,000 Units by mouth once daily.    zinc oxide 20 % ointment Apply topically 2 (two) times a day.     Review of Systems   Unable to perform ROS: Acuity of condition     Objective:     Vital Signs (Most Recent):  Temp: 97.4 °F (36.3 °C) (03/06/25 0731)  Pulse: 82 (03/06/25 0731)  Resp: 18 (03/06/25 0731)  BP: 123/88 (03/06/25 0731)  SpO2: 99 % (03/06/25 0900) Vital Signs (24h Range):  Temp:  [97.2 °F (36.2 °C)-98.8 °F (37.1 °C)] 97.4 °F (36.3 °C)  Pulse:  [69-82] 82  Resp:  [18-20] 18  SpO2:  [97 %-100 %] 99 %  BP: ()/(63-88) 123/88   Weight: 53 kg (116 lb 13.5 oz)  Body mass index is 21.37 kg/m².  SpO2: 99 %       Intake/Output Summary (Last 24 hours) at 3/6/2025 1109  Last data filed at 3/6/2025 0531  Gross per 24 hour   Intake 1180 ml   Output 2202 ml   Net -1022 ml     Lines/Drains/Airways       Drain  Duration                  Urethral Catheter 02/28/25 1305 Non-latex 16 Fr. 5 days              Peripheral Intravenous Line  Duration                  Peripheral IV - Single Lumen 02/28/25 2003 20 G Anterior;Left Forearm 5 days         Peripheral IV - Single Lumen 03/02/25 2330 20 G Anterior;Right Forearm 3 days                  Significant Labs:   Chemistries:   Recent Labs   Lab 03/01/25  0801 03/02/25  0320 03/02/25  1021 03/02/25  1906 03/03/25  0237 03/03/25  0939 03/03/25  1713 03/03/25  2149 03/04/25  0340 03/05/25  0412 03/06/25  0306    140  --   --  138  --   --   --  139 142 144   K 4.2 3.9  --   --  4.0  --   --   --  3.6 3.3* 4.3   * 113*  --   --  107  --   --   --  110* 111* 109*   CO2 17* 18*  --   --  20*  --   --   --  20* 22* 23   BUN 46.0* 38.1*  --   --  25.9*  --   --   --  26.4* 23.0 32.2*   CREATININE 2.44* 2.28*  --   --  2.17*  --   --   --  2.35* 2.43* 2.66*   CALCIUM 8.1* 8.4*  --   --  8.9  --   --   --  8.1* 7.9* 8.1*   BILITOT  --   --   --   --  0.5  --   --   --  0.3 0.2 0.2   ALKPHOS  --   --   --   --  54  --   --   --  55 56 62   ALT  --   --   --   --  32  --    "--   --  27 33 45   AST  --   --   --   --  35*  --   --   --  24 31 34   GLUCOSE 107 133*  --   --  144*  --   --   --  154* 96 92   MG 1.90 1.60  --   --   --   --   --   --  1.90 2.50 2.20   PHOS 2.1* 2.2*  --   --   --   --   --   --  3.3  --   --    TROPONINI  --   --    < > 0.598* 0.675* 0.503* 0.552* 0.532*  --   --   --     < > = values in this interval not displayed.        CBC/Anemia Labs: Coags:    Recent Labs   Lab 03/03/25  0939 03/04/25  0340 03/05/25  0412 03/06/25  0306   WBC  --  9.21 9.46 7.67   HGB  --  11.2* 10.3* 10.3*   HCT  --  33.4* 31.7* 32.0*   PLT  --  228 220 230   MCV  --  94.1* 94.9* 96.4*   RDW  --  14.0 14.0 14.1   IRON 99  --   --   --    FERRITIN 322.20*  --   --   --     No results for input(s): "PT", "INR", "APTT" in the last 168 hours.     EKG:       Telemetry: SR    Physical Exam  Constitutional:       General: He is not in acute distress.     Appearance: Normal appearance.   HENT:      Head: Normocephalic.      Mouth/Throat:      Mouth: Mucous membranes are dry.   Cardiovascular:      Rate and Rhythm: Normal rate and regular rhythm.      Pulses: Normal pulses.      Heart sounds: Normal heart sounds. No murmur heard.  Pulmonary:      Effort: Pulmonary effort is normal. No respiratory distress.      Breath sounds: Rhonchi present.      Comments: NC O2  Abdominal:      Palpations: Abdomen is soft.   Genitourinary:     Comments: + Indwelling Gandara  Skin:     General: Skin is warm.   Neurological:      General: No focal deficit present.      Mental Status: He is alert.   Psychiatric:         Mood and Affect: Mood normal.       Home Medications:   Medications Ordered Prior to Encounter[1]  Current Schedule Inpatient Medications:   aspirin  81 mg Oral Daily    atorvastatin  80 mg Oral Daily    cefTRIAXone (Rocephin) IV (PEDS and ADULTS)  2 g Intravenous Q24H    enoxparin  1 mg/kg Subcutaneous Q24H    ezetimibe  10 mg Oral Daily    insulin glargine U-100  10 Units Subcutaneous QHS    " metoprolol tartrate  50 mg Oral BID    pantoprazole  40 mg Oral Daily     Continuous Infusions:   D5 and 0.45% NaCl   Intravenous Continuous  mL/hr at 03/06/25 0544 New Bag at 03/06/25 0544     Assessment:   NSTEMI Type II in the Setting of DKA, Dehydration with Possibility of Type I given New CMO  Newly Diagnosed CMO/EF 35%    - ECHO (3.2.25) - LVEF 35%    - ECHO (Sept 2024) - Intact LVEF  AMS due to Delirium - Improving   Sinus Tachycardia - Now SR   DKA - Resolved     - DM II   Hypotension requiring Pressors - Resolved    - Hx of HTN   Lactic Acidosis - Resolved   Leukocytosis - Resolved   Bilateral Hydronephrosis/Hydroureter  UTI - POA/Chronic Catheter   DWAINE/CKD IIIa - Stable   RSV +  Acute Gastroenteritis  Hx of CVA (9.2024)  Anemia  CAD/Nonobstructive 2007  HLD  SHERMAN/Bilaterally Moderate  Chronic Urinary Retention requiring Indwelling Catheter  Electrolyte Derangements - Hypomagnesemia   No Hx of GIB     Plan:   Continue ASA, Statin and BB add farxiga   No ACEi/ARB/ARNI given DWAINE/CKD IIIa  Will plan for outpatient ischemic workup due to ongoing CKD   Nephrology Following for Renal Optimization  Cardiology will be available please reconsult if needed    Mary Rendon NP  Cardiology  [facility]          [1]   No current facility-administered medications on file prior to encounter.     Current Outpatient Medications on File Prior to Encounter   Medication Sig Dispense Refill    alcohol swabs (BD ALCOHOL SWABS) PadM Apply 1 each topically once daily. 200 each 12    alfuzosin (UROXATRAL) 10 mg Tb24 Take 1 tablet (10 mg total) by mouth daily with breakfast. 90 tablet 3    aspirin (ECOTRIN) 81 MG EC tablet Take 81 mg by mouth once daily.      b complex vitamins capsule Take 1 capsule by mouth once daily.      blood glucose control, low (TRUE METRIX LEVEL 1) Soln 1 Bottle by Misc.(Non-Drug; Combo Route) route once daily. 1 each 12    blood-glucose meter (TRUE METRIX AIR GLUCOSE METER) [facility] 1 each  by Misc.(Non-Drug; Combo Route) route once daily. 1 each 0    blood-glucose sensor (DEXCOM G7 SENSOR) Carmen 1 each by Misc.(Non-Drug; Combo Route) route once daily. 3 each 12    ezetimibe (ZETIA) 10 mg tablet Take 1 tablet (10 mg total) by mouth once daily. 90 tablet 3    finasteride (PROSCAR) 5 mg tablet Take 1 tablet (5 mg total) by mouth once daily. 30 tablet 11    lancets (TRUEPLUS LANCETS) 33 gauge Misc 1 lancet by Misc.(Non-Drug; Combo Route) route once daily. 200 each 12    metFORMIN (GLUCOPHAGE) 1000 MG tablet Take 1 tablet (1,000 mg total) by mouth 2 (two) times a day. 180 tablet 3    miconazole NITRATE 2 % (MICOTIN) 2 % top powder Apply topically 2 (two) times daily. 43 g 0    mirtazapine (REMERON) 15 MG tablet Take 1 tablet (15 mg total) by mouth once daily. 90 tablet 3    pantoprazole (PROTONIX) 40 MG tablet Take 1 tablet (40 mg total) by mouth once daily. 90 tablet 3    rosuvastatin (CRESTOR) 40 MG Tab Take 1 tablet (40 mg total) by mouth once daily. 90 tablet 3    SITagliptin phosphate (JANUVIA) 50 MG Tab Take 1 tablet (50 mg total) by mouth once daily. 90 tablet 0    TRUE METRIX GLUCOSE TEST STRIP Strp TEST BLOOD SUGAR EVERY  strip 3    vitamin D (VITAMIN D3) 1000 units Tab Take 1,000 Units by mouth once daily.      zinc oxide 20 % ointment Apply topically 2 (two) times a day.

## 2025-03-06 NOTE — PLAN OF CARE
Foc signed, referral will be sent to [facility] when medically ready

## 2025-03-06 NOTE — PROGRESS NOTES
Nephrology consult follow up note    HPI:      Froy Barragan is a 64 y.o. male has multiple medical problems and has been admitted for UTI, weakness, nausea vomiting diarrhea.  He is also found to have RSV positive.  Patient is nonverbal at present time and does not give any history but the nurses report the intermittently he gets agitation anal and he has been given Haldol.  He does not follow any command and does not give any answers to any questions.     I spoke to patient's sister Ms. Chantal Ricardo who told me that patient has had stroke in the past and has been getting some physical therapy at home.  He lives alone.  He has sitters come in the morning for 4-5 hours and again in the evening and they take care of all his meals.  He has no control on his bowels or bladder.  He uses depends.  He does have indwelling Gandara catheter because of the urinary incontinence.  He is able to take few steps on his own and also has intermittent understand to the questions and follows command.  He has 3 sisters who help him with his all business.  He has no wife or children.     His other medical problems include type 2 diabetes mellitus, hyperlipidemia, coronary artery disease, congestive heart failure, GERD, elevated PSA and urinary retention and has chronic indwelling Gandara catheter placed by Dr. Tyson Berry.  No history of kidney stones.  Now admitted for UTI and RSV.    Interval history:     03/05/2025  No acute events overnight.  No new complaints.  No chest pain, shortness of breath, abdominal pain, nausea, vomiting, or lower extremity edema.    03/06/2025  No acute events overnight.  No new complaints.  Does not seem like patient is drinking very much water.  No chest pain, abdominal pain, nausea, vomiting.     Review of Systems:     Unable to obtain full ROS due to mental status.    Past medical, family, surgical, and social history reviewed and unchanged from initial consult note.     Objective:        VITAL SIGNS: 24 HR MIN & MAX LAST    Temp  Min: 97.2 °F (36.2 °C)  Max: 98.8 °F (37.1 °C)  97.7 °F (36.5 °C)        BP  Min: 96/63  Max: 135/87  135/87     Pulse  Min: 69  Max: 82  80     Resp  Min: 18  Max: 20  18    SpO2  Min: 97 %  Max: 100 %  98 %      GEN: Chronically ill appearing white male in NAD  CV: RRR +S1,S2 without murmur  PULM: CTAB, unlabored  ABD: Soft, NT/ND abdomen with NABS  EXT: No cyanosis or edema  SKIN: Warm and dry  PSYCH: Awake, alert and answering questions appropriately.  Does seem to have underlying muscle disability, and has repetitive speech  Dialysis access:  No dialysis access            Component Value Date/Time     03/06/2025 0306     03/05/2025 0412     01/14/2025 2300     (H) 07/21/2024 2300    K 4.3 03/06/2025 0306    K 3.3 (L) 03/05/2025 0412    K 4.8 01/14/2025 2300    K 4.0 07/21/2024 2300    CO2 23 03/06/2025 0306    CO2 22 (L) 03/05/2025 0412    CO2 20 01/14/2025 2300    CO2 23 07/21/2024 2300    BUN 32.2 (H) 03/06/2025 0306    BUN 23.0 03/05/2025 0412    BUN 46 (H) 01/14/2025 2300    BUN 22 07/21/2024 2300    CREATININE 2.66 (H) 03/06/2025 0306    CREATININE 2.43 (H) 03/05/2025 0412    CREATININE 2.11 (H) 01/14/2025 2300    CREATININE 1.25 07/21/2024 2300    CALCIUM 8.1 (L) 03/06/2025 0306    CALCIUM 7.9 (L) 03/05/2025 0412    CALCIUM 9.3 01/14/2025 2300    CALCIUM 9.1 07/21/2024 2300    PHOS 3.3 03/04/2025 0340            Component Value Date/Time    WBC 7.67 03/06/2025 0306    WBC 9.46 03/05/2025 0412    WBC 24.83 02/28/2025 1123    WBC 6.86 09/09/2024 0120    HGB 10.3 (L) 03/06/2025 0306    HGB 10.3 (L) 03/05/2025 0412    HCT 32.0 (L) 03/06/2025 0306    HCT 31.7 (L) 03/05/2025 0412     03/06/2025 0306     03/05/2025 0412         Imaging reviewed      Assessment / Plan:       There are no hospital problems to display for this patient.      DWAINE probably secondary to ATN secondary to UTI   Underlying chronic kidney disease stage  3b to stage IV probably secondary diabetic nephrosclerosis - baseline creatinine 2-2.5  Metabolic acidosis secondary to above  Diabetes mellitus type 2   Hypertension   Hyperlipidemia   RSV positive   UTI   Previous stroke   History of coronary artery disease  Incontinent of urine and the bowels.  Anemia    Plan:  Renal function very slightly worsened today.  It does not seem like patient is drinking very much water.  We will restart IV fluids with LR at 50 mL/hr.  Discussion with patient yesterday, he did not seem like he wanted left heart catheterization.  His renal function is around his typical baseline.  If not getting left heart catheterization he is okay to discharge from Nephrology standpoint and can follow up with us in clinic in a few weeks.  Defer further discussion with heart catheterization to Cardiology and family, however, there is certainly a risk of worsening renal function after contrast exposure.    Derrell Ledbetter DO  Nephrology  [facility]  Clinic number: [phone]      Note was created with the assistance of electronic Dictation Services.  Note was reviewed to decrease errors, however, these may still be present.  Please contact me about questions or concerns with the dictation.

## 2025-03-07 LAB
ALBUMIN SERPL-MCNC: 2.8 G/DL (ref 3.4–4.8)
BASOPHILS # BLD AUTO: 0.09 X10(3)/MCL
BASOPHILS NFR BLD AUTO: 1 %
BUN SERPL-MCNC: 37.5 MG/DL (ref 8.4–25.7)
CALCIUM SERPL-MCNC: 8.4 MG/DL (ref 8.8–10)
CHLORIDE SERPL-SCNC: 107 MMOL/L (ref 98–107)
CO2 SERPL-SCNC: 26 MMOL/L (ref 23–31)
CREAT SERPL-MCNC: 2.47 MG/DL (ref 0.72–1.25)
EOSINOPHIL # BLD AUTO: 0.24 X10(3)/MCL (ref 0–0.9)
EOSINOPHIL NFR BLD AUTO: 2.8 %
ERYTHROCYTE [DISTWIDTH] IN BLOOD BY AUTOMATED COUNT: 14 % (ref 11.5–17)
GFR SERPLBLD CREATININE-BSD FMLA CKD-EPI: 28 ML/MIN/1.73/M2
GLUCOSE SERPL-MCNC: 115 MG/DL (ref 82–115)
HCT VFR BLD AUTO: 32.9 % (ref 42–52)
HGB BLD-MCNC: 10.6 G/DL (ref 14–18)
IMM GRANULOCYTES # BLD AUTO: 0.07 X10(3)/MCL (ref 0–0.04)
IMM GRANULOCYTES NFR BLD AUTO: 0.8 %
LYMPHOCYTES # BLD AUTO: 1.58 X10(3)/MCL (ref 0.6–4.6)
LYMPHOCYTES NFR BLD AUTO: 18.1 %
MCH RBC QN AUTO: 31.1 PG (ref 27–31)
MCHC RBC AUTO-ENTMCNC: 32.2 G/DL (ref 33–36)
MCV RBC AUTO: 96.5 FL (ref 80–94)
MONOCYTES # BLD AUTO: 0.86 X10(3)/MCL (ref 0.1–1.3)
MONOCYTES NFR BLD AUTO: 9.9 %
NEUTROPHILS # BLD AUTO: 5.88 X10(3)/MCL (ref 2.1–9.2)
NEUTROPHILS NFR BLD AUTO: 67.4 %
NRBC BLD AUTO-RTO: 0 %
OHS QRS DURATION: 80 MS
OHS QTC CALCULATION: 471 MS
PHOSPHATE SERPL-MCNC: 4.8 MG/DL (ref 2.3–4.7)
PLATELET # BLD AUTO: 254 X10(3)/MCL (ref 130–400)
PMV BLD AUTO: 11.7 FL (ref 7.4–10.4)
POCT GLUCOSE: 163 MG/DL (ref 70–110)
POCT GLUCOSE: 228 MG/DL (ref 70–110)
POCT GLUCOSE: 339 MG/DL (ref 70–110)
POTASSIUM SERPL-SCNC: 4.7 MMOL/L (ref 3.5–5.1)
RBC # BLD AUTO: 3.41 X10(6)/MCL (ref 4.7–6.1)
SODIUM SERPL-SCNC: 141 MMOL/L (ref 136–145)
WBC # BLD AUTO: 8.72 X10(3)/MCL (ref 4.5–11.5)

## 2025-03-07 PROCEDURE — 25000003 PHARM REV CODE 250

## 2025-03-07 PROCEDURE — 97530 THERAPEUTIC ACTIVITIES: CPT

## 2025-03-07 PROCEDURE — 94760 N-INVAS EAR/PLS OXIMETRY 1: CPT

## 2025-03-07 PROCEDURE — 25000003 PHARM REV CODE 250: Performed by: NURSE PRACTITIONER

## 2025-03-07 PROCEDURE — 85025 COMPLETE CBC W/AUTO DIFF WBC: CPT

## 2025-03-07 PROCEDURE — 27000207 HC ISOLATION

## 2025-03-07 PROCEDURE — 87045 FECES CULTURE AEROBIC BACT: CPT

## 2025-03-07 PROCEDURE — 80069 RENAL FUNCTION PANEL: CPT | Performed by: STUDENT IN AN ORGANIZED HEALTH CARE EDUCATION/TRAINING PROGRAM

## 2025-03-07 PROCEDURE — 36415 COLL VENOUS BLD VENIPUNCTURE: CPT

## 2025-03-07 PROCEDURE — 21400001 HC TELEMETRY ROOM

## 2025-03-07 PROCEDURE — 63600175 PHARM REV CODE 636 W HCPCS

## 2025-03-07 RX ORDER — ENOXAPARIN SODIUM 100 MG/ML
30 INJECTION SUBCUTANEOUS EVERY 24 HOURS
Status: DISCONTINUED | OUTPATIENT
Start: 2025-03-07 | End: 2025-03-13 | Stop reason: HOSPADM

## 2025-03-07 RX ORDER — INSULIN ASPART 100 [IU]/ML
0-10 INJECTION, SOLUTION INTRAVENOUS; SUBCUTANEOUS
Status: DISCONTINUED | OUTPATIENT
Start: 2025-03-07 | End: 2025-03-13 | Stop reason: HOSPADM

## 2025-03-07 RX ORDER — IBUPROFEN 200 MG
16 TABLET ORAL
Status: DISCONTINUED | OUTPATIENT
Start: 2025-03-07 | End: 2025-03-13 | Stop reason: HOSPADM

## 2025-03-07 RX ORDER — GLUCAGON 1 MG
1 KIT INJECTION
Status: DISCONTINUED | OUTPATIENT
Start: 2025-03-07 | End: 2025-03-13 | Stop reason: HOSPADM

## 2025-03-07 RX ORDER — IBUPROFEN 200 MG
24 TABLET ORAL
Status: DISCONTINUED | OUTPATIENT
Start: 2025-03-07 | End: 2025-03-13 | Stop reason: HOSPADM

## 2025-03-07 RX ADMIN — METOPROLOL TARTRATE 50 MG: 50 TABLET, FILM COATED ORAL at 09:03

## 2025-03-07 RX ADMIN — PANTOPRAZOLE SODIUM 40 MG: 40 TABLET, DELAYED RELEASE ORAL at 09:03

## 2025-03-07 RX ADMIN — EZETIMIBE 10 MG: 10 TABLET ORAL at 09:03

## 2025-03-07 RX ADMIN — ATORVASTATIN CALCIUM 80 MG: 40 TABLET, FILM COATED ORAL at 09:03

## 2025-03-07 RX ADMIN — ASPIRIN 81 MG: 81 TABLET, COATED ORAL at 09:03

## 2025-03-07 RX ADMIN — DAPAGLIFLOZIN 10 MG: 10 TABLET, FILM COATED ORAL at 09:03

## 2025-03-07 RX ADMIN — INSULIN ASPART 4 UNITS: 100 INJECTION, SOLUTION INTRAVENOUS; SUBCUTANEOUS at 01:03

## 2025-03-07 RX ADMIN — INSULIN GLARGINE 10 UNITS: 100 INJECTION, SOLUTION SUBCUTANEOUS at 09:03

## 2025-03-07 RX ADMIN — INSULIN ASPART 8 UNITS: 100 INJECTION, SOLUTION INTRAVENOUS; SUBCUTANEOUS at 05:03

## 2025-03-07 RX ADMIN — ENOXAPARIN SODIUM 30 MG: 30 INJECTION SUBCUTANEOUS at 05:03

## 2025-03-07 NOTE — PROGRESS NOTES
[facility] Hospital Medicine Progress Note        Chief Complaint: Inpatient Follow-up     HPI:   64-year-old gentleman with DM type 2, CKD stage 3, CVA 09/2024, chronic indwelling Gandara who was admitted after he presented with complaints of nausea with several episodes of vomiting and nonbloody diarrhea.  He denied having any chest pain, abdominal pain, SOB, cough, sputum production, headache, numbness, weakness, dizziness/lightheadedness or loss of consciousness.  On admission his labs showed leukocytosis of 24.8, normocytic anemia with H/H of 11.8/36.9, hyperkalemia of 5.5, CO2 15 with anion gap 20, BUN/creatinine 66.9/3.69, negative troponins, lactic acid of 3.3, beta hydroxybutyrate of 2.9.  UA showed 3+ protein, 2+ glucose, 1+ ketones, 3+ blood, leukocyte esterase, WBCs as well as bacteria.  Blood cultures and urine culture were sent off.  CT C/A/P was done which showed extensive urinary bladder wall thickening with inflammatory changes around urinary bladder consistent with acute cystitis causing secondary obstructive changes and secondary B/L hydronephrosis and hydroureter, RLL atelectasis.  Admitted to ICU on insulin infusion per DKA protocol.  Started on broad-spectrum antibiotics.  Given IVF resuscitation.  Urine culture growing GNR.  Noted to have low-grade fevers overnight on 03/01.  Insulin infusion discontinued and patient is started on long-acting insulin. Patient also found to be RSV positive. Downgraded to . Troponins increased to 0.114; trended q8 & Echo ordered. CIS consulted. FD Lovenox ordered. Urology consulted for hydronephrosis; will follow recommendations. Noted to have hypoglycemic episodes overnight 03/03. On IV D5/NS. Tachycardia improved. Trop increased to 0.675 overnight. Echo showing EF 35%. BUN/Cr improved at 25.9/2.17; remains on IV D5/NS. UCX growing Proteus. Will DC Cefepime in case it is causing AMS & start IV Rocephin. CT Head ordered which was  unremarkable. Decreased Lantus to 15 units nightly till PO intake improves.     Patient and family thinking about LHC. Opting to do outpatient work up in the setting of kidney function.     Interval Hx:   NAEO. Creatine improved. Sister at bedside. All questions answered.     Case was discussed with patient's nurse and  on the floor.    Objective/physical exam:  General: alert male lying comfortably in bed, in no acute distress  HENT: oral and oropharyngeal mucosa moist, pink, with no erythema or exudates, no ear pain or discharge  Neck: normal neck movement, no lymph nodes or swellings, no JVD or Carotid bruit  Respiratory: clear breathing sounds bilaterally, no crackles, rales, ronchi or wheezes  Cardiovascular: clear S1 and S2, no murmurs, rubs or gallops  Peripheral Vascular: no lesions, ulcers or erosions, normal peripheral pulses and no pedal edema  Gastrointestinal: soft, non-tender, non-distended abdomen, no guarding, rigidity or rebound tenderness, normal bowel sounds  Integumentary: normal skin color, no rashes or lesions  Neuro: AAO x 3, no focal deficits    VITAL SIGNS: 24 HRS MIN & MAX LAST   Temp  Min: 97.4 °F (36.3 °C)  Max: 98.4 °F (36.9 °C) 97.7 °F (36.5 °C)   BP  Min: 100/58  Max: 136/74 120/78   Pulse  Min: 60  Max: 86  65   Resp  Min: 16  Max: 18 18   SpO2  Min: 97 %  Max: 100 % 98 %     I have reviewed the following labs:  Recent Labs   Lab 03/05/25  0412 03/06/25  0306 03/07/25  0358   WBC 9.46 7.67 8.72   RBC 3.34* 3.32* 3.41*   HGB 10.3* 10.3* 10.6*   HCT 31.7* 32.0* 32.9*   MCV 94.9* 96.4* 96.5*   MCH 30.8 31.0 31.1*   MCHC 32.5* 32.2* 32.2*   RDW 14.0 14.1 14.0    230 254   MPV 11.0* 11.1* 11.7*     Recent Labs   Lab 03/02/25  0720 03/03/25  0237 03/04/25  0340 03/05/25  0412 03/06/25  0306 03/07/25  0358   NA  --    < > 139 142 144 141   K  --    < > 3.6 3.3* 4.3 4.7   CL  --    < > 110* 111* 109* 107   CO2  --    < > 20* 22* 23 26   BUN  --    < > 26.4* 23.0 32.2* 37.5*    CREATININE  --    < > 2.35* 2.43* 2.66* 2.47*   CALCIUM  --    < > 8.1* 7.9* 8.1* 8.4*   PH 7.460*  --   --   --   --   --    MG  --   --  1.90 2.50 2.20  --    ALBUMIN  --    < > 2.7* 2.7* 2.7* 2.8*   ALKPHOS  --    < > 55 56 62  --    ALT  --    < > 27 33 45  --    AST  --    < > 24 31 34  --    BILITOT  --    < > 0.3 0.2 0.2  --     < > = values in this interval not displayed.     Assessment/Plan:  # DKA-resolved  # AMS 2/2 Delirium vs Cefepime induced  # Tachycardia possibly 2/2 NSTEMI vs Intravascular Depletion  # Troponinemia 2/2 NSTEMI type I vs type II  # Acute sytolic heart failure  # Lactic Acidemia 2/2 Intravascular Depletion  # Proteus UTI  # Leukocytosis 2/2 above  # B/L Hydronephrosis & Hydroureter  # DWAINE on CKD III 2/2 Prerenal vs Postrenal Azotemia  # RSV antigen positive  # Acute gastroenteritis  # Prior CVA 09/2024  # Normocytic anemia  # Left kidney cyst     - Reporting no new complaints; noted to be much more awake and oriented today  - Troponins uptrended overnight 03/03  - Echo showing EF 35%  - On FD Lovenox for NSTEMI but will switch to prophylaxis lovenox given no plans by cardiology for inpatient ischemic eval  - CIS consulted for troponinemia; follow recommendations   - cardiology informed regarding family is ok with [facility] but cardiology planned for outpatient ischemic work up  - Nephrology recommended for renal clearance for [facility]  - CT Head negative  - Consulted Urology for B/L hydronephrosis associated with bladder wall thickening; signed off  - On IV Rocephin - last day of Abx 3/6 (day 7)  - Urine culture growing Proteus  - Blood cultures negative   - decrease glargine to 10 units and start ISS moderate scale   - On ISS LD   - Continued on home aspirin, atorvastatin, Zetia, mirtazapine, Protonix  - add farxiga  - continue gentle fluids  - PT/OT consulted; recommending moderate intensity therapy  - psych consult    VTE prophylaxis: Lovenox    Patient condition:  Stable    Anticipated  discharge and Disposition:     Pending    All diagnosis and differential diagnosis have been reviewed; assessment and plan has been documented; I have personally reviewed the labs and test results that are presently available; I have reviewed the patients medication list; I have reviewed the consulting providers response and recommendations. I have reviewed or attempted to review medical records based upon their availability    All of the patient's questions have been  addressed and answered. Patient's is agreeable to the above stated plan. I will continue to monitor closely and make adjustments to medical management as needed.      Radiology:  I have personally reviewed the following imaging and agree with the radiologist.     CT Head Without Contrast  Narrative: EXAMINATION:  CT HEAD WITHOUT CONTRAST    CLINICAL HISTORY:  Mental status change, unknown cause;    TECHNIQUE:  CT images of the head without IV contrast. Axial, coronal and sagittal images reviewed. Dose length product 907 mGycm. Automatic exposure control, adjustment of mA/kV or iterative reconstruction technique used to limit radiation dose.    COMPARISON:  CT 09/29/2024    FINDINGS:  Extra-axial spaces/ventricular system: Normal for age.    Intracranial hemorrhage: None identified.    Cerebral parenchyma: No acute large vessel territory infarct or mass effect identified. Remote right basal ganglia lacunar infarct.  Mild to moderate chronic small vessel ischemic changes have similar appearance.    Vascular system: No hyperdense vessel appreciated. Bilateral carotid siphon and distal vertebral artery calcifications.    Cerebellum: Normal.    Sella: Normal.    Included paranasal sinuses and mastoid air cells: Opacified right mastoid air cells.    Visualized orbits: Normal.    Scalp/Calvarium: No depressed skull fracture.  Impression: No acute intracranial process identified.    Electronically signed by: Ky  Barry  Date:    03/03/2025  Time:    15:30  US Retroperitoneal Complete  Narrative: EXAMINATION:  US RETROPERITONEAL COMPLETE    CLINICAL HISTORY:  reassess bilateral hydronephrosis;  Disorder of kidney and ureter, unspecified    COMPARISON:  09/08/2024    FINDINGS:  Right kidney is poorly demonstrated due to body habitus.  It measures 10.6 x 5.5 x 5.3 cm.  No focal lesions are noted there is no hydronephrosis.    There is flow present the proximal IVC.  Aorta is incompletely visualized due to overlying bowel gas.  There is a Gandara catheter in place.  The wall of the urinary bladder appears thickened measuring 1.3 cm however the bladder is collapsed.    The left kidney measures 9.8 by 4.6 x 4.8 cm.  There is a simple cyst present measuring 1.5 x 1.7 x 1.4 cm.  There is no hydronephrosis.  Impression: Simple cyst of the left kidney.    Changes consistent with thickening of the wall of the urinary bladder.    Electronically signed by: yTrese Díaz MD  Date:    03/03/2025  Time:    08:59      Bull Zaidi MD  Department of Hospital Medicine   [facility]   03/07/2025

## 2025-03-07 NOTE — PT/OT/SLP PROGRESS
Occupational Therapy   Treatment    Name: Froy Barragan  MRN: [id number]  Admitting Diagnosis:  <principal problem not specified>       Recommendations:     Recommended therapy intensity at discharge: Moderate Intensity Therapy   Discharge Equipment Recommendations:  to be determined by next level of care  Barriers to discharge:       Assessment:     Froy Barragan is a 64 y.o. male with a medical diagnosis of Generalized weakness, Diabetic ketoacidosis, Sepsis, Acute on chronic renal insufficiency, RSV, and Acute UTI, hx of CVA 9/24. Performance deficits affecting function are weakness, impaired functional mobility, impaired cognition, decreased safety awareness, impaired coordination, impaired endurance, gait instability, decreased coordination, impaired fine motor, impaired joint extensibility, abnormal tone, impaired skin, decreased upper extremity function, decreased lower extremity function, impaired balance, visual deficits, impaired self care skills, decreased ROM.     Rehab Prognosis:  Fair; patient would benefit from acute skilled OT services to address these deficits and reach maximum level of function.       Plan:     Patient to be seen 4 x/week to address the above listed problems via self-care/home management, therapeutic activities, therapeutic exercises  Plan of Care Expires: 04/03/25  Plan of Care Reviewed with: patient    Subjective     Pain/Comfort:  Pain Rating 1: 0/10    Objective:     Communicated with: RN prior to session.  Patient found HOB elevated with telemetry, pulse ox (continuous), peripheral IV, colbert catheter upon OT entry to room.    General Precautions: Standard, aphasia, pureed diet, fall    Orthopedic Precautions:N/A  Braces: N/A  Respiratory Status: Room air  Vital Signs: HR: 76 bpm     Occupational Performance:   Pt participate in fxnl bed mobility and steps to chair to progress transfers for increased ADL independence. BSC ordered to address toilet transfers &  toileting ADLs.     Bed Mobility:    Patient completed Scooting/Bridging with moderate assistance  Patient completed Supine to Sit with moderate assistance     Functional Mobility/Transfers:  Patient completed Sit <> Stand Transfer with minimum assistance  with  rolling walker and cues for hand placement   Patient completed Bed <> Chair Transfer using Step Transfer technique with moderate assistance and of 2 persons with rolling walker  Functional Mobility: brief steps to chair with mod A x2 and RW. Pt with ataxic gait and requiring A to maintain stand balance.     Therapeutic Activities:  Pt completed x4 STS trials at bedside to promote dynamic sit balance, UB strengthening, transfer skills/safety.    Pt requires Tonja x1-2, cues for technique.  Flexed stand posture, unable to achieve full hip extension despite V/T cuing.     Skin assessment: All visible skin intact.     [facility] 6 Click ADL: 12    Patient Education:  Patient provided with verbal education education regarding OT role/goals/POC, fall prevention, safety awareness, and Discharge/DME recommendations.  Understanding was verbalized.      Patient left up in chair with all lines intact, call button in reach, and RN notified.    GOALS:   Multidisciplinary Problems       Occupational Therapy Goals          Problem: Occupational Therapy    Goal Priority Disciplines Outcome Interventions   Occupational Therapy Goal     OT, PT/OT Progressing    Description: Goals to be met by: 4/3/25     Patient will increase functional independence with ADLs by performing:    UE Dressing with Minimal Assistance.  LE Dressing with Minimal Assistance.  Grooming while seated at sink with Stand-by Assistance.  Toileting from bedside commode with Minimal Assistance for hygiene and clothing management.   Toilet transfer to bedside commode with Minimal Assistance.  Showering from shower chair w/ minimal assistance.   Inc functional awareness of the L side for safety during daily life  tasks.                          Time Tracking:     OT Date of Treatment: 03/07/25  OT Start Time: 1447  OT Stop Time: 1510  OT Total Time (min): 23 min    Billable Minutes:Therapeutic Activity 2    OT/JOLENE: OT     Number of JOLENE visits since last OT visit: 2    3/7/2025

## 2025-03-07 NOTE — PROGRESS NOTES
Nephrology consult follow up note    HPI:      Froy Barragan is a 64 y.o. male has multiple medical problems and has been admitted for UTI, weakness, nausea vomiting diarrhea.  He is also found to have RSV positive.  Patient is nonverbal at present time and does not give any history but the nurses report the intermittently he gets agitation anal and he has been given Haldol.  He does not follow any command and does not give any answers to any questions.     I spoke to patient's sister Ms. Chantal Ricardo who told me that patient has had stroke in the past and has been getting some physical therapy at home.  He lives alone.  He has sitters come in the morning for 4-5 hours and again in the evening and they take care of all his meals.  He has no control on his bowels or bladder.  He uses depends.  He does have indwelling Gandara catheter because of the urinary incontinence.  He is able to take few steps on his own and also has intermittent understand to the questions and follows command.  He has 3 sisters who help him with his all business.  He has no wife or children.     His other medical problems include type 2 diabetes mellitus, hyperlipidemia, coronary artery disease, congestive heart failure, GERD, elevated PSA and urinary retention and has chronic indwelling Gandara catheter placed by Dr. Tyson Berry.  No history of kidney stones.  Now admitted for UTI and RSV.    Interval history:     03/05/2025  No acute events overnight.  No new complaints.  No chest pain, shortness of breath, abdominal pain, nausea, vomiting, or lower extremity edema.    03/06/2025  No acute events overnight.  No new complaints.  Does not seem like patient is drinking very much water.  No chest pain, abdominal pain, nausea, vomiting.    03/07/2025.    Making good urine output, 1.1 L yesterday.  Cardiology is planning for outpatient workup with left heart catheterization.  He is still not drinking much water.     Review of  Systems:     Unable to obtain full ROS due to mental status.    Past medical, family, surgical, and social history reviewed and unchanged from initial consult note.     Objective:       VITAL SIGNS: 24 HR MIN & MAX LAST    Temp  Min: 97.4 °F (36.3 °C)  Max: 98.4 °F (36.9 °C)  97.4 °F (36.3 °C)        BP  Min: 100/58  Max: 136/74  120/78     Pulse  Min: 60  Max: 86  64     Resp  Min: 16  Max: 18  16    SpO2  Min: 97 %  Max: 100 %  100 %      GEN: Chronically ill appearing white male in NAD  CV: RRR +S1,S2 without murmur  PULM: CTAB, unlabored  ABD: Soft, NT/ND abdomen with NABS  EXT: No cyanosis or edema  SKIN: Warm and dry  PSYCH: Awake, alert and answering questions appropriately.  Does seem to have underlying muscle disability, and has repetitive speech  Dialysis access:  No dialysis access            Component Value Date/Time     03/07/2025 0358     03/06/2025 0306     01/14/2025 2300     (H) 07/21/2024 2300    K 4.7 03/07/2025 0358    K 4.3 03/06/2025 0306    K 4.8 01/14/2025 2300    K 4.0 07/21/2024 2300    CO2 26 03/07/2025 0358    CO2 23 03/06/2025 0306    CO2 20 01/14/2025 2300    CO2 23 07/21/2024 2300    BUN 37.5 (H) 03/07/2025 0358    BUN 32.2 (H) 03/06/2025 0306    BUN 46 (H) 01/14/2025 2300    BUN 22 07/21/2024 2300    CREATININE 2.47 (H) 03/07/2025 0358    CREATININE 2.66 (H) 03/06/2025 0306    CREATININE 2.11 (H) 01/14/2025 2300    CREATININE 1.25 07/21/2024 2300    CALCIUM 8.4 (L) 03/07/2025 0358    CALCIUM 8.1 (L) 03/06/2025 0306    CALCIUM 9.3 01/14/2025 2300    CALCIUM 9.1 07/21/2024 2300    PHOS 4.8 (H) 03/07/2025 0358            Component Value Date/Time    WBC 8.72 03/07/2025 0358    WBC 7.67 03/06/2025 0306    WBC 24.83 02/28/2025 1123    WBC 6.86 09/09/2024 0120    HGB 10.6 (L) 03/07/2025 0358    HGB 10.3 (L) 03/06/2025 0306    HCT 32.9 (L) 03/07/2025 0358    HCT 32.0 (L) 03/06/2025 0306     03/07/2025 0358     03/06/2025 0306         Imaging  reviewed      Assessment / Plan:       There are no hospital problems to display for this patient.      DWAINE probably secondary to ATN secondary to UTI   Underlying chronic kidney disease stage 3b to stage IV probably secondary diabetic nephrosclerosis - baseline creatinine 2-2.5  Metabolic acidosis secondary to above  Diabetes mellitus type 2   Hypertension   Hyperlipidemia   RSV positive   UTI   Previous stroke   History of coronary artery disease  Incontinent of urine and the bowels.  Anemia    Plan:  Renal function remained stable.  This is likely his baseline.  Cardiology is planning for outpatient workup of HFrEF with outpatient heart catheterization.  Encourage p.o. water intake.  Keep him on low-dose IV fluids for now until his p.o. intake improved.  From a Nephrology standpoint he is okay to discharge whenever ready.  He can follow up with us in clinic in a couple of weeks.    Derrell Ledbetter DO  Nephrology  [facility]  Clinic number: [phone]      Note was created with the assistance of electronic Dictation Services.  Note was reviewed to decrease errors, however, these may still be present.  Please contact me about questions or concerns with the dictation.

## 2025-03-07 NOTE — PLAN OF CARE
Problem: Infection  Goal: Absence of Infection Signs and Symptoms  Outcome: Progressing     Problem: Adult Inpatient Plan of Care  Goal: Plan of Care Review  Outcome: Progressing  Goal: Absence of Hospital-Acquired Illness or Injury  Outcome: Progressing  Goal: Optimal Comfort and Wellbeing  Outcome: Progressing     Problem: Diabetes Comorbidity  Goal: Blood Glucose Level Within Targeted Range  Outcome: Progressing     Problem: Skin Injury Risk Increased  Goal: Skin Health and Integrity  Outcome: Progressing     Problem: Fall Injury Risk  Goal: Absence of Fall and Fall-Related Injury  Outcome: Progressing     Problem: Infection  Goal: Absence of Infection Signs and Symptoms  Outcome: Progressing     Problem: Adult Inpatient Plan of Care  Goal: Plan of Care Review  Outcome: Progressing  Goal: Absence of Hospital-Acquired Illness or Injury  Outcome: Progressing  Goal: Optimal Comfort and Wellbeing  Outcome: Progressing     Problem: Diabetes Comorbidity  Goal: Blood Glucose Level Within Targeted Range  Outcome: Progressing     Problem: Skin Injury Risk Increased  Goal: Skin Health and Integrity  Outcome: Progressing     Problem: Fall Injury Risk  Goal: Absence of Fall and Fall-Related Injury  Outcome: Progressing

## 2025-03-08 LAB
ANION GAP SERPL CALC-SCNC: 7 MEQ/L
BASOPHILS # BLD AUTO: 0.08 X10(3)/MCL
BASOPHILS NFR BLD AUTO: 1.1 %
BUN SERPL-MCNC: 38.4 MG/DL (ref 8.4–25.7)
CALCIUM SERPL-MCNC: 8.6 MG/DL (ref 8.8–10)
CHLORIDE SERPL-SCNC: 107 MMOL/L (ref 98–107)
CO2 SERPL-SCNC: 26 MMOL/L (ref 23–31)
CREAT SERPL-MCNC: 2.46 MG/DL (ref 0.72–1.25)
CREAT/UREA NIT SERPL: 16
EOSINOPHIL # BLD AUTO: 0.22 X10(3)/MCL (ref 0–0.9)
EOSINOPHIL NFR BLD AUTO: 3.1 %
ERYTHROCYTE [DISTWIDTH] IN BLOOD BY AUTOMATED COUNT: 14 % (ref 11.5–17)
GFR SERPLBLD CREATININE-BSD FMLA CKD-EPI: 28 ML/MIN/1.73/M2
GLUCOSE SERPL-MCNC: 158 MG/DL (ref 82–115)
HCT VFR BLD AUTO: 32.2 % (ref 42–52)
HGB BLD-MCNC: 10.5 G/DL (ref 14–18)
IMM GRANULOCYTES # BLD AUTO: 0.06 X10(3)/MCL (ref 0–0.04)
IMM GRANULOCYTES NFR BLD AUTO: 0.8 %
LYMPHOCYTES # BLD AUTO: 1.44 X10(3)/MCL (ref 0.6–4.6)
LYMPHOCYTES NFR BLD AUTO: 20.3 %
MAGNESIUM SERPL-MCNC: 2 MG/DL (ref 1.6–2.6)
MCH RBC QN AUTO: 31.6 PG (ref 27–31)
MCHC RBC AUTO-ENTMCNC: 32.6 G/DL (ref 33–36)
MCV RBC AUTO: 97 FL (ref 80–94)
MONOCYTES # BLD AUTO: 0.73 X10(3)/MCL (ref 0.1–1.3)
MONOCYTES NFR BLD AUTO: 10.3 %
NEUTROPHILS # BLD AUTO: 4.56 X10(3)/MCL (ref 2.1–9.2)
NEUTROPHILS NFR BLD AUTO: 64.4 %
NRBC BLD AUTO-RTO: 0 %
PLATELET # BLD AUTO: 254 X10(3)/MCL (ref 130–400)
PMV BLD AUTO: 11.4 FL (ref 7.4–10.4)
POCT GLUCOSE: 164 MG/DL (ref 70–110)
POCT GLUCOSE: 215 MG/DL (ref 70–110)
POCT GLUCOSE: 260 MG/DL (ref 70–110)
POCT GLUCOSE: 300 MG/DL (ref 70–110)
POTASSIUM SERPL-SCNC: 5.5 MMOL/L (ref 3.5–5.1)
RBC # BLD AUTO: 3.32 X10(6)/MCL (ref 4.7–6.1)
SODIUM SERPL-SCNC: 140 MMOL/L (ref 136–145)
WBC # BLD AUTO: 7.09 X10(3)/MCL (ref 4.5–11.5)

## 2025-03-08 PROCEDURE — 80048 BASIC METABOLIC PNL TOTAL CA: CPT

## 2025-03-08 PROCEDURE — 85025 COMPLETE CBC W/AUTO DIFF WBC: CPT

## 2025-03-08 PROCEDURE — 25000003 PHARM REV CODE 250

## 2025-03-08 PROCEDURE — 63600175 PHARM REV CODE 636 W HCPCS: Performed by: STUDENT IN AN ORGANIZED HEALTH CARE EDUCATION/TRAINING PROGRAM

## 2025-03-08 PROCEDURE — 63600175 PHARM REV CODE 636 W HCPCS

## 2025-03-08 PROCEDURE — 83735 ASSAY OF MAGNESIUM: CPT

## 2025-03-08 PROCEDURE — 25000003 PHARM REV CODE 250: Performed by: NURSE PRACTITIONER

## 2025-03-08 PROCEDURE — 21400001 HC TELEMETRY ROOM

## 2025-03-08 PROCEDURE — 36415 COLL VENOUS BLD VENIPUNCTURE: CPT

## 2025-03-08 PROCEDURE — 27000207 HC ISOLATION

## 2025-03-08 RX ORDER — INSULIN GLARGINE 100 [IU]/ML
12 INJECTION, SOLUTION SUBCUTANEOUS NIGHTLY
Status: DISCONTINUED | OUTPATIENT
Start: 2025-03-08 | End: 2025-03-11

## 2025-03-08 RX ORDER — MIRTAZAPINE 15 MG/1
15 TABLET, FILM COATED ORAL NIGHTLY
Status: DISCONTINUED | OUTPATIENT
Start: 2025-03-08 | End: 2025-03-13 | Stop reason: HOSPADM

## 2025-03-08 RX ADMIN — ATORVASTATIN CALCIUM 80 MG: 40 TABLET, FILM COATED ORAL at 08:03

## 2025-03-08 RX ADMIN — SODIUM ZIRCONIUM CYCLOSILICATE 10 G: 10 POWDER, FOR SUSPENSION ORAL at 08:03

## 2025-03-08 RX ADMIN — INSULIN GLARGINE 12 UNITS: 100 INJECTION, SOLUTION SUBCUTANEOUS at 10:03

## 2025-03-08 RX ADMIN — INSULIN ASPART 4 UNITS: 100 INJECTION, SOLUTION INTRAVENOUS; SUBCUTANEOUS at 05:03

## 2025-03-08 RX ADMIN — EZETIMIBE 10 MG: 10 TABLET ORAL at 08:03

## 2025-03-08 RX ADMIN — MIRTAZAPINE 15 MG: 15 TABLET, FILM COATED ORAL at 09:03

## 2025-03-08 RX ADMIN — DAPAGLIFLOZIN 10 MG: 10 TABLET, FILM COATED ORAL at 08:03

## 2025-03-08 RX ADMIN — INSULIN ASPART 3 UNITS: 100 INJECTION, SOLUTION INTRAVENOUS; SUBCUTANEOUS at 10:03

## 2025-03-08 RX ADMIN — METOPROLOL TARTRATE 50 MG: 50 TABLET, FILM COATED ORAL at 08:03

## 2025-03-08 RX ADMIN — INSULIN ASPART 6 UNITS: 100 INJECTION, SOLUTION INTRAVENOUS; SUBCUTANEOUS at 12:03

## 2025-03-08 RX ADMIN — ASPIRIN 81 MG: 81 TABLET, COATED ORAL at 08:03

## 2025-03-08 RX ADMIN — SODIUM CHLORIDE, POTASSIUM CHLORIDE, SODIUM LACTATE AND CALCIUM CHLORIDE: 600; 310; 30; 20 INJECTION, SOLUTION INTRAVENOUS at 05:03

## 2025-03-08 RX ADMIN — ENOXAPARIN SODIUM 30 MG: 30 INJECTION SUBCUTANEOUS at 05:03

## 2025-03-08 RX ADMIN — PANTOPRAZOLE SODIUM 40 MG: 40 TABLET, DELAYED RELEASE ORAL at 08:03

## 2025-03-08 NOTE — PROGRESS NOTES
[facility] Hospital Medicine Progress Note        Chief Complaint: Inpatient Follow-up     HPI:   64-year-old gentleman with DM type 2, CKD stage 3, CVA 09/2024, chronic indwelling Gandara who was admitted after he presented with complaints of nausea with several episodes of vomiting and nonbloody diarrhea.  He denied having any chest pain, abdominal pain, SOB, cough, sputum production, headache, numbness, weakness, dizziness/lightheadedness or loss of consciousness.  On admission his labs showed leukocytosis of 24.8, normocytic anemia with H/H of 11.8/36.9, hyperkalemia of 5.5, CO2 15 with anion gap 20, BUN/creatinine 66.9/3.69, negative troponins, lactic acid of 3.3, beta hydroxybutyrate of 2.9.  UA showed 3+ protein, 2+ glucose, 1+ ketones, 3+ blood, leukocyte esterase, WBCs as well as bacteria.  Blood cultures and urine culture were sent off.  CT C/A/P was done which showed extensive urinary bladder wall thickening with inflammatory changes around urinary bladder consistent with acute cystitis causing secondary obstructive changes and secondary B/L hydronephrosis and hydroureter, RLL atelectasis.  Admitted to ICU on insulin infusion per DKA protocol.  Started on broad-spectrum antibiotics.  Given IVF resuscitation.  Urine culture growing GNR.  Noted to have low-grade fevers overnight on 03/01.  Insulin infusion discontinued and patient is started on long-acting insulin. Patient also found to be RSV positive. Downgraded to . Troponins increased to 0.114; trended q8 & Echo ordered. CIS consulted. FD Lovenox ordered. Urology consulted for hydronephrosis; will follow recommendations. Noted to have hypoglycemic episodes overnight 03/03. On IV D5/NS. Tachycardia improved. Trop increased to 0.675 overnight. Echo showing EF 35%. BUN/Cr improved at 25.9/2.17; remains on IV D5/NS. UCX growing Proteus. Will DC Cefepime in case it is causing AMS & start IV Rocephin. CT Head ordered which was  unremarkable. Decreased Lantus to 15 units nightly till PO intake improves.     Patient and family thinking about LHC. Opting to do outpatient work up in the setting of kidney function. Psych consulted as per family request for inability to keep focus.     Interval Hx:   NAEO. Creatine stable. Told patient that he is pending psych eval. Otherwise doing ok. Encouraged PO intake.     Case was discussed with patient's nurse and  on the floor.    Objective/physical exam:  General: alert male lying comfortably in bed, in no acute distress  HENT: oral and oropharyngeal mucosa moist, pink, with no erythema or exudates, no ear pain or discharge  Neck: normal neck movement, no lymph nodes or swellings, no JVD or Carotid bruit  Respiratory: clear breathing sounds bilaterally, no crackles, rales, ronchi or wheezes  Cardiovascular: clear S1 and S2, no murmurs, rubs or gallops  Peripheral Vascular: no lesions, ulcers or erosions, normal peripheral pulses and no pedal edema  Gastrointestinal: soft, non-tender, non-distended abdomen, no guarding, rigidity or rebound tenderness, normal bowel sounds  Integumentary: normal skin color, no rashes or lesions  Neuro: AAO x 3, no focal deficits    VITAL SIGNS: 24 HRS MIN & MAX LAST   Temp  Min: 97.3 °F (36.3 °C)  Max: 98 °F (36.7 °C) 98 °F (36.7 °C)   BP  Min: 99/63  Max: 113/79 112/70   Pulse  Min: 65  Max: 86  70   Resp  Min: 16  Max: 19 16   SpO2  Min: 96 %  Max: 99 % 98 %     I have reviewed the following labs:  Recent Labs   Lab 03/06/25  0306 03/07/25  0358 03/08/25  0533   WBC 7.67 8.72 7.09   RBC 3.32* 3.41* 3.32*   HGB 10.3* 10.6* 10.5*   HCT 32.0* 32.9* 32.2*   MCV 96.4* 96.5* 97.0*   MCH 31.0 31.1* 31.6*   MCHC 32.2* 32.2* 32.6*   RDW 14.1 14.0 14.0    254 254   MPV 11.1* 11.7* 11.4*     Recent Labs   Lab 03/02/25  0720 03/03/25  0237 03/04/25  0340 03/05/25  0412 03/06/25  0306 03/07/25  0358 03/08/25  0533   NA  --    < > 139 142 144 141 140   K  --    < >  3.6 3.3* 4.3 4.7 5.5*   CL  --    < > 110* 111* 109* 107 107   CO2  --    < > 20* 22* 23 26 26   BUN  --    < > 26.4* 23.0 32.2* 37.5* 38.4*   CREATININE  --    < > 2.35* 2.43* 2.66* 2.47* 2.46*   CALCIUM  --    < > 8.1* 7.9* 8.1* 8.4* 8.6*   PH 7.460*  --   --   --   --   --   --    MG  --   --  1.90 2.50 2.20  --  2.00   ALBUMIN  --    < > 2.7* 2.7* 2.7* 2.8*  --    ALKPHOS  --    < > 55 56 62  --   --    ALT  --    < > 27 33 45  --   --    AST  --    < > 24 31 34  --   --    BILITOT  --    < > 0.3 0.2 0.2  --   --     < > = values in this interval not displayed.     Assessment/Plan:  # DKA-resolved  # AMS 2/2 Delirium vs Cefepime induced  # Tachycardia possibly 2/2 NSTEMI vs Intravascular Depletion  # Troponinemia 2/2 NSTEMI type I vs type II  # Acute sytolic heart failure  # Lactic Acidemia 2/2 Intravascular Depletion  # Proteus UTI  # Leukocytosis 2/2 above  # B/L Hydronephrosis & Hydroureter  # DWAINE on CKD III 2/2 Prerenal vs Postrenal Azotemia  # RSV antigen positive  # Acute gastroenteritis  # Prior CVA 09/2024  # Normocytic anemia  # Left kidney cyst     - Reporting no new complaints; noted to be much more awake and oriented today  - Troponins uptrended overnight 03/03  - Echo showing EF 35%  - was on FD Lovenox for NSTEMI but switched to prophylaxis lovenox given no plans by cardiology for inpatient ischemic eval  - CIS consulted for troponinemia; follow recommendations   - cardiology informed regarding family is ok with LH but cardiology planned for outpatient ischemic work up  - Nephrology recommended for renal clearance for [facility]  - CT Head negative  - Consulted Urology for B/L hydronephrosis associated with bladder wall thickening; signed off  - IV Rocephin - last day of Abx 3/6 (day 7)  - Urine culture growing Proteus  - Blood cultures negative   - increase glargine 12 units units and ISS moderate scale   - continue on home aspirin, atorvastatin, Zetia, mirtazapine, Protonix  - continue farxiga  -  continue gentle fluids and encourage PO intake  - PT/OT consulted; recommending moderate intensity therapy  - psych consult    VTE prophylaxis: Lovenox    Patient condition:  Stable    Anticipated discharge and Disposition:     Pending    All diagnosis and differential diagnosis have been reviewed; assessment and plan has been documented; I have personally reviewed the labs and test results that are presently available; I have reviewed the patients medication list; I have reviewed the consulting providers response and recommendations. I have reviewed or attempted to review medical records based upon their availability    All of the patient's questions have been  addressed and answered. Patient's is agreeable to the above stated plan. I will continue to monitor closely and make adjustments to medical management as needed.      Radiology:  I have personally reviewed the following imaging and agree with the radiologist.     CT Head Without Contrast  Narrative: EXAMINATION:  CT HEAD WITHOUT CONTRAST    CLINICAL HISTORY:  Mental status change, unknown cause;    TECHNIQUE:  CT images of the head without IV contrast. Axial, coronal and sagittal images reviewed. Dose length product 907 mGycm. Automatic exposure control, adjustment of mA/kV or iterative reconstruction technique used to limit radiation dose.    COMPARISON:  CT 09/29/2024    FINDINGS:  Extra-axial spaces/ventricular system: Normal for age.    Intracranial hemorrhage: None identified.    Cerebral parenchyma: No acute large vessel territory infarct or mass effect identified. Remote right basal ganglia lacunar infarct.  Mild to moderate chronic small vessel ischemic changes have similar appearance.    Vascular system: No hyperdense vessel appreciated. Bilateral carotid siphon and distal vertebral artery calcifications.    Cerebellum: Normal.    Sella: Normal.    Included paranasal sinuses and mastoid air cells: Opacified right mastoid air cells.    Visualized  orbits: Normal.    Scalp/Calvarium: No depressed skull fracture.  Impression: No acute intracranial process identified.    Electronically signed by: Ky Reddy  Date:    03/03/2025  Time:    15:30  US Retroperitoneal Complete  Narrative: EXAMINATION:  US RETROPERITONEAL COMPLETE    CLINICAL HISTORY:  reassess bilateral hydronephrosis;  Disorder of kidney and ureter, unspecified    COMPARISON:  09/08/2024    FINDINGS:  Right kidney is poorly demonstrated due to body habitus.  It measures 10.6 x 5.5 x 5.3 cm.  No focal lesions are noted there is no hydronephrosis.    There is flow present the proximal IVC.  Aorta is incompletely visualized due to overlying bowel gas.  There is a Gandara catheter in place.  The wall of the urinary bladder appears thickened measuring 1.3 cm however the bladder is collapsed.    The left kidney measures 9.8 by 4.6 x 4.8 cm.  There is a simple cyst present measuring 1.5 x 1.7 x 1.4 cm.  There is no hydronephrosis.  Impression: Simple cyst of the left kidney.    Changes consistent with thickening of the wall of the urinary bladder.    Electronically signed by: Tyrese Díaz MD  Date:    03/03/2025  Time:    08:59      Bull Zaidi MD  Department of Hospital Medicine   [facility]   03/08/2025

## 2025-03-08 NOTE — CONSULTS
"3/8/2025  Froy Barragan   1960   [id number]       Initial Psychiatric Consult    Chief complaint: "I'm okay ma'am. I think."     SUBJECTIVE:   HPI:   Froy Barragan is a 64-year-old male with a past psychiatric history of insomnia and anxiety. He is currently admitted with diabetic ketoacidosis (DKA), respiratory syncytial virus (RSV), a urinary tract infection (UTI), and altered mental status. Psychiatry has been consulted to address "pressured speech" as the patient goes off on tangents. His medical history includes type 2 diabetes mellitus, stage 3 chronic kidney disease, and a cerebrovascular accident (CVA) in September 2024. He has a chronic indwelling Gandara catheter. He was admitted on February 28, 2025, presenting with complaints of nausea, several episodes of vomiting, and non-bloody diarrhea. Upon admission, his lab results revealed leukocytosis of 24.8, normocytic anemia with a hemoglobin/hematocrit (H/H) of 11.8/36.9, hyperkalemia of 5.5, an anion gap of 20, and a blood urea nitrogen/creatinine level of 66.9/3.69. Troponin levels were negative, but lactic acid measured at 3.3, and beta-hydroxybutyrate was at 2.9. A urinalysis showed 3+ protein, 2+ glucose, 1+ ketones, 3+ blood, leukocyte esterase, white blood cells (WBCs), and bacteria. A CT scan of the chest, abdomen, and pelvis showed extensive thickening of the urinary bladder wall, with inflammatory changes consistent with acute cystitis, leading to secondary obstructive changes and bilateral hydronephrosis and hydroureter. Additionally, there was right lower lobe atelectasis. The patient was admitted to the ICU and started on an insulin infusion per DKA protocol. Broad-spectrum antibiotics were initiated, and intravenous fluids (IVF) were administered. Urine culture grew gram-negative rods (GNR). On March 1, 2025, troponin levels increased to 0.114, and they were trended every eight hours with an echocardiogram ordered. " "Consultations were made for cardiology and urology due to the hydronephrosis. The echocardiogram showed an ejection fraction of 35%. A CT scan of the head was ordered, which was unremarkable.     During the assessment, the patient was lying in bed and appeared to be in no acute distress. He was alert, awake, and oriented times three. His situation seems questionable; he expressed feelings of loneliness but denied having any daily unmanageable depressive symptoms. He also denied daily unmanageable anxiety but stated that he is worried about the near future. He lives alone and previously had home health nurses to assist with his activities of daily living (ADLs), and he would like to resume these services. He denied any history of bipolar disorder or psychosis. He reported occasional weakness and mentioned experiencing difficulties with falling and staying asleep in the past. According to his home medication list, he was previously prescribed Remeron (15 mg) to be taken orally at bedtime, but this medication was not resumed during his admission. He denied experiencing any nightmares, night terrors, vivid dreams, or lucid dreams. His appetite is reported as being okay, and he denies having any current passive thoughts of death, suicidal ideations, homicidal ideations, or self-injurious behaviors. He also denied experiencing auditory or visual hallucinations, delusions, illusions, or paranoid thoughts but noted that he can be irritable due to his current medical status.     Collateral:   Medical records and nurse    Past Psychiatric History:   Previous Psychiatric Hospitalizations:  Denies   Previous Medication Trials: Remeron 15mg and Vitamin D   Previous Suicide Attempts:  Denies   History of Violence:  Denies   Outpatient psychiatrist: "I don't know my sister Chantal handles that."     Past Medical/Surgical History:   Past Medical History:   Diagnosis Date    Anemia of chronic disease 7/6/2022    Arteriosclerosis " of coronary artery 7/6/2022    Cervical myelopathy 7/6/2022    Cobalamin deficiency 7/6/2022    Gastroesophageal reflux disease 7/6/2022    Hypertension 7/6/2022    Low vitamin D level 7/6/2022    Mixed hyperlipidemia 7/6/2022    Paraparesis 7/6/2022    Stage 3a chronic kidney disease 7/6/2022    Type 2 diabetes mellitus 7/6/2022     Past Surgical History:   Procedure Laterality Date    MAGNETIC RESONANCE IMAGING N/A 9/19/2024    Procedure: MRI (Magnetic Resonance Imagine);  Surgeon: David Amin DO;  Location: [facility];  Service: Anesthesiology;  Laterality: N/A;    SPINE SURGERY          Scheduled Meds:    aspirin  81 mg Oral Daily    atorvastatin  80 mg Oral Daily    dapagliflozin propanediol  10 mg Oral Daily    enoxparin  30 mg Subcutaneous Q24H (prophylaxis, 1700)    ezetimibe  10 mg Oral Daily    insulin glargine U-100  10 Units Subcutaneous QHS    metoprolol tartrate  50 mg Oral BID    pantoprazole  40 mg Oral Daily      PRN Meds:   Current Facility-Administered Medications:     acetaminophen, 650 mg, Oral, Q6H PRN    dextrose 10%, 250 mL, Intravenous, bolus from bag    D5 and 0.45% NaCl, , Intravenous, Continuous PRN    dextrose 50%, 12.5 g, Intravenous, PRN    dextrose 50%, 12.5 g, Intravenous, PRN    dextrose 50%, 12.5 g, Intravenous, PRN    dextrose 50%, 25 g, Intravenous, PRN    dextrose 50%, 25 g, Intravenous, PRN    dextrose 50%, 25 g, Intravenous, PRN    glucagon (human recombinant), 1 mg, Intramuscular, PRN    glucagon (human recombinant), 1 mg, Intramuscular, PRN    glucose, 16 g, Oral, PRN    glucose, 16 g, Oral, PRN    glucose, 24 g, Oral, PRN    glucose, 24 g, Oral, PRN    haloperidol lactate, 5 mg, Intravenous, Q4H PRN    insulin aspart U-100, 0-10 Units, Subcutaneous, QID (AC + HS) PRN    metoprolol, 5 mg, Intravenous, Q5 Min PRN    ondansetron, 4 mg, Intravenous, Q6H PRN    sodium chloride 0.9%, 10 mL, Intravenous, PRN   Psychotherapeutics (From admission, onward)      Start     Stop Route  Frequency Ordered    03/02/25 1305  haloperidol lactate injection 5 mg         -- IV Every 4 hours PRN 03/02/25 1205            Allergies:   Review of patient's allergies indicates:  No Known Allergies       Substance Abuse History:   Tobacco Use:Deny   Use of Alcohol:  Deny   Recreational Drugs:  Deny   Rehab History:  Deny       Social History:  Housing Status:  Resides alone  Relationship Status/Sexual Orientation:  Single   Children:  None  Education:  [employer] graduate   Employment Status/Info:  Disabled    history:  Deny  History of physical/sexual abuse:  Deny   Access to gun:  Deny       Legal History:   Past Charges/Incarcerations:  Deny   Pending charges:  Deny       OBJECTIVE:   Vitals   Vitals:    03/08/25 1057   BP: 112/70   Pulse: 69   Resp:    Temp: 98 °F (36.7 °C)        Labs/Imaging/Studies:   Recent Results (from the past 48 hours)   POCT glucose    Collection Time: 03/06/25 11:59 AM   Result Value Ref Range    POCT Glucose 231 (H) 70 - 110 mg/dL   POCT glucose    Collection Time: 03/06/25  5:53 PM   Result Value Ref Range    POCT Glucose 378 (H) 70 - 110 mg/dL   POCT glucose    Collection Time: 03/06/25  8:28 PM   Result Value Ref Range    POCT Glucose 353 (H) 70 - 110 mg/dL   Renal Function Panel    Collection Time: 03/07/25  3:58 AM   Result Value Ref Range    Sodium 141 136 - 145 mmol/L    Potassium 4.7 3.5 - 5.1 mmol/L    Chloride 107 98 - 107 mmol/L    CO2 26 23 - 31 mmol/L    Glucose 115 82 - 115 mg/dL    Blood Urea Nitrogen 37.5 (H) 8.4 - 25.7 mg/dL    Creatinine 2.47 (H) 0.72 - 1.25 mg/dL    Calcium 8.4 (L) 8.8 - 10.0 mg/dL    Albumin 2.8 (L) 3.4 - 4.8 g/dL    Phosphorus Level 4.8 (H) 2.3 - 4.7 mg/dL    eGFR 28 mL/min/1.73/m2   CBC with Differential    Collection Time: 03/07/25  3:58 AM   Result Value Ref Range    WBC 8.72 4.50 - 11.50 x10(3)/mcL    RBC 3.41 (L) 4.70 - 6.10 x10(6)/mcL    Hgb 10.6 (L) 14.0 - 18.0 g/dL    Hct 32.9 (L) 42.0 - 52.0 %    MCV 96.5 (H) 80.0 - 94.0 fL    MCH  31.1 (H) 27.0 - 31.0 pg    MCHC 32.2 (L) 33.0 - 36.0 g/dL    RDW 14.0 11.5 - 17.0 %    Platelet 254 130 - 400 x10(3)/mcL    MPV 11.7 (H) 7.4 - 10.4 fL    Neut % 67.4 %    Lymph % 18.1 %    Mono % 9.9 %    Eos % 2.8 %    Basophil % 1.0 %    Imm Grans % 0.8 %    Neut # 5.88 2.1 - 9.2 x10(3)/mcL    Lymph # 1.58 0.6 - 4.6 x10(3)/mcL    Mono # 0.86 0.1 - 1.3 x10(3)/mcL    Eos # 0.24 0 - 0.9 x10(3)/mcL    Baso # 0.09 <=0.2 x10(3)/mcL    Imm Gran # 0.07 (H) 0.00 - 0.04 x10(3)/mcL    NRBC% 0.0 %   POCT glucose    Collection Time: 03/07/25 10:53 AM   Result Value Ref Range    POCT Glucose 228 (H) 70 - 110 mg/dL   Stool Culture    Collection Time: 03/07/25  1:34 PM    Specimen: Stool   Result Value Ref Range    Stool Culture       Negative for Salmonella, Shigella, Campylobacter, Vibrio, Aeromonas, Pleisiomonas,Yersinia, or Shiga Toxin 1 and 2.   POCT glucose    Collection Time: 03/07/25  4:16 PM   Result Value Ref Range    POCT Glucose 339 (H) 70 - 110 mg/dL   POCT glucose    Collection Time: 03/07/25  9:41 PM   Result Value Ref Range    POCT Glucose 163 (H) 70 - 110 mg/dL   Basic Metabolic Panel    Collection Time: 03/08/25  5:33 AM   Result Value Ref Range    Sodium 140 136 - 145 mmol/L    Potassium 5.5 (H) 3.5 - 5.1 mmol/L    Chloride 107 98 - 107 mmol/L    CO2 26 23 - 31 mmol/L    Glucose 158 (H) 82 - 115 mg/dL    Blood Urea Nitrogen 38.4 (H) 8.4 - 25.7 mg/dL    Creatinine 2.46 (H) 0.72 - 1.25 mg/dL    BUN/Creatinine Ratio 16     Calcium 8.6 (L) 8.8 - 10.0 mg/dL    Anion Gap 7.0 mEq/L    eGFR 28 mL/min/1.73/m2   Magnesium    Collection Time: 03/08/25  5:33 AM   Result Value Ref Range    Magnesium Level 2.00 1.60 - 2.60 mg/dL   CBC with Differential    Collection Time: 03/08/25  5:33 AM   Result Value Ref Range    WBC 7.09 4.50 - 11.50 x10(3)/mcL    RBC 3.32 (L) 4.70 - 6.10 x10(6)/mcL    Hgb 10.5 (L) 14.0 - 18.0 g/dL    Hct 32.2 (L) 42.0 - 52.0 %    MCV 97.0 (H) 80.0 - 94.0 fL    MCH 31.6 (H) 27.0 - 31.0 pg    MCHC 32.6  "(L) 33.0 - 36.0 g/dL    RDW 14.0 11.5 - 17.0 %    Platelet 254 130 - 400 x10(3)/mcL    MPV 11.4 (H) 7.4 - 10.4 fL    Neut % 64.4 %    Lymph % 20.3 %    Mono % 10.3 %    Eos % 3.1 %    Basophil % 1.1 %    Imm Grans % 0.8 %    Neut # 4.56 2.1 - 9.2 x10(3)/mcL    Lymph # 1.44 0.6 - 4.6 x10(3)/mcL    Mono # 0.73 0.1 - 1.3 x10(3)/mcL    Eos # 0.22 0 - 0.9 x10(3)/mcL    Baso # 0.08 <=0.2 x10(3)/mcL    Imm Gran # 0.06 (H) 0.00 - 0.04 x10(3)/mcL    NRBC% 0.0 %   POCT glucose    Collection Time: 03/08/25  5:44 AM   Result Value Ref Range    POCT Glucose 164 (H) 70 - 110 mg/dL      No results found for: "PHENYTOIN", "PHENOBARB", "VALPROATE", "CBMZ"      Psychiatric Mental Status Exam:  General Appearance: well-nourished, appropriately dressed, dressed in casual attire, lying in bed, in no acute distress  Arousal: alert  Behavior: cooperative, pleasant, appropriate eye-contact, under good behavioral control  Movements and Motor Activity: no abnormal involuntary movements noted; no tics, no tremors, no akathisia, no dystonia, no evidence of tardive dyskinesia; no psychomotor agitation or retardation  Orientation: oriented to person, place, and time, situation is questionable  Speech: responds to questions minimally/briefly  Mood: Anxious  Affect: appropriate to situation and context  Thought Process: linear  Associations: no loosening of associations  Thought Content and Perceptions: no suicidal or homicidal ideation, no auditory or visual hallucinations, no paranoid ideation, no ideas of reference, no evidence of delusions or psychosis  Recent and Remote Memory: mild impairments noted; per interview/observation with patient  Attention and Concentration: attentive to conversation; per interview/observation with patient  Fund of Knowledge: vocabulary appropriate; based on history, vocabulary, fund of knowledge, syntax, grammar, and content  Insight: questionable; based on understanding of severity of illness and HPI  Judgment: " adequate; based on patient's behavior and HPI    ASSESSMENT/PLAN:   Anxiety Disorder F41.9  Insomnia F51.05  Depression, unspecified F41.9    Past Medical History:   Diagnosis Date    Anemia of chronic disease 7/6/2022    Arteriosclerosis of coronary artery 7/6/2022    Cervical myelopathy 7/6/2022    Cobalamin deficiency 7/6/2022    Gastroesophageal reflux disease 7/6/2022    Hypertension 7/6/2022    Low vitamin D level 7/6/2022    Mixed hyperlipidemia 7/6/2022    Paraparesis 7/6/2022    Stage 3a chronic kidney disease 7/6/2022    Type 2 diabetes mellitus 7/6/2022      Plans:    Medication regimen  Resume home medication, Mirtazapine 15mg orally at bedtime   Legal Status  The patient is not a threat to his self or others at this present time. PEC/CEC not warranted   Pscy  Will sign off. Please reconsult if needed.  He is requesting a consultation with  for home health assistance pending his discharge       GUANAKITO BECK

## 2025-03-09 LAB
ANION GAP SERPL CALC-SCNC: 10 MEQ/L
BACTERIA STL CULT: ABNORMAL
BACTERIA STL CULT: ABNORMAL
BASOPHILS # BLD AUTO: 0.08 X10(3)/MCL
BASOPHILS NFR BLD AUTO: 1.1 %
BUN SERPL-MCNC: 44.9 MG/DL (ref 8.4–25.7)
CALCIUM SERPL-MCNC: 8.7 MG/DL (ref 8.8–10)
CHLORIDE SERPL-SCNC: 107 MMOL/L (ref 98–107)
CO2 SERPL-SCNC: 29 MMOL/L (ref 23–31)
CREAT SERPL-MCNC: 2.5 MG/DL (ref 0.72–1.25)
CREAT/UREA NIT SERPL: 18
EOSINOPHIL # BLD AUTO: 0.18 X10(3)/MCL (ref 0–0.9)
EOSINOPHIL NFR BLD AUTO: 2.5 %
ERYTHROCYTE [DISTWIDTH] IN BLOOD BY AUTOMATED COUNT: 14 % (ref 11.5–17)
GFR SERPLBLD CREATININE-BSD FMLA CKD-EPI: 28 ML/MIN/1.73/M2
GLUCOSE SERPL-MCNC: 142 MG/DL (ref 82–115)
HCT VFR BLD AUTO: 31.5 % (ref 42–52)
HGB BLD-MCNC: 10 G/DL (ref 14–18)
IMM GRANULOCYTES # BLD AUTO: 0.06 X10(3)/MCL (ref 0–0.04)
IMM GRANULOCYTES NFR BLD AUTO: 0.8 %
LYMPHOCYTES # BLD AUTO: 1.91 X10(3)/MCL (ref 0.6–4.6)
LYMPHOCYTES NFR BLD AUTO: 26.6 %
MAGNESIUM SERPL-MCNC: 2.1 MG/DL (ref 1.6–2.6)
MCH RBC QN AUTO: 30.8 PG (ref 27–31)
MCHC RBC AUTO-ENTMCNC: 31.7 G/DL (ref 33–36)
MCV RBC AUTO: 96.9 FL (ref 80–94)
MONOCYTES # BLD AUTO: 0.84 X10(3)/MCL (ref 0.1–1.3)
MONOCYTES NFR BLD AUTO: 11.7 %
NEUTROPHILS # BLD AUTO: 4.12 X10(3)/MCL (ref 2.1–9.2)
NEUTROPHILS NFR BLD AUTO: 57.3 %
NRBC BLD AUTO-RTO: 0 %
PHOSPHATE SERPL-MCNC: 5.4 MG/DL (ref 2.3–4.7)
PLATELET # BLD AUTO: 250 X10(3)/MCL (ref 130–400)
PMV BLD AUTO: 11.4 FL (ref 7.4–10.4)
POCT GLUCOSE: 115 MG/DL (ref 70–110)
POCT GLUCOSE: 199 MG/DL (ref 70–110)
POCT GLUCOSE: 217 MG/DL (ref 70–110)
POTASSIUM SERPL-SCNC: 5.1 MMOL/L (ref 3.5–5.1)
RBC # BLD AUTO: 3.25 X10(6)/MCL (ref 4.7–6.1)
SODIUM SERPL-SCNC: 146 MMOL/L (ref 136–145)
WBC # BLD AUTO: 7.19 X10(3)/MCL (ref 4.5–11.5)

## 2025-03-09 PROCEDURE — 80048 BASIC METABOLIC PNL TOTAL CA: CPT

## 2025-03-09 PROCEDURE — 85025 COMPLETE CBC W/AUTO DIFF WBC: CPT

## 2025-03-09 PROCEDURE — 63600175 PHARM REV CODE 636 W HCPCS

## 2025-03-09 PROCEDURE — 25000003 PHARM REV CODE 250

## 2025-03-09 PROCEDURE — 63600175 PHARM REV CODE 636 W HCPCS: Performed by: STUDENT IN AN ORGANIZED HEALTH CARE EDUCATION/TRAINING PROGRAM

## 2025-03-09 PROCEDURE — 27000207 HC ISOLATION

## 2025-03-09 PROCEDURE — 21400001 HC TELEMETRY ROOM

## 2025-03-09 PROCEDURE — 36415 COLL VENOUS BLD VENIPUNCTURE: CPT

## 2025-03-09 PROCEDURE — 84100 ASSAY OF PHOSPHORUS: CPT

## 2025-03-09 PROCEDURE — 25000003 PHARM REV CODE 250: Performed by: NURSE PRACTITIONER

## 2025-03-09 PROCEDURE — 83735 ASSAY OF MAGNESIUM: CPT

## 2025-03-09 RX ORDER — SODIUM CHLORIDE, SODIUM LACTATE, POTASSIUM CHLORIDE, CALCIUM CHLORIDE 600; 310; 30; 20 MG/100ML; MG/100ML; MG/100ML; MG/100ML
INJECTION, SOLUTION INTRAVENOUS CONTINUOUS
Status: ACTIVE | OUTPATIENT
Start: 2025-03-09 | End: 2025-03-10

## 2025-03-09 RX ORDER — HYDROXYZINE HYDROCHLORIDE 25 MG/1
25 TABLET, FILM COATED ORAL ONCE
Status: COMPLETED | OUTPATIENT
Start: 2025-03-09 | End: 2025-03-09

## 2025-03-09 RX ADMIN — ATORVASTATIN CALCIUM 80 MG: 40 TABLET, FILM COATED ORAL at 11:03

## 2025-03-09 RX ADMIN — HYDROXYZINE HYDROCHLORIDE 25 MG: 25 TABLET, FILM COATED ORAL at 09:03

## 2025-03-09 RX ADMIN — EZETIMIBE 10 MG: 10 TABLET ORAL at 11:03

## 2025-03-09 RX ADMIN — ASPIRIN 81 MG: 81 TABLET, COATED ORAL at 11:03

## 2025-03-09 RX ADMIN — SODIUM CHLORIDE, POTASSIUM CHLORIDE, SODIUM LACTATE AND CALCIUM CHLORIDE: 600; 310; 30; 20 INJECTION, SOLUTION INTRAVENOUS at 11:03

## 2025-03-09 RX ADMIN — METOPROLOL TARTRATE 50 MG: 50 TABLET, FILM COATED ORAL at 09:03

## 2025-03-09 RX ADMIN — PANTOPRAZOLE SODIUM 40 MG: 40 TABLET, DELAYED RELEASE ORAL at 11:03

## 2025-03-09 RX ADMIN — ENOXAPARIN SODIUM 30 MG: 30 INJECTION SUBCUTANEOUS at 09:03

## 2025-03-09 RX ADMIN — INSULIN GLARGINE 12 UNITS: 100 INJECTION, SOLUTION SUBCUTANEOUS at 09:03

## 2025-03-09 RX ADMIN — DAPAGLIFLOZIN 10 MG: 10 TABLET, FILM COATED ORAL at 11:03

## 2025-03-09 RX ADMIN — METOPROLOL TARTRATE 50 MG: 50 TABLET, FILM COATED ORAL at 11:03

## 2025-03-09 RX ADMIN — MIRTAZAPINE 15 MG: 15 TABLET, FILM COATED ORAL at 09:03

## 2025-03-09 RX ADMIN — SODIUM CHLORIDE, POTASSIUM CHLORIDE, SODIUM LACTATE AND CALCIUM CHLORIDE: 600; 310; 30; 20 INJECTION, SOLUTION INTRAVENOUS at 01:03

## 2025-03-09 NOTE — PLAN OF CARE
Problem: Infection  Goal: Absence of Infection Signs and Symptoms  Outcome: Progressing     Problem: Adult Inpatient Plan of Care  Goal: Absence of Hospital-Acquired Illness or Injury  Outcome: Progressing  Goal: Optimal Comfort and Wellbeing  Outcome: Progressing     Problem: Diabetes Comorbidity  Goal: Blood Glucose Level Within Targeted Range  Outcome: Progressing     Problem: Wound  Goal: Optimal Pain Control and Function  Outcome: Progressing     Problem: Skin Injury Risk Increased  Goal: Skin Health and Integrity  Outcome: Progressing     Problem: Fall Injury Risk  Goal: Absence of Fall and Fall-Related Injury  Outcome: Progressing     Problem: Delirium  Goal: Optimal Coping  Outcome: Progressing      Clear

## 2025-03-09 NOTE — PROGRESS NOTES
[facility] Hospital Medicine Progress Note        Chief Complaint: Inpatient Follow-up     HPI:   64-year-old gentleman with DM type 2, CKD stage 3, CVA 09/2024, chronic indwelling Gandara who was admitted after he presented with complaints of nausea with several episodes of vomiting and nonbloody diarrhea.  He denied having any chest pain, abdominal pain, SOB, cough, sputum production, headache, numbness, weakness, dizziness/lightheadedness or loss of consciousness.  On admission his labs showed leukocytosis of 24.8, normocytic anemia with H/H of 11.8/36.9, hyperkalemia of 5.5, CO2 15 with anion gap 20, BUN/creatinine 66.9/3.69, negative troponins, lactic acid of 3.3, beta hydroxybutyrate of 2.9.  UA showed 3+ protein, 2+ glucose, 1+ ketones, 3+ blood, leukocyte esterase, WBCs as well as bacteria.  Blood cultures and urine culture were sent off.  CT C/A/P was done which showed extensive urinary bladder wall thickening with inflammatory changes around urinary bladder consistent with acute cystitis causing secondary obstructive changes and secondary B/L hydronephrosis and hydroureter, RLL atelectasis.  Admitted to ICU on insulin infusion per DKA protocol.  Started on broad-spectrum antibiotics.  Given IVF resuscitation.  Urine culture growing GNR.  Noted to have low-grade fevers overnight on 03/01.  Insulin infusion discontinued and patient is started on long-acting insulin. Patient also found to be RSV positive. Downgraded to . Troponins increased to 0.114; trended q8 & Echo ordered. CIS consulted. FD Lovenox ordered. Urology consulted for hydronephrosis; will follow recommendations. Noted to have hypoglycemic episodes overnight 03/03. On IV D5/NS. Tachycardia improved. Trop increased to 0.675 overnight. Echo showing EF 35%. BUN/Cr improved at 25.9/2.17; remains on IV D5/NS. UCX growing Proteus. Will DC Cefepime in case it is causing AMS & start IV Rocephin. CT Head ordered which was  unremarkable. Decreased Lantus to 15 units nightly till PO intake improves.     Patient and family thinking about LHC. Opting to do outpatient work up in the setting of kidney function. Psych consulted as per family request for inability to keep focus. Patient pending placement to SNF.     Interval Hx:   NAEO. Creatinine around the same. Patient is not eating as much as he should and that is reflected in his labs with the hypernatremia.     Case was discussed with patient's nurse and  on the floor.    Objective/physical exam:  General: alert male lying comfortably in bed, in no acute distress  HENT: oral and oropharyngeal mucosa moist, pink, with no erythema or exudates, no ear pain or discharge  Neck: normal neck movement, no lymph nodes or swellings, no JVD or Carotid bruit  Respiratory: clear breathing sounds bilaterally, no crackles, rales, ronchi or wheezes  Cardiovascular: clear S1 and S2, no murmurs, rubs or gallops  Peripheral Vascular: no lesions, ulcers or erosions, normal peripheral pulses and no pedal edema  Gastrointestinal: soft, non-tender, non-distended abdomen, no guarding, rigidity or rebound tenderness, normal bowel sounds  Integumentary: normal skin color, no rashes or lesions  Neuro: AAO x 3, no focal deficits    VITAL SIGNS: 24 HRS MIN & MAX LAST   Temp  Min: 97.4 °F (36.3 °C)  Max: 98.2 °F (36.8 °C) 97.7 °F (36.5 °C)   BP  Min: 98/63  Max: 124/75 124/75   Pulse  Min: 70  Max: 80  76   Resp  Min: 16  Max: 19 19   SpO2  Min: 92 %  Max: 98 % 98 %     I have reviewed the following labs:  Recent Labs   Lab 03/07/25  0358 03/08/25  0533 03/09/25  0349   WBC 8.72 7.09 7.19   RBC 3.41* 3.32* 3.25*   HGB 10.6* 10.5* 10.0*   HCT 32.9* 32.2* 31.5*   MCV 96.5* 97.0* 96.9*   MCH 31.1* 31.6* 30.8   MCHC 32.2* 32.6* 31.7*   RDW 14.0 14.0 14.0    254 250   MPV 11.7* 11.4* 11.4*     Recent Labs   Lab 03/04/25  0340 03/05/25  0412 03/06/25  0306 03/07/25  0358 03/08/25  0533 03/09/25  0349     142 144 141 140 146*   K 3.6 3.3* 4.3 4.7 5.5* 5.1   * 111* 109* 107 107 107   CO2 20* 22* 23 26 26 29   BUN 26.4* 23.0 32.2* 37.5* 38.4* 44.9*   CREATININE 2.35* 2.43* 2.66* 2.47* 2.46* 2.50*   CALCIUM 8.1* 7.9* 8.1* 8.4* 8.6* 8.7*   MG 1.90 2.50 2.20  --  2.00 2.10   ALBUMIN 2.7* 2.7* 2.7* 2.8*  --   --    ALKPHOS 55 56 62  --   --   --    ALT 27 33 45  --   --   --    AST 24 31 34  --   --   --    BILITOT 0.3 0.2 0.2  --   --   --      Assessment/Plan:  # DKA-resolved  # AMS 2/2 Delirium vs Cefepime induced - improved   # Tachycardia possibly 2/2 NSTEMI vs Intravascular Depletion  # Acute systolic heart failure - compensated   # Troponinemia 2/2 NSTEMI type I vs type II  # Acute sytolic heart failure  # Lactic Acidemia 2/2 Intravascular Depletion  # Proteus UTI  # Leukocytosis 2/2 above  # B/L Hydronephrosis & Hydroureter  # DWAINE on CKD III 2/2 Prerenal vs Postrenal Azotemia  # Hypernatremia   # RSV antigen positive  # Acute gastroenteritis  # Prior CVA 09/2024  # Normocytic anemia  # Left kidney cyst     - Reporting no new complaints; noted to be much more awake and oriented today  - Troponins uptrended overnight 03/03  - Echo showing EF 35%  - was on FD Lovenox for NSTEMI but switched to prophylaxis lovenox given no plans by cardiology for inpatient ischemic eval  - CIS consulted for troponinemia; follow recommendations   - cardiology informed regarding family is ok with [facility] but cardiology planned for outpatient ischemic work up  - Nephrology recommended for renal clearance for [facility]  - CT Head negative  - Consulted Urology for B/L hydronephrosis associated with bladder wall thickening; signed off  - IV Rocephin - last day of Abx 3/6 (day 7)  - Urine culture growing Proteus  - Blood cultures negative   - increased glargine 12 units units and ISS moderate scale   - continue on home aspirin, atorvastatin, Zetia, mirtazapine, Protonix  - continue farxiga  - continue gentle fluids and encourage PO  intake   - increased fluid rate  - asked nurse for 1:1 feeding for the patient  - PT/OT consulted; recommending moderate intensity therapy  - psych consult    VTE prophylaxis: Lovenox    Patient condition:  Stable    Anticipated discharge and Disposition:     Pending    All diagnosis and differential diagnosis have been reviewed; assessment and plan has been documented; I have personally reviewed the labs and test results that are presently available; I have reviewed the patients medication list; I have reviewed the consulting providers response and recommendations. I have reviewed or attempted to review medical records based upon their availability    All of the patient's questions have been  addressed and answered. Patient's is agreeable to the above stated plan. I will continue to monitor closely and make adjustments to medical management as needed.      Radiology:  I have personally reviewed the following imaging and agree with the radiologist.     CT Head Without Contrast  Narrative: EXAMINATION:  CT HEAD WITHOUT CONTRAST    CLINICAL HISTORY:  Mental status change, unknown cause;    TECHNIQUE:  CT images of the head without IV contrast. Axial, coronal and sagittal images reviewed. Dose length product 907 mGycm. Automatic exposure control, adjustment of mA/kV or iterative reconstruction technique used to limit radiation dose.    COMPARISON:  CT 09/29/2024    FINDINGS:  Extra-axial spaces/ventricular system: Normal for age.    Intracranial hemorrhage: None identified.    Cerebral parenchyma: No acute large vessel territory infarct or mass effect identified. Remote right basal ganglia lacunar infarct.  Mild to moderate chronic small vessel ischemic changes have similar appearance.    Vascular system: No hyperdense vessel appreciated. Bilateral carotid siphon and distal vertebral artery calcifications.    Cerebellum: Normal.    Sella: Normal.    Included paranasal sinuses and mastoid air cells: Opacified right  mastoid air cells.    Visualized orbits: Normal.    Scalp/Calvarium: No depressed skull fracture.  Impression: No acute intracranial process identified.    Electronically signed by: Ky Reddy  Date:    03/03/2025  Time:    15:30  US Retroperitoneal Complete  Narrative: EXAMINATION:  US RETROPERITONEAL COMPLETE    CLINICAL HISTORY:  reassess bilateral hydronephrosis;  Disorder of kidney and ureter, unspecified    COMPARISON:  09/08/2024    FINDINGS:  Right kidney is poorly demonstrated due to body habitus.  It measures 10.6 x 5.5 x 5.3 cm.  No focal lesions are noted there is no hydronephrosis.    There is flow present the proximal IVC.  Aorta is incompletely visualized due to overlying bowel gas.  There is a Gandara catheter in place.  The wall of the urinary bladder appears thickened measuring 1.3 cm however the bladder is collapsed.    The left kidney measures 9.8 by 4.6 x 4.8 cm.  There is a simple cyst present measuring 1.5 x 1.7 x 1.4 cm.  There is no hydronephrosis.  Impression: Simple cyst of the left kidney.    Changes consistent with thickening of the wall of the urinary bladder.    Electronically signed by: Tyrese Díaz MD  Date:    03/03/2025  Time:    08:59      Bull Zaidi MD  Department of Hospital Medicine   [facility]   03/09/2025

## 2025-03-10 LAB
ANION GAP SERPL CALC-SCNC: 8 MEQ/L
BASOPHILS # BLD AUTO: 0.07 X10(3)/MCL
BASOPHILS NFR BLD AUTO: 0.9 %
BUN SERPL-MCNC: 44.3 MG/DL (ref 8.4–25.7)
CALCIUM SERPL-MCNC: 8.3 MG/DL (ref 8.8–10)
CHLORIDE SERPL-SCNC: 110 MMOL/L (ref 98–107)
CO2 SERPL-SCNC: 23 MMOL/L (ref 23–31)
CREAT SERPL-MCNC: 2.57 MG/DL (ref 0.72–1.25)
CREAT/UREA NIT SERPL: 17
EOSINOPHIL # BLD AUTO: 0.16 X10(3)/MCL (ref 0–0.9)
EOSINOPHIL NFR BLD AUTO: 2.1 %
ERYTHROCYTE [DISTWIDTH] IN BLOOD BY AUTOMATED COUNT: 14 % (ref 11.5–17)
GFR SERPLBLD CREATININE-BSD FMLA CKD-EPI: 27 ML/MIN/1.73/M2
GLUCOSE SERPL-MCNC: 144 MG/DL (ref 82–115)
HCT VFR BLD AUTO: 30.8 % (ref 42–52)
HGB BLD-MCNC: 9.9 G/DL (ref 14–18)
IMM GRANULOCYTES # BLD AUTO: 0.04 X10(3)/MCL (ref 0–0.04)
IMM GRANULOCYTES NFR BLD AUTO: 0.5 %
LYMPHOCYTES # BLD AUTO: 1.93 X10(3)/MCL (ref 0.6–4.6)
LYMPHOCYTES NFR BLD AUTO: 25.1 %
MAGNESIUM SERPL-MCNC: 1.9 MG/DL (ref 1.6–2.6)
MCH RBC QN AUTO: 30.9 PG (ref 27–31)
MCHC RBC AUTO-ENTMCNC: 32.1 G/DL (ref 33–36)
MCV RBC AUTO: 96.3 FL (ref 80–94)
MONOCYTES # BLD AUTO: 0.9 X10(3)/MCL (ref 0.1–1.3)
MONOCYTES NFR BLD AUTO: 11.7 %
NEUTROPHILS # BLD AUTO: 4.58 X10(3)/MCL (ref 2.1–9.2)
NEUTROPHILS NFR BLD AUTO: 59.7 %
NRBC BLD AUTO-RTO: 0 %
PLATELET # BLD AUTO: 245 X10(3)/MCL (ref 130–400)
PMV BLD AUTO: 11.4 FL (ref 7.4–10.4)
POCT GLUCOSE: 126 MG/DL (ref 70–110)
POCT GLUCOSE: 166 MG/DL (ref 70–110)
POTASSIUM SERPL-SCNC: 5.1 MMOL/L (ref 3.5–5.1)
RBC # BLD AUTO: 3.2 X10(6)/MCL (ref 4.7–6.1)
SODIUM SERPL-SCNC: 141 MMOL/L (ref 136–145)
WBC # BLD AUTO: 7.68 X10(3)/MCL (ref 4.5–11.5)

## 2025-03-10 PROCEDURE — 85025 COMPLETE CBC W/AUTO DIFF WBC: CPT

## 2025-03-10 PROCEDURE — 36415 COLL VENOUS BLD VENIPUNCTURE: CPT

## 2025-03-10 PROCEDURE — 25000003 PHARM REV CODE 250

## 2025-03-10 PROCEDURE — 97530 THERAPEUTIC ACTIVITIES: CPT

## 2025-03-10 PROCEDURE — 25000003 PHARM REV CODE 250: Performed by: NURSE PRACTITIONER

## 2025-03-10 PROCEDURE — 21400001 HC TELEMETRY ROOM

## 2025-03-10 PROCEDURE — 63600175 PHARM REV CODE 636 W HCPCS

## 2025-03-10 PROCEDURE — 27000207 HC ISOLATION

## 2025-03-10 PROCEDURE — 83735 ASSAY OF MAGNESIUM: CPT

## 2025-03-10 PROCEDURE — 80048 BASIC METABOLIC PNL TOTAL CA: CPT

## 2025-03-10 RX ORDER — SODIUM CHLORIDE, SODIUM LACTATE, POTASSIUM CHLORIDE, CALCIUM CHLORIDE 600; 310; 30; 20 MG/100ML; MG/100ML; MG/100ML; MG/100ML
INJECTION, SOLUTION INTRAVENOUS CONTINUOUS
Status: ACTIVE | OUTPATIENT
Start: 2025-03-10 | End: 2025-03-11

## 2025-03-10 RX ADMIN — EZETIMIBE 10 MG: 10 TABLET ORAL at 09:03

## 2025-03-10 RX ADMIN — ATORVASTATIN CALCIUM 80 MG: 40 TABLET, FILM COATED ORAL at 09:03

## 2025-03-10 RX ADMIN — DAPAGLIFLOZIN 10 MG: 10 TABLET, FILM COATED ORAL at 09:03

## 2025-03-10 RX ADMIN — INSULIN GLARGINE 12 UNITS: 100 INJECTION, SOLUTION SUBCUTANEOUS at 09:03

## 2025-03-10 RX ADMIN — METOPROLOL TARTRATE 50 MG: 50 TABLET, FILM COATED ORAL at 09:03

## 2025-03-10 RX ADMIN — ASPIRIN 81 MG: 81 TABLET, COATED ORAL at 09:03

## 2025-03-10 RX ADMIN — SODIUM CHLORIDE, POTASSIUM CHLORIDE, SODIUM LACTATE AND CALCIUM CHLORIDE: 600; 310; 30; 20 INJECTION, SOLUTION INTRAVENOUS at 05:03

## 2025-03-10 RX ADMIN — PANTOPRAZOLE SODIUM 40 MG: 40 TABLET, DELAYED RELEASE ORAL at 09:03

## 2025-03-10 RX ADMIN — MIRTAZAPINE 15 MG: 15 TABLET, FILM COATED ORAL at 09:03

## 2025-03-10 RX ADMIN — ENOXAPARIN SODIUM 30 MG: 30 INJECTION SUBCUTANEOUS at 05:03

## 2025-03-10 NOTE — AI DETERIORATION ALERT
Artificial Intelligence Notification  [facility]  [address]  [place] [address]  Phone: [phone]    This documentation was triggered by an Artificial Intelligence Notification:    Admit Date: 2025   LOS: 9  Code Status: Full Code  : 1960  Age: 64 y.o.  Weight:   Wt Readings from Last 1 Encounters:   25 53 kg (116 lb 13.5 oz)        Sex: male  Bed: 6[facility]  MRN: [id number]  Attending Physician: Bull Zaidi,*     Date of Alert: 2025  Time AI Alert Received: 2151 hrs             Vitals:    25 2311   BP: 107/71   Pulse: 72   Resp: 20   Temp: 98 °F (36.7 °C)     SpO2: 97 %      Artificial Intelligence alert discussed with Provider:     Name:     Date/Time of Provider Notification:        Patient Condition: Chart reviewed and discussed pt condition with pt's nurse.  Pt resting in bed, no apparent distress.  VSS.  No ICU intervention required at this time.

## 2025-03-10 NOTE — PLAN OF CARE
03/10/25 1537   Discharge Reassessment   Assessment Type Discharge Planning Reassessment   Did the patient's condition or plan change since previous assessment? Yes   Discharge Plan discussed with: Sibling   Name(s) and Number(s) Chantal Ricardo (Sister)  [phone] (   Communicated RISSA with patient/caregiver Yes   Discharge Plan A Rehab   Discharge Plan B Assisted Living   DME Needed Upon Discharge  none   Transition of Care Barriers None   Post-Acute Status   Post-Acute Authorization Placement   Post-Acute Placement Status Referrals Sent   Discharge Delays None known at this time

## 2025-03-10 NOTE — PT/OT/SLP PROGRESS
Physical Therapy Treatment    Patient Name:  Froy Barragan   MRN:  [id number]    Recommendations:     Discharge therapy intensity: Moderate Intensity Therapy   Discharge Equipment Recommendations: to be determined by next level of care  Barriers to discharge: Impaired mobility and Ongoing medical needs    Assessment:     Froy Barragan is a 64 y.o. male admitted with a medical diagnosis of Generalized weakness, Diabetic ketoacidosis, Sepsis, Acute on chronic renal insufficiency, RSV, and Acute UTI, hx of CVA 9/24. .  He presents with the following impairments/functional limitations: weakness, impaired self care skills, impaired endurance, impaired functional mobility, gait instability, impaired balance, decreased coordination, decreased lower extremity function     Rehab Prognosis: Fair; patient would benefit from acute skilled PT services to address these deficits and reach maximum level of function.    Recent Surgery: * No surgery found *      Plan:     During this hospitalization, patient would benefit from acute PT services 3 x/week to address the identified rehab impairments via gait training, therapeutic activities, therapeutic exercises and progress toward the following goals:    Plan of Care Expires:  04/03/25    Subjective     Chief Complaint:   Patient/Family Comments/goals:   Pain/Comfort:         Objective:     Communicated with nurse prior to session.  Patient found supine with colbert catheter, telemetry, pulse ox (continuous) upon PT entry to room.     General Precautions: Standard, aphasia, droplet  Orthopedic Precautions: N/A  Braces: N/A  Respiratory Status: Room air  Blood Pressure:   Skin Integrity:       Functional Mobility:  Bed Mobility:     Scooting: maximal assistance  Supine to Sit: maximal assistance  Sit to Supine: of moda x 2 persons  Transfers:     Sit to Stand:  of moda x 2 persons with rolling walker  Gait: pt ambulates with a rw with mod to maxa x 2 for rw control , to  help maintain his balance ( he posteriorly leans a lot. He also has difficulty judging how for to step with each leg, He tends to scissor his legs. He went about 4-6 feet and he was very unsteady    Therapeutic Activities/Exercises:      Education:  Patient provided with verbal education education regarding PT role/goals/POC, fall prevention, and safety awareness.  Additional teaching is warranted.     Patient left supine with all lines intact and call button in reach    GOALS:   Multidisciplinary Problems       Physical Therapy Goals          Problem: Physical Therapy    Goal Priority Disciplines Outcome Interventions   Physical Therapy Goal     PT, PT/OT Progressing    Description: Goals to be met by: 4/3/25     Patient will increase functional independence with mobility by performin. Supine to sit with min Assistance  2. Sit to supine with min Assistance  3. Sit to stand transfer with mod Assistance  4. Bed to chair transfer with mod using LRAD                         Time Tracking:     PT Received On: 03/10/25  PT Start Time: 1425     PT Stop Time: 1443  PT Total Time (min): 18 min     Billable Minutes: Therapeutic Activity 18    Treatment Type: Treatment  PT/PTA: PT     Number of PTA visits since last PT visit: 2     03/10/2025

## 2025-03-10 NOTE — PLAN OF CARE
155pm-Pt clinically accepted to [facility], verifying bens    230pm- pt has no snf days left, has not had a 60 day wellness period. Unable to try rehab d/t pt not walking and my not be able to tolerate 3hrs of therapy. Md notified, will reach out to sister to see if she is interested in longterm.

## 2025-03-10 NOTE — PROGRESS NOTES
[facility] Hospital Medicine Progress Note        Chief Complaint: Inpatient Follow-up     HPI:   64-year-old gentleman with DM type 2, CKD stage 3, CVA 09/2024, chronic indwelling Gandara who was admitted after he presented with complaints of nausea with several episodes of vomiting and nonbloody diarrhea.  He denied having any chest pain, abdominal pain, SOB, cough, sputum production, headache, numbness, weakness, dizziness/lightheadedness or loss of consciousness.  On admission his labs showed leukocytosis of 24.8, normocytic anemia with H/H of 11.8/36.9, hyperkalemia of 5.5, CO2 15 with anion gap 20, BUN/creatinine 66.9/3.69, negative troponins, lactic acid of 3.3, beta hydroxybutyrate of 2.9.  UA showed 3+ protein, 2+ glucose, 1+ ketones, 3+ blood, leukocyte esterase, WBCs as well as bacteria.  Blood cultures and urine culture were sent off.  CT C/A/P was done which showed extensive urinary bladder wall thickening with inflammatory changes around urinary bladder consistent with acute cystitis causing secondary obstructive changes and secondary B/L hydronephrosis and hydroureter, RLL atelectasis.  Admitted to ICU on insulin infusion per DKA protocol.  Started on broad-spectrum antibiotics.  Given IVF resuscitation.  Urine culture growing GNR.  Noted to have low-grade fevers overnight on 03/01.  Insulin infusion discontinued and patient is started on long-acting insulin. Patient also found to be RSV positive. Downgraded to . Troponins increased to 0.114; trended q8 & Echo ordered. CIS consulted. FD Lovenox ordered. Urology consulted for hydronephrosis; will follow recommendations. Noted to have hypoglycemic episodes overnight 03/03. On IV D5/NS. Tachycardia improved. Trop increased to 0.675 overnight. Echo showing EF 35%. BUN/Cr improved at 25.9/2.17; remains on IV D5/NS. UCX growing Proteus. Will DC Cefepime in case it is causing AMS & start IV Rocephin. CT Head ordered which was  unremarkable. Decreased Lantus to 15 units nightly till PO intake improves.     Patient and family thinking about LHC. Opting to do outpatient work up in the setting of kidney function. Psych consulted as per family request for inability to keep focus. Patient pending placement to SNF.     Interval Hx:   NAEO. Creatinine will mild bump. Otherwise doing okay.     Case was discussed with patient's nurse and  on the floor.    Objective/physical exam:  General: alert male lying comfortably in bed, in no acute distress  HENT: oral and oropharyngeal mucosa moist, pink, with no erythema or exudates, no ear pain or discharge  Neck: normal neck movement, no lymph nodes or swellings, no JVD or Carotid bruit  Respiratory: clear breathing sounds bilaterally, no crackles, rales, ronchi or wheezes  Cardiovascular: clear S1 and S2, no murmurs, rubs or gallops  Peripheral Vascular: no lesions, ulcers or erosions, normal peripheral pulses and no pedal edema  Gastrointestinal: soft, non-tender, non-distended abdomen, no guarding, rigidity or rebound tenderness, normal bowel sounds  Integumentary: normal skin color, no rashes or lesions  Neuro: AAO x 3, no focal deficits    VITAL SIGNS: 24 HRS MIN & MAX LAST   Temp  Min: 96.5 °F (35.8 °C)  Max: 98.6 °F (37 °C) 98.4 °F (36.9 °C)   BP  Min: 89/51  Max: 127/75 122/80   Pulse  Min: 64  Max: 72  71   Resp  Min: 16  Max: 20 19   SpO2  Min: 80 %  Max: 100 % 100 %     I have reviewed the following labs:  Recent Labs   Lab 03/08/25  0533 03/09/25  0349 03/10/25  0212   WBC 7.09 7.19 7.68   RBC 3.32* 3.25* 3.20*   HGB 10.5* 10.0* 9.9*   HCT 32.2* 31.5* 30.8*   MCV 97.0* 96.9* 96.3*   MCH 31.6* 30.8 30.9   MCHC 32.6* 31.7* 32.1*   RDW 14.0 14.0 14.0    250 245   MPV 11.4* 11.4* 11.4*     Recent Labs   Lab 03/04/25  0340 03/05/25  0412 03/06/25  0306 03/07/25  0358 03/08/25  0533 03/09/25  0349 03/10/25  0212    142 144 141 140 146* 141   K 3.6 3.3* 4.3 4.7 5.5* 5.1 5.1    * 111* 109* 107 107 107 110*   CO2 20* 22* 23 26 26 29 23   BUN 26.4* 23.0 32.2* 37.5* 38.4* 44.9* 44.3*   CREATININE 2.35* 2.43* 2.66* 2.47* 2.46* 2.50* 2.57*   CALCIUM 8.1* 7.9* 8.1* 8.4* 8.6* 8.7* 8.3*   MG 1.90 2.50 2.20  --  2.00 2.10 1.90   ALBUMIN 2.7* 2.7* 2.7* 2.8*  --   --   --    ALKPHOS 55 56 62  --   --   --   --    ALT 27 33 45  --   --   --   --    AST 24 31 34  --   --   --   --    BILITOT 0.3 0.2 0.2  --   --   --   --      Assessment/Plan:  # DKA-resolved  # AMS 2/2 Delirium vs Cefepime induced - improved   # Tachycardia possibly 2/2 NSTEMI vs Intravascular Depletion  # Acute systolic heart failure - compensated   # Troponinemia 2/2 NSTEMI type I vs type II  # Acute sytolic heart failure  # Lactic Acidemia 2/2 Intravascular Depletion  # Proteus UTI  # Leukocytosis 2/2 above  # B/L Hydronephrosis & Hydroureter  # DWAINE on CKD III 2/2 Prerenal vs Postrenal Azotemia  # Hypernatremia - resolved  # RSV antigen positive  # Acute gastroenteritis  # Prior CVA 09/2024  # Normocytic anemia  # Left kidney cyst     - Reporting no new complaints  - Echo showing EF 35%  - was on FD Lovenox for NSTEMI but switched to prophylaxis lovenox given no plans by cardiology for inpatient ischemic eval  - CIS consulted for troponinemia; follow recommendations   - cardiology informed regarding family is ok with [facility] but cardiology planned for outpatient ischemic work up  - Nephrology recommended for renal clearance for [facility]  - CT Head negative  - Consulted Urology for B/L hydronephrosis associated with bladder wall thickening; signed off  - IV Rocephin - last day of Abx 3/6 (day 7)  - Urine culture growing Proteus  - Blood cultures negative   - continue glargine 12 units units and ISS moderate scale   - continue on home aspirin, atorvastatin, Zetia, mirtazapine, Protonix  - continue farxiga  - continue gentle fluids and encourage PO intake   - increased fluid rate today given bump in creatinine  - asked nurse for 1:1  feeding for the patient  - PT/OT consulted; recommending moderate intensity therapy  - psych consult  - labs in AM    VTE prophylaxis: Lovenox    Patient condition:  Stable    Anticipated discharge and Disposition:     Pending    All diagnosis and differential diagnosis have been reviewed; assessment and plan has been documented; I have personally reviewed the labs and test results that are presently available; I have reviewed the patients medication list; I have reviewed the consulting providers response and recommendations. I have reviewed or attempted to review medical records based upon their availability    All of the patient's questions have been  addressed and answered. Patient's is agreeable to the above stated plan. I will continue to monitor closely and make adjustments to medical management as needed.      Radiology:  I have personally reviewed the following imaging and agree with the radiologist.     CT Head Without Contrast  Narrative: EXAMINATION:  CT HEAD WITHOUT CONTRAST    CLINICAL HISTORY:  Mental status change, unknown cause;    TECHNIQUE:  CT images of the head without IV contrast. Axial, coronal and sagittal images reviewed. Dose length product 907 mGycm. Automatic exposure control, adjustment of mA/kV or iterative reconstruction technique used to limit radiation dose.    COMPARISON:  CT 09/29/2024    FINDINGS:  Extra-axial spaces/ventricular system: Normal for age.    Intracranial hemorrhage: None identified.    Cerebral parenchyma: No acute large vessel territory infarct or mass effect identified. Remote right basal ganglia lacunar infarct.  Mild to moderate chronic small vessel ischemic changes have similar appearance.    Vascular system: No hyperdense vessel appreciated. Bilateral carotid siphon and distal vertebral artery calcifications.    Cerebellum: Normal.    Sella: Normal.    Included paranasal sinuses and mastoid air cells: Opacified right mastoid air cells.    Visualized orbits:  Normal.    Scalp/Calvarium: No depressed skull fracture.  Impression: No acute intracranial process identified.    Electronically signed by: Ky Reddy  Date:    03/03/2025  Time:    15:30  US Retroperitoneal Complete  Narrative: EXAMINATION:  US RETROPERITONEAL COMPLETE    CLINICAL HISTORY:  reassess bilateral hydronephrosis;  Disorder of kidney and ureter, unspecified    COMPARISON:  09/08/2024    FINDINGS:  Right kidney is poorly demonstrated due to body habitus.  It measures 10.6 x 5.5 x 5.3 cm.  No focal lesions are noted there is no hydronephrosis.    There is flow present the proximal IVC.  Aorta is incompletely visualized due to overlying bowel gas.  There is a Gandara catheter in place.  The wall of the urinary bladder appears thickened measuring 1.3 cm however the bladder is collapsed.    The left kidney measures 9.8 by 4.6 x 4.8 cm.  There is a simple cyst present measuring 1.5 x 1.7 x 1.4 cm.  There is no hydronephrosis.  Impression: Simple cyst of the left kidney.    Changes consistent with thickening of the wall of the urinary bladder.    Electronically signed by: Tyrese Díaz MD  Date:    03/03/2025  Time:    08:59      Bull Zaidi MD  Department of Hospital Medicine   [facility]   03/10/2025

## 2025-03-10 NOTE — PLAN OF CARE
Problem: Infection  Goal: Absence of Infection Signs and Symptoms  Outcome: Progressing     Problem: Adult Inpatient Plan of Care  Goal: Plan of Care Review  Outcome: Progressing  Goal: Absence of Hospital-Acquired Illness or Injury  Outcome: Progressing  Goal: Optimal Comfort and Wellbeing  Outcome: Progressing  Goal: Readiness for Transition of Care  Outcome: Progressing

## 2025-03-11 LAB
ALBUMIN SERPL-MCNC: 3.2 G/DL (ref 3.4–4.8)
ALBUMIN/GLOB SERPL: 1.1 RATIO (ref 1.1–2)
ALP SERPL-CCNC: 69 UNIT/L (ref 40–150)
ALT SERPL-CCNC: 79 UNIT/L (ref 0–55)
ANION GAP SERPL CALC-SCNC: 10 MEQ/L
AST SERPL-CCNC: 41 UNIT/L (ref 5–34)
BASOPHILS # BLD AUTO: 0.05 X10(3)/MCL
BASOPHILS NFR BLD AUTO: 0.8 %
BILIRUB SERPL-MCNC: 0.3 MG/DL
BUN SERPL-MCNC: 49.5 MG/DL (ref 8.4–25.7)
CALCIUM SERPL-MCNC: 8.5 MG/DL (ref 8.8–10)
CHLORIDE SERPL-SCNC: 107 MMOL/L (ref 98–107)
CO2 SERPL-SCNC: 23 MMOL/L (ref 23–31)
CREAT SERPL-MCNC: 2.88 MG/DL (ref 0.72–1.25)
CREAT/UREA NIT SERPL: 17
EOSINOPHIL # BLD AUTO: 0.13 X10(3)/MCL (ref 0–0.9)
EOSINOPHIL NFR BLD AUTO: 2.2 %
ERYTHROCYTE [DISTWIDTH] IN BLOOD BY AUTOMATED COUNT: 14.1 % (ref 11.5–17)
GFR SERPLBLD CREATININE-BSD FMLA CKD-EPI: 23 ML/MIN/1.73/M2
GLOBULIN SER-MCNC: 2.9 GM/DL (ref 2.4–3.5)
GLUCOSE SERPL-MCNC: 275 MG/DL (ref 82–115)
HCT VFR BLD AUTO: 30.2 % (ref 42–52)
HGB BLD-MCNC: 9.8 G/DL (ref 14–18)
IMM GRANULOCYTES # BLD AUTO: 0.03 X10(3)/MCL (ref 0–0.04)
IMM GRANULOCYTES NFR BLD AUTO: 0.5 %
LYMPHOCYTES # BLD AUTO: 1.44 X10(3)/MCL (ref 0.6–4.6)
LYMPHOCYTES NFR BLD AUTO: 24.3 %
MCH RBC QN AUTO: 31.4 PG (ref 27–31)
MCHC RBC AUTO-ENTMCNC: 32.5 G/DL (ref 33–36)
MCV RBC AUTO: 96.8 FL (ref 80–94)
MONOCYTES # BLD AUTO: 0.59 X10(3)/MCL (ref 0.1–1.3)
MONOCYTES NFR BLD AUTO: 9.9 %
NEUTROPHILS # BLD AUTO: 3.69 X10(3)/MCL (ref 2.1–9.2)
NEUTROPHILS NFR BLD AUTO: 62.3 %
NRBC BLD AUTO-RTO: 0 %
PLATELET # BLD AUTO: 253 X10(3)/MCL (ref 130–400)
PMV BLD AUTO: 11.5 FL (ref 7.4–10.4)
POCT GLUCOSE: 223 MG/DL (ref 70–110)
POCT GLUCOSE: 271 MG/DL (ref 70–110)
POTASSIUM SERPL-SCNC: 5.1 MMOL/L (ref 3.5–5.1)
PROT SERPL-MCNC: 6.1 GM/DL (ref 5.8–7.6)
RBC # BLD AUTO: 3.12 X10(6)/MCL (ref 4.7–6.1)
SODIUM SERPL-SCNC: 140 MMOL/L (ref 136–145)
WBC # BLD AUTO: 5.93 X10(3)/MCL (ref 4.5–11.5)

## 2025-03-11 PROCEDURE — 97530 THERAPEUTIC ACTIVITIES: CPT

## 2025-03-11 PROCEDURE — 25000003 PHARM REV CODE 250: Performed by: NURSE PRACTITIONER

## 2025-03-11 PROCEDURE — 27000207 HC ISOLATION

## 2025-03-11 PROCEDURE — 25000003 PHARM REV CODE 250

## 2025-03-11 PROCEDURE — 21400001 HC TELEMETRY ROOM

## 2025-03-11 PROCEDURE — 80053 COMPREHEN METABOLIC PANEL: CPT

## 2025-03-11 PROCEDURE — 36415 COLL VENOUS BLD VENIPUNCTURE: CPT

## 2025-03-11 PROCEDURE — 97530 THERAPEUTIC ACTIVITIES: CPT | Mod: CO

## 2025-03-11 PROCEDURE — 85025 COMPLETE CBC W/AUTO DIFF WBC: CPT

## 2025-03-11 PROCEDURE — 63600175 PHARM REV CODE 636 W HCPCS: Performed by: STUDENT IN AN ORGANIZED HEALTH CARE EDUCATION/TRAINING PROGRAM

## 2025-03-11 PROCEDURE — 63600175 PHARM REV CODE 636 W HCPCS

## 2025-03-11 RX ORDER — INSULIN GLARGINE 100 [IU]/ML
15 INJECTION, SOLUTION SUBCUTANEOUS NIGHTLY
Status: DISCONTINUED | OUTPATIENT
Start: 2025-03-11 | End: 2025-03-13 | Stop reason: HOSPADM

## 2025-03-11 RX ORDER — ATORVASTATIN CALCIUM 40 MG/1
40 TABLET, FILM COATED ORAL DAILY
Status: DISCONTINUED | OUTPATIENT
Start: 2025-03-11 | End: 2025-03-13 | Stop reason: HOSPADM

## 2025-03-11 RX ADMIN — ASPIRIN 81 MG: 81 TABLET, COATED ORAL at 05:03

## 2025-03-11 RX ADMIN — INSULIN GLARGINE 15 UNITS: 100 INJECTION, SOLUTION SUBCUTANEOUS at 09:03

## 2025-03-11 RX ADMIN — INSULIN ASPART 4 UNITS: 100 INJECTION, SOLUTION INTRAVENOUS; SUBCUTANEOUS at 12:03

## 2025-03-11 RX ADMIN — ATORVASTATIN CALCIUM 40 MG: 40 TABLET, FILM COATED ORAL at 10:03

## 2025-03-11 RX ADMIN — MIRTAZAPINE 15 MG: 15 TABLET, FILM COATED ORAL at 09:03

## 2025-03-11 RX ADMIN — HALOPERIDOL LACTATE 5 MG: 5 INJECTION, SOLUTION INTRAMUSCULAR at 10:03

## 2025-03-11 RX ADMIN — METOPROLOL TARTRATE 50 MG: 50 TABLET, FILM COATED ORAL at 10:03

## 2025-03-11 RX ADMIN — PANTOPRAZOLE SODIUM 40 MG: 40 TABLET, DELAYED RELEASE ORAL at 10:03

## 2025-03-11 RX ADMIN — ENOXAPARIN SODIUM 30 MG: 30 INJECTION SUBCUTANEOUS at 05:03

## 2025-03-11 RX ADMIN — METOPROLOL TARTRATE 50 MG: 50 TABLET, FILM COATED ORAL at 09:03

## 2025-03-11 NOTE — PLAN OF CARE
[facility] received referral and discussed with Dr Zuniga with denial for rehab due to inability to tolerate 3 hours of therapy daily with no qualifying rehab diagnosis.

## 2025-03-11 NOTE — PROGRESS NOTES
[facility] Hospital Medicine Progress Note        Chief Complaint: Inpatient Follow-up     HPI:   64-year-old gentleman with DM type 2, CKD stage 3, CVA 09/2024, chronic indwelling Gandara who was admitted after he presented with complaints of nausea with several episodes of vomiting and nonbloody diarrhea.  He denied having any chest pain, abdominal pain, SOB, cough, sputum production, headache, numbness, weakness, dizziness/lightheadedness or loss of consciousness.  On admission his labs showed leukocytosis of 24.8, normocytic anemia with H/H of 11.8/36.9, hyperkalemia of 5.5, CO2 15 with anion gap 20, BUN/creatinine 66.9/3.69, negative troponins, lactic acid of 3.3, beta hydroxybutyrate of 2.9.  UA showed 3+ protein, 2+ glucose, 1+ ketones, 3+ blood, leukocyte esterase, WBCs as well as bacteria.  Blood cultures and urine culture were sent off.  CT C/A/P was done which showed extensive urinary bladder wall thickening with inflammatory changes around urinary bladder consistent with acute cystitis causing secondary obstructive changes and secondary B/L hydronephrosis and hydroureter, RLL atelectasis.  Admitted to ICU on insulin infusion per DKA protocol.  Started on broad-spectrum antibiotics.  Given IVF resuscitation.  Urine culture growing GNR.  Noted to have low-grade fevers overnight on 03/01.  Insulin infusion discontinued and patient is started on long-acting insulin. Patient also found to be RSV positive. Downgraded to . Troponins increased to 0.114; trended q8 & Echo ordered. CIS consulted. FD Lovenox ordered. Urology consulted for hydronephrosis; will follow recommendations. Noted to have hypoglycemic episodes overnight 03/03. On IV D5/NS. Tachycardia improved. Trop increased to 0.675 overnight. Echo showing EF 35%. BUN/Cr improved at 25.9/2.17; remains on IV D5/NS. UCX growing Proteus. Will DC Cefepime in case it is causing AMS & start IV Rocephin. CT Head ordered which was  unremarkable. Decreased Lantus to 15 units nightly till PO intake improves.     Patient and family thinking about [facility]. Opting to do outpatient work up in the setting of kidney function. Psych consulted as per family request for inability to keep focus. Patient pending placement to SNF.     Interval Hx:   NAEO. Creatinine will mild bump. Otherwise doing okay. Spoke with sister at bedside.     Case was discussed with patient's nurse and  on the floor.    Objective/physical exam:  General: alert male lying comfortably in bed, in no acute distress  HENT: oral and oropharyngeal mucosa moist, pink, with no erythema or exudates, no ear pain or discharge  Neck: normal neck movement, no lymph nodes or swellings, no JVD or Carotid bruit  Respiratory: clear breathing sounds bilaterally, no crackles, rales, ronchi or wheezes  Cardiovascular: clear S1 and S2, no murmurs, rubs or gallops  Peripheral Vascular: no lesions, ulcers or erosions, normal peripheral pulses and no pedal edema  Gastrointestinal: soft, non-tender, non-distended abdomen, no guarding, rigidity or rebound tenderness, normal bowel sounds  Integumentary: normal skin color, no rashes or lesions  Neuro: AAO x 3, no focal deficits    VITAL SIGNS: 24 HRS MIN & MAX LAST   Temp  Min: 97.4 °F (36.3 °C)  Max: 98.1 °F (36.7 °C) 97.5 °F (36.4 °C)   BP  Min: 100/65  Max: 116/75 103/73   Pulse  Min: 66  Max: 150  69   Resp  Min: 18  Max: 19 18   SpO2  Min: 94 %  Max: 99 % 97 %     I have reviewed the following labs:  Recent Labs   Lab 03/09/25  0349 03/10/25  0212 03/11/25  0452   WBC 7.19 7.68 5.93   RBC 3.25* 3.20* 3.12*   HGB 10.0* 9.9* 9.8*   HCT 31.5* 30.8* 30.2*   MCV 96.9* 96.3* 96.8*   MCH 30.8 30.9 31.4*   MCHC 31.7* 32.1* 32.5*   RDW 14.0 14.0 14.1    245 253   MPV 11.4* 11.4* 11.5*     Recent Labs   Lab 03/05/25  1292 03/06/25  0306 03/07/25  0358 03/08/25  0533 03/09/25  0349 03/10/25  0212 03/11/25  0452    144 141 140 146* 141 140   K  3.3* 4.3 4.7 5.5* 5.1 5.1 5.1   * 109* 107 107 107 110* 107   CO2 22* 23 26 26 29 23 23   BUN 23.0 32.2* 37.5* 38.4* 44.9* 44.3* 49.5*   CREATININE 2.43* 2.66* 2.47* 2.46* 2.50* 2.57* 2.88*   CALCIUM 7.9* 8.1* 8.4* 8.6* 8.7* 8.3* 8.5*   MG 2.50 2.20  --  2.00 2.10 1.90  --    ALBUMIN 2.7* 2.7* 2.8*  --   --   --  3.2*   ALKPHOS 56 62  --   --   --   --  69   ALT 33 45  --   --   --   --  79*   AST 31 34  --   --   --   --  41*   BILITOT 0.2 0.2  --   --   --   --  0.3     Assessment/Plan:  # DKA-resolved  # AMS 2/2 Delirium vs Cefepime induced - improved   # Tachycardia possibly 2/2 NSTEMI vs Intravascular Depletion  # Acute systolic heart failure - compensated   # Troponinemia 2/2 NSTEMI type I vs type II  # Acute sytolic heart failure  # Lactic Acidemia 2/2 Intravascular Depletion  # Proteus UTI  # Leukocytosis 2/2 above  # B/L Hydronephrosis & Hydroureter  # DWAINE on CKD III 2/2 Prerenal vs Postrenal Azotemia  # Hypernatremia - resolved  # RSV antigen positive  # Acute gastroenteritis  # Prior CVA 09/2024  # Normocytic anemia  # Left kidney cyst  # Elevated LFTs     - Reporting no new complaints  - Echo showing EF 35%  - was on FD Lovenox for NSTEMI but switched to prophylaxis lovenox given no plans by cardiology for inpatient ischemic eval  - CIS consulted for troponinemia; follow recommendations   - cardiology informed regarding family is ok with [facility] but cardiology planned for outpatient ischemic work up  - Nephrology recommended for renal clearance for [facility]  - CT Head negative  - Consulted Urology for B/L hydronephrosis associated with bladder wall thickening; signed off  - IV Rocephin - last day of Abx 3/6 (day 7)  - Urine culture growing Proteus  - Blood cultures negative   - increase glargine 15 units units and ISS moderate scale   - continue on home aspirin, atorvastatin, Zetia, mirtazapine, Protonix  - stop farxiga  - stop fluids and encourage PO intake  - decreased dose of statin and held zetia  -  "obtain US RUQ US  - asked nurse for 1:1 feeding for the patient  - PT/OT consulted; recommending moderate intensity therapy  - psych consult  - labs in AM  - I spoke extensively with his sister Kelin - patient has been declining over the last couple of years due to stroke and dementia. Patient has been requiring max assistance with therapy and has colbert placed the last few weeks by urology. I told her patient will benefit from therapy but he will likely not go back to his baseline from a few years ago. If rehabs deny him dur to patient requiring max assistance with everything - patient may benefit from nursing home. Sister reports that she cannot take care of him at home because sometimes he is okay and sometimes he is like "dead weight"    Prolonged care - 95 mins    VTE prophylaxis: Lovenox    Patient condition:  Stable    Anticipated discharge and Disposition:     Pending    All diagnosis and differential diagnosis have been reviewed; assessment and plan has been documented; I have personally reviewed the labs and test results that are presently available; I have reviewed the patients medication list; I have reviewed the consulting providers response and recommendations. I have reviewed or attempted to review medical records based upon their availability    All of the patient's questions have been  addressed and answered. Patient's is agreeable to the above stated plan. I will continue to monitor closely and make adjustments to medical management as needed.      Radiology:  I have personally reviewed the following imaging and agree with the radiologist.     CT Head Without Contrast  Narrative: EXAMINATION:  CT HEAD WITHOUT CONTRAST    CLINICAL HISTORY:  Mental status change, unknown cause;    TECHNIQUE:  CT images of the head without IV contrast. Axial, coronal and sagittal images reviewed. Dose length product 907 mGycm. Automatic exposure control, adjustment of mA/kV or iterative reconstruction technique " used to limit radiation dose.    COMPARISON:  CT 09/29/2024    FINDINGS:  Extra-axial spaces/ventricular system: Normal for age.    Intracranial hemorrhage: None identified.    Cerebral parenchyma: No acute large vessel territory infarct or mass effect identified. Remote right basal ganglia lacunar infarct.  Mild to moderate chronic small vessel ischemic changes have similar appearance.    Vascular system: No hyperdense vessel appreciated. Bilateral carotid siphon and distal vertebral artery calcifications.    Cerebellum: Normal.    Sella: Normal.    Included paranasal sinuses and mastoid air cells: Opacified right mastoid air cells.    Visualized orbits: Normal.    Scalp/Calvarium: No depressed skull fracture.  Impression: No acute intracranial process identified.    Electronically signed by: Ky Reddy  Date:    03/03/2025  Time:    15:30  US Retroperitoneal Complete  Narrative: EXAMINATION:  US RETROPERITONEAL COMPLETE    CLINICAL HISTORY:  reassess bilateral hydronephrosis;  Disorder of kidney and ureter, unspecified    COMPARISON:  09/08/2024    FINDINGS:  Right kidney is poorly demonstrated due to body habitus.  It measures 10.6 x 5.5 x 5.3 cm.  No focal lesions are noted there is no hydronephrosis.    There is flow present the proximal IVC.  Aorta is incompletely visualized due to overlying bowel gas.  There is a Gandara catheter in place.  The wall of the urinary bladder appears thickened measuring 1.3 cm however the bladder is collapsed.    The left kidney measures 9.8 by 4.6 x 4.8 cm.  There is a simple cyst present measuring 1.5 x 1.7 x 1.4 cm.  There is no hydronephrosis.  Impression: Simple cyst of the left kidney.    Changes consistent with thickening of the wall of the urinary bladder.    Electronically signed by: Tyrese Díaz MD  Date:    03/03/2025  Time:    08:59      Bull Zaidi MD  Department of Hospital Medicine   [facility]   03/11/2025

## 2025-03-11 NOTE — PLAN OF CARE
Denied by rehab, sister made aware. Sister given info to call NH for private skilled price. VM left for sister

## 2025-03-11 NOTE — PT/OT/SLP PROGRESS
Physical Therapy Treatment    Patient Name:  Froy Barragan   MRN:  [id number]    Recommendations:     Discharge therapy intensity: Moderate Intensity Therapy   Discharge Equipment Recommendations: to be determined by next level of care  Barriers to discharge: Impaired mobility and Ongoing medical needs    Assessment:     Froy Barragan is a 64 y.o. male admitted with a medical diagnosis of Generalized weakness, Diabetic ketoacidosis, Sepsis, Acute on chronic renal insufficiency, RSV, and Acute UTI, hx of CVA 9/24. .  .  He presents with the following impairments/functional limitations: weakness, impaired self care skills, impaired balance, impaired functional mobility, gait instability, impaired endurance, decreased upper extremity function, decreased lower extremity function, decreased safety awareness, decreased coordination . Pt made good effort today but needs almost constant re direction when performing fxn'l mobility. He was denied rehab today because they felt he could not tolerate 3 hours of therapy a day. Will talk with case management about pursuing a swing bed facility    Rehab Prognosis: Good and Fair; patient would benefit from acute skilled PT services to address these deficits and reach maximum level of function.    Recent Surgery: * No surgery found *      Plan:     During this hospitalization, patient would benefit from acute PT services 3 x/week to address the identified rehab impairments via gait training, therapeutic activities, therapeutic exercises and progress toward the following goals:    Plan of Care Expires:  04/03/25    Subjective     Chief Complaint:   Patient/Family Comments/goals:   Pain/Comfort:         Objective:     Communicated with nurse prior to session.  Patient found supine with colbert catheter, telemetry, pulse ox (continuous) upon PT entry to room.     General Precautions: Standard, fall, droplet  Orthopedic Precautions: N/A  Braces: N/A  Respiratory Status:  Room air  Blood Pressure:   Skin Integrity:       Functional Mobility:  Bed Mobility:     Supine to Sit: moderate assistance  Transfers:     Sit to Stand:  moderate assistance with rolling walker  Bed to Chair: of moda x 2  persons with  rolling walker  using  Step Transfer  Pt during sitting needs cues and sometimes manual therapy to place his left foot flat on the ground. He will often keep this foot raised up in sitting. I had a tech hold his foot down in place while I had him perform activities in sitting. When he stands, he needs cues to use arms safely with initial stand and then needs reminders to activate his gluets to help him straighten up from leaning back and keeping his posture from being in a stooped position. With transfers using a rw bed to chair, he requires lots of safety cues, assist with rw, and sequencing of the transfer.    Therapeutic Activities/Exercises:  Pt performed  trunk flex and ext exs with arms folded to focus on his trunk . As well as using arms in performance of repeated sit to partial stands with PT in front for support    Education:  Patient and sister  provided with verbal education education regarding PT role/goals/POC, fall prevention, and safety awareness.  Understanding was verbalized, however additional teaching warranted.     Patient left up in chair with all lines intact, call button in reach, chair alarm on, and geomat cushion    GOALS:   Multidisciplinary Problems       Physical Therapy Goals          Problem: Physical Therapy    Goal Priority Disciplines Outcome Interventions   Physical Therapy Goal     PT, PT/OT Progressing    Description: Goals to be met by: 4/3/25     Patient will increase functional independence with mobility by performin. Supine to sit with min Assistance  2. Sit to supine with min Assistance  3. Sit to stand transfer with mod Assistance  4. Bed to chair transfer with mod using LRAD                         Time Tracking:     PT Received On:  03/11/25  PT Start Time: 0920     PT Stop Time: 0955  PT Total Time (min): 35 min     Billable Minutes: Therapeutic Activity 35    Treatment Type: Treatment  PT/PTA: PT     Number of PTA visits since last PT visit: 3     03/11/2025

## 2025-03-11 NOTE — PT/OT/SLP PROGRESS
Occupational Therapy   Treatment    Name: Froy Barragan  MRN: [id number]  Admitting Diagnosis:  <principal problem not specified>       Recommendations:     Recommended therapy intensity at discharge: Moderate Intensity Therapy   Discharge Equipment Recommendations:  to be determined by next level of care  Barriers to discharge:       Assessment:     Froy Barragan is a 64 y.o. male with a medical diagnosis of <principal problem not specified>.  He presents with good participation. Performance deficits affecting function are weakness, impaired functional mobility, impaired cognition, decreased safety awareness, impaired coordination, impaired endurance, gait instability, decreased coordination, impaired fine motor, impaired joint extensibility, abnormal tone, impaired skin, decreased upper extremity function, decreased lower extremity function, impaired balance, visual deficits, impaired self care skills, decreased ROM.     Rehab Prognosis:  Good; patient would benefit from acute skilled OT services to address these deficits and reach maximum level of function.       Plan:     Patient to be seen 4 x/week to address the above listed problems via self-care/home management, therapeutic activities, therapeutic exercises  Plan of Care Expires: 04/03/25  Plan of Care Reviewed with: patient    Subjective     Pain/Comfort:  Pain Rating 1: 0/10    Objective:     Communicated with: nurse prior to and following session.  Patient found sitting edge of bed with colbert catheter, telemetry, pulse ox (continuous) upon OT entry to room.    General Precautions: Standard, fall, droplet    Orthopedic Precautions:N/A  Braces: N/A  Respiratory Status: Room air     Occupational Performance:     Bed Mobility:    Patient completed Rolling/Turning to Left with  stand by assistance  Patient completed Rolling/Turning to Right with stand by assistance  Patient completed Scooting/Bridging with minimum assistance  Patient  completed Supine to Sit with contact guard assistance x 2 trials  Patient completed Sit to Supine with minimum assistance x 2 trials    Functional Mobility/Transfers:  Patient completed Sit <> Stand Transfer with moderate assistance  with  rolling walker x 2 trials  Functional Mobility: static standing tolerance with Mod A with RW x 1 minute    Therapeutic Positioning    OT interventions performed during the course of today's session in an effort to prevent and/or reduce acquired pressure injuries:   Therapeutic positioning was provided at the conclusion of session to offload all bony prominences for the prevention and/or reduction of pressure injuries    [facility] 6 Click ADL: 12    Patient Education:  Patient provided with verbal education and demonstrations education regarding fall prevention and safety awareness.  Understanding was verbalized, however additional teaching warranted.      Patient left HOB elevated with all lines intact and call button in reach.    GOALS:   Multidisciplinary Problems       Occupational Therapy Goals          Problem: Occupational Therapy    Goal Priority Disciplines Outcome Interventions   Occupational Therapy Goal     OT, PT/OT Progressing    Description: Goals to be met by: 4/3/25     Patient will increase functional independence with ADLs by performing:    UE Dressing with Minimal Assistance.  LE Dressing with Minimal Assistance.  Grooming while seated at sink with Stand-by Assistance.  Toileting from bedside commode with Minimal Assistance for hygiene and clothing management.   Toilet transfer to bedside commode with Minimal Assistance.  Showering from shower chair w/ minimal assistance.   Inc functional awareness of the L side for safety during daily life tasks.                          Time Tracking:     OT Date of Treatment: 03/11/25  OT Start Time: 1241  OT Stop Time: 1304  OT Total Time (min): 23 min    Billable Minutes:Therapeutic Activity 23    OT/JOLENE: JOLENE     Number of JOLENE  visits since last OT visit: 3    3/11/2025

## 2025-03-12 LAB
ALBUMIN SERPL-MCNC: 3.4 G/DL (ref 3.4–4.8)
ALBUMIN/GLOB SERPL: 1.1 RATIO (ref 1.1–2)
ALP SERPL-CCNC: 70 UNIT/L (ref 40–150)
ALT SERPL-CCNC: 84 UNIT/L (ref 0–55)
ANION GAP SERPL CALC-SCNC: 9 MEQ/L
AST SERPL-CCNC: 44 UNIT/L (ref 5–34)
BASOPHILS # BLD AUTO: 0.09 X10(3)/MCL
BASOPHILS NFR BLD AUTO: 1.3 %
BILIRUB SERPL-MCNC: 0.4 MG/DL
BUN SERPL-MCNC: 45.7 MG/DL (ref 8.4–25.7)
CALCIUM SERPL-MCNC: 8.8 MG/DL (ref 8.8–10)
CHLORIDE SERPL-SCNC: 109 MMOL/L (ref 98–107)
CO2 SERPL-SCNC: 25 MMOL/L (ref 23–31)
CREAT SERPL-MCNC: 2.65 MG/DL (ref 0.72–1.25)
CREAT/UREA NIT SERPL: 17
EOSINOPHIL # BLD AUTO: 0.15 X10(3)/MCL (ref 0–0.9)
EOSINOPHIL NFR BLD AUTO: 2.1 %
ERYTHROCYTE [DISTWIDTH] IN BLOOD BY AUTOMATED COUNT: 14.2 % (ref 11.5–17)
GFR SERPLBLD CREATININE-BSD FMLA CKD-EPI: 26 ML/MIN/1.73/M2
GLOBULIN SER-MCNC: 3 GM/DL (ref 2.4–3.5)
GLUCOSE SERPL-MCNC: 140 MG/DL (ref 82–115)
HCT VFR BLD AUTO: 32.1 % (ref 42–52)
HGB BLD-MCNC: 10.3 G/DL (ref 14–18)
IMM GRANULOCYTES # BLD AUTO: 0.04 X10(3)/MCL (ref 0–0.04)
IMM GRANULOCYTES NFR BLD AUTO: 0.6 %
LYMPHOCYTES # BLD AUTO: 1.36 X10(3)/MCL (ref 0.6–4.6)
LYMPHOCYTES NFR BLD AUTO: 19 %
MAGNESIUM SERPL-MCNC: 1.9 MG/DL (ref 1.6–2.6)
MCH RBC QN AUTO: 31.1 PG (ref 27–31)
MCHC RBC AUTO-ENTMCNC: 32.1 G/DL (ref 33–36)
MCV RBC AUTO: 97 FL (ref 80–94)
MONOCYTES # BLD AUTO: 0.66 X10(3)/MCL (ref 0.1–1.3)
MONOCYTES NFR BLD AUTO: 9.2 %
NEUTROPHILS # BLD AUTO: 4.84 X10(3)/MCL (ref 2.1–9.2)
NEUTROPHILS NFR BLD AUTO: 67.8 %
NRBC BLD AUTO-RTO: 0 %
PLATELET # BLD AUTO: 267 X10(3)/MCL (ref 130–400)
PMV BLD AUTO: 11.3 FL (ref 7.4–10.4)
POCT GLUCOSE: 217 MG/DL (ref 70–110)
POTASSIUM SERPL-SCNC: 4.8 MMOL/L (ref 3.5–5.1)
PROT SERPL-MCNC: 6.4 GM/DL (ref 5.8–7.6)
RBC # BLD AUTO: 3.31 X10(6)/MCL (ref 4.7–6.1)
SODIUM SERPL-SCNC: 143 MMOL/L (ref 136–145)
WBC # BLD AUTO: 7.14 X10(3)/MCL (ref 4.5–11.5)

## 2025-03-12 PROCEDURE — 85025 COMPLETE CBC W/AUTO DIFF WBC: CPT

## 2025-03-12 PROCEDURE — 36415 COLL VENOUS BLD VENIPUNCTURE: CPT

## 2025-03-12 PROCEDURE — 25000003 PHARM REV CODE 250

## 2025-03-12 PROCEDURE — 27000207 HC ISOLATION

## 2025-03-12 PROCEDURE — 80053 COMPREHEN METABOLIC PANEL: CPT

## 2025-03-12 PROCEDURE — 83735 ASSAY OF MAGNESIUM: CPT

## 2025-03-12 PROCEDURE — 63600175 PHARM REV CODE 636 W HCPCS

## 2025-03-12 PROCEDURE — 25000003 PHARM REV CODE 250: Performed by: NURSE PRACTITIONER

## 2025-03-12 PROCEDURE — 21400001 HC TELEMETRY ROOM

## 2025-03-12 PROCEDURE — 97535 SELF CARE MNGMENT TRAINING: CPT

## 2025-03-12 RX ADMIN — METOPROLOL TARTRATE 50 MG: 50 TABLET, FILM COATED ORAL at 10:03

## 2025-03-12 RX ADMIN — ATORVASTATIN CALCIUM 40 MG: 40 TABLET, FILM COATED ORAL at 08:03

## 2025-03-12 RX ADMIN — MIRTAZAPINE 15 MG: 15 TABLET, FILM COATED ORAL at 10:03

## 2025-03-12 RX ADMIN — ASPIRIN 81 MG: 81 TABLET, COATED ORAL at 08:03

## 2025-03-12 RX ADMIN — INSULIN GLARGINE 15 UNITS: 100 INJECTION, SOLUTION SUBCUTANEOUS at 10:03

## 2025-03-12 RX ADMIN — ENOXAPARIN SODIUM 30 MG: 30 INJECTION SUBCUTANEOUS at 04:03

## 2025-03-12 RX ADMIN — INSULIN ASPART 4 UNITS: 100 INJECTION, SOLUTION INTRAVENOUS; SUBCUTANEOUS at 12:03

## 2025-03-12 RX ADMIN — PANTOPRAZOLE SODIUM 40 MG: 40 TABLET, DELAYED RELEASE ORAL at 08:03

## 2025-03-12 RX ADMIN — METOPROLOL TARTRATE 50 MG: 50 TABLET, FILM COATED ORAL at 08:03

## 2025-03-12 NOTE — PT/OT/SLP PROGRESS
Occupational Therapy   Treatment    Name: Froy Barragan  MRN: [id number]  Admitting Diagnosis:  <principal problem not specified>       Recommendations:     Recommended therapy intensity at discharge: Moderate Intensity Therapy   Discharge Equipment Recommendations:  to be determined by next level of care  Barriers to discharge:  Other (Comment), Decreased caregiver support (ongoing medical needs)    Assessment:     Froy Barragan is a 64 y.o. male with a medical diagnosis of  RSV, diabetic ketoacidosis, renal insufficiency, and UTI.  He presents with the following performance deficits affecting function: impaired functional mobility, weakness, impaired endurance, gait instability, impaired balance, impaired self care skills, impaired cognition, decreased safety awareness, decreased coordination, decreased upper extremity function, decreased lower extremity function, impaired coordination.     Pt willing to participate in therapy this date. He required mod A to reach EOB, mod A x 2 w/ RW to stand, and mod A x 2 with RW to step tf to bedside chair. Pt required max cues for movement progression during t/f. Pt passed BM on pad, sitting on chair, and then required max A for toileting but unable to complete clean due to poor standing tolerance. Max A x 2 + RW via stand pivot required to t/f pt back to bed for more thorough hygiene. RN notified of pt's need for a bath. SBA then provided for hair combing with pt at bedlevel. Still rec mod int therapy post dc.     Rehab Prognosis:  Good; patient would benefit from acute skilled OT services to address these deficits and reach maximum level of function.       Plan:     Patient to be seen 4 x/week to address the above listed problems via self-care/home management, therapeutic activities, therapeutic exercises  Plan of Care Expires: 04/03/25  Plan of Care Reviewed with: patient    Subjective     Pain/Comfort:  Pain Rating 1: 0/10    Objective:     Communicated  with: Rn prior to session.  Patient found supine with colbert catheter, telemetry, pulse ox (continuous), peripheral IV upon OT entry to room.    General Precautions: Standard, fall, droplet, contact    Orthopedic Precautions:N/A  Braces: N/A  Respiratory Status: Room air     Occupational Performance:     Bed Mobility:    Patient completed Supine to Sit with moderate assistance     Functional Mobility/Transfers:  Patient completed Sit <> Stand Transfer with moderate assistance and of 2 persons  with  rolling walker   Patient completed Bed <> Chair Transfer using step tv technique with moderate assistance and of 2 persons with rolling walker / chair to bed tf using stand pivot tf with max A x 2 with RW    Activities of Daily Living:  Grooming: stand by assistance for hair combing at bed level    Lower Body Dressing: maximal assistance to don brief standing at chair with RW   Toileting: maximal assistance for hygiene and clothing management post BM   Pt returned to bed level due to needing bath post Bm. RN aware.         Therapeutic Positioning    OT interventions performed during the course of today's session in an effort to prevent and/or reduce acquired pressure injuries:   Education was provided on benefits of and recommendations for therapeutic positioning    Skin assessment: full body skin assessment was performed    Findings: known area of altered skin integrity at buttock     [facility] 6 Click ADL: 12    Patient Education:  Patient provided with verbal education education regarding OT role/goals/POC, fall prevention, safety awareness, Discharge/DME recommendations, and pressure ulcer prevention.  Additional teaching is warranted.      Patient left HOB elevated with all lines intact, call button in reach, and Rn notified.    GOALS:   Multidisciplinary Problems       Occupational Therapy Goals          Problem: Occupational Therapy    Goal Priority Disciplines Outcome Interventions   Occupational Therapy Goal     OT,  PT/OT Progressing    Description: Goals to be met by: 4/3/25     Patient will increase functional independence with ADLs by performing:    UE Dressing with Minimal Assistance.  LE Dressing with Minimal Assistance.  Grooming while seated at sink with Stand-by Assistance.  Toileting from bedside commode with Minimal Assistance for hygiene and clothing management.   Toilet transfer to bedside commode with Minimal Assistance.  Showering from shower chair w/ minimal assistance.   Inc functional awareness of the L side for safety during daily life tasks.                          Time Tracking:     OT Date of Treatment: 03/12/25  OT Start Time: 1100  OT Stop Time: 1136  OT Total Time (min): 36 min    Billable Minutes:Self Care/Home Management 2    OT/JOLENE: OT     Number of JOLENE visits since last OT visit: 4    3/12/2025

## 2025-03-12 NOTE — PLAN OF CARE
[facility] sent clinical updates to [facility] via [employer] Fax. Received communication from Gaby with [facility] inquiring about financials. I notified her family was given information to call facility regarding skilled pricing. Per ZENY.

## 2025-03-12 NOTE — PLAN OF CARE
Problem: Infection  Goal: Absence of Infection Signs and Symptoms  Outcome: Progressing     Problem: Adult Inpatient Plan of Care  Goal: Plan of Care Review  Outcome: Progressing  Goal: Patient-Specific Goal (Individualized)  Outcome: Progressing  Goal: Absence of Hospital-Acquired Illness or Injury  Outcome: Progressing  Goal: Optimal Comfort and Wellbeing  Outcome: Progressing  Goal: Readiness for Transition of Care  Outcome: Progressing     Problem: Diabetes Comorbidity  Goal: Blood Glucose Level Within Targeted Range  Outcome: Progressing     Problem: Wound  Goal: Optimal Coping  Outcome: Progressing  Goal: Optimal Functional Ability  Outcome: Progressing  Goal: Absence of Infection Signs and Symptoms  Outcome: Progressing  Goal: Improved Oral Intake  Outcome: Progressing  Goal: Optimal Pain Control and Function  Outcome: Progressing  Goal: Skin Health and Integrity  Outcome: Progressing  Goal: Optimal Wound Healing  Outcome: Progressing

## 2025-03-12 NOTE — PROGRESS NOTES
[facility] Hospital Medicine Progress Note        Chief Complaint: Inpatient Follow-up     HPI:   64-year-old gentleman with DM type 2, CKD stage 3, CVA 09/2024, chronic indwelling Gandara who was admitted after he presented with complaints of nausea with several episodes of vomiting and nonbloody diarrhea.  He denied having any chest pain, abdominal pain, SOB, cough, sputum production, headache, numbness, weakness, dizziness/lightheadedness or loss of consciousness.  On admission his labs showed leukocytosis of 24.8, normocytic anemia with H/H of 11.8/36.9, hyperkalemia of 5.5, CO2 15 with anion gap 20, BUN/creatinine 66.9/3.69, negative troponins, lactic acid of 3.3, beta hydroxybutyrate of 2.9.  UA showed 3+ protein, 2+ glucose, 1+ ketones, 3+ blood, leukocyte esterase, WBCs as well as bacteria.  Blood cultures and urine culture were sent off.  CT C/A/P was done which showed extensive urinary bladder wall thickening with inflammatory changes around urinary bladder consistent with acute cystitis causing secondary obstructive changes and secondary B/L hydronephrosis and hydroureter, RLL atelectasis.  Admitted to ICU on insulin infusion per DKA protocol.  Started on broad-spectrum antibiotics.  Given IVF resuscitation.  Urine culture growing GNR.  Noted to have low-grade fevers overnight on 03/01.  Insulin infusion discontinued and patient is started on long-acting insulin. Patient also found to be RSV positive. Downgraded to . Troponins increased to 0.114; trended q8 & Echo ordered. CIS consulted. FD Lovenox ordered. Urology consulted for hydronephrosis; will follow recommendations. Noted to have hypoglycemic episodes overnight 03/03. On IV D5/NS. Tachycardia improved. Trop increased to 0.675 overnight. Echo showing EF 35%. BUN/Cr improved at 25.9/2.17; remains on IV D5/NS. UCX growing Proteus. Will DC Cefepime in case it is causing AMS & start IV Rocephin. CT Head ordered which was  unremarkable. Decreased Lantus to 15 units nightly till PO intake improves.     Patient and family thinking about LHC. Opting to do outpatient work up in the setting of kidney function. Psych consulted as per family request for inability to keep focus.     Creatinine improved after holding farxiga.    Interval Hx:   NAEO. Creatinine improving after holding farxiga. Otherwise no issues.     Case was discussed with patient's nurse and  on the floor.    Objective/physical exam:  General: alert male lying comfortably in bed, in no acute distress  HENT: oral and oropharyngeal mucosa moist, pink, with no erythema or exudates, no ear pain or discharge  Neck: normal neck movement, no lymph nodes or swellings, no JVD or Carotid bruit  Respiratory: clear breathing sounds bilaterally, no crackles, rales, ronchi or wheezes  Cardiovascular: clear S1 and S2, no murmurs, rubs or gallops  Peripheral Vascular: no lesions, ulcers or erosions, normal peripheral pulses and no pedal edema  Gastrointestinal: soft, non-tender, non-distended abdomen, no guarding, rigidity or rebound tenderness, normal bowel sounds  Integumentary: normal skin color, no rashes or lesions  Neuro: AAO x 3, no focal deficits    VITAL SIGNS: 24 HRS MIN & MAX LAST   Temp  Min: 97.2 °F (36.2 °C)  Max: 98.4 °F (36.9 °C) 97.2 °F (36.2 °C)   BP  Min: 98/65  Max: 131/81 114/69   Pulse  Min: 65  Max: 91  76   Resp  Min: 16  Max: 20 18   SpO2  Min: 95 %  Max: 99 % 98 %     I have reviewed the following labs:  Recent Labs   Lab 03/10/25  0212 03/11/25  0452 03/12/25  0726   WBC 7.68 5.93 7.14   RBC 3.20* 3.12* 3.31*   HGB 9.9* 9.8* 10.3*   HCT 30.8* 30.2* 32.1*   MCV 96.3* 96.8* 97.0*   MCH 30.9 31.4* 31.1*   MCHC 32.1* 32.5* 32.1*   RDW 14.0 14.1 14.2    253 267   MPV 11.4* 11.5* 11.3*     Recent Labs   Lab 03/06/25  0306 03/07/25  0358 03/08/25  0533 03/09/25  0349 03/10/25  0212 03/11/25  0452 03/12/25  0726    141   < > 146* 141 140 143   K  4.3 4.7   < > 5.1 5.1 5.1 4.8   * 107   < > 107 110* 107 109*   CO2 23 26   < > 29 23 23 25   BUN 32.2* 37.5*   < > 44.9* 44.3* 49.5* 45.7*   CREATININE 2.66* 2.47*   < > 2.50* 2.57* 2.88* 2.65*   CALCIUM 8.1* 8.4*   < > 8.7* 8.3* 8.5* 8.8   MG 2.20  --    < > 2.10 1.90  --  1.90   ALBUMIN 2.7* 2.8*  --   --   --  3.2* 3.4   ALKPHOS 62  --   --   --   --  69 70   ALT 45  --   --   --   --  79* 84*   AST 34  --   --   --   --  41* 44*   BILITOT 0.2  --   --   --   --  0.3 0.4    < > = values in this interval not displayed.     Assessment/Plan:  # DKA-resolved  # AMS 2/2 Delirium vs Cefepime induced - improved   # Tachycardia possibly 2/2 NSTEMI vs Intravascular Depletion  # Acute systolic heart failure - compensated   # Troponinemia 2/2 NSTEMI type I vs type II  # Acute sytolic heart failure  # Lactic Acidemia 2/2 Intravascular Depletion  # Proteus UTI  # Leukocytosis 2/2 above  # B/L Hydronephrosis & Hydroureter  # DWAINE on CKD III 2/2 Prerenal vs Postrenal Azotemia - improving  # Hypernatremia - resolved  # RSV antigen positive  # Acute gastroenteritis  # Prior CVA 09/2024  # Normocytic anemia  # Left kidney cyst  # Elevated LFTs     - Reporting no new complaints  - Echo showing EF 35%  - was on FD Lovenox for NSTEMI but switched to prophylaxis lovenox given no plans by cardiology for inpatient ischemic eval  - CIS consulted for troponinemia; follow recommendations   - cardiology informed regarding family is ok with [facility] but cardiology planned for outpatient ischemic work up  - Nephrology recommended for renal clearance for [facility]  - CT Head negative  - Consulted Urology for B/L hydronephrosis associated with bladder wall thickening; signed off  - IV Rocephin - last day of Abx 3/6 (day 7)  - Urine culture growing Proteus  - Blood cultures negative   - continue glargine 15 units units and ISS moderate scale   - continue on home aspirin, atorvastatin, Zetia, mirtazapine, Protonix  - stopped farxiga  - stopped fluids  "and encourage PO intake  - decrease dose of statin and held zetia  - US RUQ US noted  - asked nurse for 1:1 feeding for the patient  - PT/OT consulted; recommending moderate intensity therapy  - psych consult  - labs in AM  - I spoke extensively with his sister Kelin - patient has been declining over the last couple of years due to stroke and dementia. Patient has been requiring max assistance with therapy and has colbert placed the last few weeks by urology. I told her patient will benefit from therapy but he will likely not go back to his baseline from a few years ago. If rehabs deny him dur to patient requiring max assistance with everything - patient may benefit from nursing home. Sister reports that she cannot take care of him at home because sometimes he is okay and sometimes he is like "dead weight". Otherwise they will work on sitters at home      VTE prophylaxis: Lovenox    Patient condition:  Stable    Anticipated discharge and Disposition:     Pending    All diagnosis and differential diagnosis have been reviewed; assessment and plan has been documented; I have personally reviewed the labs and test results that are presently available; I have reviewed the patients medication list; I have reviewed the consulting providers response and recommendations. I have reviewed or attempted to review medical records based upon their availability    All of the patient's questions have been  addressed and answered. Patient's is agreeable to the above stated plan. I will continue to monitor closely and make adjustments to medical management as needed.      Radiology:  I have personally reviewed the following imaging and agree with the radiologist.     US Abdomen Limited  Narrative: EXAMINATION:  US ABDOMEN LIMITED    CLINICAL HISTORY:  elevated LFTs;    TECHNIQUE:  Limited ultrasound of the right upper quadrant of the abdomen was performed.    COMPARISON:  CT chest abdomen pelvis 02/28/2025    FINDINGS:  LIVER: 9 cm " cranial caudal at the midclavicular line. Coarsened echotexture.  Echogenic liver.  No focal mass appreciable. Portal vein is patent with appropriate directional flow.    PANCREAS: Obscured by overlying bowel gas.    GALLBLADDER: Obscured by shadowing    BILE DUCTS: 5 mm common bile duct.  Distal common bile duct is obscured by shadowing bowel gas.    INFERIOR VENA CAVA: Obscured by shadowing    RIGHT KIDNEY: Lower pole obscured by shadowing. No hydronephrosis.    OTHER: No ascites.  Impression: Technically limited exam due to shadowing bowel gas    The liver is echogenic with coarsened echotexture.  This may be related to steatosis, hepatocellular disease or technique    Electronically signed by: Jojo Davidson  Date:    03/11/2025  Time:    14:01      Bull Zaidi MD  Department of Hospital Medicine   [facility]   03/12/2025

## 2025-03-13 VITALS
OXYGEN SATURATION: 100 % | HEART RATE: 75 BPM | TEMPERATURE: 98 F | BODY MASS INDEX: 21.51 KG/M2 | DIASTOLIC BLOOD PRESSURE: 76 MMHG | HEIGHT: 62 IN | RESPIRATION RATE: 18 BRPM | SYSTOLIC BLOOD PRESSURE: 113 MMHG | WEIGHT: 116.88 LBS

## 2025-03-13 DIAGNOSIS — I50.21 ACUTE HEART FAILURE WITH REDUCED EJECTION FRACTION (HFREF, <= 40%): Primary | Chronic | ICD-10-CM

## 2025-03-13 PROBLEM — E11.10 DKA (DIABETIC KETOACIDOSIS): Chronic | Status: ACTIVE | Noted: 2024-09-26

## 2025-03-13 PROBLEM — D72.829 LEUKOCYTOSIS: Status: ACTIVE | Noted: 2024-10-12

## 2025-03-13 LAB
ALBUMIN SERPL-MCNC: 3.5 G/DL (ref 3.4–4.8)
ALBUMIN/GLOB SERPL: 1.2 RATIO (ref 1.1–2)
ALP SERPL-CCNC: 74 UNIT/L (ref 40–150)
ALT SERPL-CCNC: 81 UNIT/L (ref 0–55)
ANION GAP SERPL CALC-SCNC: 10 MEQ/L
AST SERPL-CCNC: 38 UNIT/L (ref 5–34)
BASOPHILS # BLD AUTO: 0.1 X10(3)/MCL
BASOPHILS NFR BLD AUTO: 0.8 %
BILIRUB SERPL-MCNC: 0.5 MG/DL
BUN SERPL-MCNC: 44.4 MG/DL (ref 8.4–25.7)
CALCIUM SERPL-MCNC: 8.9 MG/DL (ref 8.8–10)
CHLORIDE SERPL-SCNC: 107 MMOL/L (ref 98–107)
CO2 SERPL-SCNC: 24 MMOL/L (ref 23–31)
CREAT SERPL-MCNC: 2.81 MG/DL (ref 0.72–1.25)
CREAT/UREA NIT SERPL: 16
EOSINOPHIL # BLD AUTO: 0.2 X10(3)/MCL (ref 0–0.9)
EOSINOPHIL NFR BLD AUTO: 1.6 %
ERYTHROCYTE [DISTWIDTH] IN BLOOD BY AUTOMATED COUNT: 14.3 % (ref 11.5–17)
GFR SERPLBLD CREATININE-BSD FMLA CKD-EPI: 24 ML/MIN/1.73/M2
GLOBULIN SER-MCNC: 2.9 GM/DL (ref 2.4–3.5)
GLUCOSE SERPL-MCNC: 103 MG/DL (ref 82–115)
HCT VFR BLD AUTO: 32.7 % (ref 42–52)
HGB BLD-MCNC: 10.4 G/DL (ref 14–18)
IMM GRANULOCYTES # BLD AUTO: 0.05 X10(3)/MCL (ref 0–0.04)
IMM GRANULOCYTES NFR BLD AUTO: 0.4 %
LYMPHOCYTES # BLD AUTO: 1.35 X10(3)/MCL (ref 0.6–4.6)
LYMPHOCYTES NFR BLD AUTO: 11 %
MAGNESIUM SERPL-MCNC: 1.9 MG/DL (ref 1.6–2.6)
MCH RBC QN AUTO: 30.5 PG (ref 27–31)
MCHC RBC AUTO-ENTMCNC: 31.8 G/DL (ref 33–36)
MCV RBC AUTO: 95.9 FL (ref 80–94)
MONOCYTES # BLD AUTO: 0.87 X10(3)/MCL (ref 0.1–1.3)
MONOCYTES NFR BLD AUTO: 7.1 %
NEUTROPHILS # BLD AUTO: 9.68 X10(3)/MCL (ref 2.1–9.2)
NEUTROPHILS NFR BLD AUTO: 79.1 %
NRBC BLD AUTO-RTO: 0 %
PLATELET # BLD AUTO: 278 X10(3)/MCL (ref 130–400)
PMV BLD AUTO: 11.5 FL (ref 7.4–10.4)
POCT GLUCOSE: 182 MG/DL (ref 70–110)
POCT GLUCOSE: 85 MG/DL (ref 70–110)
POTASSIUM SERPL-SCNC: 4.5 MMOL/L (ref 3.5–5.1)
PROT SERPL-MCNC: 6.4 GM/DL (ref 5.8–7.6)
RBC # BLD AUTO: 3.41 X10(6)/MCL (ref 4.7–6.1)
SODIUM SERPL-SCNC: 141 MMOL/L (ref 136–145)
WBC # BLD AUTO: 12.25 X10(3)/MCL (ref 4.5–11.5)

## 2025-03-13 PROCEDURE — 25000003 PHARM REV CODE 250

## 2025-03-13 PROCEDURE — 80053 COMPREHEN METABOLIC PANEL: CPT

## 2025-03-13 PROCEDURE — 63600175 PHARM REV CODE 636 W HCPCS

## 2025-03-13 PROCEDURE — 85025 COMPLETE CBC W/AUTO DIFF WBC: CPT

## 2025-03-13 PROCEDURE — 25000003 PHARM REV CODE 250: Performed by: NURSE PRACTITIONER

## 2025-03-13 PROCEDURE — 83735 ASSAY OF MAGNESIUM: CPT

## 2025-03-13 PROCEDURE — 36415 COLL VENOUS BLD VENIPUNCTURE: CPT

## 2025-03-13 PROCEDURE — 97530 THERAPEUTIC ACTIVITIES: CPT | Mod: CQ

## 2025-03-13 RX ORDER — BLOOD-GLUCOSE SENSOR
1 EACH MISCELLANEOUS DAILY
Qty: 3 EACH | Refills: 12 | Status: SHIPPED | OUTPATIENT
Start: 2025-03-13 | End: 2025-03-27 | Stop reason: SDUPTHER

## 2025-03-13 RX ORDER — INSULIN ASPART 100 [IU]/ML
0-10 INJECTION, SOLUTION INTRAVENOUS; SUBCUTANEOUS
Qty: 10 ML | Refills: 2 | Status: SHIPPED | OUTPATIENT
Start: 2025-03-13 | End: 2025-03-27

## 2025-03-13 RX ORDER — MIRTAZAPINE 15 MG/1
15 TABLET, FILM COATED ORAL NIGHTLY
Qty: 30 TABLET | Refills: 11 | Status: SHIPPED | OUTPATIENT
Start: 2025-03-13 | End: 2026-03-13

## 2025-03-13 RX ORDER — INSULIN GLARGINE 100 [IU]/ML
15 INJECTION, SOLUTION SUBCUTANEOUS NIGHTLY
Qty: 4.5 ML | Refills: 11 | Status: SHIPPED | OUTPATIENT
Start: 2025-03-13 | End: 2025-03-27

## 2025-03-13 RX ORDER — LANCETS 33 GAUGE
1 EACH MISCELLANEOUS DAILY
Qty: 200 EACH | Refills: 12 | Status: SHIPPED | OUTPATIENT
Start: 2025-03-13

## 2025-03-13 RX ORDER — ISOPROPYL ALCOHOL 70 ML/100ML
1 SWAB TOPICAL DAILY
Qty: 200 EACH | Refills: 12 | Status: SHIPPED | OUTPATIENT
Start: 2025-03-13

## 2025-03-13 RX ORDER — METOPROLOL TARTRATE 50 MG/1
50 TABLET ORAL 2 TIMES DAILY
Qty: 180 TABLET | Refills: 3 | Status: SHIPPED | OUTPATIENT
Start: 2025-03-13 | End: 2026-03-13

## 2025-03-13 RX ORDER — BLOOD-GLUCOSE METER
1 EACH MISCELLANEOUS DAILY
Qty: 30 EACH | Refills: 2 | Status: SHIPPED | OUTPATIENT
Start: 2025-03-13

## 2025-03-13 RX ADMIN — METOPROLOL TARTRATE 50 MG: 50 TABLET, FILM COATED ORAL at 09:03

## 2025-03-13 RX ADMIN — INSULIN ASPART 2 UNITS: 100 INJECTION, SOLUTION INTRAVENOUS; SUBCUTANEOUS at 11:03

## 2025-03-13 RX ADMIN — ATORVASTATIN CALCIUM 40 MG: 40 TABLET, FILM COATED ORAL at 09:03

## 2025-03-13 RX ADMIN — ASPIRIN 81 MG: 81 TABLET, COATED ORAL at 09:03

## 2025-03-13 RX ADMIN — PANTOPRAZOLE SODIUM 40 MG: 40 TABLET, DELAYED RELEASE ORAL at 09:03

## 2025-03-13 NOTE — ASSESSMENT & PLAN NOTE
Patient's FSGs are  on current medication regimen.  Last A1c reviewed-   Lab Results   Component Value Date    HGBA1C 6.8 02/28/2025     Most recent fingerstick glucose reviewed-   Recent Labs   Lab 03/12/25  1106 03/12/25  1602   POCTGLUCOSE 217* 85     Current correctional scale     anti-hyperglycemic dose as follows-   Antihyperglycemics (From admission, onward)      Start     Stop Route Frequency Ordered    03/11/25 2100  insulin glargine U-100 (Lantus) injection 15 Units         -- SubQ Nightly 03/11/25 1301    03/07/25 0849  insulin aspart U-100 injection 0-10 Units         -- SubQ Before meals & nightly PRN 03/07/25 0749          Hold Oral hypoglycemics while patient is in the hospital.

## 2025-03-13 NOTE — PT/OT/SLP PROGRESS
Physical Therapy Treatment    Patient Name:  Froy Barragan   MRN:  [id number]    Recommendations:     Discharge therapy intensity: Low Intensity Therapy   Discharge Equipment Recommendations: to be determined by next level of care  Barriers to discharge: Impaired mobility    Assessment:     Froy Barragan is a 64 y.o. male admitted with a medical diagnosis of Generalized weakness, Diabetic ketoacidosis, Sepsis, Acute on chronic renal insufficiency, RSV, and Acute UTI, hx of CVA 9/24.  He presents with the following impairments/functional limitations: weakness, impaired self care skills, impaired balance, impaired functional mobility, gait instability, impaired endurance, decreased upper extremity function, decreased lower extremity function, decreased safety awareness, decreased coordination.    Rehab Prognosis: Fair; patient would benefit from acute skilled PT services to address these deficits and reach maximum level of function.    Recent Surgery: * No surgery found *      Plan:     During this hospitalization, patient would benefit from acute PT services 3 x/week to address the identified rehab impairments via therapeutic activities, gait training, therapeutic exercises and progress toward the following goals:    Plan of Care Expires:  04/03/25    Subjective     Chief Complaint: tired of being in hospital  Patient/Family Comments/goals: going home and wants assistance to change into clothes  Pain/Comfort:  Pain Rating 1: 0/10      Objective:     Communicated with pts nurse prior to session.  Patient found HOB elevated with colbert catheter upon PT entry to room.     General Precautions: Standard, contact, droplet, fall  Orthopedic Precautions: N/A  Braces: N/A  Respiratory Status: Room air  Skin Integrity: Visible skin intact      Functional Mobility:  Bed Mobility:     Rolling Right: contact guard assistance  Scooting: minimum assistance and sitting, to EOB  Supine to Sit: contact guard  assistance and increase time, w/ verbal cues to improve mechanics  Transfers:     Sit to Stand:  moderate assistance and of 2 persons with rolling walker and x 3 trials working on bringing COM over BLANCA to vijaya brief and pants.  Pt limited on stand tolerance having to stand 3 times to complete pulling up of brief and pants  Bed to Chair: moderate assistance with  hand-held assist  using  Squat Pivot  Balance: good sitting balance donning shirt and able to assist w/ pulling pants over knees sit EOB. Poor stand balance w/ tendency to lean backwards, requiring Mod A to maintain balance, improving w/ education and  subsequent trials requiring Min to CGA to maintain balance    Education:  Patient and family were provided with verbal education and demonstrations education regarding PT role/goals/POC.  Understanding was verbalized.     Patient left up in chair with call button in reach, geomat cushion, nurse notified, and sister present    GOALS:   Multidisciplinary Problems       Physical Therapy Goals          Problem: Physical Therapy    Goal Priority Disciplines Outcome Interventions   Physical Therapy Goal     PT, PT/OT Progressing    Description: Goals to be met by: 4/3/25     Patient will increase functional independence with mobility by performin. Supine to sit with min Assistance  2. Sit to supine with min Assistance  3. Sit to stand transfer with mod Assistance  4. Bed to chair transfer with mod using LRAD                         Time Tracking:     PT Received On: 25  PT Start Time: 1122     PT Stop Time: 1146  PT Total Time (min): 24 min     Billable Minutes: Therapeutic Activity 24 min    Treatment Type: Treatment  PT/PTA: PTA     Number of PTA visits since last PT visit: 4     2025

## 2025-03-13 NOTE — PLAN OF CARE
Problem: Infection  Goal: Absence of Infection Signs and Symptoms  Outcome: Progressing     Problem: Adult Inpatient Plan of Care  Goal: Plan of Care Review  Outcome: Progressing  Goal: Patient-Specific Goal (Individualized)  Outcome: Progressing  Goal: Absence of Hospital-Acquired Illness or Injury  Outcome: Progressing  Goal: Optimal Comfort and Wellbeing  Outcome: Progressing  Goal: Readiness for Transition of Care  Outcome: Progressing     Problem: Diabetes Comorbidity  Goal: Blood Glucose Level Within Targeted Range  Outcome: Progressing     Problem: Wound  Goal: Optimal Coping  Outcome: Progressing  Goal: Optimal Functional Ability  Outcome: Progressing  Goal: Absence of Infection Signs and Symptoms  Outcome: Progressing  Goal: Improved Oral Intake  Outcome: Progressing  Goal: Optimal Pain Control and Function  Outcome: Progressing  Goal: Skin Health and Integrity  Outcome: Progressing  Goal: Optimal Wound Healing  Outcome: Progressing     Problem: Skin Injury Risk Increased  Goal: Skin Health and Integrity  Outcome: Progressing

## 2025-03-13 NOTE — DISCHARGE SUMMARY
[facility] Medicine Discharge Summary    Admit Date: 2/28/2025  Discharge Date and Time: 3/13/430010:45 PM  Admitting Physician:  Team  Discharging Physician: Ed Avalos MD.  Primary Care Physician: George Bran MD  Consults: Cardiology, Nephrology, and Urology          64-year-old gentleman with DM type 2, CKD stage 3, CVA 09/2024, chronic indwelling Gandara who was admitted after he presented with complaints of nausea with several episodes of vomiting and nonbloody diarrhea.  He denied having any chest pain, abdominal pain, SOB, cough, sputum production, headache, numbness, weakness, dizziness/lightheadedness or loss of consciousness.  On admission his labs showed leukocytosis of 24.8, normocytic anemia with H/H of 11.8/36.9, hyperkalemia of 5.5, CO2 15 with anion gap 20, BUN/creatinine 66.9/3.69, negative troponins, lactic acid of 3.3, beta hydroxybutyrate of 2.9.  UA showed 3+ protein, 2+ glucose, 1+ ketones, 3+ blood, leukocyte esterase, WBCs as well as bacteria.  Blood cultures and urine culture were sent off.  CT C/A/P was done which showed extensive urinary bladder wall thickening with inflammatory changes around urinary bladder consistent with acute cystitis causing secondary obstructive changes and secondary B/L hydronephrosis and hydroureter, RLL atelectasis.  Admitted to ICU on insulin infusion per DKA protocol.  Started on broad-spectrum antibiotics.  Given IVF resuscitation.  Urine culture growing GNR.  Noted to have low-grade fevers overnight on 03/01.  Insulin infusion discontinued and patient is started on long-acting insulin. Patient also found to be RSV positive. Downgraded to . Troponins increased to 0.114; trended q8 & Echo ordered. CIS consulted. FD Lovenox ordered. Urology consulted for hydronephrosis; will follow recommendations. Noted to have hypoglycemic episodes overnight 03/03. On IV D5/NS. Tachycardia improved. Trop increased to 0.675  overnight. Echo showing EF 35%. BUN/Cr improved at 25.9/2.17; remains on IV D5/NS. UCX growing Proteus. Will DC Cefepime in case it is causing AMS & start IV Rocephin. CT Head ordered which was unremarkable. Decreased Lantus to 15 units nightly till PO intake improves.  EF showed 35%.  Lovenox was transitioned to prophylactic dose later.  Cardiology recommended outpatient ischemic workup.  Nephrology cleared for DC.  Urology had no further recommendations.  Completed antibiotics while here for UTI.  Psych eval noted and he will be discharged today.                # DKA-resolved  # AMS 2/2 Delirium vs Cefepime induced - improved   # Tachycardia possibly 2/2 NSTEMI vs Intravascular Depletion  # Acute systolic heart failure - compensated   # Troponinemia 2/2 NSTEMI type I vs type II  # Acute sytolic heart failure  # Lactic Acidemia 2/2 Intravascular Depletion  # Proteus UTI  # Leukocytosis 2/2 above  # B/L Hydronephrosis & Hydroureter  # DWAINE on CKD III 2/2 Prerenal vs Postrenal Azotemia - improving  # Hypernatremia - resolved  # RSV antigen positive  # Acute gastroenteritis-resolving  # Prior CVA 09/2024  # Normocytic anemia  # Left kidney cyst  # Elevated LFTs    Pt was seen and examined on the day of discharge  Vitals:  VITAL SIGNS: 24 HRS MIN & MAX LAST   Temp  Min: 97.2 °F (36.2 °C)  Max: 98 °F (36.7 °C) 98 °F (36.7 °C)   BP  Min: 95/61  Max: 114/69 113/76   Pulse  Min: 70  Max: 81  75   Resp  Min: 18  Max: 18 18   SpO2  Min: 98 %  Max: 100 % 100 %       General: Comfortable, not in distress  Respiratory: Clear to auscultation bilaterally, nonlabored breathing  Cardiovascular: RRR, S1, S2  Abdominal: Soft, nontender, nondistended  Neurological: AOx4, no focal deficits  Psychiatric: Cooperative          Procedures Performed: No admission procedures for hospital encounter.     Significant Diagnostic Studies: See Full reports for all details    Recent Labs   Lab 03/11/25  0452 03/12/25  0726 03/13/25  0618   WBC 5.93  7.14 12.25*   RBC 3.12* 3.31* 3.41*   HGB 9.8* 10.3* 10.4*   HCT 30.2* 32.1* 32.7*   MCV 96.8* 97.0* 95.9*   MCH 31.4* 31.1* 30.5   MCHC 32.5* 32.1* 31.8*   RDW 14.1 14.2 14.3    267 278   MPV 11.5* 11.3* 11.5*       Recent Labs   Lab 03/10/25  0212 03/11/25  0452 03/12/25  0726 03/13/25  0618    140 143 141   K 5.1 5.1 4.8 4.5   * 107 109* 107   CO2 23 23 25 24   BUN 44.3* 49.5* 45.7* 44.4*   CREATININE 2.57* 2.88* 2.65* 2.81*   CALCIUM 8.3* 8.5* 8.8 8.9   MG 1.90  --  1.90 1.90   ALBUMIN  --  3.2* 3.4 3.5   ALKPHOS  --  69 70 74   ALT  --  79* 84* 81*   AST  --  41* 44* 38*   BILITOT  --  0.3 0.4 0.5        Microbiology Results (last 7 days)       Procedure Component Value Units Date/Time    Stool Culture [[id number]]  (Abnormal) Collected: 03/07/25 1334    Order Status: Completed Specimen: Stool Updated: 03/09/25 0852     Stool Culture Negative for Salmonella, Shigella, Campylobacter, Vibrio, Aeromonas, Pleisiomonas,Yersinia, or Shiga Toxin 1 and 2.      Few Yeast             US Abdomen Limited  Narrative: EXAMINATION:  US ABDOMEN LIMITED    CLINICAL HISTORY:  elevated LFTs;    TECHNIQUE:  Limited ultrasound of the right upper quadrant of the abdomen was performed.    COMPARISON:  CT chest abdomen pelvis 02/28/2025    FINDINGS:  LIVER: 9 cm cranial caudal at the midclavicular line. Coarsened echotexture.  Echogenic liver.  No focal mass appreciable. Portal vein is patent with appropriate directional flow.    PANCREAS: Obscured by overlying bowel gas.    GALLBLADDER: Obscured by shadowing    BILE DUCTS: 5 mm common bile duct.  Distal common bile duct is obscured by shadowing bowel gas.    INFERIOR VENA CAVA: Obscured by shadowing    RIGHT KIDNEY: Lower pole obscured by shadowing. No hydronephrosis.    OTHER: No ascites.  Impression: Technically limited exam due to shadowing bowel gas    The liver is echogenic with coarsened echotexture.  This may be related to steatosis, hepatocellular disease  or technique    Electronically signed by: Jojo Davidson  Date:    03/11/2025  Time:    14:01         Medication List        START taking these medications      insulin aspart U-100 100 unit/mL injection  Commonly known as: NovoLOG  Inject 0-10 Units into the skin before meals and at bedtime as needed for High Blood Sugar.     insulin glargine U-100 (Lantus) 100 unit/mL injection  Inject 15 Units into the skin every evening.     metoprolol tartrate 50 MG tablet  Commonly known as: LOPRESSOR  Take 1 tablet (50 mg total) by mouth 2 (two) times daily.            CHANGE how you take these medications      mirtazapine 15 MG tablet  Commonly known as: REMERON  Take 1 tablet (15 mg total) by mouth every evening.  What changed: when to take this            CONTINUE taking these medications      alcohol swabs Padm  Commonly known as: ALCOHOL PREP  Apply 1 each topically once daily.     alfuzosin 10 mg Tb24  Commonly known as: UROXATRAL  Take 1 tablet (10 mg total) by mouth daily with breakfast.     aspirin 81 MG EC tablet  Commonly known as: ECOTRIN     b complex vitamins capsule     blood-glucose meter Misc  Commonly known as: TRUE METRIX AIR GLUCOSE METER  1 each by Misc.(Non-Drug; Combo Route) route once daily.     DEXCOM G7 SENSOR Carmen  Generic drug: blood-glucose sensor  1 each by Misc.(Non-Drug; Combo Route) route once daily.     finasteride 5 mg tablet  Commonly known as: PROSCAR  Take 1 tablet (5 mg total) by mouth once daily.     lancets 33 gauge Misc  Commonly known as: TRUEPLUS LANCETS  1 lancet  by Misc.(Non-Drug; Combo Route) route once daily.     pantoprazole 40 MG tablet  Commonly known as: PROTONIX  Take 1 tablet (40 mg total) by mouth once daily.     rosuvastatin 40 MG Tab  Commonly known as: CRESTOR  Take 1 tablet (40 mg total) by mouth once daily.     TRUE METRIX GLUCOSE TEST STRIP Strp  Generic drug: blood sugar diagnostic  TEST BLOOD SUGAR EVERY DAY     TRUE METRIX LEVEL 1 Soln  Generic drug: blood  glucose control, low  1 Bottle by Misc.(Non-Drug; Combo Route) route once daily.     vitamin D 1000 units Tab  Commonly known as: VITAMIN D3     zinc oxide 20 % ointment  Apply topically 2 (two) times a day.            STOP taking these medications      ezetimibe 10 mg tablet  Commonly known as: ZETIA     metFORMIN 1000 MG tablet  Commonly known as: GLUCOPHAGE     miconazole NITRATE 2 % 2 % top powder  Commonly known as: MICOTIN     SITagliptin phosphate 50 MG Tab  Commonly known as: JANUVIA               Where to Get Your Medications        These medications were sent to [id number] - [place], [place] - [address] AT [facility] [address]  [address], [place] [address]      Phone: [phone]   alcohol swabs Padm  DEXCOM G7 SENSOR Carmen  insulin aspart U-100 100 unit/mL injection  insulin glargine U-100 (Lantus) 100 unit/mL injection  lancets 33 gauge Misc  metoprolol tartrate 50 MG tablet  mirtazapine 15 MG tablet  TRUE METRIX LEVEL 1 Soln          Explained in detail to the patient about the discharge plan, medications, and follow-up visits. Pt understands and agrees with the treatment plan  Discharge Disposition: Home or Self Care   Discharged Condition: stable  Diet-   Dietary Orders (From admission, onward)       Start     Ordered    03/04/25 1340  Diet Soft & Bite Sized (IDDSI Level 6) Diabetic, Low Chol/Sat Fat; 2000 Calories (up to 75 gm per meal)  Diet effective now        Question Answer Comment   Diet Modifier: Diabetic    Diet Modifier: Low Chol/Sat Fat    Total calories / carbs: 2000 Calories (up to 75 gm per meal)        03/04/25 1339                   Medications Per DC med rec  Activities as tolerated   Contact information for follow-up providers       Marie Lambert PA. Go on 3/24/2025.    Specialty: Nephrology  Why: @ 10:15 AM  Contact information:  [address] [facility] [address] [phone]               George Bran MD. Go on  3/19/2025.    Specialty: Internal Medicine  Why: @ 4:00 PM  Contact information:  [facility] [address]  [phone]                       Contact information for after-discharge care       Home Medical Care       [facility] [place] .    Service: Home Health Services  Why: They will contact you  Contact information:  [address] [facility] [address]  [phone]                                 For further questions contact hospitalist office    Discharge time 33 minutes    For worsening symptoms, chest pain, shortness of breath, increased abdominal pain, high grade fever, stroke or stroke like symptoms, immediately go to the nearest Emergency Room or call 911 as soon as possible.      Ed Hernandez M.D, on 3/13/2025. at 12:45 PM.

## 2025-03-13 NOTE — PROGRESS NOTES
Inpatient Nutrition Assessment    Admit Date: 2/28/2025   Total duration of encounter: 13 days   Patient Age: 64 y.o.    Nutrition Recommendation/Prescription   Diet Soft & Bite Sized (IDDSI Level 6) Diabetic, Low Chol/Sat Fat; 2000 Calories (up to 75 gm per meal) ordered      Communication of Recommendations: reviewed with nurse, reviewed with patient, and reviewed with family    Nutrition Assessment     Chart Review    Reason Seen: malnutrition screening tool (MST) and follow-up    Malnutrition Screening Tool Results   Have you recently lost weight without trying?: Unsure  Have you been eating poorly because of a decreased appetite?: Yes   MST Score: 3   Diagnosis:  Wide anion gap metabolic acidosis of combined diabetic ketoacidotic and lactic acidotic etiologies (resolved)  Acute gastroenteritis  Acute cystitis with bilateral hydronephrosis  Acute on chronic kidney disease with baseline stage III CKD, significantly improved since admission, remains nonoliguric  RSV antigen positive    Relevant Medical History: type 2 diabetes mellitus, stage 3 chronic kidney disease, cerebrovascular disease with previous history of stroke 09/2024, and chronic urinary retention requiring indwelling Gandara placement about 1 month ago     Scheduled Medications:  aspirin, 81 mg, Daily  atorvastatin, 40 mg, Daily  enoxparin, 30 mg, Q24H (prophylaxis, 1700)  insulin glargine U-100, 15 Units, QHS  metoprolol tartrate, 50 mg, BID  mirtazapine, 15 mg, QHS  pantoprazole, 40 mg, Daily    Continuous Infusions:  D5 and 0.45% NaCl, Last Rate: 125 mL/hr at 03/06/25 0544    PRN Medications:   acetaminophen, 650 mg, Q6H PRN  dextrose 10%, 250 mL, bolus from bag  D5 and 0.45% NaCl, , Continuous PRN  dextrose 50%, 12.5 g, PRN  dextrose 50%, 12.5 g, PRN  dextrose 50%, 25 g, PRN  dextrose 50%, 25 g, PRN  glucagon (human recombinant), 1 mg, PRN  glucose, 16 g, PRN  glucose, 24 g, PRN  haloperidol lactate, 5 mg, Q4H PRN  insulin aspart U-100, 0-10  Units, QID (AC + HS) PRN  metoprolol, 5 mg, Q5 Min PRN  ondansetron, 4 mg, Q6H PRN  sodium chloride 0.9%, 10 mL, PRN    Calorie Containing IV Medications: no significant kcals from medications at this time    Recent Labs   Lab 03/07/25  0358 03/08/25  0533 03/09/25  0349 03/10/25  0212 03/11/25  0452 03/12/25  0726 03/13/25  0618    140 146* 141 140 143 141   K 4.7 5.5* 5.1 5.1 5.1 4.8 4.5   CALCIUM 8.4* 8.6* 8.7* 8.3* 8.5* 8.8 8.9   PHOS 4.8*  --  5.4*  --   --   --   --    MG  --  2.00 2.10 1.90  --  1.90 1.90    107 107 110* 107 109* 107   CO2 26 26 29 23 23 25 24   BUN 37.5* 38.4* 44.9* 44.3* 49.5* 45.7* 44.4*   CREATININE 2.47* 2.46* 2.50* 2.57* 2.88* 2.65* 2.81*   EGFRNORACEVR 28 28 28 27 23 26 24   GLUCOSE 115 158* 142* 144* 275* 140* 103   BILITOT  --   --   --   --  0.3 0.4 0.5   ALKPHOS  --   --   --   --  69 70 74   ALT  --   --   --   --  79* 84* 81*   AST  --   --   --   --  41* 44* 38*   ALBUMIN 2.8*  --   --   --  3.2* 3.4 3.5   WBC 8.72 7.09 7.19 7.68 5.93 7.14 12.25*   HGB 10.6* 10.5* 10.0* 9.9* 9.8* 10.3* 10.4*   HCT 32.9* 32.2* 31.5* 30.8* 30.2* 32.1* 32.7*     Nutrition Orders:  No diet orders on file      Appetite/Oral Intake: fair/% of meals  Factors Affecting Nutritional Intake: decreased appetite  Social Needs Impacting Access to Food: none identified on admission screening  Food/Jew/Cultural Preferences: none reported  Food Allergies: none reported  Last Bowel Movement: 03/12/25  Wound(s):     Wound 09/09/24 Moisture associated dermatitis Buttocks-Tissue loss description: Not applicable     Comments    3/1/25 Patient reports a poor appetite, would not elaborate. Diet advanced from NPO for lunch today, nurse reports he ate 100%. Patient denies weight loss. Offered to send oral supplements but patient not interested at this time. He did not answer further questions, reports he would prefer to rest now.    3/6/2025: The nurse reports a good appetite/PO intake and denies  "N/V/D/C. Last BM documented as 3/6/2025. Will monitor.    3/13/2025: Pt's sister reports a good appetite/PO intake and denies N/V. Last BM noted. Will monitor.     Anthropometrics    Height: 5' 2" (157.5 cm), Height Method: Measured  Last Weight: 53 kg (116 lb 13.5 oz) (25 1610), Weight Method: Bed Scale  BMI (Calculated): 21.4  BMI Classification: underweight (BMI less than 22 if >65 years of age)        Ideal Body Weight (IBW), Male: 118 lb     % Ideal Body Weight, Male (lb): 99.21 %                 Usual Body Weight (UBW), k.2 kg  % Usual Body Weight: 101.74  % Weight Change From Usual Weight: 1.53 %  Usual Weight Provided By: patient    Wt Readings from Last 5 Encounters:   25 53 kg (116 lb 13.5 oz)   25 52.2 kg (115 lb)   01/15/25 52.2 kg (115 lb)   01/10/25 52.2 kg (115 lb)   24 50 kg (110 lb 3.7 oz)     Weight Change(s) Since Admission:   (3/1) admission weight stated, took bed weight 53.0 kg during rounds  3/6/2025: no new wts  3/13/2025: no new wts  Wt Readings from Last 1 Encounters:   25 1610 53 kg (116 lb 13.5 oz)   25 1600 53.1 kg (117 lb 1 oz)   25 1100 53.1 kg (117 lb)   Admit Weight: 53.1 kg (117 lb) (25 1100), Weight Method: Stated    Nutrition Goals & Monitoring     Dietitian will monitor: food and beverage intake, weight, electrolyte/renal panel, glucose/endocrine profile, and gastrointestinal profile  Discharge planning: resume home regimen  Nutrition Risk/Follow-Up: low (follow-up in 5-7 days)   Please consult if re-assessment needed sooner.    "

## 2025-03-13 NOTE — PLAN OF CARE
03/13/25 0903   Final Note   Assessment Type Final Discharge Note   Anticipated Discharge Disposition Home-Health   Hospital Resources/Appts/Education Provided Post-Acute resouces added to AVS   Post-Acute Status   Post-Acute Authorization Home Health   Home Health Status Set-up Complete/Auth obtained  (Resume with [facility])

## 2025-03-14 ENCOUNTER — HOSPITAL ENCOUNTER (EMERGENCY)
Facility: HOSPITAL | Age: 65
Discharge: HOME | End: 2025-03-14
Attending: STUDENT IN AN ORGANIZED HEALTH CARE EDUCATION/TRAINING PROGRAM
Payer: MEDICARE

## 2025-03-14 ENCOUNTER — PATIENT OUTREACH (OUTPATIENT)
Dept: ADMINISTRATIVE | Facility: CLINIC | Age: 65
End: 2025-03-14
Payer: MEDICARE

## 2025-03-14 VITALS
BODY MASS INDEX: 22.08 KG/M2 | HEART RATE: 95 BPM | DIASTOLIC BLOOD PRESSURE: 86 MMHG | HEIGHT: 62 IN | WEIGHT: 120 LBS | RESPIRATION RATE: 16 BRPM | TEMPERATURE: 98 F | OXYGEN SATURATION: 99 % | SYSTOLIC BLOOD PRESSURE: 114 MMHG

## 2025-03-14 DIAGNOSIS — N18.9 CHRONIC KIDNEY DISEASE, UNSPECIFIED CKD STAGE: Primary | ICD-10-CM

## 2025-03-14 DIAGNOSIS — R53.83 FATIGUE: ICD-10-CM

## 2025-03-14 LAB
ALBUMIN SERPL-MCNC: 3.3 G/DL (ref 3.4–4.8)
ALBUMIN/GLOB SERPL: 1 RATIO (ref 1.1–2)
ALLENS TEST BLOOD GAS (OHS): ABNORMAL
ALP SERPL-CCNC: 77 UNIT/L (ref 40–150)
ALT SERPL-CCNC: 64 UNIT/L (ref 0–55)
ANION GAP SERPL CALC-SCNC: 11 MEQ/L
AST SERPL-CCNC: 29 UNIT/L (ref 5–34)
B-OH-BUTYR SERPL-MCNC: 0.5 MMOL/L
BACTERIA #/AREA URNS AUTO: ABNORMAL /HPF
BASE EXCESS BLD CALC-SCNC: -2.6 MMOL/L (ref -2–2)
BASOPHILS # BLD AUTO: 0.07 X10(3)/MCL
BASOPHILS NFR BLD AUTO: 0.8 %
BILIRUB SERPL-MCNC: 0.3 MG/DL
BILIRUB UR QL STRIP.AUTO: NEGATIVE
BLOOD GAS SAMPLE TYPE (OHS): ABNORMAL
BUN SERPL-MCNC: 60.3 MG/DL (ref 8.4–25.7)
CA-I BLD-SCNC: 1.2 MMOL/L (ref 1.12–1.23)
CALCIUM SERPL-MCNC: 8.7 MG/DL (ref 8.8–10)
CHLORIDE SERPL-SCNC: 105 MMOL/L (ref 98–107)
CLARITY UR: CLEAR
CO2 BLDA-SCNC: 22.8 MMOL/L
CO2 SERPL-SCNC: 22 MMOL/L (ref 23–31)
COHGB MFR BLDA: 1.4 % (ref 0.5–1.5)
COLOR UR AUTO: YELLOW
CREAT SERPL-MCNC: 2.92 MG/DL (ref 0.72–1.25)
CREAT/UREA NIT SERPL: 21
DRAWN BY BLOOD GAS (OHS): ABNORMAL
EOSINOPHIL # BLD AUTO: 0.06 X10(3)/MCL (ref 0–0.9)
EOSINOPHIL NFR BLD AUTO: 0.7 %
ERYTHROCYTE [DISTWIDTH] IN BLOOD BY AUTOMATED COUNT: 14.3 % (ref 11.5–17)
GFR SERPLBLD CREATININE-BSD FMLA CKD-EPI: 23 ML/MIN/1.73/M2
GLOBULIN SER-MCNC: 3.3 GM/DL (ref 2.4–3.5)
GLUCOSE SERPL-MCNC: 187 MG/DL (ref 82–115)
GLUCOSE UR QL STRIP: ABNORMAL
HCO3 BLDA-SCNC: 21.7 MMOL/L (ref 22–26)
HCT VFR BLD AUTO: 30.2 % (ref 42–52)
HGB BLD-MCNC: 9.6 G/DL (ref 14–18)
HGB UR QL STRIP: ABNORMAL
IMM GRANULOCYTES # BLD AUTO: 0.03 X10(3)/MCL (ref 0–0.04)
IMM GRANULOCYTES NFR BLD AUTO: 0.4 %
KETONES UR QL STRIP: NEGATIVE
LEUKOCYTE ESTERASE UR QL STRIP: NEGATIVE
LYMPHOCYTES # BLD AUTO: 0.88 X10(3)/MCL (ref 0.6–4.6)
LYMPHOCYTES NFR BLD AUTO: 10.5 %
MAGNESIUM SERPL-MCNC: 2 MG/DL (ref 1.6–2.6)
MCH RBC QN AUTO: 30.9 PG (ref 27–31)
MCHC RBC AUTO-ENTMCNC: 31.8 G/DL (ref 33–36)
MCV RBC AUTO: 97.1 FL (ref 80–94)
METHGB MFR BLDA: 0 % (ref 0.4–1.5)
MONOCYTES # BLD AUTO: 0.52 X10(3)/MCL (ref 0.1–1.3)
MONOCYTES NFR BLD AUTO: 6.2 %
MUCOUS THREADS URNS QL MICRO: ABNORMAL /LPF
NEUTROPHILS # BLD AUTO: 6.81 X10(3)/MCL (ref 2.1–9.2)
NEUTROPHILS NFR BLD AUTO: 81.4 %
NITRITE UR QL STRIP: NEGATIVE
NRBC BLD AUTO-RTO: 0 %
O2 HB BLOOD GAS (OHS): 95 % (ref 94–97)
OHS QRS DURATION: 80 MS
OHS QTC CALCULATION: 439 MS
OXYGEN DEVICE BLOOD GAS (OHS): ABNORMAL
OXYHGB MFR BLDA: 10.4 G/DL (ref 12–16)
PCO2 BLDA: 35 MMHG (ref 35–45)
PH BLDA: 7.4 [PH] (ref 7.35–7.45)
PH UR STRIP: 5 [PH]
PLATELET # BLD AUTO: 249 X10(3)/MCL (ref 130–400)
PMV BLD AUTO: 11.5 FL (ref 7.4–10.4)
PO2 BLDA: 76 MMHG (ref 80–100)
POTASSIUM BLOOD GAS (OHS): 4.6 MMOL/L (ref 3.5–5)
POTASSIUM SERPL-SCNC: 4.6 MMOL/L (ref 3.5–5.1)
PROT SERPL-MCNC: 6.6 GM/DL (ref 5.8–7.6)
PROT UR QL STRIP: ABNORMAL
RBC # BLD AUTO: 3.11 X10(6)/MCL (ref 4.7–6.1)
RBC #/AREA URNS AUTO: ABNORMAL /HPF
SAMPLE SITE BLOOD GAS (OHS): ABNORMAL
SAO2 % BLDA: 95.1 %
SODIUM BLOOD GAS (OHS): 137 MMOL/L (ref 137–145)
SODIUM SERPL-SCNC: 138 MMOL/L (ref 136–145)
SP GR UR STRIP.AUTO: 1.01 (ref 1–1.03)
SQUAMOUS #/AREA URNS LPF: ABNORMAL /HPF
TROPONIN I SERPL-MCNC: 0.02 NG/ML (ref 0–0.04)
UROBILINOGEN UR STRIP-ACNC: NORMAL
WBC # BLD AUTO: 8.37 X10(3)/MCL (ref 4.5–11.5)
WBC #/AREA URNS AUTO: ABNORMAL /HPF

## 2025-03-14 PROCEDURE — 82010 KETONE BODYS QUAN: CPT | Performed by: PHYSICIAN ASSISTANT

## 2025-03-14 PROCEDURE — 93010 ELECTROCARDIOGRAM REPORT: CPT | Mod: ,,, | Performed by: INTERNAL MEDICINE

## 2025-03-14 PROCEDURE — 84484 ASSAY OF TROPONIN QUANT: CPT | Performed by: PHYSICIAN ASSISTANT

## 2025-03-14 PROCEDURE — 81001 URINALYSIS AUTO W/SCOPE: CPT | Performed by: PHYSICIAN ASSISTANT

## 2025-03-14 PROCEDURE — 99900035 HC TECH TIME PER 15 MIN (STAT)

## 2025-03-14 PROCEDURE — 93005 ELECTROCARDIOGRAM TRACING: CPT

## 2025-03-14 PROCEDURE — 25000003 PHARM REV CODE 250: Performed by: STUDENT IN AN ORGANIZED HEALTH CARE EDUCATION/TRAINING PROGRAM

## 2025-03-14 PROCEDURE — 99285 EMERGENCY DEPT VISIT HI MDM: CPT | Mod: 25

## 2025-03-14 PROCEDURE — 82803 BLOOD GASES ANY COMBINATION: CPT

## 2025-03-14 PROCEDURE — 83735 ASSAY OF MAGNESIUM: CPT | Performed by: PHYSICIAN ASSISTANT

## 2025-03-14 PROCEDURE — 96360 HYDRATION IV INFUSION INIT: CPT

## 2025-03-14 PROCEDURE — 80053 COMPREHEN METABOLIC PANEL: CPT | Performed by: PHYSICIAN ASSISTANT

## 2025-03-14 PROCEDURE — 85025 COMPLETE CBC W/AUTO DIFF WBC: CPT | Performed by: PHYSICIAN ASSISTANT

## 2025-03-14 PROCEDURE — 36600 WITHDRAWAL OF ARTERIAL BLOOD: CPT

## 2025-03-14 RX ADMIN — SODIUM CHLORIDE 1000 ML: 9 INJECTION, SOLUTION INTRAVENOUS at 03:03

## 2025-03-14 NOTE — PROGRESS NOTES
C3 nurse attempted to contact [facility] Barragan for a TCC post hospital discharge follow up call. Spoke with the patient's sister, Jaimie who stated patient was sent to the ED by  and is currently waiting to be seen. The patient has a scheduled hospitals appointment with George Bran MD (Internal Medicine) on 3/19/2025; @ 4:00 PM and Marie Lambert PA (Nephrology) on 3/24/2025; @ 10:15 AM.

## 2025-03-14 NOTE — ED PROVIDER NOTES
Encounter Date: 3/14/2025    SCRIBE #1 NOTE: I, Jasbir Bach, am scribing for, and in the presence of,  Vini Novoa MD. I have scribed the following portions of the note - Other sections scribed: HPI, ROS, PE.       History     Chief Complaint   Patient presents with    Fatigue     Arrives via [facility] with reports of weakness and intermittent AMS. Reports discharged yesterday after admission for DKA. .      Patient is a 64 y.o. male with a PMHx of stage 3 CKD, DM II, HLD, HTN, GERD, cervical myelopathy presenting to the ED with fatigue that onset this morning. Patient was discharged yesterday after admission for DKA. EMS states the patient's family reported he was weak with intermittent AMS. Patient's CBG was 143 with EMS.     Patient reports he is feeling fine and is unsure why he had to come to the ED.     The history is provided by the patient and the EMS personnel. No  was used.     Review of patient's allergies indicates:  No Known Allergies  Past Medical History:   Diagnosis Date    Acute heart failure with reduced ejection fraction (HFrEF, <= 40%) 03/13/2025    Anemia of chronic disease 07/06/2022    Arteriosclerosis of coronary artery 07/06/2022    Cervical myelopathy 07/06/2022    Cobalamin deficiency 07/06/2022    Gastroesophageal reflux disease 07/06/2022    Hypertension 07/06/2022    Low vitamin D level 07/06/2022    Mixed hyperlipidemia 07/06/2022    Paraparesis 07/06/2022    Stage 3a chronic kidney disease 07/06/2022    Type 2 diabetes mellitus 07/06/2022     Past Surgical History:   Procedure Laterality Date    MAGNETIC RESONANCE IMAGING N/A 9/19/2024    Procedure: MRI (Magnetic Resonance Imagine);  Surgeon: David Amin DO;  Location: [facility];  Service: Anesthesiology;  Laterality: N/A;    SPINE SURGERY       No family history on file.  Social History[1]  Review of Systems   Constitutional:  Negative for fever.   HENT:  Negative for sore throat.    Eyes:  Negative for  visual disturbance.   Respiratory:  Negative for shortness of breath.    Cardiovascular:  Negative for chest pain.   Gastrointestinal:  Negative for abdominal pain.   Genitourinary:  Negative for dysuria.   Musculoskeletal:  Negative for joint swelling.   Skin:  Negative for rash.   Neurological:  Negative for weakness.   Psychiatric/Behavioral:  Negative for confusion.    All other systems reviewed and are negative.      Physical Exam     Initial Vitals [03/14/25 1148]   BP Pulse Resp Temp SpO2   (!) 121/52 98 18 98.1 °F (36.7 °C) 99 %      MAP       --         Physical Exam    Nursing note and vitals reviewed.  Constitutional: He appears well-developed and well-nourished. He is not diaphoretic. No distress.   HENT:   Head: Normocephalic and atraumatic.   Eyes: Conjunctivae and EOM are normal. Pupils are equal, round, and reactive to light.   Neck:   Normal range of motion.  Cardiovascular:  Normal rate, regular rhythm, normal heart sounds and intact distal pulses.           No murmur heard.  Pulmonary/Chest: Breath sounds normal. No respiratory distress. He has no wheezes. He has no rales.   Abdominal: Abdomen is soft. He exhibits no distension. There is no abdominal tenderness.   Genitourinary:    Genitourinary Comments: Indwelling colbert catheter in place with clear yellow urine     Musculoskeletal:         General: No tenderness or edema. Normal range of motion.      Cervical back: Normal range of motion.     Neurological: He is alert and oriented to person, place, and time. No cranial nerve deficit.   Skin: Skin is warm and dry. Capillary refill takes less than 2 seconds. No rash noted. No erythema.   Psychiatric: He has a normal mood and affect.         ED Course   Procedures  Labs Reviewed   COMPREHENSIVE METABOLIC PANEL - Abnormal       Result Value    Sodium 138      Potassium 4.6      Chloride 105      CO2 22 (*)     Glucose 187 (*)     Blood Urea Nitrogen 60.3 (*)     Creatinine 2.92 (*)     Calcium 8.7  (*)     Protein Total 6.6      Albumin 3.3 (*)     Globulin 3.3      Albumin/Globulin Ratio 1.0 (*)     Bilirubin Total 0.3      ALP 77      ALT 64 (*)     AST 29      eGFR 23      Anion Gap 11.0      BUN/Creatinine Ratio 21     URINALYSIS, REFLEX TO URINE CULTURE - Abnormal    Color, UA Yellow      Appearance, UA Clear      Specific Gravity, UA 1.014      pH, UA 5.0      Protein, UA 1+ (*)     Glucose, UA 3+ (*)     Ketones, UA Negative      Blood, UA 1+ (*)     Bilirubin, UA Negative      Urobilinogen, UA Normal      Nitrites, UA Negative      Leukocyte Esterase, UA Negative      RBC, UA 0-5      WBC, UA 0-5      Bacteria, UA Trace      Squamous Epithelial Cells, UA Trace      Mucous, UA Trace (*)    CBC WITH DIFFERENTIAL - Abnormal    WBC 8.37      RBC 3.11 (*)     Hgb 9.6 (*)     Hct 30.2 (*)     MCV 97.1 (*)     MCH 30.9      MCHC 31.8 (*)     RDW 14.3      Platelet 249      MPV 11.5 (*)     Neut % 81.4      Lymph % 10.5      Mono % 6.2      Eos % 0.7      Basophil % 0.8      Imm Grans % 0.4      Neut # 6.81      Lymph # 0.88      Mono # 0.52      Eos # 0.06      Baso # 0.07      Imm Gran # 0.03      NRBC% 0.0     BETA - HYDROXYBUTYRATE, SERUM - Abnormal    Beta Hydroxybutyrate 0.50 (*)    BLOOD GAS - Abnormal    Sample Type Arterial Blood      Sample site Right Brachial Artery      Drawn by KDT RRT      pH, Blood gas 7.400      pCO2, Blood gas 35.0      pO2, Blood gas 76.0 (*)     Sodium, Blood Gas 137      Potassium, Blood Gas 4.6      Calcium Level Ionized 1.20      TOC2, Blood gas 22.8      Base Excess, Blood gas -2.60 (*)     sO2, Blood gas 95.1      HCO3, Blood gas 21.7 (*)     THb, Blood gas 10.4 (*)     O2 Hb, Blood Gas 95.0      CO Hgb 1.4      Met Hgb 0.0 (*)     Allens Test N/A      Oxygen Device, Blood gas RA     MAGNESIUM - Normal    Magnesium Level 2.00     TROPONIN I - Normal    Troponin-I 0.018     CBC W/ AUTO DIFFERENTIAL    Narrative:     The following orders were created for panel order CBC  auto differential.  Procedure                               Abnormality         Status                     ---------                               -----------         ------                     CBC with Differential[[id number]]       Abnormal            Final result                 Please view results for these tests on the individual orders.     EKG Readings: (Independently Interpreted)   Initial Reading: No STEMI. Rhythm: Normal Sinus Rhythm. Heart Rate: 96. Ectopy: No Ectopy. Conduction: Normal. ST Segments: Normal ST Segments. T Waves: Normal. Axis: Normal. Clinical Impression: Normal Sinus Rhythm   Done at 1153     ECG Results              EKG 12-lead (Final result)        Collection Time Result Time QRS Duration OHS QTC Calculation    03/14/25 11:53:36 03/14/25 13:02:50 80 439                     Final result by Interface, [facility] (03/14/25 13:03:04)                   Narrative:    Test Reason : R53.83,    Vent. Rate :  96 BPM     Atrial Rate :  96 BPM     P-R Int : 138 ms          QRS Dur :  80 ms      QT Int : 348 ms       P-R-T Axes :  65  66  92 degrees    QTcB Int : 439 ms    Normal sinus rhythm  Nonspecific ST and T wave abnormality  Abnormal ECG  When compared with ECG of 06-Mar-2025 04:52,  Nonspecific T wave abnormality no longer evident in Inferior leads  T wave inversion no longer evident in Anterior leads  Confirmed by Andres Blakely ([id number]) on 3/14/2025 1:02:46 PM    Referred By:            Confirmed By: Andres Blakely                                  Imaging Results              CT Head Without Contrast (Final result)  Result time 03/14/25 13:35:33      Final result by Jose J Do MD (03/14/25 13:35:33)                   Impression:      1.  No acute intracranial findings identified.    2.  Old lacunar infarcts, chronic microangiopathic ischemia and atrophy.      Electronically signed by: Jose J Do  Date:    03/14/2025  Time:    13:35               Narrative:    EXAMINATION:  CT HEAD  WITHOUT CONTRAST    CLINICAL HISTORY:  Mental status change, unknown cause;    TECHNIQUE:  Sequential axial images were performed of the brain without contrast.    Dose product length of 906 mGycm. Automated exposure control was utilized to minimize radiation dose.    COMPARISON:  Three, 2025..    FINDINGS:  There is no intracranial mass effect, midline shift, hydrocephalus or hemorrhage. There is no sulcal effacement or low attenuation changes to suggest recent large vessel territory infarction.  There are bilateral basal ganglia and thalami old lacunar infarcts.  Chronic appearing periventricular and subcortical white matter low attenuation changes are present and are consistent with chronic microangiopathic ischemia. The ventricular system and sulcal markings prominence is consistent with atrophy. There is no acute extra axial fluid collection. Visualized paranasal sinuses are clear without mucosal thickening, polypoidal abnormality or air-fluid levels. Mastoid air cells aeration is optimal.                                       Medications   sodium chloride 0.9% bolus 1,000 mL 1,000 mL (0 mLs Intravenous Stopped 3/14/25 1600)     Medical Decision Making  Judging by the patient's chief complaint and pertinent history, the patient has the following possible differential diagnoses, including but not limited to the following.  Some of these are deemed to be lower likelihood and some more likely based on my physical exam and history combined with possible lab work and/or imaging studies.   Please see the pertinent studies, and refer to the HPI.  Some of these diagnoses will take further evaluation to fully rule out, perhaps as an outpatient and the patient was encouraged to follow up when discharged for more comprehensive evaluation.    Generalized weakness: anemia, dehydration, electrolyte derangement, hypoglycemia, infection, intoxication, respiratory failure, ACS, thyroid disease, medications,    Focal weakness:  CVA, ICH,    Patient is a 64-year-old male who presents to the emergency department.  He was recently admitted for DKA and discharge yesterday.  He is brought to emergency department concern for altered mental status.  See HPI.  See physical exam.  Labs as noted.  CT scan of the head without any acute abnormalities.  Patient currently denies any current complaints.  States he feels fine.  States will like to go home.  Family states they will like him to go to a nursing home.  I spoke with the sister at the bedside as well as sister on the phone.  Social work also consulted, Shaniqua has discussed with the patient and family.  Patient currently has capacity to make his own medical decisions.  Does not wish to be admitted or placed in any type of skilled nursing facility or nursing home.  He currently does not require admission as he does not appear to be in DKA.  Long discussion with the patient and family regards to goals of care.  Multiple offers to admit the patient for nursing home placement however patient refused.  I discussed patient does not meet criteria for legal hold at this time.  Denies any SI, HI.  Discussed all results.  Hemodynamically stable for continued outpatient management strict return precautions.  Discussed need for follow-up with the PCP.    Problems Addressed:  Chronic kidney disease, unspecified CKD stage: acute illness or injury that poses a threat to life or bodily functions  Fatigue: acute illness or injury that poses a threat to life or bodily functions    Amount and/or Complexity of Data Reviewed  Independent Historian: EMS     Details: See HPI  Labs: ordered.  Radiology: ordered. Decision-making details documented in ED Course.  ECG/medicine tests: ordered and independent interpretation performed.     Details: No STEMI. Rhythm: Normal Sinus Rhythm. Heart Rate: 96. Ectopy: No Ectopy. Conduction: Normal. ST Segments: Normal ST Segments. T Waves: Normal. Axis: Normal. Clinical Impression:  Normal Sinus Rhythm   Done at 1153    Risk  OTC drugs.  Prescription drug management.  Parenteral controlled substances.  Decision regarding hospitalization.  Diagnosis or treatment significantly limited by social determinants of health.            Scribe Attestation:   Scribe #1: I performed the above scribed service and the documentation accurately describes the services I performed. I attest to the accuracy of the note.    Attending Attestation:           Physician Attestation for Scribe:  Physician Attestation Statement for Scribe #1: I, Vini Novoa MD, reviewed documentation, as scribed by Jasibr Bach in my presence, and it is both accurate and complete.             ED Course as of 03/24/25 0742   Fri Mar 14, 2025   1443 CT Head Without Contrast [RP]   1717 Patient is demanding to leave. [RP]      ED Course User Index  [RP] Vini Novoa MD                           Clinical Impression:  Final diagnoses:  [R53.83] Fatigue  [N18.9] Chronic kidney disease, unspecified CKD stage (Primary)          ED Disposition Condition    Discharge Stable          ED Prescriptions    None       Follow-up Information       Follow up With Specialties Details Why Contact Info    George Bran MD Internal Medicine   [address]  [facility] [address]  [phone]      Primary Care  Call in 1 day  Please call [phone] for a primary care provider.               [1]   Social History  Tobacco Use    Smoking status: Never    Smokeless tobacco: Never   Substance Use Topics    Alcohol use: Never    Drug use: Never        Vini Novoa MD  03/24/25 0790

## 2025-03-14 NOTE — DISCHARGE INSTRUCTIONS
You should consider going to a long-term care facility such as a nursing home.      You will need to continue taking your medications.  If you do not take your medications as prescribed you will likely become sicker and are at risk for significant complications including death.    Return to the emergency department if any new or worsening symptoms, chest pain, shortness of breath, nausea, vomiting, or any other concerns.

## 2025-03-14 NOTE — FIRST PROVIDER EVALUATION
"Medical screening examination initiated.  I have conducted a focused provider triage encounter, findings are as follows:    Brief history of present illness:  64yoWM arrives via EMS for AMS per family and HH nurse. Patient states he was sleeping. He is AAO x4 w/GCS 15 on arrival. No meds given by EMS. Patient states he is just tired. Patient recently discharged yesterday for DKA complications . Has catheter.    Vitals:    03/14/25 1148   BP: (!) 121/52   Pulse: 98   Resp: 18   Temp: 98.1 °F (36.7 °C)   TempSrc: Oral   SpO2: 99%   Weight: 54.4 kg (120 lb)   Height: 5' 2" (1.575 m)       Pertinent physical exam:  NAD. Pallor noted    Brief workup plan:  labs    Preliminary workup initiated; this workup will be continued and followed by the physician or advanced practice provider that is assigned to the patient when roomed.  "

## 2025-03-14 NOTE — ED NOTES
Just spoke with patients sisters, they stated that his home health nurse was there this morning and was concerned because his blood pressure was in the 70s and he has some confusion and disorientation. That he couldn't answer some of the questions appropriately. States that he has 2 care givers that do two 4hr shifts one in the morning and in the afternoon that sit with him. Jaimie -[phone]

## 2025-03-14 NOTE — PROGRESS NOTES
Met with sister per request, with other sister Chantal on the phone. They voiced concerns related to pt returning home as he requires insulin 3 times per day and family is unable administer such. He currently has [facility], but they aren't able to administer the insulin. They are worried if pt is trained he will forget and end up either back in the hospital or . They wanted pt to be placed in skilled nursing NH, as they previously attempted SNF but he did not qualify. They reported having medical POA. Explained that medical POA does not apply unless pt is deemed to lack capacity to make decisions, which pt does not exhibit at this time. Family, myself and Dr. Novoa met with patient and family discussed concerns with patient and risks of returning home. He adamantly refused a nursing home placement. Spoke with [facility] who said they can train staff to administer Insulin. When family spoke with staff, they said they were unable to administer insulin. Attempted to call back to clarify but office closed for the day and left a message for possible callback after hours. Updated sister and pt. He became agitated saying he will do his own meds and wants to go home now. Family will follow up with [address] [place] [address] [facility].

## 2025-03-18 ENCOUNTER — HOSPITAL ENCOUNTER (OUTPATIENT)
Facility: HOSPITAL | Age: 65
LOS: 2 days | Discharge: HOME HEALTH | End: 2025-03-21
Attending: STUDENT IN AN ORGANIZED HEALTH CARE EDUCATION/TRAINING PROGRAM | Admitting: INTERNAL MEDICINE
Payer: MEDICARE

## 2025-03-18 DIAGNOSIS — R53.1 WEAKNESS: ICD-10-CM

## 2025-03-18 DIAGNOSIS — R62.7 FAILURE TO THRIVE IN ADULT: ICD-10-CM

## 2025-03-18 LAB
ALBUMIN SERPL-MCNC: 3 G/DL (ref 3.4–4.8)
ALBUMIN/GLOB SERPL: 0.9 RATIO (ref 1.1–2)
ALLENS TEST BLOOD GAS (OHS): ABNORMAL
ALP SERPL-CCNC: 86 UNIT/L (ref 40–150)
ALT SERPL-CCNC: 36 UNIT/L (ref 0–55)
AMORPH URATE CRY URNS QL MICRO: ABNORMAL /UL
ANION GAP SERPL CALC-SCNC: 9 MEQ/L
AST SERPL-CCNC: 28 UNIT/L (ref 5–34)
BACTERIA #/AREA URNS AUTO: ABNORMAL /HPF
BASE EXCESS BLD CALC-SCNC: -4.1 MMOL/L (ref -2–2)
BASOPHILS # BLD AUTO: 0.07 X10(3)/MCL
BASOPHILS NFR BLD AUTO: 0.6 %
BILIRUB SERPL-MCNC: 0.3 MG/DL
BILIRUB UR QL STRIP.AUTO: NEGATIVE
BLOOD GAS SAMPLE TYPE (OHS): ABNORMAL
BNP BLD-MCNC: 77.1 PG/ML
BUN SERPL-MCNC: 56.3 MG/DL (ref 8.4–25.7)
CA-I BLD-SCNC: 1.17 MMOL/L (ref 1.12–1.23)
CALCIUM SERPL-MCNC: 8.4 MG/DL (ref 8.8–10)
CHLORIDE SERPL-SCNC: 112 MMOL/L (ref 98–107)
CLARITY UR: ABNORMAL
CO2 BLDA-SCNC: 22 MMOL/L
CO2 SERPL-SCNC: 20 MMOL/L (ref 23–31)
COHGB MFR BLDA: 1.4 % (ref 0.5–1.5)
COLOR UR AUTO: ABNORMAL
CREAT SERPL-MCNC: 2.43 MG/DL (ref 0.72–1.25)
CREAT/UREA NIT SERPL: 23
DRAWN BY BLOOD GAS (OHS): ABNORMAL
EOSINOPHIL # BLD AUTO: 0.13 X10(3)/MCL (ref 0–0.9)
EOSINOPHIL NFR BLD AUTO: 1.2 %
ERYTHROCYTE [DISTWIDTH] IN BLOOD BY AUTOMATED COUNT: 14.6 % (ref 11.5–17)
GFR SERPLBLD CREATININE-BSD FMLA CKD-EPI: 29 ML/MIN/1.73/M2
GLOBULIN SER-MCNC: 3.5 GM/DL (ref 2.4–3.5)
GLUCOSE SERPL-MCNC: 312 MG/DL (ref 82–115)
GLUCOSE UR QL STRIP: ABNORMAL
GRAN CASTS #/AREA URNS LPF: ABNORMAL /LPF
HCO3 BLDA-SCNC: 20.9 MMOL/L (ref 22–26)
HCT VFR BLD AUTO: 25.7 % (ref 42–52)
HGB BLD-MCNC: 8.3 G/DL (ref 14–18)
HGB UR QL STRIP: ABNORMAL
IMM GRANULOCYTES # BLD AUTO: 0.04 X10(3)/MCL (ref 0–0.04)
IMM GRANULOCYTES NFR BLD AUTO: 0.4 %
INHALED O2 CONCENTRATION: 21 %
KETONES UR QL STRIP: NEGATIVE
LACTATE SERPL-SCNC: 1.2 MMOL/L (ref 0.5–2.2)
LEUKOCYTE ESTERASE UR QL STRIP: 25
LYMPHOCYTES # BLD AUTO: 0.74 X10(3)/MCL (ref 0.6–4.6)
LYMPHOCYTES NFR BLD AUTO: 6.5 %
MCH RBC QN AUTO: 31.6 PG (ref 27–31)
MCHC RBC AUTO-ENTMCNC: 32.3 G/DL (ref 33–36)
MCV RBC AUTO: 97.7 FL (ref 80–94)
METHGB MFR BLDA: 0.7 % (ref 0.4–1.5)
MONOCYTES # BLD AUTO: 1.25 X10(3)/MCL (ref 0.1–1.3)
MONOCYTES NFR BLD AUTO: 11.1 %
MUCOUS THREADS URNS QL MICRO: ABNORMAL /LPF
NEUTROPHILS # BLD AUTO: 9.07 X10(3)/MCL (ref 2.1–9.2)
NEUTROPHILS NFR BLD AUTO: 80.2 %
NITRITE UR QL STRIP: NEGATIVE
NRBC BLD AUTO-RTO: 0 %
O2 HB BLOOD GAS (OHS): 92.7 % (ref 94–97)
OXYHGB MFR BLDA: 8.7 G/DL (ref 12–16)
PCO2 BLDA: 37 MMHG (ref 35–45)
PH BLDA: 7.36 [PH] (ref 7.35–7.45)
PH UR STRIP: 5.5 [PH]
PLATELET # BLD AUTO: 195 X10(3)/MCL (ref 130–400)
PMV BLD AUTO: 11.2 FL (ref 7.4–10.4)
PO2 BLDA: 64 MMHG (ref 80–100)
POCT GLUCOSE: 110 MG/DL (ref 70–110)
POCT GLUCOSE: 234 MG/DL (ref 70–110)
POCT GLUCOSE: 273 MG/DL (ref 70–110)
POTASSIUM BLOOD GAS (OHS): 4.1 MMOL/L (ref 3.5–5)
POTASSIUM SERPL-SCNC: 4 MMOL/L (ref 3.5–5.1)
PROT SERPL-MCNC: 6.5 GM/DL (ref 5.8–7.6)
PROT UR QL STRIP: ABNORMAL
RBC # BLD AUTO: 2.63 X10(6)/MCL (ref 4.7–6.1)
RBC #/AREA URNS AUTO: ABNORMAL /HPF
SAMPLE SITE BLOOD GAS (OHS): ABNORMAL
SAO2 % BLDA: 91.1 %
SODIUM BLOOD GAS (OHS): 140 MMOL/L (ref 137–145)
SODIUM SERPL-SCNC: 141 MMOL/L (ref 136–145)
SP GR UR STRIP.AUTO: 1.02 (ref 1–1.03)
SQUAMOUS #/AREA URNS LPF: ABNORMAL /HPF
TROPONIN I SERPL-MCNC: 0.03 NG/ML (ref 0–0.04)
UROBILINOGEN UR STRIP-ACNC: NORMAL
WBC # BLD AUTO: 11.3 X10(3)/MCL (ref 4.5–11.5)
WBC #/AREA URNS AUTO: ABNORMAL /HPF

## 2025-03-18 PROCEDURE — 85025 COMPLETE CBC W/AUTO DIFF WBC: CPT | Performed by: STUDENT IN AN ORGANIZED HEALTH CARE EDUCATION/TRAINING PROGRAM

## 2025-03-18 PROCEDURE — 83880 ASSAY OF NATRIURETIC PEPTIDE: CPT | Performed by: STUDENT IN AN ORGANIZED HEALTH CARE EDUCATION/TRAINING PROGRAM

## 2025-03-18 PROCEDURE — 99900035 HC TECH TIME PER 15 MIN (STAT)

## 2025-03-18 PROCEDURE — 87040 BLOOD CULTURE FOR BACTERIA: CPT | Performed by: STUDENT IN AN ORGANIZED HEALTH CARE EDUCATION/TRAINING PROGRAM

## 2025-03-18 PROCEDURE — 63600175 PHARM REV CODE 636 W HCPCS: Performed by: STUDENT IN AN ORGANIZED HEALTH CARE EDUCATION/TRAINING PROGRAM

## 2025-03-18 PROCEDURE — 96374 THER/PROPH/DIAG INJ IV PUSH: CPT

## 2025-03-18 PROCEDURE — 25000003 PHARM REV CODE 250: Performed by: STUDENT IN AN ORGANIZED HEALTH CARE EDUCATION/TRAINING PROGRAM

## 2025-03-18 PROCEDURE — 82803 BLOOD GASES ANY COMBINATION: CPT

## 2025-03-18 PROCEDURE — 84484 ASSAY OF TROPONIN QUANT: CPT | Performed by: STUDENT IN AN ORGANIZED HEALTH CARE EDUCATION/TRAINING PROGRAM

## 2025-03-18 PROCEDURE — 80053 COMPREHEN METABOLIC PANEL: CPT | Performed by: STUDENT IN AN ORGANIZED HEALTH CARE EDUCATION/TRAINING PROGRAM

## 2025-03-18 PROCEDURE — 96361 HYDRATE IV INFUSION ADD-ON: CPT

## 2025-03-18 PROCEDURE — 93010 ELECTROCARDIOGRAM REPORT: CPT | Mod: ,,, | Performed by: INTERNAL MEDICINE

## 2025-03-18 PROCEDURE — 83605 ASSAY OF LACTIC ACID: CPT | Performed by: STUDENT IN AN ORGANIZED HEALTH CARE EDUCATION/TRAINING PROGRAM

## 2025-03-18 PROCEDURE — 99285 EMERGENCY DEPT VISIT HI MDM: CPT | Mod: 25

## 2025-03-18 PROCEDURE — 93005 ELECTROCARDIOGRAM TRACING: CPT

## 2025-03-18 PROCEDURE — 63600175 PHARM REV CODE 636 W HCPCS: Performed by: PHYSICIAN ASSISTANT

## 2025-03-18 PROCEDURE — 81015 MICROSCOPIC EXAM OF URINE: CPT | Performed by: STUDENT IN AN ORGANIZED HEALTH CARE EDUCATION/TRAINING PROGRAM

## 2025-03-18 PROCEDURE — 82962 GLUCOSE BLOOD TEST: CPT

## 2025-03-18 PROCEDURE — 36600 WITHDRAWAL OF ARTERIAL BLOOD: CPT

## 2025-03-18 PROCEDURE — 11000001 HC ACUTE MED/SURG PRIVATE ROOM

## 2025-03-18 RX ORDER — IBUPROFEN 200 MG
24 TABLET ORAL
Status: DISCONTINUED | OUTPATIENT
Start: 2025-03-18 | End: 2025-03-21 | Stop reason: HOSPADM

## 2025-03-18 RX ORDER — GLUCAGON 1 MG
1 KIT INJECTION
Status: DISCONTINUED | OUTPATIENT
Start: 2025-03-18 | End: 2025-03-21 | Stop reason: HOSPADM

## 2025-03-18 RX ORDER — IBUPROFEN 200 MG
16 TABLET ORAL
Status: DISCONTINUED | OUTPATIENT
Start: 2025-03-18 | End: 2025-03-21 | Stop reason: HOSPADM

## 2025-03-18 RX ORDER — INSULIN ASPART 100 [IU]/ML
0-10 INJECTION, SOLUTION INTRAVENOUS; SUBCUTANEOUS
Status: DISCONTINUED | OUTPATIENT
Start: 2025-03-18 | End: 2025-03-21 | Stop reason: HOSPADM

## 2025-03-18 RX ADMIN — INSULIN ASPART 4 UNITS: 100 INJECTION, SOLUTION INTRAVENOUS; SUBCUTANEOUS at 07:03

## 2025-03-18 RX ADMIN — SODIUM CHLORIDE 1000 ML: 9 INJECTION, SOLUTION INTRAVENOUS at 05:03

## 2025-03-18 RX ADMIN — HUMAN INSULIN 5 UNITS: 100 INJECTION, SOLUTION SUBCUTANEOUS at 05:03

## 2025-03-18 NOTE — PROGRESS NOTES
Received a call from Dr. Bran stating that pt needs SNF placement and already had the 2 midnight stay. He also said that [facility] had him before and would be able to take him back. He reported family is unable to provide the care for him that he needs at this time and pt is on the way to the ED hypotensive and confused. Relayed to Dr. Bran that myself, Dr. Novoa and patients two sisters attempted to convince pt on Friday to go to NH and he had capacity and refused, but that we would attempt to speak with patient again upon this evaluation.

## 2025-03-18 NOTE — Clinical Note
Diagnosis: Failure to thrive in adult [360831]   Future Attending Provider: CHRISTIAN ZUÑIGA [[id number]]   Admit to which facility:: [facility] [[id number]]   Reason for IP Medical Treatment  (Clinical interventions that can only be accomplished in the IP setting? ) :: Failure to thrive, sent by PCP for SNF   Plans for Post-Acute care--if anticipated (pick the single best option):: F. IP Rehabilitation Unit Placement

## 2025-03-18 NOTE — ED PROVIDER NOTES
Encounter Date: 3/18/2025    SCRIBE #1 NOTE: I, Jasbir Bach, am scribing for, and in the presence of,  Vini Novoa MD. I have scribed the following portions of the note - Other sections scribed: HPI, ROS, PE.       History     Chief Complaint   Patient presents with    Medical Evaluation     Pt to ED via [facility] unit [facility]. Pt's home health nurse reported that pt was hypotensive. Initial pressure with /70. Pt has no complaints.  GCS 15     Patient is a 64 y.o. male with a PMHx of stage 3 CKD, DM II, HTN, GERD, cervical myelopathy, and heart failure presenting to the ED because his PCP wants the patient placed in a Skilled Nursing Facility. Patient was also reportedly hypotensive with home health nurse with a pressure of 105/70. Patient's CBG was 321 and was GCS 15. Patient has no complaints at this time.     The history is provided by the patient and a caregiver. No  was used.     Review of patient's allergies indicates:  No Known Allergies  Past Medical History:   Diagnosis Date    Acute heart failure with reduced ejection fraction (HFrEF, <= 40%) 03/13/2025    Anemia of chronic disease 07/06/2022    Arteriosclerosis of coronary artery 07/06/2022    Cervical myelopathy 07/06/2022    Cobalamin deficiency 07/06/2022    Gastroesophageal reflux disease 07/06/2022    Hypertension 07/06/2022    Low vitamin D level 07/06/2022    Mixed hyperlipidemia 07/06/2022    Paraparesis 07/06/2022    Stage 3a chronic kidney disease 07/06/2022    Type 2 diabetes mellitus 07/06/2022     Past Surgical History:   Procedure Laterality Date    MAGNETIC RESONANCE IMAGING N/A 9/19/2024    Procedure: MRI (Magnetic Resonance Imagine);  Surgeon: David Amin DO;  Location: [facility] OR;  Service: Anesthesiology;  Laterality: N/A;    SPINE SURGERY       No family history on file.  Social History[1]  Review of Systems   Constitutional:  Negative for fever.   HENT:  Negative for sore throat.    Eyes:  Negative for  visual disturbance.   Respiratory:  Negative for shortness of breath.    Cardiovascular:  Negative for chest pain.   Gastrointestinal:  Negative for abdominal pain.   Genitourinary:  Negative for dysuria.   Musculoskeletal:  Negative for joint swelling.   Skin:  Negative for rash.   Neurological:  Negative for weakness.   Psychiatric/Behavioral:  Negative for confusion.    All other systems reviewed and are negative.      Physical Exam     Initial Vitals [03/18/25 1512]   BP Pulse Resp Temp SpO2   (!) 147/61 92 16 98.4 °F (36.9 °C) 100 %      MAP       --         Physical Exam    Nursing note and vitals reviewed.  Constitutional: He appears well-developed and well-nourished. He is not diaphoretic. No distress.   HENT:   Head: Normocephalic and atraumatic.   Eyes: Conjunctivae and EOM are normal. Pupils are equal, round, and reactive to light.   Neck:   Normal range of motion.  Cardiovascular:  Normal rate, regular rhythm, normal heart sounds and intact distal pulses.           No murmur heard.  Pulmonary/Chest: Breath sounds normal. No respiratory distress. He has no wheezes. He has no rales.   Abdominal: Abdomen is soft. He exhibits no distension. There is no abdominal tenderness.   Musculoskeletal:         General: No tenderness or edema. Normal range of motion.      Cervical back: Normal range of motion.     Neurological: He is alert and oriented to person, place, and time. No cranial nerve deficit.   Skin: Skin is warm and dry. Capillary refill takes less than 2 seconds. No rash noted. No erythema.   Psychiatric: He has a normal mood and affect.         ED Course   Procedures  Labs Reviewed   COMPREHENSIVE METABOLIC PANEL - Abnormal       Result Value    Sodium 141      Potassium 4.0      Chloride 112 (*)     CO2 20 (*)     Glucose 312 (*)     Blood Urea Nitrogen 56.3 (*)     Creatinine 2.43 (*)     Calcium 8.4 (*)     Protein Total 6.5      Albumin 3.0 (*)     Globulin 3.5      Albumin/Globulin Ratio 0.9 (*)      Bilirubin Total 0.3      ALP 86      ALT 36      AST 28      eGFR 29      Anion Gap 9.0      BUN/Creatinine Ratio 23     URINALYSIS, REFLEX TO URINE CULTURE - Abnormal    Color, UA Light-Yellow      Appearance, UA Turbid (*)     Specific Gravity, UA 1.016      pH, UA 5.5      Protein, UA 1+ (*)     Glucose, UA 4+ (*)     Ketones, UA Negative      Blood, UA 2+ (*)     Bilirubin, UA Negative      Urobilinogen, UA Normal      Nitrites, UA Negative      Leukocyte Esterase, UA 25 (*)     RBC, UA 0-5      WBC, UA 6-10 (*)     Bacteria, UA Trace      Squamous Epithelial Cells, UA Trace      Mucous, UA Trace (*)     Granular Casts 3-5 (*)     Amorphous Crystal, UA Moderate (*)    CBC WITH DIFFERENTIAL - Abnormal    WBC 11.30      RBC 2.63 (*)     Hgb 8.3 (*)     Hct 25.7 (*)     MCV 97.7 (*)     MCH 31.6 (*)     MCHC 32.3 (*)     RDW 14.6      Platelet 195      MPV 11.2 (*)     Neut % 80.2      Lymph % 6.5      Mono % 11.1      Eos % 1.2      Basophil % 0.6      Imm Grans % 0.4      Neut # 9.07      Lymph # 0.74      Mono # 1.25      Eos # 0.13      Baso # 0.07      Imm Gran # 0.04      NRBC% 0.0     BLOOD GAS - Abnormal    Sample Type Arterial Blood      Sample site Left Brachial Artery      Drawn by RF RRT      pH, Blood gas 7.360      pCO2, Blood gas 37.0      pO2, Blood gas 64.0 (*)     Sodium, Blood Gas 140      Potassium, Blood Gas 4.1      Calcium Level Ionized 1.17      TOC2, Blood gas 22.0      Base Excess, Blood gas -4.10 (*)     sO2, Blood gas 91.1      HCO3, Blood gas 20.9 (*)     THb, Blood gas 8.7 (*)     O2 Hb, Blood Gas 92.7 (*)     CO Hgb 1.4      Met Hgb 0.7      Allens Test N/A      FIO2, Blood gas 21.0     POCT GLUCOSE - Abnormal    POCT Glucose 273 (*)    POCT GLUCOSE - Abnormal    POCT Glucose 234 (*)    LACTIC ACID, PLASMA - Normal    Lactic Acid Level 1.2     B-TYPE NATRIURETIC PEPTIDE - Normal    Natriuretic Peptide 77.1     TROPONIN I - Normal    Troponin-I 0.028     CBC W/ AUTO DIFFERENTIAL     Narrative:     The following orders were created for panel order CBC auto differential.  Procedure                               Abnormality         Status                     ---------                               -----------         ------                     CBC with Differential[[id number]]       Abnormal            Final result                 Please view results for these tests on the individual orders.     EKG Readings: (Independently Interpreted)   Normal sinus rhythm.  Rate of 90.  Normal intervals.  No STEMI.     ECG Results              EKG 12-lead (Final result)        Collection Time Result Time QRS Duration OHS QTC Calculation    03/18/25 15:41:36 03/19/25 08:07:43 88 415                     Final result by Interface, Lab In [facility] (03/19/25 08:07:50)                   Narrative:    Test Reason : R53.1,    Vent. Rate :  90 BPM     Atrial Rate :  90 BPM     P-R Int : 148 ms          QRS Dur :  88 ms      QT Int : 340 ms       P-R-T Axes :  58  65  18 degrees    QTcB Int : 415 ms    Normal sinus rhythm  T wave abnormality, consider lateral ischemia  Abnormal ECG  When compared with ECG of 14-Mar-2025 11:53,  T wave inversion now evident in Inferior leads  Inverted T waves have replaced nonspecific T wave abnormality in Lateral  leads  Confirmed by Andres Blakely ([id number]) on 3/19/2025 8:07:41 AM    Referred By:            Confirmed By: Andres Blakely                                  Imaging Results    None          Medications   sodium chloride 0.9% bolus 1,000 mL 1,000 mL (0 mLs Intravenous Stopped 3/18/25 1842)   insulin regular injection 5 Units 0.05 mL (5 Units Intravenous Given 3/18/25 1742)     Medical Decision Making  Judging by the patient's chief complaint and pertinent history, the patient has the following possible differential diagnoses, including but not limited to the following.  Some of these are deemed to be lower likelihood and some more likely based on my physical exam and history combined  with possible lab work and/or imaging studies.   Please see the pertinent studies, and refer to the HPI.  Some of these diagnoses will take further evaluation to fully rule out, perhaps as an outpatient and the patient was encouraged to follow up when discharged for more comprehensive evaluation.    Generalized weakness: anemia, dehydration, electrolyte derangement, hypoglycemia, infection, respiratory failure, ACS, thyroid disease, medications    Patient is a 64-year-old male who presents to emergency department for placement at skilled nursing.  See HPI.  See physical exam.  I saw the patient few days ago, patient was adamant he did not wish to go to a nursing home.  He had capacity to make his own medical decisions at that time.  As a result he was discharged home and has a home health at home.  He has brought to the emergency department today after PCP recommended he go to skilled nursing facility.  Patient amenable to going to skilled nursing.  As a result discussed with hospital medicine for observation/admission so the patient can has a placement.        Problems Addressed:  Failure to thrive in adult: acute illness or injury that poses a threat to life or bodily functions  Weakness: acute illness or injury that poses a threat to life or bodily functions    Amount and/or Complexity of Data Reviewed  Independent Historian: caregiver     Details: See HPI  Labs: ordered.    Risk  OTC drugs.  Parenteral controlled substances.  Decision regarding hospitalization.  Diagnosis or treatment significantly limited by social determinants of health.            Scribe Attestation:   Scribe #1: I performed the above scribed service and the documentation accurately describes the services I performed. I attest to the accuracy of the note.    Attending Attestation:           Physician Attestation for Scribe:  Physician Attestation Statement for Scribe #1: I, Vini Novoa MD, reviewed documentation, as scribed by Jasbir  Isreal in my presence, and it is both accurate and complete.             ED Course as of 03/24/25 0740   Tue Mar 18, 2025   [id number] Dr. George Bran Paged [JM]   [id number] Patient is sent to the emergency department by PCP Dr. George Bran for sniff placement. [RP]      ED Course User Index  [JM] Amrit Bach  [RP] Vini Novoa MD                           Clinical Impression:  Final diagnoses:  [R53.1] Weakness  [R62.7] Failure to thrive in adult          ED Disposition Condition    Admit                   [1]   Social History  Tobacco Use    Smoking status: Never    Smokeless tobacco: Never   Substance Use Topics    Alcohol use: Never    Drug use: Never        Vini Novoa MD  03/24/25 0741

## 2025-03-19 LAB
GLUCOSE SERPL-MCNC: 163 MG/DL (ref 70–110)
OHS QRS DURATION: 88 MS
OHS QTC CALCULATION: 415 MS
POCT GLUCOSE: 163 MG/DL (ref 70–110)
POCT GLUCOSE: 165 MG/DL (ref 70–110)
POCT GLUCOSE: 260 MG/DL (ref 70–110)
POCT GLUCOSE: 308 MG/DL (ref 70–110)
POCT GLUCOSE: 312 MG/DL (ref 70–110)

## 2025-03-19 PROCEDURE — 63600175 PHARM REV CODE 636 W HCPCS: Performed by: PHYSICIAN ASSISTANT

## 2025-03-19 PROCEDURE — 63600175 PHARM REV CODE 636 W HCPCS: Performed by: INTERNAL MEDICINE

## 2025-03-19 PROCEDURE — 11000001 HC ACUTE MED/SURG PRIVATE ROOM

## 2025-03-19 PROCEDURE — 25000003 PHARM REV CODE 250: Performed by: INTERNAL MEDICINE

## 2025-03-19 RX ORDER — ATORVASTATIN CALCIUM 40 MG/1
40 TABLET, FILM COATED ORAL NIGHTLY
Status: DISCONTINUED | OUTPATIENT
Start: 2025-03-19 | End: 2025-03-21 | Stop reason: HOSPADM

## 2025-03-19 RX ORDER — MUPIROCIN 20 MG/G
OINTMENT TOPICAL 2 TIMES DAILY
Status: DISCONTINUED | OUTPATIENT
Start: 2025-03-21 | End: 2025-03-21 | Stop reason: HOSPADM

## 2025-03-19 RX ORDER — METOPROLOL TARTRATE 50 MG/1
50 TABLET ORAL 2 TIMES DAILY
Status: DISCONTINUED | OUTPATIENT
Start: 2025-03-19 | End: 2025-03-21 | Stop reason: HOSPADM

## 2025-03-19 RX ORDER — ENOXAPARIN SODIUM 100 MG/ML
30 INJECTION SUBCUTANEOUS EVERY 24 HOURS
Status: DISCONTINUED | OUTPATIENT
Start: 2025-03-19 | End: 2025-03-21 | Stop reason: HOSPADM

## 2025-03-19 RX ORDER — ASPIRIN 81 MG/1
81 TABLET ORAL DAILY
Status: DISCONTINUED | OUTPATIENT
Start: 2025-03-20 | End: 2025-03-21 | Stop reason: HOSPADM

## 2025-03-19 RX ORDER — MIRTAZAPINE 15 MG/1
15 TABLET, FILM COATED ORAL NIGHTLY
Status: DISCONTINUED | OUTPATIENT
Start: 2025-03-19 | End: 2025-03-21 | Stop reason: HOSPADM

## 2025-03-19 RX ORDER — PANTOPRAZOLE SODIUM 40 MG/1
40 TABLET, DELAYED RELEASE ORAL DAILY
Status: DISCONTINUED | OUTPATIENT
Start: 2025-03-20 | End: 2025-03-21 | Stop reason: HOSPADM

## 2025-03-19 RX ORDER — INSULIN GLARGINE 100 [IU]/ML
15 INJECTION, SOLUTION SUBCUTANEOUS NIGHTLY
Status: DISCONTINUED | OUTPATIENT
Start: 2025-03-19 | End: 2025-03-21 | Stop reason: HOSPADM

## 2025-03-19 RX ADMIN — Medication: at 03:03

## 2025-03-19 RX ADMIN — ATORVASTATIN CALCIUM 40 MG: 40 TABLET, FILM COATED ORAL at 08:03

## 2025-03-19 RX ADMIN — ENOXAPARIN SODIUM 30 MG: 30 INJECTION SUBCUTANEOUS at 04:03

## 2025-03-19 RX ADMIN — INSULIN ASPART 2 UNITS: 100 INJECTION, SOLUTION INTRAVENOUS; SUBCUTANEOUS at 04:03

## 2025-03-19 RX ADMIN — MIRTAZAPINE 15 MG: 15 TABLET, FILM COATED ORAL at 08:03

## 2025-03-19 RX ADMIN — INSULIN ASPART 1 UNITS: 100 INJECTION, SOLUTION INTRAVENOUS; SUBCUTANEOUS at 05:03

## 2025-03-19 RX ADMIN — INSULIN ASPART 6 UNITS: 100 INJECTION, SOLUTION INTRAVENOUS; SUBCUTANEOUS at 12:03

## 2025-03-19 RX ADMIN — METOPROLOL TARTRATE 50 MG: 50 TABLET, FILM COATED ORAL at 08:03

## 2025-03-19 RX ADMIN — INSULIN GLARGINE 15 UNITS: 100 INJECTION, SOLUTION SUBCUTANEOUS at 08:03

## 2025-03-19 RX ADMIN — Medication: at 08:03

## 2025-03-19 RX ADMIN — INSULIN ASPART 4 UNITS: 100 INJECTION, SOLUTION INTRAVENOUS; SUBCUTANEOUS at 08:03

## 2025-03-19 NOTE — CONSULTS
Subjective:      Patient ID: Froy Barragan is a 64 y.o. male.    Chief Complaint: Medical Evaluation (Pt to ED via [facility] unit [facility]. Pt's home health nurse reported that pt was hypotensive. Initial pressure with /70. Pt has no complaints.  GCS 15)    HPI  Review of Systems   Objective:     Physical Exam   Assessment:     1. Weakness    2. Failure to thrive in adult           Wound 09/09/24 Moisture associated dermatitis Buttocks (Active)   09/09/24  Buttocks   Present on Original Admission: Y   Primary Wound Type: Moisture ass   Side:    Orientation:    Wound Approximate Age at First Assessment (Weeks):    Wound Number:    Is this injury device related?:    Incision Type:    Closure Method:    Wound Description (Comments):    Type:    Additional Comments:    Ankle-Brachial Index:    Pulses:    Removal Indication and Assessment:    Wound Outcome:    Wound Image   03/19/25 1100   Dressing Appearance Open to air 03/19/25 1100   Drainage Amount None 03/19/25 1100   Appearance Pink;Moist 03/19/25 1100   Tissue loss description Not applicable 03/19/25 1100   Care Cleansed with:;Wound cleanser;Applied:;Skin Barrier 03/19/25 1100            Wound 03/19/25 1302 Other (comment) Right proximal;lower Leg (Active)   03/19/25 1302 Leg   Present on Original Admission: Y   Primary Wound Type: Other   Side: Right   Orientation: proximal;lower   Wound Approximate Age at First Assessment (Weeks):    Wound Number:    Is this injury device related?:    Incision Type:    Closure Method:    Wound Description (Comments):    Type:    Additional Comments:    Ankle-Brachial Index:    Pulses:    Removal Indication and Assessment:    Wound Outcome:    Wound Image    03/19/25 1100   Dressing Appearance Open to air 03/19/25 1100   Drainage Amount None 03/19/25 1100   Appearance Pink;Dry 03/19/25 1100   Tissue loss description Not applicable 03/19/25 1100   Care Cleansed with:;Wound cleanser;Applied:;Povidone iodine 03/19/25  1100            Wound 03/19/25 1304 Other (comment) Left lateral Hip (Active)   03/19/25 1304 Hip   Present on Original Admission: Y   Primary Wound Type: Other   Side: Left   Orientation: lateral   Wound Approximate Age at First Assessment (Weeks):    Wound Number:    Is this injury device related?:    Incision Type:    Closure Method:    Wound Description (Comments):    Type:    Additional Comments:    Ankle-Brachial Index:    Pulses:    Removal Indication and Assessment:    Wound Outcome:    Wound Image   03/19/25 1100   Dressing Appearance Intact;Dry 03/19/25 1100   Drainage Amount None 03/19/25 1100   Appearance Pink;Dry 03/19/25 1100   Tissue loss description Not applicable 03/19/25 1100   Red (%), Wound Tissue Color 100 % 03/19/25 1100   Periwound Area Dry;Intact 03/19/25 1100   Wound Edges Irregular 03/19/25 1100   Wound Length (cm) 1 cm 03/19/25 1100   Wound Width (cm) 1 cm 03/19/25 1100   Wound Surface Area (cm^2) 0.79 cm^2 03/19/25 1100   Care Cleansed with:;Wound cleanser 03/19/25 1100   Dressing Foam 03/19/25 1100       Plan:          Wocn consult- 63 y/o male in with failure to thrive and sent to hospital for eval per home health nurse.    He is awake alert oriented and moves self in bed without issues.  He is emaciated  Several pink areas and scabs noted and see photos.  Left hip abrasion all dry and pink-buttock crease pink and moist area.   Care put in place with orders to nursing.    Pt is to be turned q2hrs with wedge and offloading boots to be applied.    Will check on him weekly while in hospital.

## 2025-03-19 NOTE — PLAN OF CARE
Problem: Adult Inpatient Plan of Care  Goal: Plan of Care Review  Outcome: Progressing  Goal: Patient-Specific Goal (Individualized)  Outcome: Progressing  Goal: Absence of Hospital-Acquired Illness or Injury  Outcome: Progressing  Goal: Optimal Comfort and Wellbeing  Outcome: Progressing  Goal: Readiness for Transition of Care  Outcome: Progressing     Problem: Skin Injury Risk Increased  Goal: Skin Health and Integrity  Outcome: Progressing     Problem: Diabetes Comorbidity  Goal: Blood Glucose Level Within Targeted Range  Outcome: Progressing     Problem: Wound  Goal: Optimal Coping  Outcome: Progressing  Goal: Optimal Functional Ability  Outcome: Progressing  Goal: Absence of Infection Signs and Symptoms  Outcome: Progressing  Goal: Improved Oral Intake  Outcome: Progressing  Goal: Optimal Pain Control and Function  Outcome: Progressing  Goal: Skin Health and Integrity  Outcome: Progressing

## 2025-03-19 NOTE — PLAN OF CARE
Patient AAO x3. Discussed placement states that he wants to return home he is more comfortable there. He states that he did therapy at [facility] before and he did not like it. I asked him what he did not like and he states that he did therapy and they gave him praises but he did not feel that it made him want to stay there. He felt confined while he was there.  He states that [facility] would be at bottom of his list. Asked him if he would consider another facility he states yes but if he thought about it more it would likely be no because he wants to be comfortable and he feels most comfortable  at home. He wants to stay home as long as he can.

## 2025-03-19 NOTE — PLAN OF CARE
Patient's sister called states that he said he was confused when he spoke to me and is agreeable to assisted living. I spoke to patient he states that he is agreeable to assisted living but will not do SNF. [facility] is in house she will do virtual tour with patient this afternoon.

## 2025-03-19 NOTE — PLAN OF CARE
03/19/25 0954   Discharge Assessment   Assessment Type Discharge Planning Assessment   Confirmed/corrected address, phone number and insurance Yes   Confirmed Demographics Correct on Facesheet   Source of Information patient   Communicated RISSA with patient/caregiver Date not available/Unable to determine   Reason For Admission hypotension   People in Home alone   Do you expect to return to your current living situation? Yes   Do you have help at home or someone to help you manage your care at home?   (states that his sisters help him but there is concern about whether they can properly assist him.)   Prior to hospitilization cognitive status: Alert/Oriented   Current cognitive status: Alert/Oriented   Walking or Climbing Stairs Difficulty yes   Walking or Climbing Stairs ambulation difficulty, requires equipment;stair climbing difficulty, requires equipment;transferring difficulty, requires equipment   Mobility Management wheelchair and slide board   Dressing/Bathing Difficulty yes   Dressing/Bathing bathing difficulty, assistance 1 person;dressing difficulty, assistance 1 person   Dressing/Bathing Management bath bench and HH   Do you have any problems with: Errands/Grocery  (does not drive)   Home Accessibility wheelchair accessible   Home Layout Able to live on 1st floor   Equipment Currently Used at Home wheelchair;slide board;bath bench   Readmission within 30 days? No   Patient currently being followed by outpatient case management? No   Do you currently have service(s) that help you manage your care at home? Yes   Name and Contact number of agency  but cannot remember the name of the company   Is the pt/caregiver preference to resume services with current agency Yes   Do you take prescription medications? Yes   Do you have prescription coverage? Yes   Coverage medicare   Do you have any problems affording any of your prescribed medications? TBD   Is the patient taking medications as prescribed? yes   Who  is going to help you get home at discharge? family   How do you get to doctors appointments? family or friend will provide   Are you on dialysis? No   Do you take coumadin? No   Discharge Plan A Home Health   Discharge Plan B Skilled Nursing Facility  (placed as plab b because patient is refusing)   DME Needed Upon Discharge  none   Discharge Plan discussed with: Patient   Transition of Care Barriers None

## 2025-03-19 NOTE — H&P
[facility] Hospital Medicine History & Physical Examination       Patient Name: Froy Barragan  MRN: [id number]  Patient Class: IP- Inpatient   Admission Date: 3/18/2025   Admitting Physician: ADA Service   Length of Stay: 1  Attending Physician: Peter Bills MD  Primary Care Provider: George Bran MD  Face-to-Face encounter date: 03/19/2025  Code Status: Full  Chief Complaint: Medical Evaluation (Pt to ED via [facility] unit 4. Pt's home health nurse reported that pt was hypotensive. Initial pressure with /70. Pt has no complaints.  GCS 15)      Screening for Social Drivers for health:  Patient screened for food insecurity, housing instability, transportation needs, utility difficulties, and interpersonal safety (select all that apply as identified as concern)  []Housing or Food  []Transportation Needs  []Utility Difficulties  [x]Interpersonal safety  []None      Patient information was obtained from patient, patient's family, past medical records and ER records.     HISTORY OF PRESENT ILLNESS:   64-year-old male with known past medical history of heart failure with reduced EF of less than 40%, anemia of chronic disease, cervical myopathy, B12 deficiency, GERD, hypertension, hyperlipidemia, paraparesis with mostly wheelchair-bound status, CKD 3A, type 2 diabetes mellitus admitted 03/19/2025 as home health reported patient was hypotensive though blood pressure with EMS was 105/70.  Upon my evaluation, patient is laying in bed, feels very frustrated that his family pushes him around, reports that he was doing fine at home and had no complaints but yet his sister talk to the home health nurses and called EMS on him.  Patient reports he has sitters at home as well.  Reports his sister mentioned that she is unable to provide him the care given he needs insulin and the sitters can not give him insulin.  Patient is weighing his choices between assisted living [facility] versus nursing home.  He is very distressed about nursing home idea  Reports he is wheelchair-bound, stated he can walk but can only walk very short distance and get weak in his legs.  Has a Dexcom for monitoring his diabetes  Patient is meeting with [facility] liaison via [facility] today to tour the facility  I spoke with  and she informed me patient does not have SNF days left per his insurance  Reviewed patient's chart, labs, vital signs, imaging  Mild metabolic acidosis with bicarb of 20, BUN/creatinine 56.3/2.4, glucose of 312  Urinalysis is again showing WBC of 6-10, no urine culture triggered  Blood cultures x2 collected  Vital signs grossly stable with no hypotension  Patient is admitted to hospital medicine team    PAST MEDICAL HISTORY:     Past Medical History:   Diagnosis Date    Acute heart failure with reduced ejection fraction (HFrEF, <= 40%) 03/13/2025    Anemia of chronic disease 07/06/2022    Arteriosclerosis of coronary artery 07/06/2022    Cervical myelopathy 07/06/2022    Cobalamin deficiency 07/06/2022    Gastroesophageal reflux disease 07/06/2022    Hypertension 07/06/2022    Low vitamin D level 07/06/2022    Mixed hyperlipidemia 07/06/2022    Paraparesis 07/06/2022    Stage 3a chronic kidney disease 07/06/2022    Type 2 diabetes mellitus 07/06/2022       PAST SURGICAL HISTORY:     Past Surgical History:   Procedure Laterality Date    MAGNETIC RESONANCE IMAGING N/A 9/19/2024    Procedure: MRI (Magnetic Resonance Imagine);  Surgeon: David Amin DO;  Location: [facility];  Service: Anesthesiology;  Laterality: N/A;    SPINE SURGERY         ALLERGIES:   Patient has no known allergies.    FAMILY HISTORY:   Reviewed and negative    SOCIAL HISTORY:   Never smoked  Never used alcohol  Uses THC on regular base    REVIEW OF SYSTEMS:   Except as documented, all other systems reviewed and negative     PHYSICAL EXAM:     VITAL SIGNS: 24 HRS MIN & MAX LAST   Temp  Min: 97.3 °F (36.3  °C)  Max: 98.3 °F (36.8 °C) 98.3 °F (36.8 °C)   BP  Min: 105/65  Max: 159/93 (!) 153/86   Pulse  Min: 72  Max: 91  79   Resp  Min: 17  Max: 18 18   SpO2  Min: 96 %  Max: 100 % 96 %     General appearance: Well-developed, well-nourished male in no apparent distress.  Just frustrated with his family  HENT: Atraumatic head. Moist mucous membranes of oral cavity.  Eyes: Normal extraocular movements.   Neck: Supple.   Lungs: Clear to auscultation bilaterally. No wheezing present.   Heart: Regular rate and rhythm. S1 and S2 present with no murmurs/gallop/rub. No pedal edema. No JVD present.   Abdomen: Soft, non-distended, non-tender. No rebound tenderness/guarding. Bowel sounds are normal.   Extremities: No cyanosis, clubbing, or edema.  Skin: No Rash.   Neuro: Motor and sensory exams grossly intact.  Unable to move lower extremities  Psych/mental status:  Easily agitated, frustrated     LABS AND IMAGING:     Recent Labs   Lab 03/13/25  0618 03/14/25  1224 03/18/25  1538   WBC 12.25* 8.37 11.30   RBC 3.41* 3.11* 2.63*   HGB 10.4* 9.6* 8.3*   HCT 32.7* 30.2* 25.7*   MCV 95.9* 97.1* 97.7*   MCH 30.5 30.9 31.6*   MCHC 31.8* 31.8* 32.3*   RDW 14.3 14.3 14.6    249 195   MPV 11.5* 11.5* 11.2*       Recent Labs   Lab 03/13/25  0618 03/14/25  1224 03/14/25  1500 03/18/25  1538 03/18/25  1835    138  --  141  --    K 4.5 4.6  --  4.0  --     105  --  112*  --    CO2 24 22*  --  20*  --    BUN 44.4* 60.3*  --  56.3*  --    CREATININE 2.81* 2.92*  --  2.43*  --    CALCIUM 8.9 8.7*  --  8.4*  --    PH  --   --  7.400  --  7.360   MG 1.90 2.00  --   --   --    ALBUMIN 3.5 3.3*  --  3.0*  --    ALKPHOS 74 77  --  86  --    ALT 81* 64*  --  36  --    AST 38* 29  --  28  --    BILITOT 0.5 0.3  --  0.3  --        Microbiology Results (last 7 days)       Procedure Component Value Units Date/Time    Blood culture #2 **CANNOT BE ORDERED STAT** [[id number]] Collected: 03/18/25 1558    Order Status: Resulted Specimen: Blood  Updated: 03/18/25 1704    Blood culture #1 **CANNOT BE ORDERED STAT** [[id number]] Collected: 03/18/25 1558    Order Status: Resulted Specimen: Blood Updated: 03/18/25 1655             Radiology- see below    ASSESSMENT & PLAN:   Admitted for SNF placement per family's request  Known diabetes mellitus type 2  Known hypertension, hyperlipidemia, CHF (EF 35% on 3/2/25)  CKD 4  Paraparesis, wheelchair-bound    Case management is consulted for SNF placement, patient is deciding between [facility] versus SNF but unfortunately  informed me that patient does not have any SNF days left.  I did discussed with patient's nursing home option, he has not made decision yet  No sign of infection, no need for antibiotics  Creatinine in his baseline, chart reviewed, ranges between 2.4-2.8  Appropriate home medication for chronic medical condition has been resumed  Morning CBC, BMP ordered        VTE Prophylaxis:  Lovenox subQ     Patient condition:  Stable    Discharge Planning and Disposition/Anticipated discharge: TBD    Nonsmoker    Advanced Directives:  I spent 16 mins of face to face discussion regarding advanced directives and end of life planning which included the patient who informed me does not have a living will but he would like to be a full code    If patient was admitted under observational status it is with my approval/permission.           All diagnosis and differential diagnosis have been reviewed; at least 55 min was spent on this history and physical exam, assessment and plan has been documented; I have personally reviewed the labs and test results that are presently available; I have reviewed the patients medication list; I have reviewed the consulting providers response and recommendations. I have reviewed or attempted to review medical records based upon their availability.    All of the patient and family questions have been addressed and answered. Patient's is agreeable to the above  stated plan. I will continue to monitor closely and make adjustments to medical management as needed.    Portions of this note dictated using EMR integrated voice recognition software, and may be subject to voice recognition errors not corrected at proofreading. Please contact writer for clarification if needed  _____________________________________________________________________    HOME MEDICATIONS:     Prior to Admission medications    Medication Sig Start Date End Date Taking? Authorizing Provider   alfuzosin (UROXATRAL) 10 mg Tb24 Take 1 tablet (10 mg total) by mouth daily with breakfast. 2/24/25  Yes George Bran MD   aspirin (ECOTRIN) 81 MG EC tablet Take 81 mg by mouth once daily.   Yes Provider, Historical   b complex vitamins capsule Take 1 capsule by mouth once daily.   Yes Provider, Historical   finasteride (PROSCAR) 5 mg tablet Take 1 tablet (5 mg total) by mouth once daily. 2/19/25 2/19/26 Yes George Bran MD   insulin glargine U-100, Lantus, 100 unit/mL injection Inject 15 Units into the skin every evening. 3/13/25 3/13/26 Yes Ed Avalos MD   metoprolol tartrate (LOPRESSOR) 50 MG tablet Take 1 tablet (50 mg total) by mouth 2 (two) times daily. 3/13/25 3/13/26 Yes Ed Avalos MD   mirtazapine (REMERON) 15 MG tablet Take 1 tablet (15 mg total) by mouth every evening. 3/13/25 3/13/26 Yes Ed Avalos MD   pantoprazole (PROTONIX) 40 MG tablet Take 1 tablet (40 mg total) by mouth once daily. 2/24/25  Yes George Bran MD   rosuvastatin (CRESTOR) 40 MG Tab Take 1 tablet (40 mg total) by mouth once daily. 2/24/25  Yes George Bran MD   vitamin D (VITAMIN D3) 1000 units Tab Take 1,000 Units by mouth once daily.   Yes Provider, Historical   alcohol swabs (ALCOHOL PREP) PadM Apply 1 each topically once daily. 3/13/25   Ed Avalos MD   blood glucose control, low (TRUE METRIX LEVEL 1) Soln 1 Bottle by Misc.(Non-Drug; Combo Route) route once daily. 3/13/25   Ed Avalos  MD NICHOLE   blood-glucose meter (TRUE METRIX AIR GLUCOSE METER) Misc 1 each by Misc.(Non-Drug; Combo Route) route once daily. 6/7/23   George Bran MD   blood-glucose sensor (DEXCOM G7 SENSOR) Carmen 1 each by Misc.(Non-Drug; Combo Route) route once daily. 3/13/25 3/13/26  Ed Avalos MD   insulin aspart U-100 (NOVOLOG) 100 unit/mL injection Inject 0-10 Units into the skin before meals and at bedtime as needed for High Blood Sugar. 3/13/25 3/13/26  Ed Avalos MD   lancets (TRUEPLUS LANCETS) 33 gauge Misc 1 lancet  by Misc.(Non-Drug; Combo Route) route once daily. 3/13/25   Ed Avalos MD   TRUE METRIX GLUCOSE TEST STRIP Strp TEST BLOOD SUGAR EVERY DAY 9/4/24   George Bran MD   zinc oxide 20 % ointment Apply topically 2 (two) times a day. 10/1/24   Niranjan Rankin MD       INPATIENT LIST OF MEDICATIONS   Scheduled Meds:   [START ON 3/20/2025] aspirin  81 mg Oral Daily    atorvastatin  40 mg Oral QHS    enoxparin  30 mg Subcutaneous Q24H (prophylaxis, 1700)    insulin glargine U-100 (Lantus)  15 Units Subcutaneous QHS    metoprolol tartrate  50 mg Oral BID    mirtazapine  15 mg Oral QHS    [START ON 3/21/2025] mupirocin   Nasal BID    [START ON 3/20/2025] pantoprazole  40 mg Oral Daily    zinc oxide-cod liver oil   Topical (Top) BID     Continuous Infusions:  PRN Meds:.  Current Facility-Administered Medications:     dextrose 50%, 12.5 g, Intravenous, PRN    dextrose 50%, 25 g, Intravenous, PRN    glucagon (human recombinant), 1 mg, Intramuscular, PRN    glucose, 16 g, Oral, PRN    glucose, 24 g, Oral, PRN    insulin aspart U-100, 0-10 Units, Subcutaneous, QID (AC + HS) PRN    IMAGING:   CT Head Without Contrast  Narrative: EXAMINATION:  CT HEAD WITHOUT CONTRAST    CLINICAL HISTORY:  Mental status change, unknown cause;    TECHNIQUE:  Sequential axial images were performed of the brain without contrast.    Dose product length of 906 mGycm. Automated exposure control was utilized to minimize  radiation dose.    COMPARISON:  Three, 2025..    FINDINGS:  There is no intracranial mass effect, midline shift, hydrocephalus or hemorrhage. There is no sulcal effacement or low attenuation changes to suggest recent large vessel territory infarction.  There are bilateral basal ganglia and thalami old lacunar infarcts.  Chronic appearing periventricular and subcortical white matter low attenuation changes are present and are consistent with chronic microangiopathic ischemia. The ventricular system and sulcal markings prominence is consistent with atrophy. There is no acute extra axial fluid collection. Visualized paranasal sinuses are clear without mucosal thickening, polypoidal abnormality or air-fluid levels. Mastoid air cells aeration is optimal.  Impression: 1.  No acute intracranial findings identified.    2.  Old lacunar infarcts, chronic microangiopathic ischemia and atrophy.    Electronically signed by: Jose J Do  Date:    03/14/2025  Time:    13:35      Peter Bills MD  Department of Hospital Medicine   [facility]   03/19/2025 7:11 AM

## 2025-03-19 NOTE — PLAN OF CARE
Problem: Adult Inpatient Plan of Care  Goal: Plan of Care Review  Outcome: Progressing  Goal: Patient-Specific Goal (Individualized)  Outcome: Progressing  Goal: Absence of Hospital-Acquired Illness or Injury  Outcome: Progressing  Goal: Optimal Comfort and Wellbeing  Outcome: Progressing  Goal: Readiness for Transition of Care  Outcome: Progressing     Problem: Delirium  Goal: Optimal Coping  Outcome: Progressing  Goal: Improved Behavioral Control  Outcome: Progressing  Goal: Improved Attention and Thought Clarity  Outcome: Progressing  Goal: Improved Sleep  Outcome: Progressing     Problem: Skin Injury Risk Increased  Goal: Skin Health and Integrity  Outcome: Progressing     Problem: Diabetes Comorbidity  Goal: Blood Glucose Level Within Targeted Range  Outcome: Progressing     Problem: Wound  Goal: Optimal Coping  Outcome: Progressing  Goal: Optimal Functional Ability  Outcome: Progressing  Goal: Absence of Infection Signs and Symptoms  Outcome: Progressing  Goal: Improved Oral Intake  Outcome: Progressing  Goal: Optimal Pain Control and Function  Outcome: Progressing  Goal: Skin Health and Integrity  Outcome: Progressing  Goal: Optimal Wound Healing  Outcome: Progressing     Problem: Infection  Goal: Absence of Infection Signs and Symptoms  Outcome: Progressing

## 2025-03-19 NOTE — PLAN OF CARE
Assisted Living denied patient due to unable to complete assessment at this time and feels that he needs more therapy.

## 2025-03-19 NOTE — PLAN OF CARE
Notified patient he is out of skilled days [facility] to [facility] and pay out of pocket for his stay. While we were discussing he received call from assisted living for virtual visit.

## 2025-03-19 NOTE — PROGRESS NOTES
Inpatient Nutrition Evaluation    Admit Date: 3/18/2025   Total duration of encounter: 1 day   Patient Age: 64 y.o.    Nutrition Recommendation/Prescription     Continue diabetic diet as tolerated.   Encouraged good intake, small and frequent meals.    Nutrition Assessment     Chart Review    Reason Seen: malnutrition screening tool (MST)    Malnutrition Screening Tool Results   Have you recently lost weight without trying?: Unsure  Have you been eating poorly because of a decreased appetite?: No   MST Score: 2   Diagnosis:  Weakness  Failure to thrive    Relevant Medical History:   Past Medical History:   Diagnosis Date    Acute heart failure with reduced ejection fraction (HFrEF, <= 40%) 03/13/2025    Anemia of chronic disease 07/06/2022    Arteriosclerosis of coronary artery 07/06/2022    Cervical myelopathy 07/06/2022    Cobalamin deficiency 07/06/2022    Gastroesophageal reflux disease 07/06/2022    Hypertension 07/06/2022    Low vitamin D level 07/06/2022    Mixed hyperlipidemia 07/06/2022    Paraparesis 07/06/2022    Stage 3a chronic kidney disease 07/06/2022    Type 2 diabetes mellitus 07/06/2022      Scheduled Medications:   Continuous Infusions:   PRN Medications:   Current Facility-Administered Medications:     dextrose 50%, 12.5 g, Intravenous, PRN    dextrose 50%, 25 g, Intravenous, PRN    glucagon (human recombinant), 1 mg, Intramuscular, PRN    glucose, 16 g, Oral, PRN    glucose, 24 g, Oral, PRN    insulin aspart U-100, 0-10 Units, Subcutaneous, QID (AC + HS) PRN    Recent Labs   Lab 03/13/25  0618 03/14/25  1224 03/18/25  1538    138 141   K 4.5 4.6 4.0   CALCIUM 8.9 8.7* 8.4*   MG 1.90 2.00  --     105 112*   CO2 24 22* 20*   BUN 44.4* 60.3* 56.3*   CREATININE 2.81* 2.92* 2.43*   EGFRNORACEVR 24 23 29   GLUCOSE 103 187* 312*   BILITOT 0.5 0.3 0.3   ALKPHOS 74 77 86   ALT 81* 64* 36   AST 38* 29 28   ALBUMIN 3.5 3.3* 3.0*   WBC 12.25* 8.37 11.30   HGB 10.4* 9.6* 8.3*   HCT 32.7* 30.2*  "25.7*     Nutrition Orders:  Diet Consistent Carbohydrate 2000 Calories (up to 75 gm per meal)    Appetite/Oral Intake: good/% of meals  Factors Affecting Nutritional Intake: none identified  Food/Moravian/Cultural Preferences: none reported  Food Allergies: none reported  Last Bowel Movement: 25  Wound(s):      Comments    3/19/25 Pt reports good appetite, ate a good breakfast. Denies any n/v/d/c. States UBW of 115lbs, stable with admit wt. Noted underweight BMI for age. Offered oral supplement 1-2x daily for additional nutrition, pt politely declined.     Anthropometrics    Height: 5' 2" (157.5 cm), Height Method: Stated  Last Weight: 52.1 kg (114 lb 13.8 oz) (25), Weight Method: Bed Scale  BMI (Calculated): 21  BMI Classification: underweight (BMI less than 22 if >65 years of age)        Ideal Body Weight (IBW), Male: 118 lb     % Ideal Body Weight, Male (lb): 97.34 %                 Usual Body Weight (UBW), k.27 kg  % Usual Body Weight: 99.88     Usual Weight Provided By: patient    Wt Readings from Last 5 Encounters:   25 52.1 kg (114 lb 13.8 oz)   25 54.4 kg (120 lb)   25 53 kg (116 lb 13.5 oz)   25 52.2 kg (115 lb)   01/15/25 52.2 kg (115 lb)     Weight Change(s) Since Admission:   Wt Readings from Last 1 Encounters:   25 52.1 kg (114 lb 13.8 oz)   25 54.4 kg (120 lb)   Admit Weight: 54.4 kg (120 lb) (25 151), Weight Method: Stated    Patient Education     Not applicable.    Nutrition Goals & Monitoring     Dietitian will monitor: energy intake, weight change, electrolyte/renal panel, glucose/endocrine profile, and gastrointestinal profile    Nutrition Risk/Follow-Up: low (follow-up in 5-7 days)  Patients assigned 'low nutrition risk' status do not qualify for a full nutritional assessment but will be monitored and re-evaluated in a 5-7 day time period. Please consult if re-evaluation needed sooner.   "

## 2025-03-19 NOTE — PLAN OF CARE
Spoke to patient's sister tam she states that they are working with Niesha with [facility] and that she is trying to get him admitted to assisted living. Assisted living is not something set up by CM here. Discussed with her that I spoke to patient this am and that he states that he is not willing to be placed. She states that the issue they are having is that [facility] does not do insulin shots 7 days a week and he cannot get home health 7 day a week. I will discuss with MD. Discussed that if patient is oriented I cannot force him into a facility and doubt that a facility will accept him if he is saying no and has capacity to make decisions. She states that he told their sister yesterday that he was willing to be placed. Explained that may be so and that he may change his mind several times but if he is capable of making a decision he is capable of making a bad one. I understand the stress and concern on the family's part and will discuss with MD. She states that the only way he can go home is with [facility] because the insulin is an issue. She did ask if [facility] will make him take his insulin. I explained that [facility] is [facility] if he refuses they will not force him to take them.

## 2025-03-20 LAB
ANION GAP SERPL CALC-SCNC: 9 MEQ/L
BUN SERPL-MCNC: 41.1 MG/DL (ref 8.4–25.7)
CALCIUM SERPL-MCNC: 8.7 MG/DL (ref 8.8–10)
CHLORIDE SERPL-SCNC: 110 MMOL/L (ref 98–107)
CO2 SERPL-SCNC: 21 MMOL/L (ref 23–31)
CREAT SERPL-MCNC: 2.11 MG/DL (ref 0.72–1.25)
CREAT/UREA NIT SERPL: 19
ERYTHROCYTE [DISTWIDTH] IN BLOOD BY AUTOMATED COUNT: 14.3 % (ref 11.5–17)
GFR SERPLBLD CREATININE-BSD FMLA CKD-EPI: 34 ML/MIN/1.73/M2
GLUCOSE SERPL-MCNC: 173 MG/DL (ref 70–110)
GLUCOSE SERPL-MCNC: 173 MG/DL (ref 82–115)
HCT VFR BLD AUTO: 30 % (ref 42–52)
HGB BLD-MCNC: 9.5 G/DL (ref 14–18)
MCH RBC QN AUTO: 30.7 PG (ref 27–31)
MCHC RBC AUTO-ENTMCNC: 31.7 G/DL (ref 33–36)
MCV RBC AUTO: 97.1 FL (ref 80–94)
NRBC BLD AUTO-RTO: 0 %
PLATELET # BLD AUTO: 218 X10(3)/MCL (ref 130–400)
PMV BLD AUTO: 11.7 FL (ref 7.4–10.4)
POCT GLUCOSE: 120 MG/DL (ref 70–110)
POCT GLUCOSE: 145 MG/DL (ref 70–110)
POCT GLUCOSE: 382 MG/DL (ref 70–110)
POTASSIUM SERPL-SCNC: 3.9 MMOL/L (ref 3.5–5.1)
RBC # BLD AUTO: 3.09 X10(6)/MCL (ref 4.7–6.1)
SODIUM SERPL-SCNC: 140 MMOL/L (ref 136–145)
WBC # BLD AUTO: 7.93 X10(3)/MCL (ref 4.5–11.5)

## 2025-03-20 PROCEDURE — G0378 HOSPITAL OBSERVATION PER HR: HCPCS

## 2025-03-20 PROCEDURE — 96372 THER/PROPH/DIAG INJ SC/IM: CPT | Performed by: PHYSICIAN ASSISTANT

## 2025-03-20 PROCEDURE — 80048 BASIC METABOLIC PNL TOTAL CA: CPT | Performed by: INTERNAL MEDICINE

## 2025-03-20 PROCEDURE — 25000003 PHARM REV CODE 250: Performed by: INTERNAL MEDICINE

## 2025-03-20 PROCEDURE — 96372 THER/PROPH/DIAG INJ SC/IM: CPT | Performed by: INTERNAL MEDICINE

## 2025-03-20 PROCEDURE — 63600175 PHARM REV CODE 636 W HCPCS: Performed by: INTERNAL MEDICINE

## 2025-03-20 PROCEDURE — 36415 COLL VENOUS BLD VENIPUNCTURE: CPT | Performed by: INTERNAL MEDICINE

## 2025-03-20 PROCEDURE — 63600175 PHARM REV CODE 636 W HCPCS: Performed by: PHYSICIAN ASSISTANT

## 2025-03-20 PROCEDURE — 85027 COMPLETE CBC AUTOMATED: CPT | Performed by: INTERNAL MEDICINE

## 2025-03-20 RX ORDER — NIFEDIPINE 30 MG/1
30 TABLET, EXTENDED RELEASE ORAL DAILY
Status: DISCONTINUED | OUTPATIENT
Start: 2025-03-20 | End: 2025-03-21 | Stop reason: HOSPADM

## 2025-03-20 RX ADMIN — INSULIN GLARGINE 15 UNITS: 100 INJECTION, SOLUTION SUBCUTANEOUS at 08:03

## 2025-03-20 RX ADMIN — MIRTAZAPINE 15 MG: 15 TABLET, FILM COATED ORAL at 08:03

## 2025-03-20 RX ADMIN — Medication: at 08:03

## 2025-03-20 RX ADMIN — ENOXAPARIN SODIUM 30 MG: 30 INJECTION SUBCUTANEOUS at 04:03

## 2025-03-20 RX ADMIN — NIFEDIPINE 30 MG: 30 TABLET, FILM COATED, EXTENDED RELEASE ORAL at 10:03

## 2025-03-20 RX ADMIN — PANTOPRAZOLE SODIUM 40 MG: 40 TABLET, DELAYED RELEASE ORAL at 08:03

## 2025-03-20 RX ADMIN — ASPIRIN 81 MG: 81 TABLET, COATED ORAL at 08:03

## 2025-03-20 RX ADMIN — METOPROLOL TARTRATE 50 MG: 50 TABLET, FILM COATED ORAL at 08:03

## 2025-03-20 RX ADMIN — ATORVASTATIN CALCIUM 40 MG: 40 TABLET, FILM COATED ORAL at 08:03

## 2025-03-20 RX ADMIN — INSULIN ASPART 1 UNITS: 100 INJECTION, SOLUTION INTRAVENOUS; SUBCUTANEOUS at 07:03

## 2025-03-20 RX ADMIN — INSULIN ASPART 10 UNITS: 100 INJECTION, SOLUTION INTRAVENOUS; SUBCUTANEOUS at 11:03

## 2025-03-20 NOTE — PLAN OF CARE
0952: Spoke to Celsa with [facility], [facility] feels like patient will need therapy prior to accepting to Assisted Living. She is reaching out to [facility] to discuss patient possibly going to assisted living and then transferring to rehab due to patient currently being observation in hospital. She will call back.     1048: Celsa reports [facility] wants to see patient transfer from bed to wheelchair with slide board. Nurse is providing slide board and [facility] will come evaluate today around 2.     1517: [facility] will accept patient. Sending MAR.    negative...

## 2025-03-20 NOTE — PROGRESS NOTES
"[facility]  Hospital Medicine Progress Note        Chief Complaint: Inpatient Follow-up     HPI:   "64-year-old male with known past medical history of heart failure with reduced EF of less than 40%, anemia of chronic disease, cervical myopathy, B12 deficiency, GERD, hypertension, hyperlipidemia, paraparesis with mostly wheelchair-bound status, CKD 3A, type 2 diabetes mellitus admitted 03/19/2025 as home health reported patient was hypotensive though blood pressure with EMS was 105/70.  Upon my evaluation, patient is laying in bed, feels very frustrated that his family pushes him around, reports that he was doing fine at home and had no complaints but yet his sister talk to the home health nurses and called EMS on him.  Patient reports he has sitters at home as well.  Reports his sister mentioned that she is unable to provide him the care given he needs insulin and the sitters can not give him insulin.Patient is weighing his choices between assisted living [facility] versus nursing home.  He is very distressed about nursing home ideaReports he is wheelchair-bound, stated he can walk but can only walk very short distance and get weak in his legs.Has a Dexcom for monitoring his diabetes  Patient is meeting with [facility] liaison via [facility] today to tour the facility.  and she informed me patient does not have SNF days left per his insurance.    Vital signs grossly stable with no hypotension. bicarb of 20, BUN/creatinine 56.3/2.4, glucose of 312.Urinalysis is again showing WBC of 6-10, no urine culture triggered.Blood cultures x2 collected.Patient is admitted to hospital medicine team."      Interval Hx:   No acute events reported overnight.  Patient was seen at bedside this morning, he voiced no new complaints, reported doing okay.    Case was discussed with patient's nurse     Objective/physical exam:  General: In no acute distress, afebrile  Chest: Clear to " auscultation bilaterally  Heart:  +S1, S2, no appreciable murmur  Abdomen: Soft, nontender  MSK: Warm, no lower extremity edema  Neurologic: Alert and oriented to self, date of birth, confused to place  VITAL SIGNS: 24 HRS MIN & MAX LAST   Temp  Min: 97.5 °F (36.4 °C)  Max: 98.4 °F (36.9 °C) 97.5 °F (36.4 °C)   BP  Min: 117/70  Max: 163/86 (!) 155/91   Pulse  Min: 75  Max: 95  95   Resp  Min: 18  Max: 19 18   SpO2  Min: 96 %  Max: 99 % 99 %     I have reviewed the following labs:  Recent Labs   Lab 03/14/25  1224 03/18/25  1538 03/20/25  0527   WBC 8.37 11.30 7.93   RBC 3.11* 2.63* 3.09*   HGB 9.6* 8.3* 9.5*   HCT 30.2* 25.7* 30.0*   MCV 97.1* 97.7* 97.1*   MCH 30.9 31.6* 30.7   MCHC 31.8* 32.3* 31.7*   RDW 14.3 14.6 14.3    195 218   MPV 11.5* 11.2* 11.7*     Recent Labs   Lab 03/14/25  1224 03/14/25  1500 03/18/25  1538 03/18/25  1835 03/20/25  0527     --  141  --  140   K 4.6  --  4.0  --  3.9     --  112*  --  110*   CO2 22*  --  20*  --  21*   BUN 60.3*  --  56.3*  --  41.1*   CREATININE 2.92*  --  2.43*  --  2.11*   CALCIUM 8.7*  --  8.4*  --  8.7*   PH  --  7.400  --  7.360  --    MG 2.00  --   --   --   --    ALBUMIN 3.3*  --  3.0*  --   --    ALKPHOS 77  --  86  --   --    ALT 64*  --  36  --   --    AST 29  --  28  --   --    BILITOT 0.3  --  0.3  --   --      Microbiology Results (last 7 days)       Procedure Component Value Units Date/Time    Blood culture #2 **CANNOT BE ORDERED STAT** [[id number]]  (Normal) Collected: 03/18/25 1558    Order Status: Completed Specimen: Blood Updated: 03/19/25 1801     Blood Culture No Growth At 24 Hours    Blood culture #1 **CANNOT BE ORDERED STAT** [[id number]]  (Normal) Collected: 03/18/25 1558    Order Status: Completed Specimen: Blood Updated: 03/19/25 1801     Blood Culture No Growth At 24 Hours             See below for Radiology    Assessment/Plan:  Admitted for SNF placement per family's request  diabetes mellitus type 2  hypertension  CHF (EF  35% on 3/2/25)  CKD 4      Continue current home medications for chronic conditions  Monitor BP, Add nifedipine  Nutritional support  Therapy services   Labs from this morning noted grossly stable  Case discussed with case management this morning, working on placement      VTE prophylaxis:  Lovenox    Patient condition:  Stable    Anticipated discharge and Disposition:   senior care versus nursing home          Portions of this note dictated using EMR integrated voice recognition software, and may be subject to voice recognition errors not corrected at proofreading. Please contact writer for clarification if needed.   _____________________________________________________________________    Malnutrition Status:  Nutrition consulted. Most recent weight and BMI monitored-     Measurements:  Wt Readings from Last 1 Encounters:   03/18/25 52.1 kg (114 lb 13.8 oz)   Body mass index is 21.01 kg/m².    Patient has been screened and assessed by RD.    Malnutrition Type:  Context:    Level:      Malnutrition Characteristic Summary:       Interventions/Recommendations (treatment strategy):        Scheduled Med:   aspirin  81 mg Oral Daily    atorvastatin  40 mg Oral QHS    enoxparin  30 mg Subcutaneous Q24H (prophylaxis, 1700)    insulin glargine U-100 (Lantus)  15 Units Subcutaneous QHS    metoprolol tartrate  50 mg Oral BID    mirtazapine  15 mg Oral QHS    [START ON 3/21/2025] mupirocin   Nasal BID    pantoprazole  40 mg Oral Daily    zinc oxide-cod liver oil   Topical (Top) BID      Continuous Infusions:     PRN Meds:    Current Facility-Administered Medications:     dextrose 50%, 12.5 g, Intravenous, PRN    dextrose 50%, 25 g, Intravenous, PRN    glucagon (human recombinant), 1 mg, Intramuscular, PRN    glucose, 16 g, Oral, PRN    glucose, 24 g, Oral, PRN    insulin aspart U-100, 0-10 Units, Subcutaneous, QID (AC + HS) PRN     Radiology:  I have personally reviewed the following imaging and agree with the radiologist.     CT Head  Without Contrast  Narrative: EXAMINATION:  CT HEAD WITHOUT CONTRAST    CLINICAL HISTORY:  Mental status change, unknown cause;    TECHNIQUE:  Sequential axial images were performed of the brain without contrast.    Dose product length of 906 mGycm. Automated exposure control was utilized to minimize radiation dose.    COMPARISON:  Three, 2025..    FINDINGS:  There is no intracranial mass effect, midline shift, hydrocephalus or hemorrhage. There is no sulcal effacement or low attenuation changes to suggest recent large vessel territory infarction.  There are bilateral basal ganglia and thalami old lacunar infarcts.  Chronic appearing periventricular and subcortical white matter low attenuation changes are present and are consistent with chronic microangiopathic ischemia. The ventricular system and sulcal markings prominence is consistent with atrophy. There is no acute extra axial fluid collection. Visualized paranasal sinuses are clear without mucosal thickening, polypoidal abnormality or air-fluid levels. Mastoid air cells aeration is optimal.  Impression: 1.  No acute intracranial findings identified.    2.  Old lacunar infarcts, chronic microangiopathic ischemia and atrophy.    Electronically signed by: Jose J Do  Date:    03/14/2025  Time:    13:35      Elayne Torres MD  Department of Hospital Medicine   [facility]   03/20/2025

## 2025-03-20 NOTE — PLAN OF CARE
[facility] delivered JIMENEZ, explained its importance and placed the physical copy in patient's red chart

## 2025-03-20 NOTE — PLAN OF CARE
3/20 1009- Observation (MOON) letter/ OBS status reviewed by hospital room phone w patient. [place] to [facility] to please [place] off copies, have letter signed, etc. I did let Mr. Hemphill know someone would be coming w the papers at some point. CHRISTINE Spaulding RN

## 2025-03-20 NOTE — CODE 44
"MEDICARE CONDITION 44     (Reference: Transmittal 200 of the Medicare Manual)     A Medicare "Inpatient Admission" may be changed to an "Outpatient" (includes  Outpatient Observation) status, if the following conditions exist:  The change in patient status from inpatient to outpatient is made prior to        discharge or release, while the patient is still a patient in the hospital.   The hospital has not submitted a claim for the inpatient admission.  A physician concurs with the Utilization Committee decision.   Physician concurrence with the Utilization Committee's decision is documented         in the patient's records.      The entire case has been reviewed with Peter Bills MD, attending physician and me, physician advisor/member of the Utilization Management Committee. We are both in agreement that this should be an Outpatient/Observation encounter because the patient did not meet medical necessity for inpatient admission. The patient and/or patient representative have been notified of the change in billing status.           Radha Johnson MD  Utilization Management  [facility] Advisor  [facility]      "

## 2025-03-20 NOTE — PLAN OF CARE
Problem: Adult Inpatient Plan of Care  Goal: Plan of Care Review  Outcome: Progressing  Goal: Patient-Specific Goal (Individualized)  Outcome: Progressing  Goal: Absence of Hospital-Acquired Illness or Injury  Outcome: Progressing  Goal: Optimal Comfort and Wellbeing  Outcome: Progressing  Goal: Readiness for Transition of Care  Outcome: Progressing     Problem: Delirium  Goal: Optimal Coping  Outcome: Progressing  Goal: Improved Behavioral Control  Outcome: Progressing  Goal: Improved Attention and Thought Clarity  Outcome: Progressing  Goal: Improved Sleep  Outcome: Progressing     Problem: Skin Injury Risk Increased  Goal: Skin Health and Integrity  Outcome: Progressing     Problem: Diabetes Comorbidity  Goal: Blood Glucose Level Within Targeted Range  Outcome: Progressing     Problem: Wound  Goal: Optimal Coping  Outcome: Progressing  Goal: Optimal Functional Ability  Outcome: Progressing  Goal: Absence of Infection Signs and Symptoms  Outcome: Progressing  Goal: Improved Oral Intake  Outcome: Progressing  Goal: Optimal Pain Control and Function  Outcome: Progressing  Goal: Skin Health and Integrity  Outcome: Progressing  Goal: Optimal Wound Healing  Outcome: Progressing     Problem: Infection  Goal: Absence of Infection Signs and Symptoms  Outcome: Progressing     Problem: Fall Injury Risk  Goal: Absence of Fall and Fall-Related Injury  Outcome: Progressing

## 2025-03-21 VITALS
BODY MASS INDEX: 21.14 KG/M2 | RESPIRATION RATE: 20 BRPM | HEIGHT: 62 IN | WEIGHT: 114.88 LBS | OXYGEN SATURATION: 98 % | TEMPERATURE: 98 F | SYSTOLIC BLOOD PRESSURE: 96 MMHG | HEART RATE: 73 BPM | DIASTOLIC BLOOD PRESSURE: 62 MMHG

## 2025-03-21 PROBLEM — R53.1 WEAKNESS: Status: ACTIVE | Noted: 2025-03-21

## 2025-03-21 LAB
POCT GLUCOSE: 266 MG/DL (ref 70–110)
POCT GLUCOSE: 79 MG/DL (ref 70–110)

## 2025-03-21 PROCEDURE — G0378 HOSPITAL OBSERVATION PER HR: HCPCS

## 2025-03-21 PROCEDURE — 25000003 PHARM REV CODE 250: Performed by: INTERNAL MEDICINE

## 2025-03-21 PROCEDURE — 96372 THER/PROPH/DIAG INJ SC/IM: CPT | Performed by: PHYSICIAN ASSISTANT

## 2025-03-21 PROCEDURE — 97162 PT EVAL MOD COMPLEX 30 MIN: CPT

## 2025-03-21 PROCEDURE — 63600175 PHARM REV CODE 636 W HCPCS: Performed by: PHYSICIAN ASSISTANT

## 2025-03-21 RX ADMIN — Medication: at 08:03

## 2025-03-21 RX ADMIN — PANTOPRAZOLE SODIUM 40 MG: 40 TABLET, DELAYED RELEASE ORAL at 08:03

## 2025-03-21 RX ADMIN — METOPROLOL TARTRATE 50 MG: 50 TABLET, FILM COATED ORAL at 08:03

## 2025-03-21 RX ADMIN — NIFEDIPINE 30 MG: 30 TABLET, FILM COATED, EXTENDED RELEASE ORAL at 08:03

## 2025-03-21 RX ADMIN — MUPIROCIN: 20 OINTMENT TOPICAL at 08:03

## 2025-03-21 RX ADMIN — ASPIRIN 81 MG: 81 TABLET, COATED ORAL at 08:03

## 2025-03-21 RX ADMIN — INSULIN ASPART 6 UNITS: 100 INJECTION, SOLUTION INTRAVENOUS; SUBCUTANEOUS at 11:03

## 2025-03-21 NOTE — PLAN OF CARE
03/21/25 1314   Final Note   Assessment Type Final Discharge Note   Anticipated Discharge Disposition Home-Health   Post-Acute Status   Post-Acute Authorization Home Health   Home Health Status Set-up Complete/Auth obtained  (Derek PALMER with [facility])

## 2025-03-21 NOTE — PLAN OF CARE
Patient has been accepted to [facility] with [facility]. Spoke to patient's sister Clary and she will provide transport. Notified Clary patient's medications were resumed and information sent to [facility]. [facility] is ready to accept. Dr. Evi nguyen.

## 2025-03-21 NOTE — PLAN OF CARE
Problem: Physical Therapy  Goal: Physical Therapy Goal  Description: Goals to be met by: d/c     Patient will increase functional independence with mobility by performin. Supine to sit with Modified [place]  2. Sit to supine with Modified [place]  3. Sit to stand transfer with Stand-by Assistance  4. Bed to chair transfer with Stand-by Assistance using Slideboard  5. Wheelchair propulsion x150 feet with Supervision using bilateral uppper extremities    Outcome: Progressing

## 2025-03-21 NOTE — PLAN OF CARE
Problem: Adult Inpatient Plan of Care  Goal: Plan of Care Review  3/21/2025 1447 by Sherly Gaona RN  Outcome: Met  3/21/2025 1207 by Sherly Gaona RN  Outcome: Progressing  Goal: Patient-Specific Goal (Individualized)  3/21/2025 1447 by Sherly Gaona RN  Outcome: Met  3/21/2025 1207 by Sherly Gaona, RN  Outcome: Progressing  Goal: Absence of Hospital-Acquired Illness or Injury  3/21/2025 1447 by Sherly Gaona RN  Outcome: Met  3/21/2025 1207 by Sherly Gaona RN  Outcome: Progressing  Goal: Optimal Comfort and Wellbeing  3/21/2025 1447 by Sherly Gaona RN  Outcome: Met  3/21/2025 1207 by Sherly Gaona RN  Outcome: Progressing  Goal: Readiness for Transition of Care  3/21/2025 1447 by Sherly Gaona, RN  Outcome: Met  3/21/2025 1207 by Sherly Gaona RN  Outcome: Progressing     Problem: Delirium  Goal: Optimal Coping  3/21/2025 1447 by Sherly Gaona, RN  Outcome: Met  3/21/2025 1207 by Sherly Gaona RN  Outcome: Progressing  Goal: Improved Behavioral Control  3/21/2025 1447 by Sherly Gaona, RN  Outcome: Met  3/21/2025 1207 by Sherly Gaona RN  Outcome: Progressing  Goal: Improved Attention and Thought Clarity  3/21/2025 1447 by Sherly Gaona, RN  Outcome: Met  3/21/2025 1207 by Sherly Gaona, RN  Outcome: Progressing  Goal: Improved Sleep  3/21/2025 1447 by Sherly Gaona, RN  Outcome: Met  3/21/2025 1207 by Sherly Gaona, RN  Outcome: Progressing     Problem: Skin Injury Risk Increased  Goal: Skin Health and Integrity  3/21/2025 1447 by Sherly Gaona, RN  Outcome: Met  3/21/2025 1207 by Sherly Gaona RN  Outcome: Progressing     Problem: Diabetes Comorbidity  Goal: Blood Glucose Level Within Targeted Range  3/21/2025 1447 by Sherly Gaona, RN  Outcome: Met  3/21/2025 1207 by Sherly Gaona, RN  Outcome: Progressing     Problem: Wound  Goal: Optimal Coping  3/21/2025 1447 by Sherly Gaona, RN  Outcome: Met  3/21/2025 1207 by Sherly Gaona, RN  Outcome: Progressing  Goal: Optimal Functional Ability  3/21/2025 1447 by Jimenez,  PJ Dubois  Outcome: Met  3/21/2025 1207 by Sherly Gaona RN  Outcome: Progressing  Goal: Absence of Infection Signs and Symptoms  3/21/2025 1447 by Sherly Gaona RN  Outcome: Met  3/21/2025 1207 by Sherly Gaona RN  Outcome: Progressing  Goal: Improved Oral Intake  3/21/2025 1447 by Sherly Gaona RN  Outcome: Met  3/21/2025 1207 by Sherly Gaona RN  Outcome: Progressing  Goal: Optimal Pain Control and Function  3/21/2025 1447 by Sherly Gaona RN  Outcome: Met  3/21/2025 1207 by Sherly Gaona RN  Outcome: Progressing  Goal: Skin Health and Integrity  3/21/2025 1447 by Sherly Gaona RN  Outcome: Met  3/21/2025 1207 by Sherly Gaona RN  Outcome: Progressing  Goal: Optimal Wound Healing  3/21/2025 1447 by Sherly Gaona RN  Outcome: Met  3/21/2025 1207 by Sherly Gaona RN  Outcome: Progressing     Problem: Infection  Goal: Absence of Infection Signs and Symptoms  3/21/2025 1447 by Sherly Gaona RN  Outcome: Met  3/21/2025 1207 by Sherly Gaona RN  Outcome: Progressing     Problem: Fall Injury Risk  Goal: Absence of Fall and Fall-Related Injury  3/21/2025 1447 by Sherly Gaona, RN  Outcome: Met  3/21/2025 1207 by Sherly Gaona RN  Outcome: Progressing

## 2025-03-21 NOTE — PT/OT/SLP EVAL
Physical Therapy Evaluation    Patient Name:  Froy Barragan   MRN:  [id number]    Recommendations:     Discharge therapy intensity: Moderate Intensity Therapy   Discharge Equipment Recommendations: none   Barriers to discharge: Decreased caregiver support and Ongoing medical needs    Assessment:     Froy Barragan is a 64 y.o. male admitted with a medical diagnosis of admitted for placement, DM2, HTN. PMH: paraperesis.  He presents with the following impairments/functional limitations: decreased lower extremity function, weakness, impaired endurance, impaired functional mobility. Pt tolerated session well, motivated to participate. Able to perform bed mobility without assistance and scoot towards HOB x 4 reps to demonstrate technique of SB transfer without assistance. Leslie needed for sit to stand with BUE support. Pt admitted from home, but wanting assisted living placement at d/c 2/2 inability to care for himself at home.     Rehab Prognosis: Fair; patient would benefit from acute skilled PT services to address these deficits and reach maximum level of function.    Recent Surgery: * No surgery found *      Plan:     During this hospitalization, patient would benefit from acute PT services 5 x/week to address the identified rehab impairments via therapeutic activities, therapeutic exercises, wheelchair management/training and progress toward the following goals:    Plan of Care Expires:  04/26/25    Subjective     Chief Complaint: none noted  Patient/Family Comments/goals: to go to assisted living  Pain/Comfort:  Pain Rating 1: 0/10    Patients cultural, spiritual, Moravian conflicts given the current situation:      Living Environment:  Pt lives at home alone in a SLH with flat entrance. Pt has sitters at home to assist with ADLS.  Prior to admission, patients level of function was Toby SB to w/c, able to pull to stand without.  Equipment used at home: wheelchair, slide board, bath bench.  DME  owned (not currently used): none.  Upon discharge, patient will have assistance from facility staff.    Objective:     Communicated with RN prior to session.  Patient found HOB elevated with peripheral IV, colbert catheter  upon PT entry to room.    General Precautions: Standard,    Orthopedic Precautions:N/A   Braces: N/A  Respiratory Status: Room air    Exams:  RLE Strength: Deficits: hip flexion 3, knee extension 3+, DF 1  LLE Strength: Deficits: hip flexion 3, knee extension 3+, DF 1  Skin integrity:  scattered bruising and scabbing BUE/BLE      Functional Mobility:  Bed Mobility:     Scooting: stand by assistance x 4 reps   Supine to Sit: stand by assistance, hooking BLE to assist to EOB  Sit to Supine: stand by assistance  Transfers:     Sit to Stand:  minimum assistance with hand-held assist x 10 sec   Balance: fair static sitting balance      AM-PAC 6 CLICK MOBILITY  Total Score:16       Treatment & Education:    Patient provided with verbal education education regarding PT role/goals/POC, fall prevention, and safety awareness.  Understanding was verbalized.     Patient left right sidelying with all lines intact, call button in reach, wedge under L side, and pressure relief boots.    GOALS:   Multidisciplinary Problems       Physical Therapy Goals          Problem: Physical Therapy    Goal Priority Disciplines Outcome Interventions   Physical Therapy Goal     PT, PT/OT Progressing    Description: Goals to be met by: d/c     Patient will increase functional independence with mobility by performin. Supine to sit with Modified [place]  2. Sit to supine with Modified [place]  3. Sit to stand transfer with Stand-by Assistance  4. Bed to chair transfer with Stand-by Assistance using Slideboard  5. Wheelchair propulsion x150 feet with Supervision using bilateral uppper extremities                         History:     Past Medical History:   Diagnosis Date    Acute heart failure with reduced ejection  fraction (HFrEF, <= 40%) 03/13/2025    Anemia of chronic disease 07/06/2022    Arteriosclerosis of coronary artery 07/06/2022    Cervical myelopathy 07/06/2022    Cobalamin deficiency 07/06/2022    Gastroesophageal reflux disease 07/06/2022    Hypertension 07/06/2022    Low vitamin D level 07/06/2022    Mixed hyperlipidemia 07/06/2022    Paraparesis 07/06/2022    Stage 3a chronic kidney disease 07/06/2022    Type 2 diabetes mellitus 07/06/2022       Past Surgical History:   Procedure Laterality Date    MAGNETIC RESONANCE IMAGING N/A 9/19/2024    Procedure: MRI (Magnetic Resonance Imagine);  Surgeon: aDvid Amin DO;  [facility];  Service: Anesthesiology;  Laterality: N/A;    SPINE SURGERY         Time Tracking:     PT Received On: 03/21/25  PT Start Time: 0903     PT Stop Time: 0916  PT Total Time (min): 13 min     Billable Minutes: Evaluation 13      03/21/2025

## 2025-03-21 NOTE — DISCHARGE SUMMARY
[facility] Medicine Discharge Summary    Admit Date: 3/18/2025  Discharge Date and Time: 3/21/59843:40 PM  Admitting Physician:  Team  Discharging Physician: Elayne Torres MD.  Primary Care Physician: George Bran MD  Consults: {consultation: [id number]}    Discharge Diagnoses:  ***    Hospital Course:   ***  Pt was seen and examined on the day of discharge  Vitals:  VITAL SIGNS: 24 HRS MIN & MAX LAST   Temp  Min: 97.4 °F (36.3 °C)  Max: 98.7 °F (37.1 °C) 98 °F (36.7 °C)   BP  Min: 82/44  Max: 107/71 96/62   Pulse  Min: 64  Max: 79  73   Resp  Min: 17  Max: 20 20   SpO2  Min: 94 %  Max: 100 % 98 %       Physical Exam:  ***    Procedures Performed: No admission procedures for hospital encounter.     Significant Diagnostic Studies: See Full reports for all details    Recent Labs   Lab 03/18/25  1538 03/20/25  0527   WBC 11.30 7.93   RBC 2.63* 3.09*   HGB 8.3* 9.5*   HCT 25.7* 30.0*   MCV 97.7* 97.1*   MCH 31.6* 30.7   MCHC 32.3* 31.7*   RDW 14.6 14.3    218   MPV 11.2* 11.7*       Recent Labs   Lab 03/14/25  1500 03/18/25  1538 03/18/25  1835 03/20/25  0527   NA  --  141  --  140   K  --  4.0  --  3.9   CL  --  112*  --  110*   CO2  --  20*  --  21*   BUN  --  56.3*  --  41.1*   CREATININE  --  2.43*  --  2.11*   CALCIUM  --  8.4*  --  8.7*   PH 7.400  --  7.360  --    ALBUMIN  --  3.0*  --   --    ALKPHOS  --  86  --   --    ALT  --  36  --   --    AST  --  28  --   --    BILITOT  --  0.3  --   --         Microbiology Results (last 7 days)       Procedure Component Value Units Date/Time    Blood culture #2 **CANNOT BE ORDERED STAT** [[id number]]  (Normal) Collected: 03/18/25 1558    Order Status: Completed Specimen: Blood Updated: 03/20/25 1801     Blood Culture No Growth At 48 Hours    Blood culture #1 **CANNOT BE ORDERED STAT** [[id number]]  (Normal) Collected: 03/18/25 1558    Order Status: Completed Specimen: Blood Updated: 03/20/25 1801     Blood Culture No  Growth At 48 Hours             CT Head Without Contrast  Narrative: EXAMINATION:  CT HEAD WITHOUT CONTRAST    CLINICAL HISTORY:  Mental status change, unknown cause;    TECHNIQUE:  Sequential axial images were performed of the brain without contrast.    Dose product length of 906 mGycm. Automated exposure control was utilized to minimize radiation dose.    COMPARISON:  Three, 2025..    FINDINGS:  There is no intracranial mass effect, midline shift, hydrocephalus or hemorrhage. There is no sulcal effacement or low attenuation changes to suggest recent large vessel territory infarction.  There are bilateral basal ganglia and thalami old lacunar infarcts.  Chronic appearing periventricular and subcortical white matter low attenuation changes are present and are consistent with chronic microangiopathic ischemia. The ventricular system and sulcal markings prominence is consistent with atrophy. There is no acute extra axial fluid collection. Visualized paranasal sinuses are clear without mucosal thickening, polypoidal abnormality or air-fluid levels. Mastoid air cells aeration is optimal.  Impression: 1.  No acute intracranial findings identified.    2.  Old lacunar infarcts, chronic microangiopathic ischemia and atrophy.    Electronically signed by: Jose J Do  Date:    03/14/2025  Time:    13:35         Medication List        CONTINUE taking these medications      alcohol swabs Pad  Commonly known as: ALCOHOL PREP  Apply 1 each topically once daily.     alfuzosin 10 mg Tb24  Commonly known as: UROXATRAL  Take 1 tablet (10 mg total) by mouth daily with breakfast.     aspirin 81 MG EC tablet  Commonly known as: ECOTRIN     b complex vitamins capsule     blood-glucose meter Misc  Commonly known as: TRUE METRIX AIR GLUCOSE METER  1 each by Misc.(Non-Drug; Combo Route) route once daily.     DEXCOM G7 SENSOR Carmen  Generic drug: blood-glucose sensor  1 each by Misc.(Non-Drug; Combo Route) route once daily.     finasteride 5  mg tablet  Commonly known as: PROSCAR  Take 1 tablet (5 mg total) by mouth once daily.     insulin aspart U-100 100 unit/mL injection  Commonly known as: NovoLOG  Inject 0-10 Units into the skin before meals and at bedtime as needed for High Blood Sugar.     insulin glargine U-100 (Lantus) 100 unit/mL injection  Inject 15 Units into the skin every evening.     lancets 33 gauge Misc  Commonly known as: TRUEPLUS LANCETS  1 lancet  by Misc.(Non-Drug; Combo Route) route once daily.     metoprolol tartrate 50 MG tablet  Commonly known as: LOPRESSOR  Take 1 tablet (50 mg total) by mouth 2 (two) times daily.     mirtazapine 15 MG tablet  Commonly known as: REMERON  Take 1 tablet (15 mg total) by mouth every evening.     pantoprazole 40 MG tablet  Commonly known as: PROTONIX  Take 1 tablet (40 mg total) by mouth once daily.     rosuvastatin 40 MG Tab  Commonly known as: CRESTOR  Take 1 tablet (40 mg total) by mouth once daily.     TRUE METRIX GLUCOSE TEST STRIP Strp  Generic drug: blood sugar diagnostic  TEST BLOOD SUGAR EVERY DAY     TRUE METRIX LEVEL 1 Soln  Generic drug: blood glucose control, low  1 Bottle by Misc.(Non-Drug; Combo Route) route once daily.     vitamin D 1000 units Tab  Commonly known as: VITAMIN D3     zinc oxide 20 % ointment  Apply topically 2 (two) times a day.               Explained in detail to the patient about the discharge plan, medications, and follow-up visits. Pt understands and agrees with the treatment plan  Discharge Disposition: Home or Self Care   Discharged Condition: stable  Diet-   Dietary Orders (From admission, onward)       Start     Ordered    03/18/25 1834  Diet Consistent Carbohydrate 2000 Calories (up to 75 gm per meal)  Diet effective now        Question:  Total calories / carbs:  Answer:  2000 Calories (up to 75 gm per meal)    03/18/25 1833                   Medications Per DC med rec  Activities as tolerated   Contact information for after-discharge care       Home Medical  [facility] .    Service: Home Health Services  Contact information:  [address] [place] [id number]  [phone]                                 For further questions contact hospitalist office    Discharge time 33 minutes    For worsening symptoms, chest pain, shortness of breath, increased abdominal pain, high grade fever, stroke or stroke like symptoms, immediately go to the nearest Emergency Room or call 911 as soon as possible.      Elayne Calzada M.D on 3/21/2025. at 1:40 PM.

## 2025-03-23 LAB
BACTERIA BLD CULT: NORMAL
BACTERIA BLD CULT: NORMAL

## 2025-03-24 PROBLEM — D72.829 LEUKOCYTOSIS: Status: RESOLVED | Noted: 2024-10-12 | Resolved: 2025-03-24

## 2025-03-24 PROBLEM — E11.10 DKA (DIABETIC KETOACIDOSIS): Chronic | Status: RESOLVED | Noted: 2024-09-26 | Resolved: 2025-03-24

## 2025-03-24 NOTE — PHYSICIAN QUERY
Please clarify if there is any clinical correlation between UTI and urinary device.    Other explanation (please specify): Difficult to determine clinically, but highly likely related.

## 2025-03-25 ENCOUNTER — LAB REQUISITION (OUTPATIENT)
Dept: LAB | Facility: HOSPITAL | Age: 65
End: 2025-03-25
Payer: MEDICARE

## 2025-03-25 DIAGNOSIS — R19.7 DIARRHEA, UNSPECIFIED: ICD-10-CM

## 2025-03-25 LAB
ALBUMIN SERPL-MCNC: 3.4 G/DL (ref 3.4–4.8)
ALBUMIN/GLOB SERPL: 1.1 RATIO (ref 1.1–2)
ALP SERPL-CCNC: 76 UNIT/L (ref 40–150)
ALT SERPL-CCNC: 29 UNIT/L (ref 0–55)
ANION GAP SERPL CALC-SCNC: 10 MEQ/L
AST SERPL-CCNC: 21 UNIT/L (ref 11–45)
BASOPHILS # BLD AUTO: 0.06 X10(3)/MCL
BASOPHILS NFR BLD AUTO: 0.6 %
BILIRUB SERPL-MCNC: 0.3 MG/DL
BUN SERPL-MCNC: 38.6 MG/DL (ref 8.4–25.7)
CALCIUM SERPL-MCNC: 8.6 MG/DL (ref 8.8–10)
CHLORIDE SERPL-SCNC: 111 MMOL/L (ref 98–107)
CO2 SERPL-SCNC: 22 MMOL/L (ref 23–31)
CREAT SERPL-MCNC: 2.19 MG/DL (ref 0.72–1.25)
CREAT/UREA NIT SERPL: 18
EOSINOPHIL # BLD AUTO: 0.12 X10(3)/MCL (ref 0–0.9)
EOSINOPHIL NFR BLD AUTO: 1.3 %
ERYTHROCYTE [DISTWIDTH] IN BLOOD BY AUTOMATED COUNT: 14.6 % (ref 11.5–17)
GFR SERPLBLD CREATININE-BSD FMLA CKD-EPI: 32 ML/MIN/1.73/M2
GLOBULIN SER-MCNC: 3.2 GM/DL (ref 2.4–3.5)
GLUCOSE SERPL-MCNC: 222 MG/DL (ref 82–115)
HCT VFR BLD AUTO: 31 % (ref 42–52)
HGB BLD-MCNC: 9.8 G/DL (ref 14–18)
IMM GRANULOCYTES # BLD AUTO: 0.09 X10(3)/MCL (ref 0–0.04)
IMM GRANULOCYTES NFR BLD AUTO: 1 %
LYMPHOCYTES # BLD AUTO: 1.26 X10(3)/MCL (ref 0.6–4.6)
LYMPHOCYTES NFR BLD AUTO: 13.5 %
MCH RBC QN AUTO: 31.2 PG (ref 27–31)
MCHC RBC AUTO-ENTMCNC: 31.6 G/DL (ref 33–36)
MCV RBC AUTO: 98.7 FL (ref 80–94)
MONOCYTES # BLD AUTO: 0.49 X10(3)/MCL (ref 0.1–1.3)
MONOCYTES NFR BLD AUTO: 5.3 %
NEUTROPHILS # BLD AUTO: 7.3 X10(3)/MCL (ref 2.1–9.2)
NEUTROPHILS NFR BLD AUTO: 78.3 %
NRBC BLD AUTO-RTO: 0 %
PLATELET # BLD AUTO: 275 X10(3)/MCL (ref 130–400)
PMV BLD AUTO: 11.7 FL (ref 7.4–10.4)
POTASSIUM SERPL-SCNC: 4.7 MMOL/L (ref 3.5–5.1)
PROT SERPL-MCNC: 6.6 GM/DL (ref 5.8–7.6)
RBC # BLD AUTO: 3.14 X10(6)/MCL (ref 4.7–6.1)
SODIUM SERPL-SCNC: 143 MMOL/L (ref 136–145)
WBC # BLD AUTO: 9.32 X10(3)/MCL (ref 4.5–11.5)

## 2025-03-25 PROCEDURE — 85025 COMPLETE CBC W/AUTO DIFF WBC: CPT | Performed by: INTERNAL MEDICINE

## 2025-03-25 PROCEDURE — 80053 COMPREHEN METABOLIC PANEL: CPT | Performed by: INTERNAL MEDICINE

## 2025-04-02 ENCOUNTER — PATIENT OUTREACH (OUTPATIENT)
Dept: ADMINISTRATIVE | Facility: OTHER | Age: 65
End: 2025-04-02
Payer: MEDICARE

## 2025-04-03 PROBLEM — R53.1 WEAKNESS: Chronic | Status: ACTIVE | Noted: 2025-03-21

## 2025-04-03 PROBLEM — L89.152 PRESSURE ULCER OF SACRAL REGION, STAGE 2: Chronic | Status: ACTIVE | Noted: 2024-09-26

## 2025-04-03 NOTE — PROGRESS NOTES
LPN spoke to patient/caregiver as per OPCM referral for:   pt admitted with DKA, has hx of heart failure, numerous medical dx, sister takes care of him states becoming difficult to care for him,please assess ofr OPCM    Does the patient consent to completing the assessment/enrollment: Yes  Does the patient consent for LPN to speak to a caregiver? Yes, describe: Sisters Jaimie and Chantal    Health Insurance Coverage:     Does the patient have adequate health insurance coverage? Yes  Education provided: Yes    PCP Follow-Up Appointments:    Does the patient have a primary care provider? yes - George Bran MD  Date of last PCP appointment? 3/27/25  Next PCP appointment:  4/24/25  Was patient provided with education surrounding PCP services/creating a medical home? yes -       Specialist Appointments:     Does the patient have a pending specialist referral? no  Does the patient have an upcoming specialist appointment? yes - cardio, urology, nephrology  Is the patient pregnant? N/A  Does the patient have a mental health provider? no       Home Medications:     Reviewed medication list with patient? No  Is the patient able to afford their medications? Yes        Recent lab results:  Blood Sugar:    Provided education: No  Blood Pressure:   Provided education: No        Social Determinants of Health (SDOH)    Patient's identified areas of need:      Education/Resources provided:          Home Health/DME:    Current Home Health: No  Patient has all healthcare equipment/supplies indicated: yes      Additional Documentation:   Spoke to patient sisterJaimie (legal guardian) and sister Chantal for OPCM referral. Pt resides at [facility]. They bring him to [facility]. Sister denies any financial issues, issues affording meds, or with food/utility/transp/housing for pt. Asked to call if needs, will f/u in 1 month.       Follow up:   Patient agrees to scheduled follow up call.

## 2025-05-07 ENCOUNTER — PATIENT OUTREACH (OUTPATIENT)
Dept: ADMINISTRATIVE | Facility: OTHER | Age: 65
End: 2025-05-07
Payer: MEDICARE

## 2025-05-07 NOTE — PROGRESS NOTES
CHW - Case Closure    This LPN spoke to patient via telephone today.   Pt/Caregiver reported: Spoke to patient's sister, Chantal. Pt is at [place]. There is a nurse that helps adminster meds, aids, and they  bring to appts. She states pt will attend nephrology apt tomorrow, 5/8/25. Asked to call in future with needs.   Pt/Caregiver denied any additional needs at this time and agrees with episode closure at this time.  Provided patient with Community Health Worker's contact information and encouraged him/her to contact this Community Health Worker if additional needs arise.